# Patient Record
Sex: FEMALE | Race: WHITE | Employment: OTHER | URBAN - METROPOLITAN AREA
[De-identification: names, ages, dates, MRNs, and addresses within clinical notes are randomized per-mention and may not be internally consistent; named-entity substitution may affect disease eponyms.]

---

## 2018-03-22 ENCOUNTER — HOSPITAL ENCOUNTER (OUTPATIENT)
Age: 63
Discharge: HOME | End: 2018-03-22
Payer: COMMERCIAL

## 2018-03-22 LAB
ALBUMIN SERPL-MCNC: 3.3 G/DL (ref 3.5–5.2)
ALP BLD-CCNC: 87 U/L (ref 35–104)
ALT SERPL-CCNC: 11 U/L (ref 0–32)
ANION GAP SERPL CALCULATED.3IONS-SCNC: 14 MMOL/L (ref 7–16)
AST SERPL-CCNC: 15 U/L (ref 0–31)
BASOPHILS ABSOLUTE: 0.04 E9/L (ref 0–0.2)
BASOPHILS RELATIVE PERCENT: 0.6 % (ref 0–2)
BILIRUB SERPL-MCNC: 0.4 MG/DL (ref 0–1.2)
BUN BLDV-MCNC: 10 MG/DL (ref 8–23)
CALCIUM SERPL-MCNC: 9.4 MG/DL (ref 8.6–10.2)
CHLORIDE BLD-SCNC: 99 MMOL/L (ref 98–107)
CHOLESTEROL, TOTAL: 174 MG/DL (ref 0–199)
CO2: 31 MMOL/L (ref 22–29)
CREAT SERPL-MCNC: 1 MG/DL (ref 0.5–1)
EOSINOPHILS ABSOLUTE: 0.08 E9/L (ref 0.05–0.5)
EOSINOPHILS RELATIVE PERCENT: 1.2 % (ref 0–6)
GFR AFRICAN AMERICAN: >60
GFR NON-AFRICAN AMERICAN: 56 ML/MIN/1.73
GLUCOSE BLD-MCNC: 194 MG/DL (ref 74–109)
HCT VFR BLD CALC: 42.2 % (ref 34–48)
HDLC SERPL-MCNC: 30 MG/DL
HEMOGLOBIN: 13.6 G/DL (ref 11.5–15.5)
IMMATURE GRANULOCYTES #: 0.01 E9/L
IMMATURE GRANULOCYTES %: 0.1 % (ref 0–5)
LDL CHOLESTEROL CALCULATED: 98 MG/DL (ref 0–99)
LYMPHOCYTES ABSOLUTE: 2.79 E9/L (ref 1.5–4)
LYMPHOCYTES RELATIVE PERCENT: 41.5 % (ref 20–42)
MCH RBC QN AUTO: 32.3 PG (ref 26–35)
MCHC RBC AUTO-ENTMCNC: 32.2 % (ref 32–34.5)
MCV RBC AUTO: 100.2 FL (ref 80–99.9)
MONOCYTES ABSOLUTE: 0.64 E9/L (ref 0.1–0.95)
MONOCYTES RELATIVE PERCENT: 9.5 % (ref 2–12)
NEUTROPHILS ABSOLUTE: 3.17 E9/L (ref 1.8–7.3)
NEUTROPHILS RELATIVE PERCENT: 47.1 % (ref 43–80)
PDW BLD-RTO: 13.4 FL (ref 11.5–15)
PLATELET # BLD: 192 E9/L (ref 130–450)
PMV BLD AUTO: 11.5 FL (ref 7–12)
POTASSIUM SERPL-SCNC: 4.2 MMOL/L (ref 3.5–5)
RBC # BLD: 4.21 E12/L (ref 3.5–5.5)
SODIUM BLD-SCNC: 144 MMOL/L (ref 132–146)
TOTAL PROTEIN: 7.2 G/DL (ref 6.4–8.3)
TRIGL SERPL-MCNC: 230 MG/DL (ref 0–149)
TSH SERPL DL<=0.05 MIU/L-ACNC: 1.64 UIU/ML (ref 0.27–4.2)
VITAMIN D 25-HYDROXY: 57 NG/ML (ref 30–100)
VLDLC SERPL CALC-MCNC: 46 MG/DL
WBC # BLD: 6.7 E9/L (ref 4.5–11.5)

## 2018-03-22 PROCEDURE — 82306 VITAMIN D 25 HYDROXY: CPT

## 2018-03-22 PROCEDURE — 84443 ASSAY THYROID STIM HORMONE: CPT

## 2018-03-22 PROCEDURE — 80061 LIPID PANEL: CPT

## 2018-03-22 PROCEDURE — 80053 COMPREHEN METABOLIC PANEL: CPT

## 2018-03-22 PROCEDURE — 36415 COLL VENOUS BLD VENIPUNCTURE: CPT

## 2018-03-22 PROCEDURE — 85025 COMPLETE CBC W/AUTO DIFF WBC: CPT

## 2018-05-26 ENCOUNTER — HOSPITAL ENCOUNTER (OUTPATIENT)
Dept: MRI IMAGING | Age: 63
Discharge: HOME | End: 2018-05-28
Payer: COMMERCIAL

## 2018-05-26 ENCOUNTER — HOSPITAL ENCOUNTER (OUTPATIENT)
Age: 63
Discharge: HOME | End: 2018-05-26
Payer: COMMERCIAL

## 2018-05-26 LAB
BUN BLDV-MCNC: 10 MG/DL (ref 8–23)
CREAT SERPL-MCNC: 0.8 MG/DL (ref 0.5–1)
GFR AFRICAN AMERICAN: >60
GFR NON-AFRICAN AMERICAN: >60 ML/MIN/1.73

## 2018-05-26 PROCEDURE — 36415 COLL VENOUS BLD VENIPUNCTURE: CPT

## 2018-05-26 PROCEDURE — 82565 ASSAY OF CREATININE: CPT

## 2018-05-26 PROCEDURE — 84520 ASSAY OF UREA NITROGEN: CPT

## 2018-05-26 PROCEDURE — 70540 MRI ORBIT/FACE/NECK W/O DYE: CPT

## 2018-06-26 ENCOUNTER — TELEPHONE (OUTPATIENT)
Dept: CARDIOLOGY CLINIC | Age: 63
End: 2018-06-26

## 2018-07-23 ENCOUNTER — APPOINTMENT (OUTPATIENT)
Dept: GENERAL RADIOLOGY | Age: 63
DRG: 056 | End: 2018-07-23
Payer: MEDICARE

## 2018-07-23 ENCOUNTER — APPOINTMENT (OUTPATIENT)
Dept: CT IMAGING | Age: 63
DRG: 056 | End: 2018-07-23
Payer: MEDICARE

## 2018-07-23 ENCOUNTER — HOSPITAL ENCOUNTER (INPATIENT)
Age: 63
LOS: 11 days | Discharge: NURSING, REHAB OR LONG-TERM CARE | DRG: 056 | End: 2018-08-03
Attending: EMERGENCY MEDICINE | Admitting: INTERNAL MEDICINE
Payer: MEDICARE

## 2018-07-23 DIAGNOSIS — G91.2 NORMAL PRESSURE HYDROCEPHALUS (HCC): ICD-10-CM

## 2018-07-23 DIAGNOSIS — R29.90 STROKE-LIKE SYMPTOMS: Primary | ICD-10-CM

## 2018-07-23 DIAGNOSIS — M62.838 MUSCLE SPASMS OF BOTH LOWER EXTREMITIES: ICD-10-CM

## 2018-07-23 DIAGNOSIS — R52 PAIN: ICD-10-CM

## 2018-07-23 PROBLEM — R27.0 ATAXIA: Status: ACTIVE | Noted: 2018-07-23

## 2018-07-23 PROBLEM — N18.30 CKD (CHRONIC KIDNEY DISEASE) STAGE 3, GFR 30-59 ML/MIN (HCC): Status: ACTIVE | Noted: 2018-07-23

## 2018-07-23 PROBLEM — G45.9 TIA (TRANSIENT ISCHEMIC ATTACK): Status: ACTIVE | Noted: 2018-07-23

## 2018-07-23 PROBLEM — I27.20 PULMONARY HYPERTENSION (HCC): Status: ACTIVE | Noted: 2018-07-23

## 2018-07-23 PROBLEM — R09.02 HYPOXIA: Status: ACTIVE | Noted: 2018-07-23

## 2018-07-23 PROBLEM — R48.2 APRAXIA: Status: ACTIVE | Noted: 2018-07-23

## 2018-07-23 LAB
ALBUMIN SERPL-MCNC: 3.2 G/DL (ref 3.5–5.2)
ALP BLD-CCNC: 98 U/L (ref 35–104)
ALT SERPL-CCNC: 14 U/L (ref 0–32)
ANION GAP SERPL CALCULATED.3IONS-SCNC: 13 MMOL/L (ref 7–16)
APTT: <20 SEC (ref 24.5–35.1)
AST SERPL-CCNC: 26 U/L (ref 0–31)
BACTERIA: ABNORMAL /HPF
BASOPHILS ABSOLUTE: 0.06 E9/L (ref 0–0.2)
BASOPHILS RELATIVE PERCENT: 0.8 % (ref 0–2)
BILIRUB SERPL-MCNC: 1.1 MG/DL (ref 0–1.2)
BILIRUBIN URINE: NEGATIVE
BLOOD, URINE: NEGATIVE
BUN BLDV-MCNC: 23 MG/DL (ref 8–23)
CALCIUM SERPL-MCNC: 8.8 MG/DL (ref 8.6–10.2)
CHLORIDE BLD-SCNC: 87 MMOL/L (ref 98–107)
CLARITY: CLEAR
CO2: 37 MMOL/L (ref 22–29)
COLOR: YELLOW
CREAT SERPL-MCNC: 1.1 MG/DL (ref 0.5–1)
EKG ATRIAL RATE: 78 BPM
EKG P AXIS: 79 DEGREES
EKG P-R INTERVAL: 146 MS
EKG Q-T INTERVAL: 448 MS
EKG QRS DURATION: 146 MS
EKG QTC CALCULATION (BAZETT): 510 MS
EKG R AXIS: -2 DEGREES
EKG T AXIS: -159 DEGREES
EKG VENTRICULAR RATE: 78 BPM
EOSINOPHILS ABSOLUTE: 0.02 E9/L (ref 0.05–0.5)
EOSINOPHILS RELATIVE PERCENT: 0.3 % (ref 0–6)
EPITHELIAL CELLS, UA: ABNORMAL /HPF
GFR AFRICAN AMERICAN: >60
GFR NON-AFRICAN AMERICAN: 50 ML/MIN/1.73
GLUCOSE BLD-MCNC: 253 MG/DL (ref 74–109)
GLUCOSE URINE: NEGATIVE MG/DL
HCT VFR BLD CALC: 35.8 % (ref 34–48)
HEMOGLOBIN: 11.2 G/DL (ref 11.5–15.5)
IMMATURE GRANULOCYTES #: 0.04 E9/L
IMMATURE GRANULOCYTES %: 0.6 % (ref 0–5)
INR BLD: 1.5
KETONES, URINE: NEGATIVE MG/DL
LEUKOCYTE ESTERASE, URINE: NEGATIVE
LYMPHOCYTES ABSOLUTE: 2.45 E9/L (ref 1.5–4)
LYMPHOCYTES RELATIVE PERCENT: 33.9 % (ref 20–42)
MCH RBC QN AUTO: 28.6 PG (ref 26–35)
MCHC RBC AUTO-ENTMCNC: 31.3 % (ref 32–34.5)
MCV RBC AUTO: 91.6 FL (ref 80–99.9)
MONOCYTES ABSOLUTE: 0.69 E9/L (ref 0.1–0.95)
MONOCYTES RELATIVE PERCENT: 9.6 % (ref 2–12)
NEUTROPHILS ABSOLUTE: 3.96 E9/L (ref 1.8–7.3)
NEUTROPHILS RELATIVE PERCENT: 54.8 % (ref 43–80)
NITRITE, URINE: NEGATIVE
PDW BLD-RTO: 18.3 FL (ref 11.5–15)
PH UA: 5.5 (ref 5–9)
PLATELET # BLD: 191 E9/L (ref 130–450)
PMV BLD AUTO: 12.1 FL (ref 7–12)
POTASSIUM SERPL-SCNC: 4 MMOL/L (ref 3.5–5)
PROTEIN UA: 30 MG/DL
PROTHROMBIN TIME: 16.5 SEC (ref 9.3–12.4)
RBC # BLD: 3.91 E12/L (ref 3.5–5.5)
RBC UA: ABNORMAL /HPF (ref 0–2)
SODIUM BLD-SCNC: 137 MMOL/L (ref 132–146)
SPECIFIC GRAVITY UA: 1.02 (ref 1–1.03)
TOTAL PROTEIN: 7.2 G/DL (ref 6.4–8.3)
TROPONIN: <0.01 NG/ML (ref 0–0.03)
UROBILINOGEN, URINE: 4 E.U./DL
WBC # BLD: 7.2 E9/L (ref 4.5–11.5)
WBC UA: ABNORMAL /HPF (ref 0–5)

## 2018-07-23 PROCEDURE — 70450 CT HEAD/BRAIN W/O DYE: CPT

## 2018-07-23 PROCEDURE — 85025 COMPLETE CBC W/AUTO DIFF WBC: CPT

## 2018-07-23 PROCEDURE — 81001 URINALYSIS AUTO W/SCOPE: CPT

## 2018-07-23 PROCEDURE — 2060000000 HC ICU INTERMEDIATE R&B

## 2018-07-23 PROCEDURE — 36415 COLL VENOUS BLD VENIPUNCTURE: CPT

## 2018-07-23 PROCEDURE — 80053 COMPREHEN METABOLIC PANEL: CPT

## 2018-07-23 PROCEDURE — 2580000003 HC RX 258: Performed by: EMERGENCY MEDICINE

## 2018-07-23 PROCEDURE — 6360000004 HC RX CONTRAST MEDICATION: Performed by: RADIOLOGY

## 2018-07-23 PROCEDURE — 85730 THROMBOPLASTIN TIME PARTIAL: CPT

## 2018-07-23 PROCEDURE — 71275 CT ANGIOGRAPHY CHEST: CPT

## 2018-07-23 PROCEDURE — 99285 EMERGENCY DEPT VISIT HI MDM: CPT

## 2018-07-23 PROCEDURE — 71045 X-RAY EXAM CHEST 1 VIEW: CPT

## 2018-07-23 PROCEDURE — 84484 ASSAY OF TROPONIN QUANT: CPT

## 2018-07-23 PROCEDURE — 94761 N-INVAS EAR/PLS OXIMETRY MLT: CPT

## 2018-07-23 PROCEDURE — 85610 PROTHROMBIN TIME: CPT

## 2018-07-23 RX ORDER — 0.9 % SODIUM CHLORIDE 0.9 %
1000 INTRAVENOUS SOLUTION INTRAVENOUS ONCE
Status: COMPLETED | OUTPATIENT
Start: 2018-07-23 | End: 2018-07-23

## 2018-07-23 RX ORDER — FUROSEMIDE 20 MG/1
20 TABLET ORAL DAILY PRN
Status: ON HOLD | COMMUNITY
End: 2018-08-03 | Stop reason: HOSPADM

## 2018-07-23 RX ORDER — OXYBUTYNIN CHLORIDE 10 MG/1
10 TABLET, EXTENDED RELEASE ORAL DAILY
COMMUNITY

## 2018-07-23 RX ORDER — URSODIOL 300 MG/1
300 CAPSULE ORAL
Status: ON HOLD | COMMUNITY
End: 2018-08-03 | Stop reason: HOSPADM

## 2018-07-23 RX ORDER — FLUTICASONE PROPIONATE 110 UG/1
1 AEROSOL, METERED RESPIRATORY (INHALATION) 2 TIMES DAILY PRN
Status: ON HOLD | COMMUNITY
End: 2018-08-03 | Stop reason: HOSPADM

## 2018-07-23 RX ORDER — FLUTICASONE PROPIONATE 50 MCG
1 SPRAY, SUSPENSION (ML) NASAL DAILY PRN
COMMUNITY
End: 2022-10-04

## 2018-07-23 RX ORDER — SODIUM CHLORIDE 0.9 % (FLUSH) 0.9 %
10 SYRINGE (ML) INJECTION PRN
Status: DISCONTINUED | OUTPATIENT
Start: 2018-07-23 | End: 2018-07-27

## 2018-07-23 RX ADMIN — SODIUM CHLORIDE 1000 ML: 9 INJECTION, SOLUTION INTRAVENOUS at 19:16

## 2018-07-23 RX ADMIN — IOPAMIDOL 60 ML: 755 INJECTION, SOLUTION INTRAVENOUS at 18:30

## 2018-07-23 ASSESSMENT — ENCOUNTER SYMPTOMS
VOMITING: 0
BLOOD IN STOOL: 0
BACK PAIN: 0
ABDOMINAL PAIN: 0
SHORTNESS OF BREATH: 1
COUGH: 0
NAUSEA: 0

## 2018-07-23 NOTE — ED NOTES
Called to CT. Patient pulled IV out and holding it in her hand they stated.       Marty Mathis RN  07/23/18 [id number]

## 2018-07-23 NOTE — ED PROVIDER NOTES
respiratory distress. She has no wheezes. She has no rales. Abdominal: Soft. Bowel sounds are normal. There is no tenderness. There is no rebound and no guarding. Musculoskeletal: She exhibits no edema. Neurological: She is alert and oriented to person, place, and time. No cranial nerve deficit. Coordination normal.   Finger to nose ataxia,  slurred speech no aphasia   Skin: Skin is warm and dry. She is not diaphoretic. Nursing note and vitals reviewed. Procedures    MDM  Number of Diagnoses or Management Options  Diagnosis management comments: Patient evaluated for speech difficulties weakness and ataxia considering possible TIA or stroke. Patient's oxygen saturation requiring an additional O2 compared to normal. Will evaluate lungs the chest x-ray, labs. 4:00 patient has chest x-ray findings consistent with thoracic aortic dissection. Radiologist spoke with Dr. Dinorah Villafuerte. Ordered CTA chest to evaluate for possible dissection. Patient's symptoms are unchanged at this time      9:40 Reevaluated the patient. Notfified of imaging results. No PE or dissection present on scan. Will admit for stroke like symptoms. Speech improving, leg twitching still present. 9:45 Spoke with Dr Dong Gerard. He will admit the Patient.     --------------------------------------------- PAST HISTORY ---------------------------------------------  Past Medical History:  has a past medical history of Apraxia; Ataxia; CAD (coronary artery disease); CAD (coronary artery disease); Choledocholithiasis; CKD (chronic kidney disease) stage 3, GFR 30-59 ml/min; COPD (chronic obstructive pulmonary disease) ([facility] [address] Hyperlipidemia; Hypoxia; Pleural effusion; Pulmonary hypertension; Restless legs; S/P CABG x 2; and Smoker. Past Surgical History:  has a past surgical history that includes Coronary artery bypass graft (10/30/2002); Cholecystectomy (2002); cervical fusion (1999); Hysterectomy (1988); and Cardiac surgery.     Social History: reports that she has been smoking Cigarettes. She has a 20.00 pack-year smoking history. She has never used smokeless tobacco. She reports that she does not drink alcohol or use drugs. Family History: family history includes Diabetes in her mother; Emphysema in her father; Heart Attack in her sister; Heart Failure in her sister; High Blood Pressure in her mother. The patients home medications have been reviewed.     Allergies: Pentazocine lactate and Sulfa antibiotics    -------------------------------------------------- RESULTS -------------------------------------------------    LABS:  Results for orders placed or performed during the hospital encounter of 07/23/18   Respiratory Panel, Film Array   Result Value Ref Range    Film Array Adenovirus       Result: Not Detected  *  *  Normal Range: Not Detected      Film Array Coronavirus HKU1       Result: Not Detected  *  *  Normal Range: Not Detected      Film Array Conoravirus NL63       Result: Not Detected  *  *  Normal Range: Not Detected      Film Array Coronavirus 229E       Result: Not Detected  *  *  Normal Range: Not Detected      Film Array Coronavirus OC43       Result: Not Detected  *  *  Normal Range: Not Detected      Film Array Influenza A Virus       Result: Not Detected  *  *  Normal Range: Not Detected      Film Array Influenza A Virus H1       Result: Not Detected  *  *  Normal Range: Not Detected      Film Array Influenza A Virus 09H1       Result: Not Detected  *  *  Normal Range: Not Detected      Film Array Influenza A Virus H3       Result: Not Detected  *  *  Normal Range: Not Detected      Film Array Influenza B       Result: Not Detected  *  *  Normal Range: Not Detected      Film Array Metapneumovirus       Result: Not Detected  *  *  Normal Range: Not Detected      Film Array Parainfluenza Virus 1       Result: Not Detected  *  *  Normal Range: Not Detected      Film Array Parainfluenza Virus 2       Result: Not 93.4 80.0 - 99.9 fL    MCH 28.3 26.0 - 35.0 pg    MCHC 30.3 (L) 32.0 - 34.5 %    RDW 18.0 (H) 11.5 - 15.0 fL    Platelets 564 067 - 924 E9/L    MPV 11.9 7.0 - 12.0 fL    Neutrophils % 47.6 43.0 - 80.0 %    Immature Granulocytes % 0.2 0.0 - 5.0 %    Lymphocytes % 39.8 20.0 - 42.0 %    Monocytes % 10.4 2.0 - 12.0 %    Eosinophils % 0.9 0.0 - 6.0 %    Basophils % 1.1 0.0 - 2.0 %    Neutrophils # 2.62 1.80 - 7.30 E9/L    Immature Granulocytes # 0.01 E9/L    Lymphocytes # 2.19 1.50 - 4.00 E9/L    Monocytes # 0.57 0.10 - 0.95 E9/L    Eosinophils # 0.05 0.05 - 0.50 E9/L    Basophils # 0.06 0.00 - 0.20 E9/L   Brain Natriuretic Peptide   Result Value Ref Range    Pro-BNP 5,034 (H) 0 - 125 pg/mL   Hemoglobin A1C   Result Value Ref Range    Hemoglobin A1C 10.4 (H) 4.0 - 5.6 %   Lipid Panel   Result Value Ref Range    Cholesterol, Total 92 0 - 199 mg/dL    Triglycerides 108 0 - 149 mg/dL    HDL 21 >40 mg/dL    LDL Calculated 49 0 - 99 mg/dL    VLDL Cholesterol Calculated 22 mg/dL   Phosphorus   Result Value Ref Range    Phosphorus 2.9 2.5 - 4.5 mg/dL   Vitamin B12 & Folate   Result Value Ref Range    Vitamin B-12 1123 (H) 211 - 946 pg/mL    Folate 5.5 4.8 - 24.2 ng/mL   Troponin   Result Value Ref Range    Troponin <0.01 0.00 - 0.03 ng/mL   Troponin   Result Value Ref Range    Troponin <0.01 0.00 - 0.03 ng/mL   EKG 12 Lead   Result Value Ref Range    Ventricular Rate 78 BPM    Atrial Rate 78 BPM    P-R Interval 146 ms    QRS Duration 146 ms    Q-T Interval 448 ms    QTc Calculation (Bazett) 510 ms    P Axis 79 degrees    R Axis -2 degrees    T Axis -159 degrees       RADIOLOGY:  Ct Head Wo Contrast    Result Date: 2018  Patient MRN:  [id number] : 1955 Age: 60 years Gender: Female Order Date:  2018 3:00 PM EXAM: CT HEAD WO CONTRAST NUMBER OF IMAGES:  101 INDICATION: Difficulty walking and speaking Technique: Multiple contiguous 5 mm axial images were obtained from the skull base to the vertex without workstation, using maximum intensity projection (MIP) and volume rendered (3D) datasets. Low-dose CT  acquisition technique included one of following options: 1. Automated exposure control 2. Adjustment of MA and or KV according to patient's size or 3. Use of iterative reconstruction. Comparison: No prior studies available for comparison. Findings: Pulmonary vasculature: There is no evidence of filling defect in the main pulmonary artery, right or left pulmonary arteries, or segmental branches of the pulmonary arteries to suggest pulmonary embolism. Evaluation of the subsegmental branches is limited due to respiratory artifact. Lung parenchyma and pleura: The tracheobronchial tree is midline and the central bronchi are patent. There are moderate centrilobular bullous on this changes the lungs bilaterally, most prominently involving the upper lobes. There is a calcified pleural plaque in the posterior aspect of the superior segment of the left lower lobe (series 5, image 17). There is no definite pleural or parenchymal mass lesion. There is no focal consolidation, pleural effusion or pneumothorax. There are mild patchy bibasal dependent pulmonary parenchymal opacities consistent with mild atelectatic change. Thyroid: Demonstrates homogeneous enhancement. Mediastinum: No significant lymphadenopathy. Ruth: No significant lymphadenopathy. Heart and pericardium: The heart is enlarged. There is no pericardial effusion. There are moderate coronary artery calcifications. There is dilatation of the main pulmonary artery and, measuring 3.4 cm in maximum diameter (series 4, image 59). There is dilatation of the right pulmonary artery, measuring 2.8 cm in maximum diameter (series 4, image 64). There is dilatation of the left pulmonary artery, measuring 2.9 cm in maximum diameter (series 4, image 61). Chest wall: Midline sternotomy wires are identified, prior sternotomy. Axilla: No enlarged lymph nodes.  Visualized upper abdomen:

## 2018-07-24 ENCOUNTER — APPOINTMENT (OUTPATIENT)
Dept: ULTRASOUND IMAGING | Age: 63
DRG: 056 | End: 2018-07-24
Payer: MEDICARE

## 2018-07-24 LAB
ALBUMIN SERPL-MCNC: 2.6 G/DL (ref 3.5–5.2)
ALP BLD-CCNC: 82 U/L (ref 35–104)
ALT SERPL-CCNC: 11 U/L (ref 0–32)
ANION GAP SERPL CALCULATED.3IONS-SCNC: 12 MMOL/L (ref 7–16)
AST SERPL-CCNC: 16 U/L (ref 0–31)
BASOPHILS ABSOLUTE: 0.06 E9/L (ref 0–0.2)
BASOPHILS RELATIVE PERCENT: 1.1 % (ref 0–2)
BILIRUB SERPL-MCNC: 1 MG/DL (ref 0–1.2)
BUN BLDV-MCNC: 17 MG/DL (ref 8–23)
CALCIUM SERPL-MCNC: 8.2 MG/DL (ref 8.6–10.2)
CHLORIDE BLD-SCNC: 94 MMOL/L (ref 98–107)
CHOLESTEROL, TOTAL: 92 MG/DL (ref 0–199)
CO2: 34 MMOL/L (ref 22–29)
CREAT SERPL-MCNC: 1 MG/DL (ref 0.5–1)
EOSINOPHILS ABSOLUTE: 0.05 E9/L (ref 0.05–0.5)
EOSINOPHILS RELATIVE PERCENT: 0.9 % (ref 0–6)
FILM ARRAY ADENOVIRUS: NORMAL
FILM ARRAY BORDETELLA PERTUSSIS: NORMAL
FILM ARRAY CHLAMYDOPHILIA PNEUMONIAE: NORMAL
FILM ARRAY CORONAVIRUS 229E: NORMAL
FILM ARRAY CORONAVIRUS HKU1: NORMAL
FILM ARRAY CORONAVIRUS NL63: NORMAL
FILM ARRAY CORONAVIRUS OC43: NORMAL
FILM ARRAY INFLUENZA A VIRUS 09H1: NORMAL
FILM ARRAY INFLUENZA A VIRUS H1: NORMAL
FILM ARRAY INFLUENZA A VIRUS H3: NORMAL
FILM ARRAY INFLUENZA A VIRUS: NORMAL
FILM ARRAY INFLUENZA B: NORMAL
FILM ARRAY METAPNEUMOVIRUS: NORMAL
FILM ARRAY MYCOPLASMA PNEUMONIAE: NORMAL
FILM ARRAY PARAINFLUENZA VIRUS 1: NORMAL
FILM ARRAY PARAINFLUENZA VIRUS 2: NORMAL
FILM ARRAY PARAINFLUENZA VIRUS 3: NORMAL
FILM ARRAY PARAINFLUENZA VIRUS 4: NORMAL
FILM ARRAY RESPIRATORY SYNCITIAL VIRUS: NORMAL
FILM ARRAY RHINOVIRUS/ENTEROVIRUS: NORMAL
FOLATE: 5.5 NG/ML (ref 4.8–24.2)
GFR AFRICAN AMERICAN: >60
GFR NON-AFRICAN AMERICAN: 56 ML/MIN/1.73
GLUCOSE BLD-MCNC: 123 MG/DL (ref 74–109)
HBA1C MFR BLD: 10.4 % (ref 4–5.6)
HCT VFR BLD CALC: 34 % (ref 34–48)
HDLC SERPL-MCNC: 21 MG/DL
HEMOGLOBIN: 10.3 G/DL (ref 11.5–15.5)
IMMATURE GRANULOCYTES #: 0.01 E9/L
IMMATURE GRANULOCYTES %: 0.2 % (ref 0–5)
LDL CHOLESTEROL CALCULATED: 49 MG/DL (ref 0–99)
LYMPHOCYTES ABSOLUTE: 2.19 E9/L (ref 1.5–4)
LYMPHOCYTES RELATIVE PERCENT: 39.8 % (ref 20–42)
MCH RBC QN AUTO: 28.3 PG (ref 26–35)
MCHC RBC AUTO-ENTMCNC: 30.3 % (ref 32–34.5)
MCV RBC AUTO: 93.4 FL (ref 80–99.9)
MONOCYTES ABSOLUTE: 0.57 E9/L (ref 0.1–0.95)
MONOCYTES RELATIVE PERCENT: 10.4 % (ref 2–12)
NEUTROPHILS ABSOLUTE: 2.62 E9/L (ref 1.8–7.3)
NEUTROPHILS RELATIVE PERCENT: 47.6 % (ref 43–80)
PDW BLD-RTO: 18 FL (ref 11.5–15)
PHOSPHORUS: 2.9 MG/DL (ref 2.5–4.5)
PLATELET # BLD: 181 E9/L (ref 130–450)
PMV BLD AUTO: 11.9 FL (ref 7–12)
POTASSIUM SERPL-SCNC: 2.9 MMOL/L (ref 3.5–5)
PRO-BNP: 5034 PG/ML (ref 0–125)
RBC # BLD: 3.64 E12/L (ref 3.5–5.5)
SODIUM BLD-SCNC: 140 MMOL/L (ref 132–146)
TOTAL PROTEIN: 6.2 G/DL (ref 6.4–8.3)
TRIGL SERPL-MCNC: 108 MG/DL (ref 0–149)
TROPONIN: <0.01 NG/ML (ref 0–0.03)
TROPONIN: <0.01 NG/ML (ref 0–0.03)
TSH SERPL DL<=0.05 MIU/L-ACNC: 2.12 UIU/ML (ref 0.27–4.2)
VITAMIN B-12: 1123 PG/ML (ref 211–946)
VLDLC SERPL CALC-MCNC: 22 MG/DL
WBC # BLD: 5.5 E9/L (ref 4.5–11.5)

## 2018-07-24 PROCEDURE — 80053 COMPREHEN METABOLIC PANEL: CPT

## 2018-07-24 PROCEDURE — 6370000000 HC RX 637 (ALT 250 FOR IP): Performed by: INTERNAL MEDICINE

## 2018-07-24 PROCEDURE — 84484 ASSAY OF TROPONIN QUANT: CPT

## 2018-07-24 PROCEDURE — 87798 DETECT AGENT NOS DNA AMP: CPT

## 2018-07-24 PROCEDURE — 2580000003 HC RX 258: Performed by: EMERGENCY MEDICINE

## 2018-07-24 PROCEDURE — 83036 HEMOGLOBIN GLYCOSYLATED A1C: CPT

## 2018-07-24 PROCEDURE — 87502 INFLUENZA DNA AMP PROBE: CPT

## 2018-07-24 PROCEDURE — 87486 CHLMYD PNEUM DNA AMP PROBE: CPT

## 2018-07-24 PROCEDURE — 2060000000 HC ICU INTERMEDIATE R&B

## 2018-07-24 PROCEDURE — 2700000000 HC OXYGEN THERAPY PER DAY

## 2018-07-24 PROCEDURE — 84100 ASSAY OF PHOSPHORUS: CPT

## 2018-07-24 PROCEDURE — 36415 COLL VENOUS BLD VENIPUNCTURE: CPT

## 2018-07-24 PROCEDURE — 6360000002 HC RX W HCPCS: Performed by: INTERNAL MEDICINE

## 2018-07-24 PROCEDURE — 87581 M.PNEUMON DNA AMP PROBE: CPT

## 2018-07-24 PROCEDURE — 87040 BLOOD CULTURE FOR BACTERIA: CPT

## 2018-07-24 PROCEDURE — 84443 ASSAY THYROID STIM HORMONE: CPT

## 2018-07-24 PROCEDURE — 87503 INFLUENZA DNA AMP PROB ADDL: CPT

## 2018-07-24 PROCEDURE — 83880 ASSAY OF NATRIURETIC PEPTIDE: CPT

## 2018-07-24 PROCEDURE — 80061 LIPID PANEL: CPT

## 2018-07-24 PROCEDURE — 93880 EXTRACRANIAL BILAT STUDY: CPT

## 2018-07-24 PROCEDURE — 82746 ASSAY OF FOLIC ACID SERUM: CPT

## 2018-07-24 PROCEDURE — 82607 VITAMIN B-12: CPT

## 2018-07-24 PROCEDURE — 85025 COMPLETE CBC W/AUTO DIFF WBC: CPT

## 2018-07-24 PROCEDURE — 99222 1ST HOSP IP/OBS MODERATE 55: CPT | Performed by: PSYCHIATRY & NEUROLOGY

## 2018-07-24 PROCEDURE — 2580000003 HC RX 258: Performed by: INTERNAL MEDICINE

## 2018-07-24 PROCEDURE — 87088 URINE BACTERIA CULTURE: CPT

## 2018-07-24 RX ORDER — ISOSORBIDE MONONITRATE 30 MG/1
30 TABLET, EXTENDED RELEASE ORAL DAILY
Status: DISCONTINUED | OUTPATIENT
Start: 2018-07-24 | End: 2018-08-03 | Stop reason: HOSPADM

## 2018-07-24 RX ORDER — ONDANSETRON 2 MG/ML
4 INJECTION INTRAMUSCULAR; INTRAVENOUS EVERY 6 HOURS PRN
Status: DISCONTINUED | OUTPATIENT
Start: 2018-07-24 | End: 2018-08-03 | Stop reason: HOSPADM

## 2018-07-24 RX ORDER — ASPIRIN 81 MG/1
81 TABLET, CHEWABLE ORAL DAILY
Status: DISCONTINUED | OUTPATIENT
Start: 2018-07-24 | End: 2018-07-27

## 2018-07-24 RX ORDER — LORAZEPAM 2 MG/ML
2 INJECTION INTRAMUSCULAR ONCE
Status: COMPLETED | OUTPATIENT
Start: 2018-07-24 | End: 2018-07-25

## 2018-07-24 RX ORDER — OXYBUTYNIN CHLORIDE 10 MG/1
10 TABLET, EXTENDED RELEASE ORAL DAILY
Status: DISCONTINUED | OUTPATIENT
Start: 2018-07-24 | End: 2018-08-03 | Stop reason: HOSPADM

## 2018-07-24 RX ORDER — IBUPROFEN 200 MG
400 TABLET ORAL EVERY 6 HOURS PRN
Status: DISCONTINUED | OUTPATIENT
Start: 2018-07-24 | End: 2018-07-27

## 2018-07-24 RX ORDER — FLUTICASONE PROPIONATE 50 MCG
1 SPRAY, SUSPENSION (ML) NASAL DAILY PRN
Status: DISCONTINUED | OUTPATIENT
Start: 2018-07-24 | End: 2018-08-03 | Stop reason: HOSPADM

## 2018-07-24 RX ORDER — TRAZODONE HYDROCHLORIDE 150 MG/1
150 TABLET ORAL NIGHTLY
Status: DISCONTINUED | OUTPATIENT
Start: 2018-07-24 | End: 2018-08-03 | Stop reason: HOSPADM

## 2018-07-24 RX ORDER — FOLIC ACID 1 MG/1
1 TABLET ORAL DAILY
Status: DISCONTINUED | OUTPATIENT
Start: 2018-07-24 | End: 2018-08-03 | Stop reason: HOSPADM

## 2018-07-24 RX ORDER — POTASSIUM CHLORIDE 20 MEQ/1
20 TABLET, EXTENDED RELEASE ORAL 2 TIMES DAILY WITH MEALS
Status: DISCONTINUED | OUTPATIENT
Start: 2018-07-24 | End: 2018-07-27

## 2018-07-24 RX ORDER — PANTOPRAZOLE SODIUM 40 MG/1
40 TABLET, DELAYED RELEASE ORAL
Status: DISCONTINUED | OUTPATIENT
Start: 2018-07-24 | End: 2018-08-03 | Stop reason: HOSPADM

## 2018-07-24 RX ORDER — OXYCODONE HYDROCHLORIDE AND ACETAMINOPHEN 5; 325 MG/1; MG/1
1 TABLET ORAL EVERY 4 HOURS PRN
Status: DISCONTINUED | OUTPATIENT
Start: 2018-07-24 | End: 2018-07-27

## 2018-07-24 RX ORDER — CARVEDILOL 6.25 MG/1
18.75 TABLET ORAL 2 TIMES DAILY
Status: DISCONTINUED | OUTPATIENT
Start: 2018-07-24 | End: 2018-07-27

## 2018-07-24 RX ORDER — GABAPENTIN 300 MG/1
300 CAPSULE ORAL 3 TIMES DAILY
Status: DISCONTINUED | OUTPATIENT
Start: 2018-07-24 | End: 2018-08-01

## 2018-07-24 RX ORDER — LISINOPRIL 20 MG/1
40 TABLET ORAL DAILY
Status: DISCONTINUED | OUTPATIENT
Start: 2018-07-24 | End: 2018-08-02

## 2018-07-24 RX ORDER — TORSEMIDE 20 MG/1
20 TABLET ORAL DAILY
Status: DISCONTINUED | OUTPATIENT
Start: 2018-07-24 | End: 2018-08-03 | Stop reason: HOSPADM

## 2018-07-24 RX ORDER — SODIUM CHLORIDE 9 MG/ML
INJECTION, SOLUTION INTRAVENOUS CONTINUOUS
Status: DISCONTINUED | OUTPATIENT
Start: 2018-07-24 | End: 2018-07-28

## 2018-07-24 RX ORDER — ACETAMINOPHEN 325 MG/1
650 TABLET ORAL EVERY 4 HOURS PRN
Status: DISCONTINUED | OUTPATIENT
Start: 2018-07-24 | End: 2018-07-27 | Stop reason: SDUPTHER

## 2018-07-24 RX ORDER — ALBUTEROL SULFATE 2.5 MG/3ML
2.5 SOLUTION RESPIRATORY (INHALATION) EVERY 6 HOURS PRN
Status: DISCONTINUED | OUTPATIENT
Start: 2018-07-24 | End: 2018-08-03 | Stop reason: HOSPADM

## 2018-07-24 RX ORDER — FUROSEMIDE 20 MG/1
20 TABLET ORAL DAILY PRN
Status: DISCONTINUED | OUTPATIENT
Start: 2018-07-24 | End: 2018-08-03 | Stop reason: HOSPADM

## 2018-07-24 RX ORDER — BUPROPION HYDROCHLORIDE 300 MG/1
300 TABLET ORAL EVERY MORNING
Status: DISCONTINUED | OUTPATIENT
Start: 2018-07-24 | End: 2018-08-03 | Stop reason: HOSPADM

## 2018-07-24 RX ADMIN — PANTOPRAZOLE SODIUM 40 MG: 40 TABLET, DELAYED RELEASE ORAL at 08:21

## 2018-07-24 RX ADMIN — CARVEDILOL 18.75 MG: 6.25 TABLET, FILM COATED ORAL at 22:03

## 2018-07-24 RX ADMIN — ASPIRIN 81 MG 81 MG: 81 TABLET ORAL at 08:21

## 2018-07-24 RX ADMIN — POTASSIUM CHLORIDE 20 MEQ: 20 TABLET, EXTENDED RELEASE ORAL at 18:14

## 2018-07-24 RX ADMIN — TRAZODONE HYDROCHLORIDE 150 MG: 150 TABLET ORAL at 02:15

## 2018-07-24 RX ADMIN — OXYCODONE HYDROCHLORIDE AND ACETAMINOPHEN 1 TABLET: 5; 325 TABLET ORAL at 19:22

## 2018-07-24 RX ADMIN — BUPROPION HYDROCHLORIDE 300 MG: 300 TABLET, FILM COATED, EXTENDED RELEASE ORAL at 08:22

## 2018-07-24 RX ADMIN — BECLOMETHASONE DIPROPIONATE 4 PUFF: 80 AEROSOL, METERED RESPIRATORY (INHALATION) at 02:17

## 2018-07-24 RX ADMIN — ROPINIROLE HYDROCHLORIDE 3 MG: 2 TABLET, FILM COATED ORAL at 02:15

## 2018-07-24 RX ADMIN — OXYBUTYNIN CHLORIDE 10 MG: 10 TABLET, EXTENDED RELEASE ORAL at 08:22

## 2018-07-24 RX ADMIN — ROPINIROLE HYDROCHLORIDE 3 MG: 2 TABLET, FILM COATED ORAL at 22:04

## 2018-07-24 RX ADMIN — CARVEDILOL 18.75 MG: 6.25 TABLET, FILM COATED ORAL at 08:21

## 2018-07-24 RX ADMIN — SODIUM CHLORIDE: 9 INJECTION, SOLUTION INTRAVENOUS at 01:13

## 2018-07-24 RX ADMIN — BECLOMETHASONE DIPROPIONATE 4 PUFF: 80 AEROSOL, METERED RESPIRATORY (INHALATION) at 19:23

## 2018-07-24 RX ADMIN — OXYCODONE HYDROCHLORIDE AND ACETAMINOPHEN 1 TABLET: 5; 325 TABLET ORAL at 14:48

## 2018-07-24 RX ADMIN — TORSEMIDE 20 MG: 20 TABLET ORAL at 08:21

## 2018-07-24 RX ADMIN — GABAPENTIN 300 MG: 300 CAPSULE ORAL at 08:21

## 2018-07-24 RX ADMIN — ROPINIROLE HYDROCHLORIDE 3 MG: 2 TABLET, FILM COATED ORAL at 08:22

## 2018-07-24 RX ADMIN — LISINOPRIL 40 MG: 20 TABLET ORAL at 08:21

## 2018-07-24 RX ADMIN — OXYCODONE HYDROCHLORIDE AND ACETAMINOPHEN 1 TABLET: 5; 325 TABLET ORAL at 02:15

## 2018-07-24 RX ADMIN — Medication 10 ML: at 08:21

## 2018-07-24 RX ADMIN — BECLOMETHASONE DIPROPIONATE 4 PUFF: 80 AEROSOL, METERED RESPIRATORY (INHALATION) at 09:00

## 2018-07-24 RX ADMIN — ISOSORBIDE MONONITRATE 30 MG: 30 TABLET ORAL at 08:22

## 2018-07-24 RX ADMIN — ENOXAPARIN SODIUM 40 MG: 100 INJECTION SUBCUTANEOUS at 08:21

## 2018-07-24 RX ADMIN — OXYCODONE HYDROCHLORIDE AND ACETAMINOPHEN 1 TABLET: 5; 325 TABLET ORAL at 08:20

## 2018-07-24 RX ADMIN — TRAZODONE HYDROCHLORIDE 150 MG: 150 TABLET ORAL at 22:04

## 2018-07-24 RX ADMIN — SODIUM CHLORIDE: 9 INJECTION, SOLUTION INTRAVENOUS at 21:57

## 2018-07-24 RX ADMIN — CARVEDILOL 18.75 MG: 6.25 TABLET, FILM COATED ORAL at 02:14

## 2018-07-24 RX ADMIN — GABAPENTIN 300 MG: 300 CAPSULE ORAL at 22:03

## 2018-07-24 RX ADMIN — GABAPENTIN 300 MG: 300 CAPSULE ORAL at 13:46

## 2018-07-24 RX ADMIN — IBUPROFEN 400 MG: 200 TABLET, FILM COATED ORAL at 18:14

## 2018-07-24 ASSESSMENT — PAIN DESCRIPTION - ONSET: ONSET: ON-GOING

## 2018-07-24 ASSESSMENT — PAIN DESCRIPTION - LOCATION
LOCATION: BACK;NECK
LOCATION: BACK;NECK

## 2018-07-24 ASSESSMENT — PAIN SCALES - GENERAL
PAINLEVEL_OUTOF10: 9
PAINLEVEL_OUTOF10: 7
PAINLEVEL_OUTOF10: 5

## 2018-07-24 ASSESSMENT — PAIN DESCRIPTION - PAIN TYPE
TYPE: CHRONIC PAIN

## 2018-07-24 ASSESSMENT — PAIN DESCRIPTION - ORIENTATION: ORIENTATION: MID;UPPER

## 2018-07-24 ASSESSMENT — PAIN DESCRIPTION - DESCRIPTORS
DESCRIPTORS: ACHING;CONSTANT;DISCOMFORT
DESCRIPTORS: ACHING;DISCOMFORT;DULL

## 2018-07-24 ASSESSMENT — PAIN DESCRIPTION - PROGRESSION: CLINICAL_PROGRESSION: NOT CHANGED

## 2018-07-24 ASSESSMENT — PAIN DESCRIPTION - FREQUENCY: FREQUENCY: CONTINUOUS
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
## 2018-07-24 NOTE — PLAN OF CARE
Problem: Nutrition  Goal: Optimal nutrition therapy  Outcome: Ongoing  Nutrition Problem: Inadequate oral intake  Nutritional Goals: Consume >75% meals

## 2018-07-24 NOTE — PROGRESS NOTES
Monitoring, Education Initiated, Coordination of Care (Reviewed current diet order)    Nutrition Evaluation:   · Evaluation: Goals set   · Goals: Consume >75% meals    · Monitoring: Meal Intake, Fluid Balance, Ascites/Edema, Weight, Comparative Standards, Pertinent Labs    See Adult Nutrition Doc Flowsheet for more detail.      Electronically signed by Una Dawn RD, RADHA on 7/24/18 at 4:39 PM    Contact Number: [phone]

## 2018-07-24 NOTE — H&P
[address]                   [address] [facility],  [facility]                               HISTORY AND PHYSICAL    PATIENT NAME: Niels Lin                      :        1955  MED REC NO:   [id number]                            ROOM:       8503  ACCOUNT NO:   [de-identified]                           ADMIT DATE: 2018  PROVIDER:     Darian Fragoso DO    A 79-year-old female, patient of Dr. Pretty Harrington. I am asked to admit  and follow. Birth date 1955. CHIEF COMPLAINT:  Frequent falls, apraxia. HISTORY OF PRESENT ILLNESS:  A 79-year-old female who was sent from Dr. Roya Conde office to the ED because of severe worsening of slurred  speech, frequent falls, and difficulty thinking. Possibility of TIA verus  CVA. In the ED, the patient did undergo examination as well as CT of the brain  which showed no evidence of acute hemorrhage. There was noted to be  cerebral volume loss, trace microvascular disease, possibility of normal  pressure hydrocephalus. Chest x-ray showed enlarged cardiomediastinal  silhouette, basilar infiltrates, small effusion. She also underwent CT of  chest.  There was no evidence of pulmonary embolus, no focal  calcifications, moderate emphysema, calcified plaque posterior lung,  compatible with history of asbestos exposure; cardiomegaly. She is admitted for further evaluation and treatment of the above. History is obtained from the patient as well as . This is _____  current HPI.  states that he has noticed that the patient has been  falling more, at least once or twice a week for the last three months. He  has noticed more slurred speech for the last three months. The patient  admits she has fallen out of bed at least once a week perhaps more for the  last three to four months.   The patient states she has also had  intermittent chest discomfort, chest pain off and on for the fusion done at age 39. PHYSICAL EXAMINATION:  GENERAL:  A 41-year-old female, sitting quietly in bed, somewhat slow with  speech. No evidence of any slurred speech.  agrees the patient is  not as sharp with her speech as she was three to four months ago. VITAL SIGNS:  Temperature 98.5, respirations 18, pulse 88, blood pressure  142/69. HEENT:  Pupils equally round, reactive to light. Extraocular muscles are  intact. Sclerae white. Conjunctivae pink. Ears, nose, throat, negative  for discharge, drainage, deformity. NECK:  Supple. No masses or megaly. No bruits. Trachea midline. Thyroid  negative. No adenopathy. HEART:  Regular rate and rhythm. No murmur, gallop, or rub. LUNGS:  Clear to auscultation bilaterally. Nasal cannula oxygen in place  at this time. ABDOMEN:  Soft, nontender. No masses, megaly, rebound, or guarding. Liver  and spleen negative. Bowel sounds are normal.  EXTREMITIES:  Peripheral pulses decreased. Muscular strength equal and  normal bilaterally. She apparently does have trouble standing, off balance  which leads to her falls. LABORATORY DATA:  Monitor reviewed, has been in sinus since admission. Sodium 140, potassium 2.9, chloride 94, CO2 34, BUN 17, creatinine 1.0,  glucose 123, calcium 8.2, protein 6.2. ProBNP [id number]. LDL 49. Troponin is  negative x3. Albumin 2.6. Liver function is normal.  Hemoglobin 10.3, WBC  5.5. G40 is normal.  Folic acid is minimally normal at 5.5. Urinalysis,  again leukocytes esterase, negative nitrites. ASSESSMENT:  1. The patient with frequent episodes of falling down. Cannot exclude  cerebellar degeneration. Await the results of MRI. 2.  Possibility of vascular disease, causing the above. 3.  Equivocal normal pressure hydrocephalus. 4.  Equivocal peripheral neuropathy. If hemoglobin A1c is accurate, cannot  exclude diabetic peripheral neuropathy. 5.  Hypoxia, requiring nocturnal oxygen.   6.  Coronary artery disease with previous bypass. 7.  Pulmonary hypertension. 8.  Chronic kidney disease stage III. 9.  Hypokalemia. 10.  Borderline folic acid deficiency. PLAN:  Replace folic acid. Replace potassium. MRI of brain to be  completed. Valium to be given before MRI. The patient hoping for  discharge soon. Repeat A1c.         Adrian Kirkland DO    D: 07/24/2018 12:54:43       T: 07/24/2018 14:31:16     [email or web address]  Job#: [id number]     Doc#: [id number]    CC:

## 2018-07-25 ENCOUNTER — APPOINTMENT (OUTPATIENT)
Dept: MRI IMAGING | Age: 63
DRG: 056 | End: 2018-07-25
Payer: MEDICARE

## 2018-07-25 PROBLEM — J96.22 ACUTE ON CHRONIC RESPIRATORY FAILURE WITH HYPOXIA AND HYPERCAPNIA (HCC): Status: ACTIVE | Noted: 2018-07-25

## 2018-07-25 PROBLEM — J96.21 ACUTE ON CHRONIC RESPIRATORY FAILURE WITH HYPOXIA AND HYPERCAPNIA (HCC): Status: ACTIVE | Noted: 2018-07-25

## 2018-07-25 LAB
ANION GAP SERPL CALCULATED.3IONS-SCNC: 10 MMOL/L (ref 7–16)
BUN BLDV-MCNC: 16 MG/DL (ref 8–23)
CALCIUM SERPL-MCNC: 8.2 MG/DL (ref 8.6–10.2)
CHLORIDE BLD-SCNC: 100 MMOL/L (ref 98–107)
CO2: 33 MMOL/L (ref 22–29)
CREAT SERPL-MCNC: 1 MG/DL (ref 0.5–1)
GFR AFRICAN AMERICAN: >60
GFR NON-AFRICAN AMERICAN: 56 ML/MIN/1.73
GLUCOSE BLD-MCNC: 171 MG/DL (ref 74–109)
MAGNESIUM: 1.4 MG/DL (ref 1.6–2.6)
POTASSIUM REFLEX MAGNESIUM: 3.3 MMOL/L (ref 3.5–5)
SODIUM BLD-SCNC: 143 MMOL/L (ref 132–146)

## 2018-07-25 PROCEDURE — 83735 ASSAY OF MAGNESIUM: CPT

## 2018-07-25 PROCEDURE — 2700000000 HC OXYGEN THERAPY PER DAY

## 2018-07-25 PROCEDURE — 2060000000 HC ICU INTERMEDIATE R&B

## 2018-07-25 PROCEDURE — 80048 BASIC METABOLIC PNL TOTAL CA: CPT

## 2018-07-25 PROCEDURE — 6360000002 HC RX W HCPCS: Performed by: INTERNAL MEDICINE

## 2018-07-25 PROCEDURE — G8982 BODY POS GOAL STATUS: HCPCS | Performed by: PHYSICAL THERAPIST

## 2018-07-25 PROCEDURE — 97161 PT EVAL LOW COMPLEX 20 MIN: CPT | Performed by: PHYSICAL THERAPIST

## 2018-07-25 PROCEDURE — 6370000000 HC RX 637 (ALT 250 FOR IP): Performed by: INTERNAL MEDICINE

## 2018-07-25 PROCEDURE — 97167 OT EVAL HIGH COMPLEX 60 MIN: CPT

## 2018-07-25 PROCEDURE — 97535 SELF CARE MNGMENT TRAINING: CPT

## 2018-07-25 PROCEDURE — 36415 COLL VENOUS BLD VENIPUNCTURE: CPT

## 2018-07-25 PROCEDURE — G8987 SELF CARE CURRENT STATUS: HCPCS

## 2018-07-25 PROCEDURE — 72141 MRI NECK SPINE W/O DYE: CPT

## 2018-07-25 PROCEDURE — 70551 MRI BRAIN STEM W/O DYE: CPT

## 2018-07-25 PROCEDURE — G8981 BODY POS CURRENT STATUS: HCPCS | Performed by: PHYSICAL THERAPIST

## 2018-07-25 PROCEDURE — 99232 SBSQ HOSP IP/OBS MODERATE 35: CPT | Performed by: NURSE PRACTITIONER

## 2018-07-25 PROCEDURE — G8988 SELF CARE GOAL STATUS: HCPCS

## 2018-07-25 RX ORDER — MAGNESIUM SULFATE IN WATER 40 MG/ML
2 INJECTION, SOLUTION INTRAVENOUS ONCE
Status: COMPLETED | OUTPATIENT
Start: 2018-07-25 | End: 2018-07-25

## 2018-07-25 RX ADMIN — OXYCODONE HYDROCHLORIDE AND ACETAMINOPHEN 1 TABLET: 5; 325 TABLET ORAL at 01:06

## 2018-07-25 RX ADMIN — ENOXAPARIN SODIUM 40 MG: 100 INJECTION SUBCUTANEOUS at 08:59

## 2018-07-25 RX ADMIN — POTASSIUM CHLORIDE 20 MEQ: 20 TABLET, EXTENDED RELEASE ORAL at 08:58

## 2018-07-25 RX ADMIN — BECLOMETHASONE DIPROPIONATE 4 PUFF: 80 AEROSOL, METERED RESPIRATORY (INHALATION) at 08:59

## 2018-07-25 RX ADMIN — BUPROPION HYDROCHLORIDE 300 MG: 300 TABLET, FILM COATED, EXTENDED RELEASE ORAL at 08:58

## 2018-07-25 RX ADMIN — LISINOPRIL 40 MG: 20 TABLET ORAL at 08:58

## 2018-07-25 RX ADMIN — BECLOMETHASONE DIPROPIONATE 4 PUFF: 80 AEROSOL, METERED RESPIRATORY (INHALATION) at 20:43

## 2018-07-25 RX ADMIN — ROPINIROLE HYDROCHLORIDE 3 MG: 2 TABLET, FILM COATED ORAL at 08:59

## 2018-07-25 RX ADMIN — OXYBUTYNIN CHLORIDE 10 MG: 10 TABLET, EXTENDED RELEASE ORAL at 08:59

## 2018-07-25 RX ADMIN — ISOSORBIDE MONONITRATE 30 MG: 30 TABLET ORAL at 08:59

## 2018-07-25 RX ADMIN — FOLIC ACID 1 MG: 1 TABLET ORAL at 08:58

## 2018-07-25 RX ADMIN — TORSEMIDE 20 MG: 20 TABLET ORAL at 08:59

## 2018-07-25 RX ADMIN — TRAZODONE HYDROCHLORIDE 150 MG: 150 TABLET ORAL at 20:42

## 2018-07-25 RX ADMIN — ROPINIROLE HYDROCHLORIDE 3 MG: 2 TABLET, FILM COATED ORAL at 20:42

## 2018-07-25 RX ADMIN — MAGNESIUM SULFATE HEPTAHYDRATE 2 G: 40 INJECTION, SOLUTION INTRAVENOUS at 13:07

## 2018-07-25 RX ADMIN — GABAPENTIN 300 MG: 300 CAPSULE ORAL at 08:58

## 2018-07-25 RX ADMIN — PANTOPRAZOLE SODIUM 40 MG: 40 TABLET, DELAYED RELEASE ORAL at 08:59

## 2018-07-25 RX ADMIN — OXYCODONE HYDROCHLORIDE AND ACETAMINOPHEN 1 TABLET: 5; 325 TABLET ORAL at 16:49

## 2018-07-25 RX ADMIN — GABAPENTIN 300 MG: 300 CAPSULE ORAL at 20:42

## 2018-07-25 RX ADMIN — CARVEDILOL 18.75 MG: 6.25 TABLET, FILM COATED ORAL at 08:59

## 2018-07-25 RX ADMIN — CARVEDILOL 18.75 MG: 6.25 TABLET, FILM COATED ORAL at 20:42

## 2018-07-25 RX ADMIN — LORAZEPAM 2 MG: 2 INJECTION INTRAMUSCULAR; INTRAVENOUS at 06:29

## 2018-07-25 RX ADMIN — ASPIRIN 81 MG 81 MG: 81 TABLET ORAL at 08:59

## 2018-07-25 RX ADMIN — POTASSIUM CHLORIDE 20 MEQ: 20 TABLET, EXTENDED RELEASE ORAL at 16:46

## 2018-07-25 ASSESSMENT — PAIN SCALES - GENERAL
PAINLEVEL_OUTOF10: 7
PAINLEVEL_OUTOF10: 7
PAINLEVEL_OUTOF10: 0
PAINLEVEL_OUTOF10: 8
PAINLEVEL_OUTOF10: 0

## 2018-07-25 ASSESSMENT — PAIN DESCRIPTION - ORIENTATION: ORIENTATION: MID;UPPER

## 2018-07-25 ASSESSMENT — PAIN DESCRIPTION - FREQUENCY: FREQUENCY: CONTINUOUS

## 2018-07-25 ASSESSMENT — PAIN DESCRIPTION - DESCRIPTORS: DESCRIPTORS: ACHING;CONSTANT;NAGGING

## 2018-07-25 ASSESSMENT — PAIN DESCRIPTION - PROGRESSION: CLINICAL_PROGRESSION: NOT CHANGED

## 2018-07-25 ASSESSMENT — PAIN DESCRIPTION - ONSET: ONSET: ON-GOING

## 2018-07-25 ASSESSMENT — PAIN DESCRIPTION - LOCATION: LOCATION: BACK;NECK

## 2018-07-25 ASSESSMENT — PAIN DESCRIPTION - PAIN TYPE: TYPE: CHRONIC PAIN
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
## 2018-07-25 NOTE — PROGRESS NOTES
Upon shift assessment, patient without oxygen on. Currently set for 3L. Patient O2 Sat 66%. Placed NC O2 on patient and sat returned to 77%. Increased O2 to 4L, encouraged deep breathing. Sat @ 94%. Reminded patient to keep O2 on at all times. 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 Reiterated that O2 needs to stay on.

## 2018-07-25 NOTE — PROGRESS NOTES
PROGRESS  NOTE --                                                          INTERNAL  MEDICINE                                                                              I  PERSONALLY SAW , EXAMINED, AND CARED [address], 7/25/2018     LABS, XRAY ,CHART, AND MEDICATIONS  REVIEWED BY ME .        SUBJECTIVE: Ricky Veras is moderately sedated and sleepy, received Ativan prior to MRI; has been confused since. She is able to respond to questioning with head nods. Denies any chest pain dyspnea nausea emesis. Tolerating diet. No abdominal pain. Carotid ultrasound reveals no evidence of hemodynamically significant stenosis. MRI of brain shows no acute ischemic infarct or hemorrhage; diffuse age related atrophy and chronic microvascular changes; likely normal pressure hydrocephalus with ventricular enlargement. Sodium 143 potassium 3.3 chloride 100 CO2 33 BUN 16 creatinine 1.0 magnesium 1.4 glucose 171 calcium 8.2. Initial hemoglobin A1c 10.4; repeat A1c pending. No history of diabetes prior to this. Patient had significant oxygen desaturation today, when nasal cannula was removed; saturation in the 70s. ROS:  Negative to a 10 system review except that mentioned in the HPI. Objective:     PHYSICAL EXAM:    VS: BP (!) 159/95   Pulse 84   Temp 97.8 °F (36.6 °C) (Oral)   Resp 20   Ht 5' 2.99\" (1.6 m)   Wt 126 lb 4.8 oz (57.3 kg)   SpO2 95%   BMI 22.38 kg/m²   General appearance: Mildly sedated due to recent Ativan  Skin: Warm and dry ; no rashes  Head: Normocephalic. No masses, lesions or tenderness noted  Eyes: Conjunctivae pink, sclera white. PERRL,EOM-I  Ears: External ears normal  Nose/Sinuses: Nares normal. Septum midline. Mucosa normal. No drainage; nasal cannula oxygen in place  Oropharynx: Oropharynx clear with no exudate seen  Neck: Supple.  No jugular venous distension, lymphadenopathy or thyromegaly Trachea midline  Lungs: Clear to auscultation bilaterally. No rhonchi, crackles or wheezes  Heart: S1 S2  Regular rate and rhythm. No rub, murmur or gallop  Abdomen: Soft, non-tender. BS normal. No masses, organomegaly; no rebound or guarding  Extremities: No edema, Peripheral pulses weak  Musculoskeletal: Muscular strength appears weak   Neuro:   II-XII grossly intact . ALTMAN equally    TELEMETRY: REVIEWED--Telemetry: Sinus    ASSESSMENT:   Principal Problem:    Apraxia  Active Problems:    Pulmonary hypertension    CKD (chronic kidney disease) stage 3, GFR 30-59 ml/min    Hypoxia    CAD (coronary artery disease)    S/P CABG x 2    COPD (chronic obstructive pulmonary disease) (Formerly [facility])    Ataxia    TIA (transient ischemic attack)    Acute respiratory failure with hypoxia in the setting of hypoxia & COPD, being treated with O2 @ 4 L, QVAR, SpO2 monitoring. Resolved Problems:    * No resolved hospital problems. *      PLAN:  SEE ORDERS      RE  CHANGES AND FINDINGS   Medications reviewed with patient  GI prophylaxis  DVT prophylaxis  Consultants notes reviewed  Await results of cervical MRI  May need further workup and testing for low pressure hydrocephalus  Await repeat hemoglobin A1c  Continue other treatments  Replace magnesium IV      Discussed with patient and nursing.       Tom Vieira DO     12:43 PM     7/25/2018    TIME >25 MINUTES      Active Hospital Problems    Diagnosis    Pulmonary hypertension [I27.20]     Priority: High     Class: Chronic    Apraxia [R48.2]     Priority: High     Class: Acute    CKD (chronic kidney disease) stage 3, GFR 30-59 ml/min [N18.3]     Priority: High    Hypoxia [R09.02]     Priority: High     Class: Acute    Ataxia [R27.0]    TIA (transient ischemic attack) [G45.9]    COPD (chronic obstructive pulmonary disease) (Former[facility]) [J44.9]    CAD (coronary artery disease) [I25.10]    S/P CABG x 2 [Z95.1]                 ------------  INFORMATION  ----------- Nebulization Q6H PRN Marilee Slates Mihok, DO        0.9 % sodium chloride infusion   Intravenous Continuous Marilee Slates Mihok,  mL/hr at 18      enoxaparin (LOVENOX) injection 40 mg  40 mg Subcutaneous Daily Malik LORNA Mihok, DO   40 mg at 18 0859    pantoprazole (PROTONIX) tablet 40 mg  40 mg Oral QAM AC Malik LORNA Gomezhok, DO   40 mg at 18 0859    ondansetron (ZOFRAN) injection 4 mg  4 mg Intravenous Q6H PRN Marilee Slates Mihok, DO        acetaminophen (TYLENOL) tablet 650 mg  650 mg Oral Q4H PRN Marilee Slates Mihok, DO        aspirin chewable tablet 81 mg  81 mg Oral Daily Malik Gomezhok, DO   81 mg at 18 0859    potassium chloride (KLOR-CON M) extended release tablet 20 mEq  20 mEq Oral BID  Malik Gomezk, DO   20 mEq at  4520    folic acid (FOLVITE) tablet 1 mg  1 mg Oral Daily Malik ROTHMAN Mihok, DO   1 mg at 18 0858    sodium chloride flush 0.9 % injection 10 mL  10 mL Intravenous PRN Nae Gonsalez, DO   10 mL at 181     Scheduled Meds:   traZODone  150 mg Oral Nightly    torsemide  20 mg Oral Daily    rOPINIRole  3 mg Oral BID    oxybutynin  10 mg Oral Daily    lisinopril  40 mg Oral Daily    isosorbide mononitrate  30 mg Oral Daily    gabapentin  300 mg Oral TID    beclomethasone  4 puff Inhalation BID    carvedilol  18.75 mg Oral BID    buPROPion  300 mg Oral QAM    enoxaparin  40 mg Subcutaneous Daily    pantoprazole  40 mg Oral QAM AC    aspirin  81 mg Oral Daily    potassium chloride  20 mEq Oral BID WC    folic acid  1 mg Oral Daily       Continuous Infusions:   sodium chloride 100 mL/hr at 18         Data:   Temperature:  Current - Temp: 97.8 °F (36.6 °C);  Max - Temp  Av.7 °F (36.5 °C)  Min: 97.3 °F (36.3 °C)  Max: 98 °F (36.7 °C)    Respiratory Rate : Resp  Av  Min: 16  Max: 20    Pulse Range: Pulse  Av.5  Min: 82  Max: 86    Blood Presuure Range:  Systolic (42UPP), IDP:328 , Min:123 , XDS:022   ; Diastolic (24hrs), Av, Min:75, Max:95      Pulse ox Range: SpO2  Av.2 %  Min: 72 %  Max: 95 %    Patient Vitals for the past 8 hrs:   BP Temp Temp src Pulse Resp SpO2   18 0845 (!) 159/95 97.8 °F (36.6 °C) Oral 84 20 95 %         Intake/Output Summary (Last 24 hours) at 18 1243  Last data filed at 18 0657   Gross per 24 hour   Intake             1260 ml   Output                0 ml   Net             1260 ml       I/O last 3 completed shifts: In: 1440 [P.O.:540; I.V.:900]  Out: -     No intake/output data recorded.     Wt Readings from Last 3 Encounters:   18 126 lb 4.8 oz (57.3 kg)   17 130 lb (59 kg)   16 128 lb (58.1 kg)       Labs:   CBC:   Lab Results   Component Value Date    WBC 5.5 2018    RBC 3.64 2018    HGB 10.3 2018    HCT 34.0 2018    MCV 93.4 2018    MCH 28.3 2018    MCHC 30.3 2018    RDW 18.0 2018     2018    MPV 11.9 2018     CBC with Differential:    Lab Results   Component Value Date    WBC 5.5 2018    RBC 3.64 2018    HGB 10.3 2018    HCT 34.0 2018     2018    MCV 93.4 2018    MCH 28.3 2018    MCHC 30.3 2018    RDW 18.0 2018    SEGSPCT 63 2014    LYMPHOPCT 39.8 2018    MONOPCT 10.4 2018    BASOPCT 1.1 2018    MONOSABS 0.57 2018    LYMPHSABS 2.19 2018    EOSABS 0.05 2018    BASOSABS 0.06 2018     Hemoglobin/Hematocrit:    Lab Results   Component Value Date    HGB 10.3 2018    HCT 34.0 2018     CMP:    Lab Results   Component Value Date     2018    K 3.3 2018     2018    CO2 33 2018    BUN 16 2018    CREATININE 1.0 2018    GFRAA >60 2018    LABGLOM 56 2018    GLUCOSE 171 2018    GLUCOSE 111 2011    PROT 6.2 2018    LABALBU 2.6 2018    LABALBU 4.4 2011    CALCIUM 8.2 2018    BILITOT 1.0 07/24/2018    ALKPHOS 82 07/24/2018    AST 16 07/24/2018    ALT 11 07/24/2018     BMP:    Lab Results   Component Value Date     07/25/2018    K 3.3 07/25/2018     07/25/2018    CO2 33 07/25/2018    BUN 16 07/25/2018    LABALBU 2.6 07/24/2018    LABALBU 4.4 09/30/2011    CREATININE 1.0 07/25/2018    CALCIUM 8.2 07/25/2018    GFRAA >60 07/25/2018    LABGLOM 56 07/25/2018    GLUCOSE 171 07/25/2018    GLUCOSE 111 09/30/2011     Magnesium:    Lab Results   Component Value Date    MG 1.4 07/25/2018     Phosphorus:    Lab Results   Component Value Date    PHOS 2.9 07/24/2018     Uric Acid:  No results found for: LABURIC, URICACID  PT/INR:    Lab Results   Component Value Date    PROTIME 16.5 07/23/2018    INR 1.5 07/23/2018     Warfarin PT/INR:  No components found for: Caretha Ates  PTT:    Lab Results   Component Value Date    APTT <20.0 07/23/2018   [APTT}  Troponin:    Lab Results   Component Value Date    TROPONINI <0.01 07/24/2018     Last 3 Troponin:    Lab Results   Component Value Date    TROPONINI <0.01 07/24/2018    TROPONINI <0.01 07/24/2018    TROPONINI <0.01 07/23/2018     U/A:    Lab Results   Component Value Date    COLORU Yellow 07/23/2018    PROTEINU 30 07/23/2018    PHUR 5.5 07/23/2018    WBCUA 0-1 07/23/2018    RBCUA NONE 07/23/2018    BACTERIA FEW 07/23/2018    CLARITYU Clear 07/23/2018    SPECGRAV 1.020 07/23/2018    LEUKOCYTESUR Negative 07/23/2018    UROBILINOGEN 4.0 07/23/2018    BILIRUBINUR Negative 07/23/2018    BLOODU Negative 07/23/2018    GLUCOSEU Negative 07/23/2018     ABG:  No results found for: PH, PCO2, PO2, HCO3, BE, THGB, TCO2, O2SAT  HgBA1c:    Lab Results   Component Value Date    LABA1C 10.4 07/24/2018     FLP:    Lab Results   Component Value Date    TRIG 108 07/24/2018    HDL 21 07/24/2018    LDLCALC 49 07/24/2018    LABVLDL 22 07/24/2018     TSH:    Lab Results   Component Value Date    TSH 2.120 07/24/2018     VITAMIN B12: No components found for: B12  FOLATE: above.      XR CHEST PORTABLE   Final Result   ALERT:  THIS IS AN ABNORMAL REPORT. Findings communicated   directly with Dr. Whitney Garcia at approximately 7/23/2018 3:43 PM .   1. Enlarged cardiomediastinal silhouette since previous exam with   increased tortuosity of the descending thoracic aorta and enlargement. CT scan chest is recommended to exclude any potential of thoracic   aneurysm with consideration given to patient's renal status and any   potential safety or allergenic concerns. 2. Vascular calcifications thoracic aorta. 3. Suspected bibasilar infiltrate/pneumonia and small amounts of   bilateral pleural effusion.       MRI CERVICAL SPINE WO CONTRAST    (Results Pending)         Active Hospital Problems    Diagnosis    Pulmonary hypertension [I27.20]     Priority: High     Class: Chronic    Apraxia [R48.2]     Priority: High     Class: Acute    CKD (chronic kidney disease) stage 3, GFR 30-59 ml/min [N18.3]     Priority: High    Hypoxia [R09.02]     Priority: High     Class: Acute    Ataxia [R27.0]    TIA (transient ischemic attack) [G45.9]    COPD (chronic obstructive pulmonary disease) (HCC) [J44.9]    CAD (coronary artery disease) [I25.10]    S/P CABG x 2 [Z95.1]         Mohit Vieira  DO   12:43 PM     7/25/2018

## 2018-07-26 ENCOUNTER — ANESTHESIA EVENT (OUTPATIENT)
Dept: OPERATING ROOM | Age: 63
DRG: 056 | End: 2018-07-26
Payer: MEDICARE

## 2018-07-26 LAB
ANION GAP SERPL CALCULATED.3IONS-SCNC: 7 MMOL/L (ref 7–16)
ANION GAP SERPL CALCULATED.3IONS-SCNC: 9 MMOL/L (ref 7–16)
BUN BLDV-MCNC: 12 MG/DL (ref 8–23)
BUN BLDV-MCNC: 12 MG/DL (ref 8–23)
CALCIUM SERPL-MCNC: 8.2 MG/DL (ref 8.6–10.2)
CALCIUM SERPL-MCNC: 8.5 MG/DL (ref 8.6–10.2)
CHLORIDE BLD-SCNC: 95 MMOL/L (ref 98–107)
CHLORIDE BLD-SCNC: 97 MMOL/L (ref 98–107)
CO2: 36 MMOL/L (ref 22–29)
CO2: 38 MMOL/L (ref 22–29)
CREAT SERPL-MCNC: 0.8 MG/DL (ref 0.5–1)
CREAT SERPL-MCNC: 0.8 MG/DL (ref 0.5–1)
GFR AFRICAN AMERICAN: >60
GFR AFRICAN AMERICAN: >60
GFR NON-AFRICAN AMERICAN: >60 ML/MIN/1.73
GFR NON-AFRICAN AMERICAN: >60 ML/MIN/1.73
GLUCOSE BLD-MCNC: 182 MG/DL (ref 74–109)
GLUCOSE BLD-MCNC: 194 MG/DL (ref 74–109)
INR BLD: 1.2
MAGNESIUM: 1.4 MG/DL (ref 1.6–2.6)
MAGNESIUM: 1.9 MG/DL (ref 1.6–2.6)
POTASSIUM REFLEX MAGNESIUM: 3.3 MMOL/L (ref 3.5–5)
POTASSIUM REFLEX MAGNESIUM: 3.4 MMOL/L (ref 3.5–5)
PROTHROMBIN TIME: 13.8 SEC (ref 9.3–12.4)
SODIUM BLD-SCNC: 140 MMOL/L (ref 132–146)
SODIUM BLD-SCNC: 142 MMOL/L (ref 132–146)

## 2018-07-26 PROCEDURE — 85610 PROTHROMBIN TIME: CPT

## 2018-07-26 PROCEDURE — 2580000003 HC RX 258: Performed by: INTERNAL MEDICINE

## 2018-07-26 PROCEDURE — 99232 SBSQ HOSP IP/OBS MODERATE 35: CPT | Performed by: NURSE PRACTITIONER

## 2018-07-26 PROCEDURE — 2060000000 HC ICU INTERMEDIATE R&B

## 2018-07-26 PROCEDURE — 6370000000 HC RX 637 (ALT 250 FOR IP): Performed by: INTERNAL MEDICINE

## 2018-07-26 PROCEDURE — 6360000002 HC RX W HCPCS: Performed by: INTERNAL MEDICINE

## 2018-07-26 PROCEDURE — 2700000000 HC OXYGEN THERAPY PER DAY

## 2018-07-26 PROCEDURE — 80048 BASIC METABOLIC PNL TOTAL CA: CPT

## 2018-07-26 PROCEDURE — 83735 ASSAY OF MAGNESIUM: CPT

## 2018-07-26 PROCEDURE — 36415 COLL VENOUS BLD VENIPUNCTURE: CPT

## 2018-07-26 RX ORDER — MAGNESIUM SULFATE IN WATER 40 MG/ML
2 INJECTION, SOLUTION INTRAVENOUS ONCE
Status: COMPLETED | OUTPATIENT
Start: 2018-07-26 | End: 2018-07-26

## 2018-07-26 RX ADMIN — LISINOPRIL 40 MG: 20 TABLET ORAL at 09:10

## 2018-07-26 RX ADMIN — GABAPENTIN 300 MG: 300 CAPSULE ORAL at 14:47

## 2018-07-26 RX ADMIN — MAGNESIUM SULFATE HEPTAHYDRATE 2 G: 40 INJECTION, SOLUTION INTRAVENOUS at 12:16

## 2018-07-26 RX ADMIN — FOLIC ACID 1 MG: 1 TABLET ORAL at 09:10

## 2018-07-26 RX ADMIN — CARVEDILOL 18.75 MG: 6.25 TABLET, FILM COATED ORAL at 20:01

## 2018-07-26 RX ADMIN — BUPROPION HYDROCHLORIDE 300 MG: 300 TABLET, FILM COATED, EXTENDED RELEASE ORAL at 09:09

## 2018-07-26 RX ADMIN — GABAPENTIN 300 MG: 300 CAPSULE ORAL at 09:10

## 2018-07-26 RX ADMIN — OXYBUTYNIN CHLORIDE 10 MG: 10 TABLET, EXTENDED RELEASE ORAL at 09:09

## 2018-07-26 RX ADMIN — ROPINIROLE HYDROCHLORIDE 3 MG: 2 TABLET, FILM COATED ORAL at 09:09

## 2018-07-26 RX ADMIN — TORSEMIDE 20 MG: 20 TABLET ORAL at 09:10

## 2018-07-26 RX ADMIN — PANTOPRAZOLE SODIUM 40 MG: 40 TABLET, DELAYED RELEASE ORAL at 05:46

## 2018-07-26 RX ADMIN — CARVEDILOL 18.75 MG: 6.25 TABLET, FILM COATED ORAL at 09:10

## 2018-07-26 RX ADMIN — SODIUM CHLORIDE: 9 INJECTION, SOLUTION INTRAVENOUS at 02:57

## 2018-07-26 RX ADMIN — OXYCODONE HYDROCHLORIDE AND ACETAMINOPHEN 1 TABLET: 5; 325 TABLET ORAL at 14:47

## 2018-07-26 RX ADMIN — ISOSORBIDE MONONITRATE 30 MG: 30 TABLET ORAL at 09:10

## 2018-07-26 RX ADMIN — SODIUM CHLORIDE: 9 INJECTION, SOLUTION INTRAVENOUS at 22:45

## 2018-07-26 RX ADMIN — OXYCODONE HYDROCHLORIDE AND ACETAMINOPHEN 1 TABLET: 5; 325 TABLET ORAL at 20:00

## 2018-07-26 RX ADMIN — GABAPENTIN 300 MG: 300 CAPSULE ORAL at 20:01

## 2018-07-26 RX ADMIN — ROPINIROLE HYDROCHLORIDE 3 MG: 2 TABLET, FILM COATED ORAL at 20:01

## 2018-07-26 RX ADMIN — TRAZODONE HYDROCHLORIDE 150 MG: 150 TABLET ORAL at 20:01

## 2018-07-26 RX ADMIN — ASPIRIN 81 MG 81 MG: 81 TABLET ORAL at 09:09

## 2018-07-26 RX ADMIN — BECLOMETHASONE DIPROPIONATE 4 PUFF: 80 AEROSOL, METERED RESPIRATORY (INHALATION) at 09:11

## 2018-07-26 RX ADMIN — POTASSIUM CHLORIDE 20 MEQ: 20 TABLET, EXTENDED RELEASE ORAL at 17:26

## 2018-07-26 RX ADMIN — POTASSIUM CHLORIDE 20 MEQ: 20 TABLET, EXTENDED RELEASE ORAL at 09:10

## 2018-07-26 RX ADMIN — ENOXAPARIN SODIUM 40 MG: 100 INJECTION SUBCUTANEOUS at 09:09

## 2018-07-26 ASSESSMENT — PAIN SCALES - GENERAL
PAINLEVEL_OUTOF10: 3
PAINLEVEL_OUTOF10: 7
PAINLEVEL_OUTOF10: 0
PAINLEVEL_OUTOF10: 3
PAINLEVEL_OUTOF10: 0

## 2018-07-26 NOTE — PROGRESS NOTES
40 mg Oral Daily Liz Evans Mihok, DO   40 mg at 07/26/18 0910    isosorbide mononitrate (IMDUR) extended release tablet 30 mg  30 mg Oral Daily Malik Vieira, DO   30 mg at 07/26/18 0910    ibuprofen (ADVIL;MOTRIN) tablet 400 mg  400 mg Oral Q6H PRN Liz Gomezhok, DO   400 mg at 07/24/18 1814    gabapentin (NEURONTIN) capsule 300 mg  300 mg Oral TID Liz Gomezhok, DO   300 mg at 07/26/18 5104    furosemide (LASIX) tablet 20 mg  20 mg Oral Daily PRN Liz Gomezhok, DO        beclomethasone (QVAR) 80 MCG/ACT inhaler 4 puff  4 puff Inhalation BID Liz Gomezk, DO   4 puff at 07/26/18 0911    fluticasone (FLONASE) 50 MCG/ACT nasal spray 1 spray  1 spray Nasal Daily PRN Liz Gomezk, DO        carvedilol (COREG) tablet 18.75 mg  18.75 mg Oral BID Liz Gomezk, DO   18.75 mg at 07/26/18 0910    buPROPion (WELLBUTRIN XL) extended release tablet 300 mg  300 mg Oral QAM Malik Vieira, DO   300 mg at 07/26/18 0909    albuterol (PROVENTIL) nebulizer solution 2.5 mg  2.5 mg Nebulization Q6H PRN Liz Gomezhok, DO        0.9 % sodium chloride infusion   Intravenous Continuous Liz Vieira,  mL/hr at 07/26/18 0257      enoxaparin (LOVENOX) injection 40 mg  40 mg Subcutaneous Daily Malik Gomezk, DO   40 mg at 07/26/18 0909    pantoprazole (PROTONIX) tablet 40 mg  40 mg Oral QAM AC Malik Gomezk, DO   40 mg at 07/26/18 0546    ondansetron (ZOFRAN) injection 4 mg  4 mg Intravenous Q6H PRN Liz Gomezhok, DO        acetaminophen (TYLENOL) tablet 650 mg  650 mg Oral Q4H PRN Liz Gomezhok, DO        aspirin chewable tablet 81 mg  81 mg Oral Daily Malik Gomezk, DO   81 mg at 07/26/18 0909    potassium chloride (KLOR-CON M) extended release tablet 20 mEq  20 mEq Oral BID  Malik Vieira DO   20 mEq at 09/79/02 0617    folic acid (FOLVITE) tablet 1 mg  1 mg Oral Daily Malik Vieira, DO   1 mg at 07/26/18 0910    sodium chloride flush 0.9 % injection 10 mL  10 mL Intravenous PRN Valdez Milo DO Wallace   10 mL at 18 7591     Scheduled Meds:   magnesium sulfate  2 g Intravenous Once    traZODone  150 mg Oral Nightly    torsemide  20 mg Oral Daily    rOPINIRole  3 mg Oral BID    oxybutynin  10 mg Oral Daily    lisinopril  40 mg Oral Daily    isosorbide mononitrate  30 mg Oral Daily    gabapentin  300 mg Oral TID    beclomethasone  4 puff Inhalation BID    carvedilol  18.75 mg Oral BID    buPROPion  300 mg Oral QAM    enoxaparin  40 mg Subcutaneous Daily    pantoprazole  40 mg Oral QAM AC    aspirin  81 mg Oral Daily    potassium chloride  20 mEq Oral BID WC    folic acid  1 mg Oral Daily       Continuous Infusions:   sodium chloride 100 mL/hr at 18 0257         Data:   Temperature:  Current - Temp: 98.7 °F (37.1 °C); Max - Temp  Av.3 °F (36.8 °C)  Min: 97.4 °F (36.3 °C)  Max: 99.3 °F (37.4 °C)    Respiratory Rate : Resp  Av.4  Min: 18  Max: 20    Pulse Range: Pulse  Av.8  Min: 77  Max: 93    Blood Presuure Range:  Systolic (79JUZ), WINSTON:809 , Min:138 , NVI:873   ; Diastolic (75UMD), TNN:47, Min:72, Max:82      Pulse ox Range: SpO2  Av.3 %  Min: 93 %  Max: 95 %    Patient Vitals for the past 8 hrs:   BP Temp Temp src Pulse Resp SpO2   18 1132 (!) 142/76 98.7 °F (37.1 °C) Oral 93 18 -   18 0800 (!) 140/72 97.9 °F (36.6 °C) - 93  -   18 0441 (!) 143/81 99.3 °F (37.4 °C) Oral 83 18 -   18 0410 - - - - - 95 %         Intake/Output Summary (Last 24 hours) at 18 1158  Last data filed at 18 1112   Gross per 24 hour   Intake             5703 ml   Output              900 ml   Net             4803 ml       I/O last 3 completed shifts:   In: 7236 [P.O.:480; I.V.:4923]  Out: 900 [Urine:900]    I/O this shift:  In: 300 [P.O.:300]  Out: -     Wt Readings from Last 3 Encounters:   18 126 lb 4.8 oz (57.3 kg)   17 130 lb (59 kg)   16 128 lb (58.1 kg)       Labs:   CBC:   Lab Results   Component Value Date    WBC 5.5 07/23/2018     Warfarin PT/INR:  No components found for: Monica Ham  PTT:    Lab Results   Component Value Date    APTT <20.0 07/23/2018   [APTT}  Troponin:    Lab Results   Component Value Date    TROPONINI <0.01 07/24/2018     Last 3 Troponin:    Lab Results   Component Value Date    TROPONINI <0.01 07/24/2018    TROPONINI <0.01 07/24/2018    TROPONINI <0.01 07/23/2018     U/A:    Lab Results   Component Value Date    COLORU Yellow 07/23/2018    PROTEINU 30 07/23/2018    PHUR 5.5 07/23/2018    WBCUA 0-1 07/23/2018    RBCUA NONE 07/23/2018    BACTERIA FEW 07/23/2018    CLARITYU Clear 07/23/2018    SPECGRAV 1.020 07/23/2018    LEUKOCYTESUR Negative 07/23/2018    UROBILINOGEN 4.0 07/23/2018    BILIRUBINUR Negative 07/23/2018    BLOODU Negative 07/23/2018    GLUCOSEU Negative 07/23/2018     ABG:  No results found for: PH, PCO2, PO2, HCO3, BE, THGB, TCO2, O2SAT  HgBA1c:    Lab Results   Component Value Date    LABA1C 10.4 07/24/2018     FLP:    Lab Results   Component Value Date    TRIG 108 07/24/2018    HDL 21 07/24/2018    LDLCALC 49 07/24/2018    LABVLDL 22 07/24/2018     TSH:    Lab Results   Component Value Date    TSH 2.120 07/24/2018     VITAMIN B12: No components found for: B12  FOLATE:    Lab Results   Component Value Date    FOLATE 5.5 07/24/2018     GILBERT:  No results found for: ANATITER, GILBERT  RF:  No results found for: RF     CBC:  Recent Labs      07/23/18   1500  07/24/18   0653   WBC  7.2  5.5   RBC  3.91  3.64   HGB  11.2*  10.3*   HCT  35.8  34.0   PLT  191  181   MCV  91.6  93.4   MCH  28.6  28.3   MCHC  31.3*  30.3*   RDW  18.3*  18.0*   LYMPHOPCT  33.9  39.8   MONOPCT  9.6  10.4   BASOPCT  0.8  1.1   MONOSABS  0.69  0.57   LYMPHSABS  2.45  2.19   EOSABS  0.02*  0.05   BASOSABS  0.06  0.06          H & H :  Recent Labs      07/23/18   1500  07/24/18   0653   HGB  11.2*  10.3*       TSH:  Recent Labs      07/24/18   0045   TSH  2.120       GLUCOSE:No results for input(s): POCGLU in the last 72 7/23/2018 3:43 PM .   1. Enlarged cardiomediastinal silhouette since previous exam with   increased tortuosity of the descending thoracic aorta and enlargement. CT scan chest is recommended to exclude any potential of thoracic   aneurysm with consideration given to patient's renal status and any   potential safety or allergenic concerns. 2. Vascular calcifications thoracic aorta. 3. Suspected bibasilar infiltrate/pneumonia and small amounts of   bilateral pleural effusion.             Active Hospital Problems    Diagnosis    Acute on chronic respiratory failure with hypoxia and hypercapnia (HCC) [J96.21, J96.22]     Priority: High     Class: Acute    Pulmonary hypertension [I27.20]     Priority: High     Class: Chronic    Apraxia [R48.2]     Priority: High     Class: Acute    CKD (chronic kidney disease) stage 3, GFR 30-59 ml/min [N18.3]     Priority: High    Hypoxia [R09.02]     Priority: High     Class: Acute    Stroke-like symptoms [R29.90]    Ataxia [R27.0]    TIA (transient ischemic attack) [G45.9]    COPD (chronic obstructive pulmonary disease) (HCC) [J44.9]    CAD (coronary artery disease) [I25.10]    S/P CABG x 2 [Z95.1]         Cyndi Azeb Gomezjoellen SANABRIA   11:58 AM     7/26/2018
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
## 2018-07-27 ENCOUNTER — APPOINTMENT (OUTPATIENT)
Dept: GENERAL RADIOLOGY | Age: 63
DRG: 056 | End: 2018-07-27
Payer: MEDICARE

## 2018-07-27 ENCOUNTER — ANESTHESIA (OUTPATIENT)
Dept: OPERATING ROOM | Age: 63
DRG: 056 | End: 2018-07-27
Payer: MEDICARE

## 2018-07-27 VITALS
OXYGEN SATURATION: 100 % | RESPIRATION RATE: 14 BRPM | DIASTOLIC BLOOD PRESSURE: 96 MMHG | SYSTOLIC BLOOD PRESSURE: 158 MMHG

## 2018-07-27 PROBLEM — I45.5 SINUS PAUSE: Status: ACTIVE | Noted: 2018-07-27

## 2018-07-27 LAB — URINE CULTURE, ROUTINE: NORMAL

## 2018-07-27 PROCEDURE — 87081 CULTURE SCREEN ONLY: CPT

## 2018-07-27 PROCEDURE — 6370000000 HC RX 637 (ALT 250 FOR IP): Performed by: NEUROLOGICAL SURGERY

## 2018-07-27 PROCEDURE — 3600000012 HC SURGERY LEVEL 2 ADDTL 15MIN: Performed by: NEUROLOGICAL SURGERY

## 2018-07-27 PROCEDURE — 6370000000 HC RX 637 (ALT 250 FOR IP): Performed by: INTERNAL MEDICINE

## 2018-07-27 PROCEDURE — 2580000003 HC RX 258: Performed by: INTERNAL MEDICINE

## 2018-07-27 PROCEDURE — 99233 SBSQ HOSP IP/OBS HIGH 50: CPT | Performed by: SURGERY

## 2018-07-27 PROCEDURE — 009U30Z DRAINAGE OF SPINAL CANAL WITH DRAINAGE DEVICE, PERCUTANEOUS APPROACH: ICD-10-PCS | Performed by: NEUROLOGICAL SURGERY

## 2018-07-27 PROCEDURE — 6360000002 HC RX W HCPCS

## 2018-07-27 PROCEDURE — 2580000003 HC RX 258

## 2018-07-27 PROCEDURE — 00HU33Z INSERTION OF INFUSION DEVICE INTO SPINAL CANAL, PERCUTANEOUS APPROACH: ICD-10-PCS | Performed by: NEUROLOGICAL SURGERY

## 2018-07-27 PROCEDURE — 7100000000 HC PACU RECOVERY - FIRST 15 MIN: Performed by: NEUROLOGICAL SURGERY

## 2018-07-27 PROCEDURE — 2000000000 HC ICU R&B

## 2018-07-27 PROCEDURE — 3600000002 HC SURGERY LEVEL 2 BASE: Performed by: NEUROLOGICAL SURGERY

## 2018-07-27 PROCEDURE — 6360000002 HC RX W HCPCS: Performed by: NEUROLOGICAL SURGERY

## 2018-07-27 PROCEDURE — 2500000003 HC RX 250 WO HCPCS: Performed by: NEUROLOGICAL SURGERY

## 2018-07-27 PROCEDURE — 7100000001 HC PACU RECOVERY - ADDTL 15 MIN: Performed by: NEUROLOGICAL SURGERY

## 2018-07-27 PROCEDURE — 3700000001 HC ADD 15 MINUTES (ANESTHESIA): Performed by: NEUROLOGICAL SURGERY

## 2018-07-27 PROCEDURE — 3209999900 FLUORO FOR SURGICAL PROCEDURES

## 2018-07-27 PROCEDURE — 3700000000 HC ANESTHESIA ATTENDED CARE: Performed by: NEUROLOGICAL SURGERY

## 2018-07-27 RX ORDER — HYDROCODONE BITARTRATE AND ACETAMINOPHEN 5; 325 MG/1; MG/1
1 TABLET ORAL EVERY 4 HOURS PRN
Status: DISCONTINUED | OUTPATIENT
Start: 2018-07-27 | End: 2018-07-27

## 2018-07-27 RX ORDER — PROPOFOL 10 MG/ML
INJECTION, EMULSION INTRAVENOUS CONTINUOUS PRN
Status: DISCONTINUED | OUTPATIENT
Start: 2018-07-27 | End: 2018-07-27 | Stop reason: SDUPTHER

## 2018-07-27 RX ORDER — HYDROCODONE BITARTRATE AND ACETAMINOPHEN 5; 325 MG/1; MG/1
TABLET ORAL
Status: DISPENSED
Start: 2018-07-27 | End: 2018-07-27

## 2018-07-27 RX ORDER — POTASSIUM CHLORIDE 20 MEQ/1
40 TABLET, EXTENDED RELEASE ORAL 2 TIMES DAILY WITH MEALS
Status: DISCONTINUED | OUTPATIENT
Start: 2018-07-28 | End: 2018-07-27

## 2018-07-27 RX ORDER — SPIRONOLACTONE 25 MG/1
25 TABLET ORAL DAILY
Status: DISCONTINUED | OUTPATIENT
Start: 2018-07-28 | End: 2018-08-03 | Stop reason: HOSPADM

## 2018-07-27 RX ORDER — LIDOCAINE HYDROCHLORIDE AND EPINEPHRINE 10; 10 MG/ML; UG/ML
INJECTION, SOLUTION INFILTRATION; PERINEURAL PRN
Status: DISCONTINUED | OUTPATIENT
Start: 2018-07-27 | End: 2018-07-27 | Stop reason: HOSPADM

## 2018-07-27 RX ORDER — FENTANYL CITRATE 50 UG/ML
25 INJECTION, SOLUTION INTRAMUSCULAR; INTRAVENOUS EVERY 5 MIN PRN
Status: DISCONTINUED | OUTPATIENT
Start: 2018-07-27 | End: 2018-07-27 | Stop reason: HOSPADM

## 2018-07-27 RX ORDER — MIDAZOLAM HYDROCHLORIDE 1 MG/ML
INJECTION INTRAMUSCULAR; INTRAVENOUS PRN
Status: DISCONTINUED | OUTPATIENT
Start: 2018-07-27 | End: 2018-07-27 | Stop reason: SDUPTHER

## 2018-07-27 RX ORDER — SODIUM CHLORIDE 9 MG/ML
INJECTION, SOLUTION INTRAVENOUS CONTINUOUS PRN
Status: DISCONTINUED | OUTPATIENT
Start: 2018-07-27 | End: 2018-07-27 | Stop reason: SDUPTHER

## 2018-07-27 RX ORDER — CARVEDILOL 6.25 MG/1
12.5 TABLET ORAL 2 TIMES DAILY
Status: DISCONTINUED | OUTPATIENT
Start: 2018-07-28 | End: 2018-08-03 | Stop reason: HOSPADM

## 2018-07-27 RX ORDER — ACETAMINOPHEN 325 MG/1
650 TABLET ORAL EVERY 4 HOURS PRN
Status: DISCONTINUED | OUTPATIENT
Start: 2018-07-27 | End: 2018-08-03 | Stop reason: HOSPADM

## 2018-07-27 RX ORDER — OXYCODONE HYDROCHLORIDE AND ACETAMINOPHEN 5; 325 MG/1; MG/1
1 TABLET ORAL EVERY 4 HOURS PRN
Status: DISCONTINUED | OUTPATIENT
Start: 2018-07-27 | End: 2018-07-28

## 2018-07-27 RX ADMIN — GABAPENTIN 300 MG: 300 CAPSULE ORAL at 14:48

## 2018-07-27 RX ADMIN — CARVEDILOL 18.75 MG: 6.25 TABLET, FILM COATED ORAL at 09:56

## 2018-07-27 RX ADMIN — CEFAZOLIN SODIUM 1 G: 1 SOLUTION INTRAVENOUS at 22:30

## 2018-07-27 RX ADMIN — SODIUM CHLORIDE: 9 INJECTION, SOLUTION INTRAVENOUS at 07:10

## 2018-07-27 RX ADMIN — POTASSIUM CHLORIDE 20 MEQ: 20 TABLET, EXTENDED RELEASE ORAL at 16:56

## 2018-07-27 RX ADMIN — GABAPENTIN 300 MG: 300 CAPSULE ORAL at 20:45

## 2018-07-27 RX ADMIN — HYDROCODONE BITARTRATE AND ACETAMINOPHEN 1 TABLET: 5; 325 TABLET ORAL at 11:57

## 2018-07-27 RX ADMIN — BECLOMETHASONE DIPROPIONATE 4 PUFF: 80 AEROSOL, METERED RESPIRATORY (INHALATION) at 20:51

## 2018-07-27 RX ADMIN — CEFAZOLIN SODIUM 2 G: 2 SOLUTION INTRAVENOUS at 07:24

## 2018-07-27 RX ADMIN — MIDAZOLAM 0.5 MG: 1 INJECTION INTRAMUSCULAR; INTRAVENOUS at 07:17

## 2018-07-27 RX ADMIN — MIDAZOLAM 0.5 MG: 1 INJECTION INTRAMUSCULAR; INTRAVENOUS at 07:10

## 2018-07-27 RX ADMIN — SODIUM CHLORIDE: 9 INJECTION, SOLUTION INTRAVENOUS at 16:55

## 2018-07-27 RX ADMIN — TRAZODONE HYDROCHLORIDE 150 MG: 150 TABLET ORAL at 20:45

## 2018-07-27 RX ADMIN — CARVEDILOL 18.75 MG: 6.25 TABLET, FILM COATED ORAL at 20:45

## 2018-07-27 RX ADMIN — PROPOFOL 20 MCG/KG/MIN: 10 INJECTION, EMULSION INTRAVENOUS at 07:17

## 2018-07-27 RX ADMIN — OXYCODONE HYDROCHLORIDE AND ACETAMINOPHEN 1 TABLET: 5; 325 TABLET ORAL at 04:43

## 2018-07-27 RX ADMIN — OXYCODONE HYDROCHLORIDE AND ACETAMINOPHEN 1 TABLET: 5; 325 TABLET ORAL at 15:57

## 2018-07-27 RX ADMIN — LISINOPRIL 40 MG: 20 TABLET ORAL at 10:00

## 2018-07-27 RX ADMIN — CEFAZOLIN SODIUM 1 G: 1 SOLUTION INTRAVENOUS at 14:49

## 2018-07-27 RX ADMIN — ROPINIROLE HYDROCHLORIDE 3 MG: 2 TABLET, FILM COATED ORAL at 20:45

## 2018-07-27 RX ADMIN — OXYCODONE HYDROCHLORIDE AND ACETAMINOPHEN 1 TABLET: 5; 325 TABLET ORAL at 20:44

## 2018-07-27 ASSESSMENT — PAIN DESCRIPTION - ONSET
ONSET: ON-GOING

## 2018-07-27 ASSESSMENT — PAIN SCALES - GENERAL
PAINLEVEL_OUTOF10: 2
PAINLEVEL_OUTOF10: 0
PAINLEVEL_OUTOF10: 6
PAINLEVEL_OUTOF10: 8
PAINLEVEL_OUTOF10: 0
PAINLEVEL_OUTOF10: 3
PAINLEVEL_OUTOF10: 0
PAINLEVEL_OUTOF10: 0
PAINLEVEL_OUTOF10: 2
PAINLEVEL_OUTOF10: 9
PAINLEVEL_OUTOF10: 0
PAINLEVEL_OUTOF10: 9
PAINLEVEL_OUTOF10: 0
PAINLEVEL_OUTOF10: 8
PAINLEVEL_OUTOF10: 0

## 2018-07-27 ASSESSMENT — PULMONARY FUNCTION TESTS
PIF_VALUE: 0
PIF_VALUE: 1
PIF_VALUE: 0
PIF_VALUE: 1
PIF_VALUE: 0
PIF_VALUE: 1
PIF_VALUE: 0
PIF_VALUE: 0
PIF_VALUE: 1
PIF_VALUE: 0
PIF_VALUE: 0

## 2018-07-27 ASSESSMENT — PAIN DESCRIPTION - PROGRESSION
CLINICAL_PROGRESSION: NOT CHANGED

## 2018-07-27 ASSESSMENT — PAIN DESCRIPTION - ORIENTATION
ORIENTATION: MID
ORIENTATION: MID;LOWER
ORIENTATION: MID

## 2018-07-27 ASSESSMENT — PAIN DESCRIPTION - DESCRIPTORS
DESCRIPTORS: ACHING;DISCOMFORT
DESCRIPTORS: ACHING;DISCOMFORT;CONSTANT
DESCRIPTORS: ACHING;DISCOMFORT

## 2018-07-27 ASSESSMENT — PAIN DESCRIPTION - LOCATION
LOCATION: BACK

## 2018-07-27 ASSESSMENT — PAIN DESCRIPTION - PAIN TYPE
TYPE: ACUTE PAIN
TYPE: SURGICAL PAIN
TYPE: ACUTE PAIN;SURGICAL PAIN

## 2018-07-27 ASSESSMENT — PAIN DESCRIPTION - FREQUENCY
FREQUENCY: CONTINUOUS

## 2018-07-27 ASSESSMENT — LIFESTYLE VARIABLES: SMOKING_STATUS: 1

## 2018-07-27 NOTE — ANESTHESIA PRE PROCEDURE
Department of Anesthesiology  Preprocedure Note       Name:  Jarad Mariscal   Age:  60 y.o.  :  1955                                          MRN:  [id number]         Date:  2018      Surgeon: Marii Gutierrez):  Anastasia Boateng MD    Procedure: Procedure(s):  LUMBAR DRAIN INSERTION    Medications prior to admission:   Prior to Admission medications    Medication Sig Start Date End Date Taking? Authorizing Provider   GABAPENTIN PO Take 300 mg by mouth 3 times daily   Yes Historical Provider, MD   ursodiol (ACTIGALL) 300 MG capsule Take 300 mg by mouth 3 times daily (before meals)   Yes Historical Provider, MD   TORSEMIDE PO Take 20 mg by mouth daily 1-2 tablets   Yes Historical Provider, MD   oxybutynin (DITROPAN-XL) 10 MG extended release tablet Take 10 mg by mouth daily   Yes Historical Provider, MD   furosemide (LASIX) 20 MG tablet Take 20 mg by mouth daily as needed   Yes Historical Provider, MD   fluticasone (FLONASE) 50 MCG/ACT nasal spray 1 spray by Nasal route daily as needed for Rhinitis   Yes Historical Provider, MD   fluticasone (FLOVENT HFA) 110 MCG/ACT inhaler Inhale 1 puff into the lungs 2 times daily as needed   Yes Historical Provider, MD   albuterol (PROVENTIL) (2.5 MG/3ML) 0.083% nebulizer solution Take 3 mLs by nebulization every 6 hours as needed for Wheezing 3/5/17  Yes LIZA Lizarraga - CNP   carvedilol (COREG) 12.5 MG tablet   Take 18.75 mg by mouth 2 times daily  5/10/15  Yes Historical Provider, MD   rOPINIRole (REQUIP) 1 MG tablet Take 3 mg by mouth 2 times daily  5/10/15  Yes Historical Provider, MD   oxyCODONE-acetaminophen (PERCOCET) 5-325 MG per tablet Take 1 tablet by mouth every 4 hours as needed for Pain. Yes Historical Provider, MD   lisinopril (PRINIVIL;ZESTRIL) 10 MG tablet Take 40 mg by mouth daily. Yes Historical Provider, MD   buPROPion (WELLBUTRIN XL) 300 MG XL tablet Take 300 mg by mouth every morning.      Yes Historical Provider, MD   traZODone (DESYREL) 150 MG tablet Take 150 mg by mouth nightly. Yes Historical Provider, MD   isosorbide mononitrate (IMDUR) 30 MG CR tablet Take 30 mg by mouth daily. Yes Historical Provider, MD   ibuprofen (ADVIL;MOTRIN) 600 MG tablet Take 1 tablet by mouth every 6 hours as needed for Pain 3/5/17 3/10/17  LIZA Lizarraga - CNP   clotrimazole-betamethasone (LOTRISONE) 1-0.05 % cream Apply topically 2 times daily. 11/25/16   Uma Lopez DO   omeprazole (PRILOSEC) 40 MG capsule   Take 40 mg by mouth daily  5/16/15   Historical Provider, MD   vitamin D (ERGOCALCIFEROL) 86095 UNITS CAPS capsule   Take 50,000 Units by mouth once a week  4/30/15   Historical Provider, MD   DULoxetine (CYMBALTA) 60 MG capsule Take 60 mg by mouth daily. Historical Provider, MD   rosuvastatin (CRESTOR) 20 MG tablet Take 20 mg by mouth daily. Historical Provider, MD   aspirin 325 MG EC tablet Take 325 mg by mouth daily.       Historical Provider, MD       Current medications:    Current Facility-Administered Medications   Medication Dose Route Frequency Provider Last Rate Last Dose    traZODone (DESYREL) tablet 150 mg  150 mg Oral Nightly Malik Vieira, DO   150 mg at 07/26/18 2001    torsemide (DEMADEX) tablet 20 mg  20 mg Oral Daily Malik Vieira, DO   20 mg at 07/26/18 0910    rOPINIRole (REQUIP) tablet 3 mg  3 mg Oral BID Kika Vieira, DO   3 mg at 07/26/18 2001    oxyCODONE-acetaminophen (PERCOCET) 5-325 MG per tablet 1 tablet  1 tablet Oral Q4H PRN Kika Calregulo Mihok, DO   1 tablet at 07/27/18 0443    oxybutynin (DITROPAN-XL) extended release tablet 10 mg  10 mg Oral Daily Malik Vieira, DO   10 mg at 07/26/18 0909    lisinopril (PRINIVIL;ZESTRIL) tablet 40 mg  40 mg Oral Daily Malik Vieira, DO   40 mg at 07/26/18 0910    isosorbide mononitrate (IMDUR) extended release tablet 30 mg  30 mg Oral Daily Malik Vieira DO   30 mg at 07/26/18 0910    ibuprofen (ADVIL;MOTRIN) tablet 400 mg  400 mg Oral Q6H PRN Kika Vieira, DO   400 mg at 07/24/18 1814    gabapentin (NEURONTIN) capsule 300 mg  300 mg Oral TID Ana Vieira, DO   300 mg at 07/26/18 2001    furosemide (LASIX) tablet 20 mg  20 mg Oral Daily PRN Ana Gomezk, DO        beclomethasone (QVAR) 80 MCG/ACT inhaler 4 puff  4 puff Inhalation BID Ana Vieira, DO   4 puff at 07/26/18 0911    fluticasone (FLONASE) 50 MCG/ACT nasal spray 1 spray  1 spray Nasal Daily PRN Ana Gomezk, DO        carvedilol (COREG) tablet 18.75 mg  18.75 mg Oral BID Ana Vieira, DO   18.75 mg at 07/26/18 2001    buPROPion (WELLBUTRIN XL) extended release tablet 300 mg  300 mg Oral QAM Malik Vieira, DO   300 mg at 07/26/18 0909    albuterol (PROVENTIL) nebulizer solution 2.5 mg  2.5 mg Nebulization Q6H PRN Ana Vieira, DO        0.9 % sodium chloride infusion   Intravenous Continuous Ana Vieira,  mL/hr at 07/26/18 2245      enoxaparin (LOVENOX) injection 40 mg  40 mg Subcutaneous Daily Malik Vieira, DO   40 mg at 07/26/18 0909    pantoprazole (PROTONIX) tablet 40 mg  40 mg Oral QAM AC Malik Vieira, DO   Stopped at 07/27/18 0700    ondansetron (ZOFRAN) injection 4 mg  4 mg Intravenous Q6H PRN Ana Vieira, DO        acetaminophen (TYLENOL) tablet 650 mg  650 mg Oral Q4H PRN Ana Vieira, DO        aspirin chewable tablet 81 mg  81 mg Oral Daily Malik Vieira, DO   81 mg at 07/26/18 0909    potassium chloride (KLOR-CON M) extended release tablet 20 mEq  20 mEq Oral BID WC Malik Vieira, DO   20 mEq at 86/57/10 1848    folic acid (FOLVITE) tablet 1 mg  1 mg Oral Daily Malik Vieira, DO   1 mg at 07/26/18 0910    sodium chloride flush 0.9 % injection 10 mL  10 mL Intravenous PRN Saul Gonsalez DO   10 mL at 07/24/18 2122       Allergies:     Allergies   Allergen Reactions    Pentazocine Lactate     Sulfa Antibiotics        Problem List:    Patient Active Problem List   Diagnosis Code    CAD (coronary artery disease) I25.10    S/P CABG x 2 Z95.1    Smoker F17.200    Bronchitis J40    Pulmonary hypertension I27.20    COPD (chronic obstructive pulmonary disease) ([facility]) J44.9    Apraxia R48.2    Ataxia R27.0    CKD (chronic kidney disease) stage 3, GFR 30-59 ml/min N18.3    Hypoxia R09.02    TIA (transient ischemic attack) G45.9    Stroke-like symptoms R29.90    Acute on chronic respiratory failure with hypoxia and hypercapnia ([facility]) J96.21, J96.22       Past Medical History:        Diagnosis Date    Acute on chronic respiratory failure with hypoxia and hypercapnia ([facility]) 7/25/2018    Apraxia 7/23/2018    Ataxia 7/23/2018    CAD (coronary artery disease)     CAD (coronary artery disease) 10/4/2012    Choledocholithiasis     recurrent    CKD (chronic kidney disease) stage 3, GFR 30-59 ml/min 7/23/2018    COPD (chronic obstructive pulmonary disease) ([facility])     Hyperlipidemia     Hypoxia 7/23/2018    Pleural effusion     requiring right thoracentesis    Pulmonary hypertension 7/23/2018    Restless legs     S/P CABG x 2 10/4/2012    Smoker 10/4/2012       Past Surgical History:        Procedure Laterality Date    CARDIAC SURGERY      CERVICAL FUSION  1999    C3-C6    CHOLECYSTECTOMY  2002    CORONARY ARTERY BYPASS GRAFT  10/30/2002    HYSTERECTOMY  1988       Social History:    Social History   Substance Use Topics    Smoking status: Current Every Day Smoker     Packs/day: 0.50     Years: 40.00     Types: Cigarettes    Smokeless tobacco: Never Used    Alcohol use No                                Ready to quit: Not Answered  Counseling given: Not Answered      Vital Signs (Current):   Vitals:    07/26/18 1132 07/26/18 1529 07/26/18 1930 07/27/18 0000   BP: (!) 142/76 (!) 154/74 130/60 139/66   Pulse: 93 79 85 77   Resp: 18 18 18 18   Temp: 37.1 °C (98.7 °F) 36.6 °C (97.9 °F) 36.7 °C (98.1 °F) 36.6 °C (97.8 °F)   TempSrc: Oral Temporal Temporal Temporal   SpO2:   95% 94%   Weight:       Height: current smoker                          ROS comment: Bronchitis  Pleural effusion right   Cardiovascular:    (+) CAD:, CABG/stent:, pulmonary hypertension:, hyperlipidemia      ECG reviewed  Rhythm: regular  Rate: normal  Echocardiogram reviewed                  Neuro/Psych:   (+) neuromuscular disease ( restless legs):, TIA, psychiatric history:depression/anxiety              ROS comment: Apraxia  Ataxia  Stroke-like symptoms  CERVICAL FUSION GI/Hepatic/Renal:   (+) GERD:, renal disease: CRI,          ROS comment: Choledocholithiasis recurrent   CHOLECYSTECTOMY. Endo/Other:                      ROS comment: hysterectomy Abdominal:       Abdomen: soft.     Vascular:                                 06/02/15  ECHO  EF  40-45%  Ventricular Rate 78  BPM Incomplete 07/23/2018 4:01 PM [facility]   Atrial Rate 78  BPM Incomplete 07/23/2018 4:01 PM [facility]   P-R Interval 146  ms Incomplete 07/23/2018 4:01 PM [facility]   QRS Duration 146  ms Incomplete 07/23/2018 4:01 PM [facility]   Q-T Interval 448  ms Incomplete 07/23/2018 4:01 PM [facility]   QTc Calculation (Bazett) 510  ms Incomplete 07/23/2018 4:01 PM [facility]   P Carmen 79  degrees Incomplete 07/23/2018 4:01 PM [facility]   R Axis -2  degrees Incomplete 07/23/2018 4:01 PM [facility]   T Axis -159  degrees Incomplete 07/23/2018 4:01 PM [facility]   Testing Performed By   Lab - Abbreviation Name Director Address Valid Date Range   360-[facility] MUSE Unknown Unknown 04/18/16 1121-Present   Narrative   Normal sinus rhythm  Left ventricular hypertrophy with QRS widening and repolarization abnormality  Abnormal ECG  No previous ECGs available     7/23/2018 3:00 PM       EXAM: CT HEAD WO CONTRAST       NUMBER OF IMAGES:  101       INDICATION: Difficulty walking and speaking       Technique: Multiple contiguous 5 mm axial images were obtained from   the skull base to the vertex without administration of IV contrast.   Reformatted images were generated in the sagittal and coronal than cortical cerebral volume loss. Recommend   clinical correlation. 3. Trace microvascular ischemic disease as described above. 7/23/2018 3:00 PM       Exam: XR CHEST PORTABLE       Number of Views: 1       Indication:   Possible transient ischemic attack. Difficulty walking   and speaking. Possible sepsis. Comparison: Chest radiograph 9/25/2012       Findings: There is a enlarged cardiomediastinal silhouette since the   previous exam with abnormal appearance of the thoracic aorta. Vascular   calcifications thoracic aorta. Median sternotomy wires. Streaky   opacification in the lung bases. No pneumothorax. Impression   ALERT:  THIS IS AN ABNORMAL REPORT. Findings communicated   directly with Dr. Zenobia Bueno at approximately 7/23/2018 3:43 PM .   1. Enlarged cardiomediastinal silhouette since previous exam with   increased tortuosity of the descending thoracic aorta and enlargement. CT scan chest is recommended to exclude any potential of thoracic   aneurysm with consideration given to patient's renal status and any   potential safety or allergenic concerns. 2. Vascular calcifications thoracic aorta. 3. Suspected bibasilar infiltrate/pneumonia and small amounts of   bilateral pleural effusion. 7/23/2018 4:00 PM       EXAM: CTA CHEST W CONTRAST       NUMBER OF IMAGES:  548       INDICATION: Aortic disease        Technique: CT of the chest was performed from the apices of the lungs   through the upper abdomen using contiguous 2.5 mm transaxial sections,   following  pulmonary angiogram protocol. 60cc of Isovue-370 IV   contrast was administered without incident. Reformations were   constructed in the coronal and sagittal planes (5 mm). Images were   reviewed in soft tissue, bone and lung windows. Images were reconstructed on a separate dedicated 3-D imaging   workstation, using maximum intensity projection (MIP) and volume   rendered (3D) datasets.         Low-dose CT abdomen: There is a trace amount of perihepatic free   fluid. The gallbladder is absent and surgical clips are present in the   gallbladder fossa, consistent with a prior cholecystectomy. There is   gas present within the central biliary tree, likely secondary to prior   cholecystectomy. The remaining visualized upper abdominal organs are   unremarkable. Bones: There are degenerative changes of the spine. CT angiography demonstrates the following: The aorta is normal in caliber without evidence of aneurysmal   dilatation, dissection or thrombus. The ascending aorta at the level of the aortic root has a maximum   diameter of 2.7 cm   The ascending aorta at the level of the right pulmonary artery has a   maximum diameter of 3.3 cm   The transverse arch has a maximum diameter of 2.8 cm   The descending thoracic aorta at the level of the left pulmonary   artery has a maximum diameter of 2.4 cm           Impression   1. No evidence of pulmonary embolus in the  main pulmonary artery,   right or left pulmonary arteries, or segmental branches of the   pulmonary arteries. 2. No focal consolidation, pleural effusion or pneumothorax. 3. No definite pleural or parenchymal mass lesion. 4. Moderate pulmonary emphysematous disease. 5. Calcified pleural plaque in the posterior aspect of the superior   segment of the left lower lobe, consistent with a history of prior   asbestos exposure. 6. Cardiomegaly with dilatation of the main pulmonary artery as well   as the right and left pulmonary arteries, consistent with pulmonary   artery hypertension. 7. Trace amount of perihepatic free fluid. 8. Status post cholecystectomy with small amount of residual   pneumobilia.          7/24/2018 10:45 PM       EXAM: US CAROTID ARTERY BILATERAL       NUMBER OF IMAGES:  46       TECHNIQUE: Duplex carotid ultrasound with color-flow Doppler, and a   velocity blood flow characteristic and spectral analysis of the carotid arteries were obtained and documented. INDICATION: Patient is a 69-year-old woman with history of CAD,   hyperlipidemia, COPD, TIA        COMPARISON: None       FINDINGS:   Bilateral CCA, ICA and ECA are patent. The right internal carotid artery has a peak systolic velocity of 62   cm/sec proximally with an  internal to common carotid artery ratio of   1. Based on Gosink criteria, no evidence of hemodynamically   significant stenosis is seen. The left internal carotid artery has a peak systolic velocity of 30.6   cm/sec proximally with an internal to common carotid artery ratio of   1.3. Based on Gosink criteria, no evidence of hemodynamically   significant stenosis is seen. Bilateral antegrade vertebral artery flow is seen. Mild atherosclerotic plaque formation is seen on the right without   evidence of hemodynamically significant stenosis. Mild atherosclerotic plaque formation is seen on the left without   evidence of hemodynamically significant stenosis. Impression   1. No evidence to suggest hemodynamically significant stenosis. 7/24/2018 12:45 AM       EXAM: MRI BRAIN WO CONTRAST       NUMBER OF IMAGES:  36       INDICATION:  Patient is a 69-year-old woman with history of   hyperlipidemia, CAD, hypertension, STROKE        TECHNIQUE: Multiplanar, multi sequential images of the brain were   obtained without gadolinium administration. COMPARISON: CT head July 23, 2018       FINDINGS: The ventricular system appears moderately distended which is   a slightly disproportionate to cortical sulci widening raising   possibility of normal pressure hydrocephalus. Clinical correlation is   recommended. Moderate diffuse age-related cerebral atrophy. There are   mild to moderate periventricular and deep subcortical T-2/flair   hyperintensities representing chronic microvascular ischemic disease. No intracranial mass lesions are identified.        There

## 2018-07-27 NOTE — PROGRESS NOTES
PROGRESS  NOTE --                                                          INTERNAL  MEDICINE                                                                              I  PERSONALLY SAW , EXAMINED, AND CARED [address], 7/27/2018     LABS, XRAY ,CHART, AND MEDICATIONS  REVIEWED BY ME .        SUBJECTIVE: Cecy Veras is moderately sedated and sleepy, received Ativan prior to MRI; has been confused since. She is able to respond to questioning with head nods. Denies any chest pain dyspnea nausea emesis. Tolerating diet. No abdominal pain. Carotid ultrasound reveals no evidence of hemodynamically significant stenosis. MRI of brain shows no acute ischemic infarct or hemorrhage; diffuse age related atrophy and chronic microvascular changes; likely normal pressure hydrocephalus with ventricular enlargement. Sodium 143 potassium 3.3 chloride 100 CO2 33 BUN 16 creatinine 1.0 magnesium 1.4 glucose 171 calcium 8.2. Initial hemoglobin A1c 10.4; repeat A1c pending. No history of diabetes prior to this. Patient had significant oxygen desaturation today, when nasal cannula was removed; saturation in the 70s.    7/26/18-patient sitting in bed, alert awake oriented ×3. She states she still doesn't feel perfectly normal in regarding to thinking. No further falling episodes. Denies chest pain or dyspnea. MRI cervical spine does reveal broad-based focal right paracentral disc protrusion C4-C5 with moderate central canal stenosis on the right. Severe neuroforaminal changes. Sodium 142 potassium slowly improving 3.4 chloride 97 CO2 36 BUN 12 creatinine 0.9° of still low 1.4 glucose 194 calcium 8.2.    7/27/18- denies chest pain dyspnea. Patient was seen by neurosurgery, they agreed likely normal pressure hydrocephalus; she had surgery earlier this morning, lumbar drain insertion under anesthesia.  She states Norco is not helping her pain she would like something stronger. Currently in surgical intensive care, postop. Patient had 3 sec pause last evening; to be seen by cardiology. ROS:  Negative to a 10 system review except that mentioned in the HPI. Objective:     PHYSICAL EXAM:    VS: BP (!) 150/91   Pulse 75   Temp 98.8 °F (37.1 °C) (Temporal)   Resp 14   Ht 5' 2.99\" (1.6 m)   Wt 126 lb 4.8 oz (57.3 kg)   SpO2 95%   BMI 22.38 kg/m²   General appearance: Alert awake oriented ×3, uncomfortable due to pain  Skin: Warm and dry ; no rashes  Head: Normocephalic. No masses, lesions or tenderness noted  Eyes: Conjunctivae pink, sclera white. PERRL,EOM-I  Ears: External ears normal  Nose/Sinuses: Nares normal. Septum midline. Mucosa normal. No drainage; nasal cannula oxygen in place  Oropharynx: Oropharynx clear with no exudate seen  Neck: Supple. No jugular venous distension, lymphadenopathy or thyromegaly Trachea midline  Lungs: Clear to auscultation bilaterally. No rhonchi, crackles or wheezes  Heart: S1 S2  Regular rate and rhythm. No rub, murmur or gallop  Abdomen: Soft, non-tender. BS normal. No masses, organomegaly; no rebound or guarding  Extremities: No edema, Peripheral pulses weak  Musculoskeletal: Muscular strength appears weak   Neuro:   II-XII grossly intact . ALTMAN equally    TELEMETRY: REVIEWED--Telemetry: Sinus    ASSESSMENT:   Principal Problem:    Apraxia  Active Problems:    Pulmonary hypertension    CKD (chronic kidney disease) stage 3, GFR 30-59 ml/min    Hypoxia    CAD (coronary artery disease)    S/P CABG x 2    COPD (chronic obstructive pulmonary disease) (HCC)    Ataxia    TIA (transient ischemic attack)    Acute respiratory failure with hypoxia in the setting of hypoxia & COPD, being treated with O2 @ 4 L, QVAR, SpO2 monitoring. Resolved Problems:    * No resolved hospital problems.  *      PLAN:  SEE ORDERS      RE  CHANGES AND FINDINGS   Medications reviewed with patient  GI prophylaxis  DVT pantoprazole (PROTONIX) tablet 40 mg  40 mg Oral QAM AC Malik A Mihok, DO   Stopped at 18 0700    ondansetron (ZOFRAN) injection 4 mg  4 mg Intravenous Q6H PRN Sydelle Din Mihok, DO        acetaminophen (TYLENOL) tablet 650 mg  650 mg Oral Q4H PRN Sydelle Din Mihok, DO        potassium chloride (KLOR-CON M) extended release tablet 20 mEq  20 mEq Oral BID WC Malik Gomezk, DO   20 mEq at / 6160    folic acid (FOLVITE) tablet 1 mg  1 mg Oral Daily Malik Gomezhok, DO   1 mg at 18 0910    sodium chloride flush 0.9 % injection 10 mL  10 mL Intravenous PRN Jun Gonsalez, DO   10 mL at 18 7387     Scheduled Meds:   ceFAZolin  1 g Intravenous 60 Min Pre-Op    traZODone  150 mg Oral Nightly    torsemide  20 mg Oral Daily    rOPINIRole  3 mg Oral BID    oxybutynin  10 mg Oral Daily    lisinopril  40 mg Oral Daily    isosorbide mononitrate  30 mg Oral Daily    gabapentin  300 mg Oral TID    beclomethasone  4 puff Inhalation BID    carvedilol  18.75 mg Oral BID    buPROPion  300 mg Oral QAM    pantoprazole  40 mg Oral QAM     potassium chloride  20 mEq Oral BID     folic acid  1 mg Oral Daily       Continuous Infusions:   sodium chloride 100 mL/hr at 18 2245         Data:   Temperature:  Current - Temp: 98.8 °F (37.1 °C);  Max - Temp  Av.4 °F (36.9 °C)  Min: 97.8 °F (36.6 °C)  Max: 98.9 °F (37.2 °C)    Respiratory Rate : Resp  Av.2  Min: 0  Max: 18    Pulse Range: Pulse  Av.7  Min: 75  Max: 93    Blood Presuure Range:  Systolic (18PBJ), NVZ:382 , Min:130 , IBX:106   ; Diastolic (78FSQ), XGO:25, Min:60, Max:109      Pulse ox Range: SpO2  Av.6 %  Min: 88 %  Max: 100 %    Patient Vitals for the past 8 hrs:   BP Temp Temp src Pulse Resp SpO2   18 0900 (!) 150/91 - - 75 14 95 %   18 0830 (!) 157/92 - - 76 13 95 %   18 0815 (!) 141/92 - - 76 13 92 %   18 0800 137/89 - - 82 18 95 %   18 0747 (!) 160/89 98.8 °F (37.1 °C) Temporal 77 16 96 %   07/27/18 0745 (!) 160/86 98.9 °F (37.2 °C) Temporal 77 15 96 %         Intake/Output Summary (Last 24 hours) at 07/27/18 0952  Last data filed at 07/27/18 0900   Gross per 24 hour   Intake             2288 ml   Output               15 ml   Net             2273 ml       I/O last 3 completed shifts: In: 2038 [P.O.:420;  I.V.:1618]  Out: -     I/O this shift:  In: 250 [I.V.:250]  Out: 15 [Drains:10; Blood:5]    Wt Readings from Last 3 Encounters:   07/24/18 126 lb 4.8 oz (57.3 kg)   03/05/17 130 lb (59 kg)   12/22/16 128 lb (58.1 kg)       Labs:   CBC:   Lab Results   Component Value Date    WBC 5.5 07/24/2018    RBC 3.64 07/24/2018    HGB 10.3 07/24/2018    HCT 34.0 07/24/2018    MCV 93.4 07/24/2018    MCH 28.3 07/24/2018    MCHC 30.3 07/24/2018    RDW 18.0 07/24/2018     07/24/2018    MPV 11.9 07/24/2018     CBC with Differential:    Lab Results   Component Value Date    WBC 5.5 07/24/2018    RBC 3.64 07/24/2018    HGB 10.3 07/24/2018    HCT 34.0 07/24/2018     07/24/2018    MCV 93.4 07/24/2018    MCH 28.3 07/24/2018    MCHC 30.3 07/24/2018    RDW 18.0 07/24/2018    SEGSPCT 63 01/30/2014    LYMPHOPCT 39.8 07/24/2018    MONOPCT 10.4 07/24/2018    BASOPCT 1.1 07/24/2018    MONOSABS 0.57 07/24/2018    LYMPHSABS 2.19 07/24/2018    EOSABS 0.05 07/24/2018    BASOSABS 0.06 07/24/2018     Hemoglobin/Hematocrit:    Lab Results   Component Value Date    HGB 10.3 07/24/2018    HCT 34.0 07/24/2018     CMP:    Lab Results   Component Value Date     07/26/2018    K 3.3 07/26/2018    CL 95 07/26/2018    CO2 38 07/26/2018    BUN 12 07/26/2018    CREATININE 0.8 07/26/2018    GFRAA >60 07/26/2018    LABGLOM >60 07/26/2018    GLUCOSE 182 07/26/2018    GLUCOSE 111 09/30/2011    PROT 6.2 07/24/2018    LABALBU 2.6 07/24/2018    LABALBU 4.4 09/30/2011    CALCIUM 8.5 07/26/2018    BILITOT 1.0 07/24/2018    ALKPHOS 82 07/24/2018    AST 16 07/24/2018    ALT 11 07/24/2018     BMP:    Lab Results   Component Value results found for: RF     CBC:  No results for input(s): WBC, RBC, HGB, HCT, PLT, MCV, MCH, MCHC, RDW, NRBC, SEGSPCT, BANDSPCT, BLASTSPCT, METASPCT, LYMPHOPCT, PROMYELOPCT, MONOPCT, MYELOPCT, EOSPCT, BASOPCT, MONOSABS, LYMPHSABS, EOSABS, BASOSABS, DIFFTYPE in the last 72 hours. Invalid input(s): ATYLMREL       H & H :  No results for input(s): HGB in the last 72 hours. TSH:  No results for input(s): TSH in the last 72 hours. GLUCOSE:No results for input(s): POCGLU in the last 72 hours. CMP:  Recent Labs      07/25/18   0824  07/26/18   0607  07/26/18   1533   NA  143  142  140   K  3.3*  3.4*  3.3*   CL  100  97*  95*   CO2  33*  36*  38*   BUN  16  12  12   CREATININE  1.0  0.8  0.8   GFRAA  >60  >60  >60   LABGLOM  56  >60  >60   GLUCOSE  171*  194*  182*   CALCIUM  8.2*  8.2*  8.5*        BNP:No results found for: BNP    PROTIME/INR:  Recent Labs      07/26/18   1611   PROTIME  13.8*   INR  1.2       CRP: No results for input(s): CRP in the last 72 hours. ESR:No results for input(s): SEDRATE in the last 72 hours. LIPASE , AMYLASE:  Lab Results   Component Value Date    LIPASE 45 07/30/2014      No results found for: AMYLASE    ABGs: No results found for: PH, PO2, PCO2    CARDIAC:   No results for input(s): CKTOTAL, CKMB, CKMBINDEX, TROPONINI in the last 72 hours. Lipid Profile:   Lab Results   Component Value Date    TRIG 108 07/24/2018    HDL 21 07/24/2018    LDLCALC 49 07/24/2018    CHOL 92 07/24/2018        Lab Results   Component Value Date    LABA1C 10.4 (H) 07/24/2018            RADIOLOGY: REVIEWED AVAILABLE REPORT  MRI CERVICAL SPINE WO CONTRAST   Final Result      1. Limited study due to patient's motion. 2. No compression fracture or a spondylolisthesis. 3. C5-C6 vertebral fusion. 4. Moderate to severe degenerative disc disease at C6-C7. 5. Broad-based focal right paracentral disc protrusion at C4-C5 and   moderate central canal narrowing on the right.    6. Multilevel

## 2018-07-27 NOTE — ANESTHESIA POSTPROCEDURE EVALUATION
Department of Anesthesiology  Postprocedure Note    Patient: Tere Viera  MRN: [id number]  YOB: 1955  Date of evaluation: 7/27/2018  Time:  12:45 PM     Procedure Summary     Date:  07/27/18 Room / Location:  [facility] OR  / [facility] OR    Anesthesia Start:  0710 Anesthesia Stop:  9223    Procedure:  LUMBAR DRAIN INSERTION (N/A ) Diagnosis:  (encephalus)    Surgeon:  Porfirio Oviedo MD Responsible Provider:  Shaila Wlison DO    Anesthesia Type:  MAC ASA Status:  4          Anesthesia Type: MAC    Sg Phase I: Sg Score: 9    Sg Phase II:      Last vitals: Reviewed and per EMR flowsheets.        Anesthesia Post Evaluation    Patient location during evaluation: PACU  Patient participation: complete - patient participated  Level of consciousness: awake and alert  Pain score: 1  Airway patency: patent  Nausea & Vomiting: no nausea and no vomiting  Complications: no  Cardiovascular status: hemodynamically stable  Respiratory status: acceptable  Hydration status: euvolemic

## 2018-07-27 NOTE — CONSULTS
[address]                   [address] [facility], [address] [facility]                                   CONSULTATION    PATIENT NAME: Tiffany Marino                      :        1955  MED REC NO:   [id number]                            ROOM:       8503  ACCOUNT NO:   [de-identified]                           ADMIT DATE: 2018  PROVIDER:     Tomer Yañez MD    CONSULT DATE:  2018    CHIEF COMPLAINT:  Incontinence, ataxia of the gait and memory problems. HISTORY OF PRESENT ILLNESS:  This 27-year-old female who was admitted  because of above-mentioned symptoms and also having headaches from time to  time. She also complained of pain in the neck, but she has had neck  surgery in the past and has pain off and on since then. The CT scan of  brain followed by MRI scan of brain revealed enlargement of the ventricular  system which is little more pronounced than the cortical atrophy. The  patient does admit to having difficulty with balance and has loss of  short-term memory and is incontinent most of the time. The patient denies  any trauma of recent onset. The CT scan and MRI scan were reviewed by me. PAST MEDICAL HISTORY:  Apraxia, ataxia, cholelithiasis, hyperlipidemia,  COPD, pleural effusion, pulmonary hypertension, CABG, and she is a smoker. PAST SURGICAL HISTORY:  Cardiac surgery, cervical fusion, cholecystectomy,  coronary artery bypass graft and hysterectomy. PERSONAL HISTORY:  She is allergic to PENTAZOCINE and SULFA ANTIBIOTICS. SOCIAL HISTORY:  She is a smoker. Does not use alcohol or street drugs. PHYSICAL EXAMINATION:  GENERAL:  She is well-developed and well-nourished female, in no acute  distress. NEUROLOGIC:  She is alert, awake, confused, but disoriented about the time. She does admit to having slight headache. Pupils are equal, reactive. Extraocular movements are full. Vision is full to confrontation.   She can  count the fingers well. No drift of the upper extremities noted. Hand   is equal bilaterally. She can move her legs well. No focal motor or  sensory deficit noted in lower extremities. I reviewed the various studies. IMPRESSION:  1. Normal pressure hydrocephalus. The patient does have triad of normal  pressure hydrocephalus, incontinence, ataxia and short-term memory loss. 2.  She has multiple medical comorbidities. I have discussed the possibility of doing either area isotope cisternogram  or high volume tap to confirm the diagnosis of normal pressure  hydrocephalus. The patient has consented to the high volume tap. We will  schedule it for tomorrow. .  We will proceed from there.         Shanice Abdalla MD    D: 07/26/2018 15:55:30       T: 07/26/2018 15:57:10     [email or web address]  Job#: [id number]     Doc#: [id number]    CC:

## 2018-07-27 NOTE — PROGRESS NOTES
Dr. Faraz Abdullahi notified of consult and that patient's surgery is on hold until she receives cardiac clearance. Spoke to surgery regarding this issue. They will notify Dr. Heladio Lindsay.

## 2018-07-27 NOTE — PROGRESS NOTES
[facility]  SURGICAL INTENSIVE CARE UNIT (SICU)  ATTENDING PHYSICIAN CRITICAL CARE PROGRESS NOTE     I have examined the patient, reviewed the record, and discussed the case with the APN/ resident. Please refer to the APN/ resident's note. I agree with the assessment and plan. I have reviewed all relevant labs and imaging data. The following summarizes my clinical findings and independent assessment. CC:  Critical care management after lumbar drain for NPH    Hospital Course/Overnight Events:  7/27--underwent lumbar drain for NPH    Pt denies pain. Reports some weakness of legs and decreased sensation in RLE.     Awake and alert  Follows commands  Hrt:  Regular  Lungs:  Fairly clear bilaterally  Abd:  Soft; BS+; NT/ND  Skin:  Warm/dry  Ext:  Diminished sensation in RLE; strength 5/5 bilateral upper/lower ext    Patient Active Problem List    Diagnosis Date Noted    Acute on chronic respiratory failure with hypoxia and hypercapnia (HCC) 07/25/2018     Priority: High     Class: Acute    Pulmonary hypertension 07/23/2018     Priority: High     Class: Chronic    Apraxia 07/23/2018     Priority: High     Class: Acute    CKD (chronic kidney disease) stage 3, GFR 30-59 ml/min 07/23/2018     Priority: High    Hypoxia 07/23/2018     Priority: High     Class: Acute    Stroke-like symptoms     Ataxia 07/23/2018    TIA (transient ischemic attack) 07/23/2018    COPD (chronic obstructive pulmonary disease) ([facility] [address]     CAD (coronary artery disease) 10/04/2012    S/P CABG x 2 10/04/2012    Smoker 10/04/2012    Bronchitis 10/04/2012       S/p lumbar drain for NPH--POD #0  Monitor drain output  Monitor neuro exam  Hypoalbuminemia/moderate protein calorie malnutrition--diet as tolerated  Electrolyte imbalance (hypokalemia)--correct as able  PT/OT evals as appropriate  DVT risk--PCDs    Agnieszka Easley MD, FACS  7/27/2018  11:33 AM

## 2018-07-28 PROBLEM — G91.2 NORMAL PRESSURE HYDROCEPHALUS (HCC): Status: ACTIVE | Noted: 2018-07-28

## 2018-07-28 LAB
ANION GAP SERPL CALCULATED.3IONS-SCNC: 11 MMOL/L (ref 7–16)
BUN BLDV-MCNC: 11 MG/DL (ref 8–23)
CALCIUM SERPL-MCNC: 8.5 MG/DL (ref 8.6–10.2)
CHLORIDE BLD-SCNC: 100 MMOL/L (ref 98–107)
CO2: 33 MMOL/L (ref 22–29)
CREAT SERPL-MCNC: 0.7 MG/DL (ref 0.5–1)
GFR AFRICAN AMERICAN: >60
GFR NON-AFRICAN AMERICAN: >60 ML/MIN/1.73
GLUCOSE BLD-MCNC: 193 MG/DL (ref 74–109)
MAGNESIUM: 1.4 MG/DL (ref 1.6–2.6)
MRSA CULTURE ONLY: NORMAL
PHOSPHORUS: 2.7 MG/DL (ref 2.5–4.5)
POTASSIUM SERPL-SCNC: 3.4 MMOL/L (ref 3.5–5)
SODIUM BLD-SCNC: 144 MMOL/L (ref 132–146)

## 2018-07-28 PROCEDURE — 6370000000 HC RX 637 (ALT 250 FOR IP): Performed by: NEUROLOGICAL SURGERY

## 2018-07-28 PROCEDURE — 6360000002 HC RX W HCPCS: Performed by: SURGERY

## 2018-07-28 PROCEDURE — 6370000000 HC RX 637 (ALT 250 FOR IP): Performed by: INTERNAL MEDICINE

## 2018-07-28 PROCEDURE — 6370000000 HC RX 637 (ALT 250 FOR IP): Performed by: SURGERY

## 2018-07-28 PROCEDURE — 6370000000 HC RX 637 (ALT 250 FOR IP)

## 2018-07-28 PROCEDURE — 6370000000 HC RX 637 (ALT 250 FOR IP): Performed by: STUDENT IN AN ORGANIZED HEALTH CARE EDUCATION/TRAINING PROGRAM

## 2018-07-28 PROCEDURE — 6360000002 HC RX W HCPCS: Performed by: STUDENT IN AN ORGANIZED HEALTH CARE EDUCATION/TRAINING PROGRAM

## 2018-07-28 PROCEDURE — 80048 BASIC METABOLIC PNL TOTAL CA: CPT

## 2018-07-28 PROCEDURE — 36415 COLL VENOUS BLD VENIPUNCTURE: CPT

## 2018-07-28 PROCEDURE — 94640 AIRWAY INHALATION TREATMENT: CPT

## 2018-07-28 PROCEDURE — 84100 ASSAY OF PHOSPHORUS: CPT

## 2018-07-28 PROCEDURE — 99291 CRITICAL CARE FIRST HOUR: CPT | Performed by: SURGERY

## 2018-07-28 PROCEDURE — 2580000003 HC RX 258

## 2018-07-28 PROCEDURE — 83735 ASSAY OF MAGNESIUM: CPT

## 2018-07-28 PROCEDURE — 2000000000 HC ICU R&B

## 2018-07-28 PROCEDURE — 2700000000 HC OXYGEN THERAPY PER DAY

## 2018-07-28 PROCEDURE — 6360000002 HC RX W HCPCS: Performed by: INTERNAL MEDICINE

## 2018-07-28 PROCEDURE — 6360000002 HC RX W HCPCS: Performed by: NEUROLOGICAL SURGERY

## 2018-07-28 PROCEDURE — 99291 CRITICAL CARE FIRST HOUR: CPT | Performed by: NURSE PRACTITIONER

## 2018-07-28 RX ORDER — FENTANYL CITRATE 50 UG/ML
25 INJECTION, SOLUTION INTRAMUSCULAR; INTRAVENOUS
Status: DISCONTINUED | OUTPATIENT
Start: 2018-07-28 | End: 2018-08-03 | Stop reason: HOSPADM

## 2018-07-28 RX ORDER — MAGNESIUM SULFATE IN WATER 40 MG/ML
2 INJECTION, SOLUTION INTRAVENOUS ONCE
Status: COMPLETED | OUTPATIENT
Start: 2018-07-28 | End: 2018-07-28

## 2018-07-28 RX ORDER — FENTANYL CITRATE 50 UG/ML
75 INJECTION, SOLUTION INTRAMUSCULAR; INTRAVENOUS ONCE
Status: COMPLETED | OUTPATIENT
Start: 2018-07-28 | End: 2018-07-28

## 2018-07-28 RX ORDER — OXYCODONE HYDROCHLORIDE AND ACETAMINOPHEN 5; 325 MG/1; MG/1
1 TABLET ORAL EVERY 4 HOURS PRN
Status: DISCONTINUED | OUTPATIENT
Start: 2018-07-28 | End: 2018-08-03 | Stop reason: HOSPADM

## 2018-07-28 RX ORDER — BUTALBITAL, ACETAMINOPHEN AND CAFFEINE 50; 325; 40 MG/1; MG/1; MG/1
1 TABLET ORAL EVERY 4 HOURS PRN
Status: DISCONTINUED | OUTPATIENT
Start: 2018-07-28 | End: 2018-08-03 | Stop reason: HOSPADM

## 2018-07-28 RX ORDER — SODIUM CHLORIDE 0.9 % (FLUSH) 0.9 %
SYRINGE (ML) INJECTION
Status: COMPLETED
Start: 2018-07-28 | End: 2018-07-28

## 2018-07-28 RX ORDER — POTASSIUM CHLORIDE 20 MEQ/1
20 TABLET, EXTENDED RELEASE ORAL 2 TIMES DAILY WITH MEALS
Status: DISCONTINUED | OUTPATIENT
Start: 2018-07-28 | End: 2018-08-03 | Stop reason: HOSPADM

## 2018-07-28 RX ORDER — DIMETHICONE, OXYBENZONE, AND PADIMATE O 2; 2.5; 6.6 G/100G; G/100G; G/100G
STICK TOPICAL
Status: COMPLETED
Start: 2018-07-28 | End: 2018-07-28

## 2018-07-28 RX ORDER — FENTANYL CITRATE 50 UG/ML
INJECTION, SOLUTION INTRAMUSCULAR; INTRAVENOUS
Status: DISPENSED
Start: 2018-07-28 | End: 2018-07-29

## 2018-07-28 RX ORDER — OXYCODONE HYDROCHLORIDE AND ACETAMINOPHEN 5; 325 MG/1; MG/1
2 TABLET ORAL EVERY 4 HOURS PRN
Status: DISCONTINUED | OUTPATIENT
Start: 2018-07-28 | End: 2018-08-03 | Stop reason: HOSPADM

## 2018-07-28 RX ADMIN — GABAPENTIN 300 MG: 300 CAPSULE ORAL at 13:46

## 2018-07-28 RX ADMIN — FOLIC ACID 1 MG: 1 TABLET ORAL at 12:12

## 2018-07-28 RX ADMIN — Medication: at 20:35

## 2018-07-28 RX ADMIN — MAGNESIUM SULFATE HEPTAHYDRATE 2 G: 40 INJECTION, SOLUTION INTRAVENOUS at 11:10

## 2018-07-28 RX ADMIN — Medication 10 ML: at 09:35

## 2018-07-28 RX ADMIN — CEFAZOLIN SODIUM 1 G: 1 SOLUTION INTRAVENOUS at 14:41

## 2018-07-28 RX ADMIN — CARVEDILOL 12.5 MG: 6.25 TABLET, FILM COATED ORAL at 12:10

## 2018-07-28 RX ADMIN — OXYCODONE HYDROCHLORIDE AND ACETAMINOPHEN 1 TABLET: 5; 325 TABLET ORAL at 13:48

## 2018-07-28 RX ADMIN — GABAPENTIN 300 MG: 300 CAPSULE ORAL at 20:30

## 2018-07-28 RX ADMIN — PANTOPRAZOLE SODIUM 40 MG: 40 TABLET, DELAYED RELEASE ORAL at 06:37

## 2018-07-28 RX ADMIN — SPIRONOLACTONE 25 MG: 25 TABLET ORAL at 12:10

## 2018-07-28 RX ADMIN — BECLOMETHASONE DIPROPIONATE 4 PUFF: 80 AEROSOL, METERED RESPIRATORY (INHALATION) at 19:58

## 2018-07-28 RX ADMIN — CEFAZOLIN SODIUM 1 G: 1 SOLUTION INTRAVENOUS at 06:37

## 2018-07-28 RX ADMIN — OXYBUTYNIN CHLORIDE 10 MG: 10 TABLET, EXTENDED RELEASE ORAL at 12:12

## 2018-07-28 RX ADMIN — ROPINIROLE HYDROCHLORIDE 3 MG: 2 TABLET, FILM COATED ORAL at 12:14

## 2018-07-28 RX ADMIN — POTASSIUM CHLORIDE 20 MEQ: 20 TABLET, EXTENDED RELEASE ORAL at 12:13

## 2018-07-28 RX ADMIN — LISINOPRIL 40 MG: 20 TABLET ORAL at 12:11

## 2018-07-28 RX ADMIN — OXYCODONE HYDROCHLORIDE AND ACETAMINOPHEN 2 TABLET: 5; 325 TABLET ORAL at 21:59

## 2018-07-28 RX ADMIN — BUPROPION HYDROCHLORIDE 300 MG: 300 TABLET, FILM COATED, EXTENDED RELEASE ORAL at 12:10

## 2018-07-28 RX ADMIN — POTASSIUM CHLORIDE 20 MEQ: 20 TABLET, EXTENDED RELEASE ORAL at 17:32

## 2018-07-28 RX ADMIN — ONDANSETRON 4 MG: 2 INJECTION INTRAMUSCULAR; INTRAVENOUS at 09:35

## 2018-07-28 RX ADMIN — ACETAMINOPHEN 650 MG: 325 TABLET, FILM COATED ORAL at 08:35

## 2018-07-28 RX ADMIN — ISOSORBIDE MONONITRATE 30 MG: 30 TABLET ORAL at 12:11

## 2018-07-28 RX ADMIN — ROPINIROLE HYDROCHLORIDE 3 MG: 2 TABLET, FILM COATED ORAL at 20:26

## 2018-07-28 RX ADMIN — OXYCODONE HYDROCHLORIDE AND ACETAMINOPHEN 1 TABLET: 5; 325 TABLET ORAL at 01:00

## 2018-07-28 RX ADMIN — FENTANYL CITRATE 25 MCG: 50 INJECTION INTRAMUSCULAR; INTRAVENOUS at 12:26

## 2018-07-28 RX ADMIN — BUTALBITAL, ACETAMINOPHEN AND CAFFEINE 1 TABLET: 50; 325; 40 TABLET ORAL at 20:26

## 2018-07-28 RX ADMIN — OXYCODONE HYDROCHLORIDE AND ACETAMINOPHEN 1 TABLET: 5; 325 TABLET ORAL at 17:57

## 2018-07-28 RX ADMIN — CARVEDILOL 12.5 MG: 6.25 TABLET, FILM COATED ORAL at 20:25

## 2018-07-28 RX ADMIN — TRAZODONE HYDROCHLORIDE 150 MG: 150 TABLET ORAL at 20:26

## 2018-07-28 RX ADMIN — TORSEMIDE 20 MG: 20 TABLET ORAL at 12:12

## 2018-07-28 RX ADMIN — FENTANYL CITRATE 75 MCG: 50 INJECTION INTRAMUSCULAR; INTRAVENOUS at 12:46

## 2018-07-28 RX ADMIN — OXYCODONE HYDROCHLORIDE AND ACETAMINOPHEN 1 TABLET: 5; 325 TABLET ORAL at 09:22

## 2018-07-28 ASSESSMENT — PAIN SCALES - GENERAL
PAINLEVEL_OUTOF10: 8
PAINLEVEL_OUTOF10: 9
PAINLEVEL_OUTOF10: 6
PAINLEVEL_OUTOF10: 7
PAINLEVEL_OUTOF10: 2
PAINLEVEL_OUTOF10: 0
PAINLEVEL_OUTOF10: 0
PAINLEVEL_OUTOF10: 7
PAINLEVEL_OUTOF10: 7
PAINLEVEL_OUTOF10: 0
PAINLEVEL_OUTOF10: 10
PAINLEVEL_OUTOF10: 0
PAINLEVEL_OUTOF10: 9
PAINLEVEL_OUTOF10: 7

## 2018-07-28 ASSESSMENT — PAIN DESCRIPTION - LOCATION
LOCATION: BACK;HEAD
LOCATION: BACK

## 2018-07-28 ASSESSMENT — PAIN DESCRIPTION - ORIENTATION
ORIENTATION: MID

## 2018-07-28 ASSESSMENT — PAIN DESCRIPTION - DESCRIPTORS
DESCRIPTORS: ACHING;STABBING;SORE
DESCRIPTORS: ACHING;DISCOMFORT;THROBBING
DESCRIPTORS: ACHING;SHARP;SHOOTING;THROBBING
DESCRIPTORS: ACHING;DISCOMFORT;SORE
DESCRIPTORS: ACHING;DISCOMFORT;SORE
DESCRIPTORS: ACHING
DESCRIPTORS: STABBING;THROBBING
DESCRIPTORS: THROBBING;STABBING

## 2018-07-28 ASSESSMENT — PAIN DESCRIPTION - FREQUENCY
FREQUENCY: INTERMITTENT
FREQUENCY: CONTINUOUS
FREQUENCY: INTERMITTENT
FREQUENCY: CONTINUOUS

## 2018-07-28 ASSESSMENT — PAIN DESCRIPTION - PROGRESSION
CLINICAL_PROGRESSION: GRADUALLY WORSENING

## 2018-07-28 ASSESSMENT — PAIN DESCRIPTION - PAIN TYPE
TYPE: SURGICAL PAIN

## 2018-07-28 ASSESSMENT — PAIN DESCRIPTION - ONSET
ONSET: ON-GOING
ONSET: PROGRESSIVE
ONSET: ON-GOING
ONSET: SUDDEN
ONSET: ON-GOING
ONSET: ON-GOING
ONSET: GRADUAL
ONSET: SUDDEN

## 2018-07-28 NOTE — CONSULTS
Low-dose CT  acquisition technique included one of following options: 1. Automated exposure control 2. Adjustment of MA and or KV according to patient's size or 3. Use of iterative reconstruction. Comparison: No prior studies available for comparison. Findings: There is mild cortical sulcal prominence and moderate ventricular dilatation, with no clear-cut evidence of hydrocephalus, consistent with central greater than cortical cerebral volume loss. The basal cisterns are preserved. The fourth ventricle is midline. The supratentorial midline structures appear unremarkable. No evidence of parenchymal hemorrhage or extra-axial fluid collection is identified. There is no evidence of cortical infarction or contusion, with preservation of gray-white matter distinction. There are patchy regions of low attenuation in the deep periventricular white matter, most prominently about the frontal horns of lateral ventricles bilaterally, with extension into the corona radiata and centrum semiovale, most compatible with trace microangiopathic ischemic changes. No focal mass lesion or midline shift is identified. There is no depressed skull fracture. There is thickening of the inner table of the frontal bone, consistent with hyperostosis frontalis interna. No lytic or blastic osseous lesions are seen. There is no significant mucosal thickening or fluid levels within paranasal sinuses or mastoid air cells. 1. No evidence of intracranial hemorrhage, extra axial collection, space-occupying mass lesion or mass effect, midline shift, or acute territorial infarction. If there is strong clinical suspicion for acute infarct of the brain, then MR imaging of the brain with diffusion-weighted sequence may be obtained for further evaluation. 2. Central greater than cortical cerebral volume loss. Recommend clinical correlation. 3. Trace microvascular ischemic disease as described above.     Mri Cervical Spine Wo Contrast    Result Date: 2018  Patient MRN:  [id number] : 1955 Age: 60 years Gender: Female Order Date:  2018 3:15 PM EXAM: MRI CERVICAL SPINE WO CONTRAST NUMBER OF IMAGES: 127. INDICATION: Patient is a 59-year-old woman with history of neck pain, r/o spinal compression  COMPARISON: MRI neck May 26, 2018 TECHNIQUE: Multiplanar and multi sequential MRI of the cervical spine was performed with no gadolinium administration. Study is limited due to patient's motion. FINDINGS: There is normal alignment of the vertebral bodies with no spondylolisthesis. Cervico-occipital junction is unremarkable. C5-C6 vertebral fusion. Limited evaluation of the cervical cord signal due to patient's motion. There is no identifiable mass in the cervical canal. There is uniformity of the vertebral body heights. Prevertebral soft tissue are unremarkable. At C2-C3:  There is no focal disc bulge or herniation. No neural foraminal narrowing. At C3-C4: There is no focal disc bulge or herniation. Mild the left neural foraminal narrowing. At C4-C5: There is mild to moderate disc height loss and diffuse disc desiccation. Broad-based right paracentral disc protrusion and moderate central canal narrowing on the right. Flattening anterior aspect of the cord on the right. Moderate to severe bilateral neural foraminal narrowing, more pronounced on the right. At C5-C6: Vertebral fusion. No central canal stenosis. . No neural foraminal narrowing. At C6-C7: Moderate to severe disc height loss and desiccation. Degenerative irregularities of the endplates are present. Mild diffuse disc bulge and mild the central canal narrowing. Moderate to severe left foraminal narrowing. At C7-T1:There is no focal disc bulge or herniation. No neural foraminal narrowing. 1. Limited study due to patient's motion. 2. No compression fracture or a spondylolisthesis. 3. C5-C6 vertebral fusion. 4. Moderate to severe degenerative disc disease at C6-C7.  5. Broad-based focal right windows. Images were reconstructed on a separate dedicated 3-D imaging workstation, using maximum intensity projection (MIP) and volume rendered (3D) datasets. Low-dose CT  acquisition technique included one of following options: 1. Automated exposure control 2. Adjustment of MA and or KV according to patient's size or 3. Use of iterative reconstruction. Comparison: No prior studies available for comparison. Findings: Pulmonary vasculature: There is no evidence of filling defect in the main pulmonary artery, right or left pulmonary arteries, or segmental branches of the pulmonary arteries to suggest pulmonary embolism. Evaluation of the subsegmental branches is limited due to respiratory artifact. Lung parenchyma and pleura: The tracheobronchial tree is midline and the central bronchi are patent. There are moderate centrilobular bullous on this changes the lungs bilaterally, most prominently involving the upper lobes. There is a calcified pleural plaque in the posterior aspect of the superior segment of the left lower lobe (series 5, image 17). There is no definite pleural or parenchymal mass lesion. There is no focal consolidation, pleural effusion or pneumothorax. There are mild patchy bibasal dependent pulmonary parenchymal opacities consistent with mild atelectatic change. Thyroid: Demonstrates homogeneous enhancement. Mediastinum: No significant lymphadenopathy. Ruth: No significant lymphadenopathy. Heart and pericardium: The heart is enlarged. There is no pericardial effusion. There are moderate coronary artery calcifications. There is dilatation of the main pulmonary artery and, measuring 3.4 cm in maximum diameter (series 4, image 59). There is dilatation of the right pulmonary artery, measuring 2.8 cm in maximum diameter (series 4, image 64). There is dilatation of the left pulmonary artery, measuring 2.9 cm in maximum diameter (series 4, image 61).  Chest wall: Midline sternotomy wires are identified, prior sternotomy. Axilla: No enlarged lymph nodes. Visualized upper abdomen: There is a trace amount of perihepatic free fluid. The gallbladder is absent and surgical clips are present in the gallbladder fossa, consistent with a prior cholecystectomy. There is gas present within the central biliary tree, likely secondary to prior cholecystectomy. The remaining visualized upper abdominal organs are unremarkable. Bones: There are degenerative changes of the spine. CT angiography demonstrates the following: The aorta is normal in caliber without evidence of aneurysmal dilatation, dissection or thrombus. The ascending aorta at the level of the aortic root has a maximum diameter of 2.7 cm The ascending aorta at the level of the right pulmonary artery has a maximum diameter of 3.3 cm The transverse arch has a maximum diameter of 2.8 cm The descending thoracic aorta at the level of the left pulmonary artery has a maximum diameter of 2.4 cm     1. No evidence of pulmonary embolus in the  main pulmonary artery, right or left pulmonary arteries, or segmental branches of the pulmonary arteries. 2. No focal consolidation, pleural effusion or pneumothorax. 3. No definite pleural or parenchymal mass lesion. 4. Moderate pulmonary emphysematous disease. 5. Calcified pleural plaque in the posterior aspect of the superior segment of the left lower lobe, consistent with a history of prior asbestos exposure. 6. Cardiomegaly with dilatation of the main pulmonary artery as well as the right and left pulmonary arteries, consistent with pulmonary artery hypertension. 7. Trace amount of perihepatic free fluid. 8. Status post cholecystectomy with small amount of residual pneumobilia. Fluoro For Surgical Procedures    Result Date: 2018  Patient MRN:  [id number] : 1955 Age: 60 years Gender: Female Order Date:  2018 9:00 AM EXAM: FLUORO FOR SURGICAL PROCEDURES NUMBER OF IMAGES:  4. INDICATION: R 52 Pain.  Hardware Removal Findings: 3 fluoroscopic spot films were submitted for interpretation. Fluoroscopic images demonstrate localization of the lumbar spine with interval placement of a probable drain. Fluoroscopy equipment and a technologist were provided by the Department of Radiology. No radiologist was present for the examination. Total fluoroscopic time was 4.0 seconds. Intraoperative fluoroscopy, as detailed above. Mri Brain Wo Contrast    Result Date: 2018  Patient MRN:  [id number] : 1955 Age: 60 years Gender: Female Order Date:  2018 12:45 AM EXAM: MRI BRAIN WO CONTRAST NUMBER OF IMAGES:  36 INDICATION:  Patient is a 50-year-old woman with history of hyperlipidemia, CAD, hypertension, STROKE TECHNIQUE: Multiplanar, multi sequential images of the brain were obtained without gadolinium administration. COMPARISON: CT head 2018 FINDINGS: The ventricular system appears moderately distended which is a slightly disproportionate to cortical sulci widening raising possibility of normal pressure hydrocephalus. Clinical correlation is recommended. Moderate diffuse age-related cerebral atrophy. There are mild to moderate periventricular and deep subcortical T-2/flair hyperintensities representing chronic microvascular ischemic disease. No intracranial mass lesions are identified. There is no evidence of restricted diffusion to suggest acute/early subacute ischemic infarction. The posterior fossa contents and brainstem are unremarkable. There is evidence of prior bilateral cataract removal. The  orbital structures, paranasal sinuses, and mastoid air cells are unremarkable. Patent flow-voids are noted within the intracranial vessels and dural venous sinuses. The calvarium is intact. IMPRESSION: 1. No evidence for acute intracranial ischemic infarct or hemorrhage. 2. Moderate diffuse age-related cerebral atrophy and chronic microvascular ischemic disease.  3. Ventricular enlargement slightly disproportionate to cortical sulci widening which could raise possibility of normal pressure hydrocephalus. Clinical correlation is recommended. 4. No identifiable mass, mass effect or abnormal diffusion restriction. Us Carotid Artery Bilateral    Result Date: 2018  Patient MRN:  [id number] : 1955 Age: 60 years Gender: Female Order Date:  2018 10:45 PM EXAM: US CAROTID ARTERY BILATERAL NUMBER OF IMAGES:  55 TECHNIQUE: Duplex carotid ultrasound with color-flow Doppler, and a velocity blood flow characteristic and spectral analysis of the carotid arteries were obtained and documented. INDICATION: Patient is a 77-year-old woman with history of CAD, hyperlipidemia, COPD, TIA COMPARISON: None FINDINGS: Bilateral CCA, ICA and ECA are patent. The right internal carotid artery has a peak systolic velocity of 62 cm/sec proximally with an  internal to common carotid artery ratio of 1. Based on Gosink criteria, no evidence of hemodynamically significant stenosis is seen. The left internal carotid artery has a peak systolic velocity of 80.3 cm/sec proximally with an internal to common carotid artery ratio of 1.3. Based on Gosink criteria, no evidence of hemodynamically significant stenosis is seen. Bilateral antegrade vertebral artery flow is seen. Mild atherosclerotic plaque formation is seen on the right without evidence of hemodynamically significant stenosis. Mild atherosclerotic plaque formation is seen on the left without evidence of hemodynamically significant stenosis. 1. No evidence to suggest hemodynamically significant stenosis.     Assessment:    Principal Problem:    Apraxia  Active Problems:    CAD (coronary artery disease)    S/P CABG x 2    Pulmonary hypertension    COPD (chronic obstructive pulmonary disease) (HCC)    Ataxia    CKD (chronic kidney disease) stage 3, GFR 30-59 ml/min    Hypoxia    TIA (transient ischemic attack)    Stroke-like symptoms    Acute on chronic respiratory failure with hypoxia and

## 2018-07-28 NOTE — PROGRESS NOTES
[facility]  Surgical Intensive Care Unit  Daily Progress Note        CRITICAL CARE    Patient Active Problem List   Diagnosis    CAD (coronary artery disease)    S/P CABG x 2    Smoker    Bronchitis    Pulmonary hypertension    COPD (chronic obstructive pulmonary disease) (HCC)    Apraxia    Ataxia    CKD (chronic kidney disease) stage 3, GFR 30-59 ml/min    Hypoxia    TIA (transient ischemic attack)    Stroke-like symptoms    Acute on chronic respiratory failure with hypoxia and hypercapnia (HCC)    Sinus pause    Normal pressure hydrocephalus       Hospital Course/Overnight Events:  7/27--underwent lumbar drain for NPH  7/28--lumbar drain removed      OVERNIGHT EVENTS:    Writhing in pain, lumbar drain discontinued due to malfunctioning      PHYSICAL EXAM:  CONSTITUTIONAL:  Awake, alert  EYES:  Lids and lashes normal, pupils equal, round and reactive to light, extra ocular muscles intact, sclera clear, conjunctiva normal  LUNGS:  CTAB  CARDIOVASCULAR:  RRR  ABDOMEN:  Soft, NT, ND  MUSCULOSKELETAL:  ALTMAN x 4  NEUROLOGIC:  GCS 15      PROBLEMS/PLANS:    NEUROLOGIC:  PROBLEMS:  1. NPH  2. Nonfunctioning lumbar drain  PLAN:  1. Drain removed    SEDATION/ANALGESIA:  fentanyl -  Continue  Add fioricet for HA    CARDIOVASCULAR:  PROBLEMS:  1. arrhythmia  PLAN:  1. hemodynamic monitoring -  noninvasive -  continue  2. Cardiology following    PULMONARY:  PROBLEMS:  1. No active issues   PLAN:  1. bronchopulmonary hygiene -  continue  2. bronchodilators -  continue    RENAL/FLUID/ELECTROLYTE:  PROBLEMS:  1. hypomagnesium  2. hypokalemia  PLAN:  1. avoid nephrotoxins  2. replace electrolytes    GI/NUTRITION:  PROBLEMS:  1. No active issues   PLAN:  1. enteral nutrition -  continue    ID:  PROBLEMS:  1. No active issues   PLAN:  1. No active issues     HEMATOLOGIC:  PROBLEMS:  1. acute blood loss anemia  PLAN:  1. monitor    ENDOCRINE:  PROBLEMS:  1. No active issues   PLAN:  1.  No active

## 2018-07-28 NOTE — FLOWSHEET NOTE
Patient still having excruciating head pain and now pain into her lower back and legs. Patient writhing in bed from pain. Unable to take pills at this time from nausea from the pain. Dr. Freddie Bates and residents at bedside. Called Dr. Danielle Marion and reported the situation and ordered to pull the drain. Drain removed by residents and dressing placed. Ordered for patient to remain flat rest of the day.

## 2018-07-28 NOTE — PLAN OF CARE
Problem: Falls - Risk of:  Goal: Will remain free from falls  Will remain free from falls   Outcome: Met This Shift    Goal: Absence of physical injury  Absence of physical injury   Outcome: Met This Shift      Problem: Pain:  Goal: Pain level will decrease  Pain level will decrease   Outcome: Met This Shift    Goal: Control of acute pain  Control of acute pain   Outcome: Met This Shift      Problem: Mental Status - Impaired:  Goal: Mental status will improve  Mental status will improve   Outcome: Met This Shift

## 2018-07-28 NOTE — FLOWSHEET NOTE
Lumbar drain no longer draining any fluid. Bed was raised and bag was lowered with no drainage. Patient was turned on side to assess site and to ensure everything was still connected. Dr. Adriana Encinas called and was notified. No further orders at this time. Reported he would let Dr. Rey Jauregui know and that he may have to revisit the drain tomorrow when he is in.
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
## 2018-07-28 NOTE — PROGRESS NOTES
Mihok, DO   150 mg at 07/27/18 2045    torsemide (DEMADEX) tablet 20 mg  20 mg Oral Daily Liz Evans Mihok, DO   20 mg at 07/28/18 1212    rOPINIRole (REQUIP) tablet 3 mg  3 mg Oral BID Liz Gomezk, DO   3 mg at 07/28/18 1214    oxybutynin (DITROPAN-XL) extended release tablet 10 mg  10 mg Oral Daily Liz Vieira, DO   10 mg at 07/28/18 1212    lisinopril (PRINIVIL;ZESTRIL) tablet 40 mg  40 mg Oral Daily Malik Vieira, DO   40 mg at 07/28/18 1211    isosorbide mononitrate (IMDUR) extended release tablet 30 mg  30 mg Oral Daily Malik Vieira, DO   30 mg at 07/28/18 1211    gabapentin (NEURONTIN) capsule 300 mg  300 mg Oral TID Liz Gomezk, DO   Stopped at 07/28/18 0932    furosemide (LASIX) tablet 20 mg  20 mg Oral Daily PRN Liz Gomezk, DO        beclomethasone (QVAR) 80 MCG/ACT inhaler 4 puff  4 puff Inhalation BID Liz Gomezk, DO   4 puff at 07/27/18 2051    fluticasone (FLONASE) 50 MCG/ACT nasal spray 1 spray  1 spray Nasal Daily PRN Liz Gomezhok, DO        buPROPion (WELLBUTRIN XL) extended release tablet 300 mg  300 mg Oral QAM Malik Vieira, DO   300 mg at 07/28/18 1210    albuterol (PROVENTIL) nebulizer solution 2.5 mg  2.5 mg Nebulization Q6H PRN Liz Gomezhok, DO        pantoprazole (PROTONIX) tablet 40 mg  40 mg Oral QAM AC Malik Vieira, DO   40 mg at 07/28/18 4139    ondansetron (ZOFRAN) injection 4 mg  4 mg Intravenous Q6H PRN Liz Gomezk, DO   4 mg at 31/68/49 0186    folic acid (FOLVITE) tablet 1 mg  1 mg Oral Daily Malik Vieira, DO   1 mg at 07/28/18 1212     Scheduled Meds:   potassium chloride  20 mEq Oral BID WC    ceFAZolin  1 g Intravenous Q8H    carvedilol  12.5 mg Oral BID    spironolactone  25 mg Oral Daily    traZODone  150 mg Oral Nightly    torsemide  20 mg Oral Daily    rOPINIRole  3 mg Oral BID    oxybutynin  10 mg Oral Daily    lisinopril  40 mg Oral Daily    isosorbide mononitrate  30 mg Oral Daily    gabapentin  300 mg Oral TID    beclomethasone  4 puff Inhalation BID    buPROPion  300 mg Oral QAM    pantoprazole  40 mg Oral QAM AC    folic acid  1 mg Oral Daily       Continuous Infusions:        Data:   Temperature:  Current - Temp: 98.3 °F (36.8 °C); Max - Temp  Av.4 °F (36.9 °C)  Min: 98.1 °F (36.7 °C)  Max: 98.6 °F (37 °C)    Respiratory Rate : Resp  Av.3  Min: 13  Max: 37    Pulse Range: Pulse  Av  Min: 76  Max: 94    Blood Presuure Range:  Systolic (49RJG), OWW:011 , Min:131 , QWM:818   ; Diastolic (15ZEO), MSD:64, Min:79, Max:122      Pulse ox Range: SpO2  Av.9 %  Min: 89 %  Max: 99 %    Patient Vitals for the past 8 hrs:   BP Temp Temp src Pulse Resp SpO2 Weight   18 1210 (!) 157/98 - - 90 - - -   18 1200 (!) 157/98 - - 91 (!) 31 93 % -   18 1100 (!) 157/95 - - 88 22 95 % -   18 1000 (!) 147/105 - - 87 (!) 37 99 % -   18 0900 (!) 168/122 - - 94 23 94 % -   18 0800 (!) 177/115 98.3 °F (36.8 °C) Oral 83 24 94 % -   18 0700 (!) 149/81 - - 76 20 96 % -   18 0600 (!) 162/111 98.5 °F (36.9 °C) Oral 87 21 95 % 131 lb 1.6 oz (59.5 kg)         Intake/Output Summary (Last 24 hours) at 18 1337  Last data filed at 18 1046   Gross per 24 hour   Intake          3622.67 ml   Output             3123 ml   Net           499.67 ml       I/O last 3 completed shifts:   In: 3807.7 [P.O.:1480; I.V.:2177.7; IV Piggyback:150]  Out: 2500 [Urine:2375; Drains:120; Blood:5]    I/O this shift:  In: 365 [I.V.:365]  Out: 908 [Urine:900; Drains:8]    Wt Readings from Last 3 Encounters:   18 131 lb 1.6 oz (59.5 kg)   17 130 lb (59 kg)   16 128 lb (58.1 kg)       Labs:   CBC:   Lab Results   Component Value Date    WBC 5.5 2018    RBC 3.64 2018    HGB 10.3 2018    HCT 34.0 2018    MCV 93.4 2018    MCH 28.3 2018    MCHC 30.3 2018    RDW 18.0 2018     2018    MPV 11.9 2018     CBC with Differential:    Lab Results   Component Value Date    WBC 5.5 07/24/2018    RBC 3.64 07/24/2018    HGB 10.3 07/24/2018    HCT 34.0 07/24/2018     07/24/2018    MCV 93.4 07/24/2018    MCH 28.3 07/24/2018    MCHC 30.3 07/24/2018    RDW 18.0 07/24/2018    SEGSPCT 63 01/30/2014    LYMPHOPCT 39.8 07/24/2018    MONOPCT 10.4 07/24/2018    BASOPCT 1.1 07/24/2018    MONOSABS 0.57 07/24/2018    LYMPHSABS 2.19 07/24/2018    EOSABS 0.05 07/24/2018    BASOSABS 0.06 07/24/2018     Hemoglobin/Hematocrit:    Lab Results   Component Value Date    HGB 10.3 07/24/2018    HCT 34.0 07/24/2018     CMP:    Lab Results   Component Value Date     07/28/2018    K 3.4 07/28/2018    K 3.3 07/26/2018     07/28/2018    CO2 33 07/28/2018    BUN 11 07/28/2018    CREATININE 0.7 07/28/2018    GFRAA >60 07/28/2018    LABGLOM >60 07/28/2018    GLUCOSE 193 07/28/2018    GLUCOSE 111 09/30/2011    PROT 6.2 07/24/2018    LABALBU 2.6 07/24/2018    LABALBU 4.4 09/30/2011    CALCIUM 8.5 07/28/2018    BILITOT 1.0 07/24/2018    ALKPHOS 82 07/24/2018    AST 16 07/24/2018    ALT 11 07/24/2018     BMP:    Lab Results   Component Value Date     07/28/2018    K 3.4 07/28/2018    K 3.3 07/26/2018     07/28/2018    CO2 33 07/28/2018    BUN 11 07/28/2018    LABALBU 2.6 07/24/2018    LABALBU 4.4 09/30/2011    CREATININE 0.7 07/28/2018    CALCIUM 8.5 07/28/2018    GFRAA >60 07/28/2018    LABGLOM >60 07/28/2018    GLUCOSE 193 07/28/2018    GLUCOSE 111 09/30/2011     Magnesium:    Lab Results   Component Value Date    MG 1.4 07/28/2018     Phosphorus:    Lab Results   Component Value Date    PHOS 2.7 07/28/2018     Uric Acid:  No results found for: LABURIC, URICACID  PT/INR:    Lab Results   Component Value Date    PROTIME 13.8 07/26/2018    INR 1.2 07/26/2018     Warfarin PT/INR:  No components found for: PTPATWAR, PTINRWAR  PTT:    Lab Results   Component Value Date    APTT <20.0 07/23/2018   [APTT}  Troponin:    Lab Results   Component Value Date TROPONINI <0.01 07/24/2018     Last 3 Troponin:    Lab Results   Component Value Date    TROPONINI <0.01 07/24/2018    TROPONINI <0.01 07/24/2018    TROPONINI <0.01 07/23/2018     U/A:    Lab Results   Component Value Date    COLORU Yellow 07/23/2018    PROTEINU 30 07/23/2018    PHUR 5.5 07/23/2018    WBCUA 0-1 07/23/2018    RBCUA NONE 07/23/2018    BACTERIA FEW 07/23/2018    CLARITYU Clear 07/23/2018    SPECGRAV 1.020 07/23/2018    LEUKOCYTESUR Negative 07/23/2018    UROBILINOGEN 4.0 07/23/2018    BILIRUBINUR Negative 07/23/2018    BLOODU Negative 07/23/2018    GLUCOSEU Negative 07/23/2018     ABG:  No results found for: PH, PCO2, PO2, HCO3, BE, THGB, TCO2, O2SAT  HgBA1c:    Lab Results   Component Value Date    LABA1C 10.4 07/24/2018     FLP:    Lab Results   Component Value Date    TRIG 108 07/24/2018    HDL 21 07/24/2018    LDLCALC 49 07/24/2018    LABVLDL 22 07/24/2018     TSH:    Lab Results   Component Value Date    TSH 2.120 07/24/2018     VITAMIN B12: No components found for: B12  FOLATE:    Lab Results   Component Value Date    FOLATE 5.5 07/24/2018     GILBERT:  No results found for: ANATITER, GILBERT  RF:  No results found for: RF     CBC:  No results for input(s): WBC, RBC, HGB, HCT, PLT, MCV, MCH, MCHC, RDW, NRBC, SEGSPCT, BANDSPCT, BLASTSPCT, METASPCT, LYMPHOPCT, PROMYELOPCT, MONOPCT, MYELOPCT, EOSPCT, BASOPCT, MONOSABS, LYMPHSABS, EOSABS, BASOSABS, DIFFTYPE in the last 72 hours. Invalid input(s): ATYLMREL       H & H :  No results for input(s): HGB in the last 72 hours. TSH:  No results for input(s): TSH in the last 72 hours. GLUCOSE:No results for input(s): POCGLU in the last 72 hours.     CMP:  Recent Labs      07/26/18   0607  07/26/18   1533  07/28/18   0700   NA  142  140  144   K  3.4*  3.3*  3.4*   CL  97*  95*  100   CO2  36*  38*  33*   BUN  12  12  11   CREATININE  0.8  0.8  0.7   GFRAA  >60  >60  >60   LABGLOM  >60  >60  >60   GLUCOSE  194*  182*  193*   CALCIUM  8.2*  8.5*  8.5* BNP:No results found for: BNP    PROTIME/INR:  Recent Labs      07/26/18   1611   PROTIME  13.8*   INR  1.2       CRP: No results for input(s): CRP in the last 72 hours. ESR:No results for input(s): SEDRATE in the last 72 hours. LIPASE , AMYLASE:  Lab Results   Component Value Date    LIPASE 45 07/30/2014      No results found for: AMYLASE    ABGs: No results found for: PH, PO2, PCO2    CARDIAC:   No results for input(s): CKTOTAL, CKMB, CKMBINDEX, TROPONINI in the last 72 hours. Lipid Profile:   Lab Results   Component Value Date    TRIG 108 07/24/2018    HDL 21 07/24/2018    LDLCALC 49 07/24/2018    CHOL 92 07/24/2018        Lab Results   Component Value Date    LABA1C 10.4 (H) 07/24/2018            RADIOLOGY: REVIEWED AVAILABLE REPORT  FLUORO FOR SURGICAL PROCEDURES   Final Result   Intraoperative fluoroscopy, as detailed above. MRI CERVICAL SPINE WO CONTRAST   Final Result      1. Limited study due to patient's motion. 2. No compression fracture or a spondylolisthesis. 3. C5-C6 vertebral fusion. 4. Moderate to severe degenerative disc disease at C6-C7. 5. Broad-based focal right paracentral disc protrusion at C4-C5 and   moderate central canal narrowing on the right. 6. Multilevel moderate to severe foraminal narrowing more pronounced   at left C6-C7 and bilateral C4-C5. US CAROTID ARTERY BILATERAL   Final Result   1. No evidence to suggest hemodynamically significant stenosis. MRI Brain WO Contrast   Final Result    IMPRESSION:   1. No evidence for acute intracranial ischemic infarct or hemorrhage. 2. Moderate diffuse age-related cerebral atrophy and chronic   microvascular ischemic disease. 3. Ventricular enlargement slightly disproportionate to cortical sulci   widening which could raise possibility of normal pressure   hydrocephalus. Clinical correlation is recommended. 4. No identifiable mass, mass effect or abnormal diffusion   restriction.       CTA CHEST W

## 2018-07-28 NOTE — FLOWSHEET NOTE
Entered patient room and patient reported she was wet. Patient denies incontinence. Pad did not smell of urine. Lumbar dressing noted to be dry and intact with drain tubing intact as well.   Updated that drain not draining

## 2018-07-28 NOTE — PROGRESS NOTES
Daily  lisinopril (PRINIVIL;ZESTRIL) tablet 40 mg, 40 mg, Oral, Daily  isosorbide mononitrate (IMDUR) extended release tablet 30 mg, 30 mg, Oral, Daily  gabapentin (NEURONTIN) capsule 300 mg, 300 mg, Oral, TID  furosemide (LASIX) tablet 20 mg, 20 mg, Oral, Daily PRN  beclomethasone (QVAR) 80 MCG/ACT inhaler 4 puff, 4 puff, Inhalation, BID  fluticasone (FLONASE) 50 MCG/ACT nasal spray 1 spray, 1 spray, Nasal, Daily PRN  buPROPion (WELLBUTRIN XL) extended release tablet 300 mg, 300 mg, Oral, QAM  albuterol (PROVENTIL) nebulizer solution 2.5 mg, 2.5 mg, Nebulization, Q6H PRN  0.9 % sodium chloride infusion, , Intravenous, Continuous  pantoprazole (PROTONIX) tablet 40 mg, 40 mg, Oral, QAM AC  ondansetron (ZOFRAN) injection 4 mg, 4 mg, Intravenous, B4H PRN  folic acid (FOLVITE) tablet 1 mg, 1 mg, Oral, Daily    ASSESSMENT AND PLAN:    POD 1 s/p placement of lumbar drain. She has headaches. Will try percocet or Norco.  Continue draining 5 cc's per hour.     Floyd Ferguson

## 2018-07-29 LAB
ALBUMIN SERPL-MCNC: 2.7 G/DL (ref 3.5–5.2)
ALP BLD-CCNC: 90 U/L (ref 35–104)
ALT SERPL-CCNC: 7 U/L (ref 0–32)
ANION GAP SERPL CALCULATED.3IONS-SCNC: 10 MMOL/L (ref 7–16)
AST SERPL-CCNC: 15 U/L (ref 0–31)
BASOPHILS ABSOLUTE: 0.04 E9/L (ref 0–0.2)
BASOPHILS RELATIVE PERCENT: 0.6 % (ref 0–2)
BILIRUB SERPL-MCNC: 0.6 MG/DL (ref 0–1.2)
BLOOD CULTURE, ROUTINE: NORMAL
BUN BLDV-MCNC: 12 MG/DL (ref 8–23)
CALCIUM SERPL-MCNC: 8.8 MG/DL (ref 8.6–10.2)
CHLORIDE BLD-SCNC: 95 MMOL/L (ref 98–107)
CO2: 37 MMOL/L (ref 22–29)
CREAT SERPL-MCNC: 0.8 MG/DL (ref 0.5–1)
CULTURE, BLOOD 2: NORMAL
EOSINOPHILS ABSOLUTE: 0.12 E9/L (ref 0.05–0.5)
EOSINOPHILS RELATIVE PERCENT: 1.7 % (ref 0–6)
GFR AFRICAN AMERICAN: >60
GFR NON-AFRICAN AMERICAN: >60 ML/MIN/1.73
GLUCOSE BLD-MCNC: 132 MG/DL (ref 74–109)
HCT VFR BLD CALC: 36.1 % (ref 34–48)
HEMOGLOBIN: 11.1 G/DL (ref 11.5–15.5)
IMMATURE GRANULOCYTES #: 0.04 E9/L
IMMATURE GRANULOCYTES %: 0.6 % (ref 0–5)
LV EF: 23 %
LVEF MODALITY: NORMAL
LYMPHOCYTES ABSOLUTE: 1.91 E9/L (ref 1.5–4)
LYMPHOCYTES RELATIVE PERCENT: 26.6 % (ref 20–42)
MAGNESIUM: 1.6 MG/DL (ref 1.6–2.6)
MCH RBC QN AUTO: 28.5 PG (ref 26–35)
MCHC RBC AUTO-ENTMCNC: 30.7 % (ref 32–34.5)
MCV RBC AUTO: 92.8 FL (ref 80–99.9)
MONOCYTES ABSOLUTE: 0.6 E9/L (ref 0.1–0.95)
MONOCYTES RELATIVE PERCENT: 8.4 % (ref 2–12)
NEUTROPHILS ABSOLUTE: 4.46 E9/L (ref 1.8–7.3)
NEUTROPHILS RELATIVE PERCENT: 62.1 % (ref 43–80)
PDW BLD-RTO: 18.1 FL (ref 11.5–15)
PHOSPHORUS: 3 MG/DL (ref 2.5–4.5)
PLATELET # BLD: 159 E9/L (ref 130–450)
PMV BLD AUTO: 11.3 FL (ref 7–12)
POTASSIUM SERPL-SCNC: 3.7 MMOL/L (ref 3.5–5)
RBC # BLD: 3.89 E12/L (ref 3.5–5.5)
SODIUM BLD-SCNC: 142 MMOL/L (ref 132–146)
TOTAL PROTEIN: 6.6 G/DL (ref 6.4–8.3)
WBC # BLD: 7.2 E9/L (ref 4.5–11.5)

## 2018-07-29 PROCEDURE — 2060000000 HC ICU INTERMEDIATE R&B

## 2018-07-29 PROCEDURE — 93306 TTE W/DOPPLER COMPLETE: CPT

## 2018-07-29 PROCEDURE — 84100 ASSAY OF PHOSPHORUS: CPT

## 2018-07-29 PROCEDURE — 6370000000 HC RX 637 (ALT 250 FOR IP): Performed by: STUDENT IN AN ORGANIZED HEALTH CARE EDUCATION/TRAINING PROGRAM

## 2018-07-29 PROCEDURE — 85025 COMPLETE CBC W/AUTO DIFF WBC: CPT

## 2018-07-29 PROCEDURE — 6370000000 HC RX 637 (ALT 250 FOR IP): Performed by: INTERNAL MEDICINE

## 2018-07-29 PROCEDURE — 83735 ASSAY OF MAGNESIUM: CPT

## 2018-07-29 PROCEDURE — 2700000000 HC OXYGEN THERAPY PER DAY

## 2018-07-29 PROCEDURE — 36415 COLL VENOUS BLD VENIPUNCTURE: CPT

## 2018-07-29 PROCEDURE — 99231 SBSQ HOSP IP/OBS SF/LOW 25: CPT | Performed by: NURSE PRACTITIONER

## 2018-07-29 PROCEDURE — 80053 COMPREHEN METABOLIC PANEL: CPT

## 2018-07-29 PROCEDURE — 6370000000 HC RX 637 (ALT 250 FOR IP): Performed by: SURGERY

## 2018-07-29 RX ADMIN — BECLOMETHASONE DIPROPIONATE 4 PUFF: 80 AEROSOL, METERED RESPIRATORY (INHALATION) at 08:49

## 2018-07-29 RX ADMIN — GABAPENTIN 300 MG: 300 CAPSULE ORAL at 20:23

## 2018-07-29 RX ADMIN — BECLOMETHASONE DIPROPIONATE 4 PUFF: 80 AEROSOL, METERED RESPIRATORY (INHALATION) at 20:22

## 2018-07-29 RX ADMIN — TORSEMIDE 20 MG: 20 TABLET ORAL at 08:53

## 2018-07-29 RX ADMIN — GABAPENTIN 300 MG: 300 CAPSULE ORAL at 13:39

## 2018-07-29 RX ADMIN — SPIRONOLACTONE 25 MG: 25 TABLET ORAL at 08:52

## 2018-07-29 RX ADMIN — LISINOPRIL 40 MG: 20 TABLET ORAL at 08:52

## 2018-07-29 RX ADMIN — TRAZODONE HYDROCHLORIDE 150 MG: 150 TABLET ORAL at 20:23

## 2018-07-29 RX ADMIN — OXYCODONE HYDROCHLORIDE AND ACETAMINOPHEN 1 TABLET: 5; 325 TABLET ORAL at 09:48

## 2018-07-29 RX ADMIN — PANTOPRAZOLE SODIUM 40 MG: 40 TABLET, DELAYED RELEASE ORAL at 05:41

## 2018-07-29 RX ADMIN — OXYBUTYNIN CHLORIDE 10 MG: 10 TABLET, EXTENDED RELEASE ORAL at 08:54

## 2018-07-29 RX ADMIN — BUPROPION HYDROCHLORIDE 300 MG: 300 TABLET, FILM COATED, EXTENDED RELEASE ORAL at 08:54

## 2018-07-29 RX ADMIN — BUTALBITAL, ACETAMINOPHEN AND CAFFEINE 1 TABLET: 50; 325; 40 TABLET ORAL at 00:26

## 2018-07-29 RX ADMIN — GABAPENTIN 300 MG: 300 CAPSULE ORAL at 08:55

## 2018-07-29 RX ADMIN — ISOSORBIDE MONONITRATE 30 MG: 30 TABLET ORAL at 08:53

## 2018-07-29 RX ADMIN — POTASSIUM CHLORIDE 20 MEQ: 20 TABLET, EXTENDED RELEASE ORAL at 16:15

## 2018-07-29 RX ADMIN — CARVEDILOL 12.5 MG: 6.25 TABLET, FILM COATED ORAL at 08:53

## 2018-07-29 RX ADMIN — POTASSIUM CHLORIDE 20 MEQ: 20 TABLET, EXTENDED RELEASE ORAL at 08:52

## 2018-07-29 RX ADMIN — OXYCODONE HYDROCHLORIDE AND ACETAMINOPHEN 2 TABLET: 5; 325 TABLET ORAL at 19:53

## 2018-07-29 RX ADMIN — CARVEDILOL 12.5 MG: 6.25 TABLET, FILM COATED ORAL at 20:23

## 2018-07-29 RX ADMIN — ROPINIROLE HYDROCHLORIDE 3 MG: 2 TABLET, FILM COATED ORAL at 20:23

## 2018-07-29 RX ADMIN — ROPINIROLE HYDROCHLORIDE 3 MG: 2 TABLET, FILM COATED ORAL at 08:51

## 2018-07-29 RX ADMIN — FOLIC ACID 1 MG: 1 TABLET ORAL at 08:56

## 2018-07-29 RX ADMIN — OXYCODONE HYDROCHLORIDE AND ACETAMINOPHEN 2 TABLET: 5; 325 TABLET ORAL at 15:18

## 2018-07-29 RX ADMIN — BUTALBITAL, ACETAMINOPHEN AND CAFFEINE 1 TABLET: 50; 325; 40 TABLET ORAL at 05:28

## 2018-07-29 ASSESSMENT — PAIN DESCRIPTION - FREQUENCY
FREQUENCY: CONTINUOUS
FREQUENCY: CONTINUOUS
FREQUENCY: INTERMITTENT

## 2018-07-29 ASSESSMENT — PAIN DESCRIPTION - LOCATION
LOCATION: BACK
LOCATION: HEAD
LOCATION: BACK
LOCATION: BACK

## 2018-07-29 ASSESSMENT — PAIN SCALES - GENERAL
PAINLEVEL_OUTOF10: 9
PAINLEVEL_OUTOF10: 8
PAINLEVEL_OUTOF10: 0
PAINLEVEL_OUTOF10: 8
PAINLEVEL_OUTOF10: 4
PAINLEVEL_OUTOF10: 8
PAINLEVEL_OUTOF10: 10
PAINLEVEL_OUTOF10: 5
PAINLEVEL_OUTOF10: 8

## 2018-07-29 ASSESSMENT — PAIN DESCRIPTION - PAIN TYPE
TYPE: SURGICAL PAIN
TYPE: ACUTE PAIN
TYPE: CHRONIC PAIN

## 2018-07-29 ASSESSMENT — PAIN DESCRIPTION - ORIENTATION
ORIENTATION: LOWER;MID
ORIENTATION: MID
ORIENTATION: MID

## 2018-07-29 ASSESSMENT — PAIN DESCRIPTION - DESCRIPTORS
DESCRIPTORS: HEADACHE
DESCRIPTORS: ACHING;DISCOMFORT;HEADACHE

## 2018-07-29 ASSESSMENT — PAIN DESCRIPTION - ONSET: ONSET: GRADUAL

## 2018-07-29 NOTE — PROGRESS NOTES
[facility]  Critical Care Consult  Daily Progress Note 7/29/2018    Date of Admission: 7/23/18    SURGICAL/INTERVENTIONAL PROCEDURES:   7/27--underwent lumbar drain for NPH  7/28--lumbar drain removed    OVERNIGHT EVENTS: Patient moved to NSICU for bed availability. NO acute events overnight. Afebrile, vital signs stable     PHYSICAL EXAM:    BP (!) 167/108   Pulse 86   Temp 98.3 °F (36.8 °C) (Temporal)   Resp 17   Ht 5' 2.99\" (1.6 m)   Wt 131 lb 1.6 oz (59.5 kg)   SpO2 94%   BMI 23.23 kg/m²     General appearance:  Comfortable. Pain Description: none  GCS:    4 - Opens eyes on own   6 - Follows simple motor commands  4 - Seems confused, disoriented    Pupil size:  Left 3 mm  Right 3 mm  Pupil reaction: Yes  Wiggles fingers: Left Yes Right Yes  Hand grasp:   Left normal     Right normal  Wiggles toes: Left Yes    Right Yes  Plantar flexion: Left normal    Right normal    CONSTITUTIONAL: no acute distress, lying in hospital bed, alert and cooperative   NEUROLOGIC: PERRL, oriented x 4  CARDIOVASCULAR: S1 S2, regular rate, regular rhythm, no murmur/gallop/rub. Monitor: sinus  PULMONARY: no rhonchi/rales/wheezes, no use of accessory muscles, Ra  RENAL: voiding clear yellow urine  ABDOMEN: soft, nontender, nondistended, nontympanic, normal bowel sounds   MUSCULOSKELETAL: moves all extremities purposefully, 5/5 strength   SKIN/EXTREMITIES: no rashes/ecchymosis, no edema/clubbing, warm/dry, good capillary refill       CONSULTS:   Medicine  Neurology  Neurosurgery      ASSESSMENT/PLAN:       · Neuro:  NPH s/p lumbar drain removal due to malfunction. Neurosurgery and Neurology following. Monitor neruo status, Wellbutrin  · CV: No acute issues HX of HTN, hyperlipidemia, CAD, CABG. Monitor hemodynamics. BP goal < 160. PRN hydralzine & labetalol. Coreg, Imdur, Lisinopril   · Pulm: No acute issues, Hx COPD. Monitor RR & SpO2. Pulmonary hygiene, QVar  · GI: No acute issues. Diet.   Monitor bowel function. · Renal: No acute issues. Monitor BUN & Cr. Monitor electrolytes & replace as needed. Monitor urine output. · ID: No acute issues. Monitor for SIRS. · Endocrine: No acute issues. · MSK: No acute issues. ROM. turn & reposition. PT/OT  · Heme: No acute issues. Monitor CBC. Pain control/Sedation: Percocet, Tylenol  DVT prophylaxis: SCDs. No Lovenox/heparin until ok with neurosurgery.    GI prophylaxis: Diet  Mouth/Eye care: As needed  Family update: Questions answered for patient  Code status:  Full  Disposition:  Transfer     Electronically signed by LIZA Luke CNP on 7/29/2018 at 1:30 PM

## 2018-07-30 PROCEDURE — 97166 OT EVAL MOD COMPLEX 45 MIN: CPT

## 2018-07-30 PROCEDURE — 97168 OT RE-EVAL EST PLAN CARE: CPT

## 2018-07-30 PROCEDURE — 97530 THERAPEUTIC ACTIVITIES: CPT

## 2018-07-30 PROCEDURE — 6370000000 HC RX 637 (ALT 250 FOR IP): Performed by: INTERNAL MEDICINE

## 2018-07-30 PROCEDURE — 2060000000 HC ICU INTERMEDIATE R&B

## 2018-07-30 PROCEDURE — 6370000000 HC RX 637 (ALT 250 FOR IP): Performed by: SURGERY

## 2018-07-30 PROCEDURE — 6370000000 HC RX 637 (ALT 250 FOR IP): Performed by: STUDENT IN AN ORGANIZED HEALTH CARE EDUCATION/TRAINING PROGRAM

## 2018-07-30 PROCEDURE — 6370000000 HC RX 637 (ALT 250 FOR IP): Performed by: NEUROLOGICAL SURGERY

## 2018-07-30 PROCEDURE — G8979 MOBILITY GOAL STATUS: HCPCS

## 2018-07-30 PROCEDURE — 2500000003 HC RX 250 WO HCPCS: Performed by: STUDENT IN AN ORGANIZED HEALTH CARE EDUCATION/TRAINING PROGRAM

## 2018-07-30 PROCEDURE — 97535 SELF CARE MNGMENT TRAINING: CPT

## 2018-07-30 PROCEDURE — 2700000000 HC OXYGEN THERAPY PER DAY

## 2018-07-30 PROCEDURE — G8978 MOBILITY CURRENT STATUS: HCPCS

## 2018-07-30 PROCEDURE — 97164 PT RE-EVAL EST PLAN CARE: CPT

## 2018-07-30 PROCEDURE — G8988 SELF CARE GOAL STATUS: HCPCS

## 2018-07-30 PROCEDURE — G8987 SELF CARE CURRENT STATUS: HCPCS

## 2018-07-30 RX ORDER — LIDOCAINE HYDROCHLORIDE AND EPINEPHRINE 10; 10 MG/ML; UG/ML
20 INJECTION, SOLUTION INFILTRATION; PERINEURAL ONCE
Status: COMPLETED | OUTPATIENT
Start: 2018-07-30 | End: 2018-07-30

## 2018-07-30 RX ADMIN — OXYCODONE HYDROCHLORIDE AND ACETAMINOPHEN 2 TABLET: 5; 325 TABLET ORAL at 21:03

## 2018-07-30 RX ADMIN — TRAZODONE HYDROCHLORIDE 150 MG: 150 TABLET ORAL at 21:03

## 2018-07-30 RX ADMIN — CARVEDILOL 12.5 MG: 6.25 TABLET, FILM COATED ORAL at 08:32

## 2018-07-30 RX ADMIN — BUPROPION HYDROCHLORIDE 300 MG: 300 TABLET, FILM COATED, EXTENDED RELEASE ORAL at 08:30

## 2018-07-30 RX ADMIN — SPIRONOLACTONE 25 MG: 25 TABLET ORAL at 08:32

## 2018-07-30 RX ADMIN — ROPINIROLE HYDROCHLORIDE 3 MG: 2 TABLET, FILM COATED ORAL at 21:03

## 2018-07-30 RX ADMIN — ISOSORBIDE MONONITRATE 30 MG: 30 TABLET ORAL at 08:32

## 2018-07-30 RX ADMIN — TORSEMIDE 20 MG: 20 TABLET ORAL at 08:33

## 2018-07-30 RX ADMIN — OXYBUTYNIN CHLORIDE 10 MG: 10 TABLET, EXTENDED RELEASE ORAL at 08:30

## 2018-07-30 RX ADMIN — GABAPENTIN 300 MG: 300 CAPSULE ORAL at 08:32

## 2018-07-30 RX ADMIN — FOLIC ACID 1 MG: 1 TABLET ORAL at 08:32

## 2018-07-30 RX ADMIN — BECLOMETHASONE DIPROPIONATE 4 PUFF: 80 AEROSOL, METERED RESPIRATORY (INHALATION) at 21:04

## 2018-07-30 RX ADMIN — ROPINIROLE HYDROCHLORIDE 3 MG: 2 TABLET, FILM COATED ORAL at 08:29

## 2018-07-30 RX ADMIN — LIDOCAINE HYDROCHLORIDE,EPINEPHRINE BITARTRATE 20 ML: 10; .01 INJECTION, SOLUTION INFILTRATION; PERINEURAL at 22:05

## 2018-07-30 RX ADMIN — BECLOMETHASONE DIPROPIONATE 4 PUFF: 80 AEROSOL, METERED RESPIRATORY (INHALATION) at 08:28

## 2018-07-30 RX ADMIN — PANTOPRAZOLE SODIUM 40 MG: 40 TABLET, DELAYED RELEASE ORAL at 06:40

## 2018-07-30 RX ADMIN — POTASSIUM CHLORIDE 20 MEQ: 20 TABLET, EXTENDED RELEASE ORAL at 15:23

## 2018-07-30 RX ADMIN — LISINOPRIL 40 MG: 20 TABLET ORAL at 08:33

## 2018-07-30 RX ADMIN — CARVEDILOL 12.5 MG: 6.25 TABLET, FILM COATED ORAL at 21:03

## 2018-07-30 RX ADMIN — ACETAMINOPHEN 650 MG: 325 TABLET, FILM COATED ORAL at 08:29

## 2018-07-30 RX ADMIN — GABAPENTIN 300 MG: 300 CAPSULE ORAL at 21:03

## 2018-07-30 RX ADMIN — OXYCODONE HYDROCHLORIDE AND ACETAMINOPHEN 2 TABLET: 5; 325 TABLET ORAL at 14:11

## 2018-07-30 RX ADMIN — OXYCODONE HYDROCHLORIDE AND ACETAMINOPHEN 2 TABLET: 5; 325 TABLET ORAL at 05:39

## 2018-07-30 RX ADMIN — OXYCODONE HYDROCHLORIDE AND ACETAMINOPHEN 2 TABLET: 5; 325 TABLET ORAL at 10:08

## 2018-07-30 RX ADMIN — POTASSIUM CHLORIDE 20 MEQ: 20 TABLET, EXTENDED RELEASE ORAL at 08:32

## 2018-07-30 RX ADMIN — GABAPENTIN 300 MG: 300 CAPSULE ORAL at 14:11

## 2018-07-30 ASSESSMENT — PAIN DESCRIPTION - PAIN TYPE
TYPE: CHRONIC PAIN
TYPE: ACUTE PAIN;CHRONIC PAIN
TYPE: CHRONIC PAIN
TYPE: CHRONIC PAIN
TYPE: ACUTE PAIN

## 2018-07-30 ASSESSMENT — PAIN DESCRIPTION - FREQUENCY
FREQUENCY: CONTINUOUS

## 2018-07-30 ASSESSMENT — PAIN DESCRIPTION - LOCATION
LOCATION: BACK
LOCATION: BACK;HEAD
LOCATION: BACK
LOCATION: BACK;HEAD
LOCATION: BACK;HEAD

## 2018-07-30 ASSESSMENT — PAIN SCALES - GENERAL
PAINLEVEL_OUTOF10: 8
PAINLEVEL_OUTOF10: 5
PAINLEVEL_OUTOF10: 3
PAINLEVEL_OUTOF10: 0
PAINLEVEL_OUTOF10: 4
PAINLEVEL_OUTOF10: 7
PAINLEVEL_OUTOF10: 0
PAINLEVEL_OUTOF10: 7
PAINLEVEL_OUTOF10: 0

## 2018-07-30 ASSESSMENT — PAIN DESCRIPTION - DESCRIPTORS
DESCRIPTORS: DISCOMFORT;ACHING;CONSTANT
DESCRIPTORS: ACHING;DISCOMFORT;CONSTANT;HEADACHE
DESCRIPTORS: ACHING;DISCOMFORT
DESCRIPTORS: ACHING;CONSTANT;CRAMPING
DESCRIPTORS: DISCOMFORT

## 2018-07-30 ASSESSMENT — PAIN DESCRIPTION - ORIENTATION
ORIENTATION: MID;LOWER
ORIENTATION: LOWER;MID

## 2018-07-30 ASSESSMENT — PAIN DESCRIPTION - ONSET: ONSET: GRADUAL

## 2018-07-30 NOTE — PROGRESS NOTES
would like something stronger. Currently in surgical intensive care, postop. Patient had 3 sec pause last evening; to be seen by cardiology. 7/28/18-patient had lumbar drain intact; was draining 5 mL per hour. This morning, she apparently unintentionally disconnected the drain. Blood was saturated with cerebral spinal fluid; unbearable headache occurred. Neurosurgery was notified; there was reattachment of implanted drain to drainage system. However patient again dislodge it many times. It was finally determined best to have the entire catheter removed which was done by residents. She was having excruciating head pain back pain because of the above and loss of CSF fluid. She was given fentanyl for pain relief making her very sleepy at this time. Patient has been seen by cardiology due to sinus pause. Sodium 144 potassium 3.4 chloride 100 CO2 33 BUN 11 creatinine 0.7 magnesium 1.4 glucose 193 calcium 8.5 phosphorus 2.7     7/29/18-patient was transferred from surgical intensive to the neurointensive unit. She states she still having severe headache and back pain. I discussed the above with nursing, apparently patient has been receiving 1 Percocet for pain control; patient states at home she was taking 1 Percocet frequently for other aches and pains. I advised nursing to increase, 2 tablets of Percocet as needed. Blood pressure has increased since events of yesterday, averaging at least 132-735 systolic or higher. Temperature 99.3. Sodium 142 potassium 3.7 chloride 95 CO2 37 BUN 12 creatinine 0.8 magnesium 1.6, improved. Glucose 132 calcium 8.8 protein 6.6 albumin 2.7 liver functions normal.  Hemoglobin 11.1 WBC 7.2 platelets 450.    2/54/70-LKIKHVB sitting in bed, headache much improved temporarily. She states she still having paroxysms of headache. She notices her speech is worsening again; her family noticed that also.  Her headache is more like the previous headaches, prehospital rather than the intense °C); Max - Temp  Av.7 °F (37.1 °C)  Min: 98.2 °F (36.8 °C)  Max: 99.3 °F (37.4 °C)    Respiratory Rate : Resp  Av.6  Min: 15  Max: 22    Pulse Range: Pulse  Av.7  Min: 82  Max: 91    Blood Presuure Range:  Systolic (46NXP), TBT:962 , Min:121 , RUO:623   ; Diastolic (81THK), NRB:82, Min:78, Max:105      Pulse ox Range: SpO2  Av.7 %  Min: 90 %  Max: 98 %    Patient Vitals for the past 8 hrs:   BP Temp Temp src Pulse Resp SpO2   18 1215 - - - - - 94 %   18 0815 (!) 157/91 98.2 °F (36.8 °C) Oral 86 16 98 %         Intake/Output Summary (Last 24 hours) at 18 1353  Last data filed at 18 0619   Gross per 24 hour   Intake                0 ml   Output                0 ml   Net                0 ml       I/O last 3 completed shifts: In: 0   Out: 1450 [Urine:1450]    No intake/output data recorded.     Wt Readings from Last 3 Encounters:   18 131 lb 1.6 oz (59.5 kg)   17 130 lb (59 kg)   16 128 lb (58.1 kg)       Labs:   CBC:   Lab Results   Component Value Date    WBC 7.2 2018    RBC 3.89 2018    HGB 11.1 2018    HCT 36.1 2018    MCV 92.8 2018    MCH 28.5 2018    MCHC 30.7 2018    RDW 18.1 2018     2018    MPV 11.3 2018     CBC with Differential:    Lab Results   Component Value Date    WBC 7.2 2018    RBC 3.89 2018    HGB 11.1 2018    HCT 36.1 2018     2018    MCV 92.8 2018    MCH 28.5 2018    MCHC 30.7 2018    RDW 18.1 2018    SEGSPCT 63 2014    LYMPHOPCT 26.6 2018    MONOPCT 8.4 2018    BASOPCT 0.6 2018    MONOSABS 0.60 2018    LYMPHSABS 1.91 2018    EOSABS 0.12 2018    BASOSABS 0.04 2018     Hemoglobin/Hematocrit:    Lab Results   Component Value Date    HGB 11.1 2018    HCT 36.1 2018     CMP:    Lab Results   Component Value Date     2018    K 3.7 2018    K 3.3 07/26/2018    CL 95 07/29/2018    CO2 37 07/29/2018    BUN 12 07/29/2018    CREATININE 0.8 07/29/2018    GFRAA >60 07/29/2018    LABGLOM >60 07/29/2018    GLUCOSE 132 07/29/2018    GLUCOSE 111 09/30/2011    PROT 6.6 07/29/2018    LABALBU 2.7 07/29/2018    LABALBU 4.4 09/30/2011    CALCIUM 8.8 07/29/2018    BILITOT 0.6 07/29/2018    ALKPHOS 90 07/29/2018    AST 15 07/29/2018    ALT 7 07/29/2018     BMP:    Lab Results   Component Value Date     07/29/2018    K 3.7 07/29/2018    K 3.3 07/26/2018    CL 95 07/29/2018    CO2 37 07/29/2018    BUN 12 07/29/2018    LABALBU 2.7 07/29/2018    LABALBU 4.4 09/30/2011    CREATININE 0.8 07/29/2018    CALCIUM 8.8 07/29/2018    GFRAA >60 07/29/2018    LABGLOM >60 07/29/2018    GLUCOSE 132 07/29/2018    GLUCOSE 111 09/30/2011     Magnesium:    Lab Results   Component Value Date    MG 1.6 07/29/2018     Phosphorus:    Lab Results   Component Value Date    PHOS 3.0 07/29/2018     Uric Acid:  No results found for: LABURIC, URICACID  PT/INR:    Lab Results   Component Value Date    PROTIME 13.8 07/26/2018    INR 1.2 07/26/2018     Warfarin PT/INR:  No components found for: PTPATWAR, PTINRWAR  PTT:    Lab Results   Component Value Date    APTT <20.0 07/23/2018   [APTT}  Troponin:    Lab Results   Component Value Date    TROPONINI <0.01 07/24/2018     Last 3 Troponin:    Lab Results   Component Value Date    TROPONINI <0.01 07/24/2018    TROPONINI <0.01 07/24/2018    TROPONINI <0.01 07/23/2018     U/A:    Lab Results   Component Value Date    COLORU Yellow 07/23/2018    PROTEINU 30 07/23/2018    PHUR 5.5 07/23/2018    WBCUA 0-1 07/23/2018    RBCUA NONE 07/23/2018    BACTERIA FEW 07/23/2018    CLARITYU Clear 07/23/2018    SPECGRAV 1.020 07/23/2018    LEUKOCYTESUR Negative 07/23/2018    UROBILINOGEN 4.0 07/23/2018    BILIRUBINUR Negative 07/23/2018    BLOODU Negative 07/23/2018    GLUCOSEU Negative 07/23/2018     ABG:  No results found for: PH, PCO2, PO2, HCO3, BE, THGB, TCO2, pulmonary disease) ([facility].) [J44.9]    CAD (coronary artery disease) [I25.10]    S/P CABG x 2 [Z95.1]         Teresita Vieira DO   1:53 PM     7/30/2018

## 2018-07-30 NOTE — CARE COORDINATION
SOCIAL WORK / DISCHARGE PLANNING:  Sw met with pt and spouse at bedside. Sw reviewed therapy evals with them. Min A for transfers and ambulation. Spouse states he will be able to assist pt as needed. Pt does not have wheeled walker and is agreeable, [facility] D[facility] after choices provided. Sw discussed HHC and they are agreeable, [facility]. Pt has 2L O2 from [facility] that is for nocturnal use only. Pt is currently wearing 4L O2 and would need new testing and script for portable to be obtained prior to discharge if needed. Discharge plan remains to return home with [facility] and dme arrangements.                        Electronically signed by DEENA Shelton on 7/30/2018 at 5:15 PM

## 2018-07-30 NOTE — PROGRESS NOTES
C/O post spinal headache. According to , speech better & may be cognition. .  Discussed options.   Will consider isotope cisternogram to diagnose NPH

## 2018-07-30 NOTE — PROGRESS NOTES
OCCUPATIONAL THERAPY RE-EVALUATION      Date:2018  Patient Name: Sherlon Cowden  MRN: [id number]  : 1955  Room: [address] [place], [place] [address]     Re-Evaluating OT: Matthew , OTR/L  Patient re-evaluated to assess cognitive/functional changes following procedure. AM-PAC Daily Activity Raw Score:   G-Code: CK    Recommended Adaptive Equipment: to be determined     Diagnosis: TIA  Surgery: Insertion of lumbar intrathecal drain for high-volume tap 18  Pertinent Medical History: acute on chronic respiratory failure with hypoxia and hypercapnia, apraxia, ataxia, CAD, CKD, COPD, HLD, pleural effusion requiring R thoracentesis, pulmonary HTN, restless legs, CABG x2, tobacco    Comments: Pt admitted 18 for frequent falls, balance problems beginning 5 months ago and falls beginning 2 months ago. Also affected were speech, STM, and concentration. Lumbar drain insertion on , drain found to be out and bed wet on  and reattached the same day, pt in intense pain and drain ordered to be taken out. Pt has had confusion throughout hospital stay. Precautions: falls, dysmetria, home O2 user, TSM, bed alarm     Home Living: Pt questionable historian, no family present to verify information. Pt lives with   in a 1 story home with 3 stairs to enter and no rails; bed/bath on main level  Bathroom setup: TBD  Equipment owned: cane    Prior Level of Function: Since onset of illness some assistance with ADLs and with IADLs; using cane for ambulation only for the last few days PTA. 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 Slow processing. Poor safety. Poor problem solving. Fair sequencing. Fair STM, can complete 2/3 in 3 word recall.  Poor attention to task.    RASS: -1    Sensory/Perception: Dysmetria  Hearing: WFL  Vision: WFL    Glasses: yes [] no [x] reading []      UE Assessment:  Hand Dominance: Right [x]  Left []     Strength ROM  Comments   RUE  Proximal: 4/5  Distal: 4/5 Proximal: WFL  Distal: WFL G  and WFL FMC/dexterity noted during ADL tasks   LUE Proximal: 4/5  Distal: 4/5 Proximal: WFL  Distal: WFL G  and WFL FMC/dexterity noted during ADL tasks   Sensation: N/T B feet  Tone: WNL  Edema: Mild edema BLE     Functional Assessment:   Initial Status 7/30/18 Comments   Feeding  Set up A    Grooming  SBA    Upper Body Dressing Min A     Lower Body Dressing Min A    Bathing Mod A    Toileting  Mod A    Bed Mobility  Supine to Sit: SBA  Sit to Supine: SBA    Functional Transfers Sit to Stand: Min A  Stand to Sit: Min A    Functional Mobility  Min A with ww  For in-home distance. Patient reports increased pain in head and neck with ambulation. Sit balance: SBA  Stand balance: Min to mod A with ww  Endurance/Activity tolerance: fair-                              Treatment Narrative: OK from RN to see patient. Evaluation completed with PT collaboration. Upon arrival patient supine with HOB slightly elevated. Donned socks in long sitting. Supine to sit. UE ROM/MMT completed at EOB. Sit to stand. Functional mobility as above, declined further ADLs as pt reports increase headache pain following ambulation. Stand to sit at EOB. Sit to supine. Patient properly positioned using pillows and bed mechanics to improve interaction with environment, overall functioning and decrease/prevent edema and contractures. After session, patient in position as stated above with all devices within reach, all lines and tubes intact, nursing notified. Pt required cues and education as noted above for safe facilitation and completion of tasks.  During functional activites and ADLs pt educated on proper hand placement, safety technique, sequencing, and energy conservation techniques. Therapist provided skilled monitoring of HR, O2 saturation, blood pressure and patient's response during treatment session. Pt educated on OT POC, OT role, importance of completing ADL tasks daily as independently as possible to aide in recovery process, pt demonstrated F understanding, further education likely needed. Prior to and at the end of session, environmental modifications/line management completed for patients safety and efficiency of treatment session. Assessment of current deficits   Functional mobility [x]  ADLs [x] Strength [x]  Cognition [x]  Functional transfers  [x] IADLs [x] Safety Awareness [x]  Endurance [x]  Fine Motor Coordination [] Balance [x] Vision/perception [x] Sensation [x]   Gross Motor Coordination [] ROM [] Delirium []       Eval Complexity: Moderate  Profile and History: Moderate   Assessment of Occupational Performance and Identification of Deficits: Moderate   Clinical Decision Making:  Moderate    Treatment frequency: PRN     Plan of Care:  ADL retraining [x]   Equipment needs [x]   Neuromuscular re-education [x] Energy Conservation Techniques [x]  Functional Transfer training [x] Patient and/or Family Education [x]  Functional Mobility training [x]  Environmental Modifications [x]  Cognitive re-training [x]   Compensatory techniques for ADLs [x]  Splinting Needs []   Positioning to improve overall function [x]  Other: []     Delirium prevention/treatment  [x]     Rehab Potential: Good    Patient / Family Goal: None stated    Short term Goals  Time Frame: 1 week from initial evaluation  GOAL 1: Pt will improve UB/LB dressing/bathing to independent with item retrieval and G problem solving, G sequencing   GOAL 2: Pt will improve all functional transfers to independent  GOAL 3: Pt will improve functional mobility to modified independent with use of ww PRN  GOAL 4: Pt will improve grooming/hygiene tasks to independent while standing at sink with item retrieval   GOAL 5: Pt will improve toileting task and all clothing mgmt to independent  GOAL 6: Pt will demo G- activity tolerance/endurance during 15-20 min ADL task     Patient and/or family understands diagnosis, prognosis/goals and plan of care: yes    [] Malnutrition indicators have been identified and nursing has been notified to ensure a dietitian consult is ordered. Comments: Based on patient's functional performance as stated above and level of assistance needed prior to admission, this therapist believes that the patient would benefit from intensive skilled OT following hospital stay in an effort to increase safety, functional independence, and quality of life.      Time in: 0915  Time out: 0945  Total Tx Time: 25 minutes +eval time    Aletha Portillo, OTR/L  [id number]

## 2018-07-30 NOTE — PROGRESS NOTES
Patient able to perform bed mobility without hands on assist with verbal cues for safety. Patient assisted to standing, and required [facility] for balance and safety. Patient demonstrated narrow DIAMOND, with scissoring at times, and poor walker approximation/management. Repeated verbal cues for walker use needed with no carryover. Patient assisted back to supine with call light and tray table in reach. Education provided for safety throughout assessment with fair carryover. Patient education  Pt educated on safety with mobility, transfers, gait, walker use/approximation. Patient response to education:   Pt verbalized understanding Pt demonstrated skill Pt requires further education in this area   Yes  Partially with verbal cues and assist Yes      Rehab potential is Good for reaching above PT goals. Pts/ family goals   1. To go home. Patient and or family understand(s) diagnosis, prognosis, and plan of care. Questionable     PLAN  PT care will be provided in accordance with the objectives noted above. Whenever appropriate, clear delegation orders will be provided for nursing staff. Exercises and functional mobility practice will be used as well as appropriate assistive devices or modalities to obtain goals. Patient and family education will also be administered as needed. Frequency of treatments will be 2-5x/week x 3-5 days.     Time in: 0913  Time out: Viky Walters, PT, DPT   License number:  [id number]

## 2018-07-31 ENCOUNTER — APPOINTMENT (OUTPATIENT)
Dept: NUCLEAR MEDICINE | Age: 63
DRG: 056 | End: 2018-07-31
Payer: MEDICARE

## 2018-07-31 LAB
APPEARANCE CSF: ABNORMAL
COLOR CSF: ABNORMAL
GLUCOSE, CSF: 142 MG/DL (ref 40–70)
MONOCYTE, CSF: 93 % (ref 10–70)
NEUTROPHILS, CSF: 7 % (ref 0–10)
PROTEIN CSF: 99 MG/DL (ref 15–40)
RBC CSF: ABNORMAL /UL
TUBE NUMBER CSF: ABNORMAL
WBC CSF: 28 /UL (ref 0–2)

## 2018-07-31 PROCEDURE — 2700000000 HC OXYGEN THERAPY PER DAY

## 2018-07-31 PROCEDURE — 82945 GLUCOSE OTHER FLUID: CPT

## 2018-07-31 PROCEDURE — 97535 SELF CARE MNGMENT TRAINING: CPT

## 2018-07-31 PROCEDURE — 6370000000 HC RX 637 (ALT 250 FOR IP): Performed by: INTERNAL MEDICINE

## 2018-07-31 PROCEDURE — 6370000000 HC RX 637 (ALT 250 FOR IP): Performed by: STUDENT IN AN ORGANIZED HEALTH CARE EDUCATION/TRAINING PROGRAM

## 2018-07-31 PROCEDURE — 6370000000 HC RX 637 (ALT 250 FOR IP): Performed by: SURGERY

## 2018-07-31 PROCEDURE — 84157 ASSAY OF PROTEIN OTHER: CPT

## 2018-07-31 PROCEDURE — 2060000000 HC ICU INTERMEDIATE R&B

## 2018-07-31 PROCEDURE — 97112 NEUROMUSCULAR REEDUCATION: CPT

## 2018-07-31 PROCEDURE — 89051 BODY FLUID CELL COUNT: CPT

## 2018-07-31 RX ORDER — INDIUM IN-111 PENTETATE DISODIUM 1 MCI/ML
2000 SOLUTION INTRATHECAL
Status: COMPLETED | OUTPATIENT
Start: 2018-07-31 | End: 2018-07-31

## 2018-07-31 RX ADMIN — LISINOPRIL 40 MG: 20 TABLET ORAL at 08:59

## 2018-07-31 RX ADMIN — GABAPENTIN 300 MG: 300 CAPSULE ORAL at 08:59

## 2018-07-31 RX ADMIN — ROPINIROLE HYDROCHLORIDE 3 MG: 2 TABLET, FILM COATED ORAL at 20:51

## 2018-07-31 RX ADMIN — INDIUM IN-111 PENTETATE DISODIUM 2000 MICRO CURIE: 1 SOLUTION INTRATHECAL at 13:17

## 2018-07-31 RX ADMIN — CARVEDILOL 12.5 MG: 6.25 TABLET, FILM COATED ORAL at 20:51

## 2018-07-31 RX ADMIN — OXYBUTYNIN CHLORIDE 10 MG: 10 TABLET, EXTENDED RELEASE ORAL at 08:58

## 2018-07-31 RX ADMIN — FOLIC ACID 1 MG: 1 TABLET ORAL at 08:59

## 2018-07-31 RX ADMIN — GABAPENTIN 300 MG: 300 CAPSULE ORAL at 20:51

## 2018-07-31 RX ADMIN — POTASSIUM CHLORIDE 20 MEQ: 20 TABLET, EXTENDED RELEASE ORAL at 08:58

## 2018-07-31 RX ADMIN — BECLOMETHASONE DIPROPIONATE 4 PUFF: 80 AEROSOL, METERED RESPIRATORY (INHALATION) at 08:59

## 2018-07-31 RX ADMIN — TORSEMIDE 20 MG: 20 TABLET ORAL at 08:59

## 2018-07-31 RX ADMIN — GABAPENTIN 300 MG: 300 CAPSULE ORAL at 15:20

## 2018-07-31 RX ADMIN — TRAZODONE HYDROCHLORIDE 150 MG: 150 TABLET ORAL at 20:51

## 2018-07-31 RX ADMIN — BUTALBITAL, ACETAMINOPHEN AND CAFFEINE 1 TABLET: 50; 325; 40 TABLET ORAL at 17:35

## 2018-07-31 RX ADMIN — PANTOPRAZOLE SODIUM 40 MG: 40 TABLET, DELAYED RELEASE ORAL at 06:05

## 2018-07-31 RX ADMIN — OXYCODONE HYDROCHLORIDE AND ACETAMINOPHEN 2 TABLET: 5; 325 TABLET ORAL at 08:58

## 2018-07-31 RX ADMIN — POTASSIUM CHLORIDE 20 MEQ: 20 TABLET, EXTENDED RELEASE ORAL at 17:35

## 2018-07-31 RX ADMIN — ISOSORBIDE MONONITRATE 30 MG: 30 TABLET ORAL at 08:59

## 2018-07-31 RX ADMIN — BECLOMETHASONE DIPROPIONATE 4 PUFF: 80 AEROSOL, METERED RESPIRATORY (INHALATION) at 21:32

## 2018-07-31 RX ADMIN — SPIRONOLACTONE 25 MG: 25 TABLET ORAL at 08:59

## 2018-07-31 RX ADMIN — OXYCODONE HYDROCHLORIDE AND ACETAMINOPHEN 2 TABLET: 5; 325 TABLET ORAL at 13:13

## 2018-07-31 RX ADMIN — ROPINIROLE HYDROCHLORIDE 3 MG: 2 TABLET, FILM COATED ORAL at 08:58

## 2018-07-31 RX ADMIN — CARVEDILOL 12.5 MG: 6.25 TABLET, FILM COATED ORAL at 08:58

## 2018-07-31 RX ADMIN — BUPROPION HYDROCHLORIDE 300 MG: 300 TABLET, FILM COATED, EXTENDED RELEASE ORAL at 08:59

## 2018-07-31 ASSESSMENT — PAIN SCALES - GENERAL
PAINLEVEL_OUTOF10: 9
PAINLEVEL_OUTOF10: 9
PAINLEVEL_OUTOF10: 6
PAINLEVEL_OUTOF10: 7
PAINLEVEL_OUTOF10: 4
PAINLEVEL_OUTOF10: 9
PAINLEVEL_OUTOF10: 6
PAINLEVEL_OUTOF10: 7
PAINLEVEL_OUTOF10: 0

## 2018-07-31 ASSESSMENT — PAIN DESCRIPTION - LOCATION
LOCATION: BACK;HEAD

## 2018-07-31 ASSESSMENT — PAIN DESCRIPTION - DESCRIPTORS
DESCRIPTORS: BURNING;ACHING
DESCRIPTORS: BURNING;ACHING
DESCRIPTORS: DISCOMFORT;ACHING
DESCRIPTORS: POUNDING;BURNING

## 2018-07-31 ASSESSMENT — PAIN DESCRIPTION - PAIN TYPE
TYPE: ACUTE PAIN

## 2018-07-31 ASSESSMENT — PAIN DESCRIPTION - FREQUENCY: FREQUENCY: CONTINUOUS

## 2018-07-31 ASSESSMENT — PAIN DESCRIPTION - ORIENTATION: ORIENTATION: POSTERIOR

## 2018-07-31 NOTE — PROGRESS NOTES
Patient dressing noted to be saturated with CSF with small amount on bed pad underneath patient. Dr. Anisha Cleary notified. Dr. Rashaad Bailey placed a suture. Will continue to monitor.

## 2018-07-31 NOTE — PROGRESS NOTES
Spoke with nuc med. Cisternogram cannot be completed until tomorrow 8/1/18 due to need to order medication for test. Test will also need to be set up with Dr. Мария Sullivan as he must be present.

## 2018-07-31 NOTE — PLAN OF CARE
Problem: Nutrition  Goal: Optimal nutrition therapy  Outcome: Ongoing  Nutrition Problem: Inadequate oral intake  Intervention: Food and/or Nutrient Delivery: Modify current diet, Start ONS (Pt agreeable to Glucerna daily, d/t elevated BSL and A1C, would recommend change to CHO controlled diet)  Nutritional Goals: Consume >75% meals  Follow-up date: 8/6

## 2018-07-31 NOTE — PROGRESS NOTES
Nutrition Assessment    Type and Reason for Visit: Reassess    Nutrition Recommendations:   Pt agreeable to Glucerna daily, d/t elevated BSL and A1C, would recommend change to CHO controlled diet    Malnutrition Assessment:  · Malnutrition Status: Insufficient data  · Context: Chronic illness  · Findings of the 6 clinical characteristics of malnutrition (Minimum of 2 out of 6 clinical characteristics is required to make the diagnosis of moderate or severe Protein Calorie Malnutrition based on AND/ASPEN Guidelines):  1. Energy Intake-Less than or equal to 75%,  (x1 day)    2. Weight Loss-Unable to assess, unable to assess  3. Fat Loss-Mild subcutaneous fat loss, Orbital  4. Muscle Loss-No significant muscle mass loss,    5. Fluid Accumulation-No significant fluid accumulation,    6.   Strength-Not measured    Nutrition Diagnosis:   · Problem: Inadequate oral intake  · Etiology: related to Insufficient energy/nutrient consumption     Signs and symptoms:  as evidenced by Intake 50-75%, Diet history of poor intake    Nutrition Assessment:  · Nutrition-Focused Physical Findings: intrathecal drain removed 7/28, +I/O's, flat/soft abd, oriented x 4 (intermittent confusion), slurred speech, trace edema, edentulous (dentures U/L)   · Wound Type: Surgical Wound  · Current Nutrition Therapies:  · Oral Diet Orders: General   · Oral Diet intake: %, 0% (varies, pt reports eating well)  · Anthropometric Measures:  · Ht: 5' 2.99\" (160 cm)   · Current Body Wt: 124 lb (56.2 kg) (7/31 bedscale )  · Admission Body Wt: 126 lb (57.2 kg) (bedscale )  · Usual Body Wt: 130 lb (59 kg) (stated per pt)  · % Weight Change: wt closer to admit wt at this time, noted trace edema and +5.5L fl bal at this time ,     · Ideal Body Wt: 115 lb (52.2 kg), % Ideal Body 108%   · BMI Classification: BMI 18.5 - 24.9 Normal Weight  · Comparative Standards (Estimated Nutrition Needs):  · Estimated Daily Total Kcal: 1400- 1700 kcal   · Estimated

## 2018-07-31 NOTE — PROGRESS NOTES
prehospital  Continue gabapentin  Continue aerosols  Continue Corgard  Continue Desyrel at bedtime  Nuclear cisternogram  Prescription written for wheeled walker for home  Prescription for home health care including therapy  Try to obtain the name of patient's pulmonologist in [facility] with patient and nursing.       [address] [facility]     12:04 PM     7/31/2018    TIME >25 MINUTES      Active Hospital Problems    Diagnosis    Acute on chronic respiratory failure with hypoxia and hypercapnia (HCC) [J96.21, J96.22]     Priority: High     Class: Acute    Pulmonary hypertension [I27.20]     Priority: High     Class: Chronic    Apraxia [R48.2]     Priority: High     Class: Acute    CKD (chronic kidney disease) stage 3, GFR 30-59 ml/min [N18.3]     Priority: High    Hypoxia [R09.02]     Priority: High     Class: Acute    Normal pressure hydrocephalus [G91.2]    Sinus pause [I45.5]    Stroke-like symptoms [R29.90]    Ataxia [R27.0]    TIA (transient ischemic attack) [G45.9]    COPD (chronic obstructive pulmonary disease) (HCC) [J44.9]    CAD (coronary artery disease) [I25.10]    S/P CABG x 2 [Z95.1]                 ------------  INFORMATION  -----------      DIET:DIET GENERAL;  Dietary Nutrition Supplements: Diabetic Oral Supplement        Allergies   Allergen Reactions    Pentazocine Lactate     Sulfa Antibiotics          MEDICATION SIDE EFFECTS:none       SCHEDULED MEDS:                                 Current Facility-Administered Medications   Medication Dose Route Frequency Provider Last Rate Last Dose    potassium chloride (KLOR-CON M) extended release tablet 20 mEq  20 mEq Oral BID  Naomi Kelly MD   20 mEq at 07/31/18 0858    fentaNYL (SUBLIMAZE) injection 25 mcg  25 mcg Intravenous Q1H PRN Laura Sam MD   25 mcg at 07/28/18 1226    oxyCODONE-acetaminophen (PERCOCET) 5-325 MG per tablet 1 tablet  1 tablet Oral Q4H PRN Laura Sam MD   1 tablet (PROVENTIL) nebulizer solution 2.5 mg  2.5 mg Nebulization Q6H PRN wanda Cy Gomezhok, DO        pantoprazole (PROTONIX) tablet 40 mg  40 mg Oral QAM AC Malik ROTHMAN Mihok, DO   40 mg at 18 0605    ondansetron (ZOFRAN) injection 4 mg  4 mg Intravenous Q6H PRN wanda Cy Gomezk, DO   4 mg at  8246    folic acid (FOLVITE) tablet 1 mg  1 mg Oral Daily wanda Benoit Miaurorak, DO   1 mg at 18 7950     Scheduled Meds:   potassium chloride  20 mEq Oral BID WC    carvedilol  12.5 mg Oral BID    spironolactone  25 mg Oral Daily    traZODone  150 mg Oral Nightly    torsemide  20 mg Oral Daily    rOPINIRole  3 mg Oral BID    oxybutynin  10 mg Oral Daily    lisinopril  40 mg Oral Daily    isosorbide mononitrate  30 mg Oral Daily    gabapentin  300 mg Oral TID    beclomethasone  4 puff Inhalation BID    buPROPion  300 mg Oral QAM    pantoprazole  40 mg Oral QAM AC    folic acid  1 mg Oral Daily       Continuous Infusions:        Data:   Temperature:  Current - Temp: 98.6 °F (37 °C); Max - Temp  Av.5 °F (36.9 °C)  Min: 98.3 °F (36.8 °C)  Max: 98.7 °F (37.1 °C)    Respiratory Rate : Resp  Av  Min: 18  Max: 18    Pulse Range: Pulse  Av  Min: 88  Max: 91    Blood Presuure Range:  Systolic (14CKU), SYC:134 , Min:132 , UYD:554   ; Diastolic (18YTH), XMF:82, Min:78, Max:82      Pulse ox Range: SpO2  Av.3 %  Min: 94 %  Max: 95 %    Patient Vitals for the past 8 hrs:   BP Temp Temp src Pulse Resp SpO2 Weight   18 1100 - - - - - - 124 lb 1.6 oz (56.3 kg)   18 0821 132/82 98.6 °F (37 °C) Oral 91 18 95 % -         Intake/Output Summary (Last 24 hours) at 18 1204  Last data filed at 18 0913   Gross per 24 hour   Intake              580 ml   Output                0 ml   Net              580 ml       I/O last 3 completed shifts:   In: 220 [P.O.:220]  Out: -     I/O this shift:  In: 360 [P.O.:360]  Out: -     Wt Readings from Last 3 Encounters:   18 124 lb 1.6 oz (56.3 kg) 03/05/17 130 lb (59 kg)   12/22/16 128 lb (58.1 kg)       Labs:   CBC:   Lab Results   Component Value Date    WBC 7.2 07/29/2018    RBC 3.89 07/29/2018    HGB 11.1 07/29/2018    HCT 36.1 07/29/2018    MCV 92.8 07/29/2018    MCH 28.5 07/29/2018    MCHC 30.7 07/29/2018    RDW 18.1 07/29/2018     07/29/2018    MPV 11.3 07/29/2018     CBC with Differential:    Lab Results   Component Value Date    WBC 7.2 07/29/2018    RBC 3.89 07/29/2018    HGB 11.1 07/29/2018    HCT 36.1 07/29/2018     07/29/2018    MCV 92.8 07/29/2018    MCH 28.5 07/29/2018    MCHC 30.7 07/29/2018    RDW 18.1 07/29/2018    SEGSPCT 63 01/30/2014    LYMPHOPCT 26.6 07/29/2018    MONOPCT 8.4 07/29/2018    BASOPCT 0.6 07/29/2018    MONOSABS 0.60 07/29/2018    LYMPHSABS 1.91 07/29/2018    EOSABS 0.12 07/29/2018    BASOSABS 0.04 07/29/2018     Hemoglobin/Hematocrit:    Lab Results   Component Value Date    HGB 11.1 07/29/2018    HCT 36.1 07/29/2018     CMP:    Lab Results   Component Value Date     07/29/2018    K 3.7 07/29/2018    K 3.3 07/26/2018    CL 95 07/29/2018    CO2 37 07/29/2018    BUN 12 07/29/2018    CREATININE 0.8 07/29/2018    GFRAA >60 07/29/2018    LABGLOM >60 07/29/2018    GLUCOSE 132 07/29/2018    GLUCOSE 111 09/30/2011    PROT 6.6 07/29/2018    LABALBU 2.7 07/29/2018    LABALBU 4.4 09/30/2011    CALCIUM 8.8 07/29/2018    BILITOT 0.6 07/29/2018    ALKPHOS 90 07/29/2018    AST 15 07/29/2018    ALT 7 07/29/2018     BMP:    Lab Results   Component Value Date     07/29/2018    K 3.7 07/29/2018    K 3.3 07/26/2018    CL 95 07/29/2018    CO2 37 07/29/2018    BUN 12 07/29/2018    LABALBU 2.7 07/29/2018    LABALBU 4.4 09/30/2011    CREATININE 0.8 07/29/2018    CALCIUM 8.8 07/29/2018    GFRAA >60 07/29/2018    LABGLOM >60 07/29/2018    GLUCOSE 132 07/29/2018    GLUCOSE 111 09/30/2011     Magnesium:    Lab Results   Component Value Date    MG 1.6 07/29/2018     Phosphorus:    Lab Results   Component Value Date    PHOS

## 2018-08-01 ENCOUNTER — APPOINTMENT (OUTPATIENT)
Dept: NUCLEAR MEDICINE | Age: 63
DRG: 056 | End: 2018-08-01
Payer: MEDICARE

## 2018-08-01 PROBLEM — I50.22 CHRONIC SYSTOLIC CONGESTIVE HEART FAILURE (HCC): Status: ACTIVE | Noted: 2018-08-01

## 2018-08-01 PROBLEM — I34.0 SEVERE MITRAL REGURGITATION: Status: ACTIVE | Noted: 2018-08-01

## 2018-08-01 PROCEDURE — 6370000000 HC RX 637 (ALT 250 FOR IP): Performed by: STUDENT IN AN ORGANIZED HEALTH CARE EDUCATION/TRAINING PROGRAM

## 2018-08-01 PROCEDURE — 2700000000 HC OXYGEN THERAPY PER DAY

## 2018-08-01 PROCEDURE — 2060000000 HC ICU INTERMEDIATE R&B

## 2018-08-01 PROCEDURE — 94640 AIRWAY INHALATION TREATMENT: CPT

## 2018-08-01 PROCEDURE — 97535 SELF CARE MNGMENT TRAINING: CPT

## 2018-08-01 PROCEDURE — 6370000000 HC RX 637 (ALT 250 FOR IP): Performed by: SURGERY

## 2018-08-01 PROCEDURE — 97530 THERAPEUTIC ACTIVITIES: CPT

## 2018-08-01 PROCEDURE — 6370000000 HC RX 637 (ALT 250 FOR IP): Performed by: INTERNAL MEDICINE

## 2018-08-01 PROCEDURE — 99255 IP/OBS CONSLTJ NEW/EST HI 80: CPT | Performed by: INTERNAL MEDICINE

## 2018-08-01 PROCEDURE — 97112 NEUROMUSCULAR REEDUCATION: CPT

## 2018-08-01 RX ORDER — IPRATROPIUM BROMIDE AND ALBUTEROL SULFATE 2.5; .5 MG/3ML; MG/3ML
1 SOLUTION RESPIRATORY (INHALATION) 4 TIMES DAILY
Status: DISCONTINUED | OUTPATIENT
Start: 2018-08-01 | End: 2018-08-03 | Stop reason: HOSPADM

## 2018-08-01 RX ADMIN — IPRATROPIUM BROMIDE AND ALBUTEROL SULFATE 1 AMPULE: 2.5; .5 SOLUTION RESPIRATORY (INHALATION) at 21:05

## 2018-08-01 RX ADMIN — OXYCODONE HYDROCHLORIDE AND ACETAMINOPHEN 2 TABLET: 5; 325 TABLET ORAL at 11:37

## 2018-08-01 RX ADMIN — TRAZODONE HYDROCHLORIDE 150 MG: 150 TABLET ORAL at 21:28

## 2018-08-01 RX ADMIN — LISINOPRIL 40 MG: 20 TABLET ORAL at 07:59

## 2018-08-01 RX ADMIN — IPRATROPIUM BROMIDE AND ALBUTEROL SULFATE 1 AMPULE: 2.5; .5 SOLUTION RESPIRATORY (INHALATION) at 15:42

## 2018-08-01 RX ADMIN — POTASSIUM CHLORIDE 20 MEQ: 20 TABLET, EXTENDED RELEASE ORAL at 16:17

## 2018-08-01 RX ADMIN — FOLIC ACID 1 MG: 1 TABLET ORAL at 08:00

## 2018-08-01 RX ADMIN — GABAPENTIN 300 MG: 300 CAPSULE ORAL at 08:00

## 2018-08-01 RX ADMIN — OXYCODONE HYDROCHLORIDE AND ACETAMINOPHEN 2 TABLET: 5; 325 TABLET ORAL at 20:25

## 2018-08-01 RX ADMIN — ROPINIROLE HYDROCHLORIDE 3 MG: 2 TABLET, FILM COATED ORAL at 21:28

## 2018-08-01 RX ADMIN — BECLOMETHASONE DIPROPIONATE 4 PUFF: 80 AEROSOL, METERED RESPIRATORY (INHALATION) at 19:46

## 2018-08-01 RX ADMIN — TORSEMIDE 20 MG: 20 TABLET ORAL at 08:00

## 2018-08-01 RX ADMIN — CARVEDILOL 12.5 MG: 6.25 TABLET, FILM COATED ORAL at 07:59

## 2018-08-01 RX ADMIN — ISOSORBIDE MONONITRATE 30 MG: 30 TABLET ORAL at 08:00

## 2018-08-01 RX ADMIN — SPIRONOLACTONE 25 MG: 25 TABLET ORAL at 07:59

## 2018-08-01 RX ADMIN — OXYBUTYNIN CHLORIDE 10 MG: 10 TABLET, EXTENDED RELEASE ORAL at 08:00

## 2018-08-01 RX ADMIN — PANTOPRAZOLE SODIUM 40 MG: 40 TABLET, DELAYED RELEASE ORAL at 06:11

## 2018-08-01 RX ADMIN — POTASSIUM CHLORIDE 20 MEQ: 20 TABLET, EXTENDED RELEASE ORAL at 07:59

## 2018-08-01 RX ADMIN — BUPROPION HYDROCHLORIDE 300 MG: 300 TABLET, FILM COATED, EXTENDED RELEASE ORAL at 08:00

## 2018-08-01 RX ADMIN — CARVEDILOL 12.5 MG: 6.25 TABLET, FILM COATED ORAL at 21:28

## 2018-08-01 RX ADMIN — OXYCODONE HYDROCHLORIDE AND ACETAMINOPHEN 2 TABLET: 5; 325 TABLET ORAL at 16:17

## 2018-08-01 RX ADMIN — BECLOMETHASONE DIPROPIONATE 4 PUFF: 80 AEROSOL, METERED RESPIRATORY (INHALATION) at 08:04

## 2018-08-01 RX ADMIN — OXYCODONE HYDROCHLORIDE AND ACETAMINOPHEN 2 TABLET: 5; 325 TABLET ORAL at 06:12

## 2018-08-01 RX ADMIN — ROPINIROLE HYDROCHLORIDE 3 MG: 2 TABLET, FILM COATED ORAL at 07:59

## 2018-08-01 ASSESSMENT — PAIN DESCRIPTION - FREQUENCY
FREQUENCY: CONTINUOUS
FREQUENCY: CONTINUOUS

## 2018-08-01 ASSESSMENT — PAIN DESCRIPTION - DESCRIPTORS
DESCRIPTORS: ACHING;HEADACHE
DESCRIPTORS: CONSTANT;DISCOMFORT;ACHING
DESCRIPTORS: HEADACHE
DESCRIPTORS: ACHING;DISCOMFORT;SORE
DESCRIPTORS: CONSTANT;DISCOMFORT;SORE

## 2018-08-01 ASSESSMENT — PAIN SCALES - GENERAL
PAINLEVEL_OUTOF10: 8
PAINLEVEL_OUTOF10: 7
PAINLEVEL_OUTOF10: 7
PAINLEVEL_OUTOF10: 10
PAINLEVEL_OUTOF10: 10
PAINLEVEL_OUTOF10: 9
PAINLEVEL_OUTOF10: 0
PAINLEVEL_OUTOF10: 10
PAINLEVEL_OUTOF10: 6
PAINLEVEL_OUTOF10: 9

## 2018-08-01 ASSESSMENT — PAIN DESCRIPTION - ORIENTATION
ORIENTATION: POSTERIOR

## 2018-08-01 ASSESSMENT — PAIN DESCRIPTION - LOCATION
LOCATION: NECK
LOCATION: HEAD
LOCATION: NECK;BACK
LOCATION: HEAD
LOCATION: NECK

## 2018-08-01 ASSESSMENT — PAIN DESCRIPTION - PROGRESSION: CLINICAL_PROGRESSION: GRADUALLY WORSENING

## 2018-08-01 ASSESSMENT — PAIN DESCRIPTION - PAIN TYPE
TYPE: ACUTE PAIN

## 2018-08-01 ASSESSMENT — PAIN DESCRIPTION - ONSET
ONSET: ON-GOING

## 2018-08-01 NOTE — PROGRESS NOTES
descending thoracic aorta at the level of the left pulmonary artery has a maximum diameter of 2.4 cm     1. No evidence of pulmonary embolus in the  main pulmonary artery, right or left pulmonary arteries, or segmental branches of the pulmonary arteries. 2. No focal consolidation, pleural effusion or pneumothorax. 3. No definite pleural or parenchymal mass lesion. 4. Moderate pulmonary emphysematous disease. 5. Calcified pleural plaque in the posterior aspect of the superior segment of the left lower lobe, consistent with a history of prior asbestos exposure. 6. Cardiomegaly with dilatation of the main pulmonary artery as well as the right and left pulmonary arteries, consistent with pulmonary artery hypertension. 7. Trace amount of perihepatic free fluid. 8. Status post cholecystectomy with small amount of residual pneumobilia. Fluoro For Surgical Procedures    Result Date: 2018  Patient MRN:  [id number] : 1955 Age: 60 years Gender: Female Order Date:  2018 9:00 AM EXAM: FLUORO FOR SURGICAL PROCEDURES NUMBER OF IMAGES:  4. INDICATION: R 52 Pain. Hardware Removal Findings: 3 fluoroscopic spot films were submitted for interpretation. Fluoroscopic images demonstrate localization of the lumbar spine with interval placement of a probable drain. Fluoroscopy equipment and a technologist were provided by the Department of Radiology. No radiologist was present for the examination. Total fluoroscopic time was 4.0 seconds. Intraoperative fluoroscopy, as detailed above. Mri Brain Wo Contrast    Result Date: 2018  Patient MRN:  [id number] : 1955 Age: 60 years Gender: Female Order Date:  2018 12:45 AM EXAM: MRI BRAIN WO CONTRAST NUMBER OF IMAGES:  36 INDICATION:  Patient is a 59-year-old woman with history of hyperlipidemia, CAD, hypertension, STROKE TECHNIQUE: Multiplanar, multi sequential images of the brain were obtained without gadolinium administration.  COMPARISON: CT head 2018 FINDINGS: The ventricular system appears moderately distended which is a slightly disproportionate to cortical sulci widening raising possibility of normal pressure hydrocephalus. Clinical correlation is recommended. Moderate diffuse age-related cerebral atrophy. There are mild to moderate periventricular and deep subcortical T-2/flair hyperintensities representing chronic microvascular ischemic disease. No intracranial mass lesions are identified. There is no evidence of restricted diffusion to suggest acute/early subacute ischemic infarction. The posterior fossa contents and brainstem are unremarkable. There is evidence of prior bilateral cataract removal. The  orbital structures, paranasal sinuses, and mastoid air cells are unremarkable. Patent flow-voids are noted within the intracranial vessels and dural venous sinuses. The calvarium is intact. IMPRESSION: 1. No evidence for acute intracranial ischemic infarct or hemorrhage. 2. Moderate diffuse age-related cerebral atrophy and chronic microvascular ischemic disease. 3. Ventricular enlargement slightly disproportionate to cortical sulci widening which could raise possibility of normal pressure hydrocephalus. Clinical correlation is recommended. 4. No identifiable mass, mass effect or abnormal diffusion restriction. Us Carotid Artery Bilateral    Result Date: 2018  Patient MRN:  [id number] : 1955 Age: 60 years Gender: Female Order Date:  2018 10:45 PM EXAM: US CAROTID ARTERY BILATERAL NUMBER OF IMAGES:  55 TECHNIQUE: Duplex carotid ultrasound with color-flow Doppler, and a velocity blood flow characteristic and spectral analysis of the carotid arteries were obtained and documented. INDICATION: Patient is a 59-year-old woman with history of CAD, hyperlipidemia, COPD, TIA COMPARISON: None FINDINGS: Bilateral CCA, ICA and ECA are patent.  The right internal carotid artery has a peak systolic velocity of 62 cm/sec proximally with an  internal to common carotid artery ratio of 1. Based on Gosink criteria, no evidence of hemodynamically significant stenosis is seen. The left internal carotid artery has a peak systolic velocity of 86.0 cm/sec proximally with an internal to common carotid artery ratio of 1.3. Based on Gosink criteria, no evidence of hemodynamically significant stenosis is seen. Bilateral antegrade vertebral artery flow is seen. Mild atherosclerotic plaque formation is seen on the right without evidence of hemodynamically significant stenosis. Mild atherosclerotic plaque formation is seen on the left without evidence of hemodynamically significant stenosis. 1. No evidence to suggest hemodynamically significant stenosis. EKG: See Report  Echo: 7/29/2018  Summary   Left ventricle mild-moderately dilated.   Normal left ventricular wall thickness.   Global hypokinesis is noted to be severe.   Septal hypokinesis - severe.   Estimated left ventricular ejection fraction is 23±5%.   Cannot accurately characterize diastolic function secondary to mitral   valve disease.   Right ventricle global systolic function is moderately reduced .   Increased E point septal separation noted,suggesting decreased LV cardiac   output.   Moderate to severe mitral regurgitation is present.   Coanda effect, suggesting at least moderate mitral regurgitation.   Moderate to severe tricuspid regurgitation.    RVSP is 56 mmHg.   Pulmonary hypertension is moderate .   Physiologic and/or trace pulmonic regurgitation present.   Technically good quality study    IMPRESSIONS:  Principal Problem:    Apraxia  Active Problems:    CAD (coronary artery disease)    S/P CABG x 2    Pulmonary hypertension    COPD (chronic obstructive pulmonary disease) ([facility])    Ataxia    CKD (chronic kidney disease) stage 3, GFR 30-59 ml/min    Hypoxia    TIA (transient ischemic attack)    Stroke-like symptoms    Acute on chronic respiratory failure with hypoxia and hypercapnia ([address]    Sinus pause    Normal pressure hydrocephalus  Resolved Problems:    * No resolved hospital problems. *      RECOMMENDATIONS:  Based on her two-dimensional echocardiogram results, but appears to have an underlying ischemic cardiomyopathy and thus per ACC/HFSA guidelines, will to sacubitril/valsartan in the next 36 hours and utilize losartan during the transition period. Continue other heart failure medications as well. Carefully monitor both renal function as well as serum potassium and sodium and systolic blood pressure. Finally, in [place] as to the dosage of gabapentin relative to fluid retention. I have spent more than 25 minutes face to face with Jarad Mariscal and reviewing notes and laboratory data, with greater than 50% of this time instructing and counseling the patient face to face regarding my findings and recommendations and I have answered all questions as posed to me by Ms. Yaz Butler. Luis Amos, DO FACP,FACC,FSCAI      NOTE:  This report was transcribed using voice recognition software.   Every effort was made to ensure accuracy; however, inadvertent computerized transcription errors may be present

## 2018-08-01 NOTE — PLAN OF CARE
Problem: Falls - Risk of:  Goal: Will remain free from falls  Will remain free from falls   Outcome: Met This Shift      Problem: Pain:  Goal: Pain level will decrease  Pain level will decrease   Outcome: Met This Shift      Problem: Nutrition  Goal: Optimal nutrition therapy  Outcome: Met This Shift      Problem: Risk for Impaired Skin Integrity  Goal: Tissue integrity - skin and mucous membranes  Structural intactness and normal physiological function of skin and  mucous membranes.    Outcome: Met This Shift      Problem: Mental Status - Impaired:  Goal: Mental status will improve  Mental status will improve   Outcome: Met This Shift      Problem: Skin Integrity:  Goal: Will show no infection signs and symptoms  Will show no infection signs and symptoms   Outcome: Met This Shift

## 2018-08-01 NOTE — CONSULTS
[place]  Division of Pulmonary, Critical Care and Sleep Medicine  Consult Note      Admit Date:  7/23/2018    MRN:   [id number]        Patient:        PCP:   Gabriella Christianson DO    CONSULT : Chronic Respiratory Failure    HISTORY OF PRESENT ILLNESS: Brandee Chapa 60 y.o. female was seen in consultation regarding the above. A 67-year-old female with a PMH of CAD, s/p ABC, DM, HTN, NPH, Smoking, COPD, Chronic oxygen therapy, and falls was sent from Dr. Ace Nunez office to the ED because of severe worsening of slurred speech, frequent falls, and difficulty thinking. Possibility of TIA verus CVA.     In the ED, the patient did undergo examination as well as CT of the brain which showed no evidence of acute hemorrhage. There was noted to be cerebral volume loss, trace microvascular disease, possibility of normal pressure hydrocephalus. Chest x-ray showed enlarged cardiomediastinalsilhouette, basilar infiltrates, small effusion. She also underwent CT of chest.  There was no evidence of pulmonary embolus, no focal calcifications, moderate emphysema, calcified plaque posterior lung, compatible with history of asbestos exposure; cardiomegaly.     She is admitted for further evaluation and treatment of the above. SInce admission she has had a constant HA, Echocardiogram showed severe PAH with mitral valve disease and severely reduced LVEF. Pulmonary was to evaluated.      PAST MEDICAL HISTORY:   has a past medical history of Acute on chronic respiratory failure with hypoxia and hypercapnia ([address] Apraxia; Ataxia; CAD (coronary artery disease); CAD (coronary artery disease); Choledocholithiasis; Chronic systolic congestive heart failure (HCC); CKD (chronic kidney disease) stage 3, GFR 30-59 ml/min; COPD (chronic obstructive pulmonary disease) ([address] Hyperlipidemia; Hypoxia; Normal pressure hydrocephalus; Pleural effusion; Pulmonary hypertension;  Restless legs; S/P CABG x 2; Severe mitral regurgitation; and Smoker. SURGICAL HISTORY:   has a past surgical history that includes Coronary artery bypass graft (10/30/2002); Cholecystectomy (2002); cervical fusion (1999); Hysterectomy (1988); Cardiac surgery; lumbar drain implantation (07/27/2018); and pr spinal puncture,therapeutic drainage (N/A, 7/27/2018). SOCIAL HISTORY:   reports that she has been smoking Cigarettes. She has a 20.00 pack-year smoking history. She has never used smokeless tobacco. She reports that she does not drink alcohol or use drugs. She smokes 5 cigs a day. 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 Her father was a truck drive. She knows of no asbestos exposure. She has 4 grandchildren. She has no hobbies but does velasquez from time to time. Reports no TB but in her past job has some exposure likely. FAMILY  HISTORY:  family history includes Diabetes in her mother; Emphysema in her father; Heart Attack in her sister; Heart Failure in her sister; High Blood Pressure in her mother. MEDICATIONS:   Prior to Admission medications    Medication Sig Start Date End Date Taking?  Authorizing Provider   GABAPENTIN PO Take 300 mg by mouth 3 times daily   Yes Historical Provider, MD   ursodiol (ACTIGALL) 300 MG capsule Take 300 mg by mouth 3 times daily (before meals)   Yes Historical Provider, MD   TORSEMIDE PO Take 20 mg by mouth daily 1-2 tablets   Yes Historical Provider, MD   oxybutynin (DITROPAN-XL) 10 MG extended release tablet Take 10 mg by mouth daily   Yes Historical Provider, MD   furosemide (LASIX) 20 MG tablet Take 20 mg by mouth daily as needed   Yes Historical Provider, MD   fluticasone (FLONASE) 50 MCG/ACT nasal spray 1 spray by Nasal route daily as needed for Rhinitis   Yes Historical Provider, MD   fluticasone (FLOVENT HFA) 110 MCG/ACT inhaler Inhale 1 puff into the lungs 2 times daily as needed   Yes Historical Provider, MD   albuterol (PROVENTIL) (2.5 MG/3ML) 0.083% nebulizer solution Take 3 mLs by nebulization every 6 hours as needed for Wheezing 3/5/17  Yes LIZA Thomas CNP   carvedilol (COREG) 12.5 MG tablet   Take 18.75 mg by mouth 2 times daily  5/10/15  Yes Historical Provider, MD   rOPINIRole (REQUIP) 1 MG tablet Take 3 mg by mouth 2 times daily  5/10/15  Yes Historical Provider, MD   oxyCODONE-acetaminophen (PERCOCET) 5-325 MG per tablet Take 1 tablet by mouth every 4 hours as needed for Pain. Yes Historical Provider, MD   lisinopril (PRINIVIL;ZESTRIL) 10 MG tablet Take 40 mg by mouth daily. Yes Historical Provider, MD   buPROPion (WELLBUTRIN XL) 300 MG XL tablet Take 300 mg by mouth every morning. Yes Historical Provider, MD   traZODone (DESYREL) 150 MG tablet Take 150 mg by mouth nightly. Yes Historical Provider, MD   isosorbide mononitrate (IMDUR) 30 MG CR tablet Take 30 mg by mouth daily. Yes Historical Provider, MD   ibuprofen (ADVIL;MOTRIN) 600 MG tablet Take 1 tablet by mouth every 6 hours as needed for Pain 3/5/17 3/10/17  LIZA Thomas CNP   clotrimazole-betamethasone (LOTRISONE) 1-0.05 % cream Apply topically 2 times daily. 11/25/16   Kourtney Vides DO   omeprazole (PRILOSEC) 40 MG capsule   Take 40 mg by mouth daily  5/16/15   Historical Provider, MD   vitamin D (ERGOCALCIFEROL) 65383 UNITS CAPS capsule   Take 50,000 Units by mouth once a week  4/30/15   Historical Provider, MD   DULoxetine (CYMBALTA) 60 MG capsule Take 60 mg by mouth daily. Historical Provider, MD   rosuvastatin (CRESTOR) 20 MG tablet Take 20 mg by mouth daily. Historical Provider, MD   aspirin 325 MG EC tablet Take 325 mg by mouth daily. Historical Provider, MD       ALLERGIES:  Pentazocine lactate and Sulfa antibiotics       REVIEW OF SYSTEMS:   Constitutional: No unanticipated weight loss. No change in energy level. She has RLS and abnormal sleep pattern. No fever chills or rigors. Eyes: + visual changes or diplopia. HA see HPI.  No scleral icterus. ENT: + Headaches, No hearing loss or vertigo. No mouth sores or sore throat. No change in taste or smell. + left neck mass/lymph node Jacquiline Captain work up)   Cardiovascular: No chest discomfort, dyspnea on exertion, palpitations, loss of consciousness, no phlebitis, no claudication. Respiratory: No cough, wheezing or sputum production. No hemoptysis, pleuritic pain. + Oxygen since 2001  Gastrointestinal: No abdominal pain, appetite loss, blood in stools. No hematemesis  Genitourinary: No dysuria, trouble voiding or hematuria. No nocturia. Musculoskeletal:  + gait disturbance, + weakness or joint complaints. Integumentary: No rash or pruritis. Neurological: + headache, with change in gait, balance, coordination. Psychiatric: No anxiety or depression. Endocrine: No temperature intolerance. No excessive thirst, fluid intake, or urination. No tremor. Hematologic/Lymphatic: No abnormal bruising or bleeding or + neck swollen lymph nodes. Allergic/Immunologic: No nasal congestion or hives. OBJECTIVE:     PHYSICAL EXAM:   VITALS:   Patient Vitals for the past 8 hrs:   BP Temp Temp src Pulse Resp SpO2   08/01/18 0751 128/82 97.8 °F (36.6 °C) Oral 89 18 95 %       Intake/Output Summary (Last 24 hours) at 08/01/18 1453  Last data filed at 08/01/18 1306   Gross per 24 hour   Intake              360 ml   Output              275 ml   Net               85 ml        CONSTITUTIONAL:   A&O x 3, NAD  SKIN:    No rash, No suspicious lesions  HEENT:    EOMI, MMM, No thrush  NECK:   No bruits, No JVP apprechiated  CV:     Sinus,  No Murmus, No Rubs, No gallop  PULMONARY:  Couse,  No Wheezing, No Rales, No Rhonchi or Egophony  ABDOMEN:    Soft, non-tender. BS normal. No R/R/G  EXT:   No deformities or skin discoloration. No clubbing.  No lower extremity edema, No venous stasis  PULSE:  Peripheral pulses present 1+   PSYCHIATRIC: Approprate  MS:   No fracture, No gross muscle weakness  NEUROLOGIC:  Non focal

## 2018-08-01 NOTE — PROGRESS NOTES
Physical Therapy    Facility/Department: [facility]    NAME: Jarad Mariscal  : 1955  MRN: [id number]    Date of Service: 2018  Evaluating Therapist: Cameron Barnes PT, DPT    Room #: 2100G  DIAGNOSIS: TIA  PRECAUTIONS: Falls, TSM, NPH  Recommend equipment of ww    Social:  Pt lives with  in a 1 floor plan with 3 step(s) and no rail(s) to enter. Prior to admission pt walked with no AD. Has a cane that she used just prior to admission. Reported independent prior. Initial Evaluation  Date: 18 Treatment  Date:    Short Term/ Long Term   Goals   Was pt agreeable to Eval/treatment? Yes  Yes     Does pt have pain? Reported 7/10 HA and neck pain. Nurse notified. no    Bed Mobility  Rolling: SBA  Supine to sit: SBA  Sit to supine: SBA  Scooting: SBA nt Supervision   Transfers Sit to stand: Min A  Stand to sit: Min A  Stand pivot: Min A Sit to stand min  Stand to sit min with max cues for technique  Stand pivot min with ww Supervision   Ambulation    125 feet with [employer] with Min A 125 feet ww min  17 feet no device min  Pt more stable with ww >200 feet with AAD with Supervision   Stair negotiation: ascended and descended  NT 4 steps 1 rail with min of 2 4 steps with 1 rail with Supervision   AM-PAC Score 16 17    Pt is alert and oriented    Balance:  Standing dynamic min  Sitting independent    Pt performed therapeutic exercise of the following: function    Patient education  Pt was educated on  steps    Patient response to education:   Pt verbalized understanding Pt demonstrated skill Pt requires further education in this area   nt With assist x     Additional Comments:  Pt in bathroom upon arrival.   Assisted pt to chair from bathroom and pt very unsteady. With sitting pt arching bkw and not understanding how to sit. Pt required verbal and tactile cues. Pt sat and then ambulated pt with ww and pt unsteady. Pt got confused on steps and having trouble how to step up on step.     Pt up

## 2018-08-01 NOTE — PROGRESS NOTES
determination of next treatment. Patient having significant headache again, possibly from fluid build-up. ROS:  Negative to a 10 system review except that mentioned in the HPI. Objective:     PHYSICAL EXAM:    VS: /82   Pulse 89   Temp 97.8 °F (36.6 °C) (Oral)   Resp 18   Ht 5' 2.99\" (1.6 m)   Wt 124 lb 1.6 oz (56.3 kg)   SpO2 95%   BMI 21.99 kg/m²   General appearance:Awake alert oriented x3; More expressive aphasia  Skin: Warm and dry ; no rashes  Head: Normocephalic. No masses, lesions or tenderness noted  Eyes: Conjunctivae pink, sclera white. PERRL,EOM-I  Ears: External ears normal  Nose/Sinuses: Nares normal. Septum midline. Mucosa normal. No drainage; nasal cannula oxygen in place  Oropharynx: Oropharynx clear with no exudate seen  Neck: Supple. No jugular venous distension, lymphadenopathy or thyromegaly Trachea midline  Lungs: Clear to auscultation bilaterally. No rhonchi, crackles or wheezes  Heart: S1 S2  Regular rate and rhythm. No rub, murmur or gallop  Abdomen: Soft, non-tender. BS normal. No masses, organomegaly; no rebound or guarding  Extremities: No edema, Peripheral pulses weak  Musculoskeletal: Muscular strength appears fair   Neuro:   II-XII grossly intact . ALTMAN equally    TELEMETRY: REVIEWED--Telemetry: Sinus    ASSESSMENT:   Principal Problem:    Apraxia  Active Problems:    Pulmonary hypertension    CKD (chronic kidney disease) stage 3, GFR 30-59 ml/min    Hypoxia    CAD (coronary artery disease)    S/P CABG x 2    COPD (chronic obstructive pulmonary disease) (HCC)    Ataxia    TIA (transient ischemic attack)    Acute respiratory failure with hypoxia in the setting of hypoxia & COPD, being treated with O2 @ 4 L, QVAR, SpO2 monitoring. Normal pressure hydrocephalus          Resolved Problems:    * No resolved hospital problems.  *      PLAN:  SEE ORDERS      RE  CHANGES AND FINDINGS   Medications reviewed with patient  GI prophylaxis  DVT prophylaxis  Consultants notes reviewed  Await results of cervical MRI  May need further workup and testing for low pressure hydrocephalus  Await repeat hemoglobin A1c  Continue other treatments  Replace magnesium IV  Repeat magnesium sulfate IV today 2 g  Neurosurgery has been consulted for possible cervical cord compression versus low pressure hydrocephalus and falling  Continue potassium supplements  Continue nasal cannula oxygen  Recheck labs in a.m. Await results of improvement  Recheck labs in a.m. Continue with Coreg  Continue Qvar  Continue Demadex  Lumbar drainage system has been removed  Replace oral potassium  Spironolactone 25 mg has been started  Recheck labs in a.m. Percocet 10 mg every 4 hours when necessary  May need to increase Coreg dosage, hypertension  Continue lisinopril 40 mg daily  Await further neurosurgical treatment  Follow labs  I discussed with nursing, neurosurgery should be notified patient would like to proceed with  shunt if that's proposed since she's returning to pre-Condition. She states she cannot go on living as she had been prehospital  Continue gabapentin  Continue aerosols  Continue Corgard  Continue Desyrel at bedtime  Nuclear cisternogram  Prescription written for wheeled walker for home  Prescription for home health care including therapy  Try to obtain the name of patient's pulmonologist in [facility]  Continue aerosols  Pulmonary consult regarding ongoing oxygen requirements  Cardiology suggest initiation of ENTRESTO  I stopped the gabapentin because of potential interference and fluid problems  Neurosurgery suggested possible discharge tomorrow after final cisternogram pictures are completed and follow-up in the office early next week with neurosurgery  Patient may need longer stay because of cardiac and pulmonary difficulties. Discussed with patient and nursing.       [address] [facility]  DO     1:41 PM     8/1/2018    TIME >35 [address] Oral BID WC    carvedilol  12.5 mg Oral BID    spironolactone  25 mg Oral Daily    traZODone  150 mg Oral Nightly    torsemide  20 mg Oral Daily    rOPINIRole  3 mg Oral BID    oxybutynin  10 mg Oral Daily    lisinopril  40 mg Oral Daily    isosorbide mononitrate  30 mg Oral Daily    gabapentin  300 mg Oral TID    beclomethasone  4 puff Inhalation BID    buPROPion  300 mg Oral QAM    pantoprazole  40 mg Oral QAM AC    folic acid  1 mg Oral Daily       Continuous Infusions:        Data:   Temperature:  Current - Temp: 97.8 °F (36.6 °C); Max - Temp  Av °F (36.7 °C)  Min: 97.8 °F (36.6 °C)  Max: 98.2 °F (36.8 °C)    Respiratory Rate : Resp  Av  Min: 16  Max: 20    Pulse Range: Pulse  Av.5  Min: 89  Max: 96    Blood Presuure Range:  Systolic (14RVZ), UXC:847 , Min:127 , HG   ; Diastolic (23PHA), YZR:76, Min:81, Max:89      Pulse ox Range: SpO2  Av.3 %  Min: 93 %  Max: 95 %    Patient Vitals for the past 8 hrs:   BP Temp Temp src Pulse Resp SpO2   18 0751 128/82 97.8 °F (36.6 °C) Oral 89 18 95 %         Intake/Output Summary (Last 24 hours) at 18 1341  Last data filed at 18 1306   Gross per 24 hour   Intake              720 ml   Output             1025 ml   Net             -305 ml       I/O last 3 completed shifts:   In: 1080 [P.O.:1080]  Out: 750 [Urine:750]    I/O this shift:  In: -   Out: 275 [Urine:275]    Wt Readings from Last 3 Encounters:   18 124 lb 1.6 oz (56.3 kg)   17 130 lb (59 kg)   16 128 lb (58.1 kg)       Labs:   CBC:   Lab Results   Component Value Date    WBC 7.2 2018    RBC 3.89 2018    HGB 11.1 2018    HCT 36.1 2018    MCV 92.8 2018    MCH 28.5 2018    MCHC 30.7 2018    RDW 18.1 2018     2018    MPV 11.3 2018     CBC with Differential:    Lab Results   Component Value Date    WBC 7.2 2018    RBC 3.89 2018    HGB 11.1 2018    HCT 36.1 2018

## 2018-08-02 ENCOUNTER — APPOINTMENT (OUTPATIENT)
Dept: NUCLEAR MEDICINE | Age: 63
DRG: 056 | End: 2018-08-02
Payer: MEDICARE

## 2018-08-02 PROCEDURE — 82104 ALPHA-1-ANTITRYPSIN PHENO: CPT

## 2018-08-02 PROCEDURE — 94060 EVALUATION OF WHEEZING: CPT

## 2018-08-02 PROCEDURE — 2060000000 HC ICU INTERMEDIATE R&B

## 2018-08-02 PROCEDURE — 99233 SBSQ HOSP IP/OBS HIGH 50: CPT | Performed by: INTERNAL MEDICINE

## 2018-08-02 PROCEDURE — 36415 COLL VENOUS BLD VENIPUNCTURE: CPT

## 2018-08-02 PROCEDURE — 6370000000 HC RX 637 (ALT 250 FOR IP): Performed by: INTERNAL MEDICINE

## 2018-08-02 PROCEDURE — 6360000002 HC RX W HCPCS: Performed by: NEUROLOGICAL SURGERY

## 2018-08-02 PROCEDURE — 94726 PLETHYSMOGRAPHY LUNG VOLUMES: CPT

## 2018-08-02 PROCEDURE — 94729 DIFFUSING CAPACITY: CPT

## 2018-08-02 PROCEDURE — 82103 ALPHA-1-ANTITRYPSIN TOTAL: CPT

## 2018-08-02 PROCEDURE — 78630 CEREBROSPINAL FLUID SCAN: CPT

## 2018-08-02 PROCEDURE — 6370000000 HC RX 637 (ALT 250 FOR IP): Performed by: SURGERY

## 2018-08-02 PROCEDURE — 6370000000 HC RX 637 (ALT 250 FOR IP): Performed by: STUDENT IN AN ORGANIZED HEALTH CARE EDUCATION/TRAINING PROGRAM

## 2018-08-02 PROCEDURE — 2700000000 HC OXYGEN THERAPY PER DAY

## 2018-08-02 PROCEDURE — 86481 TB AG RESPONSE T-CELL SUSP: CPT

## 2018-08-02 PROCEDURE — 99231 SBSQ HOSP IP/OBS SF/LOW 25: CPT | Performed by: NEUROLOGICAL SURGERY

## 2018-08-02 RX ORDER — LOSARTAN POTASSIUM 50 MG/1
50 TABLET ORAL DAILY
Status: COMPLETED | OUTPATIENT
Start: 2018-08-03 | End: 2018-08-03

## 2018-08-02 RX ORDER — LORAZEPAM 2 MG/ML
1 INJECTION INTRAMUSCULAR ONCE
Status: COMPLETED | OUTPATIENT
Start: 2018-08-02 | End: 2018-08-02

## 2018-08-02 RX ADMIN — PANTOPRAZOLE SODIUM 40 MG: 40 TABLET, DELAYED RELEASE ORAL at 06:53

## 2018-08-02 RX ADMIN — OXYCODONE HYDROCHLORIDE AND ACETAMINOPHEN 2 TABLET: 5; 325 TABLET ORAL at 18:27

## 2018-08-02 RX ADMIN — ROPINIROLE HYDROCHLORIDE 3 MG: 2 TABLET, FILM COATED ORAL at 08:44

## 2018-08-02 RX ADMIN — CARVEDILOL 12.5 MG: 6.25 TABLET, FILM COATED ORAL at 21:13

## 2018-08-02 RX ADMIN — ISOSORBIDE MONONITRATE 30 MG: 30 TABLET ORAL at 08:44

## 2018-08-02 RX ADMIN — OXYCODONE HYDROCHLORIDE AND ACETAMINOPHEN 2 TABLET: 5; 325 TABLET ORAL at 04:07

## 2018-08-02 RX ADMIN — ROPINIROLE HYDROCHLORIDE 3 MG: 2 TABLET, FILM COATED ORAL at 21:13

## 2018-08-02 RX ADMIN — BECLOMETHASONE DIPROPIONATE 4 PUFF: 80 AEROSOL, METERED RESPIRATORY (INHALATION) at 21:13

## 2018-08-02 RX ADMIN — CARVEDILOL 12.5 MG: 6.25 TABLET, FILM COATED ORAL at 08:44

## 2018-08-02 RX ADMIN — TORSEMIDE 20 MG: 20 TABLET ORAL at 08:44

## 2018-08-02 RX ADMIN — OXYBUTYNIN CHLORIDE 10 MG: 10 TABLET, EXTENDED RELEASE ORAL at 08:44

## 2018-08-02 RX ADMIN — SPIRONOLACTONE 25 MG: 25 TABLET ORAL at 08:43

## 2018-08-02 RX ADMIN — POTASSIUM CHLORIDE 20 MEQ: 20 TABLET, EXTENDED RELEASE ORAL at 18:27

## 2018-08-02 RX ADMIN — BECLOMETHASONE DIPROPIONATE 4 PUFF: 80 AEROSOL, METERED RESPIRATORY (INHALATION) at 08:43

## 2018-08-02 RX ADMIN — BUPROPION HYDROCHLORIDE 300 MG: 300 TABLET, FILM COATED, EXTENDED RELEASE ORAL at 08:44

## 2018-08-02 RX ADMIN — POTASSIUM CHLORIDE 20 MEQ: 20 TABLET, EXTENDED RELEASE ORAL at 08:44

## 2018-08-02 RX ADMIN — TRAZODONE HYDROCHLORIDE 150 MG: 150 TABLET ORAL at 21:13

## 2018-08-02 RX ADMIN — LISINOPRIL 40 MG: 20 TABLET ORAL at 08:44

## 2018-08-02 RX ADMIN — OXYCODONE HYDROCHLORIDE AND ACETAMINOPHEN 2 TABLET: 5; 325 TABLET ORAL at 08:43

## 2018-08-02 RX ADMIN — LORAZEPAM 1 MG: 2 INJECTION INTRAMUSCULAR; INTRAVENOUS at 10:55

## 2018-08-02 RX ADMIN — FOLIC ACID 1 MG: 1 TABLET ORAL at 08:44

## 2018-08-02 ASSESSMENT — PAIN DESCRIPTION - PROGRESSION
CLINICAL_PROGRESSION: GRADUALLY IMPROVING
CLINICAL_PROGRESSION: NOT CHANGED

## 2018-08-02 ASSESSMENT — PAIN DESCRIPTION - FREQUENCY
FREQUENCY: CONTINUOUS

## 2018-08-02 ASSESSMENT — PAIN SCALES - GENERAL
PAINLEVEL_OUTOF10: 7
PAINLEVEL_OUTOF10: 0
PAINLEVEL_OUTOF10: 5
PAINLEVEL_OUTOF10: 7
PAINLEVEL_OUTOF10: 10
PAINLEVEL_OUTOF10: 7
PAINLEVEL_OUTOF10: 7

## 2018-08-02 ASSESSMENT — PAIN DESCRIPTION - ONSET
ONSET: ON-GOING

## 2018-08-02 ASSESSMENT — PAIN DESCRIPTION - DESCRIPTORS
DESCRIPTORS: ACHING;CONSTANT;DISCOMFORT
DESCRIPTORS: ACHING;CONSTANT;DISCOMFORT
DESCRIPTORS: HEADACHE

## 2018-08-02 ASSESSMENT — PAIN DESCRIPTION - PAIN TYPE
TYPE: ACUTE PAIN

## 2018-08-02 ASSESSMENT — PAIN DESCRIPTION - LOCATION
LOCATION: NECK
LOCATION: HEAD
LOCATION: NECK

## 2018-08-02 NOTE — PROGRESS NOTES
[place]  Division of Pulmonary, Critical Care and Sleep Medicine  Consult Note      Admit Date:  7/23/2018    MRN:   [id number]        Patient:        PCP:   Lori Ruffin DO    CONSULT : Chronic Respiratory Failure    SUBJECTIVE:    Still feel terrible  HA is severe  ECHO noted      OBJECTIVE:     PHYSICAL EXAM:   VITALS:   Patient Vitals for the past 8 hrs:   BP Temp Temp src Pulse Resp SpO2   08/02/18 0806 129/77 96.7 °F (35.9 °C) Axillary 92 18 95 %       Intake/Output Summary (Last 24 hours) at 08/02/18 1114  Last data filed at 08/01/18 1306   Gross per 24 hour   Intake                0 ml   Output              275 ml   Net             -275 ml        CONSTITUTIONAL:   A&O x 3, NAD  SKIN:    No rash, No suspicious lesions  HEENT:    EOMI, MMM, No thrush  NECK:   No bruits, No JVP apprechiated  CV:     Sinus,  No Murmus, No Rubs, No gallop  PULMONARY:  Couse,  No Wheezing, No Rales, No Rhonchi or Egophony  ABDOMEN:    Soft, non-tender.  BS normal. No R/R/G  EXT:   No lower extremity edema, No venous stasis  PULSE:  Peripheral pulses present 1+   PSYCHIATRIC: Approprate  MS:   No fracture, No gross muscle weakness  NEUROLOGIC:  Non focal exam.     DATA: IMAGING & TESTING:     LABORATORY TESTS:    CBC:   Lab Results   Component Value Date    WBC 7.2 07/29/2018    RBC 3.89 07/29/2018    HGB 11.1 07/29/2018    HCT 36.1 07/29/2018    MCV 92.8 07/29/2018    MCH 28.5 07/29/2018    MCHC 30.7 07/29/2018    RDW 18.1 07/29/2018     07/29/2018    MPV 11.3 07/29/2018     CMP:    Lab Results   Component Value Date     07/29/2018    K 3.7 07/29/2018    K 3.3 07/26/2018    CL 95 07/29/2018    CO2 37 07/29/2018    BUN 12 07/29/2018    CREATININE 0.8 07/29/2018    GFRAA >60 07/29/2018    LABGLOM >60 07/29/2018    GLUCOSE 132 07/29/2018    GLUCOSE 111 09/30/2011    PROT 6.6 07/29/2018    LABALBU 2.7 07/29/2018    LABALBU 4.4 09/30/2011    CALCIUM 8.8 07/29/2018    BILITOT 0.6 projection (MIP) and volume rendered (3D) datasets. Low-dose CT  acquisition technique included one of following options: 1. Automated exposure control 2. Adjustment of MA and or KV according to patient's size or 3. Use of iterative reconstruction. Comparison: No prior studies available for comparison. Findings: Pulmonary vasculature: There is no evidence of filling defect in the main pulmonary artery, right or left pulmonary arteries, or segmental branches of the pulmonary arteries to suggest pulmonary embolism. Evaluation of the subsegmental branches is limited due to respiratory artifact. Lung parenchyma and pleura: The tracheobronchial tree is midline and the central bronchi are patent. There are moderate centrilobular bullous on this changes the lungs bilaterally, most prominently involving the upper lobes. There is a calcified pleural plaque in the posterior aspect of the superior segment of the left lower lobe (series 5, image 17). There is no definite pleural or parenchymal mass lesion. There is no focal consolidation, pleural effusion or pneumothorax. There are mild patchy bibasal dependent pulmonary parenchymal opacities consistent with mild atelectatic change. Thyroid: Demonstrates homogeneous enhancement. Mediastinum: No significant lymphadenopathy. Ruth: No significant lymphadenopathy. Heart and pericardium: The heart is enlarged. There is no pericardial effusion. There are moderate coronary artery calcifications. There is dilatation of the main pulmonary artery and, measuring 3.4 cm in maximum diameter (series 4, image 59). There is dilatation of the right pulmonary artery, measuring 2.8 cm in maximum diameter (series 4, image 64). There is dilatation of the left pulmonary artery, measuring 2.9 cm in maximum diameter (series 4, image 61). Chest wall: Midline sternotomy wires are identified, prior sternotomy. Axilla: No enlarged lymph nodes. Visualized upper abdomen:  There is a trace amount of hydrocephalus. Clinical correlation is recommended. 4. No identifiable mass, mass effect or abnormal diffusion restriction. Us Carotid Artery Bilateral  1. No evidence to suggest hemodynamically significant stenosis. Assessment  1. Emphysema  2. Smoker  3. Chronic Respiratory failure  4. Chronic oxygen therapy  5. Unknonw lung function  6. CAD  7. Pleural plaque? Verse old infection/scar  8. Left neck adenopathy    Plan  1. Start Duonebs QID  2. Home with Anoro or Trelegy  3. Her home medication needs changes  4. Full PFT would be helpful with ABG   5. Alpha 1 testing pending  6. 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  Home nebulizer needed for COPD        Dickson Holter DO, MPH, FCCP, Binh Ken, FACP,

## 2018-08-02 NOTE — PROGRESS NOTES
would like something stronger. Currently in surgical intensive care, postop. Patient had 3 sec pause last evening; to be seen by cardiology. 7/28/18-patient had lumbar drain intact; was draining 5 mL per hour. This morning, she apparently unintentionally disconnected the drain. Blood was saturated with cerebral spinal fluid; unbearable headache occurred. Neurosurgery was notified; there was reattachment of implanted drain to drainage system. However patient again dislodge it many times. It was finally determined best to have the entire catheter removed which was done by residents. She was having excruciating head pain back pain because of the above and loss of CSF fluid. She was given fentanyl for pain relief making her very sleepy at this time. Patient has been seen by cardiology due to sinus pause. Sodium 144 potassium 3.4 chloride 100 CO2 33 BUN 11 creatinine 0.7 magnesium 1.4 glucose 193 calcium 8.5 phosphorus 2.7     7/29/18-patient was transferred from surgical intensive to the neurointensive unit. She states she still having severe headache and back pain. I discussed the above with nursing, apparently patient has been receiving 1 Percocet for pain control; patient states at home she was taking 1 Percocet frequently for other aches and pains. I advised nursing to increase, 2 tablets of Percocet as needed. Blood pressure has increased since events of yesterday, averaging at least 245-939 systolic or higher. Temperature 99.3. Sodium 142 potassium 3.7 chloride 95 CO2 37 BUN 12 creatinine 0.8 magnesium 1.6, improved. Glucose 132 calcium 8.8 protein 6.6 albumin 2.7 liver functions normal.  Hemoglobin 11.1 WBC 7.2 platelets 378.    7/96/53-NAPFKDE sitting in bed, headache much improved temporarily. She states she still having paroxysms of headache. She notices her speech is worsening again; her family noticed that also.  Her headache is more like the previous headaches, prehospital rather than the intense head pain she had in excess spinal fluid was released. Patient states her family is convinced that she was dramatically better with the lumbar drainage tube in place; now seemed to be reverting back to her prehospital condition. She would like further treatment if possible. 7/31/18-patient laying quietly in bed, still has intermittent headache. Speech becoming more of a problem. Obvious expressive aphasia, mild, but worsening each day. I spoke with neurosurgery regarding the above. Last evening patient was having leakage, CSF from the lumbar drainage site; drain had been removed. Surgical resident applied 1 stitch to the area, no further drainage. Patient had a nuclear cisternogram initiated this morning. The above will continue for 2 more days. Neurosurgery suggested at that time patient can be discharged home and follow up with him in the office since results will not likely be available since that is the weekend. Denies any chest pain dyspnea. 8/1/18-patient was seen yesterday by cardiology, after she had 2-D echo performed. She apparently doesn't understand results of the 2-D echo. I explained him specifically that ejection fraction should be 60%, her ears 23% plus/minus/5%. That terrifies her. She's having a hard time putting words together again. Her headaches are increasing likely from mild normal pressure hydrocephalus. She requests that I speak to her , Jewell Hoskins which I did by phone explained all the above to him. In addition, pulmonary will be consulted regarding home oxygen etc. I question both  and patient extensively, they deny she was having any significant shortness of breath prior to this admission. She is due for one more \"picture\" regarding cisternogram tomorrow. Possible discharge at that time if okay with cardiology and pulmonary.  I spoke with neurosurgery, they requested patient be discharged after final pictures and follow up in the office with neurosurgery early next week for completed and follow-up in the office early next week with neurosurgery  Patient may need longer stay because of cardiac and pulmonary difficulties. Consult for subacute rehab  Continue oxygen  ENTRESTO and/or losartan to be started by cardiology  Recheck labs in a.m. Discussed with patient and nursing.       [address]     4:11 PM     8/2/2018    TIME >35 MINUTES      Active Hospital Problems    Diagnosis    Chronic systolic congestive heart failure (HCC) [I50.22]     Priority: High     Class: Chronic    Severe mitral regurgitation [I34.0]     Priority: High     Class: Chronic    Acute on chronic respiratory failure with hypoxia and hypercapnia (HCC) [B89.34, J96.22]     Priority: High     Class: Acute    Pulmonary hypertension [I27.20]     Priority: High     Class: Chronic    Apraxia [R48.2]     Priority: High     Class: Acute    CKD (chronic kidney disease) stage 3, GFR 30-59 ml/min [N18.3]     Priority: High    Hypoxia [R09.02]     Priority: High     Class: Acute    Normal pressure hydrocephalus [G91.2]    Sinus pause [I45.5]    Stroke-like symptoms [R29.90]    Ataxia [R27.0]    TIA (transient ischemic attack) [G45.9]    COPD (chronic obstructive pulmonary disease) ([facility]) [J44.9]    CAD (coronary artery disease) [I25.10]    S/P CABG x 2 [Z95.1]                 ------------  INFORMATION  -----------      DIET:DIET GENERAL;  Dietary Nutrition Supplements: Diabetic Oral Supplement        Allergies   Allergen Reactions    Pentazocine Lactate     Sulfa Antibiotics          MEDICATION SIDE EFFECTS:none       SCHEDULED MEDS:                                 Current Facility-Administered Medications   Medication Dose Route Frequency Provider Last Rate Last Dose    ipratropium-albuterol (DUONEB) nebulizer solution 1 ampule  1 ampule Nebulization 4x daily Eric Mariscal DO   1 ampule at 08/01/18 9996    potassium chloride (KLOR-CON M) extended release tablet 20 mEq  20 mEq Oral BID  (58.1 kg)       Labs:   CBC:   Lab Results   Component Value Date    WBC 7.2 07/29/2018    RBC 3.89 07/29/2018    HGB 11.1 07/29/2018    HCT 36.1 07/29/2018    MCV 92.8 07/29/2018    MCH 28.5 07/29/2018    MCHC 30.7 07/29/2018    RDW 18.1 07/29/2018     07/29/2018    MPV 11.3 07/29/2018     CBC with Differential:    Lab Results   Component Value Date    WBC 7.2 07/29/2018    RBC 3.89 07/29/2018    HGB 11.1 07/29/2018    HCT 36.1 07/29/2018     07/29/2018    MCV 92.8 07/29/2018    MCH 28.5 07/29/2018    MCHC 30.7 07/29/2018    RDW 18.1 07/29/2018    SEGSPCT 63 01/30/2014    LYMPHOPCT 26.6 07/29/2018    MONOPCT 8.4 07/29/2018    BASOPCT 0.6 07/29/2018    MONOSABS 0.60 07/29/2018    LYMPHSABS 1.91 07/29/2018    EOSABS 0.12 07/29/2018    BASOSABS 0.04 07/29/2018     Hemoglobin/Hematocrit:    Lab Results   Component Value Date    HGB 11.1 07/29/2018    HCT 36.1 07/29/2018     CMP:    Lab Results   Component Value Date     07/29/2018    K 3.7 07/29/2018    K 3.3 07/26/2018    CL 95 07/29/2018    CO2 37 07/29/2018    BUN 12 07/29/2018    CREATININE 0.8 07/29/2018    GFRAA >60 07/29/2018    LABGLOM >60 07/29/2018    GLUCOSE 132 07/29/2018    GLUCOSE 111 09/30/2011    PROT 6.6 07/29/2018    LABALBU 2.7 07/29/2018    LABALBU 4.4 09/30/2011    CALCIUM 8.8 07/29/2018    BILITOT 0.6 07/29/2018    ALKPHOS 90 07/29/2018    AST 15 07/29/2018    ALT 7 07/29/2018     BMP:    Lab Results   Component Value Date     07/29/2018    K 3.7 07/29/2018    K 3.3 07/26/2018    CL 95 07/29/2018    CO2 37 07/29/2018    BUN 12 07/29/2018    LABALBU 2.7 07/29/2018    LABALBU 4.4 09/30/2011    CREATININE 0.8 07/29/2018    CALCIUM 8.8 07/29/2018    GFRAA >60 07/29/2018    LABGLOM >60 07/29/2018    GLUCOSE 132 07/29/2018    GLUCOSE 111 09/30/2011     Magnesium:    Lab Results   Component Value Date    MG 1.6 07/29/2018     Phosphorus:    Lab Results   Component Value Date    PHOS 3.0 07/29/2018     Uric Acid:  No results found for: [place]  PT/INR:    Lab Results   Component Value Date    PROTIME 13.8 07/26/2018    INR 1.2 07/26/2018     Warfarin PT/INR:  No components found for: Loli Miguel  PTT:    Lab Results   Component Value Date    APTT <20.0 07/23/2018   [APTT}  Troponin:    Lab Results   Component Value Date    TROPONINI <0.01 07/24/2018     Last 3 Troponin:    Lab Results   Component Value Date    TROPONINI <0.01 07/24/2018    TROPONINI <0.01 07/24/2018    TROPONINI <0.01 07/23/2018     U/A:    Lab Results   Component Value Date    COLORU Yellow 07/23/2018    PROTEINU 30 07/23/2018    PHUR 5.5 07/23/2018    WBCUA 0-1 07/23/2018    RBCUA NONE 07/23/2018    BACTERIA FEW 07/23/2018    CLARITYU Clear 07/23/2018    SPECGRAV 1.020 07/23/2018    LEUKOCYTESUR Negative 07/23/2018    UROBILINOGEN 4.0 07/23/2018    BILIRUBINUR Negative 07/23/2018    BLOODU Negative 07/23/2018    GLUCOSEU Negative 07/23/2018     ABG:  No results found for: PH, PCO2, PO2, HCO3, BE, THGB, TCO2, O2SAT  HgBA1c:    Lab Results   Component Value Date    LABA1C 10.4 07/24/2018     FLP:    Lab Results   Component Value Date    TRIG 108 07/24/2018    HDL 21 07/24/2018    LDLCALC 49 07/24/2018    LABVLDL 22 07/24/2018     TSH:    Lab Results   Component Value Date    TSH 2.120 07/24/2018     VITAMIN B12: No components found for: B12  FOLATE:    Lab Results   Component Value Date    FOLATE 5.5 07/24/2018     GILBERT:  No results found for: ANATITER, GILBERT  RF:  No results found for: RF     CBC:  No results for input(s): WBC, RBC, HGB, HCT, PLT, MCV, MCH, MCHC, RDW, NRBC, SEGSPCT, BANDSPCT, BLASTSPCT, METASPCT, LYMPHOPCT, PROMYELOPCT, MONOPCT, MYELOPCT, EOSPCT, BASOPCT, MONOSABS, LYMPHSABS, EOSABS, BASOSABS, DIFFTYPE in the last 72 hours. Invalid input(s): [facility]       H & H :  No results for input(s): HGB in the last 72 hours. TSH:  No results for input(s): TSH in the last 72 hours.     GLUCOSE:No results for input(s): POCGLU in the last 72

## 2018-08-02 NOTE — PROGRESS NOTES
Department of Neurosurgery  Progress Note    CHIEF COMPLAINT: pt seen for NPH work up    SUBJECTIVE:  Mild HA, no N/V.      REVIEW OF SYSTEMS :  Constitutional: Negative for chills and fever. Neurological: Negative for dizziness, tremors and speech change. OBJECTIVE:   VITALS:  /77   Pulse 92   Temp 96.7 °F (35.9 °C) (Axillary)   Resp 18   Ht 5' 2.99\" (1.6 m)   Wt 124 lb 1.6 oz (56.3 kg)   SpO2 95%   BMI 21.99 kg/m²   PHYSICAL:  CONSTITUTIONAL:  awake, alert, cooperative, no apparent distress, and appears stated age  Moving all extremities.      DATA:  CBC:   Lab Results   Component Value Date    WBC 7.2 07/29/2018    RBC 3.89 07/29/2018    HGB 11.1 07/29/2018    HCT 36.1 07/29/2018    MCV 92.8 07/29/2018    MCH 28.5 07/29/2018    MCHC 30.7 07/29/2018    RDW 18.1 07/29/2018     07/29/2018    MPV 11.3 07/29/2018     BMP:    Lab Results   Component Value Date     07/29/2018    K 3.7 07/29/2018    K 3.3 07/26/2018    CL 95 07/29/2018    CO2 37 07/29/2018    BUN 12 07/29/2018    LABALBU 2.7 07/29/2018    LABALBU 4.4 09/30/2011    CREATININE 0.8 07/29/2018    CALCIUM 8.8 07/29/2018    GFRAA >60 07/29/2018    LABGLOM >60 07/29/2018    GLUCOSE 132 07/29/2018    GLUCOSE 111 09/30/2011     PT/INR:    Lab Results   Component Value Date    PROTIME 13.8 07/26/2018    INR 1.2 07/26/2018     PTT:    Lab Results   Component Value Date    APTT <20.0 07/23/2018   [APTT}    Current Inpatient Medications  Current Facility-Administered Medications: ipratropium-albuterol (DUONEB) nebulizer solution 1 ampule, 1 ampule, Nebulization, 4x daily  potassium chloride (KLOR-CON M) extended release tablet 20 mEq, 20 mEq, Oral, BID WC  fentaNYL (SUBLIMAZE) injection 25 mcg, 25 mcg, Intravenous, Q1H PRN  oxyCODONE-acetaminophen (PERCOCET) 5-325 MG per tablet 1 tablet, 1 tablet, Oral, Q4H PRN **OR** oxyCODONE-acetaminophen (PERCOCET) 5-325 MG per tablet 2 tablet, 2 tablet, Oral, Q4H PRN  butalbital-acetaminophen-caffeine (FIORICET, ESGIC) per tablet 1 tablet, 1 tablet, Oral, Q4H PRN  acetaminophen (TYLENOL) tablet 650 mg, 650 mg, Oral, Q4H PRN  carvedilol (COREG) tablet 12.5 mg, 12.5 mg, Oral, BID  spironolactone (ALDACTONE) tablet 25 mg, 25 mg, Oral, Daily  perflutren lipid microspheres (DEFINITY) injection 1.65 mg, 1.5 mL, Intravenous, ONCE PRN  traZODone (DESYREL) tablet 150 mg, 150 mg, Oral, Nightly  torsemide (DEMADEX) tablet 20 mg, 20 mg, Oral, Daily  rOPINIRole (REQUIP) tablet 3 mg, 3 mg, Oral, BID  oxybutynin (DITROPAN-XL) extended release tablet 10 mg, 10 mg, Oral, Daily  lisinopril (PRINIVIL;ZESTRIL) tablet 40 mg, 40 mg, Oral, Daily  isosorbide mononitrate (IMDUR) extended release tablet 30 mg, 30 mg, Oral, Daily  furosemide (LASIX) tablet 20 mg, 20 mg, Oral, Daily PRN  beclomethasone (QVAR) 80 MCG/ACT inhaler 4 puff, 4 puff, Inhalation, BID  fluticasone (FLONASE) 50 MCG/ACT nasal spray 1 spray, 1 spray, Nasal, Daily PRN  buPROPion (WELLBUTRIN XL) extended release tablet 300 mg, 300 mg, Oral, QAM  albuterol (PROVENTIL) nebulizer solution 2.5 mg, 2.5 mg, Nebulization, Q6H PRN  pantoprazole (PROTONIX) tablet 40 mg, 40 mg, Oral, QAM AC  ondansetron (ZOFRAN) injection 4 mg, 4 mg, Intravenous, P7Z PRN  folic acid (FOLVITE) tablet 1 mg, 1 mg, Oral, Daily    ASSESSMENT:   · 60year old female with possible NPH    PLAN:  · Cisternogram in progress  · F/u with  Dr. Valeria Lewis in clinic to review results      Electronically signed by LORI Altamirano on 8/2/2018 at 9:55 AM     I independently saw, evaluated, and examined the patient. Agree with plan outlined above.    Electronically signed by Leyda Dixon MD on 8/2/2018 at 10:19 PM

## 2018-08-03 VITALS
DIASTOLIC BLOOD PRESSURE: 80 MMHG | HEIGHT: 63 IN | RESPIRATION RATE: 20 BRPM | TEMPERATURE: 98.1 F | SYSTOLIC BLOOD PRESSURE: 128 MMHG | WEIGHT: 124.1 LBS | OXYGEN SATURATION: 96 % | BODY MASS INDEX: 21.99 KG/M2 | HEART RATE: 88 BPM

## 2018-08-03 LAB
ANION GAP SERPL CALCULATED.3IONS-SCNC: 10 MMOL/L (ref 7–16)
BUN BLDV-MCNC: 23 MG/DL (ref 8–23)
CALCIUM SERPL-MCNC: 9.6 MG/DL (ref 8.6–10.2)
CHLORIDE BLD-SCNC: 91 MMOL/L (ref 98–107)
CO2: 33 MMOL/L (ref 22–29)
CREAT SERPL-MCNC: 0.9 MG/DL (ref 0.5–1)
GFR AFRICAN AMERICAN: >60
GFR NON-AFRICAN AMERICAN: >60 ML/MIN/1.73
GLUCOSE BLD-MCNC: 177 MG/DL (ref 74–109)
PHOSPHORUS: 4.3 MG/DL (ref 2.5–4.5)
POTASSIUM REFLEX MAGNESIUM: 4.7 MMOL/L (ref 3.5–5)
PRO-BNP: 2111 PG/ML (ref 0–125)
SODIUM BLD-SCNC: 134 MMOL/L (ref 132–146)

## 2018-08-03 PROCEDURE — 94729 DIFFUSING CAPACITY: CPT | Performed by: INTERNAL MEDICINE

## 2018-08-03 PROCEDURE — 94640 AIRWAY INHALATION TREATMENT: CPT

## 2018-08-03 PROCEDURE — 83880 ASSAY OF NATRIURETIC PEPTIDE: CPT

## 2018-08-03 PROCEDURE — 36415 COLL VENOUS BLD VENIPUNCTURE: CPT

## 2018-08-03 PROCEDURE — 80048 BASIC METABOLIC PNL TOTAL CA: CPT

## 2018-08-03 PROCEDURE — 99233 SBSQ HOSP IP/OBS HIGH 50: CPT | Performed by: INTERNAL MEDICINE

## 2018-08-03 PROCEDURE — 94726 PLETHYSMOGRAPHY LUNG VOLUMES: CPT | Performed by: INTERNAL MEDICINE

## 2018-08-03 PROCEDURE — 97535 SELF CARE MNGMENT TRAINING: CPT

## 2018-08-03 PROCEDURE — 97112 NEUROMUSCULAR REEDUCATION: CPT

## 2018-08-03 PROCEDURE — 2700000000 HC OXYGEN THERAPY PER DAY

## 2018-08-03 PROCEDURE — 6370000000 HC RX 637 (ALT 250 FOR IP): Performed by: INTERNAL MEDICINE

## 2018-08-03 PROCEDURE — 97110 THERAPEUTIC EXERCISES: CPT

## 2018-08-03 PROCEDURE — 84100 ASSAY OF PHOSPHORUS: CPT

## 2018-08-03 PROCEDURE — 6370000000 HC RX 637 (ALT 250 FOR IP): Performed by: SURGERY

## 2018-08-03 PROCEDURE — 94060 EVALUATION OF WHEEZING: CPT | Performed by: INTERNAL MEDICINE

## 2018-08-03 RX ORDER — IPRATROPIUM BROMIDE AND ALBUTEROL SULFATE 2.5; .5 MG/3ML; MG/3ML
3 SOLUTION RESPIRATORY (INHALATION) 4 TIMES DAILY
Qty: 360 ML | DISCHARGE
Start: 2018-08-03 | End: 2018-10-10

## 2018-08-03 RX ORDER — BUTALBITAL, ACETAMINOPHEN AND CAFFEINE 50; 325; 40 MG/1; MG/1; MG/1
1 TABLET ORAL EVERY 4 HOURS PRN
Qty: 180 TABLET | Refills: 3 | Status: SHIPPED | OUTPATIENT
Start: 2018-08-03 | End: 2020-09-23 | Stop reason: ALTCHOICE

## 2018-08-03 RX ORDER — POTASSIUM CHLORIDE 20 MEQ/1
20 TABLET, EXTENDED RELEASE ORAL 2 TIMES DAILY WITH MEALS
Qty: 60 TABLET | Refills: 3 | Status: ON HOLD | DISCHARGE
Start: 2018-08-03 | End: 2018-10-30 | Stop reason: HOSPADM

## 2018-08-03 RX ORDER — OXYCODONE HYDROCHLORIDE AND ACETAMINOPHEN 5; 325 MG/1; MG/1
1 TABLET ORAL EVERY 4 HOURS PRN
Qty: 40 TABLET | Refills: 0 | Status: SHIPPED | OUTPATIENT
Start: 2018-08-03 | End: 2018-08-10

## 2018-08-03 RX ORDER — FOLIC ACID 1 MG/1
1 TABLET ORAL DAILY
Qty: 30 TABLET | Refills: 3 | DISCHARGE
Start: 2018-08-04 | End: 2020-09-23 | Stop reason: ALTCHOICE

## 2018-08-03 RX ORDER — SPIRONOLACTONE 25 MG/1
25 TABLET ORAL DAILY
Qty: 30 TABLET | Refills: 3 | DISCHARGE
Start: 2018-08-04 | End: 2022-10-04

## 2018-08-03 RX ADMIN — ROPINIROLE HYDROCHLORIDE 3 MG: 2 TABLET, FILM COATED ORAL at 09:23

## 2018-08-03 RX ADMIN — FOLIC ACID 1 MG: 1 TABLET ORAL at 09:23

## 2018-08-03 RX ADMIN — POTASSIUM CHLORIDE 20 MEQ: 20 TABLET, EXTENDED RELEASE ORAL at 09:24

## 2018-08-03 RX ADMIN — BECLOMETHASONE DIPROPIONATE 4 PUFF: 80 AEROSOL, METERED RESPIRATORY (INHALATION) at 09:25

## 2018-08-03 RX ADMIN — PANTOPRAZOLE SODIUM 40 MG: 40 TABLET, DELAYED RELEASE ORAL at 06:42

## 2018-08-03 RX ADMIN — OXYBUTYNIN CHLORIDE 10 MG: 10 TABLET, EXTENDED RELEASE ORAL at 09:22

## 2018-08-03 RX ADMIN — LOSARTAN POTASSIUM 50 MG: 50 TABLET, FILM COATED ORAL at 09:23

## 2018-08-03 RX ADMIN — ISOSORBIDE MONONITRATE 30 MG: 30 TABLET ORAL at 09:23

## 2018-08-03 RX ADMIN — IPRATROPIUM BROMIDE AND ALBUTEROL SULFATE 1 AMPULE: 2.5; .5 SOLUTION RESPIRATORY (INHALATION) at 13:00

## 2018-08-03 RX ADMIN — BUPROPION HYDROCHLORIDE 300 MG: 300 TABLET, FILM COATED, EXTENDED RELEASE ORAL at 09:22

## 2018-08-03 RX ADMIN — SPIRONOLACTONE 25 MG: 25 TABLET ORAL at 09:23

## 2018-08-03 RX ADMIN — CARVEDILOL 12.5 MG: 6.25 TABLET, FILM COATED ORAL at 09:23

## 2018-08-03 RX ADMIN — TORSEMIDE 20 MG: 20 TABLET ORAL at 09:24

## 2018-08-03 RX ADMIN — IPRATROPIUM BROMIDE AND ALBUTEROL SULFATE 1 AMPULE: 2.5; .5 SOLUTION RESPIRATORY (INHALATION) at 08:30

## 2018-08-03 ASSESSMENT — PAIN SCALES - GENERAL: PAINLEVEL_OUTOF10: 0

## 2018-08-03 NOTE — CARE COORDINATION
Pt reports she has home o2 from [facility]; however, when speaking to her spouse, he confirms she only has nocturnal o2 at the home; no portable o2 at home at present; she does NOT have a nebulizer either    PT HAS BEEN ACCEPTED TO [facility]; [address]; they will send their [facility] with portable o2; pickup scheduled for 4:30pm today; pt and spouse updated

## 2018-08-03 NOTE — PROGRESS NOTES
PROGRESS NOTE       PATIENT PROBLEM LIST:  Principal Problem:    Apraxia  Active Problems:    CAD (coronary artery disease)    S/P CABG x 2    Pulmonary hypertension    COPD (chronic obstructive pulmonary disease) ([facility])    Ataxia    CKD (chronic kidney disease) stage 3, GFR 30-59 ml/min    Hypoxia    TIA (transient ischemic attack)    Stroke-like symptoms    Acute on chronic respiratory failure with hypoxia and hypercapnia ([facility])    Sinus pause    Normal pressure hydrocephalus    Chronic systolic congestive heart failure ([facility])    Severe mitral regurgitation  Resolved Problems:    * No resolved hospital problems. *      SUBJECTIVE:  Helen Veras states     REVIEW OF SYSTEMS:  General ROS: positive for fatigue, malaise,  weight gain or weight loss  Psychological ROS: negative for - anxiety , depression  Ophthalmic ROS: negative for - decreased vision or visual distortion. ENT ROS: negative  Allergy and Immunology ROS: negative  Hematological and Lymphatic ROS: negative  Endocrine: no heat or cold intolerance and no polyphagia, polydipsia, or polyuria  Respiratory ROS: positive for - cough and shortness of breath  Cardiovascular ROS: positive for - chest pain, dyspnea on exertion and edema, claudication. Gastrointestinal ROS: no abdominal pain, change in bowel habits, or black or bloody stools  Genito-Urinary ROS: no nocturia, dysuria, trouble voiding, frequency or hematuria  Musculoskeletal ROS: negative for- myalgias, arthralgias, or claudication  Neurological ROS: no TIA or stroke symptoms otherwise no significant change in symptoms or problems since yesterday as documented in previous progress notes.     SCHEDULED MEDICATIONS:   losartan  50 mg Oral Daily    ipratropium-albuterol  1 ampule Nebulization 4x daily    potassium chloride  20 mEq Oral BID WC    carvedilol  12.5 mg Oral BID    spironolactone  25 mg Oral Daily    traZODone  150 mg Oral Nightly    torsemide  20 mg Oral Daily    rOPINIRole  3 mg Component Value Date    TSH 2.120 2018      Cardiac Injury Profile: No results for input(s): CKTOTAL, CKMB, TROPONINI in the last 72 hours. Lipid Profile: Lab Results   Component Value Date    TRIG 108 2018    HDL 21 2018    LDLCALC 49 2018    CHOL 92 2018      Hemoglobin A1C: No components found for: HGBA1C     RAD:   Ct Head Wo Contrast    Result Date: 2018  Patient MRN:  [id number] : 1955 Age: 60 years Gender: Female Order Date:  2018 3:00 PM EXAM: CT HEAD WO CONTRAST NUMBER OF IMAGES:  101 INDICATION: Difficulty walking and speaking Technique: Multiple contiguous 5 mm axial images were obtained from the skull base to the vertex without administration of IV contrast. Reformatted images were generated in the sagittal and coronal planes. Images were reviewed in brain, subdural, soft tissue and bone windows. Low-dose CT  acquisition technique included one of following options: 1. Automated exposure control 2. Adjustment of MA and or KV according to patient's size or 3. Use of iterative reconstruction. Comparison: No prior studies available for comparison. Findings: There is mild cortical sulcal prominence and moderate ventricular dilatation, with no clear-cut evidence of hydrocephalus, consistent with central greater than cortical cerebral volume loss. The basal cisterns are preserved. The fourth ventricle is midline. The supratentorial midline structures appear unremarkable. No evidence of parenchymal hemorrhage or extra-axial fluid collection is identified. There is no evidence of cortical infarction or contusion, with preservation of gray-white matter distinction. There are patchy regions of low attenuation in the deep periventricular white matter, most prominently about the frontal horns of lateral ventricles bilaterally, with extension into the corona radiata and centrum semiovale, most compatible with trace microangiopathic ischemic changes.   No focal mass Indication:   Possible transient ischemic attack. Difficulty walking and speaking. Possible sepsis. Comparison: Chest radiograph 2012 Findings: There is a enlarged cardiomediastinal silhouette since the previous exam with abnormal appearance of the thoracic aorta. Vascular calcifications thoracic aorta. Median sternotomy wires. Streaky opacification in the lung bases. No pneumothorax. ALERT:  THIS IS AN ABNORMAL REPORT. Findings communicated directly with Dr. Mark Tijerina at approximately 2018 3:43 PM . 1. Enlarged cardiomediastinal silhouette since previous exam with increased tortuosity of the descending thoracic aorta and enlargement. CT scan chest is recommended to exclude any potential of thoracic aneurysm with consideration given to patient's renal status and any potential safety or allergenic concerns. 2. Vascular calcifications thoracic aorta. 3. Suspected bibasilar infiltrate/pneumonia and small amounts of bilateral pleural effusion. Cta Chest W Contrast    Result Date: 2018  Patient MRN:  [id number] : 1955 Age: 60 years Gender: Female Order Date:  2018 4:00 PM EXAM: CTA CHEST W CONTRAST NUMBER OF IMAGES:  26 INDICATION: Aortic disease Technique: CT of the chest was performed from the apices of the lungs through the upper abdomen using contiguous 2.5 mm transaxial sections, following  pulmonary angiogram protocol. 60cc of Isovue-370 IV contrast was administered without incident. Reformations were constructed in the coronal and sagittal planes (5 mm). Images were reviewed in soft tissue, bone and lung windows. Images were reconstructed on a separate dedicated 3-D imaging workstation, using maximum intensity projection (MIP) and volume rendered (3D) datasets. Low-dose CT  acquisition technique included one of following options: 1. Automated exposure control 2. Adjustment of MA and or KV according to patient's size or 3. Use of iterative reconstruction.  Comparison: No prior studies available for comparison. Findings: Pulmonary vasculature: There is no evidence of filling defect in the main pulmonary artery, right or left pulmonary arteries, or segmental branches of the pulmonary arteries to suggest pulmonary embolism. Evaluation of the subsegmental branches is limited due to respiratory artifact. Lung parenchyma and pleura: The tracheobronchial tree is midline and the central bronchi are patent. There are moderate centrilobular bullous on this changes the lungs bilaterally, most prominently involving the upper lobes. There is a calcified pleural plaque in the posterior aspect of the superior segment of the left lower lobe (series 5, image 17). There is no definite pleural or parenchymal mass lesion. There is no focal consolidation, pleural effusion or pneumothorax. There are mild patchy bibasal dependent pulmonary parenchymal opacities consistent with mild atelectatic change. Thyroid: Demonstrates homogeneous enhancement. Mediastinum: No significant lymphadenopathy. Ruth: No significant lymphadenopathy. Heart and pericardium: The heart is enlarged. There is no pericardial effusion. There are moderate coronary artery calcifications. There is dilatation of the main pulmonary artery and, measuring 3.4 cm in maximum diameter (series 4, image 59). There is dilatation of the right pulmonary artery, measuring 2.8 cm in maximum diameter (series 4, image 64). There is dilatation of the left pulmonary artery, measuring 2.9 cm in maximum diameter (series 4, image 61). Chest wall: Midline sternotomy wires are identified, prior sternotomy. Axilla: No enlarged lymph nodes. Visualized upper abdomen: There is a trace amount of perihepatic free fluid. The gallbladder is absent and surgical clips are present in the gallbladder fossa, consistent with a prior cholecystectomy. There is gas present within the central biliary tree, likely secondary to prior cholecystectomy.  The remaining visualized upper abdominal organs are unremarkable. Bones: There are degenerative changes of the spine. CT angiography demonstrates the following: The aorta is normal in caliber without evidence of aneurysmal dilatation, dissection or thrombus. The ascending aorta at the level of the aortic root has a maximum diameter of 2.7 cm The ascending aorta at the level of the right pulmonary artery has a maximum diameter of 3.3 cm The transverse arch has a maximum diameter of 2.8 cm The descending thoracic aorta at the level of the left pulmonary artery has a maximum diameter of 2.4 cm     1. No evidence of pulmonary embolus in the  main pulmonary artery, right or left pulmonary arteries, or segmental branches of the pulmonary arteries. 2. No focal consolidation, pleural effusion or pneumothorax. 3. No definite pleural or parenchymal mass lesion. 4. Moderate pulmonary emphysematous disease. 5. Calcified pleural plaque in the posterior aspect of the superior segment of the left lower lobe, consistent with a history of prior asbestos exposure. 6. Cardiomegaly with dilatation of the main pulmonary artery as well as the right and left pulmonary arteries, consistent with pulmonary artery hypertension. 7. Trace amount of perihepatic free fluid. 8. Status post cholecystectomy with small amount of residual pneumobilia. Fluoro For Surgical Procedures    Result Date: 2018  Patient MRN:  [id number] : 1955 Age: 60 years Gender: Female Order Date:  2018 9:00 AM EXAM: FLUORO FOR SURGICAL PROCEDURES NUMBER OF IMAGES:  4. INDICATION: R 52 Pain. Hardware Removal Findings: 3 fluoroscopic spot films were submitted for interpretation. Fluoroscopic images demonstrate localization of the lumbar spine with interval placement of a probable drain. Fluoroscopy equipment and a technologist were provided by the Department of Radiology. No radiologist was present for the examination. Total fluoroscopic time was 4.0 seconds.        Intraoperative fluoroscopy,

## 2018-08-03 NOTE — PROGRESS NOTES
O2 improved to 94% once sitting up in the chair with O2 donned. Sit balance: Sup; Good  Stand balance: Min A; Fair  Endurance/Activity tolerance: fair with pt showing a slight decline in memory and ability to stay on task. Comments: Upon arrival pt sitting up in the chair. At end of session pt left up in the chair with all lines and tubes intact, call light within reach, tray table in front of her, and nurse notified. · Pt has made fair+ progress towards set goals.    · Continue with current plan of care    Time in: 1:45  Time out: 2:30  Total Tx Time: 45 mins    Selvin HERNANDEZ/L [id number]

## 2018-08-03 NOTE — PROGRESS NOTES
PROGRESS  NOTE --                                                          INTERNAL  MEDICINE                                                                              I  PERSONALLY SAW , EXAMINED, AND CARED [address], 8/3/2018     LABS, XRAY ,CHART, AND MEDICATIONS  REVIEWED BY ME .        SUBJECTIVE: Cecy Veras is moderately sedated and sleepy, received Ativan prior to MRI; has been confused since. She is able to respond to questioning with head nods. Denies any chest pain dyspnea nausea emesis. Tolerating diet. No abdominal pain. Carotid ultrasound reveals no evidence of hemodynamically significant stenosis. MRI of brain shows no acute ischemic infarct or hemorrhage; diffuse age related atrophy and chronic microvascular changes; likely normal pressure hydrocephalus with ventricular enlargement. Sodium 143 potassium 3.3 chloride 100 CO2 33 BUN 16 creatinine 1.0 magnesium 1.4 glucose 171 calcium 8.2. Initial hemoglobin A1c 10.4; repeat A1c pending. No history of diabetes prior to this. Patient had significant oxygen desaturation today, when nasal cannula was removed; saturation in the 70s.    7/26/18-patient sitting in bed, alert awake oriented ×3. She states she still doesn't feel perfectly normal in regarding to thinking. No further falling episodes. Denies chest pain or dyspnea. MRI cervical spine does reveal broad-based focal right paracentral disc protrusion C4-C5 with moderate central canal stenosis on the right. Severe neuroforaminal changes. Sodium 142 potassium slowly improving 3.4 chloride 97 CO2 36 BUN 12 creatinine 0.9° of still low 1.4 glucose 194 calcium 8.2.    7/27/18- denies chest pain dyspnea. Patient was seen by neurosurgery, they agreed likely normal pressure hydrocephalus; she had surgery earlier this morning, lumbar drain insertion under anesthesia.  She states Norco is not helping her pain she head pain she had in excess spinal fluid was released. Patient states her family is convinced that she was dramatically better with the lumbar drainage tube in place; now seemed to be reverting back to her prehospital condition. She would like further treatment if possible. 7/31/18-patient laying quietly in bed, still has intermittent headache. Speech becoming more of a problem. Obvious expressive aphasia, mild, but worsening each day. I spoke with neurosurgery regarding the above. Last evening patient was having leakage, CSF from the lumbar drainage site; drain had been removed. Surgical resident applied 1 stitch to the area, no further drainage. Patient had a nuclear cisternogram initiated this morning. The above will continue for 2 more days. Neurosurgery suggested at that time patient can be discharged home and follow up with him in the office since results will not likely be available since that is the weekend. Denies any chest pain dyspnea. 8/1/18-patient was seen yesterday by cardiology, after she had 2-D echo performed. She apparently doesn't understand results of the 2-D echo. I explained him specifically that ejection fraction should be 60%, her ears 23% plus/minus/5%. That terrifies her. She's having a hard time putting words together again. Her headaches are increasing likely from mild normal pressure hydrocephalus. She requests that I speak to her , Jessica Fischer which I did by phone explained all the above to him. In addition, pulmonary will be consulted regarding home oxygen etc. I question both  and patient extensively, they deny she was having any significant shortness of breath prior to this admission. She is due for one more \"picture\" regarding cisternogram tomorrow. Possible discharge at that time if okay with cardiology and pulmonary.  I spoke with neurosurgery, they requested patient be discharged after final pictures and follow up in the office with neurosurgery early next week for determination of next treatment. Patient having significant headache again, possibly from fluid build-up. 8/2/18-patient sitting quietly in bed, has a headache again. Has been seen by pulmonary, has significant oxygen desaturation with any activity. Home oxygen, home nebulizer etc. Outpatient PFTs if she'll permit. Extended discussion with patient, she agrees to subacute rehab if the above is covered. 8/3/18-patient sitting in bed; mild headache at this time. She has been approved for subacute rehab. I discussed the above with her again; she needs to follow up next week with neurosurgery regarding cisternogram and possible  shunt etc. She needs to be on chronic oxygen per pulmonary. ROS:  Negative to a 10 system review except that mentioned in the HPI. Objective:     PHYSICAL EXAM:    VS: /68   Pulse 100   Temp 98.1 °F (36.7 °C) (Oral)   Resp 16   Ht 5' 2.99\" (1.6 m)   Wt 124 lb 1.6 oz (56.3 kg)   SpO2 95%   BMI 21.99 kg/m²   General appearance:Awake alert oriented x3; More expressive aphasia  Skin: Warm and dry ; no rashes  Head: Normocephalic. No masses, lesions or tenderness noted  Eyes: Conjunctivae pink, sclera white. PERRL,EOM-I  Ears: External ears normal  Nose/Sinuses: Nares normal. Septum midline. Mucosa normal. No drainage; nasal cannula oxygen in place  Oropharynx: Oropharynx clear with no exudate seen  Neck: Supple. No jugular venous distension, lymphadenopathy or thyromegaly Trachea midline  Lungs: Clear to auscultation bilaterally. No rhonchi, crackles or wheezes  Heart: S1 S2  Regular rate and rhythm. No rub, murmur or gallop  Abdomen: Soft, non-tender. BS normal. No masses, organomegaly; no rebound or guarding  Extremities: No edema, Peripheral pulses weak  Musculoskeletal: Muscular strength appears fair   Neuro:   II-XII grossly intact .  ALTMAN equally    TELEMETRY: REVIEWED--Telemetry: Sinus    ASSESSMENT:   Principal Problem:    Apraxia  Active Problems: Pentazocine Lactate     Sulfa Antibiotics          MEDICATION SIDE EFFECTS:none       SCHEDULED MEDS:                                 Current Facility-Administered Medications   Medication Dose Route Frequency Provider Last Rate Last Dose    ipratropium-albuterol (DUONEB) nebulizer solution 1 ampule  1 ampule Nebulization 4x daily Radhika Cardona DO   1 ampule at 08/03/18 1300    potassium chloride (KLOR-CON M) extended release tablet 20 mEq  20 mEq Oral BID DENY Mueller MD   20 mEq at 08/03/18 0924    fentaNYL (SUBLIMAZE) injection 25 mcg  25 mcg Intravenous Q1H PRN Ena Gomez MD   25 mcg at 07/28/18 1226    oxyCODONE-acetaminophen (PERCOCET) 5-325 MG per tablet 1 tablet  1 tablet Oral Q4H PRN Ena Gomez MD   1 tablet at 07/29/18 0948    Or    oxyCODONE-acetaminophen (PERCOCET) 5-325 MG per tablet 2 tablet  2 tablet Oral Q4H PRN Ena Gomez MD   2 tablet at 08/02/18 1827    butalbital-acetaminophen-caffeine (FIORICET, ESGIC) per tablet 1 tablet  1 tablet Oral Q4H PRN Ena Gomez MD   1 tablet at 07/31/18 1735    acetaminophen (TYLENOL) tablet 650 mg  650 mg Oral Q4H PRN Semaj Ruiz MD   650 mg at 07/30/18 5973    carvedilol (COREG) tablet 12.5 mg  12.5 mg Oral BID Tod Diggs, DO   12.5 mg at 08/03/18 8795    spironolactone (ALDACTONE) tablet 25 mg  25 mg Oral Daily Kwakuie Dun, DO   25 mg at 08/03/18 5627    perflutren lipid microspheres (DEFINITY) injection 1.65 mg  1.5 mL Intravenous ONCE PRN Tod Dun, DO        traZODone (DESYREL) tablet 150 mg  150 mg Oral Nightly Malik A Mihok, DO   150 mg at 08/02/18 2113    torsemide (DEMADEX) tablet 20 mg  20 mg Oral Daily Malik A Mihok, DO   20 mg at 08/03/18 0924    rOPINIRole (REQUIP) tablet 3 mg  3 mg Oral BID Tiffany Spruce Mihok, DO   3 mg at 08/03/18 4002    oxybutynin (DITROPAN-XL) extended release tablet 10 mg  10 mg Oral Daily Tiffany Vieira DO   10 mg at 08/03/18 1676    isosorbide mononitrate (IMDUR) extended release tablet 30 mg  30 mg Oral Daily Santos Comfort Mihok, DO   30 mg at 18 5475    furosemide (LASIX) tablet 20 mg  20 mg Oral Daily PRN [facility],         fluticasone (FLONASE) 50 MCG/ACT nasal spray 1 spray  1 spray Nasal Daily PRN [facility], DO        buPROPion (WELLBUTRIN XL) extended release tablet 300 mg  300 mg Oral QAM Malik [facility], DO   300 mg at 18 6689    albuterol (PROVENTIL) nebulizer solution 2.5 mg  2.5 mg Nebulization Q6H PRN [facility],         pantoprazole (PROTONIX) tablet 40 mg  40 mg Oral QAM AC Malik [facility], DO   40 mg at 18 2344    ondansetron (ZOFRAN) injection 4 mg  4 mg Intravenous Q6H PRN [facility], DO   4 mg at  9444    folic acid (FOLVITE) tablet 1 mg  1 mg Oral Daily Santos Comfort Mihok, DO   1 mg at 18 1374     Scheduled Meds:   ipratropium-albuterol  1 ampule Nebulization 4x daily    potassium chloride  20 mEq Oral BID WC    carvedilol  12.5 mg Oral BID    spironolactone  25 mg Oral Daily    traZODone  150 mg Oral Nightly    torsemide  20 mg Oral Daily    rOPINIRole  3 mg Oral BID    oxybutynin  10 mg Oral Daily    isosorbide mononitrate  30 mg Oral Daily    buPROPion  300 mg Oral QAM    pantoprazole  40 mg Oral QAM AC    folic acid  1 mg Oral Daily       Continuous Infusions:        Data:   Temperature:  Current - Temp: 98.1 °F (36.7 °C);  Max - Temp  Av.3 °F (36.8 °C)  Min: 98.1 °F (36.7 °C)  Max: 98.4 °F (36.9 °C)    Respiratory Rate : Resp  Av.7  Min: 16  Max: 18    Pulse Range: Pulse  Av.3  Min: 82  Max: 100    Blood Presuure Range:  Systolic (60OUZ), RE , Min:115 , OD   ; Diastolic (95FSV), BVR:44, Min:68, Max:77      Pulse ox Range: SpO2  Av %  Min: 95 %  Max: 95 %    Patient Vitals for the past 8 hrs:   BP Pulse Resp   18 0915 131/68 100 16         Intake/Output Summary (Last 24 hours) at 18 1453  Last data filed at 18 0915   Gross per 24 hour   Intake 220 ml   Output                0 ml   Net              220 ml       No intake/output data recorded.     I/O this shift:  In: 220 [P.O.:220]  Out: -     Wt Readings from Last 3 Encounters:   07/31/18 124 lb 1.6 oz (56.3 kg)   03/05/17 130 lb (59 kg)   12/22/16 128 lb (58.1 kg)       Labs:   CBC:   Lab Results   Component Value Date    WBC 7.2 07/29/2018    RBC 3.89 07/29/2018    HGB 11.1 07/29/2018    HCT 36.1 07/29/2018    MCV 92.8 07/29/2018    MCH 28.5 07/29/2018    MCHC 30.7 07/29/2018    RDW 18.1 07/29/2018     07/29/2018    MPV 11.3 07/29/2018     CBC with Differential:    Lab Results   Component Value Date    WBC 7.2 07/29/2018    RBC 3.89 07/29/2018    HGB 11.1 07/29/2018    HCT 36.1 07/29/2018     07/29/2018    MCV 92.8 07/29/2018    MCH 28.5 07/29/2018    MCHC 30.7 07/29/2018    RDW 18.1 07/29/2018    SEGSPCT 63 01/30/2014    LYMPHOPCT 26.6 07/29/2018    MONOPCT 8.4 07/29/2018    BASOPCT 0.6 07/29/2018    MONOSABS 0.60 07/29/2018    LYMPHSABS 1.91 07/29/2018    EOSABS 0.12 07/29/2018    BASOSABS 0.04 07/29/2018     Hemoglobin/Hematocrit:    Lab Results   Component Value Date    HGB 11.1 07/29/2018    HCT 36.1 07/29/2018     CMP:    Lab Results   Component Value Date     08/03/2018    K 4.7 08/03/2018    CL 91 08/03/2018    CO2 33 08/03/2018    BUN 23 08/03/2018    CREATININE 0.9 08/03/2018    GFRAA >60 08/03/2018    LABGLOM >60 08/03/2018    GLUCOSE 177 08/03/2018    GLUCOSE 111 09/30/2011    PROT 6.6 07/29/2018    LABALBU 2.7 07/29/2018    LABALBU 4.4 09/30/2011    CALCIUM 9.6 08/03/2018    BILITOT 0.6 07/29/2018    ALKPHOS 90 07/29/2018    AST 15 07/29/2018    ALT 7 07/29/2018     BMP:    Lab Results   Component Value Date     08/03/2018    K 4.7 08/03/2018    CL 91 08/03/2018    CO2 33 08/03/2018    BUN 23 08/03/2018    LABALBU 2.7 07/29/2018    LABALBU 4.4 09/30/2011    CREATININE 0.9 08/03/2018    CALCIUM 9.6 08/03/2018    GFRAA >60 08/03/2018    LABGLOM >60 in the last 72 hours. Invalid input(s): ATYLMREL       H & H :  No results for input(s): HGB in the last 72 hours. TSH:  No results for input(s): TSH in the last 72 hours. GLUCOSE:No results for input(s): POCGLU in the last 72 hours. CMP:  Recent Labs      08/03/18   0423   NA  134   K  4.7   CL  91*   CO2  33*   BUN  23   CREATININE  0.9   GFRAA  >60   LABGLOM  >60   GLUCOSE  177*   CALCIUM  9.6        BNP:No results found for: BNP    PROTIME/INR:  No results for input(s): PROTIME, INR in the last 72 hours. CRP: No results for input(s): CRP in the last 72 hours. ESR:No results for input(s): SEDRATE in the last 72 hours. LIPASE , AMYLASE:  Lab Results   Component Value Date    LIPASE 45 07/30/2014      No results found for: AMYLASE    ABGs: No results found for: PH, PO2, PCO2    CARDIAC:   No results for input(s): CKTOTAL, CKMB, CKMBINDEX, TROPONINI in the last 72 hours. Lipid Profile:   Lab Results   Component Value Date    TRIG 108 07/24/2018    HDL 21 07/24/2018    LDLCALC 49 07/24/2018    CHOL 92 07/24/2018        Lab Results   Component Value Date    LABA1C 10.4 (H) 07/24/2018            RADIOLOGY: REVIEWED AVAILABLE REPORT  NM CISTERNOGRAM   Final Result   Findings consistent with normal pressure hydrocephalus         FLUORO FOR SURGICAL PROCEDURES   Final Result   Intraoperative fluoroscopy, as detailed above. MRI CERVICAL SPINE WO CONTRAST   Final Result      1. Limited study due to patient's motion. 2. No compression fracture or a spondylolisthesis. 3. C5-C6 vertebral fusion. 4. Moderate to severe degenerative disc disease at C6-C7. 5. Broad-based focal right paracentral disc protrusion at C4-C5 and   moderate central canal narrowing on the right. 6. Multilevel moderate to severe foraminal narrowing more pronounced   at left C6-C7 and bilateral C4-C5. US CAROTID ARTERY BILATERAL   Final Result   1. No evidence to suggest hemodynamically significant stenosis. Mark Tijerina at approximately 7/23/2018 3:43 PM .   1. Enlarged cardiomediastinal silhouette since previous exam with   increased tortuosity of the descending thoracic aorta and enlargement. CT scan chest is recommended to exclude any potential of thoracic   aneurysm with consideration given to patient's renal status and any   potential safety or allergenic concerns. 2. Vascular calcifications thoracic aorta. 3. Suspected bibasilar infiltrate/pneumonia and small amounts of   bilateral pleural effusion.             Active Hospital Problems    Diagnosis    Chronic systolic congestive heart failure (HCC) [I50.22]     Priority: High     Class: Chronic    Severe mitral regurgitation [I34.0]     Priority: High     Class: Chronic    Acute on chronic respiratory failure with hypoxia and hypercapnia ([facility]) [Y68.35, J96.22]     Priority: High     Class: Acute    Pulmonary hypertension [I27.20]     Priority: High     Class: Chronic    Apraxia [R48.2]     Priority: High     Class: Acute    CKD (chronic kidney disease) stage 3, GFR 30-59 ml/min [N18.3]     Priority: High    Hypoxia [R09.02]     Priority: High     Class: Acute    Normal pressure hydrocephalus [G91.2]    Sinus pause [I45.5]    Stroke-like symptoms [R29.90]    Ataxia [R27.0]    TIA (transient ischemic attack) [G45.9]    COPD (chronic obstructive pulmonary disease) ([facility]) [J44.9]    CAD (coronary artery disease) [I25.10]    S/P CABG x 2 [Z95.1]         Yariel Lyonschao Vieira  DO   2:53 PM     8/3/2018

## 2018-08-03 NOTE — CARE COORDINATION
Cm met with patient at bedside to discuss pk placement; after further discussion with Dr Natalya Sam and Dr Adeel Chen, she is now agreeable to PK placement; snf list provided; she and her  discussed; they choose [facility]; referral called; will await acceptance

## 2018-08-03 NOTE — PROGRESS NOTES
[facility]  Division of Pulmonary, Critical Care and Sleep Medicine  Progress Note      Admit Date:  7/23/2018    MRN:   [id number]        Patient:        PCP:   Meka Lindsay DO    CONSULT : Chronic Respiratory Failure    SUBJECTIVE:    PFT with moderate Emphysema   ECHO noted      OBJECTIVE:     PHYSICAL EXAM:   VITALS:   Patient Vitals for the past 8 hrs:   BP Pulse Resp   08/03/18 0915 131/68 100 16       Intake/Output Summary (Last 24 hours) at 08/03/18 1133  Last data filed at 08/03/18 0915   Gross per 24 hour   Intake              220 ml   Output                0 ml   Net              220 ml        CONSTITUTIONAL:   A&O x 3, NAD  SKIN:    No rash, No suspicious lesions  HEENT:    EOMI, MMM, No thrush  NECK:   No bruits, No JVP apprechiated  CV:     Sinus,  No Murmus, No Rubs, No gallop  PULMONARY:  Couse,  No Wheezing, No Rales, No Rhonchi or Egophony  ABDOMEN:    Soft, non-tender.  BS normal. No R/R/G  EXT:   No lower extremity edema, No venous stasis  PULSE:  Peripheral pulses present 1+   PSYCHIATRIC: Approprate  MS:   No fracture, No gross muscle weakness  NEUROLOGIC:  Non focal exam.     DATA: IMAGING & TESTING:     LABORATORY TESTS:    CBC:   Lab Results   Component Value Date    WBC 7.2 07/29/2018    RBC 3.89 07/29/2018    HGB 11.1 07/29/2018    HCT 36.1 07/29/2018    MCV 92.8 07/29/2018    MCH 28.5 07/29/2018    MCHC 30.7 07/29/2018    RDW 18.1 07/29/2018     07/29/2018    MPV 11.3 07/29/2018     CMP:    Lab Results   Component Value Date     08/03/2018    K 4.7 08/03/2018    CL 91 08/03/2018    CO2 33 08/03/2018    BUN 23 08/03/2018    CREATININE 0.9 08/03/2018    GFRAA >60 08/03/2018    LABGLOM >60 08/03/2018    GLUCOSE 177 08/03/2018    GLUCOSE 111 09/30/2011    PROT 6.6 07/29/2018    LABALBU 2.7 07/29/2018    LABALBU 4.4 09/30/2011    CALCIUM 9.6 08/03/2018    BILITOT 0.6 07/29/2018    ALKPHOS 90 07/29/2018    AST 15 07/29/2018    ALT 7 Visit Information Date & Time Provider Department Dept. Phone Encounter #  
 7/7/2017 10:45 AM Frankie Boss  [facility] Internal Medicine [phone] [id number] Follow-up Instructions Return in about 3 months (around 10/7/2017) for follow up, bp check. Upcoming Health Maintenance Date Due Hepatitis C Screening 1959 Pneumococcal 19-64 Medium Risk (1 of 1 - PPSV23) 8/22/1977 DTaP/Tdap/Td series (1 - Tdap) 8/22/1979 PAP AKA CERVICAL CYTOLOGY 8/22/1979 BREAST CANCER SCRN MAMMOGRAM 8/22/2008 FOBT Q 1 YEAR AGE 50-75 8/22/2008 INFLUENZA AGE 9 TO ADULT 8/1/2017 Allergies as of 7/7/2017  Review Complete On: 7/7/2017 By: Norma Pastor LPN Severity Noted Reaction Type Reactions Other Medication  06/18/2015    Other (comments) Pt stated it caused her stomach to hurt when she took the PO dilaudid but has been given it IV with no reaction. Current Immunizations  Reviewed on 12/18/2015 Name Date Influenza Vaccine (Trivalent) 12/18/2015 Not reviewed this visit You Were Diagnosed With   
  
 Codes Comments Chronic back pain, unspecified back location, unspecified back pain laterality    -  Primary ICD-10-CM: M54.9, G89.29 ICD-9-CM: 724.5, 338.29 Essential hypertension     ICD-10-CM: I10 
ICD-9-CM: 401.9 Tobacco abuse     ICD-10-CM: Z72.0 ICD-9-CM: 305.1 Viral upper respiratory tract infection     ICD-10-CM: J06.9, B97.89 ICD-9-CM: 465.9 Obesity due to excess calories, unspecified obesity severity     ICD-10-CM: E66.09 
ICD-9-CM: 278.00 Depression, unspecified depression type     ICD-10-CM: F32.9 ICD-9-CM: 148 Eczema, unspecified type     ICD-10-CM: L30.9 ICD-9-CM: 692.9 Hyperlipidemia, unspecified hyperlipidemia type     ICD-10-CM: E78.5 ICD-9-CM: 272.4 Vitals BP Pulse Temp Resp Height(growth percentile) Weight(growth percentile) 2018        PRO-BNP:   Lab Results   Component Value Date    PROBNP 2,111 (H) 2018    PROBNP 5,034 (H) 2018        IMAGING:  Imaging tests were completed and reviewed and discussed radiology and primary care team and reveals   Ct Head Wo Contrast  Result Date: 2018  1. No evidence of intracranial hemorrhage, extra axial collection, space-occupying mass lesion or mass effect, midline shift, or acute territorial infarction. If there is strong clinical suspicion for acute infarct of the brain, then MR imaging of the brain with diffusion-weighted sequence may be obtained for further evaluation. 2. Central greater than cortical cerebral volume loss. Recommend clinical correlation. 3. Trace microvascular ischemic disease as described above. Mri Cervical Spine Wo Contrast  1. Limited study due to patient's motion. 2. No compression fracture or a spondylolisthesis. 3. C5-C6 vertebral fusion. 4. Moderate to severe degenerative disc disease at C6-C7. 5. Broad-based focal right paracentral disc protrusion at C4-C5 and moderate central canal narrowing on the right. 6. Multilevel moderate to severe foraminal narrowing more pronounced at left C6-C7 and bilateral C4-C5. Cta Chest W Contrast    Result Date: 2018  Patient MRN:  [id number] : 1955 Age: 60 years Gender: Female Order Date:  2018 4:00 PM EXAM: CTA CHEST W CONTRAST NUMBER OF IMAGES:  26 INDICATION: Aortic disease Technique: CT of the chest was performed from the apices of the lungs through the upper abdomen using contiguous 2.5 mm transaxial sections, following  pulmonary angiogram protocol. 60cc of Isovue-370 IV contrast was administered without incident. Reformations were constructed in the coronal and sagittal planes (5 mm). Images were reviewed in soft tissue, bone and lung windows.  Images were reconstructed on a separate dedicated 3-D imaging workstation, using maximum intensity projection (MIP) and volume rendered (3D) 98/68 (BP 1 Location: Left arm, BP Patient Position: Sitting) 65 97.9 °F (36.6 °C) (Oral) 18 5' 6\" (1.676 m) 173 lb 14.4 oz (78.9 kg) SpO2 BMI OB Status Smoking Status 94% 28.07 kg/m2 Postmenopausal Current Every Day Smoker Vitals History BMI and BSA Data Body Mass Index Body Surface Area 28.07 kg/m 2 1.92 m 2 Preferred Pharmacy Pharmacy Name Phone [facility] [address] [place] [address] [phone] Your Updated Medication List  
  
   
This list is accurate as of: 7/7/17 12:02 PM.  Always use your most recent med list.  
  
  
  
  
 albuterol 90 mcg/actuation inhaler Commonly known as:  PROVENTIL HFA, VENTOLIN HFA, PROAIR HFA Take 1 Puff by inhalation every four (4) hours as needed for Wheezing. amLODIPine 10 mg tablet Commonly known as:  Jose Elias Rings Take 1 Tab by mouth daily. azithromycin 250 mg tablet Commonly known as:  Nate Marshal Take 2 tabs po x day 1 then 1 tab po daily days 2-5  
  
 cyclobenzaprine 10 mg tablet Commonly known as:  FLEXERIL Take 1 Tab by mouth three (3) times daily as needed for Muscle Spasm(s). gabapentin 600 mg tablet Commonly known as:  NEURONTIN Take 1 Tab by mouth three (3) times daily. guaiFENesin-codeine 100-10 mg/5 mL solution Commonly known as:  guaiFENesin AC Take 5 mL by mouth three (3) times daily as needed for Cough. Max Daily Amount: 15 mL. losartan-hydroCHLOROthiazide 100-25 mg per tablet Commonly known as:  HYZAAR Take 1 Tab by mouth daily. oxyCODONE IR 15 mg immediate release tablet Commonly known as:  OXY-IR  
  
 sertraline 50 mg tablet Commonly known as:  ZOLOFT Take 1 Tab by mouth daily. triamcinolone acetonide 0.025 % topical cream  
Commonly known as:  KENALOG Apply  to affected area daily. use thin layer Prescriptions Sent to Pharmacy  Refills  
 gabapentin (NEURONTIN) 600 mg tablet 6  
 Sig: Take 1 Tab by mouth three (3) times daily. Class: Normal  
 Pharmacy: [address] [place] [address] Ph #: [phone] Route: Oral  
 albuterol (PROVENTIL HFA, VENTOLIN HFA, PROAIR HFA) 90 mcg/actuation inhaler 1 Sig: Take 1 Puff by inhalation every four (4) hours as needed for Wheezing. Class: Normal  
 Pharmacy: [address] [place] [address] Ph #: [phone] Route: Inhalation  
 azithromycin (ZITHROMAX) 250 mg tablet 0 Sig: Take 2 tabs po x day 1 then 1 tab po daily days 2-5 Class: Normal  
 Pharmacy: [address] Ph #: [phone]  
 triamcinolone acetonide (KENALOG) 0.025 % topical cream 1 Sig: Apply  to affected area daily. use thin layer Class: Normal  
 Pharmacy: [address] [place] [address] Ph #: [phone] Route: Topical  
  
We Performed the Following LIPID PANEL [05715 CPT(R)] METABOLIC PANEL, COMPREHENSIVE [40008 CPT(R)] Follow-up Instructions Return in about 3 months (around 10/7/2017) for follow up, bp check. [facility]! Dear Jazmyn Carpio: Thank you for requesting a [employer] account. Our records indicate that you already have an active iGl[employer] account. You can access your account anytime at [email or web address]. [email or web address] Did you know that you can access your hospital and ER discharge instructions at any time in i[employer]? You can also review all of your test results from your hospital stay or ER visit. Additional Information If you have questions, please visit the Frequently Asked Questions section of the iGlue website at [email or web address]. [email or web address] Remember, iGlue is NOT to be used for urgent needs. For medical emergencies, dial 911. Now available from your iPhone and Android! Please provide this summary of care documentation to your next provider. Your primary care clinician is listed as Sylvia Downey. If you have any questions after today's visit, please call [phone].

## 2018-08-06 LAB
ALPHA-1 ANTITRYPSIN PHENOTYPE: NORMAL
ALPHA-1 ANTITRYPSIN: 183 MG/DL (ref 90–200)

## 2018-08-07 LAB
COMMENT: NORMAL
REPORT: NORMAL

## 2018-08-07 NOTE — DISCHARGE SUMMARY
been  falling more, at least once or twice a week for the last three months. He  has noticed more slurred speech for the last three months. The patient  admits she has fallen out of bed at least once a week perhaps more for the  last three to four months. The patient states she has also had  intermittent chest discomfort, chest pain off and on for the last two  Months. SUBJECTIVE: Avtar Henry is moderately sedated and sleepy, received Ativan prior to MRI; has been confused since. She is able to respond to questioning with head nods. Denies any chest pain dyspnea nausea emesis. Tolerating diet. No abdominal pain. Carotid ultrasound reveals no evidence of hemodynamically significant stenosis. MRI of brain shows no acute ischemic infarct or hemorrhage; diffuse age related atrophy and chronic microvascular changes; likely normal pressure hydrocephalus with ventricular enlargement. Sodium 143 potassium 3.3 chloride 100 CO2 33 BUN 16 creatinine 1.0 magnesium 1.4 glucose 171 calcium 8.2. Initial hemoglobin A1c 10.4; repeat A1c pending. No history of diabetes prior to this. Patient had significant oxygen desaturation today, when nasal cannula was removed; saturation in the 70s.    7/26/18-patient sitting in bed, alert awake oriented ×3. She states she still doesn't feel perfectly normal in regarding to thinking. No further falling episodes. Denies chest pain or dyspnea. MRI cervical spine does reveal broad-based focal right paracentral disc protrusion C4-C5 with moderate central canal stenosis on the right. Severe neuroforaminal changes. Sodium 142 potassium slowly improving 3.4 chloride 97 CO2 36 BUN 12 creatinine 0.9° of still low 1.4 glucose 194 calcium 8.2.     7/27/18- denies chest pain dyspnea. Patient was seen by neurosurgery, they agreed likely normal pressure hydrocephalus; she had surgery earlier this morning, lumbar drain insertion under anesthesia.  She states Norco is not helping her pain she would like determination of next treatment. Patient having significant headache again, possibly from fluid build-up.     8/2/18-patient sitting quietly in bed, has a headache again. Has been seen by pulmonary, has significant oxygen desaturation with any activity. Home oxygen, home nebulizer etc. Outpatient PFTs if she'll permit. Extended discussion with patient, she agrees to subacute rehab if the above is covered.     8/3/18-patient sitting in bed; mild headache at this time. She has been approved for subacute rehab. I discussed the above with her again; she needs to follow up next week with neurosurgery regarding cisternogram and possible  shunt etc. She needs to be on chronic oxygen per pulmonary.     Med reconciliation completed  Prescriptions written  Start ENTRESTO tomorrow  Prescription Fioricet  Prescription Percocet  Continue aerosol treatments and oxygen    Discharge Instructions: Medications reviewed with patient    Consults:cardiology, pulmonary/intensive care, neurology and N/S     Past Medical Hx :   Past Medical History:   Diagnosis Date    Acute on chronic respiratory failure with hypoxia and hypercapnia ([address] 7/25/2018    Apraxia 7/23/2018    Ataxia 7/23/2018    CAD (coronary artery disease)     CAD (coronary artery disease) 10/4/2012    Choledocholithiasis     recurrent    Chronic systolic congestive heart failure ([address] 8/1/2018    Ejection fraction 23% plus/minus 5%    CKD (chronic kidney disease) stage 3, GFR 30-59 ml/min 7/23/2018    COPD (chronic obstructive pulmonary disease) (HCC)     Hyperlipidemia     Hypoxia 7/23/2018    Normal pressure hydrocephalus 7/28/2018    Pleural effusion     requiring right thoracentesis    Pulmonary hypertension ([address] 7/23/2018    Restless legs     S/P CABG x 2 10/4/2012    Severe mitral regurgitation 8/1/2018    Smoker 10/4/2012       Past Surgical Hx :  Past Surgical History:   Procedure Laterality Date   [address] 177 08/03/2018    GLUCOSE 111 09/30/2011     Hepatic Function Panel:    Lab Results   Component Value Date    ALKPHOS 90 07/29/2018    ALT 7 07/29/2018    AST 15 07/29/2018    PROT 6.6 07/29/2018    BILITOT 0.6 07/29/2018    BILIDIR <0.2 07/30/2014    IBILI 0.1 07/30/2014    LABALBU 2.7 07/29/2018    LABALBU 4.4 09/30/2011     Magnesium:    Lab Results   Component Value Date    MG 1.6 07/29/2018     Phosphorus:    Lab Results   Component Value Date    PHOS 4.3 08/03/2018     LDH:  No results found for: LDH  PT/INR:    Lab Results   Component Value Date    PROTIME 13.8 07/26/2018    INR 1.2 07/26/2018     Warfarin PT/INR:  No components found for: Caretha Ates  PTT:    Lab Results   Component Value Date    APTT <20.0 07/23/2018   [APTT}  Troponin:    Lab Results   Component Value Date    TROPONINI <0.01 07/24/2018     Last 3 Troponin:    Lab Results   Component Value Date    TROPONINI <0.01 07/24/2018    TROPONINI <0.01 07/24/2018    TROPONINI <0.01 07/23/2018     U/A:    Lab Results   Component Value Date    COLORU Yellow 07/23/2018    PROTEINU 30 07/23/2018    PHUR 5.5 07/23/2018    WBCUA 0-1 07/23/2018    RBCUA NONE 07/23/2018    BACTERIA FEW 07/23/2018    CLARITYU Clear 07/23/2018    SPECGRAV 1.020 07/23/2018    LEUKOCYTESUR Negative 07/23/2018    UROBILINOGEN 4.0 07/23/2018    BILIRUBINUR Negative 07/23/2018    BLOODU Negative 07/23/2018    GLUCOSEU Negative 07/23/2018     ABG:  No results found for: PH, PCO2, PO2, HCO3, BE, THGB, TCO2, O2SAT  HgBA1c:    Lab Results   Component Value Date    LABA1C 10.4 07/24/2018     FLP:    Lab Results   Component Value Date    TRIG 108 07/24/2018    HDL 21 07/24/2018    LDLCALC 49 07/24/2018    LABVLDL 22 07/24/2018     TSH:    Lab Results   Component Value Date    TSH 2.120 07/24/2018     VITAMIN B12: No components found for: B12  FOLATE:    Lab Results   Component Value Date    FOLATE 5.5 07/24/2018     IRON:  No results found for: IRON       CBC:No results for input(s): WBC, RBC, HGB, HCT, PLT, MCV, MCH, MCHC, RDW, NRBC, SEGSPCT, BANDSPCT, BLASTSPCT, METASPCT, LYMPHOPCT, PROMYELOPCT, MONOPCT, MYELOPCT, EOSPCT, BASOPCT, MONOSABS, LYMPHSABS, EOSABS, BASOSABS, DIFFTYPE in the last 72 hours. Invalid input(s): ATYLMREL       H & H :No results for input(s): HGB in the last 72 hours. TSH:No results for input(s): TSH in the last 72 hours. GLUCOSE:No results for input(s): POCGLU in the last 72 hours. CMP:No results for input(s): NA, K, CL, CO2, BUN, CREATININE, GFRAA, AGRATIO, LABGLOM, GLUCOSE, PROT, LABALBU, CALCIUM, BILITOT, ALKPHOS, AST, ALT in the last 72 hours. Invalid input(s): GLU      BNP:No results found for: BNP    PROTIME/INR:No results for input(s): PROTIME, INR in the last 72 hours. CRP: No results for input(s): CRP in the last 72 hours. ESR:No results for input(s): SEDRATE in the last 72 hours. LIPASE , AMYLASE:  Lab Results   Component Value Date    LIPASE 45 2014      No results found for: AMYLASE    ABGs: No results found for: PHART, PO2ART, MOY3LVH    CARDIAC: No results for input(s): CKTOTAL, CKMB, CKMBINDEX, TROPONINI in the last 72 hours. Lipid Profile:   Lab Results   Component Value Date    TRIG 108 2018    HDL 21 2018    LDLCALC 49 2018    CHOL 92 2018        Ct Head Wo Contrast    Result Date: 2018  Patient MRN:  [id number] : 1955 Age: 60 years Gender: Female Order Date:  2018 3:00 PM EXAM: CT HEAD WO CONTRAST NUMBER OF IMAGES:  101 INDICATION: Difficulty walking and speaking Technique: Multiple contiguous 5 mm axial images were obtained from the skull base to the vertex without administration of IV contrast. Reformatted images were generated in the sagittal and coronal planes. Images were reviewed in brain, subdural, soft tissue and bone windows. Low-dose CT  acquisition technique included one of following options: 1. Automated exposure control 2.  Adjustment of MA and or KV WO CONTRAST NUMBER OF IMAGES: 127. INDICATION: Patient is a 69-year-old woman with history of neck pain, r/o spinal compression  COMPARISON: MRI neck May 26, 2018 TECHNIQUE: Multiplanar and multi sequential MRI of the cervical spine was performed with no gadolinium administration. Study is limited due to patient's motion. FINDINGS: There is normal alignment of the vertebral bodies with no spondylolisthesis. Cervico-occipital junction is unremarkable. C5-C6 vertebral fusion. Limited evaluation of the cervical cord signal due to patient's motion. There is no identifiable mass in the cervical canal. There is uniformity of the vertebral body heights. Prevertebral soft tissue are unremarkable. At C2-C3:  There is no focal disc bulge or herniation. No neural foraminal narrowing. At C3-C4: There is no focal disc bulge or herniation. Mild the left neural foraminal narrowing. At C4-C5: There is mild to moderate disc height loss and diffuse disc desiccation. Broad-based right paracentral disc protrusion and moderate central canal narrowing on the right. Flattening anterior aspect of the cord on the right. Moderate to severe bilateral neural foraminal narrowing, more pronounced on the right. At C5-C6: Vertebral fusion. No central canal stenosis. . No neural foraminal narrowing. At C6-C7: Moderate to severe disc height loss and desiccation. Degenerative irregularities of the endplates are present. Mild diffuse disc bulge and mild the central canal narrowing. Moderate to severe left foraminal narrowing. At C7-T1:There is no focal disc bulge or herniation. No neural foraminal narrowing. 1. Limited study due to patient's motion. 2. No compression fracture or a spondylolisthesis. 3. C5-C6 vertebral fusion. 4. Moderate to severe degenerative disc disease at C6-C7. 5. Broad-based focal right paracentral disc protrusion at C4-C5 and moderate central canal narrowing on the right.  6. Multilevel moderate to severe foraminal narrowing 2018  Patient MRN:  [id number] : 1955 Age: 60 years Gender: Female Order Date:  2018 4:00 PM EXAM: CTA CHEST W CONTRAST NUMBER OF IMAGES:  26 INDICATION: Aortic disease Technique: CT of the chest was performed from the apices of the lungs through the upper abdomen using contiguous 2.5 mm transaxial sections, following  pulmonary angiogram protocol. 60cc of Isovue-370 IV contrast was administered without incident. Reformations were constructed in the coronal and sagittal planes (5 mm). Images were reviewed in soft tissue, bone and lung windows. Images were reconstructed on a separate dedicated 3-D imaging workstation, using maximum intensity projection (MIP) and volume rendered (3D) datasets. Low-dose CT  acquisition technique included one of following options: 1. Automated exposure control 2. Adjustment of MA and or KV according to patient's size or 3. Use of iterative reconstruction. Comparison: No prior studies available for comparison. Findings: Pulmonary vasculature: There is no evidence of filling defect in the main pulmonary artery, right or left pulmonary arteries, or segmental branches of the pulmonary arteries to suggest pulmonary embolism. Evaluation of the subsegmental branches is limited due to respiratory artifact. Lung parenchyma and pleura: The tracheobronchial tree is midline and the central bronchi are patent. There are moderate centrilobular bullous on this changes the lungs bilaterally, most prominently involving the upper lobes. There is a calcified pleural plaque in the posterior aspect of the superior segment of the left lower lobe (series 5, image 17). There is no definite pleural or parenchymal mass lesion. There is no focal consolidation, pleural effusion or pneumothorax. There are mild patchy bibasal dependent pulmonary parenchymal opacities consistent with mild atelectatic change. Thyroid: Demonstrates homogeneous enhancement. Mediastinum: No significant lymphadenopathy. Ruth: No significant lymphadenopathy. Heart and pericardium: The heart is enlarged. There is no pericardial effusion. There are moderate coronary artery calcifications. There is dilatation of the main pulmonary artery and, measuring 3.4 cm in maximum diameter (series 4, image 59). There is dilatation of the right pulmonary artery, measuring 2.8 cm in maximum diameter (series 4, image 64). There is dilatation of the left pulmonary artery, measuring 2.9 cm in maximum diameter (series 4, image 61). Chest wall: Midline sternotomy wires are identified, prior sternotomy. Axilla: No enlarged lymph nodes. Visualized upper abdomen: There is a trace amount of perihepatic free fluid. The gallbladder is absent and surgical clips are present in the gallbladder fossa, consistent with a prior cholecystectomy. There is gas present within the central biliary tree, likely secondary to prior cholecystectomy. The remaining visualized upper abdominal organs are unremarkable. Bones: There are degenerative changes of the spine. CT angiography demonstrates the following: The aorta is normal in caliber without evidence of aneurysmal dilatation, dissection or thrombus. The ascending aorta at the level of the aortic root has a maximum diameter of 2.7 cm The ascending aorta at the level of the right pulmonary artery has a maximum diameter of 3.3 cm The transverse arch has a maximum diameter of 2.8 cm The descending thoracic aorta at the level of the left pulmonary artery has a maximum diameter of 2.4 cm     1. No evidence of pulmonary embolus in the  main pulmonary artery, right or left pulmonary arteries, or segmental branches of the pulmonary arteries. 2. No focal consolidation, pleural effusion or pneumothorax. 3. No definite pleural or parenchymal mass lesion. 4. Moderate pulmonary emphysematous disease.  5. Calcified pleural plaque in the posterior aspect of the superior segment of the left lower lobe, consistent with a history of prior asbestos exposure. 6. Cardiomegaly with dilatation of the main pulmonary artery as well as the right and left pulmonary arteries, consistent with pulmonary artery hypertension. 7. Trace amount of perihepatic free fluid. 8. Status post cholecystectomy with small amount of residual pneumobilia. Fluoro For Surgical Procedures    Result Date: 2018  Patient MRN:  [id number] : 1955 Age: 60 years Gender: Female Order Date:  2018 9:00 AM EXAM: FLUORO FOR SURGICAL PROCEDURES NUMBER OF IMAGES:  4. INDICATION: R 52 Pain. Hardware Removal Findings: 3 fluoroscopic spot films were submitted for interpretation. Fluoroscopic images demonstrate localization of the lumbar spine with interval placement of a probable drain. Fluoroscopy equipment and a technologist were provided by the Department of Radiology. No radiologist was present for the examination. Total fluoroscopic time was 4.0 seconds. Intraoperative fluoroscopy, as detailed above. Mri Brain Wo Contrast    Result Date: 2018  Patient MRN:  [id number] : 1955 Age: 60 years Gender: Female Order Date:  2018 12:45 AM EXAM: MRI BRAIN WO CONTRAST NUMBER OF IMAGES:  36 INDICATION:  Patient is a 71-year-old woman with history of hyperlipidemia, CAD, hypertension, STROKE TECHNIQUE: Multiplanar, multi sequential images of the brain were obtained without gadolinium administration. COMPARISON: CT head 2018 FINDINGS: The ventricular system appears moderately distended which is a slightly disproportionate to cortical sulci widening raising possibility of normal pressure hydrocephalus. Clinical correlation is recommended. Moderate diffuse age-related cerebral atrophy. There are mild to moderate periventricular and deep subcortical T-2/flair hyperintensities representing chronic microvascular ischemic disease. No intracranial mass lesions are identified.  There is no evidence of restricted diffusion to suggest acute/early subacute ischemic infarction. The posterior fossa contents and brainstem are unremarkable. There is evidence of prior bilateral cataract removal. The  orbital structures, paranasal sinuses, and mastoid air cells are unremarkable. Patent flow-voids are noted within the intracranial vessels and dural venous sinuses. The calvarium is intact. IMPRESSION: 1. No evidence for acute intracranial ischemic infarct or hemorrhage. 2. Moderate diffuse age-related cerebral atrophy and chronic microvascular ischemic disease. 3. Ventricular enlargement slightly disproportionate to cortical sulci widening which could raise possibility of normal pressure hydrocephalus. Clinical correlation is recommended. 4. No identifiable mass, mass effect or abnormal diffusion restriction. Us Carotid Artery Bilateral    Result Date: 2018  Patient MRN:  [id number] : 1955 Age: 60 years Gender: Female Order Date:  2018 10:45 PM EXAM: US CAROTID ARTERY BILATERAL NUMBER OF IMAGES:  55 TECHNIQUE: Duplex carotid ultrasound with color-flow Doppler, and a velocity blood flow characteristic and spectral analysis of the carotid arteries were obtained and documented. INDICATION: Patient is a 78-year-old woman with history of CAD, hyperlipidemia, COPD, TIA COMPARISON: None FINDINGS: Bilateral CCA, ICA and ECA are patent. The right internal carotid artery has a peak systolic velocity of 62 cm/sec proximally with an  internal to common carotid artery ratio of 1. Based on Gosink criteria, no evidence of hemodynamically significant stenosis is seen. The left internal carotid artery has a peak systolic velocity of 75.4 cm/sec proximally with an internal to common carotid artery ratio of 1.3. Based on Gosink criteria, no evidence of hemodynamically significant stenosis is seen. Bilateral antegrade vertebral artery flow is seen. Mild atherosclerotic plaque formation is seen on the right without evidence of hemodynamically significant stenosis.  Mild respiratory failure with hypoxia and hypercapnia (HCC) [J96.21, J96.22]     Priority: High     Class: Acute    Pulmonary hypertension (HCC) [I27.20]     Priority: High     Class: Chronic    Apraxia [R48.2]     Priority: High     Class: Acute    CKD (chronic kidney disease) stage 3, GFR 30-59 ml/min [N18.3]     Priority: High    Hypoxia [R09.02]     Priority: High     Class: Acute    Normal pressure hydrocephalus [G91.2]    Sinus pause [I45.5]    Stroke-like symptoms [R29.90]    Ataxia [R27.0]    TIA (transient ischemic attack) [G45.9]    COPD (chronic obstructive pulmonary disease) (HCC) [J44.9]    CAD (coronary artery disease) [I25.10]    S/P CABG x 2 [Z95.1]       Signed:    Anthony Vieira DO    8/7/2018    4:55 PM

## 2018-09-19 NOTE — H&P
Department of Neurosurgery  Attending Pre-operative History and Physical        DIAGNOSIS:  Normal pressure hydrocephalus    INDICATION:  Triad of symptoms: confusion, gait shuffling, urinary incontinence occassionally    PROCEDURE:  Ventricular-peritoneal shunt    CHIEF COMPLAINT:  Shuffling gait, some confusion, positive high volume tap. History Obtained From:  patient    HISTORY OF PRESENT ILLNESS:      The patient is a 60 y.o. female with significant past medical history of NPH who presents with positive high volume CSF tap. Past Medical History:        Diagnosis Date    Acute on chronic respiratory failure with hypoxia and hypercapnia ([address] 7/25/2018    Apraxia 7/23/2018    Ataxia 7/23/2018    CAD (coronary artery disease)     CAD (coronary artery disease) 10/4/2012    Choledocholithiasis     recurrent    Chronic systolic congestive heart failure (HCC) 8/1/2018    Ejection fraction 23% plus/minus 5%    CKD (chronic kidney disease) stage 3, GFR 30-59 ml/min 7/23/2018    COPD (chronic obstructive pulmonary disease) ([facility])     alpha one M1S 183mg/dl    Hyperlipidemia     Hypoxia 7/23/2018    Normal pressure hydrocephalus 7/28/2018    Pleural effusion     requiring right thoracentesis    Pulmonary hypertension ([address] 7/23/2018    Restless legs     S/P CABG x 2 10/4/2012    Severe mitral regurgitation 8/1/2018    Smoker 10/4/2012     Past Surgical History:        Procedure Laterality Date    CARDIAC SURGERY      CERVICAL FUSION  1999    C3-C6    CHOLECYSTECTOMY  2002    CORONARY ARTERY BYPASS GRAFT  10/30/2002    HYSTERECTOMY  1988    LUMBAR DRAIN IMPLANTATION  07/27/2018    CO SPINAL PUNCTURE,THERAPEUTIC DRAINAGE N/A 7/27/2018    LUMBAR DRAIN INSERTION performed by Cheri Kan MD at [address] [place] [address]     Medications Prior to Admission:   No prescriptions prior to admission.   Allergies:  Pentazocine lactate and Sulfa antibiotics  History of allergic reaction to anesthesia:  No      Social

## 2018-09-24 ENCOUNTER — OFFICE VISIT (OUTPATIENT)
Dept: PULMONOLOGY | Age: 63
End: 2018-09-24
Payer: MEDICARE

## 2018-09-24 VITALS
SYSTOLIC BLOOD PRESSURE: 124 MMHG | OXYGEN SATURATION: 98 % | BODY MASS INDEX: 18.45 KG/M2 | RESPIRATION RATE: 24 BRPM | HEART RATE: 94 BPM | HEIGHT: 63 IN | DIASTOLIC BLOOD PRESSURE: 59 MMHG | TEMPERATURE: 98.3 F | WEIGHT: 104.1 LBS

## 2018-09-24 DIAGNOSIS — I27.20 PULMONARY HYPERTENSION (HCC): ICD-10-CM

## 2018-09-24 DIAGNOSIS — J96.21 ACUTE ON CHRONIC RESPIRATORY FAILURE WITH HYPOXIA AND HYPERCAPNIA (HCC): ICD-10-CM

## 2018-09-24 DIAGNOSIS — J43.9 PULMONARY EMPHYSEMA, UNSPECIFIED EMPHYSEMA TYPE (HCC): ICD-10-CM

## 2018-09-24 DIAGNOSIS — J96.22 ACUTE ON CHRONIC RESPIRATORY FAILURE WITH HYPOXIA AND HYPERCAPNIA (HCC): ICD-10-CM

## 2018-09-24 PROCEDURE — G8598 ASA/ANTIPLAT THER USED: HCPCS | Performed by: INTERNAL MEDICINE

## 2018-09-24 PROCEDURE — G8419 CALC BMI OUT NRM PARAM NOF/U: HCPCS | Performed by: INTERNAL MEDICINE

## 2018-09-24 PROCEDURE — G8427 DOCREV CUR MEDS BY ELIG CLIN: HCPCS | Performed by: INTERNAL MEDICINE

## 2018-09-24 PROCEDURE — 3017F COLORECTAL CA SCREEN DOC REV: CPT | Performed by: INTERNAL MEDICINE

## 2018-09-24 PROCEDURE — G8926 SPIRO NO PERF OR DOC: HCPCS | Performed by: INTERNAL MEDICINE

## 2018-09-24 PROCEDURE — 3023F SPIROM DOC REV: CPT | Performed by: INTERNAL MEDICINE

## 2018-09-24 PROCEDURE — 1036F TOBACCO NON-USER: CPT | Performed by: INTERNAL MEDICINE

## 2018-09-24 PROCEDURE — 99203 OFFICE O/P NEW LOW 30 MIN: CPT | Performed by: INTERNAL MEDICINE

## 2018-09-24 PROCEDURE — 99214 OFFICE O/P EST MOD 30 MIN: CPT | Performed by: INTERNAL MEDICINE

## 2018-09-24 NOTE — PROGRESS NOTES
surgery for resection biopsy and she is following with Dr Gem Jiménez ,ENT ,patinet aware   7. - RLS      Plan  1. Start Duo nebs  2. Full PFT  3. Start Trelegy   4. Alpha 1 testing 183 and no issues   5. 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  Respiratory allergy test   7. eosinophilic count  Lymph node cervical as per ENT   9- CT reviewed  10 continue o2  11-pulmonary rehab   12 further recommendation to follow     Will see in 6-8 weeks

## 2018-10-09 DIAGNOSIS — J44.9 CHRONIC OBSTRUCTIVE PULMONARY DISEASE, UNSPECIFIED COPD TYPE (HCC): Primary | ICD-10-CM

## 2018-10-10 RX ORDER — OXYCODONE HYDROCHLORIDE AND ACETAMINOPHEN 325; 5 MG/5ML; MG/5ML
SOLUTION ORAL EVERY 4 HOURS PRN
Status: ON HOLD | COMMUNITY
End: 2018-10-30 | Stop reason: HOSPADM

## 2018-10-10 NOTE — PROGRESS NOTES
[address] [facility] PRE-ADMISSION TESTING GENERAL INSTRUCTIONS- [facility]-phone number:[phone]    GENERAL INSTRUCTIONS  [x] Antibacterial Soap shower Night before and/or AM of Surgery  [] Arhtur wipe instruction sheet and wipes given. [x] Nothing by mouth after midnight, including gum, candy, mints, or water. [x] You may brush your teeth, gargle, but do NOT swallow water. []Hibiclens shower  the night before and the morning of surgery. Do not use             Hibiclens on your face or head. [x]No smoking, chewing tobacco, illegal drugs, or alcohol within 24 hours of your surgery. [x] Jewelry, valuables or body piercing's should not be brought to the hospital. All body and/or tongue piercing's must be removed prior to arriving to hospital.  ALL hair pins must be removed. [x] Do not wear makeup, lotions, powders, deodorant. Nail polish as directed by the nurse. [x] Arrange transportation to and from the hospital.  Arrange for someone to be with you for the remainder of the day and for 24 hours after your procedure due to having had anesthesia. [x] Bring insurance card and photo ID.  [] Transfusion Bracelet: Please bring with you to hospital, day of surgery  [] Bring urine specimen day of surgery. Any small container is acceptable. [] Use inhalers the morning of surgery and bring with you to hospital.   []Bring copy of living will or healthcare power of  papers to be placed in your electronic record. [] CPAP/BI-PAP: Please bring your machine if you are to spend the night in the hospital.     ENDOSCOPY INSTRUCTIONS:   [] Bowel prep instructions reviewed. [] Nothing by mouth after midnight, including gum, candy, mints, or water.  [] You may brush your teeth, gargle, but do NOT swallow water. [] Do not wear makeup, lotions, powders, deodorant. Nail polish as directed by the nurse.   [] Arrange transportation to and from the hospital.  Arrange for someone to be with you for the

## 2018-10-15 ENCOUNTER — ANESTHESIA EVENT (OUTPATIENT)
Dept: OPERATING ROOM | Age: 63
DRG: 032 | End: 2018-10-15
Payer: MEDICARE

## 2018-10-15 ENCOUNTER — ANESTHESIA (OUTPATIENT)
Dept: OPERATING ROOM | Age: 63
DRG: 032 | End: 2018-10-15
Payer: MEDICARE

## 2018-10-15 ENCOUNTER — HOSPITAL ENCOUNTER (INPATIENT)
Age: 63
LOS: 3 days | Discharge: NURSING, REHAB OR LONG-TERM CARE | DRG: 032 | End: 2018-10-18
Attending: NEUROLOGICAL SURGERY | Admitting: NEUROLOGICAL SURGERY
Payer: MEDICARE

## 2018-10-15 VITALS
DIASTOLIC BLOOD PRESSURE: 74 MMHG | OXYGEN SATURATION: 96 % | TEMPERATURE: 96.1 F | SYSTOLIC BLOOD PRESSURE: 128 MMHG | RESPIRATION RATE: 14 BRPM

## 2018-10-15 DIAGNOSIS — G91.2 NORMAL PRESSURE HYDROCEPHALUS (HCC): Primary | ICD-10-CM

## 2018-10-15 LAB
ANION GAP SERPL CALCULATED.3IONS-SCNC: 13 MMOL/L (ref 7–16)
BUN BLDV-MCNC: 18 MG/DL (ref 8–23)
CALCIUM SERPL-MCNC: 9.8 MG/DL (ref 8.6–10.2)
CHLORIDE BLD-SCNC: 94 MMOL/L (ref 98–107)
CO2: 31 MMOL/L (ref 22–29)
CREAT SERPL-MCNC: 1.2 MG/DL (ref 0.5–1)
GFR AFRICAN AMERICAN: 55
GFR NON-AFRICAN AMERICAN: 45 ML/MIN/1.73
GLUCOSE BLD-MCNC: 149 MG/DL (ref 74–109)
HCT VFR BLD CALC: 35.2 % (ref 34–48)
HEMOGLOBIN: 11.7 G/DL (ref 11.5–15.5)
MCH RBC QN AUTO: 31.1 PG (ref 26–35)
MCHC RBC AUTO-ENTMCNC: 33.2 % (ref 32–34.5)
MCV RBC AUTO: 93.6 FL (ref 80–99.9)
METER GLUCOSE: 152 MG/DL (ref 70–110)
METER GLUCOSE: 200 MG/DL (ref 70–110)
METER GLUCOSE: 244 MG/DL (ref 70–110)
PDW BLD-RTO: 14.6 FL (ref 11.5–15)
PLATELET # BLD: 193 E9/L (ref 130–450)
PMV BLD AUTO: 10.9 FL (ref 7–12)
POTASSIUM SERPL-SCNC: 4 MMOL/L (ref 3.5–5)
RBC # BLD: 3.76 E12/L (ref 3.5–5.5)
SODIUM BLD-SCNC: 138 MMOL/L (ref 132–146)
WBC # BLD: 8.7 E9/L (ref 4.5–11.5)

## 2018-10-15 PROCEDURE — 2580000003 HC RX 258: Performed by: NEUROLOGICAL SURGERY

## 2018-10-15 PROCEDURE — 7100000001 HC PACU RECOVERY - ADDTL 15 MIN: Performed by: NEUROLOGICAL SURGERY

## 2018-10-15 PROCEDURE — 6360000002 HC RX W HCPCS

## 2018-10-15 PROCEDURE — 6360000002 HC RX W HCPCS: Performed by: NEUROLOGICAL SURGERY

## 2018-10-15 PROCEDURE — 2709999900 HC NON-CHARGEABLE SUPPLY: Performed by: NEUROLOGICAL SURGERY

## 2018-10-15 PROCEDURE — 82962 GLUCOSE BLOOD TEST: CPT

## 2018-10-15 PROCEDURE — 2720000010 HC SURG SUPPLY STERILE: Performed by: NEUROLOGICAL SURGERY

## 2018-10-15 PROCEDURE — 99222 1ST HOSP IP/OBS MODERATE 55: CPT | Performed by: SURGERY

## 2018-10-15 PROCEDURE — 2780000010 HC IMPLANT OTHER: Performed by: NEUROLOGICAL SURGERY

## 2018-10-15 PROCEDURE — 6370000000 HC RX 637 (ALT 250 FOR IP): Performed by: NURSE PRACTITIONER

## 2018-10-15 PROCEDURE — 2000000000 HC ICU R&B

## 2018-10-15 PROCEDURE — 7100000000 HC PACU RECOVERY - FIRST 15 MIN: Performed by: NEUROLOGICAL SURGERY

## 2018-10-15 PROCEDURE — 6370000000 HC RX 637 (ALT 250 FOR IP): Performed by: NEUROLOGICAL SURGERY

## 2018-10-15 PROCEDURE — 62223 ESTABLISH BRAIN CAVITY SHUNT: CPT | Performed by: SURGERY

## 2018-10-15 PROCEDURE — 2500000003 HC RX 250 WO HCPCS: Performed by: NEUROLOGICAL SURGERY

## 2018-10-15 PROCEDURE — C1729 CATH, DRAINAGE: HCPCS | Performed by: NEUROLOGICAL SURGERY

## 2018-10-15 PROCEDURE — 2580000003 HC RX 258

## 2018-10-15 PROCEDURE — 36415 COLL VENOUS BLD VENIPUNCTURE: CPT

## 2018-10-15 PROCEDURE — 3700000000 HC ANESTHESIA ATTENDED CARE: Performed by: NEUROLOGICAL SURGERY

## 2018-10-15 PROCEDURE — 2700000000 HC OXYGEN THERAPY PER DAY

## 2018-10-15 PROCEDURE — 3700000001 HC ADD 15 MINUTES (ANESTHESIA): Performed by: NEUROLOGICAL SURGERY

## 2018-10-15 PROCEDURE — 85027 COMPLETE CBC AUTOMATED: CPT

## 2018-10-15 PROCEDURE — 00160J6 BYPASS CEREBRAL VENTRICLE TO PERITONEAL CAVITY WITH SYNTHETIC SUBSTITUTE, OPEN APPROACH: ICD-10-PCS | Performed by: NEUROLOGICAL SURGERY

## 2018-10-15 PROCEDURE — 3600000002 HC SURGERY LEVEL 2 BASE: Performed by: NEUROLOGICAL SURGERY

## 2018-10-15 PROCEDURE — 87081 CULTURE SCREEN ONLY: CPT

## 2018-10-15 PROCEDURE — 3600000012 HC SURGERY LEVEL 2 ADDTL 15MIN: Performed by: NEUROLOGICAL SURGERY

## 2018-10-15 PROCEDURE — 80048 BASIC METABOLIC PNL TOTAL CA: CPT

## 2018-10-15 PROCEDURE — 2500000003 HC RX 250 WO HCPCS

## 2018-10-15 PROCEDURE — 6360000002 HC RX W HCPCS: Performed by: ANESTHESIOLOGY

## 2018-10-15 RX ORDER — DEXAMETHASONE SODIUM PHOSPHATE 10 MG/ML
INJECTION, SOLUTION INTRAMUSCULAR; INTRAVENOUS PRN
Status: DISCONTINUED | OUTPATIENT
Start: 2018-10-15 | End: 2018-10-15 | Stop reason: SDUPTHER

## 2018-10-15 RX ORDER — MIDAZOLAM HYDROCHLORIDE 1 MG/ML
INJECTION INTRAMUSCULAR; INTRAVENOUS PRN
Status: DISCONTINUED | OUTPATIENT
Start: 2018-10-15 | End: 2018-10-15 | Stop reason: SDUPTHER

## 2018-10-15 RX ORDER — NICOTINE POLACRILEX 4 MG
15 LOZENGE BUCCAL PRN
Status: DISCONTINUED | OUTPATIENT
Start: 2018-10-15 | End: 2018-10-18 | Stop reason: HOSPADM

## 2018-10-15 RX ORDER — ONDANSETRON 2 MG/ML
INJECTION INTRAMUSCULAR; INTRAVENOUS PRN
Status: DISCONTINUED | OUTPATIENT
Start: 2018-10-15 | End: 2018-10-15 | Stop reason: SDUPTHER

## 2018-10-15 RX ORDER — BUTALBITAL, ACETAMINOPHEN AND CAFFEINE 50; 325; 40 MG/1; MG/1; MG/1
1 TABLET ORAL EVERY 4 HOURS PRN
Status: DISCONTINUED | OUTPATIENT
Start: 2018-10-15 | End: 2018-10-18 | Stop reason: HOSPADM

## 2018-10-15 RX ORDER — PHENYLEPHRINE HYDROCHLORIDE 10 MG/ML
INJECTION INTRAVENOUS PRN
Status: DISCONTINUED | OUTPATIENT
Start: 2018-10-15 | End: 2018-10-15 | Stop reason: SDUPTHER

## 2018-10-15 RX ORDER — DIAPER,BRIEF,INFANT-TODD,DISP
EACH MISCELLANEOUS PRN
Status: DISCONTINUED | OUTPATIENT
Start: 2018-10-15 | End: 2018-10-15 | Stop reason: HOSPADM

## 2018-10-15 RX ORDER — EPHEDRINE SULFATE 50 MG/ML
INJECTION INTRAVENOUS PRN
Status: DISCONTINUED | OUTPATIENT
Start: 2018-10-15 | End: 2018-10-15 | Stop reason: SDUPTHER

## 2018-10-15 RX ORDER — TRAZODONE HYDROCHLORIDE 150 MG/1
150 TABLET ORAL NIGHTLY
Status: DISCONTINUED | OUTPATIENT
Start: 2018-10-15 | End: 2018-10-18 | Stop reason: HOSPADM

## 2018-10-15 RX ORDER — SODIUM CHLORIDE 0.9 % (FLUSH) 0.9 %
10 SYRINGE (ML) INJECTION PRN
Status: DISCONTINUED | OUTPATIENT
Start: 2018-10-15 | End: 2018-10-15 | Stop reason: HOSPADM

## 2018-10-15 RX ORDER — ERGOCALCIFEROL 1.25 MG/1
50000 CAPSULE ORAL
Status: DISCONTINUED | OUTPATIENT
Start: 2018-10-18 | End: 2018-10-18 | Stop reason: HOSPADM

## 2018-10-15 RX ORDER — ISOSORBIDE MONONITRATE 30 MG/1
30 TABLET, EXTENDED RELEASE ORAL DAILY
Status: DISCONTINUED | OUTPATIENT
Start: 2018-10-16 | End: 2018-10-18 | Stop reason: HOSPADM

## 2018-10-15 RX ORDER — ONDANSETRON 2 MG/ML
4 INJECTION INTRAMUSCULAR; INTRAVENOUS
Status: DISCONTINUED | OUTPATIENT
Start: 2018-10-15 | End: 2018-10-15 | Stop reason: HOSPADM

## 2018-10-15 RX ORDER — ALBUTEROL SULFATE 2.5 MG/3ML
2.5 SOLUTION RESPIRATORY (INHALATION) EVERY 6 HOURS PRN
Status: DISCONTINUED | OUTPATIENT
Start: 2018-10-15 | End: 2018-10-18 | Stop reason: HOSPADM

## 2018-10-15 RX ORDER — POTASSIUM CHLORIDE 20 MEQ/1
20 TABLET, EXTENDED RELEASE ORAL 2 TIMES DAILY WITH MEALS
Status: DISCONTINUED | OUTPATIENT
Start: 2018-10-15 | End: 2018-10-18 | Stop reason: HOSPADM

## 2018-10-15 RX ORDER — FLUTICASONE PROPIONATE 50 MCG
1 SPRAY, SUSPENSION (ML) NASAL DAILY PRN
Status: DISCONTINUED | OUTPATIENT
Start: 2018-10-16 | End: 2018-10-18 | Stop reason: HOSPADM

## 2018-10-15 RX ORDER — LIDOCAINE HYDROCHLORIDE AND EPINEPHRINE BITARTRATE 20; .01 MG/ML; MG/ML
INJECTION, SOLUTION SUBCUTANEOUS PRN
Status: DISCONTINUED | OUTPATIENT
Start: 2018-10-15 | End: 2018-10-15 | Stop reason: HOSPADM

## 2018-10-15 RX ORDER — ROCURONIUM BROMIDE 10 MG/ML
INJECTION, SOLUTION INTRAVENOUS PRN
Status: DISCONTINUED | OUTPATIENT
Start: 2018-10-15 | End: 2018-10-15 | Stop reason: SDUPTHER

## 2018-10-15 RX ORDER — SODIUM CHLORIDE 9 MG/ML
INJECTION, SOLUTION INTRAVENOUS CONTINUOUS PRN
Status: DISCONTINUED | OUTPATIENT
Start: 2018-10-15 | End: 2018-10-15 | Stop reason: SDUPTHER

## 2018-10-15 RX ORDER — OXYCODONE HYDROCHLORIDE AND ACETAMINOPHEN 5; 325 MG/1; MG/1
1 TABLET ORAL EVERY 4 HOURS PRN
Status: DISCONTINUED | OUTPATIENT
Start: 2018-10-15 | End: 2018-10-18 | Stop reason: HOSPADM

## 2018-10-15 RX ORDER — OXYBUTYNIN CHLORIDE 10 MG/1
10 TABLET, EXTENDED RELEASE ORAL DAILY
Status: DISCONTINUED | OUTPATIENT
Start: 2018-10-15 | End: 2018-10-18 | Stop reason: HOSPADM

## 2018-10-15 RX ORDER — IPRATROPIUM BROMIDE AND ALBUTEROL SULFATE 2.5; .5 MG/3ML; MG/3ML
1 SOLUTION RESPIRATORY (INHALATION)
Status: DISCONTINUED | OUTPATIENT
Start: 2018-10-15 | End: 2018-10-15

## 2018-10-15 RX ORDER — ACETAMINOPHEN 325 MG/1
650 TABLET ORAL EVERY 4 HOURS PRN
Status: DISCONTINUED | OUTPATIENT
Start: 2018-10-15 | End: 2018-10-18 | Stop reason: HOSPADM

## 2018-10-15 RX ORDER — DEXTROSE MONOHYDRATE 50 MG/ML
100 INJECTION, SOLUTION INTRAVENOUS PRN
Status: DISCONTINUED | OUTPATIENT
Start: 2018-10-15 | End: 2018-10-18 | Stop reason: HOSPADM

## 2018-10-15 RX ORDER — NEOSTIGMINE METHYLSULFATE 0.5 MG/ML
INJECTION, SOLUTION INTRAVENOUS PRN
Status: DISCONTINUED | OUTPATIENT
Start: 2018-10-15 | End: 2018-10-15 | Stop reason: SDUPTHER

## 2018-10-15 RX ORDER — DEXTROSE MONOHYDRATE 25 G/50ML
12.5 INJECTION, SOLUTION INTRAVENOUS PRN
Status: DISCONTINUED | OUTPATIENT
Start: 2018-10-15 | End: 2018-10-18 | Stop reason: HOSPADM

## 2018-10-15 RX ORDER — SPIRONOLACTONE 25 MG/1
25 TABLET ORAL DAILY
Status: DISCONTINUED | OUTPATIENT
Start: 2018-10-15 | End: 2018-10-18 | Stop reason: HOSPADM

## 2018-10-15 RX ORDER — PROPOFOL 10 MG/ML
INJECTION, EMULSION INTRAVENOUS PRN
Status: DISCONTINUED | OUTPATIENT
Start: 2018-10-15 | End: 2018-10-15 | Stop reason: SDUPTHER

## 2018-10-15 RX ORDER — BUPROPION HYDROCHLORIDE 300 MG/1
300 TABLET ORAL EVERY MORNING
Status: DISCONTINUED | OUTPATIENT
Start: 2018-10-16 | End: 2018-10-18 | Stop reason: HOSPADM

## 2018-10-15 RX ORDER — GLYCOPYRROLATE 1 MG/5 ML
SYRINGE (ML) INTRAVENOUS PRN
Status: DISCONTINUED | OUTPATIENT
Start: 2018-10-15 | End: 2018-10-15 | Stop reason: SDUPTHER

## 2018-10-15 RX ORDER — FENTANYL CITRATE 50 UG/ML
INJECTION, SOLUTION INTRAMUSCULAR; INTRAVENOUS PRN
Status: DISCONTINUED | OUTPATIENT
Start: 2018-10-15 | End: 2018-10-15 | Stop reason: SDUPTHER

## 2018-10-15 RX ORDER — CARVEDILOL 6.25 MG/1
12.5 TABLET ORAL 2 TIMES DAILY WITH MEALS
Status: DISCONTINUED | OUTPATIENT
Start: 2018-10-15 | End: 2018-10-18 | Stop reason: HOSPADM

## 2018-10-15 RX ORDER — SODIUM CHLORIDE 0.9 % (FLUSH) 0.9 %
10 SYRINGE (ML) INJECTION EVERY 12 HOURS SCHEDULED
Status: DISCONTINUED | OUTPATIENT
Start: 2018-10-15 | End: 2018-10-15 | Stop reason: HOSPADM

## 2018-10-15 RX ADMIN — PHENYLEPHRINE HYDROCHLORIDE 100 MCG: 10 INJECTION INTRAVENOUS at 08:11

## 2018-10-15 RX ADMIN — SACUBITRIL AND VALSARTAN 1 TABLET: 24; 26 TABLET, FILM COATED ORAL at 20:49

## 2018-10-15 RX ADMIN — INSULIN LISPRO 2 UNITS: 100 INJECTION, SOLUTION INTRAVENOUS; SUBCUTANEOUS at 20:55

## 2018-10-15 RX ADMIN — OXYCODONE AND ACETAMINOPHEN 1 TABLET: 5; 325 TABLET ORAL at 15:33

## 2018-10-15 RX ADMIN — DEXAMETHASONE SODIUM PHOSPHATE 10 MG: 10 INJECTION INTRAMUSCULAR; INTRAVENOUS at 07:47

## 2018-10-15 RX ADMIN — MIDAZOLAM 1 MG: 1 INJECTION INTRAMUSCULAR; INTRAVENOUS at 07:47

## 2018-10-15 RX ADMIN — EPHEDRINE SULFATE 10 MG: 50 INJECTION, SOLUTION INTRAVENOUS at 08:11

## 2018-10-15 RX ADMIN — EPHEDRINE SULFATE 10 MG: 50 INJECTION, SOLUTION INTRAVENOUS at 08:01

## 2018-10-15 RX ADMIN — OXYCODONE AND ACETAMINOPHEN 1 TABLET: 5; 325 TABLET ORAL at 11:13

## 2018-10-15 RX ADMIN — CEFAZOLIN SODIUM 1 G: 1 SOLUTION INTRAVENOUS at 11:16

## 2018-10-15 RX ADMIN — CEFAZOLIN SODIUM 1 G: 1 SOLUTION INTRAVENOUS at 18:24

## 2018-10-15 RX ADMIN — HYDROMORPHONE HYDROCHLORIDE 0.25 MG: 1 INJECTION, SOLUTION INTRAMUSCULAR; INTRAVENOUS; SUBCUTANEOUS at 09:30

## 2018-10-15 RX ADMIN — Medication 0.2 MG: at 08:15

## 2018-10-15 RX ADMIN — Medication 10 ML: at 11:20

## 2018-10-15 RX ADMIN — HYDROMORPHONE HYDROCHLORIDE 0.25 MG: 1 INJECTION, SOLUTION INTRAMUSCULAR; INTRAVENOUS; SUBCUTANEOUS at 09:53

## 2018-10-15 RX ADMIN — OXYBUTYNIN CHLORIDE 10 MG: 10 TABLET, FILM COATED, EXTENDED RELEASE ORAL at 11:48

## 2018-10-15 RX ADMIN — Medication 0.6 MG: at 08:46

## 2018-10-15 RX ADMIN — HYDROMORPHONE HYDROCHLORIDE 0.25 MG: 1 INJECTION, SOLUTION INTRAMUSCULAR; INTRAVENOUS; SUBCUTANEOUS at 09:35

## 2018-10-15 RX ADMIN — CARVEDILOL 12.5 MG: 6.25 TABLET, FILM COATED ORAL at 17:17

## 2018-10-15 RX ADMIN — PHENYLEPHRINE HYDROCHLORIDE 100 MCG: 10 INJECTION INTRAVENOUS at 08:03

## 2018-10-15 RX ADMIN — ROCURONIUM BROMIDE 40 MG: 10 INJECTION, SOLUTION INTRAVENOUS at 07:47

## 2018-10-15 RX ADMIN — VANCOMYCIN HYDROCHLORIDE 1000 MG: 1 INJECTION, POWDER, LYOPHILIZED, FOR SOLUTION INTRAVENOUS at 07:30

## 2018-10-15 RX ADMIN — PHENYLEPHRINE HYDROCHLORIDE 100 MCG: 10 INJECTION INTRAVENOUS at 07:58

## 2018-10-15 RX ADMIN — HYDROMORPHONE HYDROCHLORIDE 0.25 MG: 1 INJECTION, SOLUTION INTRAMUSCULAR; INTRAVENOUS; SUBCUTANEOUS at 09:48

## 2018-10-15 RX ADMIN — POTASSIUM CHLORIDE 20 MEQ: 20 TABLET, EXTENDED RELEASE ORAL at 11:13

## 2018-10-15 RX ADMIN — ROPINIROLE HYDROCHLORIDE 3 MG: 2 TABLET, FILM COATED ORAL at 20:49

## 2018-10-15 RX ADMIN — BUTALBITAL, ACETAMINOPHEN, AND CAFFEINE 1 TABLET: 50; 325; 40 TABLET ORAL at 14:21

## 2018-10-15 RX ADMIN — SODIUM CHLORIDE: 9 INJECTION, SOLUTION INTRAVENOUS at 07:41

## 2018-10-15 RX ADMIN — OXYCODONE AND ACETAMINOPHEN 1 TABLET: 5; 325 TABLET ORAL at 20:02

## 2018-10-15 RX ADMIN — FENTANYL CITRATE 100 MCG: 50 INJECTION, SOLUTION INTRAMUSCULAR; INTRAVENOUS at 07:47

## 2018-10-15 RX ADMIN — LINAGLIPTIN 5 MG: 5 TABLET, FILM COATED ORAL at 11:48

## 2018-10-15 RX ADMIN — ROPINIROLE HYDROCHLORIDE 3 MG: 2 TABLET, FILM COATED ORAL at 11:52

## 2018-10-15 RX ADMIN — LIDOCAINE HYDROCHLORIDE 80 MG: 20 INJECTION, SOLUTION INTRAVENOUS at 07:47

## 2018-10-15 RX ADMIN — POTASSIUM CHLORIDE 20 MEQ: 20 TABLET, EXTENDED RELEASE ORAL at 17:19

## 2018-10-15 RX ADMIN — INSULIN LISPRO 6 UNITS: 100 INJECTION, SOLUTION INTRAVENOUS; SUBCUTANEOUS at 17:15

## 2018-10-15 RX ADMIN — NEOSTIGMINE METHYLSULFATE 3 MG: 0.5 INJECTION INTRAVENOUS at 08:46

## 2018-10-15 RX ADMIN — TRAZODONE HYDROCHLORIDE 150 MG: 150 TABLET ORAL at 20:49

## 2018-10-15 RX ADMIN — PROPOFOL 100 MG: 10 INJECTION, EMULSION INTRAVENOUS at 07:47

## 2018-10-15 RX ADMIN — ONDANSETRON HYDROCHLORIDE 4 MG: 2 INJECTION, SOLUTION INTRAMUSCULAR; INTRAVENOUS at 07:47

## 2018-10-15 ASSESSMENT — PAIN SCALES - GENERAL
PAINLEVEL_OUTOF10: 8
PAINLEVEL_OUTOF10: 0
PAINLEVEL_OUTOF10: 6
PAINLEVEL_OUTOF10: 10
PAINLEVEL_OUTOF10: 10
PAINLEVEL_OUTOF10: 5
PAINLEVEL_OUTOF10: 8
PAINLEVEL_OUTOF10: 5
PAINLEVEL_OUTOF10: 5
PAINLEVEL_OUTOF10: 0
PAINLEVEL_OUTOF10: 0
PAINLEVEL_OUTOF10: 5
PAINLEVEL_OUTOF10: 0

## 2018-10-15 ASSESSMENT — PULMONARY FUNCTION TESTS
PIF_VALUE: 25
PIF_VALUE: 17
PIF_VALUE: 0
PIF_VALUE: 18
PIF_VALUE: 24
PIF_VALUE: 17
PIF_VALUE: 0
PIF_VALUE: 17
PIF_VALUE: 18
PIF_VALUE: 17
PIF_VALUE: 9
PIF_VALUE: 17
PIF_VALUE: 20
PIF_VALUE: 23
PIF_VALUE: 18
PIF_VALUE: 25
PIF_VALUE: 18
PIF_VALUE: 17
PIF_VALUE: 18
PIF_VALUE: 19
PIF_VALUE: 18
PIF_VALUE: 19
PIF_VALUE: 17
PIF_VALUE: 18
PIF_VALUE: 18
PIF_VALUE: 19
PIF_VALUE: 17
PIF_VALUE: 18
PIF_VALUE: 17
PIF_VALUE: 18
PIF_VALUE: 17
PIF_VALUE: 24
PIF_VALUE: 0
PIF_VALUE: 16
PIF_VALUE: 16
PIF_VALUE: 25
PIF_VALUE: 15
PIF_VALUE: 20
PIF_VALUE: 25
PIF_VALUE: 23
PIF_VALUE: 16
PIF_VALUE: 25
PIF_VALUE: 24
PIF_VALUE: 24
PIF_VALUE: 18
PIF_VALUE: 0
PIF_VALUE: 18
PIF_VALUE: 19
PIF_VALUE: 18
PIF_VALUE: 25
PIF_VALUE: 2
PIF_VALUE: 24
PIF_VALUE: 1
PIF_VALUE: 17
PIF_VALUE: 2
PIF_VALUE: 17
PIF_VALUE: 17
PIF_VALUE: 18
PIF_VALUE: 23
PIF_VALUE: 18
PIF_VALUE: 16
PIF_VALUE: 18
PIF_VALUE: 17
PIF_VALUE: 19
PIF_VALUE: 25
PIF_VALUE: 1
PIF_VALUE: 4
PIF_VALUE: 17
PIF_VALUE: 14
PIF_VALUE: 17
PIF_VALUE: 18
PIF_VALUE: 17
PIF_VALUE: 17
PIF_VALUE: 19
PIF_VALUE: 17
PIF_VALUE: 3
PIF_VALUE: 17
PIF_VALUE: 24
PIF_VALUE: 24

## 2018-10-15 ASSESSMENT — PAIN DESCRIPTION - PAIN TYPE
TYPE: SURGICAL PAIN

## 2018-10-15 ASSESSMENT — PAIN DESCRIPTION - DESCRIPTORS
DESCRIPTORS: ACHING
DESCRIPTORS: ACHING;DISCOMFORT
DESCRIPTORS: ACHING;DISCOMFORT;CONSTANT
DESCRIPTORS: ACHING;CONSTANT;DISCOMFORT

## 2018-10-15 ASSESSMENT — PAIN DESCRIPTION - LOCATION
LOCATION: HEAD;NECK
LOCATION: HEAD
LOCATION: HEAD
LOCATION: HEAD;NECK

## 2018-10-15 ASSESSMENT — PAIN - FUNCTIONAL ASSESSMENT: PAIN_FUNCTIONAL_ASSESSMENT: 0-10

## 2018-10-15 ASSESSMENT — PAIN DESCRIPTION - FREQUENCY
FREQUENCY: INTERMITTENT
FREQUENCY: CONTINUOUS

## 2018-10-15 ASSESSMENT — PAIN DESCRIPTION - ORIENTATION
ORIENTATION: RIGHT;POSTERIOR
ORIENTATION: POSTERIOR
ORIENTATION: RIGHT
ORIENTATION: RIGHT

## 2018-10-15 ASSESSMENT — LIFESTYLE VARIABLES: SMOKING_STATUS: 1

## 2018-10-15 ASSESSMENT — ENCOUNTER SYMPTOMS: SHORTNESS OF BREATH: 1

## 2018-10-15 NOTE — ANESTHESIA POSTPROCEDURE EVALUATION
Department of Anesthesiology  Postprocedure Note    Patient: Maia De Souza  MRN: [id number]  YOB: 1955  Date of evaluation: 10/15/2018  Time:  9:32 AM     Procedure Summary     Date:  10/15/18 Room / Location:  [facility] 07 / [address]    Anesthesia Start:  1124 Anesthesia Stop:  5542    Procedure:  VENTRICULAR PERITONEAL SHUNT  PLACEMENT (N/A ) Diagnosis:  (NORMAL PRESSURE HYDROCEPHALUS )    Surgeon:  Katina Kehr, MD Responsible Provider:  Ayala Fowler MD    Anesthesia Type:  general ASA Status:  4          Anesthesia Type: general    Sg Phase I: Sg Score: 8    Sg Phase II:      Last vitals: Reviewed and per EMR flowsheets.        Anesthesia Post Evaluation    Patient location during evaluation: PACU  Patient participation: complete - patient participated  Level of consciousness: awake and alert  Pain score: 2  Airway patency: patent  Nausea & Vomiting: no vomiting and no nausea  Complications: no  Cardiovascular status: hemodynamically stable  Respiratory status: spontaneous ventilation  Hydration status: stable

## 2018-10-15 NOTE — ANESTHESIA PRE PROCEDURE
23% +- 5%):, hyperlipidemia      ECG reviewed  Rhythm: regular  Rate: normal  Echocardiogram reviewed  Stress test reviewed       Beta Blocker:  Dose within 24 Hrs         Neuro/Psych:   (+) neuromuscular disease:, TIA,              ROS comment: Pt presents with weakness when walking. Ataxia and apraxia. Bilateral leg weakness  GI/Hepatic/Renal:   (+) GERD: well controlled, renal disease: CRI,           Endo/Other:    (+) DiabetesType II DM, , .                 Abdominal:           Vascular:                                        Anesthesia Plan      general     ASA 4     (GA. Discussed extubation trail and plan for ETT removal.)  Induction: intravenous. BIS  MIPS: Postoperative opioids intended and Prophylactic antiemetics administered. Anesthetic plan and risks discussed with patient. Use of blood products discussed with patient whom consented to blood products. Plan discussed with CRNA and attending. Attending anesthesiologist reviewed and agrees with Pre Eval content            Quinten Valdez RN   10/15/2018      DOS STAFF ADDENDUM:    Pt seen and examined, physical exam updated, chart reviewed including anesthesia, drug and allergy history. H&P reviewed. No interval changes to history or physical examination (unless noted above). NPO status confirmed. Anesthetic plan, risks, benefits, alternatives discussed with patient. Patient verbalized an understanding and agrees to proceed.      Sandy rAteaga MD  Staff Anesthesiologist

## 2018-10-15 NOTE — BRIEF OP NOTE
Brief Postoperative Note  ______________________________________________________________    Patient: Clarisa Workman  YOB: 1955  MRN: [id number]  Date of Procedure: 10/15/2018    Pre-Op Diagnosis: NORMAL PRESSURE HYDROCEPHALUS     Post-Op Diagnosis: Same       Procedure(s):  VENTRICULAR PERITONEAL SHUNT  PLACEMENT    Anesthesia: General    Surgeon(s):  MD Lesli Ray MD    Staff:  Surgical Assistant: Clemencia Malik RN  Scrub Person First: Jourdan Brown  Scrub Person Second: Dilma Williamson     Estimated Blood Loss: * No values recorded between 10/15/2018  7:42 AM and 10/15/2018  9:01 AM * None    Complications: None    Specimens:   * No specimens in log *    Implants:    Implant Name Type Inv.  Item Serial No.  Lot No. LRB No. Used   [facility] [employer]UM VENTRICULAR CATHETER 15CM, WTT#         [id number] N/A 1         Drains:      Findings: As expected    Bill Gil MD  Date: 10/15/2018  Time: 9:01 AM

## 2018-10-16 ENCOUNTER — APPOINTMENT (OUTPATIENT)
Dept: CT IMAGING | Age: 63
DRG: 032 | End: 2018-10-16
Attending: NEUROLOGICAL SURGERY
Payer: MEDICARE

## 2018-10-16 LAB
METER GLUCOSE: 115 MG/DL (ref 70–110)
METER GLUCOSE: 140 MG/DL (ref 70–110)
METER GLUCOSE: 169 MG/DL (ref 70–110)
METER GLUCOSE: 170 MG/DL (ref 70–110)
MRSA CULTURE ONLY: NORMAL

## 2018-10-16 PROCEDURE — 97162 PT EVAL MOD COMPLEX 30 MIN: CPT

## 2018-10-16 PROCEDURE — APPSS60 APP SPLIT SHARED TIME 46-60 MINUTES: Performed by: NURSE PRACTITIONER

## 2018-10-16 PROCEDURE — 70450 CT HEAD/BRAIN W/O DYE: CPT

## 2018-10-16 PROCEDURE — 6370000000 HC RX 637 (ALT 250 FOR IP): Performed by: NURSE PRACTITIONER

## 2018-10-16 PROCEDURE — 97530 THERAPEUTIC ACTIVITIES: CPT

## 2018-10-16 PROCEDURE — 2060000000 HC ICU INTERMEDIATE R&B

## 2018-10-16 PROCEDURE — G8979 MOBILITY GOAL STATUS: HCPCS

## 2018-10-16 PROCEDURE — 82962 GLUCOSE BLOOD TEST: CPT

## 2018-10-16 PROCEDURE — G8988 SELF CARE GOAL STATUS: HCPCS

## 2018-10-16 PROCEDURE — 97165 OT EVAL LOW COMPLEX 30 MIN: CPT

## 2018-10-16 PROCEDURE — G8987 SELF CARE CURRENT STATUS: HCPCS

## 2018-10-16 PROCEDURE — 6370000000 HC RX 637 (ALT 250 FOR IP): Performed by: NEUROLOGICAL SURGERY

## 2018-10-16 PROCEDURE — G8978 MOBILITY CURRENT STATUS: HCPCS

## 2018-10-16 PROCEDURE — 97535 SELF CARE MNGMENT TRAINING: CPT

## 2018-10-16 RX ORDER — METHOCARBAMOL 500 MG/1
500 TABLET, FILM COATED ORAL ONCE
Status: COMPLETED | OUTPATIENT
Start: 2018-10-16 | End: 2018-10-16

## 2018-10-16 RX ORDER — BISACODYL 10 MG
10 SUPPOSITORY, RECTAL RECTAL ONCE
Status: COMPLETED | OUTPATIENT
Start: 2018-10-16 | End: 2018-10-16

## 2018-10-16 RX ADMIN — INSULIN LISPRO 3 UNITS: 100 INJECTION, SOLUTION INTRAVENOUS; SUBCUTANEOUS at 16:33

## 2018-10-16 RX ADMIN — BISACODYL 10 MG: 10 SUPPOSITORY RECTAL at 17:50

## 2018-10-16 RX ADMIN — OXYCODONE AND ACETAMINOPHEN 1 TABLET: 5; 325 TABLET ORAL at 10:33

## 2018-10-16 RX ADMIN — OXYCODONE AND ACETAMINOPHEN 1 TABLET: 5; 325 TABLET ORAL at 05:58

## 2018-10-16 RX ADMIN — SACUBITRIL AND VALSARTAN 1 TABLET: 24; 26 TABLET, FILM COATED ORAL at 20:12

## 2018-10-16 RX ADMIN — METHOCARBAMOL 500 MG: 500 TABLET, FILM COATED ORAL at 16:13

## 2018-10-16 RX ADMIN — OXYCODONE AND ACETAMINOPHEN 1 TABLET: 5; 325 TABLET ORAL at 14:47

## 2018-10-16 RX ADMIN — LINAGLIPTIN 5 MG: 5 TABLET, FILM COATED ORAL at 08:12

## 2018-10-16 RX ADMIN — OXYBUTYNIN CHLORIDE 10 MG: 10 TABLET, FILM COATED, EXTENDED RELEASE ORAL at 08:13

## 2018-10-16 RX ADMIN — INSULIN LISPRO 3 UNITS: 100 INJECTION, SOLUTION INTRAVENOUS; SUBCUTANEOUS at 11:32

## 2018-10-16 RX ADMIN — ROPINIROLE HYDROCHLORIDE 3 MG: 2 TABLET, FILM COATED ORAL at 20:12

## 2018-10-16 RX ADMIN — TRAZODONE HYDROCHLORIDE 150 MG: 150 TABLET ORAL at 20:11

## 2018-10-16 RX ADMIN — BUPROPION HYDROCHLORIDE 300 MG: 300 TABLET, FILM COATED, EXTENDED RELEASE ORAL at 08:03

## 2018-10-16 RX ADMIN — OXYCODONE AND ACETAMINOPHEN 1 TABLET: 5; 325 TABLET ORAL at 18:47

## 2018-10-16 RX ADMIN — CARVEDILOL 12.5 MG: 6.25 TABLET, FILM COATED ORAL at 08:05

## 2018-10-16 RX ADMIN — SPIRONOLACTONE 25 MG: 25 TABLET ORAL at 08:05

## 2018-10-16 RX ADMIN — POTASSIUM CHLORIDE 20 MEQ: 20 TABLET, EXTENDED RELEASE ORAL at 08:05

## 2018-10-16 RX ADMIN — ISOSORBIDE MONONITRATE 30 MG: 30 TABLET, EXTENDED RELEASE ORAL at 08:05

## 2018-10-16 RX ADMIN — BUTALBITAL, ACETAMINOPHEN, AND CAFFEINE 1 TABLET: 50; 325; 40 TABLET ORAL at 08:03

## 2018-10-16 RX ADMIN — OXYCODONE AND ACETAMINOPHEN 1 TABLET: 5; 325 TABLET ORAL at 00:38

## 2018-10-16 RX ADMIN — ROPINIROLE HYDROCHLORIDE 3 MG: 2 TABLET, FILM COATED ORAL at 08:03

## 2018-10-16 RX ADMIN — POTASSIUM CHLORIDE 20 MEQ: 20 TABLET, EXTENDED RELEASE ORAL at 16:16

## 2018-10-16 RX ADMIN — SACUBITRIL AND VALSARTAN 1 TABLET: 24; 26 TABLET, FILM COATED ORAL at 08:03

## 2018-10-16 RX ADMIN — INSULIN LISPRO 2 UNITS: 100 INJECTION, SOLUTION INTRAVENOUS; SUBCUTANEOUS at 21:28

## 2018-10-16 ASSESSMENT — PAIN DESCRIPTION - LOCATION
LOCATION: HEAD;NECK
LOCATION: HEAD;NECK
LOCATION: NECK

## 2018-10-16 ASSESSMENT — PAIN SCALES - GENERAL
PAINLEVEL_OUTOF10: 8
PAINLEVEL_OUTOF10: 4
PAINLEVEL_OUTOF10: 8
PAINLEVEL_OUTOF10: 8
PAINLEVEL_OUTOF10: 7
PAINLEVEL_OUTOF10: 0
PAINLEVEL_OUTOF10: 9
PAINLEVEL_OUTOF10: 5
PAINLEVEL_OUTOF10: 0
PAINLEVEL_OUTOF10: 5
PAINLEVEL_OUTOF10: 8
PAINLEVEL_OUTOF10: 4
PAINLEVEL_OUTOF10: 8

## 2018-10-16 ASSESSMENT — PAIN DESCRIPTION - DESCRIPTORS: DESCRIPTORS: ACHING;DISCOMFORT

## 2018-10-16 ASSESSMENT — PAIN DESCRIPTION - PAIN TYPE
TYPE: SURGICAL PAIN

## 2018-10-16 ASSESSMENT — PAIN DESCRIPTION - ORIENTATION
ORIENTATION: RIGHT;POSTERIOR

## 2018-10-16 NOTE — CONSULTS
History and Physical - General Surgery    Patient's Name/Date of Birth: Sai Cruz / 1955    Date: 9/14/2018    PCP: Gisela Edge DO    Referring Physician:   No ref. provider found  N/A      CHIEF COMPLAINT:  Shuffling gait      HISTORY OF PRESENT ILLNESS:    Sai Cruz is an 58 y.o. female who presents with shuffling gait, confusion, occasional urinary incontinence. The patient had a positive high volume CSF tap. I have been asked by Dr. Nola Ott to assist with a  shunt today.        Past Medical History:   Past Medical History:   Diagnosis Date    Acute on chronic respiratory failure with hypoxia and hypercapnia ([address] 7/25/2018    Apraxia 7/23/2018    Ataxia 7/23/2018    CAD (coronary artery disease) 10/4/2012    Choledocholithiasis     recurrent    Chronic systolic congestive heart failure ([facility]) 8/1/2018    Ejection fraction 23% plus/minus 5%    CKD (chronic kidney disease) stage 3, GFR 30-59 ml/min ([facility]) 7/23/2018    COPD (chronic obstructive pulmonary disease) ([facility])     alpha one M1S 183mg/dl    Hyperlipidemia     Hypoxia 7/23/2018    Normal pressure hydrocephalus 7/28/2018    Pleural effusion     requiring right thoracentesis    Pulmonary hypertension ([facility] 7/23/2018    Restless legs     S/P CABG x 2 10/4/2012    Severe mitral regurgitation 8/1/2018    Smoker 10/4/2012        Past Surgical History:   Past Surgical History:   Procedure Laterality Date    CARDIAC SURGERY      CERVICAL FUSION  1999    C3-C6    CHOLECYSTECTOMY  2002    CORONARY ARTERY BYPASS GRAFT  10/30/2002    HYSTERECTOMY  1988    LUMBAR DRAIN IMPLANTATION  07/27/2018    HI SPINAL PUNCTURE,THERAPEUTIC DRAINAGE N/A 7/27/2018    LUMBAR DRAIN INSERTION performed by Tg Palacios MD at [facility] OR        Allergies: Pentazocine lactate and Sulfa antibiotics     Medications:   Current Facility-Administered Medications   Medication Dose Route Frequency Provider Last Rate Last Dose    sodium chloride flush 0.9
08/03/2018    CL 94 10/15/2018    CO2 31 10/15/2018    BUN 18 10/15/2018    CREATININE 1.2 10/15/2018    GFRAA 55 10/15/2018    LABGLOM 45 10/15/2018    GLUCOSE 149 10/15/2018    GLUCOSE 111 09/30/2011    PROT 6.6 07/29/2018    LABALBU 2.7 07/29/2018    LABALBU 4.4 09/30/2011    CALCIUM 9.8 10/15/2018    BILITOT 0.6 07/29/2018    ALKPHOS 90 07/29/2018    AST 15 07/29/2018    ALT 7 07/29/2018       ASSESSMENT:      Active Hospital Problems    Diagnosis    Chronic systolic congestive heart failure (HCC) [I50.22]     Priority: High     Class: Chronic    Severe mitral regurgitation [I34.0]     Priority: High     Class: Chronic    Pulmonary hypertension (HCC) [I27.20]     Priority: High     Class: Chronic    CKD (chronic kidney disease) stage 3, GFR 30-59 ml/min ([facility]) [N18.3]     Priority: High    Normal pressure hydrocephalus [G91.2]    COPD (chronic obstructive pulmonary disease) ([facility]) [J44.9]    CAD (coronary artery disease) [I25.10]       PLAN:  Continue tx as outlined               Cardiac and pulmonary status stable         Yesi Aldrich DO  11:08 AM  10/16/2018

## 2018-10-16 NOTE — PLAN OF CARE
Problem: Falls - Risk of:  Goal: Will remain free from falls  Will remain free from falls   Outcome: Met This Shift      Problem:  Activity:  Goal: Ability to tolerate increased activity will improve  Ability to tolerate increased activity will improve   Outcome: Met This Shift

## 2018-10-16 NOTE — PLAN OF CARE
Problem: Infection - Surgical Site:  Goal: Will remain free from infection  Will remain free from infection   Outcome: Met This Shift      Problem: Verbal Communication - Impaired:  Goal: Functional communication will improve  Functional communication will improve   Outcome: Met This Shift      Problem: Falls - Risk of:  Goal: Will remain free from falls  Will remain free from falls   Outcome: Met This Shift

## 2018-10-17 LAB
ALBUMIN SERPL-MCNC: 3.5 G/DL (ref 3.5–5.2)
ALP BLD-CCNC: 57 U/L (ref 35–104)
ALT SERPL-CCNC: 8 U/L (ref 0–32)
ANION GAP SERPL CALCULATED.3IONS-SCNC: 11 MMOL/L (ref 7–16)
AST SERPL-CCNC: 18 U/L (ref 0–31)
BILIRUB SERPL-MCNC: 0.3 MG/DL (ref 0–1.2)
BUN BLDV-MCNC: 9 MG/DL (ref 8–23)
CALCIUM SERPL-MCNC: 9.9 MG/DL (ref 8.6–10.2)
CHLORIDE BLD-SCNC: 99 MMOL/L (ref 98–107)
CO2: 30 MMOL/L (ref 22–29)
CREAT SERPL-MCNC: 0.8 MG/DL (ref 0.5–1)
GFR AFRICAN AMERICAN: >60
GFR NON-AFRICAN AMERICAN: >60 ML/MIN/1.73
GLUCOSE BLD-MCNC: 102 MG/DL (ref 74–109)
METER GLUCOSE: 118 MG/DL (ref 70–110)
METER GLUCOSE: 148 MG/DL (ref 70–110)
METER GLUCOSE: 153 MG/DL (ref 70–110)
METER GLUCOSE: 87 MG/DL (ref 70–110)
POTASSIUM SERPL-SCNC: 4.3 MMOL/L (ref 3.5–5)
SODIUM BLD-SCNC: 140 MMOL/L (ref 132–146)
TOTAL PROTEIN: 6.9 G/DL (ref 6.4–8.3)

## 2018-10-17 PROCEDURE — 6360000002 HC RX W HCPCS: Performed by: NURSE PRACTITIONER

## 2018-10-17 PROCEDURE — 6370000000 HC RX 637 (ALT 250 FOR IP): Performed by: NEUROLOGICAL SURGERY

## 2018-10-17 PROCEDURE — 36415 COLL VENOUS BLD VENIPUNCTURE: CPT

## 2018-10-17 PROCEDURE — 6370000000 HC RX 637 (ALT 250 FOR IP): Performed by: NURSE PRACTITIONER

## 2018-10-17 PROCEDURE — 2060000000 HC ICU INTERMEDIATE R&B

## 2018-10-17 PROCEDURE — 82962 GLUCOSE BLOOD TEST: CPT

## 2018-10-17 PROCEDURE — 80053 COMPREHEN METABOLIC PANEL: CPT

## 2018-10-17 RX ORDER — ONDANSETRON 2 MG/ML
4 INJECTION INTRAMUSCULAR; INTRAVENOUS EVERY 6 HOURS PRN
Status: DISCONTINUED | OUTPATIENT
Start: 2018-10-17 | End: 2018-10-17

## 2018-10-17 RX ADMIN — LINAGLIPTIN 5 MG: 5 TABLET, FILM COATED ORAL at 08:26

## 2018-10-17 RX ADMIN — SACUBITRIL AND VALSARTAN 1 TABLET: 24; 26 TABLET, FILM COATED ORAL at 21:51

## 2018-10-17 RX ADMIN — OXYCODONE AND ACETAMINOPHEN 1 TABLET: 5; 325 TABLET ORAL at 15:38

## 2018-10-17 RX ADMIN — BUTALBITAL, ACETAMINOPHEN, AND CAFFEINE 1 TABLET: 50; 325; 40 TABLET ORAL at 09:14

## 2018-10-17 RX ADMIN — SPIRONOLACTONE 25 MG: 25 TABLET ORAL at 08:26

## 2018-10-17 RX ADMIN — TRIMETHOBENZAMIDE HYDROCHLORIDE 300 MG: 300 CAPSULE ORAL at 11:00

## 2018-10-17 RX ADMIN — OXYCODONE AND ACETAMINOPHEN 1 TABLET: 5; 325 TABLET ORAL at 11:34

## 2018-10-17 RX ADMIN — ROPINIROLE HYDROCHLORIDE 3 MG: 2 TABLET, FILM COATED ORAL at 21:51

## 2018-10-17 RX ADMIN — CARVEDILOL 12.5 MG: 6.25 TABLET, FILM COATED ORAL at 16:09

## 2018-10-17 RX ADMIN — ROPINIROLE HYDROCHLORIDE 3 MG: 2 TABLET, FILM COATED ORAL at 08:25

## 2018-10-17 RX ADMIN — POTASSIUM CHLORIDE 20 MEQ: 20 TABLET, EXTENDED RELEASE ORAL at 16:09

## 2018-10-17 RX ADMIN — TRAZODONE HYDROCHLORIDE 150 MG: 150 TABLET ORAL at 21:51

## 2018-10-17 RX ADMIN — OXYBUTYNIN CHLORIDE 10 MG: 10 TABLET, FILM COATED, EXTENDED RELEASE ORAL at 08:26

## 2018-10-17 RX ADMIN — TRIMETHOBENZAMIDE HYDROCHLORIDE 200 MG: 100 INJECTION INTRAMUSCULAR at 20:51

## 2018-10-17 RX ADMIN — OXYCODONE AND ACETAMINOPHEN 1 TABLET: 5; 325 TABLET ORAL at 07:15

## 2018-10-17 RX ADMIN — OXYCODONE AND ACETAMINOPHEN 1 TABLET: 5; 325 TABLET ORAL at 20:03

## 2018-10-17 RX ADMIN — POTASSIUM CHLORIDE 20 MEQ: 20 TABLET, EXTENDED RELEASE ORAL at 08:26

## 2018-10-17 RX ADMIN — SACUBITRIL AND VALSARTAN 1 TABLET: 24; 26 TABLET, FILM COATED ORAL at 08:26

## 2018-10-17 RX ADMIN — BUPROPION HYDROCHLORIDE 300 MG: 300 TABLET, FILM COATED, EXTENDED RELEASE ORAL at 10:57

## 2018-10-17 RX ADMIN — INSULIN LISPRO 2 UNITS: 100 INJECTION, SOLUTION INTRAVENOUS; SUBCUTANEOUS at 21:51

## 2018-10-17 RX ADMIN — CARVEDILOL 12.5 MG: 6.25 TABLET, FILM COATED ORAL at 08:26

## 2018-10-17 RX ADMIN — ISOSORBIDE MONONITRATE 30 MG: 30 TABLET, EXTENDED RELEASE ORAL at 08:26

## 2018-10-17 ASSESSMENT — PAIN DESCRIPTION - PAIN TYPE
TYPE: ACUTE PAIN
TYPE: ACUTE PAIN
TYPE: ACUTE PAIN;SURGICAL PAIN

## 2018-10-17 ASSESSMENT — PAIN SCALES - GENERAL
PAINLEVEL_OUTOF10: 0
PAINLEVEL_OUTOF10: 0
PAINLEVEL_OUTOF10: 4
PAINLEVEL_OUTOF10: 6
PAINLEVEL_OUTOF10: 9
PAINLEVEL_OUTOF10: 0
PAINLEVEL_OUTOF10: 10
PAINLEVEL_OUTOF10: 0
PAINLEVEL_OUTOF10: 10
PAINLEVEL_OUTOF10: 0
PAINLEVEL_OUTOF10: 8
PAINLEVEL_OUTOF10: 0
PAINLEVEL_OUTOF10: 10
PAINLEVEL_OUTOF10: 0
PAINLEVEL_OUTOF10: 0
PAINLEVEL_OUTOF10: 8
PAINLEVEL_OUTOF10: 0

## 2018-10-17 ASSESSMENT — PAIN DESCRIPTION - DESCRIPTORS
DESCRIPTORS: ACHING;DISCOMFORT
DESCRIPTORS: DISCOMFORT

## 2018-10-17 ASSESSMENT — PAIN DESCRIPTION - LOCATION
LOCATION: ABDOMEN
LOCATION: ABDOMEN
LOCATION: HEAD

## 2018-10-17 ASSESSMENT — PAIN DESCRIPTION - FREQUENCY
FREQUENCY: INTERMITTENT
FREQUENCY: INTERMITTENT

## 2018-10-17 NOTE — CARE COORDINATION
Social Work/Discharge Planning:      SW continuing to follow to assist with d/c planning. PM&R consult in pt chart. Per pt ARU choice is ARU here. Awaiting Decision of PM&R consult. SW will follow.     Electronically signed by DEENA Rojas on 10/17/2018 at 7:32 AM

## 2018-10-17 NOTE — PROGRESS NOTES
Paged Dr. Shirlene Barber regarding patients pain, she is experiencing in her abdomen. Awaiting new orders.

## 2018-10-17 NOTE — PROGRESS NOTES
arrangements: [x] Home  [] Assisted living  [] SNF [] Other  Lived with:  [] Alone  [x] Spouse  [x] Family  [] Other    Lived with: , daughter, and 4 grandchildren  Home:  2 story home  2 entry steps  Rails: 0  Steps:  Unknown to 2nd floor  Rails:  unknown   Bedroom: [x] 1st floor  [] 2nd floor    Bathroom:  [x] 1st floor  [] 2nd floor    Prior Functional Level:  Assistance for: min assist as needed for ADLs, IADLs, uses cane/walker in community  Dependent for: driving  Dominant hand: [x] Right  [] Left    Previous Home Equipment:  [x] [employer] [] Grab bars [] Orthotic / prosthetic   [] Shower chair [] Tub bench  [] 3-in-1 Commode [] Long handle sponge   [] Oxygen [] Sock aide  [] Wheelchair  [] motorized wc/scooter  [] Wheelchair cushion   [] Crutches [] Long handle shoehorn  [] Reachers [] Toilet seat elevator  [x] Walker(wheeled)   [] Walker(standard) [] Mechanical lift    [] None of the above    MEDICAL UPDATE:  History of present admission:  Ko Guerrero is an 58 y.o. female who presents with shuffling gait, confusion, occasional urinary incontinence. The patient had a positive high volume CSF tap. Dr. Shekhar Reyes and Dr. Arnaldo Fong preformed  shunt on 10/15/18.     Past Medical:  Past Medical History:   Diagnosis Date    Acute on chronic respiratory failure with hypoxia and hypercapnia ([facility] 7/25/2018    Apraxia 7/23/2018    Ataxia 7/23/2018    CAD (coronary artery disease) 10/4/2012    Choledocholithiasis     recurrent    Chronic systolic congestive heart failure (HCC) 8/1/2018    Ejection fraction 23% plus/minus 5%    CKD (chronic kidney disease) stage 3, GFR 30-59 ml/min ([facility]) 7/23/2018    COPD (chronic obstructive pulmonary disease) ([facility])     alpha one M1S 183mg/dl    Hyperlipidemia     Hypoxia 7/23/2018    Normal pressure hydrocephalus 7/28/2018    Pleural effusion     requiring right thoracentesis    Pulmonary hypertension ([facility] 7/23/2018    Restless legs     S/P CABG x 2 10/4/2012    [] Central Line    [] TPN       [x] Oxygen: [] Trach [] Bi-PAP [] CPAP  [x] Nasal cannula  [x] Liters: 3L home O2     [x] Wound Care:   [] Pressure ulcers(stage and location)    [] Wound vac   [x] Wound or incision care right neck, right head, abdomen    [x] Pain Management (level of pain, meds): 10-0 headache, controlled with  Percocet and Fioricet    [] Incontinence Bladder [] Decker  Insertion date:   []Hemodialysis and  Frequency:   [] Incontinence Bowel    [x] Last bowel movement :10/16/18    [x] Substance use history:  [x] Tobacco (history)  [] Alcohol  [] Other     [] Ethnic  [] Cultural  [] Spiritual  [] Language [] Needs  [] Other than English  [] Hearing Impaired  [] Visually Impaired  [] Speaking Impaired  [] Blind    [] Special equipment:  [] Devices/Splints  [] Type   [] Brace   [] Type  [] Bariatric bed  [] Extra wide commode  [] Extra wide wheelchair [] Extra wide walker  [] Alex walker  [] Alex wheelchair  [] Transfer lift    [] Other equipment     FUNCTIONAL STATUS PT / [place] / Mali Ferreira:  FIM / EVAL Discipline Initial: 10/16/18 Follow Up: 10/18/18 Current:    Eating OT Set up   Set up      Grooming OT Set up   Minimum assistance      Bathing OT Minimum assistance   nt    Dressing Upper Extremity OT Minimum assistance   Stand by Assist      Dressing Lower Extremity OT Moderate Assist   Minimum assistance      Toileting OT nt Minimum assistance      Toilet Transfers OT Minimum assistance   Minimum assistance      Tub/Shower Transfers OT nt nt    Homemaking OT nt nt    Bed Mobility PT Stand by Assist   Stand by Assist      Bed/Wheelchair Transfers PT Minimum assistance   Minimum assistance      Locomotion Walk / Wheelchair  Device:  Distance: PT 79' with Minimum assistance  No device 75 feet x2 and 15 x 1 with Minimum assistance      Endurance PT      Expression SP      Social Interaction SP      Problem Solving SP      Memory SP      Comprehension SP      Swallowing SP      Bowel Management

## 2018-10-17 NOTE — PLAN OF CARE
Problem: Nausea/Vomiting:  Goal: Absence of nausea/vomiting  Absence of nausea/vomiting   Outcome: Not Met This Shift      Problem: Falls - Risk of:  Goal: Will remain free from falls  Will remain free from falls   Outcome: Met This Shift    Goal: Absence of physical injury  Absence of physical injury   Outcome: Met This Shift

## 2018-10-17 NOTE — PROGRESS NOTES
Chief Complaint: head ache with N/V    Subjective: The patient is alert. Headache. Denies chest pain & SOB . Post op abdominal pain. Nausea with vomiting this AM.    Objective:    Vitals:    10/17/18 1000   BP: 117/78   Pulse: 108   Resp: 21   Temp:    SpO2: 93%     A&O x's 3  Heart:  RRR, no murmurs, gallops, or rubs. Lungs:  CTA bilaterally, no wheeze, rales or rhonchi  Abd: bowel sounds present,  tender, nondistended, no masses  Extrem:  No clubbing, cyanosis, or edema  Tele: NSR    Lab Results   Component Value Date     10/17/2018    K 4.3 10/17/2018    K 4.7 2018    CL 99 10/17/2018    CO2 30 10/17/2018    BUN 9 10/17/2018    CREATININE 0.8 10/17/2018    CALCIUM 9.9 10/17/2018      Lab Results   Component Value Date    WBC 8.7 10/15/2018    RBC 3.76 10/15/2018    HGB 11.7 10/15/2018    HCT 35.2 10/15/2018    MCV 93.6 10/15/2018    MCH 31.1 10/15/2018    MCHC 33.2 10/15/2018    RDW 14.6 10/15/2018     10/15/2018    MPV 10.9 10/15/2018     PT/INR:    Lab Results   Component Value Date    PROTIME 13.8 2018    INR 1.2 2018     No results for input(s): POCGLU in the last 72 hours. Recent Labs      10/15/18   0720  10/17/18   0955   NA  138  140   K  4.0  4.3   CL  94*  99   CO2  31*  30*   BUN  18  9   LABALBU   --   3.5   CREATININE  1.2*  0.8   CALCIUM  9.8  9.9   GFRAA  55  >60   LABGLOM  45  >60   GLUCOSE  149*  102       Ct Head Wo Contrast    Result Date: 10/16/2018  Patient MRN:  [id number] : 1955 Age: 58 years Gender: Female Order Date:  10/16/2018 6:00 AM EXAM: CT HEAD WO CONTRAST NUMBER OF IMAGES:  112 INDICATION:  Post op check  COMPARISON: 2018 TECHNIQUE: Routine axial images through the head without contrast. Coronal and sagittal reformations. Low-dose CT  acquisition technique included one of following options: 1 . Automated exposure control 2. Adjustment of MA and or KV according to patient's size or 3. Use of iterative reconstruction.  FINDINGS:

## 2018-10-18 ENCOUNTER — HOSPITAL ENCOUNTER (INPATIENT)
Age: 63
LOS: 13 days | Discharge: HOME HEALTH | DRG: 056 | End: 2018-10-31
Attending: PHYSICAL MEDICINE & REHABILITATION | Admitting: PHYSICAL MEDICINE & REHABILITATION
Payer: MEDICARE

## 2018-10-18 VITALS
OXYGEN SATURATION: 93 % | RESPIRATION RATE: 20 BRPM | SYSTOLIC BLOOD PRESSURE: 108 MMHG | TEMPERATURE: 98.7 F | HEIGHT: 62 IN | BODY MASS INDEX: 19.25 KG/M2 | HEART RATE: 92 BPM | WEIGHT: 104.6 LBS | DIASTOLIC BLOOD PRESSURE: 68 MMHG

## 2018-10-18 PROBLEM — G91.2 NPH (NORMAL PRESSURE HYDROCEPHALUS) (HCC): Status: ACTIVE | Noted: 2018-10-18

## 2018-10-18 LAB
METER GLUCOSE: 102 MG/DL (ref 70–110)
METER GLUCOSE: 124 MG/DL (ref 70–110)
METER GLUCOSE: 157 MG/DL (ref 70–110)
METER GLUCOSE: 164 MG/DL (ref 70–110)

## 2018-10-18 PROCEDURE — 99024 POSTOP FOLLOW-UP VISIT: CPT | Performed by: SURGERY

## 2018-10-18 PROCEDURE — 6370000000 HC RX 637 (ALT 250 FOR IP): Performed by: PHYSICIAN ASSISTANT

## 2018-10-18 PROCEDURE — 97535 SELF CARE MNGMENT TRAINING: CPT

## 2018-10-18 PROCEDURE — 1280000000 HC REHAB R&B

## 2018-10-18 PROCEDURE — 97530 THERAPEUTIC ACTIVITIES: CPT

## 2018-10-18 PROCEDURE — 82962 GLUCOSE BLOOD TEST: CPT

## 2018-10-18 PROCEDURE — 6360000002 HC RX W HCPCS: Performed by: NURSE PRACTITIONER

## 2018-10-18 PROCEDURE — 6370000000 HC RX 637 (ALT 250 FOR IP): Performed by: NEUROLOGICAL SURGERY

## 2018-10-18 RX ORDER — OXYCODONE HYDROCHLORIDE AND ACETAMINOPHEN 5; 325 MG/1; MG/1
1 TABLET ORAL EVERY 4 HOURS PRN
Status: CANCELLED | OUTPATIENT
Start: 2018-10-18

## 2018-10-18 RX ORDER — TRAZODONE HYDROCHLORIDE 150 MG/1
150 TABLET ORAL NIGHTLY
Status: DISCONTINUED | OUTPATIENT
Start: 2018-10-18 | End: 2018-10-26

## 2018-10-18 RX ORDER — POTASSIUM CHLORIDE 20 MEQ/1
20 TABLET, EXTENDED RELEASE ORAL 2 TIMES DAILY WITH MEALS
Status: CANCELLED | OUTPATIENT
Start: 2018-10-18

## 2018-10-18 RX ORDER — BUTALBITAL, ACETAMINOPHEN AND CAFFEINE 50; 325; 40 MG/1; MG/1; MG/1
1 TABLET ORAL EVERY 4 HOURS PRN
Status: CANCELLED | OUTPATIENT
Start: 2018-10-18

## 2018-10-18 RX ORDER — NICOTINE POLACRILEX 4 MG
15 LOZENGE BUCCAL PRN
Status: DISCONTINUED | OUTPATIENT
Start: 2018-10-18 | End: 2018-10-23

## 2018-10-18 RX ORDER — POTASSIUM CHLORIDE 20 MEQ/1
20 TABLET, EXTENDED RELEASE ORAL 2 TIMES DAILY WITH MEALS
Status: DISCONTINUED | OUTPATIENT
Start: 2018-10-18 | End: 2018-10-23

## 2018-10-18 RX ORDER — ALBUTEROL SULFATE 2.5 MG/3ML
2.5 SOLUTION RESPIRATORY (INHALATION) EVERY 6 HOURS PRN
Status: CANCELLED | OUTPATIENT
Start: 2018-10-18

## 2018-10-18 RX ORDER — ERGOCALCIFEROL 1.25 MG/1
50000 CAPSULE ORAL
Status: DISCONTINUED | OUTPATIENT
Start: 2018-10-25 | End: 2018-10-31 | Stop reason: HOSPADM

## 2018-10-18 RX ORDER — ERGOCALCIFEROL 1.25 MG/1
50000 CAPSULE ORAL
Status: CANCELLED | OUTPATIENT
Start: 2018-10-25

## 2018-10-18 RX ORDER — NICOTINE POLACRILEX 4 MG
15 LOZENGE BUCCAL PRN
Status: CANCELLED | OUTPATIENT
Start: 2018-10-18

## 2018-10-18 RX ORDER — CARVEDILOL 6.25 MG/1
12.5 TABLET ORAL 2 TIMES DAILY WITH MEALS
Status: CANCELLED | OUTPATIENT
Start: 2018-10-18

## 2018-10-18 RX ORDER — OXYBUTYNIN CHLORIDE 10 MG/1
10 TABLET, EXTENDED RELEASE ORAL DAILY
Status: DISCONTINUED | OUTPATIENT
Start: 2018-10-19 | End: 2018-10-31 | Stop reason: HOSPADM

## 2018-10-18 RX ORDER — DEXTROSE MONOHYDRATE 25 G/50ML
12.5 INJECTION, SOLUTION INTRAVENOUS PRN
Status: DISCONTINUED | OUTPATIENT
Start: 2018-10-18 | End: 2018-10-23

## 2018-10-18 RX ORDER — FLUTICASONE PROPIONATE 50 MCG
1 SPRAY, SUSPENSION (ML) NASAL DAILY PRN
Status: CANCELLED | OUTPATIENT
Start: 2018-10-18

## 2018-10-18 RX ORDER — BUPROPION HYDROCHLORIDE 300 MG/1
300 TABLET ORAL EVERY MORNING
Status: CANCELLED | OUTPATIENT
Start: 2018-10-19

## 2018-10-18 RX ORDER — OXYBUTYNIN CHLORIDE 10 MG/1
10 TABLET, EXTENDED RELEASE ORAL DAILY
Status: CANCELLED | OUTPATIENT
Start: 2018-10-19

## 2018-10-18 RX ORDER — DEXTROSE MONOHYDRATE 25 G/50ML
12.5 INJECTION, SOLUTION INTRAVENOUS PRN
Status: CANCELLED | OUTPATIENT
Start: 2018-10-18

## 2018-10-18 RX ORDER — ALBUTEROL SULFATE 2.5 MG/3ML
2.5 SOLUTION RESPIRATORY (INHALATION) EVERY 6 HOURS PRN
Status: DISCONTINUED | OUTPATIENT
Start: 2018-10-18 | End: 2018-10-31 | Stop reason: HOSPADM

## 2018-10-18 RX ORDER — OXYCODONE HYDROCHLORIDE AND ACETAMINOPHEN 5; 325 MG/1; MG/1
1 TABLET ORAL EVERY 4 HOURS PRN
Status: DISCONTINUED | OUTPATIENT
Start: 2018-10-18 | End: 2018-10-21

## 2018-10-18 RX ORDER — BUPROPION HYDROCHLORIDE 300 MG/1
300 TABLET ORAL EVERY MORNING
Status: DISCONTINUED | OUTPATIENT
Start: 2018-10-19 | End: 2018-10-31 | Stop reason: HOSPADM

## 2018-10-18 RX ORDER — SPIRONOLACTONE 25 MG/1
25 TABLET ORAL DAILY
Status: CANCELLED | OUTPATIENT
Start: 2018-10-19

## 2018-10-18 RX ORDER — ACETAMINOPHEN 325 MG/1
650 TABLET ORAL EVERY 4 HOURS PRN
Status: DISCONTINUED | OUTPATIENT
Start: 2018-10-18 | End: 2018-10-31 | Stop reason: HOSPADM

## 2018-10-18 RX ORDER — CARVEDILOL 6.25 MG/1
12.5 TABLET ORAL 2 TIMES DAILY WITH MEALS
Status: DISCONTINUED | OUTPATIENT
Start: 2018-10-18 | End: 2018-10-31 | Stop reason: HOSPADM

## 2018-10-18 RX ORDER — BUTALBITAL, ACETAMINOPHEN AND CAFFEINE 50; 325; 40 MG/1; MG/1; MG/1
1 TABLET ORAL EVERY 4 HOURS PRN
Status: DISCONTINUED | OUTPATIENT
Start: 2018-10-18 | End: 2018-10-23

## 2018-10-18 RX ORDER — DEXTROSE MONOHYDRATE 50 MG/ML
100 INJECTION, SOLUTION INTRAVENOUS PRN
Status: CANCELLED | OUTPATIENT
Start: 2018-10-18

## 2018-10-18 RX ORDER — FLUTICASONE PROPIONATE 50 MCG
1 SPRAY, SUSPENSION (ML) NASAL DAILY PRN
Status: DISCONTINUED | OUTPATIENT
Start: 2018-10-18 | End: 2018-10-31 | Stop reason: HOSPADM

## 2018-10-18 RX ORDER — ISOSORBIDE MONONITRATE 30 MG/1
30 TABLET, EXTENDED RELEASE ORAL DAILY
Status: CANCELLED | OUTPATIENT
Start: 2018-10-19

## 2018-10-18 RX ORDER — ISOSORBIDE MONONITRATE 30 MG/1
30 TABLET, EXTENDED RELEASE ORAL DAILY
Status: DISCONTINUED | OUTPATIENT
Start: 2018-10-19 | End: 2018-10-31 | Stop reason: HOSPADM

## 2018-10-18 RX ORDER — SPIRONOLACTONE 25 MG/1
25 TABLET ORAL DAILY
Status: DISCONTINUED | OUTPATIENT
Start: 2018-10-19 | End: 2018-10-31 | Stop reason: HOSPADM

## 2018-10-18 RX ORDER — TRAZODONE HYDROCHLORIDE 50 MG/1
150 TABLET ORAL NIGHTLY
Status: CANCELLED | OUTPATIENT
Start: 2018-10-18

## 2018-10-18 RX ORDER — DEXTROSE MONOHYDRATE 50 MG/ML
100 INJECTION, SOLUTION INTRAVENOUS PRN
Status: DISCONTINUED | OUTPATIENT
Start: 2018-10-18 | End: 2018-10-23

## 2018-10-18 RX ORDER — ACETAMINOPHEN 325 MG/1
650 TABLET ORAL EVERY 4 HOURS PRN
Status: CANCELLED | OUTPATIENT
Start: 2018-10-18

## 2018-10-18 RX ADMIN — CARVEDILOL 12.5 MG: 6.25 TABLET, FILM COATED ORAL at 09:58

## 2018-10-18 RX ADMIN — ERGOCALCIFEROL 50000 UNITS: 1.25 CAPSULE ORAL at 10:00

## 2018-10-18 RX ADMIN — BUPROPION HYDROCHLORIDE 300 MG: 300 TABLET, FILM COATED, EXTENDED RELEASE ORAL at 09:58

## 2018-10-18 RX ADMIN — SACUBITRIL AND VALSARTAN 1 TABLET: 24; 26 TABLET, FILM COATED ORAL at 21:21

## 2018-10-18 RX ADMIN — SACUBITRIL AND VALSARTAN 1 TABLET: 24; 26 TABLET, FILM COATED ORAL at 10:00

## 2018-10-18 RX ADMIN — SPIRONOLACTONE 25 MG: 25 TABLET ORAL at 09:58

## 2018-10-18 RX ADMIN — LINAGLIPTIN 5 MG: 5 TABLET, FILM COATED ORAL at 09:58

## 2018-10-18 RX ADMIN — OXYCODONE AND ACETAMINOPHEN 1 TABLET: 5; 325 TABLET ORAL at 10:05

## 2018-10-18 RX ADMIN — OXYCODONE AND ACETAMINOPHEN 1 TABLET: 5; 325 TABLET ORAL at 20:21

## 2018-10-18 RX ADMIN — INSULIN LISPRO 3 UNITS: 100 INJECTION, SOLUTION INTRAVENOUS; SUBCUTANEOUS at 17:05

## 2018-10-18 RX ADMIN — INSULIN LISPRO 2 UNITS: 100 INJECTION, SOLUTION INTRAVENOUS; SUBCUTANEOUS at 21:21

## 2018-10-18 RX ADMIN — POTASSIUM CHLORIDE 20 MEQ: 20 TABLET, EXTENDED RELEASE ORAL at 09:59

## 2018-10-18 RX ADMIN — OXYBUTYNIN CHLORIDE 10 MG: 10 TABLET, FILM COATED, EXTENDED RELEASE ORAL at 09:59

## 2018-10-18 RX ADMIN — TRIMETHOBENZAMIDE HYDROCHLORIDE 200 MG: 100 INJECTION INTRAMUSCULAR at 11:30

## 2018-10-18 RX ADMIN — ROPINIROLE HYDROCHLORIDE 3 MG: 2 TABLET, FILM COATED ORAL at 21:21

## 2018-10-18 RX ADMIN — TRAZODONE HYDROCHLORIDE 150 MG: 150 TABLET ORAL at 21:20

## 2018-10-18 RX ADMIN — OXYCODONE AND ACETAMINOPHEN 1 TABLET: 5; 325 TABLET ORAL at 14:40

## 2018-10-18 RX ADMIN — POTASSIUM CHLORIDE 20 MEQ: 20 TABLET, EXTENDED RELEASE ORAL at 17:03

## 2018-10-18 RX ADMIN — ROPINIROLE HYDROCHLORIDE 3 MG: 2 TABLET, FILM COATED ORAL at 09:59

## 2018-10-18 RX ADMIN — CARVEDILOL 12.5 MG: 6.25 TABLET, FILM COATED ORAL at 17:03

## 2018-10-18 RX ADMIN — OXYCODONE AND ACETAMINOPHEN 1 TABLET: 5; 325 TABLET ORAL at 02:06

## 2018-10-18 RX ADMIN — OXYCODONE AND ACETAMINOPHEN 1 TABLET: 5; 325 TABLET ORAL at 06:07

## 2018-10-18 RX ADMIN — ISOSORBIDE MONONITRATE 30 MG: 30 TABLET, EXTENDED RELEASE ORAL at 09:58

## 2018-10-18 ASSESSMENT — PAIN SCALES - GENERAL
PAINLEVEL_OUTOF10: 7
PAINLEVEL_OUTOF10: 8
PAINLEVEL_OUTOF10: 0
PAINLEVEL_OUTOF10: 7
PAINLEVEL_OUTOF10: 8
PAINLEVEL_OUTOF10: 10

## 2018-10-18 ASSESSMENT — PAIN DESCRIPTION - LOCATION
LOCATION: ARM
LOCATION: ABDOMEN
LOCATION: FLANK;HEAD

## 2018-10-18 ASSESSMENT — PAIN DESCRIPTION - PAIN TYPE
TYPE: ACUTE PAIN

## 2018-10-18 ASSESSMENT — PAIN DESCRIPTION - ORIENTATION
ORIENTATION: LEFT;UPPER
ORIENTATION: LEFT;UPPER

## 2018-10-18 ASSESSMENT — PAIN DESCRIPTION - DESCRIPTORS
DESCRIPTORS: SHARP
DESCRIPTORS: CRAMPING;BURNING;ACHING
DESCRIPTORS: ACHING;DISCOMFORT

## 2018-10-18 ASSESSMENT — PAIN DESCRIPTION - FREQUENCY
FREQUENCY: INTERMITTENT
FREQUENCY: CONTINUOUS
FREQUENCY: INTERMITTENT

## 2018-10-18 NOTE — PROGRESS NOTES
Physical Therapy  Facility/Department: [address] NEURO IMCU  Daily Treatment Note  NAME: Julia Borrero  : 1955  MRN: [id number]    Date of Service: 10/18/2018   Evaluating Therapist: Timmy De Santiago PT, DPT     ROOM #: [id number]  DIAGNOSIS: NPH  PRECAUTIONS: Falls, 3L O2  PMHx: CAD, CKD, CHF, COPD, HLD  PROCEDURES: 10/15 insertion of  shunt     Social:  Pt lives with , daughter and 4 grandchildren in a 1 floor plan with 2+1 step(s) and 0 rail(s) to enter. Prior to admission pt walked with no device and was Independent. Pt has HHPT 1-2x/wk for gait and balance training.                 Initial Evaluation  Date: 10/16/18 Treatment  Date:    10/18/18 Short Term/ Long Term   Goals   AM-PAC 6 Clicks 89/33 42/07      Does pt have pain?  8/10 back and abdomen pain  c/o \"some\" abdominal pain, no ratign given.      Bed Mobility  Rolling: NT  Supine to sit: NT  Sit to supine: SBA  Scooting: SBA  Rolling: SBA  Supine to sit: SBA  Sit to supine: SBA  Scooting: SBA Mod Independent   Transfers Sit to stand: Rachel  Stand to sit: Rachel  Stand pivot: Rachel no device  Sit to stand: Min A  Stand to sit: Min A for safety   Stand pivot: Min A Supervision with AAD if needed   Ambulation   70 feet with Rachel with no device  75 feet x 2 and 15 x 1 with Min A  >150 feet with Supervision with AAD if needed   Stair negotiation: ascended and descended NT NT >4 steps with 1 rail with Supervision   BLE ROM WNL  WNL     BLE strength Grossly 4-/5  NT Increase by 1/3 MMT grade   Balance Sitting: SBA  Standing: Rachel no device   Sitting: SBA  Dynamic standing: Min A with no AD Sitting: Independent  Standing: Supervision with AAD if needed       Patient education  Pt was educated on taking rest breaks when needed, safety, improving DIAMOND    Patient response to education:   Pt verbalized understanding Pt demonstrated skill Pt requires further education in this area   x x x     Additional Comments: Pt supine on arrival and agreeable to PT

## 2018-10-18 NOTE — PROGRESS NOTES
General Surgery  Attending Physician Progress Note    No chief complaint on file. Subjective: The patient said she had an episode of nausea and vomiting yesterday. She said she has had nausea off and on for a while even prior to the surgery. The patient has a pinching sensation in her LLQ. She said that is new. I reviewed the patient's Past Medical History, Past Surgical History, Medications, and Allergies.     LABS:  CBC:   Lab Results   Component Value Date    WBC 8.7 10/15/2018    RBC 3.76 10/15/2018    HGB 11.7 10/15/2018    HCT 35.2 10/15/2018    MCV 93.6 10/15/2018    MCH 31.1 10/15/2018    MCHC 33.2 10/15/2018    RDW 14.6 10/15/2018     10/15/2018    MPV 10.9 10/15/2018     CMP:    Lab Results   Component Value Date     10/17/2018    K 4.3 10/17/2018    K 4.7 08/03/2018    CL 99 10/17/2018    CO2 30 10/17/2018    BUN 9 10/17/2018    CREATININE 0.8 10/17/2018    GFRAA >60 10/17/2018    LABGLOM >60 10/17/2018    GLUCOSE 102 10/17/2018    GLUCOSE 111 09/30/2011    PROT 6.9 10/17/2018    LABALBU 3.5 10/17/2018    LABALBU 4.4 09/30/2011    CALCIUM 9.9 10/17/2018    BILITOT 0.3 10/17/2018    ALKPHOS 57 10/17/2018    AST 18 10/17/2018    ALT 8 10/17/2018     BMP:    Lab Results   Component Value Date     10/17/2018    K 4.3 10/17/2018    K 4.7 08/03/2018    CL 99 10/17/2018    CO2 30 10/17/2018    BUN 9 10/17/2018    LABALBU 3.5 10/17/2018    LABALBU 4.4 09/30/2011    CREATININE 0.8 10/17/2018    CALCIUM 9.9 10/17/2018    GFRAA >60 10/17/2018    LABGLOM >60 10/17/2018     Hepatic Function Panel:    Lab Results   Component Value Date    ALKPHOS 57 10/17/2018    ALT 8 10/17/2018    AST 18 10/17/2018    PROT 6.9 10/17/2018    BILITOT 0.3 10/17/2018    BILIDIR <0.2 07/30/2014    IBILI 0.1 07/30/2014    LABALBU 3.5 10/17/2018    LABALBU 4.4 09/30/2011     PT/INR:    Lab Results   Component Value Date    PROTIME 13.8 07/26/2018    INR 1.2 07/26/2018     Warfarin PT/INR:  No components found for: PTPATWAR, PTINRWAR  ABG:  No results found for: SFE9UKZ, BEART, D4SBIQEK, PHART, THGBART, WRD7TWP, PO2ART, ATR9SQT    Physical Exam:  Vitals:    10/18/18 1352   BP: (!) 148/77   Pulse: 83   Resp: 14   Temp: 98.6 °F (37 °C)       General appearance: alert, cooperative and in no acute distress. Lungs: clear to auscultation bilaterally  Heart: regular rate and rhythm  Abdomen: soft, mild LLQ tenderness, incisions c/d/i  Extremities: symmetrical without clubbing cyanosis or edema.     Impression/Plan:      Assessment: Saintclair Minion is an 58 y.o. female  POD 3  shunt    Plan: I do not think her nausea and vomiting is related to the surgery  She tolerated breakfast.  Ok for general diet  Ok for [employer] by Benedict San MD, General Surgery  on 10/18/2018 at 3:14 PM

## 2018-10-18 NOTE — PROGRESS NOTES
dextrose    I/O last 3 completed shifts:  In: -   Out: 600 [Urine:600]  No intake/output data recorded.     Intake/Output Summary (Last 24 hours) at 10/18/18 0934  Last data filed at 10/17/18 1100   Gross per 24 hour   Intake                0 ml   Output              600 ml   Net             -600 ml       Assessment:    Active Hospital Problems    Diagnosis    Chronic systolic congestive heart failure ([facility] [I50.22]     Priority: High     Class: Chronic    Severe mitral regurgitation [I34.0]     Priority: High     Class: Chronic    Pulmonary hypertension (HCC) [I27.20]     Priority: High     Class: Chronic    CKD (chronic kidney disease) stage 3, GFR 30-59 ml/min ([facility]) [N18.3]     Priority: High    Normal pressure hydrocephalus [G91.2]    COPD (chronic obstructive pulmonary disease) ([facility]) [J44.9]    CAD (coronary artery disease) [I25.10]       Plan:  Lab reviewed             BP labile--monitor             BS labile--continue coverage             Cardiac and pulmonary status remain stable             Gait unsteady walking with assistance        Joselyn Aldrich DO  9:34 AM  10/18/2018

## 2018-10-18 NOTE — PROGRESS NOTES
Patient oriented to room and new admission folder given.  Patient Guide reviewed and patient given an explanation of Rights And Responsibilities Important message from medicare signed and copy given to patient  Shellie Tavon 10/18/2018 2:13 PM

## 2018-10-18 NOTE — LETTER
DATE: 10/26/2018        [place]o [place] available in your area  [address] [facility]  [place], [address]  [phone]  Activities include: ceramics, aerobics, walkers club, and fitness center. BEAT[address]  [place], [address]  [phone] opportunities, programs and prescription assistance   [facility]  [address], [facility]  [phone], line dancing, chair yoga,   dance exercise classes, painting, gardening groups and more. [facility]  [address] [facility], [place]  [phone]  Socialization opportunities, exercise and wellness classes, crafts, and computer classes. [facility]  [address], [address]  [phone]  Senior group meets on Mondays and Wednesdays from [address] [facility]. Activities include: discussions, crafts, guest speakers, cards and films. [address] [place] [address], [place]  [phone]  Activities such as cards, bowling and occasional bus trips. [facility]  [address] [facility], [address]  [phone]  Weekly programs including painting, dancing, exercise, computer classes,   crafts and theater. [address] [facility] [address] [facility],[address], [place]  [phone]  Meetings 9AM-2PM on Thursdays to socialize and play cards. [place] [facility]  [address] [facility]  [phone]  Exercise equipment, water exercise classes,  indoor and outdoor pool. [employer] of [facility]  [address] [facility], [address]  [phone]  Activities include: bus trips, fitness programs, arts, crafts, dinners and card games. [employer] of [address] [facility], [address]  [address]  [place], [address]  [phone] Water exercise programs, different exercise equipment, indoor pool. [address] at the . [address] [facility]. [address]  [phone]  Free health screenings, health education, health information on community resources, fitness classes & fitness center. [address] [facility]  [address], [address] [place] [address]  [phone]  8AM-4PM daily with noon meal.  Activities include arts, crafts, knitting and bronson [employer] [facility]  [address]  [phone]  Low level fitness classes and warm water arthritis classes. [facility] [address], [address] [facility]  [phone]  Group meets at 12PM on Mondays, Wednesdays and Fridays. Activities include bingo, lunch and special programs.

## 2018-10-19 LAB
ALBUMIN SERPL-MCNC: 3.4 G/DL (ref 3.5–5.2)
ALP BLD-CCNC: 59 U/L (ref 35–104)
ALT SERPL-CCNC: 9 U/L (ref 0–32)
ANION GAP SERPL CALCULATED.3IONS-SCNC: 11 MMOL/L (ref 7–16)
AST SERPL-CCNC: 20 U/L (ref 0–31)
BASOPHILS ABSOLUTE: 0.04 E9/L (ref 0–0.2)
BASOPHILS RELATIVE PERCENT: 0.7 % (ref 0–2)
BILIRUB SERPL-MCNC: 0.3 MG/DL (ref 0–1.2)
BUN BLDV-MCNC: 9 MG/DL (ref 8–23)
CALCIUM SERPL-MCNC: 9.9 MG/DL (ref 8.6–10.2)
CHLORIDE BLD-SCNC: 102 MMOL/L (ref 98–107)
CO2: 30 MMOL/L (ref 22–29)
CREAT SERPL-MCNC: 1 MG/DL (ref 0.5–1)
EOSINOPHILS ABSOLUTE: 0.11 E9/L (ref 0.05–0.5)
EOSINOPHILS RELATIVE PERCENT: 1.8 % (ref 0–6)
GFR AFRICAN AMERICAN: >60
GFR NON-AFRICAN AMERICAN: 56 ML/MIN/1.73
GLUCOSE BLD-MCNC: 112 MG/DL (ref 74–109)
HCT VFR BLD CALC: 31.3 % (ref 34–48)
HEMOGLOBIN: 10.5 G/DL (ref 11.5–15.5)
IMMATURE GRANULOCYTES #: 0.02 E9/L
IMMATURE GRANULOCYTES %: 0.3 % (ref 0–5)
INR BLD: 1
LYMPHOCYTES ABSOLUTE: 2.76 E9/L (ref 1.5–4)
LYMPHOCYTES RELATIVE PERCENT: 45 % (ref 20–42)
MCH RBC QN AUTO: 32.5 PG (ref 26–35)
MCHC RBC AUTO-ENTMCNC: 33.5 % (ref 32–34.5)
MCV RBC AUTO: 96.9 FL (ref 80–99.9)
METER GLUCOSE: 102 MG/DL (ref 70–110)
METER GLUCOSE: 113 MG/DL (ref 70–110)
METER GLUCOSE: 119 MG/DL (ref 70–110)
METER GLUCOSE: 145 MG/DL (ref 70–110)
MONOCYTES ABSOLUTE: 0.51 E9/L (ref 0.1–0.95)
MONOCYTES RELATIVE PERCENT: 8.3 % (ref 2–12)
NEUTROPHILS ABSOLUTE: 2.69 E9/L (ref 1.8–7.3)
NEUTROPHILS RELATIVE PERCENT: 43.9 % (ref 43–80)
PDW BLD-RTO: 14.4 FL (ref 11.5–15)
PLATELET # BLD: 160 E9/L (ref 130–450)
PMV BLD AUTO: 11.4 FL (ref 7–12)
POTASSIUM REFLEX MAGNESIUM: 4.6 MMOL/L (ref 3.5–5)
PROTHROMBIN TIME: 11.9 SEC (ref 9.3–12.4)
RBC # BLD: 3.23 E12/L (ref 3.5–5.5)
SODIUM BLD-SCNC: 143 MMOL/L (ref 132–146)
TOTAL PROTEIN: 6.5 G/DL (ref 6.4–8.3)
WBC # BLD: 6.1 E9/L (ref 4.5–11.5)

## 2018-10-19 PROCEDURE — 92523 SPEECH SOUND LANG COMPREHEN: CPT

## 2018-10-19 PROCEDURE — 97127 HC SP THER IVNTJ W/FOCUS COG FUNCJ: CPT

## 2018-10-19 PROCEDURE — 6370000000 HC RX 637 (ALT 250 FOR IP): Performed by: PHYSICAL MEDICINE & REHABILITATION

## 2018-10-19 PROCEDURE — 97535 SELF CARE MNGMENT TRAINING: CPT | Performed by: OCCUPATIONAL THERAPIST

## 2018-10-19 PROCEDURE — 85610 PROTHROMBIN TIME: CPT

## 2018-10-19 PROCEDURE — 1280000000 HC REHAB R&B

## 2018-10-19 PROCEDURE — 80053 COMPREHEN METABOLIC PANEL: CPT

## 2018-10-19 PROCEDURE — 36415 COLL VENOUS BLD VENIPUNCTURE: CPT

## 2018-10-19 PROCEDURE — 85025 COMPLETE CBC W/AUTO DIFF WBC: CPT

## 2018-10-19 PROCEDURE — 6370000000 HC RX 637 (ALT 250 FOR IP): Performed by: PHYSICIAN ASSISTANT

## 2018-10-19 PROCEDURE — 82962 GLUCOSE BLOOD TEST: CPT

## 2018-10-19 PROCEDURE — 97530 THERAPEUTIC ACTIVITIES: CPT

## 2018-10-19 PROCEDURE — 97165 OT EVAL LOW COMPLEX 30 MIN: CPT | Performed by: OCCUPATIONAL THERAPIST

## 2018-10-19 PROCEDURE — 97530 THERAPEUTIC ACTIVITIES: CPT | Performed by: OCCUPATIONAL THERAPIST

## 2018-10-19 PROCEDURE — 97161 PT EVAL LOW COMPLEX 20 MIN: CPT

## 2018-10-19 RX ORDER — OXYCODONE AND ACETAMINOPHEN 10; 325 MG/1; MG/1
1 TABLET ORAL EVERY 4 HOURS PRN
Status: DISCONTINUED | OUTPATIENT
Start: 2018-10-19 | End: 2018-10-21

## 2018-10-19 RX ADMIN — OXYBUTYNIN CHLORIDE 10 MG: 10 TABLET, FILM COATED, EXTENDED RELEASE ORAL at 10:14

## 2018-10-19 RX ADMIN — SACUBITRIL AND VALSARTAN 1 TABLET: 24; 26 TABLET, FILM COATED ORAL at 20:55

## 2018-10-19 RX ADMIN — TRAZODONE HYDROCHLORIDE 150 MG: 150 TABLET ORAL at 20:52

## 2018-10-19 RX ADMIN — SPIRONOLACTONE 25 MG: 25 TABLET ORAL at 10:00

## 2018-10-19 RX ADMIN — POTASSIUM CHLORIDE 20 MEQ: 20 TABLET, EXTENDED RELEASE ORAL at 10:01

## 2018-10-19 RX ADMIN — CARVEDILOL 12.5 MG: 6.25 TABLET, FILM COATED ORAL at 17:33

## 2018-10-19 RX ADMIN — ISOSORBIDE MONONITRATE 30 MG: 30 TABLET ORAL at 10:01

## 2018-10-19 RX ADMIN — BUPROPION HYDROCHLORIDE 300 MG: 300 TABLET, FILM COATED, EXTENDED RELEASE ORAL at 10:09

## 2018-10-19 RX ADMIN — LINAGLIPTIN 5 MG: 5 TABLET, FILM COATED ORAL at 10:02

## 2018-10-19 RX ADMIN — ACETAMINOPHEN 650 MG: 325 TABLET, FILM COATED ORAL at 10:10

## 2018-10-19 RX ADMIN — SACUBITRIL AND VALSARTAN 1 TABLET: 24; 26 TABLET, FILM COATED ORAL at 10:13

## 2018-10-19 RX ADMIN — OXYCODONE AND ACETAMINOPHEN 1 TABLET: 5; 325 TABLET ORAL at 15:53

## 2018-10-19 RX ADMIN — OXYCODONE HYDROCHLORIDE AND ACETAMINOPHEN 1 TABLET: 10; 325 TABLET ORAL at 20:51

## 2018-10-19 RX ADMIN — POTASSIUM CHLORIDE 20 MEQ: 20 TABLET, EXTENDED RELEASE ORAL at 17:33

## 2018-10-19 RX ADMIN — OXYCODONE AND ACETAMINOPHEN 1 TABLET: 5; 325 TABLET ORAL at 05:18

## 2018-10-19 RX ADMIN — OXYCODONE AND ACETAMINOPHEN 1 TABLET: 5; 325 TABLET ORAL at 00:50

## 2018-10-19 RX ADMIN — ROPINIROLE HYDROCHLORIDE 3 MG: 2 TABLET, FILM COATED ORAL at 20:53

## 2018-10-19 RX ADMIN — FLUTICASONE PROPIONATE 1 SPRAY: 50 SPRAY, METERED NASAL at 09:58

## 2018-10-19 RX ADMIN — OXYCODONE AND ACETAMINOPHEN 1 TABLET: 5; 325 TABLET ORAL at 10:52

## 2018-10-19 RX ADMIN — INSULIN LISPRO 3 UNITS: 100 INJECTION, SOLUTION INTRAVENOUS; SUBCUTANEOUS at 12:43

## 2018-10-19 RX ADMIN — ROPINIROLE HYDROCHLORIDE 3 MG: 2 TABLET, FILM COATED ORAL at 10:11

## 2018-10-19 RX ADMIN — CARVEDILOL 12.5 MG: 6.25 TABLET, FILM COATED ORAL at 10:03

## 2018-10-19 ASSESSMENT — PAIN DESCRIPTION - ONSET
ONSET: ON-GOING

## 2018-10-19 ASSESSMENT — PAIN DESCRIPTION - ORIENTATION
ORIENTATION: LEFT;LOWER
ORIENTATION: LEFT;LOWER;RIGHT;UPPER
ORIENTATION: LEFT;LOWER

## 2018-10-19 ASSESSMENT — PAIN SCALES - GENERAL
PAINLEVEL_OUTOF10: 7
PAINLEVEL_OUTOF10: 3
PAINLEVEL_OUTOF10: 0
PAINLEVEL_OUTOF10: 2
PAINLEVEL_OUTOF10: 8
PAINLEVEL_OUTOF10: 4
PAINLEVEL_OUTOF10: 8
PAINLEVEL_OUTOF10: 8
PAINLEVEL_OUTOF10: 0
PAINLEVEL_OUTOF10: 2
PAINLEVEL_OUTOF10: 7
PAINLEVEL_OUTOF10: 3
PAINLEVEL_OUTOF10: 6
PAINLEVEL_OUTOF10: 7

## 2018-10-19 ASSESSMENT — PAIN DESCRIPTION - LOCATION
LOCATION: ABDOMEN;HEAD
LOCATION: ABDOMEN
LOCATION: ABDOMEN;FLANK;HEAD
LOCATION: ABDOMEN
LOCATION: ABDOMEN;FLANK;HEAD
LOCATION: ABDOMEN
LOCATION: ABDOMEN;HEAD
LOCATION: ABDOMEN;HEAD
LOCATION: HEAD;ABDOMEN
LOCATION: HEAD;ABDOMEN

## 2018-10-19 ASSESSMENT — PAIN DESCRIPTION - FREQUENCY
FREQUENCY: CONTINUOUS
FREQUENCY: CONTINUOUS
FREQUENCY: INTERMITTENT
FREQUENCY: INTERMITTENT
FREQUENCY: CONTINUOUS
FREQUENCY: INTERMITTENT

## 2018-10-19 ASSESSMENT — PAIN DESCRIPTION - DESCRIPTORS
DESCRIPTORS: ACHING;DISCOMFORT
DESCRIPTORS: DISCOMFORT;NAGGING
DESCRIPTORS: ACHING
DESCRIPTORS: SHARP;ACHING;DISCOMFORT
DESCRIPTORS: SHARP;ACHING;DISCOMFORT
DESCRIPTORS: ACHING;DISCOMFORT

## 2018-10-19 ASSESSMENT — 9 HOLE PEG TEST
TESTTIME_SECONDS: 35
TESTTIME_SECONDS: 36

## 2018-10-19 ASSESSMENT — PAIN DESCRIPTION - PROGRESSION
CLINICAL_PROGRESSION: NOT CHANGED
CLINICAL_PROGRESSION: GRADUALLY IMPROVING
CLINICAL_PROGRESSION: NOT CHANGED
CLINICAL_PROGRESSION: GRADUALLY IMPROVING
CLINICAL_PROGRESSION: NOT CHANGED
CLINICAL_PROGRESSION: GRADUALLY IMPROVING
CLINICAL_PROGRESSION: NOT CHANGED
CLINICAL_PROGRESSION: NOT CHANGED

## 2018-10-19 ASSESSMENT — PAIN DESCRIPTION - PAIN TYPE
TYPE: ACUTE PAIN
TYPE: SURGICAL PAIN;ACUTE PAIN
TYPE: ACUTE PAIN;SURGICAL PAIN
TYPE: ACUTE PAIN;SURGICAL PAIN
TYPE: ACUTE PAIN

## 2018-10-19 NOTE — PLAN OF CARE
Problem: Falls - Risk of:  Goal: Will remain free from falls  Will remain free from falls   Outcome: Met This Shift      Problem: Pain:  Goal: Control of acute pain  Control of acute pain   Outcome: Met This Shift      Problem: Mobility - Impaired:  Goal: Mobility will improve  Mobility will improve   Outcome: Met This Shift      Problem: Skin Integrity:  Goal: Will show no infection signs and symptoms  Will show no infection signs and symptoms   Outcome: Met This Shift      Problem: Infection:  Goal: Will remain free from infection  Will remain free from infection   Outcome: Met This Shift      Problem: Safety:  Goal: Free from accidental physical injury  Free from accidental physical injury   Outcome: Met This Shift      Problem: Daily Care:  Goal: Daily care needs are met  Daily care needs are met   Outcome: Met This Shift      Problem: ABCDS Injury Assessment  Goal: Absence of physical injury  Outcome: Met This Shift

## 2018-10-19 NOTE — PROGRESS NOTES
Physical Therapy    Facility/Department: [address] REHAB  Initial Assessment    NAME: Bard Morales  : 1955  MRN: [id number]    Date of Service: 10/19/2018  Evaluating Therapist: Rahul Noble. Stephan Curiel P.T.    ROOM: [facility]  DIAGNOSIS: NPH  PRECAUTIONS: Fall risk, O2   PROCEDURE(S): 10/15 insertion of  shunt    Social:  Pt lives with her , daughter, and 4 grandchildren in a 1 floor plan with 2-3 steps and no rail to enter. Prior to admission pt ambulated with no AD, was receiving HHPT for gait/balance 1-2x/week (TIA 2018). Pt owns a [employer]. Initial Evaluation  Date: 10/19/18          Short Term Goals Long Term Goals    Was pt agreeable to Eval/treatment? Yes Initial Evaluation Initial Evaluation     Does pt have pain? 8/10 back and L side pain, nursing aware       Bed Mobility  Rolling: Supervision  Supine to sit: SBA  Sit to supine: SBA  Scooting: SBA   Supervision Independent   Transfers Sit to stand: SBA  Stand to sit: SBA  Stand pivot: SBA   Supervision Modified Independent   Ambulation   150 feet with no AD with SBA   150 feet with AAD with Supervision >150 feet with AAD with modified independence. Walking 10 feet on uneven surface 10 feet with no AD with SBA   10 feet with AAD with supervision >10 feet with AAD with modified independence. Wheel Chair Mobility NT       Car Transfers SBA   Supervision Indepence   Stair negotiation: ascended and descended 4 steps with 2 rail with SBA   12 steps with 1 rail with supervision. >12 steps with 0 rail with modified indenpence. Curb Step:   ascended and descended 4 inch step with no AD and SBA   7.5 inch step with AAD and supervison 7.5 inch step with AAD and modified indepence   Picking up object off the floor Picked up 5# with SBA   Will  5# with Supervision. Will  5# with modified independence.    BLE ROM WNL       BLE Strength BLEs are grossly 4-/5 to 4/5       Balance  Sitting EOB: Supervision  Dynamic standing: SBA       Date descent  (    ) 0 needs assist to sit    TRANSFERS  INSTRUCTIONS: Arrange chair(s) for pivot transfer. Ask subject to transfer one way toward a seat with armrests and one way toward a seat without armrests. You may use two chairs (one with and one without armrests) or a bed and a chair. ( x ) 4 able to transfer safely with minor use of hands  (    ) 3 able to transfer safely definite need of hands  (    ) 2 able to transfer with verbal cuing and/or supervision  (    ) 1 needs one person to assist  (    ) 0 needs two people to assist or supervise to be safe    STANDING UNSUPPORTED WITH EYES CLOSED  INSTRUCTIONS: Please close your eyes and stand still for 10 seconds. (    ) 4 able to stand 10 seconds safely  ( x ) 3 able to stand 10 seconds with supervision   (    ) 2 able to stand 3 seconds  (    ) 1 unable to keep eyes closed 3 seconds but stays safely  (    ) 0 needs help to keep from falling    STANDING UNSUPPORTED WITH FEET TOGETHER  INSTRUCTIONS: Place your feet together and stand without holding on. (    ) 4 able to place feet together independently and stand 1 minute safely  ( x ) 3 able to place feet together independently and stand 1 minute with supervision  (    ) 2 able to place feet together independently but unable to hold for 30 seconds  (    ) 1 needs help to attain position but able to stand 15 seconds feet together  (    ) 0 needs help to attain position and unable to hold for 15 seconds      REACHING FORWARD WITH OUTSTRETCHED ARM WHILE STANDING  INSTRUCTIONS: Lift arm to 90 degrees. Stretch out your fingers and reach forward as far as you can. (Examiner places a ruler at the end of fingertips when arm is at 90 degrees. Fingers should not touch the ruler while reaching forward. The recorded measure is the distance forward that the fingers reach while the subject is in the most forward lean position.  When possible, ask subject to use both arms when reaching to avoid rotation of the trunk.)  (    ) 4 can reach forward confidently 25 cm (10 inches)  ( x ) 3 can reach forward  12 cm (5 inches)  (    ) 2 can reach forward 5 cm (2 inches)  (    ) 1 reaches forward but needs supervision  (    ) 0 loses balance while trying/requires external support     OBJECT FROM THE FLOOR FROM A STANDING POSITION  INSTRUCTIONS:  the shoe/slipper, which is place in front of your feet. ( x ) 4 able to  slipper safely and easily  (    ) 3 able to  slipper but needs supervision   (    ) 2 unable to  but reaches 2-5 cm(1-2 inches) from slipper and keeps balance  independently  (    ) 1 unable to  and needs supervision while trying  (    ) 0 unable to try/needs assist to keep from losing balance or falling    TURNING TO LOOK BEHIND OVER LEFT AND RIGHT SHOULDERS WHILE STANDING  INSTRUCTIONS: Turn to look directly behind you over toward the left shoulder. Repeat to the right. Examiner may pick an object to look at directly behind the subject to encourage a better twist turn. (    ) 4 looks behind from both sides and weight shifts well  ( x ) 3 looks behind one side only other side shows less weight shift  (    ) 2 turns sideways only but maintains balance  (    ) 1 needs supervision when turning  (    ) 0 needs assist to keep from losing balance or falling    TURN 360 DEGREES  INSTRUCTIONS: Turn completely around in a full [place]. Pause. Then turn a full [place] in the other direction. ( x ) 4 able to turn 360 degrees safely in 4 seconds or less  (    ) 3 able to turn 360 degrees safely one side only 4 seconds or less  (    ) 2 able to turn 360 degrees safely but slowly  (    ) 1 needs close supervision or verbal cuing  (    ) 0 needs assistance while turning    PLACE ALTERNATE FOOT ON STEP OR STOOL WHILE STANDING UNSUPPORTED  INSTRUCTIONS: Place each foot alternately on the step/stool. Continue until each foot has touch the step/stool four times.   (    ) 4 able to stand independently and safely and complete 8 steps in 20 seconds  (    ) 3 able to stand independently and complete 8 steps in > 20 seconds  ( x ) 2 able to complete 4 steps without aid with supervision  (    ) 1 able to complete > 2 steps needs minimal assist  (    ) 0 needs assistance to keep from falling/unable to try    STANDING UNSUPPORTED ONE FOOT IN FRONT  INSTRUCTIONS: (DEMONSTRATE TO SUBJECT) Place one foot directly in front of the other. If you feel that you cannot place your foot directly in front, try to step far enough ahead that the heel of your forward foot is ahead of the toes of the other foot. (To score 3 points, the length of the step should exceed the length of the other foot and the width of the stance should approximate the subjects normal stride width.)   (    ) 4 able to place foot tandem independently and hold 30 seconds  (    ) 3 able to place foot ahead independently and hold 30 seconds  ( x ) 2 able to take small step independently and hold 30 seconds  (    ) 1 needs help to step but can hold 15 seconds  (    ) 0 loses balance while stepping or standing    STANDING ON ONE LEG  INSTRUCTIONS: Stand on one leg as long as you can without holding on. (    ) 4 able to lift leg independently and hold > 10 seconds  (    ) 3 able to lift leg independently and hold  5-10 seconds  (    ) 2 able to lift leg independently and hold ? 3 seconds  ( x ) 1 tries to lift leg unable to hold 3 seconds but remains standing independently. (    ) 0 unable to try of needs assist to prevent fall      ( 44  )   TOTAL SCORE (Maximum = 56)      Interpretation: >45 = low fall risk     40-45 = medium fall risk     <40 = high fall risk   ASSESSMENT  Pt displays functional ability as noted in the objective portion of this evaluation. Comments:  Pt was received in room and was 15 minutes late to therapy due to receiving medication from nursing. Pt ambulated with decreased stride length and alyssa with no AD with SBA.   Pt demonstrates

## 2018-10-19 NOTE — PROGRESS NOTES
Speech Language Pathology  DAILY PROGRESS NOTE        COGNITION/LANGUAGE:    Alert, oriented x 3. Patient provided appropriate responses to complex level wh- questions given increased time. Active participant in cognitive therapy which involved informal conversation and responding to open-ended questions. Activity targeted turn-taking skills, STM/LTM recall, and ideational language. Patient consistently provided context-specific response to all questions given increased time for word retrieval. Exhibited good turn taking skills within context of this task. Fair+/good verbal problem solving and abstract reasoning noted when posed with novel scenarios or intentional verbal sabotage of sequence of task. Verbalizing fair+ insight into current physical/cognitive limitations and impact they will have on level of functional independence once discharged home. Will continue SP intervention as per established POC.         SWALLOWING:                     Current level:              MODIFIED INDEPENDENT     RECEPTIVE LANGUAGE:    Current FIM level:       MODIFIED INDEPENDENT                            EXPRESSIVE LANGUAGE:  Current FIM level:       SUPERVISION     PROBLEM SOLVING:           Current FIM level:       MIN ASSISTANCE                MEMORY:                              Current FIM level:       MIN ASSISTANCE             Peel Noon., CCC-SLP/L  JESS [id number]  10/19/2018      Three Hour Rule Tracking:    Individual therapy:            15 minutes  Concurrent therapy:  0 minutes  Group therapy:   0 minutes  Co-treatment therapy:  0 minutes    Total minutes: 15 minutes

## 2018-10-19 NOTE — H&P
difficulty chewing or swallowing. Past Medical History:   Past Medical History:   Diagnosis Date    Acute on chronic respiratory failure with hypoxia and hypercapnia ([facility] [address] 7/25/2018    Apraxia 7/23/2018    Ataxia 7/23/2018    CAD (coronary artery disease) 10/4/2012    Choledocholithiasis     recurrent    Chronic systolic congestive heart failure (HCC) 8/1/2018    Ejection fraction 23% plus/minus 5%    CKD (chronic kidney disease) stage 3, GFR 30-59 ml/min ([facility]) 7/23/2018    COPD (chronic obstructive pulmonary disease) ([facility])     alpha one M1S 183mg/dl    Hyperlipidemia     Hypoxia 7/23/2018    Normal pressure hydrocephalus 7/28/2018    Pleural effusion     requiring right thoracentesis    Pulmonary hypertension ([facility] [address] 7/23/2018    Restless legs     S/P CABG x 2 10/4/2012    Severe mitral regurgitation 8/1/2018    Smoker 10/4/2012      Past Surgical History:   Procedure Laterality Date    CARDIAC SURGERY      CERVICAL FUSION  1999    C3-C6    CHOLECYSTECTOMY  2002    CORONARY ARTERY BYPASS GRAFT  10/30/2002    HYSTERECTOMY  1988    LUMBAR DRAIN IMPLANTATION  07/27/2018    NV CREATE SHUNT:VENTRIC-PERITONEAL N/A 10/15/2018    VENTRICULAR PERITONEAL SHUNT  PLACEMENT performed by John Schneider MD at [address] [place] [address] N/A 7/27/2018    LUMBAR DRAIN INSERTION performed by John Schneider MD at [facility] OR       Allergies: Pentazocine lactate and Sulfa antibiotics    Medications:   Prior to Admission medications    Medication Sig Start Date End Date Taking? Authorizing Provider   oxyCODONE-acetaminophen (ROXICET) 5-325 MG/5ML solution Take by mouth every 4 hours as needed for Pain. .   Yes Historical Provider, MD   linagliptin (TRADJENTA) 5 MG tablet Take 5 mg by mouth every morning   Yes Historical Provider, MD   Fluticasone-Umeclidin-Vilant (Yaya Ruts) 100-62.5-25 MCG/INH AEPB Inhale into the lungs   Yes Historical Provider, MD   OXYGEN Inhale 3 L into the 30 mg by mouth daily. Yes Historical Provider, MD   rosuvastatin (CRESTOR) 20 MG tablet Take 20 mg by mouth daily.      Yes Historical Provider, MD   TORSEMIDE PO Take 20 mg by mouth daily 1-2 tablets    Historical Provider, MD      Current Facility-Administered Medications:     acetaminophen (TYLENOL) tablet 650 mg, 650 mg, Oral, Q4H PRN, Ginette Seen, PA    albuterol (PROVENTIL) nebulizer solution 2.5 mg, 2.5 mg, Nebulization, Q6H PRN, Ginette Seen, PA    buPROPion (WELLBUTRIN XL) extended release tablet 300 mg, 300 mg, Oral, QAM, Ginette Seen, PA    butalbital-acetaminophen-caffeine (FIORICET, ESGIC) per tablet 1 tablet, 1 tablet, Oral, Q4H PRN, Ginette Seen, PA    carvedilol (COREG) tablet 12.5 mg, 12.5 mg, Oral, BID WC, Ginette Seen, PA, 12.5 mg at 10/18/18 1703    fluticasone (FLONASE) 50 MCG/ACT nasal spray 1 spray, 1 spray, Nasal, Daily PRN, Ginette Seen, PA    Fluticasone-Umeclidin-Vilant 100-62.5-25 MCG/INH AEPB 1 puff, 1 puff, Inhalation, Daily, Ginette Seen, PA    isosorbide mononitrate (IMDUR) extended release tablet 30 mg, 30 mg, Oral, Daily, Ginette Seen, PA    linagliptin (TRADJENTA) tablet 5 mg, 5 mg, Oral, QAM, Ginette Seen, PA    oxybutynin (DITROPAN-XL) extended release tablet 10 mg, 10 mg, Oral, Daily, Ginette Seen, PA    oxyCODONE-acetaminophen (PERCOCET) 5-325 MG per tablet 1 tablet, 1 tablet, Oral, Q4H PRN, Ginette Seen, PA, 1 tablet at 10/19/18 0518    potassium chloride (KLOR-CON M) extended release tablet 20 mEq, 20 mEq, Oral, BID WC, Ginette Seen, PA, 20 mEq at 10/18/18 1703    rOPINIRole (REQUIP) tablet 3 mg, 3 mg, Oral, BID, Ginette Seen, PA, 3 mg at 10/18/18 2121    sacubitril-valsartan (ENTRESTO) 24-26 MG per tablet 1 tablet, 1 tablet, Oral, BID, Ginette Collier, PA, 1 tablet at 10/18/18 2121    spironolactone (ALDACTONE) tablet 25 mg, 25 mg, Oral, Daily, Ginette Seen, PA    traZODone (DESYREL) tablet 150 mg, Left   4/5               Right Bicep  4/5           Left Bicep  4/5              Right Triceps   4/5       Left Trceps  4/5          Right Deltoid  4/5     Left Deltoid  4/5         Right IPS  4+/5            Left IPS  4/5               Right Quadriceps  4/5          Left Quadriceps    4/5           Right Gastrocnemius    2/5    Left Gastrocnemius   2/5  Right Ant Tibialis   4+/5  Left Ant Tibialis   4+/5        Sensory:  normal to light touch (B) UE and LE        Gait: narrow base of support, impulsive, mild shuffling and tends to ignore surrounding       Coordination:   test for rapid alternating movements abnormal      DTR:   Right Brachioradialis reflex 1+  Left Brachioradialis reflex 1+  Right Biceps reflex 1+  Left Biceps reflex 1+  Right Triceps reflex 1+  Left Triceps reflex 1+  Right Quadriceps reflex 0  Left Quadriceps reflex 0  Right Achilles reflex 0  Left Achilles reflex 0      ASSESSMENT AND PLAN      Patient Active Problem List   Diagnosis    CAD (coronary artery disease)    S/P CABG x 2    Smoker    Bronchitis    Pulmonary hypertension (HCC)    [facility])    Apraxia    Ataxia    CKD (chronic kidney disease) stage 3, GFR 30-59 ml/min ([facility])    Hypoxia    TIA (transient ischemic attack)    Stroke-like symptoms    Acute on chronic respiratory failure with hypoxia and hypercapnia (HCC)    Sinus pause    Normal pressure hydrocephalus    Chronic systolic congestive heart failure (HCC)    Severe mitral regurgitation    NPH (normal pressure hydrocephalus)       1:  Primary indication for inpatient rehabilitation admission over other settings:  Neurologic Conditions:  03.9  Other Neurologic Disorder Normal Pressure Hydrocephalus  2:  Comorbidities:  Comorbid Conditions in addition to those listed above None  3. Estimated length of stay:  5 days  4. Anticipated disposition:  Home. The potential to achieve that is excellent.   5: Precautions:  falls, behavior, infections and skin      Patient Active Problem List   Diagnosis    CAD (coronary artery disease)    S/P CABG x 2    Smoker    Bronchitis    Pulmonary hypertension (HCC)    [facility] disease) [facility] [address]    Apraxia    Ataxia    CKD (chronic kidney disease) stage 3, GFR 30-59 ml/min ([facility])    Hypoxia    TIA (transient ischemic attack)    Stroke-like symptoms    Acute on chronic respiratory failure with hypoxia and hypercapnia ([facility])    Sinus pause    Normal pressure hydrocephalus    Chronic systolic congestive heart failure (HCC)    Severe mitral regurgitation    NPH (normal pressure hydrocephalus)         Admitted for comprehensive inpatient rehabilitation including PT, OT, RT, SLP, and supportive services to improve functional outcome of impaired mobility, ADL's, transfers, and self-care. Rehabilitation Potential: excellent    IOPOC: PT 90 minutes per day 5-7 days per week focusing on transfers, balance, progressive ambulation, elevations, strengthening; OT 90 minutes per day 5-7 days per week for functional transfers, UE strengthening, energy conservation, equipment needs evaluation, cognitive and perceptual evaluation; SLP 30 minutes per day 5-7 days per week with attention to problem solving and memory; Rehab nursing 24/7 for skin care, bowel and bladder management, education and carryover and  for discharge planning. Current Functional Status:     Initial Evaluation  Date: 10/19/18          Short Term Goals Long Term Goals    Was pt agreeable to Eval/treatment? Yes Initial Evaluation Initial Evaluation       Does pt have pain?  8/10 back and L side pain, nursing aware           Bed Mobility  Rolling: Supervision  Supine to sit: SBA  Sit to supine: SBA  Scooting: SBA     Supervision Independent   Transfers Sit to stand: SBA  Stand to sit: SBA  Stand pivot: SBA     Supervision Modified Independent   Ambulation   150 feet with no AD with SBA     150 feet with AAD with Supervision >150 feet with AAD with modified independence. Walking 10 feet on uneven surface 10 feet with no AD with SBA     10 feet with AAD with supervision >10 feet with AAD with modified independence. Wheel Chair Mobility NT           Car Transfers SBA     Supervision Indepence   Stair negotiation: ascended and descended 4 steps with 2 rail with SBA     12 steps with 1 rail with supervision. >12 steps with 0 rail with modified indenpence. Curb Step:   ascended and descended 4 inch step with no AD and SBA     7.5 inch step with AAD and supervison 7.5 inch step with AAD and modified indepence   Picking up object off the floor Picked up 5# with SBA     Will  5# with Supervision. Will  5# with modified independence.    BLE ROM WNL           BLE Strength BLEs are grossly 4-/5 to 4/5           Balance  Sitting EOB: Supervision  Dynamic standing: SBA             Sitting Balance: Independent  Standing Balance: Contact guard assistance (during dynamic ADL tasks)     Functional Mobility  Functional - Mobility Device: No device  Activity: To/from bathroom  Assist Level: Contact guard assistance  Functional Mobility Comments: safety cues for O2 line management     Toilet Transfers  Toilet - Technique: Ambulating  Equipment Used: Grab bars  Toilet Transfer: Contact guard assistance (No Device; cues for O2 line management)     Shower Transfers  Shower Transfers Comments: Patient decline shower transfer this date.     ADL  Feeding: Modified independent  ([employer])  Grooming: Minimal assistance for combing hair/completed seated at sink d/t decreased endurance  UE Bathing: Supervision  LE Bathing: Contact guard assistance  UE Dressing: Supervision;Setup  LE Dressing: Contact guard assistance;Setup (Patient donned pants, underpants over feet shoes Independently seated on side of bed; CGA/SBA standing to manage clothing over hips  )  Toileting: Stand by assistance;Contact guard assistance (Clothing management CGA/SBA; bladder hygiene - Indepednent seated on commode)     Coordination  Coordination and Movement description: Fine motor impairments  Quality of Movement Other  Comment: difficulty opening packages/containers during grooming tasks  Cognition  Arousal/Alertness: Appropriate responses to stimuli  Following Commands: Follows one step commands consistently (patient had some difficulty w/ word finding)  Attention Span: Appears intact  Memory:  (Modified Independent)  Safety Judgement:  (cues for safety with O2 line management)  Problem Solving: Assistance required to implement solutions (Patient able to generate solutions for completion of ADL tasks regarding limitation in motor endurance and activity tolerance, she required cues fro follow through and implementation of strategies)  Insights: Fully aware of deficits  Initiation: Does not require cues  Sequencing: Does not require cues  Sensation  Overall Sensation Status:  (Diminished sensation B hands/feet)      SWALLOWING:                     Current level:              MODIFIED INDEPENDENT     RECEPTIVE LANGUAGE:    Current FIM level:       MODIFIED INDEPENDENT     EXPRESSIVE LANGUAGE:  Current FIM level:       SUPERVISION     PROBLEM SOLVING:           Current FIM level:       MIN ASSISTANCE     MEMORY:                              Current FIM level:       MIN ASSISTANCE        Additional comments:I reviewed the patient's new clinical lab test results. Results for Claudio Muniz (MRN [id number]) as of 10/19/2018 16:42   Ref.  Range 10/19/2018 05:44   Sodium Latest Ref Range: 132 - 146 mmol/L 143   Potassium Latest Ref Range: 3.5 - 5.0 mmol/L 4.6   Chloride Latest Ref Range: 98 - 107 mmol/L 102   CO2 Latest Ref Range: 22 - 29 mmol/L 30 (H)   BUN Latest Ref Range: 8 - 23 mg/dL 9   Creatinine Latest Ref Range: 0.5 - 1.0 mg/dL 1.0   Anion Gap Latest Ref Range: 7 - 16 mmol/L 11   GFR Non- Latest Ref Range: >=60

## 2018-10-19 NOTE — PLAN OF CARE
Problem: Falls - Risk of:  Goal: Will remain free from falls  Will remain free from falls   Outcome: Met This Shift    Goal: Absence of physical injury  Absence of physical injury   Outcome: Met This Shift      Problem: Pain:  Goal: Pain level will decrease  Pain level will decrease   Outcome: Met This Shift    Goal: Control of acute pain  Control of acute pain   Outcome: Met This Shift    Goal: Control of chronic pain  Control of chronic pain   Outcome: Met This Shift      Problem: Mobility - Impaired:  Goal: Mobility will improve  Mobility will improve   Outcome: Met This Shift      Problem: Skin Integrity:  Goal: Will show no infection signs and symptoms  Will show no infection signs and symptoms   Outcome: Met This Shift    Goal: Absence of new skin breakdown  Absence of new skin breakdown   Outcome: Met This Shift      Problem: Safety:  Goal: Free from accidental physical injury  Free from accidental physical injury   Outcome: Met This Shift

## 2018-10-19 NOTE — PATIENT CARE CONFERENCE
[address] [place], [place] [address]Th [facility]  INPATIENT ACUTE REHABILITATION  TEAM CONFERENCE NOTE/PATIENT PLAN OF CARE    Date: 10/19/2018  Admission date: 10/18/2018  Patient Name: Lissette Banegas        MRN: [id number]    : 1955  (61 y.o.)  Gender: female   Rehab diagnosis/surgery with date:   Normal Pressure Hydrocephalus/ shunt insertion 10/15/18  Impairment Group Code:  2.1      MEDICAL/FUNCTIONAL HISTORY/STATUS:  COPD-has oxygen at home, CKD stage 3  patient not yet evaluated at time of team conference, briefly went over pre-admission levels, will be a good candidate for acute rehab and anticipate her being able to reach goals and go home within 1-2 weeks    Consultations/Labs/X-rays: none recent      MEDICATION UPDATE:  BP running low      NURSING FIMS:    Bowel:   Current level: independent  Short term bowel goal:  independent  Long term bowel goal: independent    Bladder:   Current level: Modified independent, oral ditropan  Short term bladder goal: Modified independent  Long term bladder goal: Modified independent    Toilet Hygiene:   Current level : 0  Short term Toilet hygiene goal: Modified independent  Long term toilet hygiene goal:  Modified independent    Skin integrity: incisions healing, on oxygen at 3 liters, had it at home due to COPD  Pain: 7-10, abdominal pain    NUTRITION    Diet  general , low NA      Discharge Plan   Estimated Length of Stay: 1-2 weeks            Destination: home  Services at Discharge: to be assessed  Equipment at Discharge: to be assessed      INTERDISCIPLINARY TEAM/PHYSICIAN RECOMMENDATION AND/OR REVISIONS OF PLAN OF CARE:  complete initial evals, continue working towards goals      I approve the established interdisciplinary plan of care as documented within the medical record of Lissette Banegas. Please see team conference signature page for those in attendance.     Electronically signed by Akira Esparza RN on 10/19/2018 at 8:48 AM

## 2018-10-19 NOTE — CARE COORDINATION
Social Work Assessment Summary    PCP: Eyal Ramirez DO    Patient Resides: Pr lives with Luis Vieira (spouse), Richard Solano (daughter) and Nataly's 4 children. Within the home there are 3 dogs (2 small and 1 large) and 5 cats. Home Architecture : Pt lives in a [facility] home. Main entrance has 1 step, 0 rails. Back entrance has 2-3 steps, 0 rails. 1st floor bed/bath which is a tub/shower combo with a shower curtain. Will you return to your own home? Yes      Primary Caregiver: Caryn Beach, spouse (64) works full time as a  in construction. He is both healthy and drives. Pt has two children:  Richard Solano, daughter (40) lives with the Pt. Frances Fisher does not work. She is both healthy and drives. Tristan Her, son (40) lives in [place]. He works full time in construction. He is both healthy and drives. Also within the home lives Sundance 4 children. Level of Function PTA : Pt was independent in ambulation, toileting and dressing. Pt needed assistance with bathing, meal prep, homemaking and driving. 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 Receives SSD for the past 2-3 years Reason: \"Neck and back fusion. \"     DME Pta  : Pt has and uses Oxygen 3 L flow from [facility] ([address] in [facility]). Pt has and \"sometimes\" uses a wheeled walker and glucometer. Community Agency Involvement PTA : [facility] for PT, OT and Nursing. Do they have a medical profile: No    Family Teaching: TBS    Strength: Compassionate    Preference: \"To get me back. To do things so I don't have to depend on somebody. To drive. \"      NAME RELATION HOME # WORK # CELL #   Caryn Beach Spouse [phone]  [address] [place] [address] Letcher daughter [phone]  [phone]            Height: 5'2\"  Weight: 102 #    Thereasa Fragmin SW Intern  Mela Vanessa

## 2018-10-19 NOTE — PROGRESS NOTES
Occupational Therapy   Occupational Therapy Initial Assessment  Date: 10/19/2018   Patient Name: Miley Jarrett  MRN: [id number]     : 1955    Date of Service: 10/19/2018     Referring Practitioner: Dr. Martin Brown  Diagnosis: Normal Presser Hydrocephalus; s/p  Shunt/[facility] 10-15-18     Past medical history: Acute on chronic respiratory failure with hypoxia and hypercapnia ; Apraxia; Ataxia; CAD (coronary artery disease); Choledocholithiasis; Chronic systolic congestive heart failure ; CKD (chronic kidney disease) stage 3, GFR 30-59 ml/min ; COPD (chronic obstructive pulmonary disease); Hyperlipidemia; Hypoxia; Normal pressure hydrocephalus; Pleural effusion; Pulmonary hypertension ; Restless legs; S/P CABG x 2; Severe mitral regurgitation; and Smoker. Past surgical history: Coronary artery bypass graft (10/30/2002); Cholecystectomy (); cervical fusion (); Hysterectomy (); Cardiac surgery; lumbar drain implantation (2018); pr spinal puncture,therapeutic drainage (N/A, 2018)         Restrictions/Precautions: Fall Risk (O2 3LPM)    Pain Assessment  Patient Currently in Pain:  (Pain L Flank - Patient reported r/t placement of shunt)     Social/Functional History  Social/Functional History  Lives With: Spouse; Daughter and 4 grandchildren; Patient reported that her spouse has CA and is undergoing Chemotherapy  Type of Home: House  Home Layout: Two level (Patient has bed/bath on 1st floor )  Home Access:  (2 steps to enter)  Bathroom Shower/Tub: Tub/Shower unit  Bathroom Toilet: Standard  Home Equipment: Rolling walker, Cane (Patient used device for community mobility only)  ADL Assistance: Independent  Homemaking Assistance:  (Family assist as needed. Patient reported that she was Independent w/ meal prep vacuming and management of finances w/ increased time PTA. )    Prior Level of Function  Ambulation Assistance: Independent  Transfer Assistance: Needs assistance (Required supervision from tub/shower transfer)  Mode of Transportation:  (Patient has not driven since July 0195)  Occupation: Retired (LPN)  [address]: Enjoys cooking     Objective   Vision: Within Functional Limits  Hearing: Within functional limits    Orientation  Overall Orientation Status: Within Functional Limits (modified Independent for exact date - looked at calendar)     Balance  Sitting Balance: Independent  Standing Balance: Contact guard assistance (during dynamic ADL tasks)    Functional Mobility  Functional - Mobility Device: No device  Activity: To/from bathroom  Assist Level: Contact guard assistance  Functional Mobility Comments: safety cues for O2 line management    Toilet Transfers  Toilet - Technique: Ambulating  Equipment Used: Grab bars  Toilet Transfer: Contact guard assistance (No Device; cues for O2 line management)    Shower Transfers  Shower Transfers Comments: Patient decline shower transfer this date. ADL  Feeding: Modified independent  ([employer]ent[employer])  Grooming: Minimal assistance for combing hair/completed seated at sink d/t decreased endurance  UE Bathing: Supervision  LE Bathing: Contact guard assistance  UE Dressing: Supervision;Setup  LE Dressing: Contact guard assistance;Setup (Patient donned pants, underpants over feet shoes Independently seated on side of bed; CGA/SBA standing to manage clothing over hips  )  Toileting: Stand by assistance;Contact guard assistance (Clothing management CGA/SBA; bladder hygiene - Indepednent seated on commode)    Coordination  Coordination and Movement description: Fine motor impairments  Quality of Movement Other  Comment: difficulty opening packages/containers during grooming tasks     Cognition  Arousal/Alertness: Appropriate responses to stimuli  Following Commands:  Follows one step commands consistently (patient had some difficulty w/ word finding)  Attention Span: Appears intact  Memory:  (Modified Independent)  Safety Judgement:  (cues for safety with O2 line management)  Problem Solving: Assistance required to implement solutions (Patient able to generate solutions for completion of ADL tasks regarding limitation in motor endurance and activity tolerance, she required cues fro follow through and implementation of strategies)  Insights: Fully aware of deficits  Initiation: Does not require cues  Sequencing: Does not require cues     Sensation  Overall Sensation Status:  (Diminished sensation B hands/feet)      LUE AROM (degrees)  LUE AROM : WFL  LUE General AROM:  limitation w/ shoulder elevation d/t pain L flank    RUE AROM (degrees)  RUE AROM : WFL    LUE Strength  Gross LUE Strength:  (3+/5 proximal; 4-/5 distal)  RUE Strength  Gross RUE Strength:  (4-/5 throughout)     Hand Dominance  Hand Dominance: Right  Left Hand Strength -  (lbs)  Handle Setting 2: 27#  Right Hand Strength -  (lbs)  Handle Setting 2: 28#    Fine Motor Skills  Left 9 Hole Peg Test Time (secs): 36  Right 9 Hole Peg Test Time (secs): 35    Activity Tolerance  Activity Tolerance: Patient limited by fatigue  Activity Tolerance: cues to pace self     Assessment   Performance deficits / Impairments: Decreased ADL status; Decreased balance;Decreased sensation;Decreased endurance;Decreased functional mobility ; Decreased high-level IADLs;Decreased fine motor control  Prognosis: Good  Decision Making: Low Complexity    Safety Devices  Safety Devices in place: Yes  Type of devices: Call light within reach         Plan   Plan  Times per week: OT 1-2x/day for 6 days/week  Current Treatment Recommendations: Self-Care / ADL, Home Management Training, Functional Mobility Training, Endurance Training, Strengthening, Balance Training, Patient/Caregiver Education & Training, Equipment Evaluation, Education, & procurement, Neuromuscular Re-education (Instruction/trainig on energy conservation)  OT Equipment Recommendations  Other: Tub Seat; grab bar    Goals  Long term goals  Time Frame for Long term goals : 1 week  Long term goal 1: increase UE dressing and grooming- standing @ sink to Independent  Long term goal 2: increase LE dressing, bathing and toileting to Modified Indepedent  Long term goal 3: increase functional transfers to/from commode, bed and chair to Modified Independnet  Long term goal 4: increase functional mobility for setup of ADL and light household tasks to Independent  Long term goal 5: increase occupational endurance to [facility] for completion of ADLs/IADLs  Long term goals 6: increase tub transfer to [employer] w/ DME PRN  Patient Goals   Patient goals : To be Normal, Independent w/ all ADLs and IADLS.   \" I love my Richardson\"       Therapy Time   Individual Concurrent Group Co-treatment   Time In 0815         Time Out 0915         Minutes 20 eval  40 treatment                 Martha Alexander, OT

## 2018-10-19 NOTE — PROGRESS NOTES
WNL  WNL     BLE Strength BLEs are grossly 4-/5 to 4/5  BLEs are grossly 4-/5 to 4/5     Balance  Sitting EOB: Supervision  Dynamic standing: SBA  Sitting EOB: Supervision  Dynamic standing: SBA     Date Family Teach Completed No family present at initial evaluation  No family present to this date     Is additional Family Teaching Needed? Y or N Yes  Yes     Hindering Progress Weakness, balance deficits  Weakness, balance deficits     PT recommended ELOS        Team's Discharge Plan        Therapist at Team Meeting          Therapeutic Exercise:   PM:   Ambulation: 90 feet with SPC with CGA<>Min A  Ambulation: 200 feet with no AD with SBA  Ambulation: x200 feet with WW with SBA  Standing cone stacking with no UE support on walker with supervision  Standing 4\" box toe touches alternating BLEs with no UE support with SBA and Delfin to recover from 1 LOB. Stand pivot transfer with WW with SBA    Additional Comments: The pt attempted ambulation with no AD, with WW, and SPC. The pt had difficulty with sequencing with the [employer] and lost her balance multiple times. The pt was able to ambulate with no AD without physical assistance but was unsteady and exhibited increased lateral sway. The pt ambulated with a WW with no LOB and no sign of unsteadiness, however she required cues to focus on her balance and was easily distracted, she turned quickly and required SBA for safety. During 4\" toe box touches, pt required Delfin for 1 LOB on the first attempt due to insufficient weight shifting, but was SBA for the rest of the activity. Noted intermittent R toe drag as pt fatigued with ambulation. Patient/family education  Pt/family educated on proper [facility] [place] and [employer] gait training.     Patient/family response to education:   Pt/family verbalized understanding Pt/family demonstrated skill Pt/family requires further education in this area   Yes Yes Yes         PM  Time in: 1430  Time out: 1515    Pt is making good progress toward established Physical Therapy goals. Continue with physical therapy current plan of care. Tami Viera, ARA Bryant.  [place], [place]  License Number: [id number]

## 2018-10-19 NOTE — PROGRESS NOTES
Aphasic               Confused        Impulsive       Unresponsive              EVALUATION  RESULTS SHORT TERM   FIM GOAL LONG TERM   FIM GOAL     RECEPTIVE   LANGUAGE     AUDITORY/SPOKEN LANGUAGE COMPREHENSION   INTACT   given increased time       MODIFIED INDEPENDENT      Reliable response to   yes/no questions  Abstract in nature      Accurate response to   Wh- questions Complex queries given increased time      Follow commands   3 step      Single object identification   Intact      Understand conversational speech Yes      WRITTEN COMPREHENSION   DNT      Identify simple written symbols/letters       Understand written   language       Functional reading             EXPRESSIVE   LANGUAGE VERBAL EXPRESSION   SUPERVISION  MODIFIED INDEPENDENT    Repetition   Intact      Automatic speech   Intact       Confrontation naming,  objects Intact       Word fluency   Increased time necessary      Effective for communication of basic wants/needs?  Yes       Abstract language   Present, requires increased time      Conversational speech   Mild word retrieval issues      WRITTEN EXPRESSION   DNT      Copying         Functional writing             PRAGMATIC   LANGUAGE   Pragmatic comprehension   Intact     Pragmatic production   Intact     Cultural communication competence Intact         COGNITION Orientation   X 3     Attention  Sustained  Selective  Alternating  Divided   Intact  Intact  TBA  TBA     Concomitant factors  Neglect   Visual field cut  Diplopia  Hearing loss   NO  NO  NO  NO     PROBLEM SOLVING MIN ASSISTANCE  SUPERVISION    Verbal    Fair+      Motor   TBA      MEMORY   MIN ASSISTANCE      SUPERVISION      Immediate recall   Fair+/good      Recent/STM recall   Fair/fair+      Long term recall   Good      SAFETY   TBA              PARAMETERS OF SPEECH PRODUCTION    FUNCTIONAL  IMPAIRED  (Comment)    Respiration   X      Articulation   X      Fluency   X       Pitch   X       Intensity    X       Quality   X Resonance   X       Prosody   X       Intelligibility   X          ORAL MECHANISM EXAMINATION    [x] Adequate lingual/labial strength  [] Generalized oral weakness  [] Left labiobuccal weakness   [] Right labiobuccal weakness  [] Left lingual deviation   [] Right lingual deviation  [] Oral apraxia    [] CNT      SALIENT CLINICAL OBSERVATIONS    [x] Latent processing   [x] Latent responses    [] Inconsistent responses    [] Perseveration     [x] Cueing necessary for accurate completion of task   [] Decreased insight into impact current physical and cognitive limitations will have on level of functional independence once discharged home       DISCHARGE PLANNING:    Given current cognitive/linguistic status, SLP recommends      24 hour supervision    Close supervision   Intermittent supervision    No supervision               X                                             Education was completed in the following areas:         Aspiration precautions    X   Solid/liquid consistency recommendations     Strategies to promote safety with PO intake     Use of thickening agents     Resistive tongue base exercises/Arianna maneuver     Deep Pharyngeal Neuromuscular Stimulation (DPNS)     Shaker exercises     Electrical stimulation     Swallow compensatory strategies     Ortiz water protocol     Modified Ortiz water protocol     Receptive aphasia     Auditory processing deficit     Expressive aphasia     Anomia/circumlocutory strategies     Apraxia -- verbal/oral/motor     Augmentative communication   X  Cognition   X  Strategies to promote safety in home environment   X  IADL tasks -- finances, medications, etc.     Safety/insight given current physical/cognitive/visuospatial deficits     Neglect     Homonymous hemianopsia     Dysarthria     Oral motor exercises     Articulation drill training     Speech intelligibility strategies (slow rate of delivery, over-articulation)     Dysphonia     Vocal hygiene program Other:       Speech Pathologist (SLP) completed education with the patient and/or family regarding results of this evaluation. Encouraged patient and/or family to engage SLP in structured Q&A session. Patient and/or family indicated understanding of all information provided via satisfactory verbal response. Regarding:      [x] diagnosis      [x] treatment      [x] prognosis      [x] plan of care    [x] Patient was an active participant in goal planning process. [] Patient was unable to participate in goal planning process. [] Goal planning not appropriate at this time as intervention was not recommended.     Prognosis for improvements is    [x] good       [] fair       [] guarded       [] poor      Arie Dietrich., CCC-SLP/L  SP [id number]  10/19/2018

## 2018-10-20 LAB
METER GLUCOSE: 114 MG/DL (ref 70–110)
METER GLUCOSE: 116 MG/DL (ref 70–110)
METER GLUCOSE: 120 MG/DL (ref 70–110)
METER GLUCOSE: 144 MG/DL (ref 70–110)

## 2018-10-20 PROCEDURE — 6370000000 HC RX 637 (ALT 250 FOR IP): Performed by: PHYSICIAN ASSISTANT

## 2018-10-20 PROCEDURE — 6370000000 HC RX 637 (ALT 250 FOR IP): Performed by: PHYSICAL MEDICINE & REHABILITATION

## 2018-10-20 PROCEDURE — 82962 GLUCOSE BLOOD TEST: CPT

## 2018-10-20 PROCEDURE — 2700000000 HC OXYGEN THERAPY PER DAY

## 2018-10-20 PROCEDURE — 97535 SELF CARE MNGMENT TRAINING: CPT

## 2018-10-20 PROCEDURE — 97127 HC SP THER IVNTJ W/FOCUS COG FUNCJ: CPT

## 2018-10-20 PROCEDURE — 1280000000 HC REHAB R&B

## 2018-10-20 PROCEDURE — 97530 THERAPEUTIC ACTIVITIES: CPT

## 2018-10-20 RX ADMIN — BUPROPION HYDROCHLORIDE 300 MG: 300 TABLET, FILM COATED, EXTENDED RELEASE ORAL at 09:31

## 2018-10-20 RX ADMIN — OXYCODONE HYDROCHLORIDE AND ACETAMINOPHEN 1 TABLET: 10; 325 TABLET ORAL at 09:30

## 2018-10-20 RX ADMIN — ISOSORBIDE MONONITRATE 30 MG: 30 TABLET ORAL at 09:31

## 2018-10-20 RX ADMIN — BUTALBITAL, ACETAMINOPHEN, AND CAFFEINE 1 TABLET: 50; 325; 40 TABLET ORAL at 16:49

## 2018-10-20 RX ADMIN — OXYCODONE HYDROCHLORIDE AND ACETAMINOPHEN 1 TABLET: 10; 325 TABLET ORAL at 19:27

## 2018-10-20 RX ADMIN — ROPINIROLE HYDROCHLORIDE 3 MG: 2 TABLET, FILM COATED ORAL at 21:10

## 2018-10-20 RX ADMIN — OXYBUTYNIN CHLORIDE 10 MG: 10 TABLET, FILM COATED, EXTENDED RELEASE ORAL at 09:30

## 2018-10-20 RX ADMIN — POTASSIUM CHLORIDE 20 MEQ: 20 TABLET, EXTENDED RELEASE ORAL at 16:50

## 2018-10-20 RX ADMIN — SACUBITRIL AND VALSARTAN 1 TABLET: 24; 26 TABLET, FILM COATED ORAL at 21:10

## 2018-10-20 RX ADMIN — INSULIN LISPRO 3 UNITS: 100 INJECTION, SOLUTION INTRAVENOUS; SUBCUTANEOUS at 16:50

## 2018-10-20 RX ADMIN — BUTALBITAL, ACETAMINOPHEN, AND CAFFEINE 1 TABLET: 50; 325; 40 TABLET ORAL at 11:02

## 2018-10-20 RX ADMIN — OXYCODONE HYDROCHLORIDE AND ACETAMINOPHEN 1 TABLET: 10; 325 TABLET ORAL at 13:35

## 2018-10-20 RX ADMIN — SACUBITRIL AND VALSARTAN 1 TABLET: 24; 26 TABLET, FILM COATED ORAL at 09:31

## 2018-10-20 RX ADMIN — CARVEDILOL 12.5 MG: 6.25 TABLET, FILM COATED ORAL at 09:31

## 2018-10-20 RX ADMIN — OXYCODONE HYDROCHLORIDE AND ACETAMINOPHEN 1 TABLET: 10; 325 TABLET ORAL at 03:22

## 2018-10-20 RX ADMIN — TRAZODONE HYDROCHLORIDE 150 MG: 150 TABLET ORAL at 21:10

## 2018-10-20 RX ADMIN — BUTALBITAL, ACETAMINOPHEN, AND CAFFEINE 1 TABLET: 50; 325; 40 TABLET ORAL at 05:58

## 2018-10-20 RX ADMIN — POTASSIUM CHLORIDE 20 MEQ: 20 TABLET, EXTENDED RELEASE ORAL at 09:31

## 2018-10-20 RX ADMIN — ROPINIROLE HYDROCHLORIDE 3 MG: 2 TABLET, FILM COATED ORAL at 09:31

## 2018-10-20 RX ADMIN — LINAGLIPTIN 5 MG: 5 TABLET, FILM COATED ORAL at 09:31

## 2018-10-20 RX ADMIN — MAGNESIUM HYDROXIDE 30 ML: 400 SUSPENSION ORAL at 05:58

## 2018-10-20 RX ADMIN — SPIRONOLACTONE 25 MG: 25 TABLET ORAL at 09:31

## 2018-10-20 ASSESSMENT — PAIN DESCRIPTION - ORIENTATION
ORIENTATION: LEFT;LOWER

## 2018-10-20 ASSESSMENT — PAIN DESCRIPTION - PROGRESSION
CLINICAL_PROGRESSION: NOT CHANGED
CLINICAL_PROGRESSION: NOT CHANGED

## 2018-10-20 ASSESSMENT — PAIN SCALES - GENERAL
PAINLEVEL_OUTOF10: 6
PAINLEVEL_OUTOF10: 7
PAINLEVEL_OUTOF10: 0
PAINLEVEL_OUTOF10: 10
PAINLEVEL_OUTOF10: 8
PAINLEVEL_OUTOF10: 7
PAINLEVEL_OUTOF10: 8
PAINLEVEL_OUTOF10: 6
PAINLEVEL_OUTOF10: 0
PAINLEVEL_OUTOF10: 7
PAINLEVEL_OUTOF10: 8

## 2018-10-20 ASSESSMENT — PAIN DESCRIPTION - DESCRIPTORS
DESCRIPTORS: SHARP;ACHING;DISCOMFORT
DESCRIPTORS: SHARP;ACHING
DESCRIPTORS: SHARP;CONSTANT
DESCRIPTORS: CONSTANT;SHARP
DESCRIPTORS: CONSTANT;DISCOMFORT
DESCRIPTORS: CONSTANT;DISCOMFORT

## 2018-10-20 ASSESSMENT — PAIN DESCRIPTION - ONSET
ONSET: ON-GOING

## 2018-10-20 ASSESSMENT — PAIN DESCRIPTION - LOCATION
LOCATION: ABDOMEN
LOCATION: ABDOMEN
LOCATION: ABDOMEN;HEAD
LOCATION: ABDOMEN
LOCATION: ABDOMEN
LOCATION: ABDOMEN;HEAD

## 2018-10-20 ASSESSMENT — PAIN DESCRIPTION - FREQUENCY
FREQUENCY: INTERMITTENT
FREQUENCY: CONTINUOUS
FREQUENCY: CONTINUOUS
FREQUENCY: INTERMITTENT

## 2018-10-20 ASSESSMENT — PAIN DESCRIPTION - PAIN TYPE
TYPE: ACUTE PAIN

## 2018-10-20 NOTE — PROGRESS NOTES
OCCUPATIONAL THERAPY DAILY NOTE    Date:10/20/2018  Patient Name: Giuliano Arrington  MRN: [id number]  : 1955  Room: [address] [place], [place] [address]     Patient Active Problem List   Diagnosis    CAD (coronary artery disease)    S/P CABG x 2    Smoker    Bronchitis    [facility]monary [facility] ([facility] [address]    [facility]ch[facility]) [facility]    Apraxia    Ataxia    CKD (chronic kidney disease) stage 3, GFR 30-59 ml/min ([facility])    Hypoxia    TIA (transient ischemic attack)    Stroke-like symptoms    Acute on chronic respiratory failure with hypoxia and hypercapnia ([facility])    Sinus pause    Normal pressure hydrocephalus    Chronic systolic congestive heart failure ([facility])    Severe mitral regurgitation    NPH (normal pressure hydrocephalus)       Precautions: 02 3L,  Fall risk  Functional Assessment:   Date Status AE  Comments   Feeding 10/19/18 Mod I   Per eval   Grooming 10/20/18   CGA  Pt completed oral care, face/hand washing then applied deodorant seated at sink,   Bathing 10/20/18 UB- supervision  LB CGA  Pt engaged in sponge bath since declined shower. Pt needed CGA for bert care when standing due to balance. UB Dressing 10/20/18  S/set up  Pt donned tank top and shirt after set up. LB Dressing 10/20/18  SBA/CGA  Pt donned pants and gripper socks using BLE crossover method. Pt able to pull pants up over hips    Homemaking   TBA       Functional Transfers / Balance:   Date Status DME  Comments   Sit Balance 10/20/18   supervision  Sitting balance required supervision during ADL since sitting with BLE's crossed. Stand Balance 10/20/18  CGA  Standing balance during transfers, ambulation and clothing mgmt. [] Tub  [] Shower   Transfer   TBA     Commode   Transfer 10/19/18  CGA No device Per eval   Functional   Mobility 10/20/18 CGA No device Pt ambulated in room using no device needing assist for 02 line mgmt.     Other:         Functional Exercises / Activity:  Pt engaged in am ADL's to increase

## 2018-10-20 NOTE — PROGRESS NOTES
Speech Language Pathology  DAILY PROGRESS NOTE        COGNITION/LANGUAGE:    Good outcome with completion of deductive reasoning task in which patient was asked to deduce the relationships between different people, places and things based on a limited number of clues given in the puzzle. Patient required infrequent min cues for thought organization. Will continue SP intervention as per established POC.         SWALLOWING:                     Current level:              MODIFIED INDEPENDENT     RECEPTIVE LANGUAGE:    Current FIM level:       MODIFIED INDEPENDENT                            EXPRESSIVE LANGUAGE:  Current FIM level:       SUPERVISION     PROBLEM SOLVING:           Current FIM level:       MIN ASSISTANCE                MEMORY:                              Current FIM level:       MIN ASSISTANCE             Rosa Rubio, CCC-SLP/L  SP [id number]  10/20/2018      Three Hour Rule Tracking:    Individual therapy:            30 minutes  Concurrent therapy:  0 minutes  Group therapy:   0 minutes  Co-treatment therapy:  0 minutes    Total minutes: 30 minutes

## 2018-10-21 LAB
METER GLUCOSE: 115 MG/DL (ref 70–110)
METER GLUCOSE: 127 MG/DL (ref 70–110)
METER GLUCOSE: 164 MG/DL (ref 70–110)
METER GLUCOSE: 99 MG/DL (ref 70–110)

## 2018-10-21 PROCEDURE — 2700000000 HC OXYGEN THERAPY PER DAY

## 2018-10-21 PROCEDURE — 6370000000 HC RX 637 (ALT 250 FOR IP): Performed by: PHYSICIAN ASSISTANT

## 2018-10-21 PROCEDURE — 97530 THERAPEUTIC ACTIVITIES: CPT

## 2018-10-21 PROCEDURE — 6370000000 HC RX 637 (ALT 250 FOR IP): Performed by: PHYSICAL MEDICINE & REHABILITATION

## 2018-10-21 PROCEDURE — 1280000000 HC REHAB R&B

## 2018-10-21 PROCEDURE — 82962 GLUCOSE BLOOD TEST: CPT

## 2018-10-21 RX ORDER — OXYCODONE AND ACETAMINOPHEN 10; 325 MG/1; MG/1
1 TABLET ORAL EVERY 4 HOURS PRN
Status: DISCONTINUED | OUTPATIENT
Start: 2018-10-21 | End: 2018-10-23

## 2018-10-21 RX ORDER — DOCUSATE SODIUM 100 MG/1
100 CAPSULE, LIQUID FILLED ORAL 2 TIMES DAILY
Status: DISCONTINUED | OUTPATIENT
Start: 2018-10-21 | End: 2018-10-31 | Stop reason: HOSPADM

## 2018-10-21 RX ORDER — SODIUM PHOSPHATE, DIBASIC AND SODIUM PHOSPHATE, MONOBASIC 7; 19 G/133ML; G/133ML
1 ENEMA RECTAL
Status: ACTIVE | OUTPATIENT
Start: 2018-10-21 | End: 2018-10-21

## 2018-10-21 RX ORDER — CALCIUM CARBONATE 200(500)MG
500 TABLET,CHEWABLE ORAL 2 TIMES DAILY PRN
Status: DISCONTINUED | OUTPATIENT
Start: 2018-10-21 | End: 2018-10-25

## 2018-10-21 RX ORDER — OXYCODONE HYDROCHLORIDE AND ACETAMINOPHEN 5; 325 MG/1; MG/1
1 TABLET ORAL EVERY 4 HOURS PRN
Status: DISCONTINUED | OUTPATIENT
Start: 2018-10-21 | End: 2018-10-23

## 2018-10-21 RX ADMIN — LINAGLIPTIN 5 MG: 5 TABLET, FILM COATED ORAL at 08:12

## 2018-10-21 RX ADMIN — OXYCODONE HYDROCHLORIDE AND ACETAMINOPHEN 1 TABLET: 10; 325 TABLET ORAL at 12:34

## 2018-10-21 RX ADMIN — POTASSIUM CHLORIDE 20 MEQ: 20 TABLET, EXTENDED RELEASE ORAL at 08:11

## 2018-10-21 RX ADMIN — MAGNESIUM HYDROXIDE 30 ML: 400 SUSPENSION ORAL at 07:08

## 2018-10-21 RX ADMIN — TRAZODONE HYDROCHLORIDE 150 MG: 150 TABLET ORAL at 21:37

## 2018-10-21 RX ADMIN — OXYCODONE HYDROCHLORIDE AND ACETAMINOPHEN 1 TABLET: 10; 325 TABLET ORAL at 03:25

## 2018-10-21 RX ADMIN — BUPROPION HYDROCHLORIDE 300 MG: 300 TABLET, FILM COATED, EXTENDED RELEASE ORAL at 08:11

## 2018-10-21 RX ADMIN — CARVEDILOL 12.5 MG: 6.25 TABLET, FILM COATED ORAL at 17:05

## 2018-10-21 RX ADMIN — SACUBITRIL AND VALSARTAN 1 TABLET: 24; 26 TABLET, FILM COATED ORAL at 21:36

## 2018-10-21 RX ADMIN — SPIRONOLACTONE 25 MG: 25 TABLET ORAL at 08:11

## 2018-10-21 RX ADMIN — CALCIUM CARBONATE (ANTACID) CHEW TAB 500 MG 500 MG: 500 CHEW TAB at 21:36

## 2018-10-21 RX ADMIN — FLUTICASONE PROPIONATE 1 SPRAY: 50 SPRAY, METERED NASAL at 08:14

## 2018-10-21 RX ADMIN — OXYCODONE HYDROCHLORIDE AND ACETAMINOPHEN 1 TABLET: 10; 325 TABLET ORAL at 08:11

## 2018-10-21 RX ADMIN — ROPINIROLE HYDROCHLORIDE 3 MG: 2 TABLET, FILM COATED ORAL at 08:13

## 2018-10-21 RX ADMIN — OXYCODONE HYDROCHLORIDE AND ACETAMINOPHEN 1 TABLET: 10; 325 TABLET ORAL at 17:05

## 2018-10-21 RX ADMIN — POTASSIUM CHLORIDE 20 MEQ: 20 TABLET, EXTENDED RELEASE ORAL at 17:06

## 2018-10-21 RX ADMIN — OXYBUTYNIN CHLORIDE 10 MG: 10 TABLET, FILM COATED, EXTENDED RELEASE ORAL at 08:11

## 2018-10-21 RX ADMIN — DOCUSATE SODIUM 100 MG: 100 CAPSULE, LIQUID FILLED ORAL at 12:35

## 2018-10-21 RX ADMIN — ROPINIROLE HYDROCHLORIDE 3 MG: 2 TABLET, FILM COATED ORAL at 21:47

## 2018-10-21 RX ADMIN — OXYCODONE HYDROCHLORIDE AND ACETAMINOPHEN 1 TABLET: 10; 325 TABLET ORAL at 22:08

## 2018-10-21 RX ADMIN — DOCUSATE SODIUM 100 MG: 100 CAPSULE, LIQUID FILLED ORAL at 21:37

## 2018-10-21 ASSESSMENT — PAIN DESCRIPTION - LOCATION
LOCATION: HEAD
LOCATION: HEAD;NECK
LOCATION: HEAD
LOCATION: ABDOMEN;HEAD
LOCATION: ABDOMEN;HEAD
LOCATION: ABDOMEN

## 2018-10-21 ASSESSMENT — PAIN SCALES - GENERAL
PAINLEVEL_OUTOF10: 8
PAINLEVEL_OUTOF10: 8
PAINLEVEL_OUTOF10: 0
PAINLEVEL_OUTOF10: 0
PAINLEVEL_OUTOF10: 8
PAINLEVEL_OUTOF10: 8
PAINLEVEL_OUTOF10: 0
PAINLEVEL_OUTOF10: 8
PAINLEVEL_OUTOF10: 6
PAINLEVEL_OUTOF10: 0

## 2018-10-21 ASSESSMENT — PAIN DESCRIPTION - ONSET
ONSET: ON-GOING

## 2018-10-21 ASSESSMENT — PAIN DESCRIPTION - DESCRIPTORS
DESCRIPTORS: DISCOMFORT;HEADACHE
DESCRIPTORS: CONSTANT;ACHING;DISCOMFORT
DESCRIPTORS: CONSTANT;HEADACHE
DESCRIPTORS: ACHING
DESCRIPTORS: ACHING
DESCRIPTORS: DISCOMFORT

## 2018-10-21 ASSESSMENT — PAIN DESCRIPTION - PAIN TYPE
TYPE: ACUTE PAIN
TYPE: ACUTE PAIN
TYPE: CHRONIC PAIN
TYPE: ACUTE PAIN

## 2018-10-21 ASSESSMENT — PAIN DESCRIPTION - PROGRESSION
CLINICAL_PROGRESSION: NOT CHANGED

## 2018-10-21 ASSESSMENT — PAIN DESCRIPTION - ORIENTATION
ORIENTATION: LEFT;LOWER
ORIENTATION: RIGHT
ORIENTATION: LEFT;LOWER
ORIENTATION: LEFT;LOWER
ORIENTATION: RIGHT

## 2018-10-21 ASSESSMENT — PAIN DESCRIPTION - FREQUENCY
FREQUENCY: CONTINUOUS

## 2018-10-21 NOTE — PROGRESS NOTES
Problem List:     CAD (coronary artery disease)     S/P CABG x 2     Smoker     Bronchitis     Pulmonary hypertension (HCC)     COPD (chronic obstructive pulmonary disease) ([facility])     Apraxia     Ataxia     CKD (chronic kidney disease) stage 3, GFR 30-59 ml/min ([facility])     Hypoxia     TIA (transient ischemic attack)     Stroke-like symptoms     Acute on chronic respiratory failure with hypoxia and hypercapnia ([facility])     Sinus pause     Normal pressure hydrocephalus     Chronic systolic congestive heart failure (HCC)     Severe mitral regurgitation     NPH (normal pressure hydrocephalus)      Plan/  1. Continue increased pain medicine  2. Continue mobilize as able  3.  Continue dvt prophylaxis          Paul Cordova MD

## 2018-10-21 NOTE — PROGRESS NOTES
Physical Therapy  Facility/Department: [address] REHAB  Daily Treatment Note  NAME: Krishan Dominguez  : 1955  MRN: [id number]    Date of Service: 10/21/2018  Evaluating Therapist: Krishan Gutiérrez. Maria Isabel Mcallister P.T.     ROOM: [facility]  DIAGNOSIS: NPH  PRECAUTIONS: Fall risk, O2   PROCEDURE(S): 10/15 insertion of  shunt     Social:  Pt lives with her , daughter, and 4 grandchildren in a 1 floor plan with 2-3 steps and no rail to enter. Prior to admission pt ambulated with no AD, was receiving HHPT for gait/balance 1-2x/week (TIA 2018). Pt owns a [employer].                    Initial Evaluation  Date: 10/19/18 AM     PM    Short Term Goals Long Term Goals    Was pt agreeable to Eval/treatment? Yes Initial Evaluation Yes       Does pt have pain? 8/10 back and L side pain, nursing aware   No c/o pain , just extreme fatigue        Bed Mobility  Rolling: Supervision  Supine to sit: SBA  Sit to supine: SBA  Scooting: SBA   Hospital bed Supervision bed flat   All aspects  Independent   Transfers Sit to stand: SBA  Stand to sit: SBA  Stand pivot: SBA   Sit to stand: CGA  Stand to sit: CGA  Stand pivot: CGA w no AD   Modified Independent   Ambulation   150 feet with no AD with SBA   200  feet with no AD with CGA  >150 feet with AAD with modified independence. Walking 10 feet on uneven surface 10 feet with no AD with SBA   NT  >10 feet with AAD with modified independence. Wheel Chair Mobility NT   NT      Car Transfers SBA   Supervision  Indepence   Stair negotiation: ascended and descended 4 steps with 2 rail with SBA          NT  >12 steps with 0 rail with modified indenpence. Curb Step:   ascended and descended 4 inch step with no AD and SBA   NT  7.5 inch step with AAD and modified indepence   Picking up object off the floor Picked up 5# with SBA   NT  Will  5# with modified independence.    BLE ROM WNL          BLE Strength BLEs are grossly 4-/5 to 4/5          Balance  Sitting EOB: Supervision  Dynamic standing:

## 2018-10-21 NOTE — PLAN OF CARE
Problem: Falls - Risk of:  Goal: Will remain free from falls  Will remain free from falls   Outcome: Met This Shift    Goal: Absence of physical injury  Absence of physical injury   Outcome: Met This Shift      Problem: Pain:  Goal: Pain level will decrease  Pain level will decrease   Outcome: Met This Shift    Goal: Control of acute pain  Control of acute pain   Outcome: Met This Shift    Goal: Control of chronic pain  Control of chronic pain   Outcome: Met This Shift      Problem: Mobility - Impaired:  Goal: Mobility will improve  Mobility will improve   Outcome: Met This Shift      Problem: Skin Integrity:  Goal: Will show no infection signs and symptoms  Will show no infection signs and symptoms   Outcome: Met This Shift    Goal: Absence of new skin breakdown  Absence of new skin breakdown   Outcome: Met This Shift      Problem: Infection:  Goal: Will remain free from infection  Will remain free from infection   Outcome: Met This Shift      Problem: Safety:  Goal: Free from accidental physical injury  Free from accidental physical injury   Outcome: Met This Shift    Goal: Free from intentional harm  Free from intentional harm   Outcome: Met This Shift

## 2018-10-22 LAB
METER GLUCOSE: 108 MG/DL (ref 70–110)
METER GLUCOSE: 111 MG/DL (ref 70–110)
METER GLUCOSE: 136 MG/DL (ref 70–110)
METER GLUCOSE: 98 MG/DL (ref 70–110)

## 2018-10-22 PROCEDURE — 97530 THERAPEUTIC ACTIVITIES: CPT

## 2018-10-22 PROCEDURE — 97110 THERAPEUTIC EXERCISES: CPT

## 2018-10-22 PROCEDURE — 82962 GLUCOSE BLOOD TEST: CPT

## 2018-10-22 PROCEDURE — 97535 SELF CARE MNGMENT TRAINING: CPT

## 2018-10-22 PROCEDURE — 1280000000 HC REHAB R&B

## 2018-10-22 PROCEDURE — 6370000000 HC RX 637 (ALT 250 FOR IP): Performed by: PHYSICIAN ASSISTANT

## 2018-10-22 PROCEDURE — 6370000000 HC RX 637 (ALT 250 FOR IP): Performed by: PHYSICAL MEDICINE & REHABILITATION

## 2018-10-22 PROCEDURE — 2700000000 HC OXYGEN THERAPY PER DAY

## 2018-10-22 RX ORDER — SENNA PLUS 8.6 MG/1
2 TABLET ORAL NIGHTLY
Status: DISCONTINUED | OUTPATIENT
Start: 2018-10-22 | End: 2018-10-31 | Stop reason: HOSPADM

## 2018-10-22 RX ORDER — SODIUM PHOSPHATE, DIBASIC AND SODIUM PHOSPHATE, MONOBASIC 7; 19 G/133ML; G/133ML
1 ENEMA RECTAL
Status: COMPLETED | OUTPATIENT
Start: 2018-10-22 | End: 2018-10-22

## 2018-10-22 RX ORDER — SENNA PLUS 8.6 MG/1
1 TABLET ORAL NIGHTLY
Status: DISCONTINUED | OUTPATIENT
Start: 2018-10-22 | End: 2018-10-22

## 2018-10-22 RX ADMIN — OXYCODONE HYDROCHLORIDE AND ACETAMINOPHEN 1 TABLET: 10; 325 TABLET ORAL at 11:12

## 2018-10-22 RX ADMIN — SENNOSIDES 17.2 MG: 8.6 TABLET, FILM COATED ORAL at 21:11

## 2018-10-22 RX ADMIN — OXYCODONE HYDROCHLORIDE AND ACETAMINOPHEN 1 TABLET: 10; 325 TABLET ORAL at 21:12

## 2018-10-22 RX ADMIN — BUPROPION HYDROCHLORIDE 300 MG: 300 TABLET, FILM COATED, EXTENDED RELEASE ORAL at 09:07

## 2018-10-22 RX ADMIN — OXYCODONE HYDROCHLORIDE AND ACETAMINOPHEN 1 TABLET: 10; 325 TABLET ORAL at 04:45

## 2018-10-22 RX ADMIN — SACUBITRIL AND VALSARTAN 1 TABLET: 24; 26 TABLET, FILM COATED ORAL at 21:12

## 2018-10-22 RX ADMIN — OXYCODONE HYDROCHLORIDE AND ACETAMINOPHEN 1 TABLET: 10; 325 TABLET ORAL at 16:27

## 2018-10-22 RX ADMIN — TRIMETHOBENZAMIDE HYDROCHLORIDE 300 MG: 300 CAPSULE ORAL at 22:47

## 2018-10-22 RX ADMIN — ROPINIROLE HYDROCHLORIDE 3 MG: 2 TABLET, FILM COATED ORAL at 21:11

## 2018-10-22 RX ADMIN — DOCUSATE SODIUM 100 MG: 100 CAPSULE, LIQUID FILLED ORAL at 09:01

## 2018-10-22 RX ADMIN — LINAGLIPTIN 5 MG: 5 TABLET, FILM COATED ORAL at 09:04

## 2018-10-22 RX ADMIN — SPIRONOLACTONE 25 MG: 25 TABLET ORAL at 11:12

## 2018-10-22 RX ADMIN — MAGNESIUM HYDROXIDE 30 ML: 400 SUSPENSION ORAL at 21:11

## 2018-10-22 RX ADMIN — FLUTICASONE PROPIONATE 1 SPRAY: 50 SPRAY, METERED NASAL at 09:01

## 2018-10-22 RX ADMIN — ROPINIROLE HYDROCHLORIDE 3 MG: 2 TABLET, FILM COATED ORAL at 09:06

## 2018-10-22 RX ADMIN — SODIUM PHOSPHATE, DIBASIC AND SODIUM PHOSPHATE, MONOBASIC 1 ENEMA: 7; 19 ENEMA RECTAL at 20:01

## 2018-10-22 RX ADMIN — DOCUSATE SODIUM 100 MG: 100 CAPSULE, LIQUID FILLED ORAL at 21:11

## 2018-10-22 RX ADMIN — BUTALBITAL, ACETAMINOPHEN, AND CAFFEINE 1 TABLET: 50; 325; 40 TABLET ORAL at 12:33

## 2018-10-22 RX ADMIN — POTASSIUM CHLORIDE 20 MEQ: 20 TABLET, EXTENDED RELEASE ORAL at 09:05

## 2018-10-22 RX ADMIN — OXYBUTYNIN CHLORIDE 10 MG: 10 TABLET, FILM COATED, EXTENDED RELEASE ORAL at 09:05

## 2018-10-22 RX ADMIN — TRAZODONE HYDROCHLORIDE 150 MG: 150 TABLET ORAL at 21:12

## 2018-10-22 RX ADMIN — POTASSIUM CHLORIDE 20 MEQ: 20 TABLET, EXTENDED RELEASE ORAL at 17:29

## 2018-10-22 ASSESSMENT — PAIN DESCRIPTION - DESCRIPTORS
DESCRIPTORS: STABBING
DESCRIPTORS: ACHING;CONSTANT;DISCOMFORT;THROBBING

## 2018-10-22 ASSESSMENT — PAIN SCALES - GENERAL
PAINLEVEL_OUTOF10: 8
PAINLEVEL_OUTOF10: 6
PAINLEVEL_OUTOF10: 8
PAINLEVEL_OUTOF10: 7
PAINLEVEL_OUTOF10: 7
PAINLEVEL_OUTOF10: 6
PAINLEVEL_OUTOF10: 0
PAINLEVEL_OUTOF10: 8
PAINLEVEL_OUTOF10: 8

## 2018-10-22 ASSESSMENT — PAIN DESCRIPTION - LOCATION
LOCATION: ABDOMEN
LOCATION: ABDOMEN;NECK;HEAD
LOCATION: ABDOMEN;NECK;HEAD
LOCATION: RIB CAGE

## 2018-10-22 ASSESSMENT — PAIN DESCRIPTION - ORIENTATION
ORIENTATION: RIGHT
ORIENTATION: LEFT
ORIENTATION: RIGHT
ORIENTATION: RIGHT

## 2018-10-22 ASSESSMENT — PAIN DESCRIPTION - PAIN TYPE
TYPE: SURGICAL PAIN

## 2018-10-22 ASSESSMENT — PAIN DESCRIPTION - FREQUENCY
FREQUENCY: CONTINUOUS

## 2018-10-22 NOTE — PLAN OF CARE
Problem: Falls - Risk of:  Goal: Will remain free from falls  Will remain free from falls   Outcome: Met This Shift      Problem: Pain:  Goal: Pain level will decrease  Pain level will decrease   Outcome: Ongoing      Problem: Mobility - Impaired:  Goal: Mobility will improve  Mobility will improve   Outcome: Ongoing      Problem: Infection:  Goal: Will remain free from infection  Will remain free from infection   Outcome: Met This Shift      Problem: Safety:  Goal: Free from accidental physical injury  Free from accidental physical injury   Outcome: Met This Shift

## 2018-10-22 NOTE — PROGRESS NOTES
Notified Dr. Ulysses Cordon that the pt has not had a bowel movement since the 16th and her enema that was ordered has been discontinued. Orders received and entered.  Magdalen Fleischer, RN

## 2018-10-22 NOTE — PROGRESS NOTES
7.5 inch step with [employer] with SBA NT 7.5 inch step with AAD and supervison 7.5 inch step with AAD and modified indepence   Picking up object off the floor Picked up 5# with SBA NT NT Will  5# with Supervision. Will  5# with modified independence. BLE ROM WNL  WNL     BLE Strength BLEs are grossly 4-/5 to 4/5  BLEs are grossly 4-/5 to 4/5     Balance  Sitting EOB: Supervision  Dynamic standing: SBA  Sitting EOB: Supervision  Dynamic standing: SBA     Date Family Teach Completed No family present at initial evaluation  No family present to this date     Is additional Family Teaching Needed? Y or N Yes  Yes     Hindering Progress Weakness, balance deficits  Weakness, balance deficits     PT recommended ELOS        Team's Discharge Plan        Therapist at Team Meeting          Therapeutic Exercise:   AM:   Ambulation 2 x 150 ft with no AD with SBA, 1 x 150 ft with [employer] with SBA, 2 x 75 ft with [employer] with SBA  7.5 inch curb step with [employer] with SBA x 2  Ambulation 10 feet on uneven surface (blue mat) with [employer] with SBA x 2    PM:   Ambulation 2 x 150 ft with [employer] with SBA, 2 X 35 ft with [employer] with SBA (weaving quad canes)  4 inch box alternating BLE toe touches with no UE at parallel bars 2 x 10 with SBA  Alternating BLE toe touches with cone with no UE at parallel bars 2 x 10 with SBA  Forward, Backward, and Sideways Quad cane step overs at parallel bars with no UE use with SBA<>CGA 2 each direction    Additional Comments: Pt states that she has increased pain and fatigue due to reduction of pain medication intake  to counter low BP. At the beginning of session, pt had symptoms of low BP at sitting and was measured at 91/56 mmHg. Symptoms reduced once pt started ambulating. With ambulation with [employer], pt exhibited R foot drag with onset of fatigue that is compensated with increased hip flexion during mid-swing. Pt had increased unsteadiness and fatigue with descending stairs and ambulation.  Pt demonstrated proper technique with curb step negotiation, but requires more training on proper placement of [address] when descending step. During the PM session, pt was educated on navigating turns ( weaving quad canes) with increased steadiness. Pt continues to have decreased balance and unsteadiness when weight shifting on 1 LE (R>L). Patient/family education  Pt/family educated on proper technique for curb step and uneven surface negotiation with [address]     Patient/family response to education:   Pt/family verbalized understanding Pt/family demonstrated skill Pt/family requires further education in this area   Yes Yes Yes     AM  Time in: 1000  Time out: 1045    PM  Time in: 1430  Time out: 1515    Pt is making good progress toward established Physical Therapy goals. Continue with physical therapy current plan of care. Mercedez Turner, ARA Roland.  [place], [place]  License Number: [id number]

## 2018-10-22 NOTE — PROGRESS NOTES
Occupational Therapy  Therapeutic Recreation Assessment     LEISURE INTEREST SURVEY               Key: P = Past Interest C = Current Interest F = Future Interest     Arts/Crafts:  ___Mechanical  ___Woodworking    ___ Painting   ___Floral arranging ___ Ceramics         _ __ Knitting                                                     ___ Crocheting        ___Other: ___________      Cooking:  _ _Baking _C__Cooking    ___   Other: _______   Comments:       Games:  _C__Cards  ___Darts  ___Board games    ___Word games  ___Crossword puzzles    ___Seek-n-find/jumble                   _C__Other: __Computer games , Hill Running , Slots_______      Horticulture:  ___Vegetables  ___Flowers  ___House plants                                                     ___Other: ___________      House/Yard Care:  ___Ironing  ___Laundry  ___Cleaning                                                      ___Repairs              ___Lawn care                                                     ___Other: ___________      Music Listening/Playing:  ___Classical       ___Big Band       ___Rock-n-Roll                                                     ___Country          ___R&B             ___Gospel/Hymns                                                     ___Other: ___________      Outdoor Activities:  ___Hunting          ___Fishing          P__Camping                                                     ___Other: ___________      Pets/Animals:  X 3___Dogs             _C__Cats                _P__Fish                                                     ___Birds             ___Livestock         ___Other: _________    Reading:   ___Newspapers  ___Magazines ___Other:stories                                                     ___Other: ___________      Sikhism Activities:  Islam: ___________   Nica Banker Activities: ___________      Shopping:   ___Grocery  ___Clothing  ___Flea market     _C__Other: __For kids_________      Socialization: _C__Family _C__Friends  ___Neighbors       ___Church  ___Volunteer ___Club/Organization                                                     ___Other: ___________    Sports/Exercises:  ___Walking  ___Football  ___Basketball     _P__Golf  ___Baseball  _P__Tennis       ___Bowling  ___Other: ___________      Traveling:   ___Global  _C__Stateside  ___Local       ___Other: ___________      TV/Radio:   ___Mysteries  ___Comedies ___Romance                                                     ___Game show       ___Sports       ___News                                                      ___Talk show          _C__Other: __Movies_________      Other: [employer] identified feelings of being sad and \"pissed\"    Personal Leisure Profile:   Question Response   Attributes Identify a positive quality: []Ambitious  []Appreciative  [x]Caring  []Courteous  []Considerate  []Compassionate  []Creative  []Dependable   []Generous  []Honest  []Hard working  []Helpful  []Kind  [][place]   [][place]  [][place]annerly  []Patient  []Polite  []Respectful  []Sociable  []Sense of humor  []Thoughtful  []Other: ___________    [facility] are very good at Nothing   Awareness Why do you participate in your leisure interest: []Competition  []Creativity  []Concentration  []Exercise  []Enjoyment  []Fun  []Hand/eye Coordination  []Increase confidence  []Learning a new skill  []Relaxation  []Social Interaction  []Supporting one another  []Thinking  [x]Other: _To get my mind off of things__________    Are your leisure activities limited at this time? [x]Yes  []No  Comments: _________    How often do you participate in your leisure interest? Never   Resources Name a restaurant, store or business you frequent? [employer]   Personal When are you most happy?  When I am me     Barriers to Leisure Participation:  []Visual   [][place]  []Cognition/Communication  []Motivation  []Financial  []Transportation  []Time Constraints  [x]Physical Ability  []Leisure Awareness  []Drug/Alcohol  []Other: ___________    Recommendations:  [x]Group Session  [x]Individual Session  []Client Refused Services  []No Therapy  []Other: ___________    Objectives:  []Establish adaptations for leisure involvement   [x]Increase Self worth/self esteem  []Community reintegration training   [x]Social interaction  [x]Leisure participation  []Other: ___________    Goals for Therapeutic Recreation Services:   Social Interaction:   Admission Functional [place] Measure: ____5_______  Discharge Functional [place] Measure: _____6______   Other:________________________________      Leisure Choice/ Increase [place] Quality of Life: To participate in 1 premorbid leisure activities of choice by discharge to increase leisure choice and independence thus increasing the overall quality of his/her life. Socialization/Social Interaction: To increase social interaction skills by introducing self 1 x per week at the beginning of TR session . Leisure Activity Tolerance: To increase leisure tolerance by attending 1 leisure activities per week for 20 minutes with active participation. Self Expression: To participate in 1 leisure activity(s) of choice, identifying 1 benefits to leisure participation. Self esteem/confidence: To complete 1 leisure activities with success 1x per week by discharge. Mamie Horner Daryle Laughter

## 2018-10-22 NOTE — PLAN OF CARE
Problem: Falls - Risk of:  Goal: Will remain free from falls  Will remain free from falls   Outcome: Met This Shift    Goal: Absence of physical injury  Absence of physical injury   Outcome: Met This Shift      Problem: Pain:  Goal: Pain level will decrease  Pain level will decrease   Outcome: Met This Shift    Goal: Control of acute pain  Control of acute pain   Outcome: Met This Shift    Goal: Control of chronic pain  Control of chronic pain   Outcome: Met This Shift      Problem: Mobility - Impaired:  Goal: Mobility will improve  Mobility will improve   Outcome: Met This Shift      Problem: Skin Integrity:  Goal: Will show no infection signs and symptoms  Will show no infection signs and symptoms   Outcome: Met This Shift    Goal: Absence of new skin breakdown  Absence of new skin breakdown   Outcome: Met This Shift      Problem: Infection:  Goal: Will remain free from infection  Will remain free from infection   Outcome: Met This Shift      Problem: Safety:  Goal: Free from accidental physical injury  Free from accidental physical injury   Outcome: Met This Shift    Goal: Free from intentional harm  Free from intentional harm   Outcome: Met This Shift      Problem: ABCDS Injury Assessment  Goal: Absence of physical injury  Outcome: Met This Shift

## 2018-10-22 NOTE — PROGRESS NOTES
OCCUPATIONAL THERAPY DAILY NOTE    Date:10/22/2018  Patient Name: Agustina Shirley  MRN: [id number]  : 1955  Room: [address] [place] [address]     DIAGNOSIS: NPH  PRECAUTIONS: Fall risk, O2   PROCEDURE(S): 10/15 insertion of  shunt     Social:  Pt lives with her , daughter, and 4 grandchildren in a 1 floor plan with 2-3 steps and no rail to enter. Prior to admission pt ambulated with no AD, was receiving HHPT for gait/balance 1-2x/week (TIA 2018). Pt owns a [employer]. Precautions: 02 3L,  Fall risk  Functional Assessment:   Date Status AE  Comments   Feeding 10/22/18 Mod I   Per eval   Grooming 10/22/18  SBA  Standing @ sink level   Bathing 10/20/18 UB- supervision  LB CGA  Pt engaged in sponge bath since declined shower. Pt needed CGA for bert care when standing due to balance. UB Dressing 10/20/18  S/set up  Pt donned tank top and shirt after set up. LB Dressing 10/20/18  SBA/CGA  Pt donned pants and gripper socks using BLE crossover method. Pt able to pull pants up over hips    Homemaking   TBA       Functional Transfers / Balance:   Date Status DME  Comments   Sit Balance 10/22/18  Independent   Seated EOB   Stand Balance 10/22/18 SBA rw Standing balance @ high low table while engaged in a functional activity    [] Tub  [] Shower   Transfer   TBA     Commode   Transfer 10/22/18 SBA rw Min vc's for safety when opening the bathroom & door & turnign to sit to a standard commode   Functional   Mobility 10/22/18 SBA rw Min vc's for safety & walker management   Other:         Functional Exercises / Activity:  Pt presents seated EOB & demo Sup to don shoes & tie them. Pt demo SBA functional mobility in her room & SBA commode trf with no device to a standard commode. Pt demo SBA functional mobility with no device on the unit with no device & 3L O2 NC & min vc's for safety as she moves throughout her environment. Pt tolerate leisure activity standing @ high low table with Sup.       Sensory / Neuromuscular

## 2018-10-23 LAB
ALBUMIN SERPL-MCNC: 3.4 G/DL (ref 3.5–5.2)
ALP BLD-CCNC: 66 U/L (ref 35–104)
ALT SERPL-CCNC: 12 U/L (ref 0–32)
ANION GAP SERPL CALCULATED.3IONS-SCNC: 10 MMOL/L (ref 7–16)
AST SERPL-CCNC: 16 U/L (ref 0–31)
BASOPHILS ABSOLUTE: 0.03 E9/L (ref 0–0.2)
BASOPHILS RELATIVE PERCENT: 0.5 % (ref 0–2)
BILIRUB SERPL-MCNC: <0.2 MG/DL (ref 0–1.2)
BUN BLDV-MCNC: 13 MG/DL (ref 8–23)
CALCIUM SERPL-MCNC: 10.3 MG/DL (ref 8.6–10.2)
CHLORIDE BLD-SCNC: 98 MMOL/L (ref 98–107)
CO2: 32 MMOL/L (ref 22–29)
CREAT SERPL-MCNC: 1.1 MG/DL (ref 0.5–1)
EOSINOPHILS ABSOLUTE: 0.15 E9/L (ref 0.05–0.5)
EOSINOPHILS RELATIVE PERCENT: 2.7 % (ref 0–6)
GFR AFRICAN AMERICAN: >60
GFR NON-AFRICAN AMERICAN: 50 ML/MIN/1.73
GLUCOSE BLD-MCNC: 103 MG/DL (ref 74–109)
HCT VFR BLD CALC: 32.7 % (ref 34–48)
HEMOGLOBIN: 10.6 G/DL (ref 11.5–15.5)
IMMATURE GRANULOCYTES #: 0.02 E9/L
IMMATURE GRANULOCYTES %: 0.4 % (ref 0–5)
LYMPHOCYTES ABSOLUTE: 2.31 E9/L (ref 1.5–4)
LYMPHOCYTES RELATIVE PERCENT: 41.9 % (ref 20–42)
MCH RBC QN AUTO: 31.5 PG (ref 26–35)
MCHC RBC AUTO-ENTMCNC: 32.4 % (ref 32–34.5)
MCV RBC AUTO: 97 FL (ref 80–99.9)
METER GLUCOSE: 101 MG/DL (ref 70–110)
METER GLUCOSE: 113 MG/DL (ref 70–110)
MONOCYTES ABSOLUTE: 0.57 E9/L (ref 0.1–0.95)
MONOCYTES RELATIVE PERCENT: 10.3 % (ref 2–12)
NEUTROPHILS ABSOLUTE: 2.43 E9/L (ref 1.8–7.3)
NEUTROPHILS RELATIVE PERCENT: 44.2 % (ref 43–80)
PDW BLD-RTO: 14 FL (ref 11.5–15)
PLATELET # BLD: 177 E9/L (ref 130–450)
PMV BLD AUTO: 11 FL (ref 7–12)
POTASSIUM SERPL-SCNC: 5.1 MMOL/L (ref 3.5–5)
RBC # BLD: 3.37 E12/L (ref 3.5–5.5)
SODIUM BLD-SCNC: 140 MMOL/L (ref 132–146)
TOTAL PROTEIN: 6.7 G/DL (ref 6.4–8.3)
WBC # BLD: 5.5 E9/L (ref 4.5–11.5)

## 2018-10-23 PROCEDURE — 97110 THERAPEUTIC EXERCISES: CPT

## 2018-10-23 PROCEDURE — 6370000000 HC RX 637 (ALT 250 FOR IP): Performed by: PHYSICAL MEDICINE & REHABILITATION

## 2018-10-23 PROCEDURE — 36415 COLL VENOUS BLD VENIPUNCTURE: CPT

## 2018-10-23 PROCEDURE — 82962 GLUCOSE BLOOD TEST: CPT

## 2018-10-23 PROCEDURE — 92507 TX SP LANG VOICE COMM INDIV: CPT | Performed by: SPEECH-LANGUAGE PATHOLOGIST

## 2018-10-23 PROCEDURE — 85025 COMPLETE CBC W/AUTO DIFF WBC: CPT

## 2018-10-23 PROCEDURE — 97530 THERAPEUTIC ACTIVITIES: CPT

## 2018-10-23 PROCEDURE — 6370000000 HC RX 637 (ALT 250 FOR IP): Performed by: PHYSICIAN ASSISTANT

## 2018-10-23 PROCEDURE — 2700000000 HC OXYGEN THERAPY PER DAY

## 2018-10-23 PROCEDURE — 80053 COMPREHEN METABOLIC PANEL: CPT

## 2018-10-23 PROCEDURE — 97535 SELF CARE MNGMENT TRAINING: CPT

## 2018-10-23 PROCEDURE — 1280000000 HC REHAB R&B

## 2018-10-23 RX ORDER — POTASSIUM CHLORIDE 20 MEQ/1
20 TABLET, EXTENDED RELEASE ORAL
Status: DISCONTINUED | OUTPATIENT
Start: 2018-10-24 | End: 2018-10-24

## 2018-10-23 RX ORDER — BUTALBITAL, ACETAMINOPHEN AND CAFFEINE 50; 325; 40 MG/1; MG/1; MG/1
1 TABLET ORAL 2 TIMES DAILY PRN
Status: DISCONTINUED | OUTPATIENT
Start: 2018-10-23 | End: 2018-10-26

## 2018-10-23 RX ORDER — OXYCODONE HYDROCHLORIDE AND ACETAMINOPHEN 5; 325 MG/1; MG/1
1 TABLET ORAL 3 TIMES DAILY PRN
Status: DISCONTINUED | OUTPATIENT
Start: 2018-10-23 | End: 2018-10-25

## 2018-10-23 RX ADMIN — SPIRONOLACTONE 25 MG: 25 TABLET ORAL at 09:23

## 2018-10-23 RX ADMIN — MAGNESIUM CITRATE 296 ML: 1.75 LIQUID ORAL at 09:22

## 2018-10-23 RX ADMIN — LINAGLIPTIN 5 MG: 5 TABLET, FILM COATED ORAL at 09:23

## 2018-10-23 RX ADMIN — ROPINIROLE HYDROCHLORIDE 3 MG: 2 TABLET, FILM COATED ORAL at 21:15

## 2018-10-23 RX ADMIN — OXYCODONE AND ACETAMINOPHEN 1 TABLET: 5; 325 TABLET ORAL at 22:59

## 2018-10-23 RX ADMIN — CARVEDILOL 12.5 MG: 6.25 TABLET, FILM COATED ORAL at 09:23

## 2018-10-23 RX ADMIN — ACETAMINOPHEN 650 MG: 325 TABLET, FILM COATED ORAL at 15:02

## 2018-10-23 RX ADMIN — TRAZODONE HYDROCHLORIDE 150 MG: 150 TABLET ORAL at 21:19

## 2018-10-23 RX ADMIN — ISOSORBIDE MONONITRATE 30 MG: 30 TABLET ORAL at 09:23

## 2018-10-23 RX ADMIN — BUPROPION HYDROCHLORIDE 300 MG: 300 TABLET, FILM COATED, EXTENDED RELEASE ORAL at 09:23

## 2018-10-23 RX ADMIN — ROPINIROLE HYDROCHLORIDE 3 MG: 2 TABLET, FILM COATED ORAL at 09:23

## 2018-10-23 RX ADMIN — OXYCODONE HYDROCHLORIDE AND ACETAMINOPHEN 1 TABLET: 10; 325 TABLET ORAL at 08:21

## 2018-10-23 RX ADMIN — OXYBUTYNIN CHLORIDE 10 MG: 10 TABLET, FILM COATED, EXTENDED RELEASE ORAL at 09:23

## 2018-10-23 RX ADMIN — BUTALBITAL, ACETAMINOPHEN, AND CAFFEINE 1 TABLET: 50; 325; 40 TABLET ORAL at 12:50

## 2018-10-23 RX ADMIN — POTASSIUM CHLORIDE 20 MEQ: 20 TABLET, EXTENDED RELEASE ORAL at 09:23

## 2018-10-23 RX ADMIN — SACUBITRIL AND VALSARTAN 1 TABLET: 24; 26 TABLET, FILM COATED ORAL at 09:24

## 2018-10-23 RX ADMIN — DOCUSATE SODIUM 100 MG: 100 CAPSULE, LIQUID FILLED ORAL at 09:24

## 2018-10-23 RX ADMIN — SENNOSIDES 17.2 MG: 8.6 TABLET, FILM COATED ORAL at 21:13

## 2018-10-23 RX ADMIN — DOCUSATE SODIUM 100 MG: 100 CAPSULE, LIQUID FILLED ORAL at 21:13

## 2018-10-23 ASSESSMENT — PAIN DESCRIPTION - ONSET
ONSET: ON-GOING

## 2018-10-23 ASSESSMENT — PAIN DESCRIPTION - FREQUENCY
FREQUENCY: CONTINUOUS

## 2018-10-23 ASSESSMENT — PAIN DESCRIPTION - ORIENTATION
ORIENTATION: RIGHT;LEFT
ORIENTATION: LEFT

## 2018-10-23 ASSESSMENT — PAIN DESCRIPTION - DESCRIPTORS
DESCRIPTORS: STABBING
DESCRIPTORS: SHARP
DESCRIPTORS: ACHING;DISCOMFORT;STABBING
DESCRIPTORS: ACHING;DISCOMFORT;STABBING

## 2018-10-23 ASSESSMENT — PAIN DESCRIPTION - LOCATION
LOCATION: ABDOMEN
LOCATION: HEAD;ABDOMEN

## 2018-10-23 ASSESSMENT — PAIN SCALES - GENERAL
PAINLEVEL_OUTOF10: 0
PAINLEVEL_OUTOF10: 8
PAINLEVEL_OUTOF10: 8
PAINLEVEL_OUTOF10: 6
PAINLEVEL_OUTOF10: 7
PAINLEVEL_OUTOF10: 0
PAINLEVEL_OUTOF10: 8
PAINLEVEL_OUTOF10: 8
PAINLEVEL_OUTOF10: 0

## 2018-10-23 ASSESSMENT — PAIN DESCRIPTION - PAIN TYPE
TYPE: SURGICAL PAIN

## 2018-10-23 NOTE — PROGRESS NOTES
OCCUPATIONAL THERAPY DAILY NOTE    Date:10/23/2018  Patient Name: Mani De Santiago  MRN: [id number]  : 1955  Room: [address] [place] [address]     DIAGNOSIS: NPH  PRECAUTIONS: Fall risk, O2   PROCEDURE(S): 10/15 insertion of  shunt     Social:  Pt lives with her , daughter, and 4 grandchildren in a 1 floor plan with 2-3 steps and no rail to enter. Prior to admission pt ambulated with no AD, was receiving HHPT for gait/balance 1-2x/week (TIA 2018). Pt owns a [employer]. Precautions: 02 3L,  Fall risk  Functional Assessment:   Date Status AE  Comments   Feeding 10/22/18 Mod I   Per eval   Grooming 10/22/18 3 S/Set up  Pt performed oral care and applied deodorant seated up in w/c at sink. Bathing 10/23/18 UB- Mod I/Supervision  LB -S/SBA LH hose/  Shower stall bench Pt engaged in shower per consult with nurse however no hair washing allowed. UB Dressing 10/23/18  S/set up  Pt donned bra, shirt and sweater after set up . LB Dressing 10/23/18 S/set up  Pt donned underwear/pants and shoes seated at sink. Pt utilized BLE crossover method for LB tasks. Homemaking   TBA       Functional Transfers / Balance:   Date Status DME  Comments   Sit Balance 10/23/18  Independent   Seated EOB, up in w/c and on commode and shower bench. Stand Balance 10/23/18 S/SBA rw  sink Standing balance during  ADL's, ambulation and sit to stand transfers. [] Tub  [x] Shower   Transfer 10/23/18  S/SBA Grab bar Pt used grab bar to enter/exit shower stall with one cue for safety/02 line mgmt. Commode   Transfer 10/23/18 S/SBA rw Min vc's for safety walking to  standard commode and elevating on/off device. Functional   Mobility 10/23/18 S/SBA rw  No device Pt ambulated from bed into bathroom using no device. Other: sit to stand transfers 10/23/18 S/SBA  Sit to stand transfers on/off EOB, shower stall bench, commode and w/c.       Functional Exercises / Activity:  Pt engaged in am ADL/shower to increase independence with dressing and bathing tasks. BUE AROM Ex's wearing 1# wrist wts during peg board/towel folding tasks then used 2# weighted ball to increase strength/endurance for ADL's and transfers. Pt tolerated bicep curls, presses and punches using ball 2 reps of 5-10 ea. Sensory / Neuromuscular Re-Education:      Cognitive Skills:   Status Comments   Problem   Solving Fair plus Demo during ADL's, transfers and arm ex's    Memory Fair plus    Sequencing Fair plus    Safety Fair plus      Visual Perception:    Education:  Pt educated with safety during sit to stand transfers, functional mobility and ADL's to increase awareness. [] Family teach completed on:    Pain Level: 8/10 left side and nurse aware    Additional Notes:   10/23/18 BP taken by nurse with reading 98/64 rate 80. Pt ok'd to shower from only  chest down; no neck/no hair due to sutures present. .     Patient has fair plus made progress during treatment sessions toward set goals. Therapy emphasis to obtain goals:  ADL,s balance, endurance, strength, transfers, fxl mobility and fine motor skills/sensation    [x] Continue with current OT Plan of care. [] Prepare for Discharge     DISCHARGE RECOMMENDATIONS  Recommended DME:    Post Discharge Care:   []Home Independently  []Home with 24hr Care / Supervision []Home with Partial Supervision []Home with Home Health OT []Home with Out Pt OT []Other: ___   Comments:         Time in Time out Tx Time Breakdown  Variance:   First Session  8:05 am 9:10 am [x] Individual Tx-65 mins  [] Concurrent Tx -  [] Co-Tx -   [] Group Tx -   [] Time Missed -     Second Session 1300pm 1345pm [] Individual Tx-   [x] Concurrent Tx -45mins  [] Co-Tx -   [] Group Tx -   [] Time Missed -     Third Session    [] Individual Tx-   [] Concurrent Tx -  [] Co-Tx -   [] Group Tx -   [] Time Missed -     Miguel [place]   I have read the above note and agree with the documentation.   Araceli Pozo OTR/L [id number]     Total Time: 110 Min

## 2018-10-23 NOTE — PROGRESS NOTES
Physical Therapy    Facility/Department: [address] REH[facility]  Treatment Note    NAME: Mani De Santiago  : 1955  MRN: [id number]    Date of Service: 10/23/2018  Evaluating Therapist: Magi Rider. Alma Delia Welch P.T.    ROOM: [facility]  DIAGNOSIS: NPH  PRECAUTIONS: Fall risk, O2   PROCEDURE(S): 10/15 insertion of  shunt    Social:  Pt lives with her , daughter, and 4 grandchildren in a 1 floor plan with 2-3 steps and no rail to enter. Prior to admission pt ambulated with no AD, was receiving HHPT for gait/balance 1-2x/week (TIA 2018). Pt owns a [employer]. Initial Evaluation  Date: 10/19/18 AM  PM    Short Term Goals Long Term Goals    Was pt agreeable to Eval/treatment? Yes Yes Yes     Does pt have pain? 8/10 back and L side pain, nursing aware 7/10 L side pain 7/10 L side pain      Bed Mobility  Rolling: Supervision  Supine to sit: SBA  Sit to supine: SBA  Scooting: SBA NT NT Supervision Independent   Transfers Sit to stand: SBA  Stand to sit: SBA  Stand pivot: SBA Sit to stand: Supervision  Stand to sit: supervision  Stand pivot: Supervision NT Supervision Modified Independent   Ambulation   150 feet with no AD with  feet with WW with  feet with WW with SBA. 150 feet with AAD with Supervision >150 feet with AAD with modified independence. Walking 10 feet on uneven surface 10 feet with no AD with SBA NT NT 10 feet with AAD with supervision >10 feet with AAD with modified independence. Wheel Chair Mobility NT NT NT     Car Transfers SBA NT NT Supervision Indepence   Stair negotiation: ascended and descended 4 steps with 2 rail with SBA NT NT 12 steps with 1 rail with supervision. >12 steps with 0 rail with modified indenpence. Curb Step:   ascended and descended 4 inch step with no AD and SBA NT NT 7.5 inch step with AAD and supervison 7.5 inch step with AAD and modified indepence   Picking up object off the floor Picked up 5# with SBA NT NT Will  5# with Supervision.  Will  5# with verbalized understanding Pt/family demonstrated skill Pt/family requires further education in this area   Yes Yes Yes     AM  Time in: 1000  Time out: 1045    PM  Time in: 1345  Time out: 1430    Pt is making good progress toward established Physical Therapy goals. Continue with physical therapy current plan of care. Robert Tavera, ARA dAams.  [place], [place]  License Number: [id number]

## 2018-10-23 NOTE — PROGRESS NOTES
10/23/2018    HCT 32.7 10/23/2018     10/23/2018    MCV 97.0 10/23/2018    MCH 31.5 10/23/2018    MCHC 32.4 10/23/2018    RDW 14.0 10/23/2018    SEGSPCT 63 01/30/2014    LYMPHOPCT 41.9 10/23/2018    MONOPCT 10.3 10/23/2018    BASOPCT 0.5 10/23/2018    MONOSABS 0.57 10/23/2018    LYMPHSABS 2.31 10/23/2018    EOSABS 0.15 10/23/2018    BASOSABS 0.03 10/23/2018     CMP:    Lab Results   Component Value Date     10/23/2018    K 5.1 10/23/2018    K 4.6 10/19/2018    CL 98 10/23/2018    CO2 32 10/23/2018    BUN 13 10/23/2018    CREATININE 1.1 10/23/2018    GFRAA >60 10/23/2018    LABGLOM 50 10/23/2018    GLUCOSE 103 10/23/2018    GLUCOSE 111 09/30/2011    PROT 6.7 10/23/2018    LABALBU 3.4 10/23/2018    LABALBU 4.4 09/30/2011    CALCIUM 10.3 10/23/2018    BILITOT <0.2 10/23/2018    ALKPHOS 66 10/23/2018    AST 16 10/23/2018    ALT 12 10/23/2018       OBJECTIVE:    General:  Alert, oriented, in NAD. HENT:   EOMI, DONAVAN, Fundi deferred. Neck:   Supple. Heart:   RRR. Chest:   Clear to auscultation. Abdomen:  Soft, with diffuse tenderness along the transverse and descending colon. BS 2+. Neurological:  No focal neurological deficits. Musculo-skeletal: WFL. FUNCTIONAL STATUS:     Cognition:   WFL. Speech:  WNL. ADLs and Self Care: SBA to MOD I. Bed Mobility:  SUP. Transfers:   SUP. Gait:     150' with [employer] with SBA. Stairs:    8 steps with 2 rails with SBA.    ROM:     DIAGNOSES:  1.) NPH.  2.) Status Post insertion of  shunt on 10/15.  3.) ADLs and Gait Dysfunction. 4.) HTN.  5.) Hypotension. 6.) Dm type II.  7.) COPD.  8.) Pulmonary Hypertension. 9.) CKD. 10.) Anemia. 11.) Depression. [facility] PLAN:    1.) SS enema now.  2.) Decrease dose and frequency of Percocet. 3.) Decrease frequency of Fioricet. 4.) PT to measure HARE Balance scale. 5.) Decrease K+ supplement.   6.) continue Rehab Program.

## 2018-10-23 NOTE — PROGRESS NOTES
Speech Language Pathology  DAILY PROGRESS NOTE        COGNITION/LANGUAGE:    Pt completed word finding task with fair-good ability this date. Dysfluencies noted during conversational speech. Pt reports this is not new since this hospital stay, however is new with onset of diagnosis. Pt trained on comp strats for decrease dysfluency with fair outcome. Good recall of adls this date. Delayed recall of words was completed with fair outcome. Will continue SP intervention as per established POC. FIMS determined by treating therapist from previous session.     SWALLOWING:                     Current level:              MODIFIED INDEPENDENT     RECEPTIVE LANGUAGE:    Current FIM level:       MODIFIED INDEPENDENT                            EXPRESSIVE LANGUAGE:  Current FIM level:       SUPERVISION     PROBLEM SOLVING:           Current FIM level:       MIN ASSISTANCE                MEMORY:                              Current FIM level:       MIN ASSISTANCE             Amira Olsen M.A., CCC-SLP/L  SP [id number]  10/23/2018      Three Hour Rule Tracking:    Individual therapy:            0  minutes  Concurrent therapy:  30 minutes  Group therapy:   0 minutes  Co-treatment therapy:  0 minutes    Total minutes: 30 minutes

## 2018-10-23 NOTE — PROGRESS NOTES
Progress Note    Subjective/   58y.o. year old female on the rehab unit for NPH s/p  shunt. Tolerating rehab. Increased pain complaints complicated by the fact that her blood pressure precludes getting medication. No SOB or chest pain. Staff notes no BM in several days. No results with fleet. Objective/   VITALS:  BP 94/60   Pulse 72   Temp 98.4 °F (36.9 °C) (Temporal)   Resp 16   Ht 5' 2.5\" (1.588 m)   Wt 102 lb 3.2 oz (46.4 kg)   SpO2 99%   BMI 18.39 kg/m²   24HR INTAKE/OUTPUT:    Intake/Output Summary (Last 24 hours) at 10/22/18 2017  Last data filed at 10/22/18 1100   Gross per 24 hour   Intake              840 ml   Output                0 ml   Net              840 ml     Constitutional:  Alert, awake, no apparent distress   Cardiovascular:  S1, S2 without m/r/g   Respiratory:  CTA B without w/r/r   Abdomen: +BS, no tenderness  Ext: no pitting LE edema  Neuro: awake and alert, no tremor. Tone is normal    Functional Level    Initial Evaluation  Date: 10/19/18 AM  PM    Short Term Goals Long Term Goals    Was pt agreeable to Eval/treatment? Yes Yes Yes       Does pt have pain? 8/10 back and L side pain, nursing aware 8/10 L side pain 6/10 L side pain       Bed Mobility  Rolling: Supervision  Supine to sit: SBA  Sit to supine: SBA  Scooting: SBA Rolling: Supervision  Supine to sit: Supervision  Sit to supine: Supervision  Scooting: Supervison  (mat, R side)  NT Supervision Independent   Transfers Sit to stand: SBA  Stand to sit: SBA  Stand pivot: SBA Sit to stand: supervision  Stand to sit: supervision    Sit to stand:Supervision  Stand to sit: supervision  Stand pivot: supervision Supervision Modified Independent   Ambulation   150 feet with no AD with  feet with WW with  feet with WW with  feet with AAD with Supervision >150 feet with AAD with modified independence.    Walking 10 feet on uneven surface 10 feet with no AD with SBA 10 feet with WW with SBA NT 10 feet

## 2018-10-24 LAB
METER GLUCOSE: 105 MG/DL (ref 70–110)
METER GLUCOSE: 116 MG/DL (ref 70–110)

## 2018-10-24 PROCEDURE — 6370000000 HC RX 637 (ALT 250 FOR IP): Performed by: PHYSICIAN ASSISTANT

## 2018-10-24 PROCEDURE — 6360000002 HC RX W HCPCS

## 2018-10-24 PROCEDURE — 97110 THERAPEUTIC EXERCISES: CPT

## 2018-10-24 PROCEDURE — 6370000000 HC RX 637 (ALT 250 FOR IP): Performed by: PHYSICAL MEDICINE & REHABILITATION

## 2018-10-24 PROCEDURE — 97535 SELF CARE MNGMENT TRAINING: CPT

## 2018-10-24 PROCEDURE — 1280000000 HC REHAB R&B

## 2018-10-24 PROCEDURE — 97530 THERAPEUTIC ACTIVITIES: CPT

## 2018-10-24 PROCEDURE — 97127 HC SP THER IVNTJ W/FOCUS COG FUNCJ: CPT

## 2018-10-24 PROCEDURE — 82962 GLUCOSE BLOOD TEST: CPT

## 2018-10-24 RX ORDER — ONDANSETRON 4 MG/1
TABLET, ORALLY DISINTEGRATING ORAL
Status: COMPLETED
Start: 2018-10-24 | End: 2018-10-24

## 2018-10-24 RX ORDER — ONDANSETRON 4 MG/1
4 TABLET, ORALLY DISINTEGRATING ORAL 2 TIMES DAILY PRN
Status: DISCONTINUED | OUTPATIENT
Start: 2018-10-24 | End: 2018-10-26

## 2018-10-24 RX ADMIN — ROPINIROLE HYDROCHLORIDE 3 MG: 2 TABLET, FILM COATED ORAL at 09:08

## 2018-10-24 RX ADMIN — NALOXEGOL OXALATE 25 MG: 12.5 TABLET, FILM COATED ORAL at 09:09

## 2018-10-24 RX ADMIN — BUTALBITAL, ACETAMINOPHEN, AND CAFFEINE 1 TABLET: 50; 325; 40 TABLET ORAL at 20:11

## 2018-10-24 RX ADMIN — OXYBUTYNIN CHLORIDE 10 MG: 10 TABLET, FILM COATED, EXTENDED RELEASE ORAL at 10:45

## 2018-10-24 RX ADMIN — POTASSIUM CHLORIDE 20 MEQ: 1500 TABLET, EXTENDED RELEASE ORAL at 09:11

## 2018-10-24 RX ADMIN — ONDANSETRON 4 MG: 4 TABLET, ORALLY DISINTEGRATING ORAL at 20:18

## 2018-10-24 RX ADMIN — SENNOSIDES 17.2 MG: 8.6 TABLET, FILM COATED ORAL at 21:38

## 2018-10-24 RX ADMIN — DOCUSATE SODIUM 100 MG: 100 CAPSULE, LIQUID FILLED ORAL at 09:07

## 2018-10-24 RX ADMIN — BUTALBITAL, ACETAMINOPHEN, AND CAFFEINE 1 TABLET: 50; 325; 40 TABLET ORAL at 14:05

## 2018-10-24 RX ADMIN — ROPINIROLE HYDROCHLORIDE 3 MG: 2 TABLET, FILM COATED ORAL at 21:37

## 2018-10-24 RX ADMIN — SACUBITRIL AND VALSARTAN 1 TABLET: 24; 26 TABLET, FILM COATED ORAL at 09:08

## 2018-10-24 RX ADMIN — DOCUSATE SODIUM 100 MG: 100 CAPSULE, LIQUID FILLED ORAL at 21:37

## 2018-10-24 RX ADMIN — CARVEDILOL 12.5 MG: 6.25 TABLET, FILM COATED ORAL at 09:07

## 2018-10-24 RX ADMIN — ISOSORBIDE MONONITRATE 30 MG: 30 TABLET ORAL at 09:07

## 2018-10-24 RX ADMIN — BUPROPION HYDROCHLORIDE 300 MG: 300 TABLET, FILM COATED, EXTENDED RELEASE ORAL at 09:08

## 2018-10-24 RX ADMIN — LINAGLIPTIN 5 MG: 5 TABLET, FILM COATED ORAL at 09:08

## 2018-10-24 RX ADMIN — OXYCODONE AND ACETAMINOPHEN 1 TABLET: 5; 325 TABLET ORAL at 09:15

## 2018-10-24 RX ADMIN — SPIRONOLACTONE 25 MG: 25 TABLET ORAL at 09:07

## 2018-10-24 RX ADMIN — OXYCODONE AND ACETAMINOPHEN 1 TABLET: 5; 325 TABLET ORAL at 16:47

## 2018-10-24 RX ADMIN — FLUTICASONE PROPIONATE 1 SPRAY: 50 SPRAY, METERED NASAL at 09:07

## 2018-10-24 RX ADMIN — TRAZODONE HYDROCHLORIDE 150 MG: 150 TABLET ORAL at 21:40

## 2018-10-24 ASSESSMENT — PAIN SCALES - GENERAL
PAINLEVEL_OUTOF10: 3
PAINLEVEL_OUTOF10: 0
PAINLEVEL_OUTOF10: 3
PAINLEVEL_OUTOF10: 7
PAINLEVEL_OUTOF10: 2
PAINLEVEL_OUTOF10: 7
PAINLEVEL_OUTOF10: 8
PAINLEVEL_OUTOF10: 8

## 2018-10-24 ASSESSMENT — PAIN DESCRIPTION - FREQUENCY
FREQUENCY: INTERMITTENT

## 2018-10-24 ASSESSMENT — PAIN DESCRIPTION - ORIENTATION
ORIENTATION: RIGHT;POSTERIOR
ORIENTATION: RIGHT;LEFT;POSTERIOR
ORIENTATION: RIGHT;POSTERIOR
ORIENTATION: POSTERIOR
ORIENTATION: RIGHT;LEFT

## 2018-10-24 ASSESSMENT — PAIN DESCRIPTION - DESCRIPTORS
DESCRIPTORS: HEADACHE
DESCRIPTORS: DISCOMFORT
DESCRIPTORS: ACHING;POUNDING;HEADACHE
DESCRIPTORS: SHARP
DESCRIPTORS: HEADACHE
DESCRIPTORS: ACHING;HEADACHE

## 2018-10-24 ASSESSMENT — PAIN DESCRIPTION - PROGRESSION
CLINICAL_PROGRESSION: NOT CHANGED

## 2018-10-24 ASSESSMENT — PAIN DESCRIPTION - PAIN TYPE
TYPE: ACUTE PAIN
TYPE: SURGICAL PAIN
TYPE: ACUTE PAIN

## 2018-10-24 ASSESSMENT — PAIN DESCRIPTION - LOCATION
LOCATION: HEAD
LOCATION: HEAD
LOCATION: ABDOMEN;HEAD
LOCATION: ABDOMEN;HEAD
LOCATION: HEAD
LOCATION: HEAD

## 2018-10-24 ASSESSMENT — PAIN DESCRIPTION - ONSET
ONSET: ON-GOING
ONSET: ON-GOING

## 2018-10-24 NOTE — PROGRESS NOTES
Perception:    Education:  Pt educated with safety during dyn/static standing balance at table top level to increase awareness. [] Family teach completed on:    Pain Level: 4-5/10 stomach due to enema liquids to drink for bowel movement. Additional Notes:   10/23/18 BP taken by nurse with reading 98/64 rate 80. Pt ok'd to shower from only  chest down; no neck/no hair due to sutures present. .     Patient has fair plus made progress during treatment sessions toward set goals. Therapy emphasis to obtain goals:  ADL,s balance, endurance, strength, transfers, fxl mobility and fine motor skills/sensation    [x] Continue with current OT Plan of care. [] Prepare for Discharge     DISCHARGE RECOMMENDATIONS  Recommended DME:    Post Discharge Care:   []Home Independently  []Home with 24hr Care / Supervision []Home with Partial Supervision []Home with Home Health OT []Home with Out Pt OT []Other: ___   Comments:         Time in Time out Tx Time Breakdown  Variance:   First Session  9:15 am 10:00 am [x] Individual Tx-45 mins  [] Concurrent Tx -  [] Co-Tx -   [] Group Tx -   [] Time Missed -     Second Session 1300pm 1345pm [] Individual Tx-   [] Concurrent Tx -mins  [] Co-Tx -   [] Group Tx -   [x] Time Missed - 45mins Pt not feeling well; c/o  Headache and nurse aware. 2nd attempt made at 13:45 pm however patient still had headache. Third Session    [] Individual Tx-   [] Concurrent Tx -  [] Co-Tx -   [] Group Tx -   [] Time Missed -          Total time: [place]   I have read the above note and agree with the documentation.   Amp[employer] Mobile OTR/L [id number]

## 2018-10-24 NOTE — PROGRESS NOTES
Physical Therapy    Facility/Department: [address] REHAB  Treatment Note    NAME: Giuliano Arrington  : 1955  MRN: [id number]    Date of Service: 10/24/2018  Evaluating Therapist: Nae Doty. Prachi Ribera P.T.    ROOM: [facility]  DIAGNOSIS: NPH  PRECAUTIONS: Fall risk, O2   PROCEDURE(S): 10/15 insertion of  shunt    Social:  Pt lives with her , daughter, and 4 grandchildren in a 1 floor plan with 2-3 steps and no rail to enter. Prior to admission pt ambulated with no AD, was receiving HHPT for gait/balance 1-2x/week (TIA 2018). Pt owns a [employer]. Initial Evaluation  Date: 10/19/18 AM  PM    Short Term Goals Long Term Goals    Was pt agreeable to Eval/treatment? Yes Yes Yes     Does pt have pain? 8/10 back and L side pain, nursing aware 8/10 L side pain 7/10 L side pain and headache     Bed Mobility  Rolling: Supervision  Supine to sit: SBA  Sit to supine: SBA  Scooting: SBA Rolling: Modified Independent  Supine to sit: Modified Independent  Sit to supine: Modified Independent  Scooting: Modified Independent   NT Supervision Independent   Transfers Sit to stand: SBA  Stand to sit: SBA  Stand pivot: SBA Sit to stand: Supervision  Stand to sit: Supervision  Stand pivot: Supervision Sit to stand: Supervision  Stand to sit: Supervision Supervision Modified Independent   Ambulation   150 feet with no AD with  feet with WW with  feet with no AD with  feet with AAD with Supervision >150 feet with AAD with modified independence. Walking 10 feet on uneven surface 10 feet with no AD with SBA NT NT 10 feet with AAD with supervision >10 feet with AAD with modified independence. Wheel Chair Mobility NT NT NT     Car Transfers SBA NT SBA Supervision Indepence   Stair negotiation: ascended and descended 4 steps with 2 rail with SBA 8 stairs with 1 rail with SBA 4 stairs with 1 rail with SBA 12 steps with 1 rail with supervision. >12 steps with 0 rail with modified indenpence.    Curb Step:   ascended able to sit 30 seconds  (    ) 1 able to sit 10 seconds  (    ) 0 unable to sit  without support 10 seconds    STANDING TO SITTING  INSTRUCTIONS: Please sit down. (    ) 4 sits safely with minimal use of hands  ( x ) 3 controls descent by using hands  (    ) 2 uses back of legs against chair to control descent  (    ) 1 sits independently but has uncontrolled descent  (    ) 0 needs assist to sit    TRANSFERS  INSTRUCTIONS: Arrange chair(s) for pivot transfer. Ask subject to transfer one way toward a seat with armrests and one way toward a seat without armrests. You may use two chairs (one with and one without armrests) or a bed and a chair. ( x ) 4 able to transfer safely with minor use of hands  (    ) 3 able to transfer safely definite need of hands  (    ) 2 able to transfer with verbal cuing and/or supervision  (    ) 1 needs one person to assist  (    ) 0 needs two people to assist or supervise to be safe    STANDING UNSUPPORTED WITH EYES CLOSED  INSTRUCTIONS: Please close your eyes and stand still for 10 seconds. ( x ) 4 able to stand 10 seconds safely  (    ) 3 able to stand 10 seconds with supervision   (    ) 2 able to stand 3 seconds  (    ) 1 unable to keep eyes closed 3 seconds but stays safely  (    ) 0 needs help to keep from falling    STANDING UNSUPPORTED WITH FEET TOGETHER  INSTRUCTIONS: Place your feet together and stand without holding on. ( x ) 4 able to place feet together independently and stand 1 minute safely  (    ) 3 able to place feet together independently and stand 1 minute with supervision  (    ) 2 able to place feet together independently but unable to hold for 30 seconds  (    ) 1 needs help to attain position but able to stand 15 seconds feet together  (    ) 0 needs help to attain position and unable to hold for 15 seconds      REACHING FORWARD WITH OUTSTRETCHED ARM WHILE STANDING  INSTRUCTIONS: Lift arm to 90 degrees.  Stretch out your fingers and reach forward as far as you can. (Examiner places a ruler at the end of fingertips when arm is at 90 degrees. Fingers should not touch the ruler while reaching forward. The recorded measure is the distance forward that the fingers reach while the subject is in the most forward lean position. When possible, ask subject to use both arms when reaching to avoid rotation of the trunk.)  (    ) 4 can reach forward confidently 25 cm (10 inches)  ( x ) 3 can reach forward  12 cm (5 inches)  (    ) 2 can reach forward 5 cm (2 inches)  (    ) 1 reaches forward but needs supervision  (    ) 0 loses balance while trying/requires external support     OBJECT FROM THE FLOOR FROM A STANDING POSITION  INSTRUCTIONS:  the shoe/slipper, which is place in front of your feet. ( x ) 4 able to  slipper safely and easily  (    ) 3 able to  slipper but needs supervision   (    ) 2 unable to  but reaches 2-5 cm(1-2 inches) from slipper and keeps balance  independently  (    ) 1 unable to  and needs supervision while trying  (    ) 0 unable to try/needs assist to keep from losing balance or falling    TURNING TO LOOK BEHIND OVER LEFT AND RIGHT SHOULDERS WHILE STANDING  INSTRUCTIONS: Turn to look directly behind you over toward the left shoulder. Repeat to the right. Examiner may pick an object to look at directly behind the subject to encourage a better twist turn. ( x ) 4 looks behind from both sides and weight shifts well  (    ) 3 looks behind one side only other side shows less weight shift  (    ) 2 turns sideways only but maintains balance  (    ) 1 needs supervision when turning  (    ) 0 needs assist to keep from losing balance or falling    TURN 360 DEGREES  INSTRUCTIONS: Turn completely around in a full [place]. Pause. Then turn a full [place] in the other direction.   ( x ) 4 able to turn 360 degrees safely in 4 seconds or less  (    ) 3 able to turn 360 degrees safely one side only 4 seconds or less  (    ) 2 able to balance. (2)    Mild Impairment: Pivot turns safely in > 3 seconds and stops with no loss of balance. (1) Moderate Impairment: Turns slowly, requires verbal cueing, requires several small steps to catch balance following turn and stop. (0) Severe Impairment: Cannot turn safely, requires assistance to turn and stop. 6. Step over obstacle __2__  Instructions: Begin walking at your normal speed. When you come to the shoebox, step over it, not around it, and keep walking. Grading: [place] the lowest category that applies. (3)    Normal: Is able to step over the box without changing gait speed, no evidence of imbalance. (2) Mild Impairment: Is able to step over box, but must slow down and adjust steps to clear box safely. (1) Moderate Impairment: Is able to step over box but must stop, then step over. May require verbal cueing.  (0) Severe Impairment: Cannot perform without assistance. 7. Step around obstacles __2___  Instructions: Begin walking at normal speed. When you come to the first cone (about 6 away), walk around the right side of it. When you come to the second cone (6 past first cone), walk around it to the left. Grading: [place] the lowest category that applies. (3) Normal: Is able to walk around cones safely without changing gait speed; no  evidence of imbalance. (2) Mild Impairment: Is able to step around both cones, but must slow down and adjust steps to clear cones. (1) Moderate Impairment: Is able to clear cones but must significantly slow, speed to accomplish task, or requires verbal cueing.  (0) Severe Impairment: Unable to clear cones, walks into one or both cones, or requires physical assistance. 8. Steps __2___  Instructions: Walk up these stairs as you would at home, i.e., using the railing if necessary. At the top, turn around and walk down. Grading: [place] the lowest category that applies. (3)  Normal: Alternating feet, no rail.   (2)  Mild Impairment: Alternating feet, must use out: 1045    PM  Time in: 1430  Time out: 1515    Pt is making good progress toward established Physical Therapy goals. Continue with physical therapy current plan of care. Cinthya Erwin, ARA [place].  [place], [place]  License Number: [id number]

## 2018-10-24 NOTE — PROGRESS NOTES
Speech Language Pathology  BHAVIN COGNITIVE ASSESSMENT      [x] The admitting diagnosis and active problem list as listed below have been reviewed prior to the initiation of this evaluation. NPH (normal pressure hydrocephalus) [G91.2]     Patient Active Problem List   Diagnosis    CAD (coronary artery disease)    S/P CABG x 2    Smoker    Bronchitis    Pulmonary hypertension (HCC)    COPD (chronic obstructive pulmonary disease) ([facility] [address]    Apraxia    Ataxia    CKD (chronic kidney disease) stage 3, GFR 30-59 ml/min ([facility])    Hypoxia    TIA (transient ischemic attack)    Stroke-like symptoms    Acute on chronic respiratory failure with hypoxia and hypercapnia ([facility])    Sinus pause    Normal pressure hydrocephalus    Chronic systolic congestive heart failure (HCC)    Severe mitral regurgitation    NPH (normal pressure hydrocephalus)         The [place] Cognitive Assessment (MoCA) was administered this date for assessment of the following cognitive domains: attention and concentration, executive functions, memory, language, visuoconstructional skills, conceptual thinking, calculations, and orientation. Visuospatial/Executive: 5/5   Naming:   3/3   Attention:   6/6   Language:   3/3   Abstraction:   1/2   Delayed recall:  4/5    Memory Index Score  15/15   Orientation:   5/6        A score of 28/30 was attained. This indicates patient's cognition is within functional limits as per the assessment parameters. Will discontinue SP intervention at this time. Patient is in agreement.       RAW SCORE SEVERITY   26-30 Within functional   limits   18-25 Mild cognitive   impairment   10-17 Moderate cognitive impairment   <10 Severe cognitive impairment           SWALLOWING:                     Current level:              INDEPENDENT     RECEPTIVE LANGUAGE:    Current FIM level:       INDEPENDENT     EXPRESSIVE LANGUAGE:  Current FIM level:       MODIFIED INDEPENDENT     PROBLEM SOLVING:           Current FIM level:       MODIFIED INDEPENDENT     MEMORY:                              Current FIM level:       MODIFIED INDEPENDENT           Glen Echeverria., CCC-SLP/L  SP [id number]  10/24/2018

## 2018-10-24 NOTE — PROGRESS NOTES
[facility]  Progress Note    GENERAL:   Covering for Dr Shalini Wallace. Reports intermittent nausea. Had good results from laxatives. BP running low.     VITALS:   Vitals:    10/23/18 1630 10/23/18 1700 10/23/18 2250 10/24/18 0707   BP: 90/60  113/62 (!) 99/52   Pulse: 80 69 70 66   Resp:    16   Temp:    97.1 °F (36.2 °C)   TempSrc:    Temporal   SpO2:  96%     Weight:       Height:           Scheduled Meds:   potassium chloride  20 mEq Oral Daily with breakfast    senna  2 tablet Oral Nightly    docusate sodium  100 mg Oral BID    buPROPion  300 mg Oral QAM    carvedilol  12.5 mg Oral BID WC    Fluticasone-Umeclidin-Vilant  1 puff Inhalation Daily    isosorbide mononitrate  30 mg Oral Daily    linagliptin  5 mg Oral QAM    oxybutynin  10 mg Oral Daily    rOPINIRole  3 mg Oral BID    sacubitril-valsartan  1 tablet Oral BID    spironolactone  25 mg Oral Daily    traZODone  150 mg Oral Nightly    [START ON 10/25/2018] vitamin D  50,000 Units Oral Once per day on Thu    influenza virus vaccine  0.5 mL Intramuscular Prior to discharge     Continuous Infusions:  PRN Meds:.oxyCODONE-acetaminophen **OR** [DISCONTINUED] oxyCODONE-acetaminophen, butalbital-acetaminophen-caffeine, calcium carbonate, acetaminophen, albuterol, fluticasone, trimethobenzamide **OR** [DISCONTINUED] trimethobenzamide      LABS:  CBC with Differential:    Lab Results   Component Value Date    WBC 5.5 10/23/2018    RBC 3.37 10/23/2018    HGB 10.6 10/23/2018    HCT 32.7 10/23/2018     10/23/2018    MCV 97.0 10/23/2018    MCH 31.5 10/23/2018    MCHC 32.4 10/23/2018    RDW 14.0 10/23/2018    SEGSPCT 63 01/30/2014    LYMPHOPCT 41.9 10/23/2018    MONOPCT 10.3 10/23/2018    BASOPCT 0.5 10/23/2018    MONOSABS 0.57 10/23/2018    LYMPHSABS 2.31 10/23/2018    EOSABS 0.15 10/23/2018    BASOSABS 0.03 10/23/2018     CMP:    Lab Results   Component Value Date     10/23/2018    K 5.1 10/23/2018    K 4.6 10/19/2018    CL 98 10/23/2018    CO2 32 10/23/2018    BUN 13 10/23/2018    CREATININE 1.1 10/23/2018    GFRAA >60 10/23/2018    LABGLOM 50 10/23/2018    GLUCOSE 103 10/23/2018    GLUCOSE 111 09/30/2011    PROT 6.7 10/23/2018    LABALBU 3.4 10/23/2018    LABALBU 4.4 09/30/2011    CALCIUM 10.3 10/23/2018    BILITOT <0.2 10/23/2018    ALKPHOS 66 10/23/2018    AST 16 10/23/2018    ALT 12 10/23/2018       FUNCTIONAL STATUS:     Cognition:   WFL. Speech:  WNL. ADLs and Self Care:  SBA to SUP. Bed Mobility:  MOD I. Transfers:   SUP. Gait:     250' with no device with SBA. Stairs:    4 - 8 steps with 1 rail with SBA.    ROM:     DIAGNOSES:  1.) NPH.  2.) Status Post insertion of  shunt on 10/15.  3.) ADLs and Gait Dysfunction. 4.) HTN.  5.) Hypotension. 6.) Dm type II.  7.) COPD.  8.) Pulmonary Hypertension. 9.) CKD. 10.) Anemia. 11.) Depression. 12.) Constipation. PLAN:    1.) Start on Movantik. 2.) Recheck labs. 3.) Discontinue POCT Glucose. 4.) Discontinue K+  5.) Start on a Carb Controlled Diet.   6.) Continue Rehab Program.

## 2018-10-24 NOTE — PLAN OF CARE
Problem: Falls - Risk of:  Goal: Will remain free from falls  Will remain free from falls   Outcome: Met This Shift    Goal: Absence of physical injury  Absence of physical injury   Outcome: Met This Shift      Problem: Pain:  Goal: Pain level will decrease  Pain level will decrease   Outcome: Met This Shift    Goal: Control of acute pain  Control of acute pain   Outcome: Met This Shift    Goal: Control of chronic pain  Control of chronic pain   Outcome: Met This Shift      Problem: Mobility - Impaired:  Goal: Mobility will improve  Mobility will improve   Outcome: Met This Shift      Problem: Skin Integrity:  Goal: Will show no infection signs and symptoms  Will show no infection signs and symptoms   Outcome: Met This Shift    Goal: Absence of new skin breakdown  Absence of new skin breakdown   Outcome: Met This Shift      Problem: Infection:  Goal: Will remain free from infection  Will remain free from infection   Outcome: Met This Shift      Problem: Safety:  Goal: Free from accidental physical injury  Free from accidental physical injury   Outcome: Met This Shift    Goal: Free from intentional harm  Free from intentional harm   Outcome: Met This Shift      Problem: Daily Care:  Goal: Daily care needs are met  Daily care needs are met   Outcome: Met This Shift      Problem: ABCDS Injury Assessment  Goal: Absence of physical injury  Outcome: Met This Shift

## 2018-10-24 NOTE — PROGRESS NOTES
Speech Language Pathology      DISCHARGE SUMMARY          CURRENT LEVEL OF FUNCTION:     SWALLOWING:   Functional for regular consistency solids and thin consistency liquids     LANGUAGE:    Functional for expression of abstract thought given increased time      COGNITION:   Within functional limits (MoCA testing 28/30 on 10/24)     SPEECH:   Within normal limits      Speech language pathologist (SLP) completed education with the patient regarding results of evaluation. Explained that speech pathology intervention is not warranted at this time. Encouraged patient to engage SLP in structured Q&A session. Patient indicated understanding of all information provided via satisfactory verbal response. OUTCOME:    Patient met all goals. Will discontinue SP intervention.       Alessia Burch M.A., CCC-SLP/L  SP [id number]  10/24/2018

## 2018-10-25 PROBLEM — E43 SEVERE PROTEIN-CALORIE MALNUTRITION (HCC): Status: ACTIVE | Noted: 2018-10-25

## 2018-10-25 LAB
ALBUMIN SERPL-MCNC: 3.9 G/DL (ref 3.5–5.2)
ALP BLD-CCNC: 67 U/L (ref 35–104)
ALT SERPL-CCNC: 8 U/L (ref 0–32)
ANION GAP SERPL CALCULATED.3IONS-SCNC: 12 MMOL/L (ref 7–16)
AST SERPL-CCNC: 12 U/L (ref 0–31)
BILIRUB SERPL-MCNC: 0.2 MG/DL (ref 0–1.2)
BUN BLDV-MCNC: 14 MG/DL (ref 8–23)
CALCIUM SERPL-MCNC: 10.5 MG/DL (ref 8.6–10.2)
CHLORIDE BLD-SCNC: 100 MMOL/L (ref 98–107)
CO2: 29 MMOL/L (ref 22–29)
CREAT SERPL-MCNC: 1.1 MG/DL (ref 0.5–1)
GFR AFRICAN AMERICAN: >60
GFR NON-AFRICAN AMERICAN: 50 ML/MIN/1.73
GLUCOSE BLD-MCNC: 101 MG/DL (ref 74–109)
HBA1C MFR BLD: 6.8 % (ref 4–5.6)
HCT VFR BLD CALC: 33.5 % (ref 34–48)
HEMOGLOBIN: 10.8 G/DL (ref 11.5–15.5)
MCH RBC QN AUTO: 32 PG (ref 26–35)
MCHC RBC AUTO-ENTMCNC: 32.2 % (ref 32–34.5)
MCV RBC AUTO: 99.1 FL (ref 80–99.9)
PDW BLD-RTO: 14.1 FL (ref 11.5–15)
PLATELET # BLD: 195 E9/L (ref 130–450)
PMV BLD AUTO: 10.7 FL (ref 7–12)
POTASSIUM SERPL-SCNC: 4.8 MMOL/L (ref 3.5–5)
RBC # BLD: 3.38 E12/L (ref 3.5–5.5)
SODIUM BLD-SCNC: 141 MMOL/L (ref 132–146)
TOTAL PROTEIN: 7.6 G/DL (ref 6.4–8.3)
WBC # BLD: 5 E9/L (ref 4.5–11.5)

## 2018-10-25 PROCEDURE — 36415 COLL VENOUS BLD VENIPUNCTURE: CPT

## 2018-10-25 PROCEDURE — 97530 THERAPEUTIC ACTIVITIES: CPT | Performed by: OCCUPATIONAL THERAPY ASSISTANT

## 2018-10-25 PROCEDURE — 6370000000 HC RX 637 (ALT 250 FOR IP): Performed by: PHYSICIAN ASSISTANT

## 2018-10-25 PROCEDURE — 97110 THERAPEUTIC EXERCISES: CPT | Performed by: OCCUPATIONAL THERAPY ASSISTANT

## 2018-10-25 PROCEDURE — 2700000000 HC OXYGEN THERAPY PER DAY

## 2018-10-25 PROCEDURE — 85027 COMPLETE CBC AUTOMATED: CPT

## 2018-10-25 PROCEDURE — 97110 THERAPEUTIC EXERCISES: CPT

## 2018-10-25 PROCEDURE — 6370000000 HC RX 637 (ALT 250 FOR IP): Performed by: PHYSICAL MEDICINE & REHABILITATION

## 2018-10-25 PROCEDURE — 83036 HEMOGLOBIN GLYCOSYLATED A1C: CPT

## 2018-10-25 PROCEDURE — 80053 COMPREHEN METABOLIC PANEL: CPT

## 2018-10-25 PROCEDURE — 1280000000 HC REHAB R&B

## 2018-10-25 PROCEDURE — 97530 THERAPEUTIC ACTIVITIES: CPT

## 2018-10-25 RX ORDER — OXYCODONE HYDROCHLORIDE AND ACETAMINOPHEN 5; 325 MG/1; MG/1
1 TABLET ORAL 2 TIMES DAILY PRN
Status: DISCONTINUED | OUTPATIENT
Start: 2018-10-25 | End: 2018-10-31 | Stop reason: HOSPADM

## 2018-10-25 RX ORDER — PANTOPRAZOLE SODIUM 40 MG/1
40 TABLET, DELAYED RELEASE ORAL
Status: DISCONTINUED | OUTPATIENT
Start: 2018-10-25 | End: 2018-10-31 | Stop reason: HOSPADM

## 2018-10-25 RX ADMIN — BUPROPION HYDROCHLORIDE 300 MG: 300 TABLET, FILM COATED, EXTENDED RELEASE ORAL at 09:04

## 2018-10-25 RX ADMIN — NALOXEGOL OXALATE 25 MG: 12.5 TABLET, FILM COATED ORAL at 09:04

## 2018-10-25 RX ADMIN — TRAZODONE HYDROCHLORIDE 150 MG: 150 TABLET ORAL at 22:09

## 2018-10-25 RX ADMIN — SPIRONOLACTONE 25 MG: 25 TABLET ORAL at 09:05

## 2018-10-25 RX ADMIN — ERGOCALCIFEROL 50000 UNITS: 1.25 CAPSULE ORAL at 09:04

## 2018-10-25 RX ADMIN — ACETAMINOPHEN 650 MG: 325 TABLET, FILM COATED ORAL at 11:13

## 2018-10-25 RX ADMIN — SENNOSIDES 17.2 MG: 8.6 TABLET, FILM COATED ORAL at 22:10

## 2018-10-25 RX ADMIN — CARVEDILOL 12.5 MG: 6.25 TABLET, FILM COATED ORAL at 09:05

## 2018-10-25 RX ADMIN — OXYCODONE AND ACETAMINOPHEN 1 TABLET: 5; 325 TABLET ORAL at 05:22

## 2018-10-25 RX ADMIN — ROPINIROLE HYDROCHLORIDE 3 MG: 2 TABLET, FILM COATED ORAL at 22:10

## 2018-10-25 RX ADMIN — OXYBUTYNIN CHLORIDE 10 MG: 10 TABLET, FILM COATED, EXTENDED RELEASE ORAL at 09:04

## 2018-10-25 RX ADMIN — DOCUSATE SODIUM 100 MG: 100 CAPSULE, LIQUID FILLED ORAL at 09:04

## 2018-10-25 RX ADMIN — LINAGLIPTIN 5 MG: 5 TABLET, FILM COATED ORAL at 09:05

## 2018-10-25 RX ADMIN — OXYCODONE AND ACETAMINOPHEN 1 TABLET: 5; 325 TABLET ORAL at 17:27

## 2018-10-25 RX ADMIN — DOCUSATE SODIUM 100 MG: 100 CAPSULE, LIQUID FILLED ORAL at 22:12

## 2018-10-25 RX ADMIN — FLUTICASONE PROPIONATE 1 SPRAY: 50 SPRAY, METERED NASAL at 09:04

## 2018-10-25 RX ADMIN — ISOSORBIDE MONONITRATE 30 MG: 30 TABLET ORAL at 09:05

## 2018-10-25 RX ADMIN — SACUBITRIL AND VALSARTAN 1 TABLET: 24; 26 TABLET, FILM COATED ORAL at 09:04

## 2018-10-25 RX ADMIN — ROPINIROLE HYDROCHLORIDE 3 MG: 2 TABLET, FILM COATED ORAL at 09:04

## 2018-10-25 ASSESSMENT — PAIN SCALES - GENERAL
PAINLEVEL_OUTOF10: 8
PAINLEVEL_OUTOF10: 7
PAINLEVEL_OUTOF10: 0
PAINLEVEL_OUTOF10: 3
PAINLEVEL_OUTOF10: 8
PAINLEVEL_OUTOF10: 5
PAINLEVEL_OUTOF10: 0

## 2018-10-25 ASSESSMENT — PAIN DESCRIPTION - LOCATION
LOCATION: FLANK
LOCATION: HEAD
LOCATION: HEAD

## 2018-10-25 ASSESSMENT — PAIN DESCRIPTION - PAIN TYPE
TYPE: SURGICAL PAIN
TYPE: SURGICAL PAIN
TYPE: ACUTE PAIN

## 2018-10-25 ASSESSMENT — PAIN DESCRIPTION - ONSET
ONSET: ON-GOING

## 2018-10-25 ASSESSMENT — PAIN DESCRIPTION - DESCRIPTORS
DESCRIPTORS: STABBING;SHARP
DESCRIPTORS: DISCOMFORT;SORE
DESCRIPTORS: ACHING;HEADACHE

## 2018-10-25 ASSESSMENT — PAIN DESCRIPTION - FREQUENCY
FREQUENCY: INTERMITTENT

## 2018-10-25 ASSESSMENT — PAIN DESCRIPTION - ORIENTATION
ORIENTATION: RIGHT
ORIENTATION: LEFT

## 2018-10-25 ASSESSMENT — PAIN DESCRIPTION - PROGRESSION
CLINICAL_PROGRESSION: GRADUALLY IMPROVING
CLINICAL_PROGRESSION: NOT CHANGED

## 2018-10-25 NOTE — PROGRESS NOTES
Nutrition Assessment    Type and Reason for Visit: Positive Nutrition Screen, Initial    Nutrition Recommendations: Continue current diet as ordered. Will begin Ensure HP BID to promote optimal po intake. Malnutrition Assessment:  · Malnutrition Status: Meets the criteria for severe malnutrition  · Context: Acute illness or injury  · Findings of the 6 clinical characteristics of malnutrition (Minimum of 2 out of 6 clinical characteristics is required to make the diagnosis of moderate or severe Protein Calorie Malnutrition based on AND/ASPEN Guidelines):  1. Energy Intake-Less than or equal to 75%, greater than 7 days    2. Weight Loss-20% loss or greater,  (x 4 months )  3. Fat Loss-Moderate subcutaneous fat loss, Orbital  4. Muscle Loss-Moderate muscle mass loss, Clavicles (pectoralis and deltoids), Temples (temporalis muscle)  5. Fluid Accumulation-No significant fluid accumulation,    6.   Strength-Not measured    Nutrition Diagnosis:   · Problem: Severe malnutrition, in context of acute illness or injury  · Etiology: related to Catabolic illness     Signs and symptoms:  as evidenced by Diet history of poor intake, Intake 50-75%, Weight loss greater than or equal to 7.5% in 3 months, Moderate muscle loss, Moderate loss of subcutaneous fat    Nutrition Assessment:  · Subjective Assessment: admit to AR[facility] w/ NPH s/p  shunt placement   · Nutrition-Focused Physical Findings: +I/Os, soft abd, active bs, no edema noted   · Wound Type: Surgical Wound  · Current Nutrition Therapies:  · Oral Diet Orders: General, Carb Control 4 Carbs/Meal, 2gm Sodium   · Oral Diet intake: 51-75% (variable intake noted per EMR )  · Oral Nutrition Supplement (ONS) Orders: None  · Anthropometric Measures:  · Ht: 5' 2\" (157.5 cm)   · Current Body Wt: 100 lb (45.4 kg) (10/25 actual bedscale )  · Usual Body Wt: 124 lb (56.2 kg) (7/2018 actual bedscale )  · % Weight Change: 24% wt loss noted per EMR x 4 months per EMR data , · Ideal Body Wt: 110 lb (49.9 kg), % Ideal Body 91%  · BMI Classification: BMI <18.5 Underweight  · Comparative Standards (Estimated Nutrition Needs):  · Estimated Daily Total Kcal: 8728-5585  · Estimated Daily Protein (g): 60-70    Nutrition Risk Level: Moderate    Nutrition Interventions:   Continue current diet, Start ONS (Ensure HP BID )  Continued Inpatient Monitoring, Coordination of Care    Nutrition Evaluation:   · Evaluation: Goals set   · Goals: Consume 75% or mroe of most meals/ONS    · Monitoring: Meal Intake, Supplement Intake, Diet Tolerance, Skin Integrity, Wound Healing, Fluid Balance, Ascites/Edema, Weight, Comparative Standards, Pertinent Labs    See Adult Nutrition Doc Flowsheet for more detail.      Electronically signed by Rubi Henry RD, RADHA on 10/25/18 at 4:01 PM    Contact Number: [phone]

## 2018-10-25 NOTE — PLAN OF CARE
Problem: Falls - Risk of:  Goal: Will remain free from falls  Will remain free from falls   Outcome: Met This Shift      Problem: Pain:  Goal: Control of chronic pain  Control of chronic pain   Outcome: Met This Shift      Problem: Mobility - Impaired:  Goal: Mobility will improve  Mobility will improve   Outcome: Met This Shift      Problem: Skin Integrity:  Goal: Will show no infection signs and symptoms  Will show no infection signs and symptoms   Outcome: Met This Shift      Problem: Infection:  Goal: Will remain free from infection  Will remain free from infection   Outcome: Met This Shift      Problem: Daily Care:  Goal: Daily care needs are met  Daily care needs are met   Outcome: Met This Shift      Problem: ABCDS Injury Assessment  Goal: Absence of physical injury  Outcome: Met This Shift

## 2018-10-25 NOTE — PROGRESS NOTES
[facility]  Progress Note    GENERAL:   Covering for Dr Mynor Hou. Seated in bed,  visiting. Right Occipital Scalp incision healing well, sutures in place, no drainage, patient is 10 days, status post insertion of  shunt, by Dr Belinda Collins. Had nausea and heartburn last night and requested Zofran, was on Omeprazole at home. Had HARE Balance scale, 10/24 and scored 48/56, which indicates low risk for falling.     VITALS:   Vitals:    10/25/18 0513 10/25/18 0514 10/25/18 0639 10/25/18 0800   BP: 87/66 100/60  113/62   Pulse: 77 75  74   Resp: 16   16   Temp: 97.5 °F (36.4 °C)   98.5 °F (36.9 °C)   TempSrc: Temporal      SpO2:       Weight:   100 lb 11.2 oz (45.7 kg)    Height:           Scheduled Meds:   naloxegol  25 mg Oral QAM    senna  2 tablet Oral Nightly    docusate sodium  100 mg Oral BID    buPROPion  300 mg Oral QAM    carvedilol  12.5 mg Oral BID WC    Fluticasone-Umeclidin-Vilant  1 puff Inhalation Daily    isosorbide mononitrate  30 mg Oral Daily    linagliptin  5 mg Oral QAM    oxybutynin  10 mg Oral Daily    rOPINIRole  3 mg Oral BID    sacubitril-valsartan  1 tablet Oral BID    spironolactone  25 mg Oral Daily    traZODone  150 mg Oral Nightly    vitamin D  50,000 Units Oral Once per day on Thu    influenza virus vaccine  0.5 mL Intramuscular Prior to discharge     Continuous Infusions:  PRN Meds:.ondansetron, oxyCODONE-acetaminophen **OR** [DISCONTINUED] oxyCODONE-acetaminophen, butalbital-acetaminophen-caffeine, calcium carbonate, acetaminophen, albuterol, fluticasone      LABS:  CBC:   Lab Results   Component Value Date    WBC 5.0 10/25/2018    RBC 3.38 10/25/2018    HGB 10.8 10/25/2018    HCT 33.5 10/25/2018    MCV 99.1 10/25/2018    MCH 32.0 10/25/2018    MCHC 32.2 10/25/2018    RDW 14.1 10/25/2018     10/25/2018    MPV 10.7 10/25/2018     CMP:    Lab Results   Component Value Date     10/25/2018    K 4.8 10/25/2018

## 2018-10-25 NOTE — PROGRESS NOTES
OCCUPATIONAL THERAPY DAILY NOTE    Date:10/25/2018  Patient Name: Gisella Fortune  MRN: [id number]  : 1955  Room: [address] [place], [place] [address]     DIAGNOSIS: NPH  PRECAUTIONS: Fall risk, O2   PROCEDURE(S): 10/15 insertion of  shunt     Social:  Pt lives with her , daughter, and 4 grandchildren in a 1 floor plan with 2-3 steps and no rail to enter. Prior to admission pt ambulated with no AD, was receiving HHPT for gait/balance 1-2x/week (TIA 2018). Pt owns a [employer]. Precautions: 02 3L,  Fall risk  Functional Assessment:   Date Status AE  Comments   Feeding 10/22/18 Mod I      Grooming 10/24/18 S/Set up     Bathing 10/23/18 UB- Mod I/Supervision  LB -S/SBA LH hose/  Shower stall bench       UB Dressing 10/23/18  S/set up   . LB Dressing 10/25/18  SUP     Donned shoes seated edge of bed    Homemaking 10/25/18 CGA rw vc's for walker management      Functional Transfers / Balance:   Date Status DME  Comments   Sit Balance 10/25/18  Independent      Stand Balance 10/25/18 S/SBA rw  Sink  Table     [] Tub  [x] Shower   Transfer 10/23/18  S/SBA Grab bar    Commode   Transfer 10/23/18 S/SBA rw    Functional   Mobility 10/25/18 S/SBA rw   To and from therapy with w.w. Other: sit to stand transfers 10/24/18 S/SBA       Functional Exercises / Activity:    BUE ROM and strength exercises with arm bike 5 mins for 3 resp   B hand strength with hand gripper 3 sets 15 reps   Func standing ax to increase bal/end while performing func ladder climb ax with 3# dowel to increase AROM and strength of BUE's. 15 reps x2 standing. Bal-F end- 10 mins standing. Func standing ax to increase bal/end while performing FM leisure ax to increase [facility]. Bal-F end- 10 mins at a time.      Sensory / Neuromuscular Re-Education:      Cognitive Skills:   Status Comments   Problem   Solving Fair plus Demo during sit to stand transfers, dyn/static balance and arm ex's    Memory Fair plus \"   Sequencing Fair plus \"   Safety Fair plus \"

## 2018-10-25 NOTE — PROGRESS NOTES
Physical Therapy    [address]  Treatment Note    NAME: Flako Hutson  : 1955  MRN: [id number]    Date of Service: 10/25/2018  Evaluating Therapist: Ginger Fontanez PELIA    ROOM: [facility]  DIAGNOSIS: NPH  PRECAUTIONS: Fall risk, O2   PROCEDURE(S): 10/15 insertion of  shunt    Social:  Pt lives with her , daughter, and 4 grandchildren in a 1 floor plan with 2-3 steps and no rail to enter. Prior to admission pt ambulated with no AD, was receiving HHPT for gait/balance 1-2x/week (TIA 2018). Pt owns a [employer]. Initial Evaluation  Date: 10/19/18 AM  PM    Short Term Goals Long Term Goals    Was pt agreeable to Eval/treatment? Yes Yes Yes     Does pt have pain? 8/10 back and L side pain, nursing aware 8/10 L side pain and headache 8/10 L side pain and cranial at the surgical site     Bed Mobility  Rolling: Supervision  Supine to sit: SBA  Sit to supine: SBA  Scooting: SBA NT NT Supervision Independent   Transfers Sit to stand: SBA  Stand to sit: SBA  Stand pivot: SBA Sit to stand: Supervision  Stand to sit: Supervision Sit to stand: supervision  Stand to sit: supervision  Stand pivot with [employer]: supervison Supervision Modified Independent   Ambulation   150 feet with no AD with  feet with [employer] with supervision 200 feet with [employer] with supervision 150 feet with AAD with Supervision >150 feet with AAD with modified independence. Walking 10 feet on uneven surface 10 feet with no AD with SBA 10 feet with WW with SBA NT 10 feet with AAD with supervision >10 feet with AAD with modified independence. Wheel Chair Mobility NT NT NT     Car Transfers SBA NT Supervision Supervision Indepence   Stair negotiation: ascended and descended 4 steps with 2 rail with SBA 12 steps with 1 rail with SBA NT 12 steps with 1 rail with supervision. >12 steps with 0 rail with modified indenpence.    Curb Step:   ascended and descended 4 inch step with no AD and SBA NT NT 7.5 inch step with AAD and

## 2018-10-26 PROCEDURE — 6370000000 HC RX 637 (ALT 250 FOR IP): Performed by: PHYSICIAN ASSISTANT

## 2018-10-26 PROCEDURE — 1280000000 HC REHAB R&B

## 2018-10-26 PROCEDURE — 97110 THERAPEUTIC EXERCISES: CPT

## 2018-10-26 PROCEDURE — 97530 THERAPEUTIC ACTIVITIES: CPT

## 2018-10-26 PROCEDURE — 6370000000 HC RX 637 (ALT 250 FOR IP): Performed by: PHYSICAL MEDICINE & REHABILITATION

## 2018-10-26 PROCEDURE — 97535 SELF CARE MNGMENT TRAINING: CPT

## 2018-10-26 PROCEDURE — 2700000000 HC OXYGEN THERAPY PER DAY

## 2018-10-26 RX ORDER — LACTULOSE 10 G/15ML
30 SOLUTION ORAL DAILY
Status: COMPLETED | OUTPATIENT
Start: 2018-10-26 | End: 2018-10-30

## 2018-10-26 RX ORDER — TRAZODONE HYDROCHLORIDE 50 MG/1
100 TABLET ORAL NIGHTLY
Status: DISCONTINUED | OUTPATIENT
Start: 2018-10-26 | End: 2018-10-31 | Stop reason: HOSPADM

## 2018-10-26 RX ADMIN — SACUBITRIL AND VALSARTAN 1 TABLET: 24; 26 TABLET, FILM COATED ORAL at 21:47

## 2018-10-26 RX ADMIN — ROPINIROLE HYDROCHLORIDE 3 MG: 2 TABLET, FILM COATED ORAL at 21:47

## 2018-10-26 RX ADMIN — BUPROPION HYDROCHLORIDE 300 MG: 300 TABLET, FILM COATED, EXTENDED RELEASE ORAL at 08:39

## 2018-10-26 RX ADMIN — LACTULOSE 30 G: 20 SOLUTION ORAL at 11:28

## 2018-10-26 RX ADMIN — OXYCODONE AND ACETAMINOPHEN 1 TABLET: 5; 325 TABLET ORAL at 17:08

## 2018-10-26 RX ADMIN — LINAGLIPTIN 5 MG: 5 TABLET, FILM COATED ORAL at 08:40

## 2018-10-26 RX ADMIN — ISOSORBIDE MONONITRATE 30 MG: 30 TABLET ORAL at 08:40

## 2018-10-26 RX ADMIN — OXYCODONE AND ACETAMINOPHEN 1 TABLET: 5; 325 TABLET ORAL at 05:15

## 2018-10-26 RX ADMIN — DOCUSATE SODIUM 100 MG: 100 CAPSULE, LIQUID FILLED ORAL at 08:39

## 2018-10-26 RX ADMIN — PANTOPRAZOLE SODIUM 40 MG: 40 TABLET, DELAYED RELEASE ORAL at 05:15

## 2018-10-26 RX ADMIN — NALOXEGOL OXALATE 25 MG: 12.5 TABLET, FILM COATED ORAL at 08:40

## 2018-10-26 RX ADMIN — TRAZODONE HYDROCHLORIDE 100 MG: 50 TABLET ORAL at 21:48

## 2018-10-26 RX ADMIN — SENNOSIDES 17.2 MG: 8.6 TABLET, FILM COATED ORAL at 21:47

## 2018-10-26 RX ADMIN — DOCUSATE SODIUM 100 MG: 100 CAPSULE, LIQUID FILLED ORAL at 21:47

## 2018-10-26 RX ADMIN — ROPINIROLE HYDROCHLORIDE 3 MG: 2 TABLET, FILM COATED ORAL at 08:40

## 2018-10-26 RX ADMIN — OXYBUTYNIN CHLORIDE 10 MG: 10 TABLET, FILM COATED, EXTENDED RELEASE ORAL at 08:40

## 2018-10-26 ASSESSMENT — PAIN DESCRIPTION - ONSET: ONSET: ON-GOING

## 2018-10-26 ASSESSMENT — PAIN DESCRIPTION - PROGRESSION: CLINICAL_PROGRESSION: NOT CHANGED

## 2018-10-26 ASSESSMENT — PAIN SCALES - GENERAL
PAINLEVEL_OUTOF10: 0
PAINLEVEL_OUTOF10: 7
PAINLEVEL_OUTOF10: 6
PAINLEVEL_OUTOF10: 5
PAINLEVEL_OUTOF10: 0
PAINLEVEL_OUTOF10: 0

## 2018-10-26 ASSESSMENT — PAIN DESCRIPTION - ORIENTATION
ORIENTATION: LEFT
ORIENTATION: LEFT

## 2018-10-26 ASSESSMENT — PAIN DESCRIPTION - LOCATION
LOCATION: FLANK
LOCATION: HEAD;FLANK

## 2018-10-26 ASSESSMENT — PAIN DESCRIPTION - FREQUENCY
FREQUENCY: INTERMITTENT
FREQUENCY: INTERMITTENT

## 2018-10-26 ASSESSMENT — PAIN DESCRIPTION - PAIN TYPE: TYPE: SURGICAL PAIN

## 2018-10-26 ASSESSMENT — PAIN DESCRIPTION - DESCRIPTORS
DESCRIPTORS: SHARP
DESCRIPTORS: ACHING;DISCOMFORT;SHARP;THROBBING

## 2018-10-26 NOTE — PATIENT CARE CONFERENCE
[address] [place], [place] [address]  INPATIENT ACUTE REHABILITATION  TEAM CONFERENCE NOTE/PATIENT PLAN OF CARE    Date: 10/26/2018  Admission date: 10/18/2018  Patient Name: Niharika Mary        MRN: [id number]    : 1955  (58 y.o.)  Gender: female   Rehab diagnosis/surgery with date:  Normal Pressure Hydrocephalus,  shunt 10/15/18  Impairment Group Code:  2.1      MEDICAL/FUNCTIONAL HISTORY/STATUS:  stable, had been constipated, left flank pain, omeparazoe at home, severe heartburn-started on protonix  scored 28/30 on MOCA    Consultations/Labs/X-rays: 10/25/18 Hgb A1C 6.8      MEDICATION UPDATE:  was on Omeprazole at home, will start on Protonix      NURSING FIMS:    Bowel:   Current level: dependent, had soap suds enema  Short term bowel goal:  supervision  Long term bowel goal: Modified independent    Bladder:   Current level: supervision  Short term bladder goal: Modified independent  Long term bladder goal: Modified independent    Toilet Hygiene:   Current level : supervision  Short term Toilet hygiene goal: supervision  Long term toilet hygiene goal:  supervision    Skin integrity: scalp incision with sutures, will come out on Monday 10/29  Pain: Headache  8-0, percocet, fioricet effective    NUTRITION    Diet  carb controlled, low sodium  Liquid consistency   thin    SOCIAL INFORMATION:  Lives with: spouse and daughter, 4 grandchildren  Prior community services:  oxygen from [employer]:  2 story, 1 entry,first floor bed and bath  Prior Level of function:  independent with gait, toilet and drsg, help with bathing, meals & housework  DME:  home O2, wheeled walker , glucometer    FAMILY / PATIENT EDUCATION:  safety and self care are ongoing with patient    PHYSICAL THERAPY    Bed mobility:   Current level: Modified independent  Short term bed mobility goal: Modified independent  Long term bed mobility goal: independent    Chair/bed transfers:  Current level: supervision with wheeled walker  Short

## 2018-10-26 NOTE — PROGRESS NOTES
Physical Therapy    Facility/Department: [address] REHAB  Treatment Note    NAME: Nga Conti  : 1955  MRN: [id number]    Date of Service: 10/26/2018  Evaluating Therapist: Reshma Valle. Roma Snyder P.T.    ROOM: [facility]  DIAGNOSIS: NPH  PRECAUTIONS: Fall risk, O2   PROCEDURE(S): 10/15 insertion of  shunt    Social:  Pt lives with her , daughter, and 4 grandchildren in a 1 floor plan with 2-3 steps and no rail to enter. Prior to admission pt ambulated with no AD, was receiving HHPT for gait/balance 1-2x/week (TIA 2018). Pt owns a [employer]. Initial Evaluation  Date: 10/19/18 AM  PM    Short Term Goals Long Term Goals    Was pt agreeable to Eval/treatment? Yes Yes      Does pt have pain? 8/10 back and L side pain, nursing aware 7/10 L side pain      Bed Mobility  Rolling: Supervision  Supine to sit: SBA  Sit to supine: SBA  Scooting: SBA NT  Supervision Independent   Transfers Sit to stand: SBA  Stand to sit: SBA  Stand pivot: SBA Sit to stand: supervision  Stand to sit: supervision   Stand pivot: supervision   Supervision Modified Independent   Ambulation   150 feet with no AD with  feet with no AD with SBA  150 feet with AAD with Supervision >150 feet with AAD with modified independence. Walking 10 feet on uneven surface 10 feet with no AD with SBA NT  10 feet with AAD with supervision >10 feet with AAD with modified independence. Wheel Chair Mobility NT NT      Car Transfers SBA Supervision  Supervision Indepence   Stair negotiation: ascended and descended 4 steps with 2 rail with SBA 16 steps with 1 rail with SBA. 12 steps with 1 rail with supervision. >12 steps with 0 rail with modified indenpence. Curb Step:   ascended and descended 4 inch step with no AD and SBA NT  7.5 inch step with AAD and supervison 7.5 inch step with AAD and modified indepence   Picking up object off the floor Picked up 5# with SBA NT  Will  5# with Supervision. Will  5# with modified independence. BLE ROM WNL WNL      BLE Strength BLEs are grossly 4-/5 to 4/5 BLEs are grossly 4-/5 to 4/5      Balance  Sitting EOB: Supervision  Dynamic standing: SBA Sitting EOB: Independent  Dynamic standing: SBA      Date Family Teach Completed No family present at initial evaluation No family present to this date      Is additional Family Teaching Needed? Y or N Yes Yes      Hindering Progress Weakness, balance deficits Weakness, balance deficits      PT recommended ELOS        Team's Discharge Plan        Therapist at Team Meeting          Therapeutic Exercise:   AM:   Ambulated 2 x 200 ft with [employer] with supervision, 1 x 300 ft with no AD with SBA (weaving canes, backwards stepping, gait with head turns, and grapevines)   Standing cone taps with alternating LEs with no UE use with SBA 2 x 10  Car transfer x 3 with supervision  16 steps with 1 rail with SBA    Additional Comments: Spoke with pt about possible family teach and she reported that she is going to try to get  to come in sometime next week. Pt was compliant to therapy session, but had decreased attention span to task due to hearing recent family news which required continuous VCing to keep on task for car transfers and stair negotiation. During dynamic balance activities with grapevine stepping, pt had increased unsteadiness with 2 LOB with Delfin recovery. Pt was educated on weight shifting with widened DIAMOND to increase balance during dynamic balance activities. Patient/family education  Pt/family educated on weight shifting with widened DIAMOND to increase balance during dynamic balance activities. Patient/family response to education:   Pt/family verbalized understanding Pt/family demonstrated skill Pt/family requires further education in this area   Yes Yes, intermittent Yes     AM  Time in: 1000   Time out: 1045    Pt is making good progress toward established Physical Therapy goals. Continue with physical therapy current plan of care.     Luisa Bacon Caden, ARA [place].  Mikki Sheppard, [place]  License Number: [id number]

## 2018-10-26 NOTE — CARE COORDINATION
Per team meeting:  Pt is scheduled for discharge on 10/31. Updated both Pt and Warden De Guzmant, spouse. LTG: Stand by Assist/Independent. Team noted: At times Pt requires cues for safety and wheeled walker management. Pt's balance was improving. DME: Pt has all recommended DME (wheeled walker and oxygen)    Aftercare:  Per Pt request:  Referral made to [facility] for PT and OT.  [address], [place], [place] [address] [phone].     FT:  10/31AM  [facility] Intern  Minor Nathaly

## 2018-10-26 NOTE — PROGRESS NOTES
OCCUPATIONAL THERAPY DAILY NOTE    Date:10/26/2018  Patient Name: Jocelyne Osei  MRN: [id number]  : 1955  Room: [address] [place], [place] [address]     DIAGNOSIS: NPH  PRECAUTIONS: Fall risk, O2   PROCEDURE(S): 10/15 insertion of  shunt     Social:  Pt lives with her , daughter, and 4 grandchildren in a 1 floor plan with 2-3 steps and no rail to enter. Prior to admission pt ambulated with no AD, was receiving HHPT for gait/balance 1-2x/week (TIA 2018). 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 Precautions: 02 3L,  Fall risk    Functional Assessment:   Date Status AE  Comments   Feeding 10/26/18 Mod I   Seated at EOB pt eating breakfast.    Grooming 10/26/18 Mod I  Grooming tasks completed seated at sink. Bathing 10/26/18 Supervision LH hose/  Shower stall bench Full shower completed seated/standing with min cues for safety. Fair tolerance. UB Dressing 10/26/18  Setup  To don/doff pull over shirt and bra seated in chair. LB Dressing 10/26/18  Supervision  Pt demo'd ability to don/doff underwear, pants and socks. Min cues for safety and technique required. Homemaking 10/25/18 CGA rw      Functional Transfers / Balance:   Date Status DME  Comments   Sit Balance 10/26/18  Independent      Stand Balance 10/26/18 Supervision rw  Sink  Table  Demo'd during transfers and ADL tasks. [] Tub  [x] Shower   Transfer 10/26/18  S/SBA Grab bar, w/w Min cues for safety required. Commode   Transfer 10/23/18 S/SBA rw    Functional   Mobility 10/26/18 S/SBA w/w   Completed within pt's room/bathroom with w/w with min cues for walker safety d/t pt abandoning w/w in smaller spaces.     2nd session: Without w/w from room<>OT rehab CGA   Other: sit to stand transfers 10/26/18 Supervision     Functional Exercises / Activity:  Pt seated completed B UE ex's focusing on strength/endurance for increased independence with ADL tasks;   Ergometer 6 minutes froward, 3 minute rest break, 5 minutes backwards;   Min resistant can do bar 15 reps 3 planes with multiple rest breaks     Sensory / Neuromuscular Re-Education:      Cognitive Skills:   Status Comments   Problem   Solving Fair plus Demo during sit to stand transfers, dyn/static balance and arm ex's    Memory Fair plus \"   Sequencing Fair plus \"   Safety Fair plus \"     Visual Perception:    Education:    Pt was educated on bathing/dressing safety and transfer techniques. [] Family teach completed on:    Pain Level: no complaints of pain      Additional Notes:     Patient has fair plus made progress during treatment sessions toward set goals. Therapy emphasis to obtain goals:  ADL,s balance, endurance, strength, transfers, fxl mobility and fine motor skills/sensation    [x] Continue with current OT Plan of care. [] Prepare for Discharge     DISCHARGE RECOMMENDATIONS  Recommended DME:    Post Discharge Care:   []Home Independently  []Home with 24hr Care / Supervision []Home with Partial Supervision []Home with Home Health OT []Home with Out Pt OT []Other: ___   Comments:         Time in Time out Tx Time Breakdown  Variance:   First Session  2224 [id number] [x] Individual Tx-45 Mins  [] Concurrent Tx -  [] Co-Tx -   [] Group Tx -   [] Time Missed -     Second Session [id number] [] Individual Tx-   [x] Concurrent Tx - 45 min  [] Co-Tx -   [] Group Tx -   [] Time Missed -       Third Session    [] Individual Tx-   [] Concurrent Tx -  [] Co-Tx -   [] Group Tx -   [] Time Missed -                 Total time: 90 Mins   Cecille Case HERNANDEZ/L [address] [id number]     I have read & agree with the above status.     Brian Michel OTR/L [id number]

## 2018-10-26 NOTE — PROGRESS NOTES
Physical Therapy    Facility/Department: [address] REHAB  Treatment Note    NAME: Ismael Diana  : 1955  MRN: [id number]    Date of Service: 10/26/2018  Evaluating Therapist: Dank Otero. Parish Jean P.T.    ROOM: [facility]  DIAGNOSIS: NPH  PRECAUTIONS: Fall risk, O2   PROCEDURE(S): 10/15 insertion of  shunt    Social:  Pt lives with her , daughter, and 4 grandchildren in a 1 floor plan with 2-3 steps and no rail to enter. Prior to admission pt ambulated with no AD, was receiving HHPT for gait/balance 1-2x/week (TIA 2018). Pt owns a [employer]. Initial Evaluation  Date: 10/19/18 PM  Short Term Goals Long Term Goals    Was pt agreeable to Eval/treatment? Yes Yes     Does pt have pain? 8/10 back and L side pain, nursing aware no     Bed Mobility  Rolling: Supervision  Supine to sit: SBA  Sit to supine: SBA  Scooting: SBA Rolling: Modified Independent  Supine to sit: Modified Independent  Sit to supine: NT  Scooting: Modified Independent Supervision Independent   Transfers Sit to stand: SBA  Stand to sit: SBA  Stand pivot: SBA Sit to stand: supervision  Stand to sit: supervision   Stand pivot: supervision  Supervision Modified Independent   Ambulation   150 feet with no AD with  feet with ww with  feet with AAD with Supervision >150 feet with AAD with modified independence. Walking 10 feet on uneven surface 10 feet with no AD with SBA 10 feet with ww with SBA 10 feet with AAD with supervision >10 feet with AAD with modified independence. Wheel Chair Mobility NT NT     Car Transfers SBA NT Supervision Indepence   Stair negotiation: ascended and descended 4 steps with 2 rail with SBA 16 steps with 1 rail with SBA 12 steps with 1 rail with supervision. >12 steps with 0 rail with modified indenpence.    Curb Step:   ascended and descended 4 inch step with no AD and SBA 4 inch with ww with 7.5 inch step with AAD and supervison 7.5 inch step with AAD and modified indepence   Picking up object off the floor Picked up 5# with SBA NT Will  5# with Supervision. Will  5# with modified independence. BLE ROM WNL WNL     BLE Strength BLEs are grossly 4-/5 to 4/5 BLEs are grossly 4-/5 to 4/5     Balance  Sitting EOB: Supervision  Dynamic standing: SBA Sitting EOB: Independent  Dynamic standing: SBA     Date Family Teach Completed No family present at initial evaluation No family present to this date     Is additional Family Teaching Needed? Y or N Yes Yes     Hindering Progress Weakness, balance deficits Weakness, balance deficits     PT recommended ELOS       Team's Discharge Plan       Therapist at Team Meeting         Therapeutic Exercise:   PM:   Fwd,bkw,lateral walking around square with no AD with SBA    Additional Comments: Pt performed functional mobility with good technique and safety awareness. Pt had 1 minor LOB that was self corrected while turning d/t narrow DIAMOND. Educated pt on widening DIAMOND, pt with good carryover throughout tx. Patient/family education  Pt educated on widen DIAMOND during turns    Patient/family response to education:   Pt/family verbalized understanding Pt/family demonstrated skill Pt/family requires further education in this area   Yes Yes reinforce     PM  Time in: 1430   Time out: 1515    Pt is making good progress toward established Physical Therapy goals. Continue with physical therapy current plan of care.     Giovanny Llamas PTA [id number]

## 2018-10-26 NOTE — PLAN OF CARE
Problem: Falls - Risk of:  Goal: Will remain free from falls  Will remain free from falls   Outcome: Met This Shift      Problem: Pain:  Goal: Control of acute pain  Control of acute pain   Outcome: Met This Shift      Problem: Mobility - Impaired:  Goal: Mobility will improve  Mobility will improve   Outcome: Met This Shift      Problem: Skin Integrity:  Goal: Will show no infection signs and symptoms  Will show no infection signs and symptoms   Outcome: Met This Shift      Problem: Infection:  Goal: Will remain free from infection  Will remain free from infection   Outcome: Met This Shift      Problem: Daily Care:  Goal: Daily care needs are met  Daily care needs are met   Outcome: Met This Shift      Problem: ABCDS Injury Assessment  Goal: Absence of physical injury  Outcome: Met This Shift

## 2018-10-27 ENCOUNTER — APPOINTMENT (OUTPATIENT)
Dept: GENERAL RADIOLOGY | Age: 63
DRG: 056 | End: 2018-10-27
Attending: PHYSICAL MEDICINE & REHABILITATION
Payer: MEDICARE

## 2018-10-27 PROCEDURE — 2700000000 HC OXYGEN THERAPY PER DAY

## 2018-10-27 PROCEDURE — 6370000000 HC RX 637 (ALT 250 FOR IP): Performed by: PHYSICAL MEDICINE & REHABILITATION

## 2018-10-27 PROCEDURE — 74019 RADEX ABDOMEN 2 VIEWS: CPT

## 2018-10-27 PROCEDURE — 1280000000 HC REHAB R&B

## 2018-10-27 PROCEDURE — 6370000000 HC RX 637 (ALT 250 FOR IP): Performed by: PHYSICIAN ASSISTANT

## 2018-10-27 RX ADMIN — SACUBITRIL AND VALSARTAN 1 TABLET: 24; 26 TABLET, FILM COATED ORAL at 08:59

## 2018-10-27 RX ADMIN — ROPINIROLE HYDROCHLORIDE 3 MG: 2 TABLET, FILM COATED ORAL at 21:15

## 2018-10-27 RX ADMIN — ROPINIROLE HYDROCHLORIDE 3 MG: 2 TABLET, FILM COATED ORAL at 08:59

## 2018-10-27 RX ADMIN — LINAGLIPTIN 5 MG: 5 TABLET, FILM COATED ORAL at 08:59

## 2018-10-27 RX ADMIN — LACTULOSE 30 G: 20 SOLUTION ORAL at 08:58

## 2018-10-27 RX ADMIN — DOCUSATE SODIUM 100 MG: 100 CAPSULE, LIQUID FILLED ORAL at 08:58

## 2018-10-27 RX ADMIN — SENNOSIDES 17.2 MG: 8.6 TABLET, FILM COATED ORAL at 21:14

## 2018-10-27 RX ADMIN — TRAZODONE HYDROCHLORIDE 100 MG: 50 TABLET ORAL at 21:15

## 2018-10-27 RX ADMIN — OXYBUTYNIN CHLORIDE 10 MG: 10 TABLET, FILM COATED, EXTENDED RELEASE ORAL at 08:59

## 2018-10-27 RX ADMIN — CARVEDILOL 12.5 MG: 6.25 TABLET, FILM COATED ORAL at 08:59

## 2018-10-27 RX ADMIN — NALOXEGOL OXALATE 25 MG: 12.5 TABLET, FILM COATED ORAL at 08:58

## 2018-10-27 RX ADMIN — SPIRONOLACTONE 25 MG: 25 TABLET ORAL at 08:59

## 2018-10-27 RX ADMIN — ISOSORBIDE MONONITRATE 30 MG: 30 TABLET ORAL at 08:59

## 2018-10-27 RX ADMIN — OXYCODONE AND ACETAMINOPHEN 1 TABLET: 5; 325 TABLET ORAL at 21:14

## 2018-10-27 RX ADMIN — OXYCODONE AND ACETAMINOPHEN 1 TABLET: 5; 325 TABLET ORAL at 08:58

## 2018-10-27 RX ADMIN — PANTOPRAZOLE SODIUM 40 MG: 40 TABLET, DELAYED RELEASE ORAL at 06:48

## 2018-10-27 RX ADMIN — DOCUSATE SODIUM 100 MG: 100 CAPSULE, LIQUID FILLED ORAL at 21:15

## 2018-10-27 RX ADMIN — BUPROPION HYDROCHLORIDE 300 MG: 300 TABLET, FILM COATED, EXTENDED RELEASE ORAL at 08:59

## 2018-10-27 RX ADMIN — SACUBITRIL AND VALSARTAN 1 TABLET: 24; 26 TABLET, FILM COATED ORAL at 21:15

## 2018-10-27 ASSESSMENT — PAIN DESCRIPTION - DESCRIPTORS
DESCRIPTORS: ACHING;DISCOMFORT
DESCRIPTORS: ACHING;DISCOMFORT

## 2018-10-27 ASSESSMENT — PAIN SCALES - GENERAL
PAINLEVEL_OUTOF10: 6
PAINLEVEL_OUTOF10: 3
PAINLEVEL_OUTOF10: 0
PAINLEVEL_OUTOF10: 5

## 2018-10-27 ASSESSMENT — PAIN DESCRIPTION - PROGRESSION: CLINICAL_PROGRESSION: NOT CHANGED

## 2018-10-27 ASSESSMENT — PAIN DESCRIPTION - FREQUENCY
FREQUENCY: INTERMITTENT
FREQUENCY: INTERMITTENT

## 2018-10-27 ASSESSMENT — PAIN DESCRIPTION - ONSET: ONSET: ON-GOING

## 2018-10-27 ASSESSMENT — PAIN DESCRIPTION - LOCATION
LOCATION: ABDOMEN
LOCATION: ABDOMEN

## 2018-10-27 ASSESSMENT — PAIN DESCRIPTION - PAIN TYPE
TYPE: ACUTE PAIN
TYPE: ACUTE PAIN

## 2018-10-27 ASSESSMENT — PAIN DESCRIPTION - ORIENTATION
ORIENTATION: LEFT
ORIENTATION: LEFT

## 2018-10-27 NOTE — PROGRESS NOTES
Ismael Diana is a 58 y.o. female patient.     Current Facility-Administered Medications   Medication Dose Route Frequency Provider Last Rate Last Dose    lactulose (CHRONULAC) 10 GM/15ML solution 30 g  30 g Oral Daily Td Mclean MD   30 g at 10/27/18 0858    traZODone (DESYREL) tablet 100 mg  100 mg Oral Nightly Td Mclean MD   100 mg at 10/26/18 2148    pantoprazole (PROTONIX) tablet 40 mg  40 mg Oral QAM AC Td Mclean MD   40 mg at 10/27/18 0648    oxyCODONE-acetaminophen (PERCOCET) 5-325 MG per tablet 1 tablet  1 tablet Oral BID PRN Prema Mendez MD   1 tablet at 10/27/18 0858    naloxegol (MOVANTIK) tablet 25 mg  25 mg Oral QAM Td Mclean MD   25 mg at 10/27/18 0858    senna (SENOKOT) tablet 17.2 mg  2 tablet Oral Nightly Tiny Meza MD   17.2 mg at 10/26/18 2147    docusate sodium (COLACE) capsule 100 mg  100 mg Oral BID Td Mclean MD   100 mg at 10/27/18 0858    acetaminophen (TYLENOL) tablet 650 mg  650 mg Oral Q4H PRN LORI Davis   650 mg at 10/25/18 1113    albuterol (PROVENTIL) nebulizer solution 2.5 mg  2.5 mg Nebulization Q6H PRN LORI Davis        buPROPion (WELLBUTRIN XL) extended release tablet 300 mg  300 mg Oral QAM LORI Davis   300 mg at 10/27/18 0859    carvedilol (COREG) tablet 12.5 mg  12.5 mg Oral BID WC Td Godinez MD   12.5 mg at 10/27/18 0859    fluticasone (FLONASE) 50 MCG/ACT nasal spray 1 spray  1 spray Nasal Daily PRN LORI Davis   1 spray at 10/25/18 0904    Fluticasone-Umeclidin-Vilant 100-62.5-25 MCG/INH AEPB 1 puff  1 puff Inhalation Daily LORI Davis        isosorbide mononitrate (IMDUR) extended release tablet 30 mg  30 mg Oral Daily Td Godinez MD   30 mg at 10/27/18 0859    linagliptin (TRADJENTA) tablet 5 mg  5 mg Oral QAM LORI Davis   5 mg at 10/27/18 0859    oxybutynin (DITROPAN-XL) extended release tablet 10 mg  10 mg Oral Daily LORI Davis   10 auscultation. Heart: Normal rate. Regular rhythm. S1 normal and S2 normal.    Abdomen: Abdomen is soft. Bowel sounds are normal.   There is no abdominal tenderness. Extremities: Normal range of motion. Neurological: Patient is alert. (4/5 str). Assessment:    Condition: In stable condition. Improving.   (NPH). Plan:   Encourage ambulation. (Still complaining of some left upper upper quadrant abdominal pain  No significant findings on exam  Bowels are moving  Will check flat plate).        Randal Pierce MD  10/27/2018

## 2018-10-27 NOTE — PROGRESS NOTES
OT SESSION ATTEMPT     Date:10/27/2018  Patient Name: Lissette Banegas  MRN: [id number]  : 1955  Room: [address] [place] [address]     Attempted OT session this date:    [] unavailable due to other medical staff currently with pt   [] on hold per nursing staff   [] on hold per nursing staff secondary to lab / radiology results    [] declined treatment  this date due to ____. Benefits of participation in therapy reviewed with pt.    [] off unit   [x] Other: Pt stated not feeling good due to unable to move bowels and taking so much medication to help causing nausea and stomach upset with left side pain. Pt requested no OT therapy invention and patient was lying down in bed. Consulted with nurse and will see patient on  vs today (Saturday). Continue with current OT P. [facility] at a later time.     Flaquito Frazier [facility]

## 2018-10-28 PROCEDURE — 6370000000 HC RX 637 (ALT 250 FOR IP): Performed by: PHYSICIAN ASSISTANT

## 2018-10-28 PROCEDURE — 6370000000 HC RX 637 (ALT 250 FOR IP): Performed by: PHYSICAL MEDICINE & REHABILITATION

## 2018-10-28 PROCEDURE — 97535 SELF CARE MNGMENT TRAINING: CPT

## 2018-10-28 PROCEDURE — 2700000000 HC OXYGEN THERAPY PER DAY

## 2018-10-28 PROCEDURE — 1280000000 HC REHAB R&B

## 2018-10-28 PROCEDURE — 97530 THERAPEUTIC ACTIVITIES: CPT

## 2018-10-28 RX ADMIN — DOCUSATE SODIUM 100 MG: 100 CAPSULE, LIQUID FILLED ORAL at 08:41

## 2018-10-28 RX ADMIN — SACUBITRIL AND VALSARTAN 1 TABLET: 24; 26 TABLET, FILM COATED ORAL at 21:51

## 2018-10-28 RX ADMIN — OXYCODONE AND ACETAMINOPHEN 1 TABLET: 5; 325 TABLET ORAL at 21:46

## 2018-10-28 RX ADMIN — ROPINIROLE HYDROCHLORIDE 3 MG: 2 TABLET, FILM COATED ORAL at 21:48

## 2018-10-28 RX ADMIN — CARVEDILOL 12.5 MG: 6.25 TABLET, FILM COATED ORAL at 08:41

## 2018-10-28 RX ADMIN — PANTOPRAZOLE SODIUM 40 MG: 40 TABLET, DELAYED RELEASE ORAL at 06:12

## 2018-10-28 RX ADMIN — SENNOSIDES 17.2 MG: 8.6 TABLET, FILM COATED ORAL at 21:48

## 2018-10-28 RX ADMIN — CARVEDILOL 12.5 MG: 6.25 TABLET, FILM COATED ORAL at 17:14

## 2018-10-28 RX ADMIN — LACTULOSE 30 G: 20 SOLUTION ORAL at 08:40

## 2018-10-28 RX ADMIN — BUPROPION HYDROCHLORIDE 300 MG: 300 TABLET, FILM COATED, EXTENDED RELEASE ORAL at 08:40

## 2018-10-28 RX ADMIN — NALOXEGOL OXALATE 25 MG: 12.5 TABLET, FILM COATED ORAL at 08:41

## 2018-10-28 RX ADMIN — ROPINIROLE HYDROCHLORIDE 3 MG: 2 TABLET, FILM COATED ORAL at 08:40

## 2018-10-28 RX ADMIN — TRAZODONE HYDROCHLORIDE 100 MG: 50 TABLET ORAL at 21:51

## 2018-10-28 RX ADMIN — LINAGLIPTIN 5 MG: 5 TABLET, FILM COATED ORAL at 08:41

## 2018-10-28 RX ADMIN — SACUBITRIL AND VALSARTAN 1 TABLET: 24; 26 TABLET, FILM COATED ORAL at 08:41

## 2018-10-28 RX ADMIN — ISOSORBIDE MONONITRATE 30 MG: 30 TABLET ORAL at 08:41

## 2018-10-28 RX ADMIN — DOCUSATE SODIUM 100 MG: 100 CAPSULE, LIQUID FILLED ORAL at 21:48

## 2018-10-28 RX ADMIN — ACETAMINOPHEN 650 MG: 325 TABLET, FILM COATED ORAL at 14:03

## 2018-10-28 RX ADMIN — OXYBUTYNIN CHLORIDE 10 MG: 10 TABLET, FILM COATED, EXTENDED RELEASE ORAL at 08:41

## 2018-10-28 RX ADMIN — OXYCODONE AND ACETAMINOPHEN 1 TABLET: 5; 325 TABLET ORAL at 08:41

## 2018-10-28 RX ADMIN — ACETAMINOPHEN 650 MG: 325 TABLET, FILM COATED ORAL at 06:19

## 2018-10-28 RX ADMIN — SPIRONOLACTONE 25 MG: 25 TABLET ORAL at 08:41

## 2018-10-28 ASSESSMENT — PAIN DESCRIPTION - DESCRIPTORS
DESCRIPTORS: ACHING;DISCOMFORT
DESCRIPTORS: HEADACHE
DESCRIPTORS: ACHING;BURNING
DESCRIPTORS: POUNDING;THROBBING
DESCRIPTORS: NAGGING;THROBBING
DESCRIPTORS: BURNING;DISCOMFORT

## 2018-10-28 ASSESSMENT — PAIN SCALES - GENERAL
PAINLEVEL_OUTOF10: 8
PAINLEVEL_OUTOF10: 0
PAINLEVEL_OUTOF10: 7
PAINLEVEL_OUTOF10: 3
PAINLEVEL_OUTOF10: 0
PAINLEVEL_OUTOF10: 3
PAINLEVEL_OUTOF10: 8
PAINLEVEL_OUTOF10: 2
PAINLEVEL_OUTOF10: 1
PAINLEVEL_OUTOF10: 4

## 2018-10-28 ASSESSMENT — PAIN DESCRIPTION - PAIN TYPE
TYPE: CHRONIC PAIN
TYPE: CHRONIC PAIN;SURGICAL PAIN
TYPE: CHRONIC PAIN

## 2018-10-28 ASSESSMENT — PAIN DESCRIPTION - ORIENTATION
ORIENTATION: LEFT
ORIENTATION: RIGHT
ORIENTATION: RIGHT
ORIENTATION: LEFT
ORIENTATION: LEFT
ORIENTATION: RIGHT

## 2018-10-28 ASSESSMENT — PAIN DESCRIPTION - LOCATION
LOCATION: HEAD
LOCATION: HEAD
LOCATION: ABDOMEN
LOCATION: ABDOMEN
LOCATION: HEAD;ABDOMEN
LOCATION: ABDOMEN

## 2018-10-28 ASSESSMENT — PAIN DESCRIPTION - PROGRESSION: CLINICAL_PROGRESSION: NOT CHANGED

## 2018-10-28 ASSESSMENT — PAIN DESCRIPTION - ONSET
ONSET: ON-GOING
ONSET: ON-GOING

## 2018-10-28 ASSESSMENT — PAIN DESCRIPTION - FREQUENCY
FREQUENCY: CONTINUOUS
FREQUENCY: INTERMITTENT
FREQUENCY: INTERMITTENT
FREQUENCY: CONTINUOUS

## 2018-10-28 NOTE — PROGRESS NOTES
Visual Perception:    Education:    Pt was educated on safety during item retrieval in kitchen/cabinets along with counter wiping and doing dishes at w. Walker level      [] Family teach completed on:    Pain Level: no complaints of pain      Additional Notes:     Patient has good made progress during treatment sessions toward set goals. Therapy emphasis to obtain goals:  ADL,s balance, endurance, strength, transfers, fxl mobility and fine motor skills/sensation    [x] Continue with current OT Plan of care. [] Prepare for Discharge     DISCHARGE RECOMMENDATIONS  Recommended DME:    Post Discharge Care:   []Home Independently  []Home with 24hr Care / Supervision []Home with Partial Supervision []Home with Home Health OT []Home with Out Pt OT []Other: ___   Comments:         Time in Time out Tx Time Breakdown  Variance:   First Session  11:30 AM 12:00pm [x] Individual Tx-30 Mins  [] Concurrent Tx -  [] Co-Tx -   [] Group Tx -   [] Time Missed -     Second Session   [] Individual Tx-   [] Concurrent Tx -  min  [] Co-Tx -   [] Group Tx -   [] Time Missed -       Third Session    [] Individual Tx-   [] Concurrent Tx -  [] Co-Tx -   [] Group Tx -   [] Time Missed -                 Total time: 30 Mins   Jacob Arauz HERNANDEZ/L [id number]       I have read the above note and agree with the documentation.    Tanmay Mendoza, OTR/L [id number]

## 2018-10-28 NOTE — PROGRESS NOTES
Physical Therapy    Facility/Department: [address] [facility]  Treatment Note    NAME: Jagruti Wilcox  : 1955  MRN: [id number]    Date of Service: 10/28/2018  Evaluating Therapist: Elizabeth Rivas. Xiao Garcia P.T.    ROOM: [facility]  DIAGNOSIS: NPH  PRECAUTIONS: Fall risk, O2   PROCEDURE(S): 10/15 insertion of  shunt    Social:  Pt lives with her , daughter, and 4 grandchildren in a 1 floor plan with 2-3 steps and no rail to enter. Prior to admission pt ambulated with no AD, was receiving HHPT for gait/balance 1-2x/week (TIA 2018). Pt owns a [employer]. Initial Evaluation  Date: 10/19/18 AM  Short Term Goals Long Term Goals    Was pt agreeable to Eval/treatment? Yes Yes     Does pt have pain? 8/10 back and L side pain, nursing aware None      Bed Mobility  Rolling: Supervision  Supine to sit: SBA  Sit to supine: SBA  Scooting: SBA Supine to sit mod I Supervision Independent   Transfers Sit to stand: SBA  Stand to sit: SBA  Stand pivot: SBA Sit to stand: supervision  Stand to sit: supervision   Stand pivot: supervision  Supervision Modified Independent   Ambulation   150 feet with no AD with  feet and 150 feet with ww with supervision  150 feet with AAD with Supervision >150 feet with AAD with modified independence. Walking 10 feet on uneven surface 10 feet with no AD with SBA NT 10 feet with AAD with supervision >10 feet with AAD with modified independence. Wheel Chair Mobility NT NT     Car Transfers SBA Supervision Supervision Indepence   Stair negotiation: ascended and descended 4 steps with 2 rail with SBA 16 steps with 1 rail with Sb/supervision  12 steps with 1 rail with supervision. >12 steps with 0 rail with modified indenpence. Curb Step:   ascended and descended 4 inch step with no AD and SBA NT 7.5 inch step with AAD and supervison 7.5 inch step with AAD and modified indepence   Picking up object off the floor Picked up 5# with SBA NT Will  5# with Supervision.  Will  5# with modified independence. BLE ROM WNL WNL     BLE Strength BLEs are grossly 4-/5 to 4/5 BLEs are grossly 4-/5 to 4/5     Balance  Sitting EOB: Supervision  Dynamic standing: SBA Sitting EOB: Independent  Dynamic standing: SBA     Date Family Teach Completed No family present at initial evaluation No family present to this date     Is additional Family Teaching Needed? Y or N Yes Yes     Hindering Progress Weakness, balance deficits Weakness, balance deficits     PT recommended ELOS       Team's Discharge Plan       Therapist at Team Meeting         T    Additional Comments Verbal cues to improve safety as pt tends to be quick with ambulation and other functional mobility. Patient/family education  Pt  educated on energy conservation   Patient/family response to education:   Pt/family verbalized understanding Pt/family demonstrated skill Pt/family requires further education in this area   x x x     AM  Time in: 1050  Time out: 1120    Pt is making good progress toward established Physical Therapy goals. Continue with physical therapy current plan of care.     Jesús Osei [id number]

## 2018-10-29 LAB
ALBUMIN SERPL-MCNC: 3.7 G/DL (ref 3.5–5.2)
ALP BLD-CCNC: 58 U/L (ref 35–104)
ALT SERPL-CCNC: 12 U/L (ref 0–32)
ANION GAP SERPL CALCULATED.3IONS-SCNC: 12 MMOL/L (ref 7–16)
AST SERPL-CCNC: 14 U/L (ref 0–31)
BILIRUB SERPL-MCNC: <0.2 MG/DL (ref 0–1.2)
BUN BLDV-MCNC: 15 MG/DL (ref 8–23)
CALCIUM SERPL-MCNC: 10.1 MG/DL (ref 8.6–10.2)
CHLORIDE BLD-SCNC: 100 MMOL/L (ref 98–107)
CO2: 30 MMOL/L (ref 22–29)
CREAT SERPL-MCNC: 1 MG/DL (ref 0.5–1)
GFR AFRICAN AMERICAN: >60
GFR NON-AFRICAN AMERICAN: 56 ML/MIN/1.73
GLUCOSE BLD-MCNC: 97 MG/DL (ref 74–109)
HCT VFR BLD CALC: 32.2 % (ref 34–48)
HEMOGLOBIN: 10.7 G/DL (ref 11.5–15.5)
MCH RBC QN AUTO: 32.4 PG (ref 26–35)
MCHC RBC AUTO-ENTMCNC: 33.2 % (ref 32–34.5)
MCV RBC AUTO: 97.6 FL (ref 80–99.9)
PDW BLD-RTO: 13.9 FL (ref 11.5–15)
PLATELET # BLD: 215 E9/L (ref 130–450)
PMV BLD AUTO: 10.6 FL (ref 7–12)
POTASSIUM SERPL-SCNC: 4.1 MMOL/L (ref 3.5–5)
RBC # BLD: 3.3 E12/L (ref 3.5–5.5)
SODIUM BLD-SCNC: 142 MMOL/L (ref 132–146)
TOTAL PROTEIN: 6.7 G/DL (ref 6.4–8.3)
WBC # BLD: 6.2 E9/L (ref 4.5–11.5)

## 2018-10-29 PROCEDURE — 85027 COMPLETE CBC AUTOMATED: CPT

## 2018-10-29 PROCEDURE — 97530 THERAPEUTIC ACTIVITIES: CPT

## 2018-10-29 PROCEDURE — 2700000000 HC OXYGEN THERAPY PER DAY

## 2018-10-29 PROCEDURE — 6370000000 HC RX 637 (ALT 250 FOR IP): Performed by: PHYSICAL MEDICINE & REHABILITATION

## 2018-10-29 PROCEDURE — 1280000000 HC REHAB R&B

## 2018-10-29 PROCEDURE — 97535 SELF CARE MNGMENT TRAINING: CPT

## 2018-10-29 PROCEDURE — 36415 COLL VENOUS BLD VENIPUNCTURE: CPT

## 2018-10-29 PROCEDURE — 97110 THERAPEUTIC EXERCISES: CPT

## 2018-10-29 PROCEDURE — 80053 COMPREHEN METABOLIC PANEL: CPT

## 2018-10-29 PROCEDURE — 6370000000 HC RX 637 (ALT 250 FOR IP): Performed by: PHYSICIAN ASSISTANT

## 2018-10-29 RX ADMIN — OXYCODONE AND ACETAMINOPHEN 1 TABLET: 5; 325 TABLET ORAL at 20:57

## 2018-10-29 RX ADMIN — OXYCODONE AND ACETAMINOPHEN 1 TABLET: 5; 325 TABLET ORAL at 08:23

## 2018-10-29 RX ADMIN — ROPINIROLE HYDROCHLORIDE 3 MG: 2 TABLET, FILM COATED ORAL at 08:13

## 2018-10-29 RX ADMIN — OXYBUTYNIN CHLORIDE 10 MG: 10 TABLET, FILM COATED, EXTENDED RELEASE ORAL at 08:12

## 2018-10-29 RX ADMIN — ISOSORBIDE MONONITRATE 30 MG: 30 TABLET ORAL at 08:09

## 2018-10-29 RX ADMIN — FLUTICASONE PROPIONATE 1 SPRAY: 50 SPRAY, METERED NASAL at 08:09

## 2018-10-29 RX ADMIN — LINAGLIPTIN 5 MG: 5 TABLET, FILM COATED ORAL at 08:10

## 2018-10-29 RX ADMIN — SPIRONOLACTONE 25 MG: 25 TABLET ORAL at 08:11

## 2018-10-29 RX ADMIN — ROPINIROLE HYDROCHLORIDE 3 MG: 2 TABLET, FILM COATED ORAL at 20:48

## 2018-10-29 RX ADMIN — LACTULOSE 30 G: 20 SOLUTION ORAL at 08:08

## 2018-10-29 RX ADMIN — DOCUSATE SODIUM 100 MG: 100 CAPSULE, LIQUID FILLED ORAL at 08:09

## 2018-10-29 RX ADMIN — TRAZODONE HYDROCHLORIDE 100 MG: 50 TABLET ORAL at 20:53

## 2018-10-29 RX ADMIN — CARVEDILOL 12.5 MG: 6.25 TABLET, FILM COATED ORAL at 08:10

## 2018-10-29 RX ADMIN — SENNOSIDES 17.2 MG: 8.6 TABLET, FILM COATED ORAL at 20:52

## 2018-10-29 RX ADMIN — SACUBITRIL AND VALSARTAN 1 TABLET: 24; 26 TABLET, FILM COATED ORAL at 20:49

## 2018-10-29 RX ADMIN — BUPROPION HYDROCHLORIDE 300 MG: 300 TABLET, FILM COATED, EXTENDED RELEASE ORAL at 08:11

## 2018-10-29 RX ADMIN — NALOXEGOL OXALATE 25 MG: 12.5 TABLET, FILM COATED ORAL at 08:10

## 2018-10-29 RX ADMIN — DOCUSATE SODIUM 100 MG: 100 CAPSULE, LIQUID FILLED ORAL at 20:52

## 2018-10-29 RX ADMIN — PANTOPRAZOLE SODIUM 40 MG: 40 TABLET, DELAYED RELEASE ORAL at 07:22

## 2018-10-29 ASSESSMENT — PAIN SCALES - GENERAL
PAINLEVEL_OUTOF10: 0
PAINLEVEL_OUTOF10: 7
PAINLEVEL_OUTOF10: 0
PAINLEVEL_OUTOF10: 0
PAINLEVEL_OUTOF10: 5
PAINLEVEL_OUTOF10: 7

## 2018-10-29 ASSESSMENT — PAIN DESCRIPTION - ORIENTATION: ORIENTATION: LEFT

## 2018-10-29 ASSESSMENT — PAIN DESCRIPTION - LOCATION: LOCATION: RIB CAGE

## 2018-10-29 ASSESSMENT — PAIN DESCRIPTION - PAIN TYPE: TYPE: ACUTE PAIN

## 2018-10-29 NOTE — PROGRESS NOTES
[facility]  Progress Note    GENERAL:   Covering for Dr Darrel Mcardle. Resting in bed, Scalp sutures removed today. Incision healing well. KUB, 10/27:  Large amount of stool in the colon with no obstruction. On Senokot, Colace, Movantik, and Lactulose. SS enema given, with good results.     VITALS:   Vitals:    10/28/18 1714 10/28/18 2100 10/29/18 0428 10/29/18 0800   BP: 124/88 122/62 (!) 104/53 (!) 114/58   Pulse: 94 83 74 78   Resp:  18 18 18   Temp:  99.1 °F (37.3 °C) 97.9 °F (36.6 °C)    TempSrc:  Temporal Temporal    SpO2:   99%    Weight:       Height:           Scheduled Meds:   lactulose  30 g Oral Daily    traZODone  100 mg Oral Nightly    pantoprazole  40 mg Oral QAM AC    naloxegol  25 mg Oral QAM    senna  2 tablet Oral Nightly    docusate sodium  100 mg Oral BID    buPROPion  300 mg Oral QAM    carvedilol  12.5 mg Oral BID WC    Fluticasone-Umeclidin-Vilant  1 puff Inhalation Daily    isosorbide mononitrate  30 mg Oral Daily    linagliptin  5 mg Oral QAM    oxybutynin  10 mg Oral Daily    rOPINIRole  3 mg Oral BID    sacubitril-valsartan  1 tablet Oral BID    spironolactone  25 mg Oral Daily    vitamin D  50,000 Units Oral Once per day on Thu    influenza virus vaccine  0.5 mL Intramuscular Prior to discharge     Continuous Infusions:  PRN Meds:.oxyCODONE-acetaminophen **OR** [DISCONTINUED] oxyCODONE-acetaminophen, acetaminophen, albuterol, fluticasone      LABS:  CBC:   Lab Results   Component Value Date    WBC 6.2 10/29/2018    RBC 3.30 10/29/2018    HGB 10.7 10/29/2018    HCT 32.2 10/29/2018    MCV 97.6 10/29/2018    MCH 32.4 10/29/2018    MCHC 33.2 10/29/2018    RDW 13.9 10/29/2018     10/29/2018    MPV 10.6 10/29/2018     CMP:    Lab Results   Component Value Date     10/29/2018    K 4.1 10/29/2018    K 4.6 10/19/2018     10/29/2018    CO2 30 10/29/2018    BUN 15 10/29/2018    CREATININE 1.0 10/29/2018    GFRAA >60

## 2018-10-29 NOTE — PROGRESS NOTES
outings & energy conservation & pacing strategies to implement @ home to ensure pt safety. Pt agreed & verbalize understanding information proivded    Sensory / Neuromuscular Re-Education:      Cognitive Skills:   Status Comments   Problem   Solving Fair plus Demo during sit to stand transfers, dyn/static balance and ambulation   Memory Fair plus \"   Sequencing Fair plus \"   Safety Fair plus \"     Visual Perception:    Education:    Pt was educated on types of DME for tub/shower combo       [x] Family teach completed on:  10/29/18- Pt  educated on pt current status. Pt  participated in pm OT treatment session & agreed to provide Sup @ home. Pain Level: no complaints of pain      Additional Notes:     Patient has good made progress during treatment sessions toward set goals. Therapy emphasis to obtain goals:  ADL,s balance, endurance, strength, transfers, fxl mobility and fine motor skills/sensation    [x] Continue with current OT Plan of care.   [] Prepare for Discharge     DISCHARGE RECOMMENDATIONS  Recommended DME:    Post Discharge Care:   []Home Independently  []Home with 24hr Care / Supervision []Home with Partial Supervision []Home with Home Health OT []Home with Out Pt OT []Other: ___   Comments:         Time in Time out Tx Time Breakdown  Variance:   First Session  9:15 10:00  [x] Individual Tx-45  [] Concurrent Tx -  [] Co-Tx -   [] Group Tx -   [] Time Missed -     Second Session [phone] [] Individual Tx-   [x] Concurrent Tx -45   [] Co-Tx -   [] Group Tx -   [] Time Missed -       Third Session    [] Individual Tx-   [] Concurrent Tx -  [] Co-Tx -   [] Group Tx -   [] Time Missed -                 Total time: [facility]L [address] [employer] [facility] OTR/L 52357

## 2018-10-29 NOTE — PROGRESS NOTES
supervision 12 steps with 1 rail with supervision. >12 steps with 0 rail with modified indenpence. Curb Step:   ascended and descended 4 inch step with no AD and SBA 7.5 inch curb with WW with supervision 4 inch curb with [employer] with supervision 7.5 inch step with AAD and supervison 7.5 inch step with AAD and modified indepence   Picking up object off the floor Picked up 5# with SBA NT NT Will  5# with Supervision. Will  5# with modified independence. BLE ROM WNL WNL      BLE Strength BLEs are grossly 4-/5 to 4/5 BLEs are grossly 4-/5 to 4/5      Balance  Sitting EOB: Supervision  Dynamic standing: SBA Sitting EOB: Independent  Dynamic standing: supervision      Date Family Teach Completed No family present at initial evaluation No family present to this date  present 10/29/18     Is additional Family Teaching Needed? Y or N Yes Yes No     Hindering Progress Weakness, balance deficits Weakness, balance deficits      PT recommended ELOS        Team's Discharge Plan        Therapist at Team Meeting          Therapeutic Exercise:   AM:   Ambulated 2 x 400 ft with [employer] with supervision, 2 x 300 ft without AD with SBA, 1 x 200 ft without AD with SBA  Car transfer x 3 with supervision  7.5 inch curb step with [employer] with supervision x 4    PM:  Ambulated 2 x 400 ft with [employer] with supervision, 4 x 200 ft without AD with SBA  4 inch curb step with [employer] with supervision x 4  Car transfer x 3 with supervision    Additional Comments: Pt demonstrated increased steadiness and balance with ambulating with WW throughout all functional tasks, but continues to ambulate with an unsteady gait when ambulating without AD. During the first car transfer, pt abandoned [employer] after opening car door that was corrected with [address]. With car transfers, pt requires min VCing for sequencing with WW to car.  was present for family teach during the PM session. He brought her personal [employer] that was adjusted to fit properly.  By the end

## 2018-10-30 PROCEDURE — 6370000000 HC RX 637 (ALT 250 FOR IP): Performed by: PHYSICAL MEDICINE & REHABILITATION

## 2018-10-30 PROCEDURE — 97535 SELF CARE MNGMENT TRAINING: CPT

## 2018-10-30 PROCEDURE — 97110 THERAPEUTIC EXERCISES: CPT

## 2018-10-30 PROCEDURE — 1280000000 HC REHAB R&B

## 2018-10-30 PROCEDURE — 97530 THERAPEUTIC ACTIVITIES: CPT

## 2018-10-30 PROCEDURE — 2700000000 HC OXYGEN THERAPY PER DAY

## 2018-10-30 PROCEDURE — 6370000000 HC RX 637 (ALT 250 FOR IP): Performed by: PHYSICIAN ASSISTANT

## 2018-10-30 RX ORDER — PSEUDOEPHEDRINE HCL 30 MG
100 TABLET ORAL 2 TIMES DAILY
COMMUNITY
Start: 2018-10-30 | End: 2020-09-23

## 2018-10-30 RX ORDER — OXYCODONE HYDROCHLORIDE AND ACETAMINOPHEN 5; 325 MG/1; MG/1
1 TABLET ORAL 2 TIMES DAILY PRN
Qty: 20 TABLET | Refills: 0 | Status: SHIPPED | OUTPATIENT
Start: 2018-10-30 | End: 2018-11-09

## 2018-10-30 RX ORDER — ROPINIROLE 3 MG/1
3 TABLET, FILM COATED ORAL 2 TIMES DAILY
Qty: 60 TABLET | Refills: 3 | Status: SHIPPED | OUTPATIENT
Start: 2018-10-30 | End: 2022-10-04

## 2018-10-30 RX ORDER — OXYCODONE HYDROCHLORIDE AND ACETAMINOPHEN 5; 325 MG/1; MG/1
1 TABLET ORAL
Status: ACTIVE | OUTPATIENT
Start: 2018-10-30 | End: 2018-10-30

## 2018-10-30 RX ORDER — TRAZODONE HYDROCHLORIDE 100 MG/1
100 TABLET ORAL NIGHTLY
Qty: 30 TABLET | Refills: 3 | Status: SHIPPED | OUTPATIENT
Start: 2018-10-30 | End: 2022-09-06

## 2018-10-30 RX ORDER — SENNA PLUS 8.6 MG/1
2 TABLET ORAL NIGHTLY
Qty: 60 TABLET | Refills: 0 | COMMUNITY
Start: 2018-10-30 | End: 2018-11-29

## 2018-10-30 RX ADMIN — TRAZODONE HYDROCHLORIDE 100 MG: 50 TABLET ORAL at 20:45

## 2018-10-30 RX ADMIN — SPIRONOLACTONE 25 MG: 25 TABLET ORAL at 09:34

## 2018-10-30 RX ADMIN — PANTOPRAZOLE SODIUM 40 MG: 40 TABLET, DELAYED RELEASE ORAL at 06:39

## 2018-10-30 RX ADMIN — LINAGLIPTIN 5 MG: 5 TABLET, FILM COATED ORAL at 09:34

## 2018-10-30 RX ADMIN — BUPROPION HYDROCHLORIDE 300 MG: 300 TABLET, FILM COATED, EXTENDED RELEASE ORAL at 09:34

## 2018-10-30 RX ADMIN — DOCUSATE SODIUM 100 MG: 100 CAPSULE, LIQUID FILLED ORAL at 09:35

## 2018-10-30 RX ADMIN — NALOXEGOL OXALATE 25 MG: 12.5 TABLET, FILM COATED ORAL at 09:34

## 2018-10-30 RX ADMIN — OXYBUTYNIN CHLORIDE 10 MG: 10 TABLET, FILM COATED, EXTENDED RELEASE ORAL at 09:34

## 2018-10-30 RX ADMIN — OXYCODONE AND ACETAMINOPHEN 1 TABLET: 5; 325 TABLET ORAL at 16:59

## 2018-10-30 RX ADMIN — OXYCODONE AND ACETAMINOPHEN 1 TABLET: 5; 325 TABLET ORAL at 06:43

## 2018-10-30 RX ADMIN — DOCUSATE SODIUM 100 MG: 100 CAPSULE, LIQUID FILLED ORAL at 20:45

## 2018-10-30 RX ADMIN — ACETAMINOPHEN 650 MG: 325 TABLET, FILM COATED ORAL at 11:21

## 2018-10-30 RX ADMIN — SENNOSIDES 17.2 MG: 8.6 TABLET, FILM COATED ORAL at 20:45

## 2018-10-30 RX ADMIN — LACTULOSE 30 G: 20 SOLUTION ORAL at 09:32

## 2018-10-30 RX ADMIN — ROPINIROLE HYDROCHLORIDE 3 MG: 2 TABLET, FILM COATED ORAL at 09:34

## 2018-10-30 RX ADMIN — ROPINIROLE HYDROCHLORIDE 3 MG: 2 TABLET, FILM COATED ORAL at 20:45

## 2018-10-30 ASSESSMENT — PAIN DESCRIPTION - PAIN TYPE
TYPE: ACUTE PAIN

## 2018-10-30 ASSESSMENT — PAIN SCALES - GENERAL
PAINLEVEL_OUTOF10: 5
PAINLEVEL_OUTOF10: 5
PAINLEVEL_OUTOF10: 7
PAINLEVEL_OUTOF10: 8
PAINLEVEL_OUTOF10: 0
PAINLEVEL_OUTOF10: 7
PAINLEVEL_OUTOF10: 0
PAINLEVEL_OUTOF10: 6

## 2018-10-30 ASSESSMENT — PAIN DESCRIPTION - PROGRESSION: CLINICAL_PROGRESSION: NOT CHANGED

## 2018-10-30 ASSESSMENT — PAIN DESCRIPTION - ORIENTATION
ORIENTATION: LEFT;LOWER
ORIENTATION: LEFT;LOWER

## 2018-10-30 ASSESSMENT — PAIN DESCRIPTION - FREQUENCY
FREQUENCY: INTERMITTENT
FREQUENCY: CONTINUOUS

## 2018-10-30 ASSESSMENT — PAIN DESCRIPTION - ONSET
ONSET: ON-GOING

## 2018-10-30 ASSESSMENT — PAIN DESCRIPTION - LOCATION
LOCATION: ABDOMEN
LOCATION: ABDOMEN
LOCATION: HEAD

## 2018-10-30 ASSESSMENT — PAIN DESCRIPTION - DESCRIPTORS
DESCRIPTORS: CONSTANT;STABBING
DESCRIPTORS: CONSTANT;STABBING
DESCRIPTORS: ACHING;DISCOMFORT;TENDER

## 2018-10-30 NOTE — PROGRESS NOTES
AD and SBA 7.5 inch with [employer] with supervision NT 7.5 inch step with AAD and supervison 7.5 inch step with AAD and modified indepence   Picking up object off the floor Picked up 5# with SBA NT NT Will  5# with Supervision. Will  5# with modified independence. BLE ROM WNL WNL      BLE Strength BLEs are grossly 4-/5 to 4/5 BLEs are grossly 4-/5 to 4/5      Balance  Sitting EOB: Supervision  Dynamic standing: SBA Sitting EOB: Independent  Dynamic standing: SBA      Date Family Teach Completed No family present at initial evaluation 10/29/18      Is additional Family Teaching Needed? Y or N Yes Yes      Hindering Progress Weakness, balance deficits Weakness, balance deficits      PT recommended ELOS        Team's Discharge Plan        Therapist at Team Meeting          Dynamic Gait Index    1. Gait level surface __2___  Instructions: Walk at your normal speed from here to the next atul (20)  Grading: Atul the lowest category that applies. (3)  Normal: Walks 20, no assistive devices, good sped, no evidence for imbalance, normal gait pattern  (2)  Mild Impairment: Walks 20, uses assistive devices, slower speed, mild gait deviations. (1) Moderate Impairment: Walks 20, slow speed, abnormal gait pattern, evidence for imbalance. (0) Severe Impairment: Cannot walk 20 without assistance, severe gait deviations or imbalance. 2. Change in gait speed __3___  Instructions: Begin walking at your normal pace (for 5), when I tell you go, walk as fast as you can (for 5). When I tell you slow, walk as slowly as you can (for 5). Grading: Jaspal Vizcaino the lowest category that applies. (3)  Normal: Able to smoothly change walking speed without loss of balance or gait deviation. Shows a significant difference in walking speeds between normal, fast and slow speeds.   (2)  Mild Impairment: Is able to change speed but demonstrates mild gait deviations, or not gait deviations but unable to achieve a significant change in velocity, or uses an assistive device. (1) Moderate Impairment: Makes only minor adjustments to walking speed, or accomplishes a change in speed with significant gait deviations, or changes speed but has significant gait deviations, or changes speed but loses balance but is able to recover and continue walking.  (0) Severe Impairment: Cannot change speeds, or loses balance and has to reach for wall or be caught. 3. Gait with horizontal head turns ___2__  Instructions:  Begin walking at your normal pace. When I tell you to [facility] right, keep walking straight, but turn your head to the right. Keep looking to the right until I tell you, look left, then keep walking straight and turn your head to the left. Keep your head to the left until I tell you look straight, then keep walking straight, but return your head to the center. Grading: Tigist Scale the lowest category that applies. (3)  Normal: Performs head turns smoothly with no change in gait. (2)  Mild Impairment: Performs head turns smoothly with slight change in gait velocity, i.e., minor disruption to smooth gait path or uses walking aid. (1) Moderate Impairment: Performs head turns with moderate change in gait velocity, slows down, staggers but recovers, can continue to walk. (0) Severe Impairment: Performs task with severe disruption of gait, i.e., staggers         outside 15 path, loses balance, stops, reaches for wall. 4. Gait with vertical head turns ___2__  Instructions: Begin walking at your normal pace. When I tell you to [facility] up, keep walking straight, but tip your head up. Keep looking up until I tell you, look down, then keep walking straight and tip your head down. Keep your head down until I tell you look straight, then keep walking straight, but return your head to the center. Grading: Tigist Scale the lowest category that applies. (3)  Normal: Performs head turns smoothly with no change in gait.   (2) Mild Impairment: Performs head turns around cones safely without changing gait speed; no  evidence of imbalance. (2) Mild Impairment: Is able to step around both cones, but must slow down and adjust steps to clear cones. (1) Moderate Impairment: Is able to clear cones but must significantly slow, speed to accomplish task, or requires verbal cueing.  (0) Severe Impairment: Unable to clear cones, walks into one or both cones, or requires physical assistance. 8. Steps _2____  Instructions: Walk up these stairs as you would at home, i.e., using the railing if necessary. At the top, turn around and walk down. Grading: Al Rater the lowest category that applies. (3)  Normal: Alternating feet, no rail. (2)  Mild Impairment: Alternating feet, must use rail. (1) Moderate Impairment: Two feet to a stair, must use rail.  (0)  Severe Impairment: Cannot do safely. TOTAL SCORE: _19__ / 24    Interpretation: < 19/24 = predictive of falls in the elderly     > 22/24 = safe ambulators    Therapeutic Exercise:   AM:   10 feet on uneven surface x 2 with [address] with Supervision, x 2 with no AD with SBA  7.5 inch curb step x 2 with [address] with Supervision, x 2 with no AD with SBA  Ambulated 2 x 200 feet with [address] with supervision    PM:  [place] 2 x 250 feet with no AD with SBA  Ambulated 1 x 100 ft with [address] with no supplemental O2 with Supervision  Ambulated 1 x 300 ft with [address] ( backwards walking, and forward walking with looking R and L) with Supervision  Agility Ladder: nonreciprocal forward walking, in-in-out-out forward progression, backwards, and Icky shuffle x 2/ each     Additional Comments: Pt continues to demonstrate increased balance with functional tasks. During ambulation, pt ambulates with R foot drag that is corrected with VCing. Pt stated that she has a fear of tripping on grandchildren's toys when descending stairs when D/C home so pt was educated on scanning the environment before negotiating steps.  Pt performed DGI, and maintained the same score of 19/24 although pt's quality of movement is improving, the pt continues to be limited by unsteadiness without use of AD. During PM session, pt ambulated 100 ft without O2 to see necessity for O2 and SaO2 dropped to 67%, pt then put on nasal cannula and O2 jumped back to 97% within 1 minute. During grapevine stepping, pt had 2 LOB with Delfin to recover. Patient/family education  Pt/family educated on preventing R foot drag when ambulating and scanning environment when descending stairs. Patient/family response to education:   Pt/family verbalized understanding Pt/family demonstrated skill Pt/family requires further education in this area   Yes Yes Yes, reinforce     AM  Time in: 1000   Time out: 1045    PM  Time in: 1430  Time out: 1515    Pt is making good progress toward established Physical Therapy goals. Continue with physical therapy current plan of care. Arnaud Fernandez, ARA [place].  [place], [place]  License Number: [id number]

## 2018-10-30 NOTE — CARE COORDINATION
Verified with [facility] that patient has oxygen, portable tanks and concentrator.   Alvaro Mathews

## 2018-10-30 NOTE — PROGRESS NOTES
91%  · BMI Classification: BMI <18.5 Underweight  · Comparative Standards (Estimated Nutrition Needs):  · Estimated Daily Total Kcal: 8480-8191  · Estimated Daily Protein (g): 60-70      Nutrition Risk Level: Moderate    Nutrition Interventions:   Continue current diet, Continue current ONS  Continued Inpatient Monitoring, Coordination of Care, No recommendations at this time    Nutrition Evaluation:   · Evaluation: Goals set   · Goals: Consume 75% or more of msot meals/ONS    · Monitoring: Meal Intake, Supplement Intake, Diet Tolerance, Skin Integrity, Fluid Balance, Wound Healing, Weight, Comparative Standards, Pertinent Labs    See Adult Nutrition Doc Flowsheet for more detail.      Electronically signed by Lee Butt RD, LD on 10/30/18 at 12:35 PM    Contact Number: [phone]

## 2018-10-30 NOTE — PROGRESS NOTES
Occupational Therapy     10/30/18 1728   Attendance   Activity Pet therapy   Participation Active participation   [place] Demonstrates ability to complete social goals   Social Skills Interacts independently in social activity   Leisure Education Demonstrates knowledge of benefits of leisure involvement  Scott Reasons stated\"It made me happy\"as a benefit.)   Time Spent With Patient   Minutes 15

## 2018-10-30 NOTE — PROGRESS NOTES
OCCUPATIONAL THERAPY DAILY NOTE    Date:10/30/2018  Patient Name: Breana De La Torre  MRN: [id number]  : 1955  Room: [address] [place] [address]     DIAGNOSIS: NPH  PRECAUTIONS: Fall risk, O2   PROCEDURE(S): 10/15 insertion of  shunt     Social:  Pt lives with her , daughter, and 4 grandchildren in a 1 floor plan with 2-3 steps and no rail to enter. Prior to admission pt ambulated with no AD, was receiving HHPT for gait/balance 1-2x/week (TIA 2018). Pt owns a [employer]. Precautions: 02 3L,  Fall risk    Functional Assessment:   Date Status AE  Comments   Feeding 10/26/18 Mod I      Grooming 10/30/18 Mod I   Completed seated/standing at sink. Bathing 10/30/18 Supervision LH hose/  Shower stall bench Full shower completed seated/standing with min cues for safety and for technique to avoid incision on head when washing hair with baby shampoo. UB Dressing 10/30/18 Independent  To don/doff pull over shirt and bar seated on chair. LB Dressing 10/30/18  Supervision  Pt demo'd ability to don/doff underwear, socks and pants. Min cues for sequencing and safety required. Pt donned shoes seated edge of bed    Homemaking 10/28/18 supervision rw  counter       Functional Transfers / Balance:   Date Status DME  Comments   Sit Balance 10/30/18  Independent   Dynamic sitting on shower bench. Stand Balance 10/30/18 Mod I rw  Sink Demo'd during ADL tasks. Standing at table top level    [x] Tub      [x] Shower   Transfer 10/29/18         10/30/18  SBA          Supervision Indwelling tub seat       Grab rail and shower seat, w/w         Min cues for safety provided   Commode   Transfer 10/30/18 Mod I rw    Functional   Mobility 10/30/18 Supervision w/w   Pt completed functional item retrieval within pt's room with min cues for safety when retrieving items from low surfaces. Throughout hallways and in OT clinic.  Assist with pushing O2 tank    Other: on/off recliner and standard queen bed  10/29/18 Supervision ww    Functional

## 2018-10-31 VITALS
OXYGEN SATURATION: 98 % | BODY MASS INDEX: 18.53 KG/M2 | WEIGHT: 100.7 LBS | RESPIRATION RATE: 16 BRPM | DIASTOLIC BLOOD PRESSURE: 58 MMHG | SYSTOLIC BLOOD PRESSURE: 103 MMHG | HEART RATE: 92 BPM | TEMPERATURE: 98 F | HEIGHT: 62 IN

## 2018-10-31 PROCEDURE — 6370000000 HC RX 637 (ALT 250 FOR IP): Performed by: PHYSICAL MEDICINE & REHABILITATION

## 2018-10-31 PROCEDURE — 97530 THERAPEUTIC ACTIVITIES: CPT

## 2018-10-31 PROCEDURE — 2700000000 HC OXYGEN THERAPY PER DAY

## 2018-10-31 PROCEDURE — 6370000000 HC RX 637 (ALT 250 FOR IP): Performed by: PHYSICIAN ASSISTANT

## 2018-10-31 PROCEDURE — 97535 SELF CARE MNGMENT TRAINING: CPT

## 2018-10-31 PROCEDURE — 97110 THERAPEUTIC EXERCISES: CPT

## 2018-10-31 RX ADMIN — ROPINIROLE HYDROCHLORIDE 3 MG: 2 TABLET, FILM COATED ORAL at 09:04

## 2018-10-31 RX ADMIN — SACUBITRIL AND VALSARTAN 1 TABLET: 24; 26 TABLET, FILM COATED ORAL at 09:04

## 2018-10-31 RX ADMIN — FLUTICASONE PROPIONATE 1 SPRAY: 50 SPRAY, METERED NASAL at 09:04

## 2018-10-31 RX ADMIN — NALOXEGOL OXALATE 25 MG: 12.5 TABLET, FILM COATED ORAL at 09:04

## 2018-10-31 RX ADMIN — DOCUSATE SODIUM 100 MG: 100 CAPSULE, LIQUID FILLED ORAL at 09:06

## 2018-10-31 RX ADMIN — SPIRONOLACTONE 25 MG: 25 TABLET ORAL at 09:04

## 2018-10-31 RX ADMIN — OXYBUTYNIN CHLORIDE 10 MG: 10 TABLET, FILM COATED, EXTENDED RELEASE ORAL at 09:04

## 2018-10-31 RX ADMIN — PANTOPRAZOLE SODIUM 40 MG: 40 TABLET, DELAYED RELEASE ORAL at 07:15

## 2018-10-31 RX ADMIN — LINAGLIPTIN 5 MG: 5 TABLET, FILM COATED ORAL at 09:04

## 2018-10-31 RX ADMIN — OXYCODONE AND ACETAMINOPHEN 1 TABLET: 5; 325 TABLET ORAL at 02:22

## 2018-10-31 RX ADMIN — ISOSORBIDE MONONITRATE 30 MG: 30 TABLET ORAL at 09:04

## 2018-10-31 RX ADMIN — BUPROPION HYDROCHLORIDE 300 MG: 300 TABLET, FILM COATED, EXTENDED RELEASE ORAL at 09:04

## 2018-10-31 ASSESSMENT — PAIN DESCRIPTION - ORIENTATION: ORIENTATION: LEFT;LOWER

## 2018-10-31 ASSESSMENT — PAIN SCALES - GENERAL
PAINLEVEL_OUTOF10: 0
PAINLEVEL_OUTOF10: 0
PAINLEVEL_OUTOF10: 8
PAINLEVEL_OUTOF10: 0
PAINLEVEL_OUTOF10: 0

## 2018-10-31 ASSESSMENT — PAIN DESCRIPTION - FREQUENCY: FREQUENCY: INTERMITTENT

## 2018-10-31 ASSESSMENT — PAIN DESCRIPTION - PAIN TYPE: TYPE: ACUTE PAIN

## 2018-10-31 ASSESSMENT — PAIN DESCRIPTION - LOCATION: LOCATION: ABDOMEN

## 2018-10-31 ASSESSMENT — PAIN DESCRIPTION - DESCRIPTORS: DESCRIPTORS: STABBING;ACHING

## 2018-10-31 ASSESSMENT — PAIN DESCRIPTION - PROGRESSION: CLINICAL_PROGRESSION: NOT CHANGED

## 2018-10-31 ASSESSMENT — PAIN DESCRIPTION - ONSET: ONSET: ON-GOING

## 2018-10-31 NOTE — PROGRESS NOTES
Physical Therapy    Facility/Department: [address] [facility]HAB  Treatment Note    NAME: Krishan Dominguez  : 1955  MRN: [id number]    Date of Service: 10/31/2018  Evaluating Therapist: Krishan Gutiérrez. Maria Isabel Mcallister P.T.    ROOM: [facility]  DIAGNOSIS: NPH  PRECAUTIONS: Fall risk, O2   PROCEDURE(S): 10/15 insertion of  shunt    Social:  Pt lives with her , daughter, and 4 grandchildren in a 1 floor plan with 2-3 steps and no rail to enter. Prior to admission pt ambulated with no AD, was receiving HHPT for gait/balance 1-2x/week (TIA 2018). Pt owns a [employer]. Initial Evaluation  Date: 10/19/18 AM      Short Term Goals Long Term Goals    Was pt agreeable to Eval/treatment? Yes Yes Discharged     Does pt have pain? 8/10 back and L side pain, nursing aware 6/10 L side pain      Bed Mobility  Rolling: Supervision  Supine to sit: SBA  Sit to supine: SBA  Scooting: SBA Rolling:  Independent  Supine to sit: Independent  Sit to supine:  Independent  Scooting: Independent    Supervision Independent   Transfers Sit to stand: SBA  Stand to sit: SBA  Stand pivot: SBA Sit to stand: Modified Independent  Stand to sit: Modified Independent  Stand pivot w/ [employer]: Modified Independent    Supervision Modified Independent   Ambulation   150 feet with no AD with  feet with [employer] with Supervision  150 feet with AAD with Supervision >150 feet with AAD with modified independence. Walking 10 feet on uneven surface 10 feet with no AD with  feet with WW with supervision  10 feet with AAD with supervision >10 feet with AAD with modified independence. Wheel Chair Mobility NT NT      Car Transfers SBA NT  Supervision Indepence   Stair negotiation: ascended and descended 4 steps with 2 rail with SBA 16 stairs with 1 rail with supervision  12 steps with 1 rail with supervision. >12 steps with 0 rail with modified indenpence.    Curb Step:   ascended and descended 4 inch step with no AD and SBA 7.5 inch step with WW with supervision  7.5 inch step with AAD and supervison 7.5 inch step with AAD and modified indepence   Picking up object off the floor Picked up 5# with SBA Picked up 5# weight with supervision  Will  5# with Supervision. Will  5# with modified independence. BLE ROM WNL WNL      BLE Strength BLEs are grossly 4-/5 to 4/5 BLEs are grossly 4-/5 to 4/5      Balance  Sitting EOB: Supervision  Dynamic standing: SBA Sitting EOB: Independent  Dynamic standing: SBA      Date Family Teach Completed No family present at initial evaluation 10/29/18, 10/31/18      Is additional Family Teaching Needed? Y or N Yes No      Hindering Progress Weakness, balance deficits Weakness, balance deficits      PT recommended [facility]        Team's Discharge Plan        Therapist at Team Meeting          [facility]: Please stand up. Try not to use your hand for support. ( x ) 4 able to stand without using hands and stabilize independently  (    ) 3 able to stand independently using hands  (    ) 2 able to stand using hands after several tries  (    ) 1 needs minimal aid to stand or stabilize  (    ) 0 needs moderate or maximal assist to stand    STANDING UNSUPPORTED  INSTRUCTIONS: Please stand for two minutes without holding on. ( x ) 4 able to stand safely for 2 minutes  (    ) 3 able to stand 2 minutes with supervision  (    ) 2 able to stand 30 seconds unsupported  (    ) 1 needs several tries to stand 30 seconds unsupported  (    ) 0 unable to stand 30 seconds unsupported    If a subject is able to stand 2 minutes unsupported, score full points for sitting unsupported. Proceed to item #4. SITTING WITH BACK UNSUPPORTED BUT FEET SUPPORTED ON FLOOR OR ON A STOOL  INSTRUCTIONS: Please sit with arms folded for 2 minutes.   ( x ) 4 able to sit safely and securely for 2 minutes  (    ) 3 able to sit 2 minutes under supervision  (    ) 2 able to able to sit 30 seconds  (    ) 1 able to sit 10 seconds  (    ) 0 unable to sit  without support 10 seconds    STANDING TO SITTING  INSTRUCTIONS: Please sit down. ( x ) 4 sits safely with minimal use of hands  (    ) 3 controls descent by using hands  (    ) 2 uses back of legs against chair to control descent  (    ) 1 sits independently but has uncontrolled descent  (    ) 0 needs assist to sit    TRANSFERS  INSTRUCTIONS: Arrange chair(s) for pivot transfer. Ask subject to transfer one way toward a seat with armrests and one way toward a seat without armrests. You may use two chairs (one with and one without armrests) or a bed and a chair. ( x ) 4 able to transfer safely with minor use of hands  (    ) 3 able to transfer safely definite need of hands  (    ) 2 able to transfer with verbal cuing and/or supervision  (    ) 1 needs one person to assist  (    ) 0 needs two people to assist or supervise to be safe    STANDING UNSUPPORTED WITH EYES CLOSED  INSTRUCTIONS: Please close your eyes and stand still for 10 seconds. (    ) 4 able to stand 10 seconds safely  ( x ) 3 able to stand 10 seconds with supervision   (    ) 2 able to stand 3 seconds  (    ) 1 unable to keep eyes closed 3 seconds but stays safely  (    ) 0 needs help to keep from falling    STANDING UNSUPPORTED WITH FEET TOGETHER  INSTRUCTIONS: Place your feet together and stand without holding on. (    ) 4 able to place feet together independently and stand 1 minute safely  ( x ) 3 able to place feet together independently and stand 1 minute with supervision  (    ) 2 able to place feet together independently but unable to hold for 30 seconds  (    ) 1 needs help to attain position but able to stand 15 seconds feet together  (    ) 0 needs help to attain position and unable to hold for 15 seconds      REACHING FORWARD WITH OUTSTRETCHED ARM WHILE STANDING  INSTRUCTIONS: Lift arm to 90 degrees. Stretch out your fingers and reach forward as far as you can.  (Examiner places a ruler at the end of fingertips when arm is at 90 degrees. Fingers should not touch the ruler while reaching forward. The recorded measure is the distance forward that the fingers reach while the subject is in the most forward lean position. When possible, ask subject to use both arms when reaching to avoid rotation of the trunk.)  ( x ) 4 can reach forward confidently 25 cm (10 inches)  (    ) 3 can reach forward  12 cm (5 inches)  (    ) 2 can reach forward 5 cm (2 inches)  (    ) 1 reaches forward but needs supervision  (    ) 0 loses balance while trying/requires external support     OBJECT FROM THE FLOOR FROM A STANDING POSITION  INSTRUCTIONS:  the shoe/slipper, which is place in front of your feet. ( x ) 4 able to  slipper safely and easily  (    ) 3 able to  slipper but needs supervision   (    ) 2 unable to  but reaches 2-5 cm(1-2 inches) from slipper and keeps balance  independently  (    ) 1 unable to  and needs supervision while trying  (    ) 0 unable to try/needs assist to keep from losing balance or falling    TURNING TO LOOK BEHIND OVER LEFT AND RIGHT SHOULDERS WHILE STANDING  INSTRUCTIONS: Turn to look directly behind you over toward the left shoulder. Repeat to the right. Examiner may pick an object to look at directly behind the subject to encourage a better twist turn. ( x ) 4 looks behind from both sides and weight shifts well  (    ) 3 looks behind one side only other side shows less weight shift  (    ) 2 turns sideways only but maintains balance  (    ) 1 needs supervision when turning  (    ) 0 needs assist to keep from losing balance or falling    TURN 360 DEGREES  INSTRUCTIONS: Turn completely around in a full [place]. Pause. Then turn a full [place] in the other direction.   ( x ) 4 able to turn 360 degrees safely in 4 seconds or less  (    ) 3 able to turn 360 degrees safely one side only 4 seconds or less  (    ) 2 able to turn 360 degrees safely but slowly  (    ) 1 needs close risk  Dynamic Gait Index    1. Gait level surface __2___  Instructions: Walk at your normal speed from here to the next atul (20)  Grading: [place] the lowest category that applies. (3)  Normal: Walks 20, no assistive devices, good sped, no evidence for imbalance, normal gait pattern  (2)  Mild Impairment: Walks 20, uses assistive devices, slower speed, mild gait deviations. (1) Moderate Impairment: Walks 20, slow speed, abnormal gait pattern, evidence for imbalance. (0) Severe Impairment: Cannot walk 20 without assistance, severe gait deviations or imbalance. 2. Change in gait speed __3___  Instructions: Begin walking at your normal pace (for 5), when I tell you go, walk as fast as you can (for 5). When I tell you slow, walk as slowly as you can (for 5). Grading: [facility] the lowest category that applies. (3)  Normal: Able to smoothly change walking speed without loss of balance or gait deviation. Shows a significant difference in walking speeds between normal, fast and slow speeds. (2)  Mild Impairment: Is able to change speed but demonstrates mild gait deviations, or not gait deviations but unable to achieve a significant change in velocity, or uses an assistive device. (1) Moderate Impairment: Makes only minor adjustments to walking speed, or accomplishes a change in speed with significant gait deviations, or changes speed but has significant gait deviations, or changes speed but loses balance but is able to recover and continue walking.  (0) Severe Impairment: Cannot change speeds, or loses balance and has to reach for wall or be caught. 3. Gait with horizontal head turns ___2__  Instructions:  Begin walking at your normal pace. When I tell you to [facility] right, keep walking straight, but turn your head to the right. Keep looking to the right until I tell you, look left, then keep walking straight and turn your head to the left.  Keep your head to the left until I tell you look straight, 3 seconds and stops with no loss of balance. (1) Moderate Impairment: Turns slowly, requires verbal cueing, requires several small steps to catch balance following turn and stop. (0) Severe Impairment: Cannot turn safely, requires assistance to turn and stop. 6. Step over obstacle _3___  Instructions: Begin walking at your normal speed. When you come to the shoebox, step over it, not around it, and keep walking. Grading: [place] the lowest category that applies. (3)    Normal: Is able to step over the box without changing gait speed, no evidence of imbalance. (2) Mild Impairment: Is able to step over box, but must slow down and adjust steps to clear box safely. (1) Moderate Impairment: Is able to step over box but must stop, then step over. May require verbal cueing.  (0) Severe Impairment: Cannot perform without assistance. 7. Step around obstacles __3___  Instructions: Begin walking at normal speed. When you come to the first cone (about 6 away), walk around the right side of it. When you come to the second cone (6 past first cone), walk around it to the left. Grading: [place] the lowest category that applies. (3) Normal: Is able to walk around cones safely without changing gait speed; no  evidence of imbalance. (2) Mild Impairment: Is able to step around both cones, but must slow down and adjust steps to clear cones. (1) Moderate Impairment: Is able to clear cones but must significantly slow, speed to accomplish task, or requires verbal cueing.  (0) Severe Impairment: Unable to clear cones, walks into one or both cones, or requires physical assistance. 8. Steps __2___  Instructions: Walk up these stairs as you would at home, i.e., using the railing if necessary. At the top, turn around and walk down. Grading: [place] the lowest category that applies. (3)  Normal: Alternating feet, no rail. (2)  Mild Impairment: Alternating feet, must use rail.   (1) Moderate Impairment: Two feet to a stair, must use brian.  (0)  Severe Impairment: Cannot do safely. TOTAL SCORE: _20__ / 24    Interpretation: < 19/24 = predictive of falls in the elderly     > 22/24 = safe ambulators        Therapeutic Exercise:   AM:   7.5 curb step x 2 with [address] with supervision, and x 2 with no AD with SBA  10 ft with uneven surface with WW with Supervision  Ambulated 2 x 200 ft with [address] with supervision, 1 x 75 ft with no AD with SBA         Additional Comments:  was present for today's session and demonstrated knowledge of proper technique for guarding during functional tasks. Pt continues to increase in balance and steadiness when ambulating, but ambulating with WW is still advised at this time. Pt continues to require Supervision with ambulation with WW due to neglect of O2 lines at times. Pt performed the Mauro balance score three times since initial evaluation with improvements of 44/56 to 48/56, and today 50/56 which places pt at low risk for falls. Pt also performed the DGI three times with improvements from 19/24 to 20/24 during today's session which still places pt at a risk for falls with dynamic activities. Patient/family education  Pt/family educated on safety awareness of ambulating with O2 line. Patient/family response to education:   Pt/family verbalized understanding Pt/family demonstrated skill Pt/family requires further education in this area   Yes Yes Yes     AM  Time in: 1000  Time out: 1045      Pt is making good progress toward established Physical Therapy goals. Continue with physical therapy current plan of care. Tami Viera, ARA  [facility].  [facility], [address] [facility]  License Number: [id number]

## 2018-10-31 NOTE — PROGRESS NOTES
SBA Picked up 5# weight with supervision Will  5# with Supervision. Will  5# with modified independence. BLE ROM WNL WNL     BLE Strength BLEs are grossly 4-/5 to 4/5 BLEs are grossly 4-/5 to 4/5     Balance  Sitting EOB: Supervision  Dynamic standing: SBA Sitting EOB: Independent  Dynamic standing: SBA     Date Family Teach Completed No family present at initial evaluation 10/29/18, 10/31/18     Hindering Progress Weakness, balance deficits Weakness, balance deficits       Additional Comments: The pt made good progress with meeting all STG's and most LTG's except with ambulation levels of supervision due to decreased awareness of O2 line . Pt improved on the Mauro balance score since initial evaluation with scores of  44/56 to 48/56 last week, and today 50/56 which places pt at low risk for falls with static balance activities. Pt also performed the DGI three times with improvements from 19/24 to 20/24 during today's session which still places pt at a risk for falls with dynamic activities. Mario Kerr, SPT  [facility].  [facility], [address]  number:  [phone]

## 2018-10-31 NOTE — PROGRESS NOTES
Occupational Therapy     10/31/18 1315   Attendance   Activity Games; Discussion/reminisce   Participation Active participation   Therapeutic Recreation   Community Reintegration Demonstrates ability to complete social goals   Social Skills Interacts independently in social activity   Leisure Education Demonstrates knowledge of benefits of leisure involvement  Jose Barnes stated\"I learned about myself \" as a benefit.)   Time Spent With Patient   Minutes 25

## 2018-10-31 NOTE — PLAN OF CARE
Problem: Falls - Risk of:  Goal: Will remain free from falls  Will remain free from falls   Outcome: Completed Date Met: 10/31/18    Goal: Absence of physical injury  Absence of physical injury   Outcome: Completed Date Met: 10/31/18      Problem: Pain:  Goal: Pain level will decrease  Pain level will decrease   Outcome: Completed Date Met: 10/31/18    Goal: Control of acute pain  Control of acute pain   Outcome: Completed Date Met: 10/31/18    Goal: Control of chronic pain  Control of chronic pain   Outcome: Completed Date Met: 10/31/18      Problem: Mobility - Impaired:  Goal: Mobility will improve  Mobility will improve   Outcome: Completed Date Met: 10/31/18      Problem: Skin Integrity:  Goal: Will show no infection signs and symptoms  Will show no infection signs and symptoms   Outcome: Completed Date Met: 10/31/18    Goal: Absence of new skin breakdown  Absence of new skin breakdown   Outcome: Completed Date Met: 10/31/18      Problem: Infection:  Goal: Will remain free from infection  Will remain free from infection   Outcome: Completed Date Met: 10/31/18      Problem: Safety:  Goal: Free from accidental physical injury  Free from accidental physical injury   Outcome: Completed Date Met: 10/31/18    Goal: Free from intentional harm  Free from intentional harm   Outcome: Completed Date Met: 10/31/18      Problem: Daily Care:  Goal: Daily care needs are met  Daily care needs are met   Outcome: Completed Date Met: 10/31/18      Problem: Discharge Planning:  Goal: Patients continuum of care needs are met  Patients continuum of care needs are met   Outcome: Completed Date Met: 10/31/18

## 2018-10-31 NOTE — PROGRESS NOTES
OCCUPATIONAL THERAPY DAILY NOTE & discharge summary    Date:10/31/2018  Patient Name: Bard Morales  MRN: [id number]  : 1955  Room: [address] [place] [address]     DIAGNOSIS: NPH  PRECAUTIONS: Fall risk, O2   PROCEDURE(S): 10/15 insertion of  shunt     Social:  Pt lives with her , daughter, and 4 grandchildren in a 1 floor plan with 2-3 steps and no rail to enter. Prior to admission pt ambulated with no AD, was receiving HHPT for gait/balance 1-2x/week (TIA 2018). Pt owns a [employer]. Precautions: 02 3L,  Fall risk    Functional Assessment:   Date Status AE  Comments   Feeding 10/31/18 Mod I      Grooming 10/31/18 Mod I   Completed seated/standing at sink. Bathing 10/30/18 Supervision  hose/  Shower stall bench Full shower completed seated/standing with min cues for safety and for technique to avoid incision on head when washing hair with baby shampoo. UB Dressing 10/31/18 Independent  To don/doff pull over shirt and bar seated on chair. LB Dressing 10/31/18  Mod I     Homemaking 10/28/18 supervision rw  counter       Functional Transfers / Balance:   Date Status DME  Comments   Sit Balance 10/30/18  Independent   Dynamic sitting on shower bench. Stand Balance 10/31/18 Mod I rw  Sink Demo'd during ADL tasks. Standing at table top level    [x] Tub      [x] Shower   Transfer 10/29/18         10/30/18  SBA          Supervision Indwelling tub seat       Grab rail and shower seat, w/w         Min cues for safety provided   Commode   Transfer 10/31/18 Mod I rw    Functional   Mobility 10/31/18 Mod I  w/w   Pt completed functional item retrieval within pt's room with min cues for safety when retrieving items from low surfaces. Throughout hallways and in OT clinic.  Assist with pushing O2 tank    Other: on/off recliner and standard queen bed  10/31/18 Mod I ww    Functional Exercises / Activity:    Sensory / Neuromuscular Re-Education:    Cognitive Skills:   Status Comments   Problem   Solving G- Demo'd during transfers/ADL's   Memory G- \"   Sequencing G \"   Safety G- \"     Visual Perception:    Education:    Pt was educated on shower safety and safety when retrieving items from low surfaces. [x] Family teach completed on:  10/29/18- Pt  educated on pt current status. Pt  participated in pm OT treatment session & agreed to provide Sup @ home. Pain Level: No c/o pain     Additional Notes:     Patient has good made progress during treatment sessions toward set goals. Therapy emphasis to obtain goals:  ADL,s balance, endurance, strength, transfers, fxl mobility and fine motor skills/sensation    [x] Continue with current OT Plan of care.   [x] Prepare for Discharge     DISCHARGE RECOMMENDATIONS  Recommended DME:  rw & tub seat with a back  Post Discharge Care:   []Home Independently  []Home with 24hr Care / Supervision []Home with Partial Supervision []Home with Home Health OT []Home with Out Pt OT []Other: ___   Comments:         Time in Time out Tx Time Breakdown  Variance:   First Session  915 1000 [x] Individual Tx- 45 Mins  [] Concurrent Tx -  [] Co-Tx -   [] Group Tx -   [] Time Missed -     Second Session   [] Individual Tx-   [] Concurrent Tx -   [] Co-Tx -   [] Group Tx -   [] Time Missed -       Third Session    [] Individual Tx-   [] Concurrent Tx -  [] Co-Tx -   [] Group Tx -   [] Time Missed -                 Total time: 45mins    Lovely Morfin OTR/L [id number]

## 2018-10-31 NOTE — DISCHARGE INSTR - DIET

## 2018-10-31 NOTE — PROGRESS NOTES
Progress Note    Subjective/   58y.o. year old female on the rehab unit for NPH s/p  shunt. Still complaining of severe (L) abdominal pain and consistently states new since surgery. No other complaints at this time. She is eager for discharge tomorrow. She admits was usually taking only 1/2 her trazadone at home and is comfortable with the 100 mg dose. Objective/   VITALS:  BP (!) 126/58   Pulse 86   Temp 97.7 °F (36.5 °C) (Temporal)   Resp 18   Ht 5' 2\" (1.575 m)   Wt 100 lb 11.2 oz (45.7 kg)   SpO2 99%   BMI 18.42 kg/m²   24HR INTAKE/OUTPUT:    Intake/Output Summary (Last 24 hours) at 10/30/18 2306  Last data filed at 10/30/18 0630   Gross per 24 hour   Intake              240 ml   Output                0 ml   Net              240 ml     Constitutional:  Alert, awake, no apparent distress   Cardiovascular:  S1, S2 without m/r/g   Respiratory:  CTA B without w/r/r   Abdomen: +BS  Ext: no pitting LE edema  Neuro: awake and alert, no focal weakness. Good sitting balance and good mobility    Functional Level      Date   Status   AE    Comments     Feeding   10/26/18   Mod I              Grooming   10/30/18   Mod I        Completed seated/standing at sink. Bathing   10/30/18   Supervision  hose/    Shower stall bench Full shower completed seated/standing with min cues for safety and for technique to avoid incision on head when washing hair with baby shampoo. UB Dressing   10/30/18   Independent       To don/doff pull over shirt and bar seated on chair. LB Dressing   10/30/18    Supervision       Pt demo'd ability to don/doff underwear, socks and pants. Min cues for sequencing and safety required. Pt donned shoes seated edge of bed      Homemaking   10/28/18   supervision   rw    counter            Functional Transfers / Balance:      Date Status DME  Comments   Sit Balance 10/30/18  Independent    Dynamic sitting on shower bench.    Stand Balance 10/30/18 Mod I rw  Sink Demo'd during ADL tasks. Standing at table top level    [x] Tub        [x] Shower   Transfer 10/29/18            10/30/18  SBA             Supervision Indwelling tub seat         Grab rail and shower seat, w/w             Min cues for safety provided   Commode   Transfer 10/30/18 Mod I rw     Functional   Mobility 10/30/18 Supervision w/w    Pt completed functional item retrieval within pt's room with min cues for safety when retrieving items from low surfaces. Throughout hallways and in OT clinic. Assist with pushing O2 tank    Other: on/off recliner and standard queen bed  10/29/18 Supervision ww     Functional Exercises / Activity:  BUE strength exercises with red can do bar 3 sets 10 reps   Pt completed graded clothespin activity with focus on UB gross coordination and strength and standing balance. Pt completed activity at Supervision level      Sensory / Neuromuscular Re-Education:        Cognitive Skills:    Status Comments   Problem   Solving Fair plus Demo'd during transfers/ADL's   Memory Fair plus \"   Sequencing Fair plus Pt required min cues for sequencing during ADL tasks   Safety Fair plus \"          Scheduled Meds:   traZODone  100 mg Oral Nightly    pantoprazole  40 mg Oral QAM AC    naloxegol  25 mg Oral QAM    senna  2 tablet Oral Nightly    docusate sodium  100 mg Oral BID    buPROPion  300 mg Oral QAM    carvedilol  12.5 mg Oral BID WC    Fluticasone-Umeclidin-Vilant  1 puff Inhalation Daily    isosorbide mononitrate  30 mg Oral Daily    linagliptin  5 mg Oral QAM    oxybutynin  10 mg Oral Daily    rOPINIRole  3 mg Oral BID    sacubitril-valsartan  1 tablet Oral BID    spironolactone  25 mg Oral Daily    vitamin D  50,000 Units Oral Once per day on Thu    influenza virus vaccine  0.5 mL Intramuscular Prior to discharge     Continuous Infusions:  PRN Meds:oxyCODONE-acetaminophen, oxyCODONE-acetaminophen **OR** [DISCONTINUED] oxyCODONE-acetaminophen, acetaminophen, albuterol,

## 2018-11-01 NOTE — DISCHARGE SUMMARY
standing: SBA              Sitting Balance: Independent  Standing Balance: Contact guard assistance (during dynamic ADL tasks)     Functional Mobility  Functional - Mobility Device: No device  Activity: To/from bathroom  Assist Level: Contact guard assistance  Functional Mobility Comments: safety cues for O2 line management     Toilet Transfers  Toilet - Technique: Ambulating  Equipment Used: Grab bars  Toilet Transfer: Contact guard assistance (No Device; cues for O2 line management)     Shower Transfers  Shower Transfers Comments: Patient decline shower transfer this date.     ADL  Feeding: Modified independent  (Dentures)  Grooming: Minimal assistance for combing hair/completed seated at sink d/t decreased endurance  UE Bathing: Supervision  LE Bathing: Contact guard assistance  UE Dressing: Supervision;Setup  LE Dressing: Contact guard assistance;Setup (Patient donned pants, underpants over feet shoes Independently seated on side of bed; CGA/SBA standing to manage clothing over hips  )  Toileting: Stand by assistance;Contact guard assistance (Clothing management CGA/SBA; bladder hygiene - Indepednent seated on commode)     Coordination  Coordination and Movement description: Fine motor impairments  Quality of Movement Other  Comment: difficulty opening packages/containers during grooming tasks  Cognition  Arousal/Alertness: Appropriate responses to stimuli  Following Commands:  Follows one step commands consistently (patient had some difficulty w/ word finding)  Attention Span: Appears intact  Memory:  (Modified Independent)  Safety Judgement:  (cues for safety with O2 line management)  Problem Solving: Assistance required to implement solutions (Patient able to generate solutions for completion of ADL tasks regarding limitation in motor endurance and activity tolerance, she required cues fro follow through and implementation of strategies)  Insights: Fully aware of deficits  Initiation: Does not require cues  Sequencing: Does not require cues  Sensation  Overall Sensation Status:  (Diminished sensation B hands/feet)        SWALLOWING:                     Current level:              MODIFIED INDEPENDENT     RECEPTIVE LANGUAGE:    Current FIM level:       MODIFIED INDEPENDENT     EXPRESSIVE LANGUAGE:  Current FIM level:       SUPERVISION     PROBLEM SOLVING:           Current FIM level:       MIN ASSISTANCE     MEMORY:                              Current FIM level:       MIN ASSISTANCE           Discharge Functional Status:                  Initial Evaluation  Date: 10/19/18 Discharge  Date: 10/31/18 Short Term Goals Long Term Goals    Bed Mobility  Rolling: Supervision  Supine to sit: SBA  Sit to supine: SBA  Scooting: SBA Rolling:  Independent  Supine to sit:  Independent  Sit to supine:  Independent  Scooting: Independent    Supervision Independent   Transfers Sit to stand: SBA  Stand to sit: SBA  Stand pivot: SBA Sit to stand: Modified Independent  Stand to sit: Modified Independent  Stand pivot w/ [employer]: Modified Independent    Supervision Modified Independent   Ambulation   150 feet with no AD with  feet with [employer] with Supervision 150 feet with AAD with Supervision >150 feet with AAD with modified independence. Walking 10 feet on uneven surface 10 feet with no AD with  feet with [employer] with supervision 10 feet with AAD with supervision >10 feet with AAD with modified independence. Wheel Chair Mobility NT NT       Car Transfers SBA NT Supervision Indepence   Stair negotiation: ascended and descended [place] with 2 rail with SBA 16 stairs with 1 rail with supervision 12 steps with 1 rail with supervision. >12 steps with 0 rail with modified indenpence.    Curb Step:   ascended and descended 4 inch step with no AD and SBA 7.5 inch step with WW with supervision 7.5 inch step with AAD and supervison 7.5 inch step with AAD and modified indepence   Picking up object off the floor Picked up 5# with SBA Picked for Admission: Decreased mobility, self care and safety secondary to NPH and s/p  shunt placement.      Rehabilitation Course: She did participate in a comprehensive inpatient rehab program and made excellent gains as outlined above. She had persistent abdominal pain which was manageable with medication. She insisted the pain was new and remained present. She remained stable from a cardiac standpoint. She was maintained on dvt prophylaxis. She had persistent constipation and was managed with multiple medications. Renal function remained stable     Consults: neurosurgery     Significant Diagnostic Studies: labs:   Results for Quyen Ramírez (MRN [id number]) as of 1/8/2019 00:09    Ref.  Range 10/29/2018 06:06 10/29/2018 06:07   Sodium Latest Ref Range: 132 - 146 mmol/L   142   Potassium Latest Ref Range: 3.5 - 5.0 mmol/L   4.1   Chloride Latest Ref Range: 98 - 107 mmol/L   100   CO2 Latest Ref Range: 22 - 29 mmol/L   30 (H)   BUN Latest Ref Range: 8 - 23 mg/dL   15   Creatinine Latest Ref Range: 0.5 - 1.0 mg/dL   1.0   Anion Gap Latest Ref Range: 7 - 16 mmol/L   12   GFR Non- Latest Ref Range: >=60 mL/min/1.73   56   GFR  Unknown   >60   Glucose Latest Ref Range: 74 - 109 mg/dL   97   Calcium Latest Ref Range: 8.6 - 10.2 mg/dL   10.1   Total Protein Latest Ref Range: 6.4 - 8.3 g/dL   6.7   Albumin Latest Ref Range: 3.5 - 5.2 g/dL   3.7   Alk Phos Latest Ref Range: 35 - 104 U/L   58   ALT Latest Ref Range: 0 - 32 U/L   12   AST Latest Ref Range: 0 - 31 U/L   14   Bilirubin Latest Ref Range: 0.0 - 1.2 mg/dL   <0.2   WBC Latest Ref Range: 4.5 - 11.5 E9/L 6.2     RBC Latest Ref Range: 3.50 - 5.50 E12/L 3.30 (L)     Hemoglobin Quant Latest Ref Range: 11.5 - 15.5 g/dL 10.7 (L)     Hematocrit Latest Ref Range: 34.0 - 48.0 % 32.2 (L)     MCV Latest Ref Range: 80.0 - 99.9 fL 97.6     MCH Latest Ref Range: 26.0 - 35.0 pg 32.4     MCHC Latest Ref Range: 32.0 - 34.5 % 33.2     MPV Latest Ref Range: 7.0 - 12.0 fL 10.6     RDW Latest Ref Range: 11.5 - 15.0 fL 13.9     Platelet Count Latest Ref Range: 130 - 450 E9/L 215        Abdominal film      Order Date:  10/27/2018 9:30 AM       EXAM: XR ABDOMEN (2 VIEWS)       NUMBER OF IMAGES:  3       INDICATION:  pain    pain        COMPARISON: July 30, 2014       TECHNIQUE:   AP views of the abdomen.       FINDINGS:   There is a nonobstructed bowel gas pattern with a large amount of   stool in the colon. No free air seen. Lung bases are clear. Degenerative changes noted in the spine.  Ventriculoperitoneal shunt   terminates in the left upper quadrant.           Impression   Nonobstructed bowel gas pattern with a large amount of stool in the   colon.                Treatments: therapies: PT, OT, RN and SW     Discharge Exam:  See progress note from date of discharge     Disposition: home     Patient Instructions:   She was to notify her physician of any new problems including fever, chills, neck pain, nausea or vomiting or any other concerns                   Scott Tadeo   Home Medication Instructions GLP:[id number]     Printed on:01/08/19 0011   Medication Information                                       buPROPion (WELLBUTRIN XL) 300 MG XL tablet  Take 300 mg by mouth every morning.                        butalbital-acetaminophen-caffeine (FIORICET, ESGIC) -40 MG per tablet  Take 1 tablet by mouth every 4 hours as needed for Headaches                      carvedilol (COREG) 12.5 MG tablet  Take 12.5 mg by mouth 2 times daily                       docusate sodium (COLACE, DULCOLAX) 100 MG CAPS  Take 100 mg by mouth 2 times daily                      fluticasone (FLONASE) 50 MCG/ACT nasal spray  1 spray by Nasal route daily as needed for Rhinitis                      Fluticasone-Umeclidin-Vilant (TRELEGY ELLIPTA) 100-62.5-25 MCG/INH AEPB  Inhale 1 puff into the lungs daily                      folic acid (FOLVITE) 1 MG tablet  Take 1 tablet by mouth

## 2018-11-01 NOTE — PROGRESS NOTES
Progress Note    Subjective/   58y.o. year old female on the rehab unit for NPH. NO new complaints. Tolerating therapy. Jessie Laureano for discharge. Objective/   VITALS:  BP (!) 103/58   Pulse 92   Temp 98 °F (36.7 °C) (Temporal)   Resp 16   Ht 5' 2\" (1.575 m)   Wt 100 lb 11.2 oz (45.7 kg)   SpO2 98%   BMI 18.42 kg/m²   24HR INTAKE/OUTPUT:    Intake/Output Summary (Last 24 hours) at 10/31/18 2241  Last data filed at 10/31/18 0715   Gross per 24 hour   Intake              360 ml   Output                0 ml   Net              360 ml     Constitutional:  Alert, awake, no apparent distress   Cardiovascular:  S1, S2 without m/r/g   Respiratory:  CTA B without w/r/r   Abdomen: +BS, soft, tender anteriorly and worse (L) LQ  Ext: no pitting LE edema  Neuro: awake and alert, no focal weakness    Functional Level  See discharge summary    Scheduled Meds:  Continuous Infusions:  PRN Meds:  I/O last 3 completed shifts: In: 360 [P.O.:360]  Out: -   No intake/output data recorded. Labs reviewed  CBC:   Recent Labs      10/29/18   0606   WBC  6.2   HGB  10.7*   PLT  215     BMP:  Recent Labs      10/29/18   0607   NA  142   K  4.1   CL  100   CO2  30*   BUN  15   CREATININE  1.0   GLUCOSE  97     Hepatic: Recent Labs      10/29/18   0607   AST  14   ALT  12   BILITOT  <0.2   ALKPHOS  58     BNP: No results for input(s): BNP in the last 72 hours. Lipids: No results for input(s): CHOL, HDL in the last 72 hours. Invalid input(s): LDLCALCU  INR: No results for input(s): INR in the last 72 hours.     Assessment/  Patient Active Problem List:     CAD (coronary artery disease)     S/P CABG x 2     Smoker     Bronchitis     Pulmonary hypertension (HCC)     COPD (chronic obstructive pulmonary disease) ([facility])     Apraxia     Ataxia     CKD (chronic kidney disease) stage 3, GFR 30-59 ml/min ([facility])     Hypoxia     TIA (transient ischemic attack)     Stroke-like symptoms     Acute on chronic respiratory failure with hypoxia and hypercapnia (HCC)     Sinus pause     Normal pressure hydrocephalus     Chronic systolic congestive heart failure (HCC)     Severe mitral regurgitation     NPH (normal pressure hydrocephalus)     Severe protein-calorie malnutrition (HCC)      Plan/  1. Continue rehab program  2. Discharge home  3. Continue current medications  4.  See discharge summary          Vinay Perez MD

## 2018-11-06 ENCOUNTER — TELEPHONE (OUTPATIENT)
Dept: PULMONOLOGY | Age: 63
End: 2018-11-06

## 2018-11-06 NOTE — TELEPHONE ENCOUNTER
Mailed a letter to patient informing her that her Full PFT is scheduled for 12-13-18 at 1:00 pm at the [facility]. She must arrive at 12:30 pm. She is to have no caffeine for 24 hours prior to the test and no resp meds for at least 4 hour prior to the test

## 2018-12-06 ENCOUNTER — PREP FOR PROCEDURE (OUTPATIENT)
Dept: SURGERY | Age: 63
End: 2018-12-06

## 2018-12-06 ENCOUNTER — OFFICE VISIT (OUTPATIENT)
Dept: SURGERY | Age: 63
End: 2018-12-06
Payer: MEDICARE

## 2018-12-06 VITALS
RESPIRATION RATE: 16 BRPM | DIASTOLIC BLOOD PRESSURE: 60 MMHG | HEIGHT: 62 IN | WEIGHT: 108 LBS | SYSTOLIC BLOOD PRESSURE: 106 MMHG | BODY MASS INDEX: 19.88 KG/M2

## 2018-12-06 DIAGNOSIS — R19.4 CHANGE IN BOWEL HABITS: Primary | ICD-10-CM

## 2018-12-06 PROCEDURE — 1036F TOBACCO NON-USER: CPT | Performed by: SURGERY

## 2018-12-06 PROCEDURE — G8484 FLU IMMUNIZE NO ADMIN: HCPCS | Performed by: SURGERY

## 2018-12-06 PROCEDURE — G8427 DOCREV CUR MEDS BY ELIG CLIN: HCPCS | Performed by: SURGERY

## 2018-12-06 PROCEDURE — G8420 CALC BMI NORM PARAMETERS: HCPCS | Performed by: SURGERY

## 2018-12-06 PROCEDURE — G8598 ASA/ANTIPLAT THER USED: HCPCS | Performed by: SURGERY

## 2018-12-06 PROCEDURE — 3017F COLORECTAL CA SCREEN DOC REV: CPT | Performed by: SURGERY

## 2018-12-06 PROCEDURE — 99213 OFFICE O/P EST LOW 20 MIN: CPT | Performed by: SURGERY

## 2018-12-06 RX ORDER — SODIUM CHLORIDE 9 MG/ML
INJECTION, SOLUTION INTRAVENOUS CONTINUOUS
Status: CANCELLED | OUTPATIENT
Start: 2018-12-06

## 2018-12-06 RX ORDER — 0.9 % SODIUM CHLORIDE 0.9 %
10 VIAL (ML) INJECTION EVERY 12 HOURS SCHEDULED
Status: CANCELLED | OUTPATIENT
Start: 2018-12-06

## 2018-12-06 RX ORDER — 0.9 % SODIUM CHLORIDE 0.9 %
10 VIAL (ML) INJECTION PRN
Status: CANCELLED | OUTPATIENT
Start: 2018-12-06

## 2018-12-06 NOTE — PROGRESS NOTES
Progress Note - Post-op follow up     Patient's Name/Date of Birth: Carlos Enrique Warren / 1955    Date: 12/6/2018    PCP: Cresencio Lux DO    Referring Physician:   No ref. provider found  N/A    Chief Complaint   Patient presents with    Abdominal Pain     since she was hospitalized. bowel movements are very small        Subjective:  Patient presents to the office following  shunt about 6 weeks ago. She said she is having lower abdominal pain. She said that is new since her surgery. She said her bowel habits have changed and she is having constipation. She is using stool softeners. She said the stool softeners are helping. The patient is taking oxycodone daily. She uses this for chronic neck pain. She has chronic nausea. She said she is drinking ensure to gain weight. She has never had a colonoscopy. Patient's medications, allergies, past medical, surgical, social and family histories were reviewed and updated as appropriate. Physical Exam:  /60 (Site: Left Upper Arm, Position: Sitting, Cuff Size: Small Adult)   Resp 16   Ht 5' 2\" (1.575 m)   Wt 108 lb (49 kg)   BMI 19.75 kg/m²   General Appearance: NAD  Abdomen: soft, non-distended mild incisional tenderness, no rebound or guarding, incision C/D/I    Data Reviewed: Pathology reviewed with patient    Assessment/Plan:    58y.o. year old female s/p V P shunt; change in bowel habits    Patient reassured that her pain is likely unrelated to her  shunt  She has never had a colonoscopy and needs to have her change in bowel habits evaluated. I will set the patient up for a colonoscopy, possible biopsy, possible polypectomy. I explained the risks including but not limited to bleeding, perforation leading to possible surgery, or infection. The benefits, alternatives, and potential complications associated with the above procedure to be performed and transfusions when applicable with the patient/responsible person prior to the procedure.  I

## 2018-12-14 ENCOUNTER — TELEPHONE (OUTPATIENT)
Dept: SURGERY | Age: 63
End: 2018-12-14

## 2018-12-14 DIAGNOSIS — Z01.818 PRE-OPERATIVE CLEARANCE: Primary | ICD-10-CM

## 2018-12-14 NOTE — TELEPHONE ENCOUNTER
Sue Stephenson of [id number] [facility] Preadmission Testing calling for medical clearance on patient for endoscopy scheduled 12/18/18. Call transferred to Via Marie Hodge in the office.

## 2018-12-17 ENCOUNTER — ANESTHESIA EVENT (OUTPATIENT)
Dept: ENDOSCOPY | Age: 63
End: 2018-12-17
Payer: MEDICARE

## 2018-12-18 ENCOUNTER — ANESTHESIA (OUTPATIENT)
Dept: ENDOSCOPY | Age: 63
End: 2018-12-18
Payer: MEDICARE

## 2019-01-07 ENCOUNTER — TELEPHONE (OUTPATIENT)
Dept: SURGERY | Age: 64
End: 2019-01-07

## 2019-01-08 NOTE — DISCHARGE SUMMARY
Rehabilitation Discharge Summary     Patient Identification  Aj Eaton is a 58 y.o. female. :  1955  Admit Date:  10/18/2018  Discharge date and time: 10/31/2018 12:20 PM   Attending Provider: No att. providers found                                      Discharge Physician: Mynor Hou    Admission Diagnoses: NPH (normal pressure hydrocephalus) [G91.2]    Discharge Diagnoses: NPH, pulmonary HTN, CKD, CHF, mitral regurgitation, CAD, COPD, protein calorie malnutrition    Admission Functional Status:                    Initial Evaluation  Date: 10/19/18          Short Term Goals Long Term Goals    Was pt agreeable to Eval/treatment? Yes Initial Evaluation Initial Evaluation       Does pt have pain? 8/10 back and L side pain, nursing aware           Bed Mobility  Rolling: Supervision  Supine to sit: SBA  Sit to supine: SBA  Scooting: SBA     Supervision Independent   Transfers Sit to stand: SBA  Stand to sit: SBA  Stand pivot: SBA     Supervision Modified Independent   Ambulation   150 feet with no AD with SBA     150 feet with AAD with Supervision >150 feet with AAD with modified independence. Walking 10 feet on uneven surface 10 feet with no AD with SBA     10 feet with AAD with supervision >10 feet with AAD with modified independence. Wheel Chair Mobility NT           Car Transfers SBA     Supervision Indepence   Stair negotiation: ascended and descended 4 steps with 2 rail with SBA     12 steps with 1 rail with supervision. >12 steps with 0 rail with modified indenpence. Curb Step:   ascended and descended 4 inch step with no AD and SBA     7.5 inch step with AAD and supervison 7.5 inch step with AAD and modified indepence   Picking up object off the floor Picked up 5# with SBA     Will  5# with Supervision. Will  5# with modified independence.    BLE ROM WNL           BLE Strength BLEs are grossly 4-/5 to 4/5           Balance  Sitting EOB: Supervision  Dynamic standing: SBA            Sitting Balance: Independent  Standing Balance: Contact guard assistance (during dynamic ADL tasks)     Functional Mobility  Functional - Mobility Device: No device  Activity: To/from bathroom  Assist Level: Contact guard assistance  Functional Mobility Comments: safety cues for O2 line management     Toilet Transfers  Toilet - Technique: Ambulating  Equipment Used: Grab bars  Toilet Transfer: Contact guard assistance (No Device; cues for O2 line management)     Shower Transfers  Shower Transfers Comments: Patient decline shower transfer this date.     ADL  Feeding: Modified independent  (Dentures)  Grooming: Minimal assistance for combing hair/completed seated at sink d/t decreased endurance  UE Bathing: Supervision  LE Bathing: Contact guard assistance  UE Dressing: Supervision;Setup  LE Dressing: Contact guard assistance;Setup (Patient donned pants, underpants over feet shoes Independently seated on side of bed; CGA/SBA standing to manage clothing over hips  )  Toileting: Stand by assistance;Contact guard assistance (Clothing management CGA/SBA; bladder hygiene - Indepednent seated on commode)     Coordination  Coordination and Movement description: Fine motor impairments  Quality of Movement Other  Comment: difficulty opening packages/containers during grooming tasks  Cognition  Arousal/Alertness: Appropriate responses to stimuli  Following Commands:  Follows one step commands consistently (patient had some difficulty w/ word finding)  Attention Span: Appears intact  Memory:  (Modified Independent)  Safety Judgement:  (cues for safety with O2 line management)  Problem Solving: Assistance required to implement solutions (Patient able to generate solutions for completion of ADL tasks regarding limitation in motor endurance and activity tolerance, she required cues fro follow through and implementation of strategies)  Insights: Fully aware of deficits  Initiation: Does not require cues  Sequencing: Does not require cues  Sensation  Overall Sensation Status:  (Diminished sensation B hands/feet)        SWALLOWING:                     Current level:              MODIFIED INDEPENDENT     RECEPTIVE LANGUAGE:    Current FIM level:       MODIFIED INDEPENDENT     EXPRESSIVE LANGUAGE:  Current FIM level:       SUPERVISION     PROBLEM SOLVING:           Current FIM level:       MIN ASSISTANCE     MEMORY:                              Current FIM level:       MIN ASSISTANCE         Discharge Functional Status:      Initial Evaluation  Date: 10/19/18 Discharge  Date: 10/31/18 Short Term Goals Long Term Goals    Bed Mobility  Rolling: Supervision  Supine to sit: SBA  Sit to supine: SBA  Scooting: SBA Rolling:  Independent  Supine to sit: Independent  Sit to supine:  Independent  Scooting: Independent    Supervision Independent   Transfers Sit to stand: SBA  Stand to sit: SBA  Stand pivot: SBA Sit to stand: Modified Independent  Stand to sit: Modified Independent  Stand pivot w/ [employer]: Modified Independent    Supervision Modified Independent   Ambulation   150 feet with no AD with  feet with [employer] with Supervision 150 feet with AAD with Supervision >150 feet with AAD with modified independence. Walking 10 feet on uneven surface 10 feet with no AD with  feet with WW with supervision 10 feet with AAD with supervision >10 feet with AAD with modified independence. Wheel Chair Mobility NT NT       Car Transfers SBA NT Supervision Indepence   Stair negotiation: ascended and descended 4 steps with 2 rail with SBA 16 stairs with 1 rail with supervision 12 steps with 1 rail with supervision. >12 steps with 0 rail with modified indenpence.    Curb Step:   ascended and descended 4 inch step with no AD and SBA 7.5 inch step with WW with supervision 7.5 inch step with AAD and supervison 7.5 inch step with AAD and modified indepence   Picking up object off the floor Picked up 5# with SBA Picked up 5# weight with supervision Will pick  shunt placement. Rehabilitation Course: She did participate in a comprehensive inpatient rehab program and made excellent gains as outlined above. She had persistent abdominal pain which was manageable with medication. She insisted the pain was new and remained present. She remained stable from a cardiac standpoint. She was maintained on dvt prophylaxis. She had persistent constipation and was managed with multiple medications. Renal function remained stable    Consults: neurosurgery    Significant Diagnostic Studies: labs:   Results for Jv Rosales (MRN [id number]) as of 1/8/2019 00:09   Ref.  Range 10/29/2018 06:06 10/29/2018 06:07   Sodium Latest Ref Range: 132 - 146 mmol/L  142   Potassium Latest Ref Range: 3.5 - 5.0 mmol/L  4.1   Chloride Latest Ref Range: 98 - 107 mmol/L  100   CO2 Latest Ref Range: 22 - 29 mmol/L  30 (H)   BUN Latest Ref Range: 8 - 23 mg/dL  15   Creatinine Latest Ref Range: 0.5 - 1.0 mg/dL  1.0   Anion Gap Latest Ref Range: 7 - 16 mmol/L  12   GFR Non- Latest Ref Range: >=60 mL/min/1.73  56   GFR  Unknown  >60   Glucose Latest Ref Range: 74 - 109 mg/dL  97   Calcium Latest Ref Range: 8.6 - 10.2 mg/dL  10.1   Total Protein Latest Ref Range: 6.4 - 8.3 g/dL  6.7   Albumin Latest Ref Range: 3.5 - 5.2 g/dL  3.7   Alk Phos Latest Ref Range: 35 - 104 U/L  58   ALT Latest Ref Range: 0 - 32 U/L  12   AST Latest Ref Range: 0 - 31 U/L  14   Bilirubin Latest Ref Range: 0.0 - 1.2 mg/dL  <0.2   WBC Latest Ref Range: 4.5 - 11.5 E9/L 6.2    RBC Latest Ref Range: 3.50 - 5.50 E12/L 3.30 (L)    Hemoglobin Quant Latest Ref Range: 11.5 - 15.5 g/dL 10.7 (L)    Hematocrit Latest Ref Range: 34.0 - 48.0 % 32.2 (L)    MCV Latest Ref Range: 80.0 - 99.9 fL 97.6    MCH Latest Ref Range: 26.0 - 35.0 pg 32.4    MCHC Latest Ref Range: 32.0 - 34.5 % 33.2    MPV Latest Ref Range: 7.0 - 12.0 fL 10.6    RDW Latest Ref Range: 11.5 - 15.0 fL 13.9    Platelet Count Latest Ref Range: 130 - 40 MG capsule    Take 40 mg by mouth daily              oxybutynin (DITROPAN-XL) 10 MG extended release tablet  Take 10 mg by mouth daily             OXYGEN  Inhale 3 L into the lungs continuous             rOPINIRole (REQUIP) 3 MG tablet  Take 1 tablet by mouth 2 times daily             rosuvastatin (CRESTOR) 20 MG tablet  Take 20 mg by mouth daily.                sacubitril-valsartan (ENTRESTO) 24-26 MG per tablet  Take 1 tablet by mouth 2 times daily             spironolactone (ALDACTONE) 25 MG tablet  Take 1 tablet by mouth daily             traZODone (DESYREL) 100 MG tablet  Take 1 tablet by mouth nightly             vitamin D (ERGOCALCIFEROL) 73106 UNITS CAPS capsule  Take 50,000 Units by mouth once a week THDAIN               [unfilled]  Activity: activity as tolerated and no lifting driving until cleared  Diet: regular diet  Wound Care: keep wound clean and dry and ok to shower  DME Orders after Discharge: wheeled walker and oxygen    Follow-up with   [facility] 6 11/1: OT at 9:00 AM & PT at 10:00 AM  [address] [place] [address] [facility] MD Zeinab  In 2 weeks    [address] [facility]   Marly Vo MD    As needed  [address] [facility],[place] [address]  [phone]   Vivi Sawant DO    post hospital follow up appt. nov. 5th at 11:30 am  [address] [phone]

## 2019-01-10 ENCOUNTER — TELEPHONE (OUTPATIENT)
Dept: PULMONOLOGY | Age: 64
End: 2019-01-10

## 2019-01-18 RX ORDER — OXYCODONE HYDROCHLORIDE AND ACETAMINOPHEN 5; 325 MG/1; MG/1
1 TABLET ORAL EVERY 4 HOURS PRN
Status: ON HOLD | COMMUNITY
End: 2021-02-04 | Stop reason: HOSPADM

## 2019-01-18 RX ORDER — ASPIRIN 325 MG
325 TABLET ORAL DAILY
Status: ON HOLD | COMMUNITY
End: 2022-10-21 | Stop reason: HOSPADM

## 2019-01-18 RX ORDER — SUCRALFATE 1 G/1
1 TABLET ORAL 2 TIMES DAILY
COMMUNITY
End: 2020-11-04

## 2019-01-18 RX ORDER — ONDANSETRON 4 MG/1
8 TABLET, FILM COATED ORAL 2 TIMES DAILY
COMMUNITY
End: 2020-11-04

## 2019-01-21 ENCOUNTER — HOSPITAL ENCOUNTER (OUTPATIENT)
Age: 64
Setting detail: OUTPATIENT SURGERY
Discharge: HOME | End: 2019-01-21
Attending: SURGERY | Admitting: SURGERY
Payer: MEDICARE

## 2019-01-21 VITALS
DIASTOLIC BLOOD PRESSURE: 62 MMHG | BODY MASS INDEX: 17.89 KG/M2 | WEIGHT: 101 LBS | TEMPERATURE: 97.9 F | RESPIRATION RATE: 18 BRPM | OXYGEN SATURATION: 92 % | SYSTOLIC BLOOD PRESSURE: 117 MMHG | HEIGHT: 63 IN | HEART RATE: 62 BPM

## 2019-01-21 VITALS
OXYGEN SATURATION: 98 % | SYSTOLIC BLOOD PRESSURE: 78 MMHG | RESPIRATION RATE: 31 BRPM | DIASTOLIC BLOOD PRESSURE: 52 MMHG

## 2019-01-21 DIAGNOSIS — Z01.818 PRE-OP EXAM: Primary | ICD-10-CM

## 2019-01-21 LAB — METER GLUCOSE: 139 MG/DL (ref 74–99)

## 2019-01-21 PROCEDURE — 88305 TISSUE EXAM BY PATHOLOGIST: CPT

## 2019-01-21 PROCEDURE — 7100000010 HC PHASE II RECOVERY - FIRST 15 MIN: Performed by: SURGERY

## 2019-01-21 PROCEDURE — C1773 RET DEV, INSERTABLE: HCPCS | Performed by: SURGERY

## 2019-01-21 PROCEDURE — 82962 GLUCOSE BLOOD TEST: CPT

## 2019-01-21 PROCEDURE — 45385 COLONOSCOPY W/LESION REMOVAL: CPT | Performed by: SURGERY

## 2019-01-21 PROCEDURE — 3609010600 HC COLONOSCOPY POLYPECTOMY SNARE/COLD BIOPSY: Performed by: SURGERY

## 2019-01-21 PROCEDURE — 7100000011 HC PHASE II RECOVERY - ADDTL 15 MIN: Performed by: SURGERY

## 2019-01-21 PROCEDURE — 2580000003 HC RX 258: Performed by: SURGERY

## 2019-01-21 PROCEDURE — 45380 COLONOSCOPY AND BIOPSY: CPT | Performed by: SURGERY

## 2019-01-21 PROCEDURE — 6360000002 HC RX W HCPCS: Performed by: NURSE ANESTHETIST, CERTIFIED REGISTERED

## 2019-01-21 PROCEDURE — 3700000001 HC ADD 15 MINUTES (ANESTHESIA): Performed by: SURGERY

## 2019-01-21 PROCEDURE — 2709999900 HC NON-CHARGEABLE SUPPLY: Performed by: SURGERY

## 2019-01-21 PROCEDURE — 3700000000 HC ANESTHESIA ATTENDED CARE: Performed by: SURGERY

## 2019-01-21 RX ORDER — PROPOFOL 10 MG/ML
INJECTION, EMULSION INTRAVENOUS PRN
Status: DISCONTINUED | OUTPATIENT
Start: 2019-01-21 | End: 2019-01-21 | Stop reason: SDUPTHER

## 2019-01-21 RX ORDER — SODIUM CHLORIDE 0.9 % (FLUSH) 0.9 %
10 SYRINGE (ML) INJECTION EVERY 12 HOURS SCHEDULED
Status: DISCONTINUED | OUTPATIENT
Start: 2019-01-21 | End: 2019-01-21 | Stop reason: HOSPADM

## 2019-01-21 RX ORDER — SODIUM CHLORIDE 0.9 % (FLUSH) 0.9 %
10 SYRINGE (ML) INJECTION PRN
Status: DISCONTINUED | OUTPATIENT
Start: 2019-01-21 | End: 2019-01-21 | Stop reason: HOSPADM

## 2019-01-21 RX ORDER — SODIUM CHLORIDE 9 MG/ML
INJECTION, SOLUTION INTRAVENOUS CONTINUOUS
Status: DISCONTINUED | OUTPATIENT
Start: 2019-01-21 | End: 2019-01-21 | Stop reason: HOSPADM

## 2019-01-21 RX ADMIN — PROPOFOL 30 MG: 10 INJECTION, EMULSION INTRAVENOUS at 10:02

## 2019-01-21 RX ADMIN — PROPOFOL 10 MG: 10 INJECTION, EMULSION INTRAVENOUS at 10:20

## 2019-01-21 RX ADMIN — PHENYLEPHRINE HYDROCHLORIDE 100 MCG: 10 INJECTION INTRAVENOUS at 10:20

## 2019-01-21 RX ADMIN — SODIUM CHLORIDE: 9 INJECTION, SOLUTION INTRAVENOUS at 09:44

## 2019-01-21 RX ADMIN — PHENYLEPHRINE HYDROCHLORIDE 100 MCG: 10 INJECTION INTRAVENOUS at 10:23

## 2019-01-21 RX ADMIN — PHENYLEPHRINE HYDROCHLORIDE 100 MCG: 10 INJECTION INTRAVENOUS at 10:04

## 2019-01-21 RX ADMIN — PROPOFOL 20 MG: 10 INJECTION, EMULSION INTRAVENOUS at 10:08

## 2019-01-21 RX ADMIN — PROPOFOL 50 MG: 10 INJECTION, EMULSION INTRAVENOUS at 09:46

## 2019-01-21 RX ADMIN — PHENYLEPHRINE HYDROCHLORIDE 100 MCG: 10 INJECTION INTRAVENOUS at 10:08

## 2019-01-21 RX ADMIN — PROPOFOL 20 MG: 10 INJECTION, EMULSION INTRAVENOUS at 09:55

## 2019-01-21 RX ADMIN — PROPOFOL 20 MG: 10 INJECTION, EMULSION INTRAVENOUS at 10:15

## 2019-01-21 RX ADMIN — PHENYLEPHRINE HYDROCHLORIDE 100 MCG: 10 INJECTION INTRAVENOUS at 09:54

## 2019-01-21 RX ADMIN — PHENYLEPHRINE HYDROCHLORIDE 100 MCG: 10 INJECTION INTRAVENOUS at 09:51

## 2019-01-21 RX ADMIN — PHENYLEPHRINE HYDROCHLORIDE 100 MCG: 10 INJECTION INTRAVENOUS at 09:59

## 2019-01-21 RX ADMIN — PROPOFOL 10 MG: 10 INJECTION, EMULSION INTRAVENOUS at 10:24

## 2019-01-21 RX ADMIN — PHENYLEPHRINE HYDROCHLORIDE 100 MCG: 10 INJECTION INTRAVENOUS at 10:14

## 2019-01-21 ASSESSMENT — COPD QUESTIONNAIRES: CAT_SEVERITY: SEVERE

## 2019-01-21 ASSESSMENT — PAIN SCALES - GENERAL
PAINLEVEL_OUTOF10: 0

## 2019-01-21 ASSESSMENT — ENCOUNTER SYMPTOMS: SHORTNESS OF BREATH: 1

## 2019-01-21 ASSESSMENT — PAIN - FUNCTIONAL ASSESSMENT: PAIN_FUNCTIONAL_ASSESSMENT: 0-10

## 2019-01-23 PROBLEM — Z51.89 VISIT FOR WOUND CHECK: Status: ACTIVE | Noted: 2019-01-23

## 2019-02-18 ENCOUNTER — OFFICE VISIT (OUTPATIENT)
Dept: SURGERY | Age: 64
End: 2019-02-18
Payer: COMMERCIAL

## 2019-02-18 VITALS
DIASTOLIC BLOOD PRESSURE: 78 MMHG | BODY MASS INDEX: 17.42 KG/M2 | TEMPERATURE: 98.5 F | RESPIRATION RATE: 16 BRPM | WEIGHT: 102 LBS | HEART RATE: 93 BPM | SYSTOLIC BLOOD PRESSURE: 120 MMHG | HEIGHT: 64 IN | OXYGEN SATURATION: 89 %

## 2019-02-18 DIAGNOSIS — R10.84 GENERALIZED ABDOMINAL PAIN: Primary | ICD-10-CM

## 2019-02-18 PROCEDURE — G8484 FLU IMMUNIZE NO ADMIN: HCPCS | Performed by: SURGERY

## 2019-02-18 PROCEDURE — G8419 CALC BMI OUT NRM PARAM NOF/U: HCPCS | Performed by: SURGERY

## 2019-02-18 PROCEDURE — 99213 OFFICE O/P EST LOW 20 MIN: CPT | Performed by: SURGERY

## 2019-02-18 PROCEDURE — 1036F TOBACCO NON-USER: CPT | Performed by: SURGERY

## 2019-02-18 PROCEDURE — G8427 DOCREV CUR MEDS BY ELIG CLIN: HCPCS | Performed by: SURGERY

## 2019-02-18 PROCEDURE — G8598 ASA/ANTIPLAT THER USED: HCPCS | Performed by: SURGERY

## 2019-02-18 PROCEDURE — 3017F COLORECTAL CA SCREEN DOC REV: CPT | Performed by: SURGERY

## 2019-02-20 ENCOUNTER — TELEPHONE (OUTPATIENT)
Dept: SURGERY | Age: 64
End: 2019-02-20

## 2019-02-25 ENCOUNTER — TELEPHONE (OUTPATIENT)
Dept: SURGERY | Age: 64
End: 2019-02-25

## 2019-02-25 DIAGNOSIS — I27.20 PULMONARY HYPERTENSION (HCC): Primary | ICD-10-CM

## 2019-02-27 ENCOUNTER — TELEPHONE (OUTPATIENT)
Dept: SURGERY | Age: 64
End: 2019-02-27

## 2019-03-20 ENCOUNTER — TELEPHONE (OUTPATIENT)
Dept: SURGERY | Age: 64
End: 2019-03-20

## 2019-03-21 ENCOUNTER — HOSPITAL ENCOUNTER (OUTPATIENT)
Dept: CT IMAGING | Age: 64
Discharge: HOME | End: 2019-03-23
Payer: MEDICARE

## 2019-03-21 ENCOUNTER — HOSPITAL ENCOUNTER (OUTPATIENT)
Age: 64
Discharge: HOME | End: 2019-03-21
Payer: MEDICARE

## 2019-03-21 DIAGNOSIS — R10.84 GENERALIZED ABDOMINAL PAIN: ICD-10-CM

## 2019-03-21 DIAGNOSIS — I27.20 PULMONARY HYPERTENSION (HCC): ICD-10-CM

## 2019-03-21 LAB
ANION GAP SERPL CALCULATED.3IONS-SCNC: 8 MMOL/L (ref 7–16)
BUN BLDV-MCNC: 16 MG/DL (ref 8–23)
CALCIUM SERPL-MCNC: 9.6 MG/DL (ref 8.6–10.2)
CHLORIDE BLD-SCNC: 101 MMOL/L (ref 98–107)
CO2: 31 MMOL/L (ref 22–29)
CREAT SERPL-MCNC: 1 MG/DL (ref 0.5–1)
GFR AFRICAN AMERICAN: >60
GFR NON-AFRICAN AMERICAN: 56 ML/MIN/1.73
GLUCOSE BLD-MCNC: 122 MG/DL (ref 74–99)
POTASSIUM SERPL-SCNC: 4.5 MMOL/L (ref 3.5–5)
SODIUM BLD-SCNC: 140 MMOL/L (ref 132–146)

## 2019-03-21 PROCEDURE — 6360000004 HC RX CONTRAST MEDICATION: Performed by: RADIOLOGY

## 2019-03-21 PROCEDURE — 36415 COLL VENOUS BLD VENIPUNCTURE: CPT

## 2019-03-21 PROCEDURE — 74174 CTA ABD&PLVS W/CONTRAST: CPT

## 2019-03-21 PROCEDURE — 80048 BASIC METABOLIC PNL TOTAL CA: CPT

## 2019-03-21 RX ADMIN — IOPAMIDOL 90 ML: 755 INJECTION, SOLUTION INTRAVENOUS at 12:13

## 2019-03-25 ENCOUNTER — TELEPHONE (OUTPATIENT)
Dept: SURGERY | Age: 64
End: 2019-03-25

## 2019-04-01 ENCOUNTER — TELEPHONE (OUTPATIENT)
Dept: SURGERY | Age: 64
End: 2019-04-01

## 2019-04-01 NOTE — TELEPHONE ENCOUNTER
Established patient/ Cancelled same day appt on 04/01/2019 for f/u ct scan pt still having bowel issues, since grandson went to the ER. Patient is rescheduled on 04-08-19 (requested afternoon appt).

## 2019-04-08 ENCOUNTER — OFFICE VISIT (OUTPATIENT)
Dept: SURGERY | Age: 64
End: 2019-04-08
Payer: COMMERCIAL

## 2019-04-08 VITALS
WEIGHT: 101.6 LBS | HEART RATE: 86 BPM | DIASTOLIC BLOOD PRESSURE: 47 MMHG | BODY MASS INDEX: 18.69 KG/M2 | HEIGHT: 62 IN | SYSTOLIC BLOOD PRESSURE: 76 MMHG

## 2019-04-08 DIAGNOSIS — K59.09 OTHER CONSTIPATION: Primary | ICD-10-CM

## 2019-04-08 PROCEDURE — G8420 CALC BMI NORM PARAMETERS: HCPCS | Performed by: SURGERY

## 2019-04-08 PROCEDURE — G8427 DOCREV CUR MEDS BY ELIG CLIN: HCPCS | Performed by: SURGERY

## 2019-04-08 PROCEDURE — 3017F COLORECTAL CA SCREEN DOC REV: CPT | Performed by: SURGERY

## 2019-04-08 PROCEDURE — 99213 OFFICE O/P EST LOW 20 MIN: CPT | Performed by: SURGERY

## 2019-04-08 PROCEDURE — G8598 ASA/ANTIPLAT THER USED: HCPCS | Performed by: SURGERY

## 2019-04-08 PROCEDURE — 1036F TOBACCO NON-USER: CPT | Performed by: SURGERY

## 2019-04-08 RX ORDER — MAGNESIUM OXIDE 400 MG/1
400 TABLET ORAL DAILY
Qty: 30 TABLET | Refills: 1 | Status: SHIPPED
Start: 2019-04-08 | End: 2020-09-23

## 2019-04-08 NOTE — PROGRESS NOTES
Progress Note - Follow up    Patient's Name/Date of Birth: Aimee Nation / 1955    Date: 4/8/2019    PCP: Carola Avila DO    Referring Physician:   Lis Lowe [place]  [phone]    Chief Complaint   Patient presents with    Results     CT-ab/pelvis. Pt. still c/o constant nausea        HPI:  The patient said she is still having pain and nausea. She thinks she is constipated. She said she has one BM a week. She said she often feels better after a BM. This has been going on since she left the hospital which was about 6 months ago. She said the Miralax is helping but not as much as she would like. Patient's medications, allergies, past medical, surgical, social and family histories were reviewed and updated as appropriate. Review of Systems  Constitutional: negative  Respiratory: negative  Cardiovascular: negative  Gastrointestinal: as in hpi  Genitourinary:negative  Integument/breast: negative    Physical Exam:  Vitals:    04/08/19 1449   BP: (!) 76/47   Pulse: 86       General appearance: alert, cooperative and in no acute distress. Lungs: clear to auscultation bilaterally  Heart: regular rate and rhythm  Abdomen:  soft, non-tender, no masses,  no organomegaly  Musculoskeletal: symmetrical without clubbing cyanosis or edema. Skin: negative    Data Reviewed:   CT:   Impression   There is anatomic variation with the right hepatic artery originating   from the superior mesenteric artery, and showing less than 50%   diameter reduction near its origin.       Atherosclerotic aortic calcification is minimal, and no occlusions or   high-grade stenoses are noted in the small bowel mesenteric   distribution.  Major aortic branch vessels are widely patent.       The patient is postcholecystectomy with probable sphincterotomy and   mild pneumobilia.       There is a  shunt catheter extending to the pelvis in the anterior   abdomen.             Impression/Plan:  58y.o. year old female with constipation    Miralax  Add Metamucil   Magnesium oxide    Electronically by Candace Holland MD, General Surgery  on 4/8/2019 at 3:47 PM      Send copy of H&P to PCP, Akin Bear DO and referring physician, Jacob Mendoza DO

## 2019-04-11 ENCOUNTER — TELEPHONE (OUTPATIENT)
Dept: ADMINISTRATIVE | Age: 64
End: 2019-04-11

## 2019-04-11 NOTE — TELEPHONE ENCOUNTER
NP calling to schedule a NP apt with \"soonest\" provider per a letter that she received in the mail. Scheduled with Dr. Cresencio Silverman per Dr. Mitul Nick dx: cardiac eval.  Former Tom/Rodrigo consult pt - 2012 [facility] pt - Former bypass N/side - (she is signing a release form to be faxed to [facility] for the bypass report. Cardiology hx sheet scanned.

## 2019-08-20 ENCOUNTER — HOSPITAL ENCOUNTER (OUTPATIENT)
Dept: GENERAL RADIOLOGY | Age: 64
Discharge: HOME | End: 2019-08-22
Payer: MEDICARE

## 2019-08-20 ENCOUNTER — HOSPITAL ENCOUNTER (OUTPATIENT)
Age: 64
Discharge: HOME | End: 2019-08-22
Payer: MEDICARE

## 2019-08-20 DIAGNOSIS — M47.817 ARTHRITIS OF LUMBOSACRAL SPINE: ICD-10-CM

## 2019-08-20 PROCEDURE — 72170 X-RAY EXAM OF PELVIS: CPT

## 2019-08-20 PROCEDURE — 72110 X-RAY EXAM L-2 SPINE 4/>VWS: CPT

## 2020-09-10 ENCOUNTER — HOSPITAL ENCOUNTER (OUTPATIENT)
Age: 65
Discharge: HOME | End: 2020-09-10
Payer: MEDICARE

## 2020-09-10 LAB
ALBUMIN SERPL-MCNC: 4.1 G/DL (ref 3.5–5.2)
ALP BLD-CCNC: 84 U/L (ref 35–104)
ALT SERPL-CCNC: 8 U/L (ref 0–32)
ANION GAP SERPL CALCULATED.3IONS-SCNC: 11 MMOL/L (ref 7–16)
AST SERPL-CCNC: 14 U/L (ref 0–31)
BASOPHILS ABSOLUTE: 0.04 E9/L (ref 0–0.2)
BASOPHILS RELATIVE PERCENT: 0.6 % (ref 0–2)
BILIRUB SERPL-MCNC: 0.3 MG/DL (ref 0–1.2)
BUN BLDV-MCNC: 25 MG/DL (ref 8–23)
CALCIUM SERPL-MCNC: 9.9 MG/DL (ref 8.6–10.2)
CHLORIDE BLD-SCNC: 104 MMOL/L (ref 98–107)
CHOLESTEROL, TOTAL: 187 MG/DL (ref 0–199)
CO2: 26 MMOL/L (ref 22–29)
CREAT SERPL-MCNC: 1.2 MG/DL (ref 0.5–1)
EOSINOPHILS ABSOLUTE: 0.14 E9/L (ref 0.05–0.5)
EOSINOPHILS RELATIVE PERCENT: 2.1 % (ref 0–6)
GFR AFRICAN AMERICAN: 54
GFR NON-AFRICAN AMERICAN: 45 ML/MIN/1.73
GLUCOSE BLD-MCNC: 131 MG/DL (ref 74–99)
HBA1C MFR BLD: 6.2 % (ref 4–5.6)
HCT VFR BLD CALC: 39.1 % (ref 34–48)
HDLC SERPL-MCNC: 38 MG/DL
HEMOGLOBIN: 13.1 G/DL (ref 11.5–15.5)
IMMATURE GRANULOCYTES #: 0.02 E9/L
IMMATURE GRANULOCYTES %: 0.3 % (ref 0–5)
LDL CHOLESTEROL CALCULATED: 118 MG/DL (ref 0–99)
LYMPHOCYTES ABSOLUTE: 1.81 E9/L (ref 1.5–4)
LYMPHOCYTES RELATIVE PERCENT: 27.6 % (ref 20–42)
MCH RBC QN AUTO: 33.1 PG (ref 26–35)
MCHC RBC AUTO-ENTMCNC: 33.5 % (ref 32–34.5)
MCV RBC AUTO: 98.7 FL (ref 80–99.9)
MONOCYTES ABSOLUTE: 0.55 E9/L (ref 0.1–0.95)
MONOCYTES RELATIVE PERCENT: 8.4 % (ref 2–12)
NEUTROPHILS ABSOLUTE: 3.99 E9/L (ref 1.8–7.3)
NEUTROPHILS RELATIVE PERCENT: 61 % (ref 43–80)
PDW BLD-RTO: 12.5 FL (ref 11.5–15)
PLATELET # BLD: 193 E9/L (ref 130–450)
PMV BLD AUTO: 10.2 FL (ref 7–12)
POTASSIUM SERPL-SCNC: 4.9 MMOL/L (ref 3.5–5)
RBC # BLD: 3.96 E12/L (ref 3.5–5.5)
SODIUM BLD-SCNC: 141 MMOL/L (ref 132–146)
TOTAL PROTEIN: 7.6 G/DL (ref 6.4–8.3)
TRIGL SERPL-MCNC: 153 MG/DL (ref 0–149)
TSH SERPL DL<=0.05 MIU/L-ACNC: 0.89 UIU/ML (ref 0.27–4.2)
VITAMIN D 25-HYDROXY: 107 NG/ML (ref 30–100)
VLDLC SERPL CALC-MCNC: 31 MG/DL
WBC # BLD: 6.6 E9/L (ref 4.5–11.5)

## 2020-09-10 PROCEDURE — 80053 COMPREHEN METABOLIC PANEL: CPT

## 2020-09-10 PROCEDURE — 36415 COLL VENOUS BLD VENIPUNCTURE: CPT

## 2020-09-10 PROCEDURE — 80061 LIPID PANEL: CPT

## 2020-09-10 PROCEDURE — 82306 VITAMIN D 25 HYDROXY: CPT

## 2020-09-10 PROCEDURE — 84443 ASSAY THYROID STIM HORMONE: CPT

## 2020-09-10 PROCEDURE — 85025 COMPLETE CBC W/AUTO DIFF WBC: CPT

## 2020-09-10 PROCEDURE — 83036 HEMOGLOBIN GLYCOSYLATED A1C: CPT

## 2020-09-23 ENCOUNTER — OFFICE VISIT (OUTPATIENT)
Dept: CARDIOLOGY CLINIC | Age: 65
End: 2020-09-23
Payer: MEDICARE

## 2020-09-23 ENCOUNTER — HOSPITAL ENCOUNTER (OUTPATIENT)
Age: 65
Discharge: HOME | End: 2020-09-25
Payer: MEDICARE

## 2020-09-23 VITALS
DIASTOLIC BLOOD PRESSURE: 80 MMHG | SYSTOLIC BLOOD PRESSURE: 128 MMHG | HEART RATE: 86 BPM | RESPIRATION RATE: 16 BRPM | BODY MASS INDEX: 21.95 KG/M2 | HEIGHT: 63 IN | OXYGEN SATURATION: 94 % | WEIGHT: 123.9 LBS

## 2020-09-23 LAB — PRO-BNP: 885 PG/ML (ref 0–125)

## 2020-09-23 PROCEDURE — 99202 OFFICE O/P NEW SF 15 MIN: CPT | Performed by: INTERNAL MEDICINE

## 2020-09-23 PROCEDURE — 1090F PRES/ABSN URINE INCON ASSESS: CPT | Performed by: INTERNAL MEDICINE

## 2020-09-23 PROCEDURE — 83880 ASSAY OF NATRIURETIC PEPTIDE: CPT

## 2020-09-23 PROCEDURE — G8420 CALC BMI NORM PARAMETERS: HCPCS | Performed by: INTERNAL MEDICINE

## 2020-09-23 PROCEDURE — 93000 ELECTROCARDIOGRAM COMPLETE: CPT | Performed by: INTERNAL MEDICINE

## 2020-09-23 PROCEDURE — 36415 COLL VENOUS BLD VENIPUNCTURE: CPT | Performed by: INTERNAL MEDICINE

## 2020-09-23 PROCEDURE — G8427 DOCREV CUR MEDS BY ELIG CLIN: HCPCS | Performed by: INTERNAL MEDICINE

## 2020-09-23 RX ORDER — CARVEDILOL 12.5 MG/1
18.75 TABLET ORAL DAILY
Qty: 90 TABLET | Refills: 5 | Status: SHIPPED | OUTPATIENT
Start: 2020-09-23 | End: 2020-09-30 | Stop reason: SDUPTHER

## 2020-09-23 RX ORDER — ALBUTEROL SULFATE 90 UG/1
AEROSOL, METERED RESPIRATORY (INHALATION)
COMMUNITY
Start: 2020-09-16

## 2020-09-23 RX ORDER — VALSARTAN 80 MG/1
80 TABLET ORAL DAILY
Status: ON HOLD | COMMUNITY
End: 2021-06-30 | Stop reason: SDUPTHER

## 2020-09-23 NOTE — PROGRESS NOTES
[facility]  Ingrid Perez. Juan Quiroz M.D. Leandra Boateng M.D.  Trevon Coulter. [employer], M.D. Adrianne Givens M.D. Daniel Lindsey M.D. MD Ross Paul Nicholas Clearyjaleesa   1955  Slade Rodriguez DO      This 70-year-old woman is seen for outpatient consultation today. She has a  history of CABG in 2002. In 2004, catheterization showed occlusion of one graft. In 2012, an exercise test in 2012, was completed to 6.2 METS. There was mild angina and ischemic EKG abnormalities. Perfusion images showed inferolateral ischemia. She was noncompliant with outpatient cardiac followup after that,    She was hospitalized in July 2018. She was diagnosed with NPH. A lumbar intrathecal drain was inserted. She was seen in cardiac consultation by Dr. Lucien Alatorre.  proBNP was 2945. An echocardiogram showed a dilated LV with global hypokinesis and an estimated EF 18-28%. RVSP 56. Moderate to severe MR. She was prescribed sacubitril/valsartan, Spironolactone, Imdur and torsemide    She was hospitalizedin October 2018. A ventriculoperitoneal shunt was inserted. She underwent rehab. Coreg was reduced to 12.5  BID. Subsequently sacubitril/valsartan stopped (cost prohibitive)        Medical History:  1. CABG, 10/30/2002 (Dr. Rachel Medina), [address].  LIMA-LAD. Left radial-OM. 2. Cardiac Cath, 01/08/2004. Radial artery graft occlusion. LIMA-LAD patent. 40-50% LMC stenosis. Diffuse plaque in CX marginal system and RCA. 3. COPD requiring home 02 PRN. 4. Chronic/ongoing cigarette abuse, 40 pack years and one-half pack daily, 05/2015.  5. Surgical history: Cholecystectomy, 2006. Mild TnI elevation perioperatively. Cholecystectomy, cervical fusion, hysterectomy. 6. Pleural effusion treated with right thoracentesis. Hemothorax complication requiring thoracoscopic drainage. 7. Stress MPS, 02/07/2007. Septal hypokinesis. EF 55%. Normal perfusion. 8. Allergies:  Sulfa and Talwin. 9. Exercise MPS, 10/08/2012.  6.2 METS. 89% MPHR. EKG negative for ischemia. Pulse ox 77% on RA. Mild angina. 1.5 mm inferior ST segment depression. No TID. Moderate sized, mild severity inferolateral reversible ischemic defect. 10. Echo 10/11/2012. Global hypokinesis. EF 45%. E/A 0.64. No hemodynamically significant valvular abnormality. RVSP 38.  11. Nephrolithiasis. 12. Exercise EKG, 05/29/2015.  4.1 METS. 68% MPHR. Test stopped due to nonlimiting chest pain. 02 at room air at rest 92-94%. During exercise, 82%. 1.1 mm ST segment depression limb lead II. Positive for ischemia. 13. Neurosurgical consult 07/26/2018: Normal pressure hydrocephalus. S/p lumbar intrathecal drain 07/2018  14. Cardiac consultation  Dr. Kuldeep Eugene in July 2018. proBNP = [id number]. Echo dilated LV with global hypokinesis and an estimated EF 18-28%. RVSP 56. Moderate to severe MR. She was prescribed sacubitril/valsartan, Spironolactone, Imdur and torsemide  15. OP cardiac assessment 09/23/2020: Carvedilol increased to 18.75 BID proBNP obtained. Echo scheduled            Review of Systems:  Constitutional: negative for fever and chills  Respiratory: negative for cough and hemoptysis  Cardiovascular:   Gastrointestinal: negative for abdominal pain, diarrhea, nausea and vomiting  Genitourinary:negative for dysuria and hematuria  Derm: negative for rash and skin lesion(s)  Neurological: negative for seizures and tremors  Endocrine: negative for diabetic symptoms including polydipsia and polyuria  Musculoskeletal: negative for CTD  Psychiatric: negative for psychosis and major depression    On examination, she is a pleasant alert middle-aged  female in no distress   skin is warm and dry. Respirations are unlabored.   /80 (Site: Right Upper Arm, Position: Sitting, Cuff Size: Medium Adult)   Pulse 86   Resp 16   Ht 5' 3\" (1.6 m)   Wt 123 lb 14.4 oz (56.2 kg)   SpO2 94%   BMI 21.95 kg/m² . HEENT negative for scleral icterus. Extraocular muscles intact. No facial asymmetry or central cyanosis. Neck without masses or goiter. No bruit or JVD. Cardiac apex not displaced. Rhythm regular. Apical grade 2 blowing murmur. Diastole silent. No gallop abdomen normal.  Extremities without edema. Lungs clear    EKG today interpretation by Dr. Merlin Boys: Normal sinus with APCs. Incomplete RBBB    Today the patient reports that she is able to do all activities of daily living without dyspnea. If she walks up an incline however she becomes dyspneic. She uses O2 nocturnally. She has an ischemic cardia myopathy and is on suboptimal medications. Today baseline proBNP and echo were obtained. Coreg is increased to 18.75 twice daily. She will have cardiac follow-up in 2 weeks. There was a prolonged discussion with the patient regarding her diagnosis and the strategy of medication changes which will ensue over the next several weeks. Her records from previous hospitalizations were reviewed and summarized for her. At completion of today's visit, medications include the following:    Current Outpatient Medications:     valsartan (DIOVAN) 80 MG tablet, Take 80 mg by mouth daily, Disp: , Rfl:     albuterol sulfate  (90 Base) MCG/ACT inhaler, INHALE 2 PUFFS BY MOUTH EVERY 4 HOURS AS NEEDED, Disp: , Rfl:     tiotropium (SPIRIVA RESPIMAT) 2.5 MCG/ACT AERS inhaler, Inhale 2 puffs into the lungs daily, Disp: , Rfl:     carvedilol (COREG) 12.5 MG tablet, Take 1.5 tablets by mouth daily, Disp: 90 tablet, Rfl: 5    aspirin 325 MG tablet, Take 325 mg by mouth daily Prescribed per PCP, Disp: , Rfl:     oxyCODONE-acetaminophen (PERCOCET) 5-325 MG per tablet, Take 1 tablet by mouth every 4 hours as needed for Pain (neck pain).  Take if needed dos 01/21 ., Disp: , Rfl:     traZODone (DESYREL) 100 MG tablet, Take 1 tablet by mouth nightly, Disp: 30 tablet, Rfl: 3    rOPINIRole (REQUIP) 3 MG tablet, Take 1 tablet by mouth 2 times daily (Patient taking differently: Take 4 mg by mouth 2 times daily ), Disp: 60 tablet, Rfl: 3    linagliptin (TRADJENTA) 5 MG tablet, Take 5 mg by mouth every morning, Disp: , Rfl:     OXYGEN, Inhale 3 L into the lungs continuous, Disp: , Rfl:     spironolactone (ALDACTONE) 25 MG tablet, Take 1 tablet by mouth daily, Disp: 30 tablet, Rfl: 3    oxybutynin (DITROPAN-XL) 10 MG extended release tablet, Take 10 mg by mouth daily, Disp: , Rfl:     fluticasone (FLONASE) 50 MCG/ACT nasal spray, 1 spray by Nasal route daily as needed for Rhinitis, Disp: , Rfl:     omeprazole (PRILOSEC) 40 MG capsule,  Take 40 mg by mouth daily , Disp: , Rfl: 5    vitamin D (ERGOCALCIFEROL) 79367 UNITS CAPS capsule, Take 50,000 Units by mouth once a week THUR, Disp: , Rfl: 5    carvedilol (COREG) 12.5 MG tablet, Take 12.5 mg by mouth 2 times daily , Disp: , Rfl: 5    buPROPion (WELLBUTRIN XL) 300 MG XL tablet, Take 300 mg by mouth every morning.  , Disp: , Rfl:     isosorbide mononitrate (IMDUR) 30 MG CR tablet, Take 30 mg by mouth daily. , Disp: , Rfl:     sucralfate (CARAFATE) 1 GM tablet, Take 1 g by mouth 2 times daily, Disp: , Rfl:     ondansetron (ZOFRAN) 4 MG tablet, Take 8 mg by mouth 2 times daily States has not taken since 01/13 because makes pt tired/sleepy, Disp: , Rfl:     rosuvastatin (CRESTOR) 20 MG tablet, Take 20 mg by mouth daily. , Disp: , Rfl:       Note: This report was completed utilizing computer voice recognition software. Every effort has been made to ensure accuracy, however; inadvertent computerized transcription errors may be present.     --Karl Holguin MD on 9/23/2020 at 3:39 PM

## 2020-09-25 ENCOUNTER — HOSPITAL ENCOUNTER (OUTPATIENT)
Dept: CT IMAGING | Age: 65
Discharge: HOME | End: 2020-09-27
Payer: MEDICARE

## 2020-09-25 PROCEDURE — 70490 CT SOFT TISSUE NECK W/O DYE: CPT

## 2020-09-30 RX ORDER — CARVEDILOL 12.5 MG/1
18.75 TABLET ORAL 2 TIMES DAILY
Qty: 180 TABLET | Refills: 5 | Status: SHIPPED
Start: 2020-09-30 | End: 2020-11-04 | Stop reason: SDUPTHER

## 2020-10-02 ENCOUNTER — TELEPHONE (OUTPATIENT)
Dept: CARDIOLOGY | Age: 65
End: 2020-10-02

## 2020-10-02 NOTE — TELEPHONE ENCOUNTER
CALLED PATIENT AND LEFT MESSAGE TO SCHEDULE ECHO.     Electronically signed by Pankaj Davis on 10/2/2020 at 8:05 AM

## 2020-10-07 ENCOUNTER — HOSPITAL ENCOUNTER (OUTPATIENT)
Age: 65
Discharge: HOME | End: 2020-10-09
Payer: MEDICARE

## 2020-10-07 ENCOUNTER — OFFICE VISIT (OUTPATIENT)
Dept: CARDIOLOGY CLINIC | Age: 65
End: 2020-10-07
Payer: MEDICARE

## 2020-10-07 VITALS
DIASTOLIC BLOOD PRESSURE: 80 MMHG | WEIGHT: 122.2 LBS | RESPIRATION RATE: 16 BRPM | OXYGEN SATURATION: 93 % | HEART RATE: 77 BPM | BODY MASS INDEX: 21.65 KG/M2 | HEIGHT: 63 IN | SYSTOLIC BLOOD PRESSURE: 112 MMHG

## 2020-10-07 LAB — PRO-BNP: 627 PG/ML (ref 0–125)

## 2020-10-07 PROCEDURE — G8484 FLU IMMUNIZE NO ADMIN: HCPCS | Performed by: INTERNAL MEDICINE

## 2020-10-07 PROCEDURE — 4040F PNEUMOC VAC/ADMIN/RCVD: CPT | Performed by: INTERNAL MEDICINE

## 2020-10-07 PROCEDURE — G8420 CALC BMI NORM PARAMETERS: HCPCS | Performed by: INTERNAL MEDICINE

## 2020-10-07 PROCEDURE — 1123F ACP DISCUSS/DSCN MKR DOCD: CPT | Performed by: INTERNAL MEDICINE

## 2020-10-07 PROCEDURE — 93000 ELECTROCARDIOGRAM COMPLETE: CPT | Performed by: INTERNAL MEDICINE

## 2020-10-07 PROCEDURE — 99213 OFFICE O/P EST LOW 20 MIN: CPT | Performed by: INTERNAL MEDICINE

## 2020-10-07 PROCEDURE — 3017F COLORECTAL CA SCREEN DOC REV: CPT | Performed by: INTERNAL MEDICINE

## 2020-10-07 PROCEDURE — 1036F TOBACCO NON-USER: CPT | Performed by: INTERNAL MEDICINE

## 2020-10-07 PROCEDURE — 1090F PRES/ABSN URINE INCON ASSESS: CPT | Performed by: INTERNAL MEDICINE

## 2020-10-07 PROCEDURE — G8400 PT W/DXA NO RESULTS DOC: HCPCS | Performed by: INTERNAL MEDICINE

## 2020-10-07 PROCEDURE — 83880 ASSAY OF NATRIURETIC PEPTIDE: CPT

## 2020-10-07 PROCEDURE — G8427 DOCREV CUR MEDS BY ELIG CLIN: HCPCS | Performed by: INTERNAL MEDICINE

## 2020-10-07 NOTE — PROGRESS NOTES
[facility]  Hanh Lesser. Lisa Dalton M.D. Diallo Ramos M.D.  Mable Block. Deidra Bergman M.D. Veneta Portela, Darlena Bloch, M.D. Denise Nickerson M.D. Donna Barber MD                Hosea Garcia Rito   1955  Jorge EnriqueDO      This 76-year-old woman is seen today for outpatient cardiac follow-up. She overall feels well although she is chronically tired and experiences occasional dyspnea. She has a  history of CABG in 2002.  In 2004, catheterization showed occlusion of one graft.  In 2012, an exercise test in 2012, was completed to 6.2 METS.  There was mild angina and ischemic EKG abnormalities.  Perfusion images showed inferolateral ischemia.  She was noncompliant with outpatient cardiac followup after that,     She was hospitalized in July 2018. She was diagnosed with NPH. A lumbar intrathecal drain was inserted. She was seen in cardiac consultation by Dr. Black Dale.  proBNP was 2856. An echocardiogram showed a dilated LV with global hypokinesis and an estimated EF 18-28%. RVSP 56. Moderate to severe MR. She was prescribed sacubitril/valsartan, Spironolactone, Imdur and torsemide     She was hospitalizedin October 2018. A ventriculoperitoneal shunt was inserted. She underwent rehab. Coreg was reduced to 12.5  BID. Subsequently sacubitril/valsartan stopped (cost prohibitive)      Medical History:  1. CABG, 10/30/2002 (Dr. Bhavya Henry), [facility] [address].  Left radial-OM. 2. Cardiac Cath, 01/08/2004.  Radial artery graft occlusion.  LIMA-LAD patent.  40-50% LMC stenosis.  Diffuse plaque in CX marginal system and RCA. 3. COPD requiring home 02 PRN.    4. Chronic/ongoing cigarette abuse, 40 pack years and one-half pack daily, 05/2015.  5. Surgical history: Cholecystectomy, 2006.  Mild TnI elevation perioperatively. Cholecystectomy, cervical fusion, hysterectomy.   6. Pleural effusion treated with right thoracentesis.  Hemothorax complication requiring thoracoscopic drainage.    7. Stress MPS, 02/07/2007.  Septal hypokinesis.  EF 55%.  Normal perfusion.    8. Allergies:  Sulfa and Talwin. 9. Exercise MPS, 10/08/2012.  6.2 METS.  89% MPHR.  EKG negative for ischemia.  Pulse ox 77% on RA.  Mild angina.  1.5 mm inferior ST segment depression.  No TID.  Moderate sized, mild severity inferolateral reversible ischemic defect. 10. Echo 10/11/2012.  Global hypokinesis.  EF 45%.  E/A 0.64.  No hemodynamically significant valvular abnormality.  RVSP 38.  11. Nephrolithiasis.    12. Exercise EKG, 05/29/2015.  4.1 METS.  68% MPHR.  Test stopped due to nonlimiting chest pain.  02 at room air at rest 92-94%.  During exercise, 82%.  1.1 mm ST segment depression limb lead II.  Positive for ischemia.      13. Neurosurgical consult 07/26/2018: Normal pressure hydrocephalus. S/p lumbar intrathecal drain 07/2018  14. Cardiac consultation  Dr. Bre Lin 08/2018. proBNP = [id number]. Echo dilated LV with global hypokinesis and ~ EF 18-28%. RVSP 56. Moderate to severe MR. rx sacubitril/valsartan, Spironolactone, Imdur and torsemide  15. OP cardiac assessment 09/23/2020: Carvedilol increased to 18.75 BID proBNP obtained. Echo scheduled  16. Labs 09/23/2020: proBNP 885. On zero 9/0 10/2020, BUN was 25 and creatinine 1.2    Review of Systems:  Constitutional: negative for fever and chills  Respiratory: negative for cough and hemoptysis  Cardiovascular:   Gastrointestinal: negative for abdominal pain, diarrhea, nausea and vomiting  Genitourinary:negative for dysuria and hematuria  Derm: negative for rash and skin lesion(s)  Neurological: negative for seizures and tremors  Endocrine: negative for diabetic symptoms including polydipsia and polyuria  Musculoskeletal: negative for CTD  Psychiatric: negative for psychosis and major depression    On examination, she is an alert pleasant middle-age  female who appears her stated age and is in no distress   skin is warm and dry.    Respirations are unlabored. /80 (Site: Right Upper Arm, Position: Sitting, Cuff Size: Medium Adult)   Pulse 77   Resp 16   Ht 5' 2.5\" (1.588 m)   Wt 122 lb 3.2 oz (55.4 kg)   SpO2 93%   BMI 21.99 kg/m² . HEENT negative for scleral icterus. Extraocular muscles intact. No facial asymmetry or central cyanosis. Neck without masses or goiter. No bruit or JVD. Cardiac apex not displaced. Rhythm regular. Abdomen normal.  Extremities without edema. Lungs are clear    EKG today shows sinus rhythm 72/min. Isolated PVCs. Nonspecific septal T abnormality. Nonspecific IVCD. No medication changes will be made today except for Coreg which she will use as 12.5 mgTID for convenience. At completion of today's visit, medications include the following:    Current Outpatient Medications:     carvedilol (COREG) 12.5 MG tablet, Take 1.5 tablets by mouth 2 times daily, Disp: 180 tablet, Rfl: 5    valsartan (DIOVAN) 80 MG tablet, Take 80 mg by mouth daily, Disp: , Rfl:     albuterol sulfate  (90 Base) MCG/ACT inhaler, INHALE 2 PUFFS BY MOUTH EVERY 4 HOURS AS NEEDED, Disp: , Rfl:     aspirin 325 MG tablet, Take 325 mg by mouth daily Prescribed per PCP, Disp: , Rfl:     oxyCODONE-acetaminophen (PERCOCET) 5-325 MG per tablet, Take 1 tablet by mouth every 4 hours as needed for Pain (neck pain).  Take if needed dos 01/21 ., Disp: , Rfl:     traZODone (DESYREL) 100 MG tablet, Take 1 tablet by mouth nightly, Disp: 30 tablet, Rfl: 3    rOPINIRole (REQUIP) 3 MG tablet, Take 1 tablet by mouth 2 times daily (Patient taking differently: Take 4 mg by mouth 2 times daily ), Disp: 60 tablet, Rfl: 3    linagliptin (TRADJENTA) 5 MG tablet, Take 5 mg by mouth every morning, Disp: , Rfl:     OXYGEN, Inhale 3 L into the lungs continuous, Disp: , Rfl:     spironolactone (ALDACTONE) 25 MG tablet, Take 1 tablet by mouth daily, Disp: 30 tablet, Rfl: 3    oxybutynin (DITROPAN-XL) 10 MG extended release tablet, Take 10 mg by mouth daily, Disp: , Rfl:     fluticasone (FLONASE) 50 MCG/ACT nasal spray, 1 spray by Nasal route daily as needed for Rhinitis, Disp: , Rfl:     omeprazole (PRILOSEC) 40 MG capsule,  Take 40 mg by mouth daily , Disp: , Rfl: 5    vitamin D (ERGOCALCIFEROL) 93257 UNITS CAPS capsule, Take 50,000 Units by mouth once a week THUR, Disp: , Rfl: 5    buPROPion (WELLBUTRIN XL) 300 MG XL tablet, Take 300 mg by mouth every morning.  , Disp: , Rfl:     isosorbide mononitrate (IMDUR) 30 MG CR tablet, Take 30 mg by mouth daily. , Disp: , Rfl:     tiotropium (SPIRIVA RESPIMAT) 2.5 MCG/ACT AERS inhaler, Inhale 2 puffs into the lungs daily, Disp: , Rfl:     sucralfate (CARAFATE) 1 GM tablet, Take 1 g by mouth 2 times daily, Disp: , Rfl:     ondansetron (ZOFRAN) 4 MG tablet, Take 8 mg by mouth 2 times daily States has not taken since 01/13 because makes pt tired/sleepy, Disp: , Rfl:     carvedilol (COREG) 12.5 MG tablet, Take 12.5 mg by mouth 2 times daily , Disp: , Rfl: 5    rosuvastatin (CRESTOR) 20 MG tablet, Take 20 mg by mouth daily. , Disp: , Rfl:       Note: This report was completed utilizing computer voice recognition software. Every effort has been made to ensure accuracy, however; inadvertent computerized transcription errors may be present.     --Wolf Drew MD on 10/7/2020 at 4:01 PM

## 2020-10-19 ENCOUNTER — TELEPHONE (OUTPATIENT)
Dept: CARDIOLOGY | Age: 65
End: 2020-10-19

## 2020-10-20 ENCOUNTER — HOSPITAL ENCOUNTER (OUTPATIENT)
Dept: CARDIOLOGY | Age: 65
Discharge: HOME | End: 2020-10-20
Payer: MEDICARE

## 2020-10-20 LAB
LV EF: 43 %
LVEF MODALITY: NORMAL

## 2020-10-20 PROCEDURE — 93306 TTE W/DOPPLER COMPLETE: CPT

## 2020-10-26 ENCOUNTER — TELEPHONE (OUTPATIENT)
Dept: CARDIOLOGY CLINIC | Age: 65
End: 2020-10-26

## 2020-10-26 NOTE — TELEPHONE ENCOUNTER
Patient returned our call and was given results and recommendations per Dr. Antonio Hagen. She verbalized understanding. Letter forwarded to Dr. Mike López.

## 2020-11-04 ENCOUNTER — OFFICE VISIT (OUTPATIENT)
Dept: CARDIOLOGY CLINIC | Age: 65
End: 2020-11-04
Payer: MEDICARE

## 2020-11-04 VITALS
BODY MASS INDEX: 21.79 KG/M2 | RESPIRATION RATE: 16 BRPM | DIASTOLIC BLOOD PRESSURE: 68 MMHG | HEIGHT: 63 IN | WEIGHT: 123 LBS | HEART RATE: 73 BPM | SYSTOLIC BLOOD PRESSURE: 110 MMHG

## 2020-11-04 PROCEDURE — 1090F PRES/ABSN URINE INCON ASSESS: CPT | Performed by: INTERNAL MEDICINE

## 2020-11-04 PROCEDURE — G8400 PT W/DXA NO RESULTS DOC: HCPCS | Performed by: INTERNAL MEDICINE

## 2020-11-04 PROCEDURE — G8484 FLU IMMUNIZE NO ADMIN: HCPCS | Performed by: INTERNAL MEDICINE

## 2020-11-04 PROCEDURE — G8427 DOCREV CUR MEDS BY ELIG CLIN: HCPCS | Performed by: INTERNAL MEDICINE

## 2020-11-04 PROCEDURE — 99213 OFFICE O/P EST LOW 20 MIN: CPT | Performed by: INTERNAL MEDICINE

## 2020-11-04 PROCEDURE — 1036F TOBACCO NON-USER: CPT | Performed by: INTERNAL MEDICINE

## 2020-11-04 PROCEDURE — 4040F PNEUMOC VAC/ADMIN/RCVD: CPT | Performed by: INTERNAL MEDICINE

## 2020-11-04 PROCEDURE — 3017F COLORECTAL CA SCREEN DOC REV: CPT | Performed by: INTERNAL MEDICINE

## 2020-11-04 PROCEDURE — 1123F ACP DISCUSS/DSCN MKR DOCD: CPT | Performed by: INTERNAL MEDICINE

## 2020-11-04 PROCEDURE — 93000 ELECTROCARDIOGRAM COMPLETE: CPT | Performed by: INTERNAL MEDICINE

## 2020-11-04 PROCEDURE — G8420 CALC BMI NORM PARAMETERS: HCPCS | Performed by: INTERNAL MEDICINE

## 2020-11-04 RX ORDER — CARVEDILOL 25 MG/1
25 TABLET ORAL 2 TIMES DAILY WITH MEALS
Qty: 180 TABLET | Refills: 3 | Status: ON HOLD
Start: 2020-11-04 | End: 2021-06-30 | Stop reason: HOSPADM

## 2020-11-04 NOTE — PROGRESS NOTES
[facility]  Sherry Newman. Roxana Bran M.D. Frida Haas M.D.  Pascual Medeiros. Cristofer Razo M.D. Carmella Carrera M.D. Meaghan Barnes M.D. Ryland Fleischer, MD                So Escudero Budd   1955  Naz Wheatley DO      This 69-year-old woman is seen today for outpatient cardiac follow-up. She had recent increase in carvedilol to 12.5 mg TID for treatment of HFrEF. This medication changes been well-tolerated and she denies any chest pain or dyspnea. She has a  history of CABG in 2002.  In 2004, catheterization showed occlusion of one graft.  In 2012, an exercise test in 2012, was completed to 6.2 METS.  There was mild angina and ischemic EKG abnormalities.  Perfusion images showed inferolateral ischemia.  She was noncompliant with outpatient cardiac followup after that,     She was hospitalized in July 2018.  She was diagnosed with NPH.  A lumbar intrathecal drain was inserted.  She was seen in cardiac consultation by Dr. Stefanie Cao. [facility] May was [id number].  An echocardiogram showed a dilated LV with global hypokinesis and an estimated EF 18-28%.  RVSP 56.  Moderate to severe MR. Miguel Palomo was prescribed sacubitril/valsartan, Spironolactone, Imdur and torsemide     She was hospitalizedin October 2018.  A ventriculoperitoneal shunt was inserted.  She underwent rehab.  Coreg was reduced to 12.5  BID.  Subsequently sacubitril/valsartan stopped (cost prohibitive)       History:  1. CABG, 10/30/2002 (Dr. Gerard Bull), [facility] [address].  Left radial-OM.    2. Cardiac Cath, 01/08/2004.  Radial artery graft occlusion.  LIMA-LAD patent.  40-50% LMC stenosis.  Diffuse plaque in CX marginal system and RCA. 3. COPD requiring home 02 PRN.    4. Chronic/ongoing cigarette abuse, 40 pack years and one-half pack daily, 05/2015.  5. Surgical history: Cholecystectomy, 2006.  Mild TnI elevation perioperatively.  Cholecystectomy, cervical fusion, hysterectomy.   6. Pleural effusion treated with right thoracentesis.  Hemothorax complication requiring thoracoscopic drainage.    7. Stress MPS, 02/07/2007.  Septal hypokinesis.  EF 55%.  Normal perfusion.    8. Allergies:  Sulfa and Talwin. 9. Exercise MPS, 10/08/2012.  6.2 METS.  89% MPHR.  EKG negative for ischemia.  Pulse ox 77% on RA.  Mild angina.  1.5 mm inferior ST segment depression.  No TID.  Moderate sized, mild severity inferolateral reversible ischemic defect. 10. Echo 10/11/2012.  Global hypokinesis.  EF 45%.  E/A 0.64.  No hemodynamically significant valvular abnormality.  RVSP 38.  11. Nephrolithiasis.    12. Exercise EKG, 05/29/2015.  4.1 METS.  68% MPHR.  Test stopped due to nonlimiting chest pain.  02 at room air at rest 92-94%.  During exercise, 82%.  1.1 mm ST segment depression limb lead II.  Positive for ischemia.      13. Neurosurgical consult 07/26/2018: Normal pressure hydrocephalus.  S/p lumbar intrathecal drain 07/2018  14. Cardiac consultation  Dr. Lauren Pritchard 08/2018. Suzanna Lopez = [id number]. Echo dilated LV with global hypokinesis and ~ EF 18-28%.  RVSP 56.  Moderate to severe MR. rx sacubitril/valsartan, Spironolactone, Imdur and torsemide  15. OP cardiac assessment 09/23/2020: Carvedilol increased to 18.75 BID proBNP obtained. 16. Labs, 09/23/2020. ProBNP 885. On 09/10/2020, BUN was 25 and creatinine 1.2.  17. Echo, 10/20/2020. LV mildly dilated.  Global hypokinesis.  Biplane EF 43%.  Atrium not dilated.  RVSP 51.  Moderate MR with central/posterior jet. 18. OP cardiac assessment 11/04/2020: Asymptomatic. Carvedilol increased to 25 BID. Imdur stopped          .     Review of Systems:  Constitutional: negative for fever and chills  Respiratory: negative for cough and hemoptysis  Cardiovascular:   Gastrointestinal: negative for abdominal pain, diarrhea, nausea and vomiting  Genitourinary:negative for dysuria and hematuria  Derm: negative for rash and skin lesion(s)  Neurological: negative for seizures and tremors  Endocrine: negative for diabetic symptoms including polydipsia and polyuria  Musculoskeletal: negative for CTD  Psychiatric: negative for psychosis and major depression    On examination, she is an alert pleasant middle-aged female in no distress skin is warm and dry. Respirations are unlabored. /68   Pulse 73   Resp 16   Ht 5' 3\" (1.6 m)   Wt 123 lb (55.8 kg)   BMI 21.79 kg/m² . HEENT negative for scleral icterus. Extraocular muscles intact. No facial asymmetry or central cyanosis. Neck without masses or goiter. No bruit or JVD. Cardiac apex not displaced. Rhythm regular. Abdomen normal.  Extremities without edema. Lungs are clear    EKG today per Dr. Bird Both interpretation:    EKG: Normal sinus rhythm. RBBB    Today carvedilol is increased to 25 mg twice daily. Imdur will be stopped. She will have follow-up in approximately 6-8 weeks    At completion of today's visit, medications include the following:    Current Outpatient Medications:     carvedilol (COREG) 25 MG tablet, Take 1 tablet by mouth 2 times daily (with meals), Disp: 180 tablet, Rfl: 3    valsartan (DIOVAN) 80 MG tablet, Take 80 mg by mouth daily, Disp: , Rfl:     albuterol sulfate  (90 Base) MCG/ACT inhaler, INHALE 2 PUFFS BY MOUTH EVERY 4 HOURS AS NEEDED, Disp: , Rfl:     aspirin 325 MG tablet, Take 325 mg by mouth daily Prescribed per PCP, Disp: , Rfl:     oxyCODONE-acetaminophen (PERCOCET) 5-325 MG per tablet, Take 1 tablet by mouth every 4 hours as needed for Pain (neck pain).  Take if needed dos 01/21 ., Disp: , Rfl:     traZODone (DESYREL) 100 MG tablet, Take 1 tablet by mouth nightly, Disp: 30 tablet, Rfl: 3    rOPINIRole (REQUIP) 3 MG tablet, Take 1 tablet by mouth 2 times daily (Patient taking differently: Take 4 mg by mouth 2 times daily ), Disp: 60 tablet, Rfl: 3    linagliptin (TRADJENTA) 5 MG tablet, Take 5 mg by mouth every morning, Disp: , Rfl:     OXYGEN, Inhale 3 L into the lungs continuous, Disp: , Rfl:     spironolactone (ALDACTONE) 25 MG tablet, Take 1 tablet by mouth daily, Disp: 30 tablet, Rfl: 3    oxybutynin (DITROPAN-XL) 10 MG extended release tablet, Take 10 mg by mouth daily, Disp: , Rfl:     fluticasone (FLONASE) 50 MCG/ACT nasal spray, 1 spray by Nasal route daily as needed for Rhinitis, Disp: , Rfl:     omeprazole (PRILOSEC) 40 MG capsule,  Take 40 mg by mouth daily , Disp: , Rfl: 5    vitamin D (ERGOCALCIFEROL) 49131 UNITS CAPS capsule, Take 50,000 Units by mouth once a week THUR, Disp: , Rfl: 5    carvedilol (COREG) 12.5 MG tablet, Take 12.5 mg by mouth 3 times daily , Disp: , Rfl: 5    buPROPion (WELLBUTRIN XL) 300 MG XL tablet, Take 300 mg by mouth every morning.  , Disp: , Rfl:     tiotropium (SPIRIVA RESPIMAT) 2.5 MCG/ACT AERS inhaler, Inhale 2 puffs into the lungs daily, Disp: , Rfl:     sucralfate (CARAFATE) 1 GM tablet, Take 1 g by mouth 2 times daily, Disp: , Rfl:     ondansetron (ZOFRAN) 4 MG tablet, Take 8 mg by mouth 2 times daily States has not taken since 01/13 because makes pt tired/sleepy, Disp: , Rfl:     rosuvastatin (CRESTOR) 20 MG tablet, Take 20 mg by mouth daily. , Disp: , Rfl:       Note: This report was completed utilizing computer voice recognition software. Every effort has been made to ensure accuracy, however; inadvertent computerized transcription errors may be present.     --Cher Clemente MD on 11/4/2020 at 2:15 PM

## 2020-12-15 ENCOUNTER — HOSPITAL ENCOUNTER (OUTPATIENT)
Dept: MRI IMAGING | Age: 65
Discharge: HOME | End: 2020-12-17
Payer: MEDICARE

## 2020-12-15 PROCEDURE — 72141 MRI NECK SPINE W/O DYE: CPT

## 2021-01-07 ENCOUNTER — HOSPITAL ENCOUNTER (OUTPATIENT)
Dept: GENERAL RADIOLOGY | Age: 66
Discharge: HOME | End: 2021-01-09
Payer: MEDICARE

## 2021-01-07 ENCOUNTER — HOSPITAL ENCOUNTER (OUTPATIENT)
Dept: NUCLEAR MEDICINE | Age: 66
Discharge: HOME | End: 2021-01-09
Payer: MEDICARE

## 2021-01-07 DIAGNOSIS — G91.2 NPH (NORMAL PRESSURE HYDROCEPHALUS) (HCC): ICD-10-CM

## 2021-01-07 DIAGNOSIS — M47.812 CERVICAL SPONDYLOSIS: ICD-10-CM

## 2021-01-07 LAB
APPEARANCE CSF: CLEAR
COLOR CSF: COLORLESS
MONOCYTE, CSF: 100 % (ref 10–70)
NEUTROPHILS, CSF: 0 % (ref 0–10)
PROTEIN CSF: 38 MG/DL (ref 15–40)
RBC CSF: <2000 /UL
TUBE NUMBER CSF: ABNORMAL
WBC CSF: <3 /UL (ref 0–2)

## 2021-01-07 PROCEDURE — 78645 CSF SHUNT EVALUATION: CPT | Performed by: RADIOLOGY

## 2021-01-07 PROCEDURE — 3430000000 HC RX DIAGNOSTIC RADIOPHARMACEUTICAL

## 2021-01-07 PROCEDURE — 78645 CSF SHUNT EVALUATION: CPT

## 2021-01-07 PROCEDURE — 3430000000 HC RX DIAGNOSTIC RADIOPHARMACEUTICAL: Performed by: RADIOLOGY

## 2021-01-07 PROCEDURE — 84157 ASSAY OF PROTEIN OTHER: CPT

## 2021-01-07 PROCEDURE — A9512 TC99M PERTECHNETATE: HCPCS

## 2021-01-07 PROCEDURE — A9512 TC99M PERTECHNETATE: HCPCS | Performed by: RADIOLOGY

## 2021-01-07 PROCEDURE — 72040 X-RAY EXAM NECK SPINE 2-3 VW: CPT

## 2021-01-07 PROCEDURE — 89051 BODY FLUID CELL COUNT: CPT

## 2021-01-07 RX ADMIN — Medication 5 MILLICURIE: at 12:23

## 2021-01-19 ENCOUNTER — TELEPHONE (OUTPATIENT)
Dept: CARDIOLOGY CLINIC | Age: 66
End: 2021-01-19

## 2021-01-19 NOTE — TELEPHONE ENCOUNTER
Patient to have revision of ventriculoperitoneal shunt by Dr. Jae Bramblia (not yet scheduled). They are asking for cardiac clearance. Clearance request scanned into Media. Please advise.

## 2021-01-21 ENCOUNTER — OFFICE VISIT (OUTPATIENT)
Dept: CARDIOLOGY CLINIC | Age: 66
End: 2021-01-21
Payer: MEDICARE

## 2021-01-21 VITALS
WEIGHT: 126.6 LBS | RESPIRATION RATE: 16 BRPM | BODY MASS INDEX: 22.43 KG/M2 | HEART RATE: 78 BPM | SYSTOLIC BLOOD PRESSURE: 132 MMHG | DIASTOLIC BLOOD PRESSURE: 75 MMHG | HEIGHT: 63 IN

## 2021-01-21 DIAGNOSIS — I34.0 MITRAL VALVE INSUFFICIENCY, UNSPECIFIED ETIOLOGY: ICD-10-CM

## 2021-01-21 DIAGNOSIS — I25.10 CORONARY ARTERY DISEASE INVOLVING NATIVE CORONARY ARTERY OF NATIVE HEART WITHOUT ANGINA PECTORIS: ICD-10-CM

## 2021-01-21 DIAGNOSIS — G91.2 NPH (NORMAL PRESSURE HYDROCEPHALUS) (HCC): ICD-10-CM

## 2021-01-21 DIAGNOSIS — I50.22 CHRONIC SYSTOLIC HEART FAILURE (HCC): ICD-10-CM

## 2021-01-21 DIAGNOSIS — Z01.810 PREOP CARDIOVASCULAR EXAM: Primary | ICD-10-CM

## 2021-01-21 PROCEDURE — G8427 DOCREV CUR MEDS BY ELIG CLIN: HCPCS | Performed by: INTERNAL MEDICINE

## 2021-01-21 PROCEDURE — 1123F ACP DISCUSS/DSCN MKR DOCD: CPT | Performed by: INTERNAL MEDICINE

## 2021-01-21 PROCEDURE — G8420 CALC BMI NORM PARAMETERS: HCPCS | Performed by: INTERNAL MEDICINE

## 2021-01-21 PROCEDURE — 99215 OFFICE O/P EST HI 40 MIN: CPT | Performed by: INTERNAL MEDICINE

## 2021-01-21 PROCEDURE — 93000 ELECTROCARDIOGRAM COMPLETE: CPT | Performed by: INTERNAL MEDICINE

## 2021-01-21 PROCEDURE — G8484 FLU IMMUNIZE NO ADMIN: HCPCS | Performed by: INTERNAL MEDICINE

## 2021-01-21 PROCEDURE — G8400 PT W/DXA NO RESULTS DOC: HCPCS | Performed by: INTERNAL MEDICINE

## 2021-01-21 PROCEDURE — 3017F COLORECTAL CA SCREEN DOC REV: CPT | Performed by: INTERNAL MEDICINE

## 2021-01-21 PROCEDURE — 1036F TOBACCO NON-USER: CPT | Performed by: INTERNAL MEDICINE

## 2021-01-21 PROCEDURE — 4040F PNEUMOC VAC/ADMIN/RCVD: CPT | Performed by: INTERNAL MEDICINE

## 2021-01-21 PROCEDURE — 1090F PRES/ABSN URINE INCON ASSESS: CPT | Performed by: INTERNAL MEDICINE

## 2021-01-21 NOTE — PROGRESS NOTES
tablet 3    linagliptin (TRADJENTA) 5 MG tablet Take 5 mg by mouth every morning      OXYGEN Inhale 3 L into the lungs continuous      spironolactone (ALDACTONE) 25 MG tablet Take 1 tablet by mouth daily 30 tablet 3    oxybutynin (DITROPAN-XL) 10 MG extended release tablet Take 10 mg by mouth daily      fluticasone (FLONASE) 50 MCG/ACT nasal spray 1 spray by Nasal route daily as needed for Rhinitis      omeprazole (PRILOSEC) 40 MG capsule   Take 40 mg by mouth daily   5    vitamin D (ERGOCALCIFEROL) 79463 UNITS CAPS capsule Take 50,000 Units by mouth once a week THUR  5    buPROPion (WELLBUTRIN XL) 300 MG XL tablet Take 300 mg by mouth every morning.  carvedilol (COREG) 12.5 MG tablet Take 12.5 mg by mouth 3 times daily   5    rosuvastatin (CRESTOR) 20 MG tablet Take 20 mg by mouth daily. No current facility-administered medications for this visit.          Allergies   Allergen Reactions    Sulfa Antibiotics Anaphylaxis    Pentazocine Lactate Nausea And Vomiting       Vitals:    21 0750   BP: 132/75   Pulse: 78   Resp: 16   Weight: 126 lb 9.6 oz (57.4 kg)   Height: 5' 3\" (1.6 m)       Social History     Socioeconomic History    Marital status:      Spouse name: Not on file    Number of children: Not on file    Years of education: Not on file    Highest education level: Not on file   Occupational History    Not on file   Social Needs    Financial resource strain: Not on file    Food insecurity     Worry: Not on file     Inability: Not on file   [employer] needs     Medical: Not on file     Non-medical: Not on file   Tobacco Use    Smoking status: Former Smoker     Packs/day: 0.50     Years: 40.00     Pack years: 20.00     Types: Cigarettes     Quit date: 2018     Years since quittin.4    Smokeless tobacco: Never Used   Substance and Sexual Activity    Alcohol use: No    Drug use: No    Sexual activity: Not on file   Lifestyle    Physical activity Days per week: Not on file     Minutes per session: Not on file    Stress: Not on file   Relationships    Social connections     Talks on phone: Not on file     Gets together: Not on file     Attends [employer] service: Not on file     Active member of club or organization: Not on file     Attends meetings of clubs or organizations: Not on file     Relationship status: Not on file    Intimate partner violence     Fear of current or ex partner: Not on file     Emotionally abused: Not on file     Physically abused: Not on file     Forced sexual activity: Not on file   Other Topics Concern    Not on file   Social History Narrative    Not on file       Family History   Problem Relation Age of Onset    High Blood Pressure Mother     Diabetes Mother     Emphysema Father     Heart Attack Sister     Heart Failure Sister          SUBJECTIVE: Segunelizabeth Lira presents to the office today for consult - dr. Fred Toro - for pre-po evaluation prior to  shunt revision. DR Carlos Ledger PATIENT. I reviewed his records and recent labs. .  She complains of no new or unstable cardiac symptoms and denies   chest pain, chest pressure/discomfort, claudication, dyspnea, exertional chest pressure/discomfort, fatigue, irregular heart beat, lower extremity edema, near-syncope, orthopnea, palpitations, paroxysmal nocturnal dyspnea, syncope, tachypnea and no drop in functional capacity. if anything, she feels better than november when last seen. Labs show decline in BNP progressively over last year from > 6500 to 627. Not on statin due to finances. Takes no loop diuretic. Review of Systems:   Heart: as above   Lungs: as above   Eyes: denies changes in vision or discharge. Ears: denies changes in hearing or pain. Nose: denies epistaxis or masses   Throat: denies sore throat or trouble swallowing. Neuro: denies numbness, tingling, tremors. Skin: denies rashes or itching.    : denies hematuria, dysuria   GI: denies vomiting, diarrhea   Psych: denies mood changed, anxiety, depression. all others negative. Physical Exam   /75   Pulse 78   Resp 16   Ht 5' 3\" (1.6 m)   Wt 126 lb 9.6 oz (57.4 kg)   BMI 22.43 kg/m²   Constitutional: Oriented to person, place, and time. Well-developed and well-nourished. No distress. Head: Normocephalic and atraumatic. Eyes: EOM are normal. Pupils are equal, round, and reactive to light. Neck: Normal range of motion. Neck supple. No hepatojugular reflux and no JVD present. Carotid bruit is not present. No tracheal deviation present. No thyromegaly present. Cardiovascular: Normal rate, regular rhythm, normal heart sounds and intact distal pulses. Exam reveals no gallop and no friction rub. No murmur heard. Pulmonary/Chest: Effort normal and breath sounds normal. No respiratory distress. No wheezes. No rales. No tenderness. Abdominal: Soft. Bowel sounds are normal. No distension and no mass. No tenderness. No rebound and no guarding. Musculoskeletal: Normal range of motion. No edema and no tenderness. Neurological: Alert and oriented to person, place, and time. Skin: Skin is warm and dry. No rash noted. Not diaphoretic. No erythema. Psychiatric: Normal mood and affect. Behavior is normal.     EKG:  normal sinus rhythm, rate 78 bpm, RBBB with LAD, unchanged from previous tracings.     ASSESSMENT AND PLAN:  Patient Active Problem List   Diagnosis    CAD (coronary artery disease)    Pulmonary hypertension ([facility] [address]    COPD [facility]chronic obstructive pulmonary disease) ([facility]    CKD (chronic kidney disease) stage 3, GFR 30-59 ml/min    Normal pressure hydrocephalus (HCC)    Chronic systolic congestive heart failure (HCC)    Severe mitral regurgitation     Patient with multiple cardiac and non cardiac diagnoses  Hx of CAD with ACB and cath documented occlusion of graft to CX, patent LIMA to LAD, RCA never bypassed  Last stress several years ago ischemic, but no symptoms of angina currently  Hx of HFrEF, Stage C, NYHA I, euvolemic, with dramatic drop in BNP, and rise in EF from 28-43% with GDMT  Hx of significant MR, functional, lessened of late, and no murmur today    OK for surgery at low risk   OK to suspend ASA usage 7 days before          The patient was educated as to the symptoms that would require a prompt return to our office. Return visit in 3 months with dr Clemencia Bacon .     Elissa Arrieta M.D  [facility]

## 2021-01-26 NOTE — H&P
Department of Neurosurgery  Attending Pre-operative History and Physical        DIAGNOSIS:  Normal Pressure Hydrocephalus    INDICATION:  Failure of  shunt. PROCEDURE:  Replacement of Ventriculoperitoneal Shunt    CHIEF COMPLAINT:  Dizziness, confusion with poor memory, shuffled gait, urinary incontinence, ventriculomegaly, NM shunt eval. Reveals   shunt not draining. History Obtained From:  patient    HISTORY OF PRESENT ILLNESS:      The patient is a 76 y.o. female with significant past medical history of NPH who presents with suboptimally functioning of  shunt.     Past Medical History:        Diagnosis Date    Acid reflux disease     Acute on chronic respiratory failure with hypoxia and hypercapnia ([facility]) 7/25/2018    Apraxia 7/23/2018    Arthritis     Ataxia 7/23/2018    CAD (coronary artery disease) 10/4/2012    Choledocholithiasis     recurrent    Chronic systolic congestive heart failure ([facility]) 8/1/2018    Ejection fraction 23% plus/minus 5%    CKD (chronic kidney disease) stage 3, GFR 30-59 ml/min 7/23/2018    COPD (chronic obstructive pulmonary disease) ([facility])     alpha one M1S 183mg/dl    Diabetes mellitus ([address]     Hyperlipidemia     Hypoxia 7/23/2018    Neck pain     Normal pressure hydrocephalus ([facility]) 7/28/2018    Oxygen dependent     3l/min/nc    Pleural effusion years ago    requiring right thoracentesis    Pulmonary hypertension ([address] 7/23/2018    Restless legs     S/P CABG x 2 10/4/2012    Severe mitral regurgitation 8/1/2018     Past Surgical History:        Procedure Laterality Date   [address]    C3-C6    CHOLECYSTECTOMY  2002    COLONOSCOPY N/A 1/21/2019    COLONOSCOPY POLYPECTOMY SNARE/COLD BIOPSY performed by Skye Vickers MD at [place] [address]  10/30/2002    [address] [place] [address]  07/27/2018    resolved   [address] N/A 10/15/2018 VENTRICULAR PERITONEAL SHUNT  PLACEMENT performed by Bj Murphy MD at [address] CSF N/A 7/27/2018    LUMBAR DRAIN INSERTION performed by Bj Murphy MD at [address]     Medications Prior to Admission:   No medications prior to admission. Allergies:  Sulfa antibiotics and Pentazocine lactate  History of allergic reaction to anesthesia:  No      Social History:   TOBACCO:  Former smoker. Type of tobacco used:  Cigarettes. Quantity per day:  .5 pack(s). Duration of tobacco use:  20+ years. Approximate Quit Date:  7/24/2018. Family History:       Problem Relation Age of Onset    High Blood Pressure Mother     Diabetes Mother     Emphysema Father     Heart Attack Sister     Heart Failure Sister      REVIEW OF SYSTEMS:  CONSTITUTIONAL:  negative  PHYSICAL EXAM:  Eyes:  Lids and lashes normal, pupils equal, round and reactive to light, extra ocular muscles intact, sclera clear, conjunctiva normal    Head/ENT:  Normocephalic, without obvious abnormality, atraumatic, sinuses nontender on palpation, external ears without lesions, oral pharynx with moist mucus membranes, tonsils without erythema or exudates, gums normal and good dentition. Neck:  Supple, symmetrical, trachea midline, no adenopathy, thyroid symmetric, not enlarged and no tenderness, skin normal    Heart:  Normal apical impulse, regular rate and rhythm, normal S1 and S2, no S3 or S4, and no murmur noted. Dr. Mayco Evans and Dr. Laurent Henry following for CAD. Lungs:  No increased work of breathing, good air exchange, clear to auscultation bilaterally, no crackles or wheezing. Dr. Katherine Waldrop and consultants for COPD and CKD    Abdomen:  No scars, normal bowel sounds, soft, non-distended, non-tender, no masses palpated, no hepatosplenomegally    Extremities:  No clubbing, cyanosis, or edema    Neurologic:  Awake, alert, oriented to name, place and time. Cranial nerves II-XII are grossly intact. Motor is 5 out of 5 bilaterally. Cerebellar finger to nose, heel to shin intact. Sensory is intact. Babinski down going, Romberg negative, and gait is normal.  EXCEPTIONS:  Ms. Aneta Betancourt had her original  shunt placed 10/15/18, she became symptomatic of failed  shunt in Dec. 2020 exhibiting shuffling gait, off balance, poor memory and confusion, with urinary incontinence. A shunt evaluation confirmed suboptimal drainage from  shunt and CT-head revealed ventriculomegaly. 4/5 strength in right quadriceps, 3/5 strength in hands, Biceps and triceps are 4+/5 both sides. Reflexes 3/5 UEs. Sensations are decreased to 50% in LEs. SLR is 60 degress both LEs. Shoulder abduction is 4/5 both sides. DATA:  Radiology Review:  CT head, NM shunt eval. MR-head. ASSESSMENT AND PLAN:    1. Patient is a 76 y.o. female with above specified procedure planned 2/1/21 with anesthesia  2. Procedure options, risks and benefits reviewed with patient. Patient expresses understanding. Family member present for visit and informed consent.

## 2021-01-26 NOTE — PROGRESS NOTES
Patient agreed to COVID test on 12-27 at the  [facility] (2-)   located at  off of [address]between the hours of 6 am- 2:30 pm, instructed to bring ID. Patient instructed to self isolate until day of surgery.

## 2021-01-27 NOTE — PROGRESS NOTES
[address] PRE-ADMISSION TESTING GENERAL INSTRUCTIONS- [facility]-phone number:[phone]    GENERAL INSTRUCTIONS  [x] Antibacterial Soap shower Night before and/or AM of Surgery  [] Arthur wipe instruction sheet and wipes given. [x] Nothing by mouth after midnight, including gum, candy, mints, or water. [x] You may brush your teeth, gargle, but do NOT swallow water. []Hibiclens shower  the night before and the morning of surgery. Do not use             Hibiclens on your face or head. [x]No smoking, chewing tobacco, illegal drugs, or alcohol within 24 hours of your surgery. [x] Jewelry, valuables or body piercing's should not be brought to the hospital. All body and/or tongue piercing's must be removed prior to arriving to hospital.  ALL hair pins must be removed. [x] Do not wear makeup, lotions, powders, deodorant. Nail polish as directed by the nurse. [x] Arrange transportation with a responsible adult  to and from the hospital. If you do not have a responsible adult  to transport you, you will need to make arrangements with a medical transportation company (i.e. [employer]. A Uber/taxi/bus is not appropriate unless you are accompanied by a responsible adult ). Arrange for someone to be with you for the remainder of the day and for 24 hours after your procedure due to having had anesthesia. Who will be your  for transportation?____Greg_________   Who will be staying with you for 24 hrs after your procedure?__________________  [x] Bring insurance card and photo ID.  [] Transfusion Bracelet: Please bring with you to hospital, day of surgery  [] Bring urine specimen day of surgery. Any small container is acceptable. [x] Use inhalers the morning of surgery and bring with you to hospital.  [] Bring copy of living will or healthcare power of  papers to be placed in your electronic record.   [] CPAP/BI-PAP: Please bring your machine if you are to spend the night in the hospital.     PARKING INSTRUCTIONS:   [x] Arrival Time:_0530_______  [x]  Arrive to [facility] entrance for surgery 2/1/21. You will be directed to pre op. [x] To reach the [facility] [address] from [facility], upon entering the hospital, take elevator B to the 3rd floor. EDUCATION INSTRUCTIONS:      [] Knee or hip replacement booklet & exercise pamphlets given. [] [address] placed in chart. [] Pre-admission Testing educational folder given  [] Incentive Spirometry,coughing & deep breathing exercises reviewed. []Medication information sheet(s)   []Fluoroscopy-Xray used in surgery reviewed with patient. Educational pamphlet placed in chart. [x]Pain: Post-op pain is normal and to be expected. You will be asked to rate your pain from 0-10(a zero is not acceptable-education is needed). Your post-op pain goal is:  [x] Ask your nurse for your pain medication. [] Joint camp offered. [] Joint replacement booklets given. [] Other:___________________________    MEDICATION INSTRUCTIONS:   [x]Bring a complete list of your medications, please write the last time you took the medicine, give this list to the nurse. [x] Take the following medications the morning of surgery with 1-2 ounces of water: SEE LIST  [x] Stop herbal supplements and vitamins 5 days before your surgery. [x] DO NOT take any diabetic medicine the morning of surgery. Follow instructions for insulin the day before surgery. [x] If you are diabetic and your blood sugar is low or you feel symptomatic, you may drink 1-2 ounces of apple juice or take a glucose tablet. The morning of your procedure, you may call the pre-op area if you have concerns about your blood sugar [phone]. [x] Use your inhalers the morning of surgery. Bring your emergency inhaler with you day of surgery. [x] Follow physician instructions regarding any blood thinners you may be taking.     WHAT TO EXPECT:  [x] The day of surgery you will be greeted and checked in by the [employer].  In addition, you will be registered in the [facility] by a Patient Access Representative. Please bring your photo ID and insurance card. A nurse will greet you in accordance to the time you are needed in the pre-op area to prepare you for surgery. Please do not be discouraged if you are not greeted in the order you arrive as there are many variables that are involved in patient preparation. Your patience is greatly appreciated as you wait for your nurse. Please bring in items such as: books, magazines, newspapers, electronics, or any other items  to occupy your time in the waiting area. [x]  Delays may occur with surgery and staff will make a sincere effort to keep you informed of delays. If any delays occur with your procedure, we apologize ahead of time for your inconvenience as we recognize the value of your time.

## 2021-01-29 ENCOUNTER — ANESTHESIA EVENT (OUTPATIENT)
Dept: OPERATING ROOM | Age: 66
DRG: 032 | End: 2021-01-29
Payer: MEDICARE

## 2021-01-29 LAB
SARS-COV-2: NOT DETECTED
SOURCE: NORMAL

## 2021-02-01 ENCOUNTER — ANESTHESIA (OUTPATIENT)
Dept: OPERATING ROOM | Age: 66
DRG: 032 | End: 2021-02-01
Payer: MEDICARE

## 2021-02-01 ENCOUNTER — HOSPITAL ENCOUNTER (INPATIENT)
Age: 66
LOS: 3 days | Discharge: HOME | DRG: 032 | End: 2021-02-04
Attending: NEUROLOGICAL SURGERY | Admitting: NEUROLOGICAL SURGERY
Payer: MEDICARE

## 2021-02-01 VITALS — SYSTOLIC BLOOD PRESSURE: 103 MMHG | OXYGEN SATURATION: 99 % | TEMPERATURE: 95.2 F | DIASTOLIC BLOOD PRESSURE: 63 MMHG

## 2021-02-01 DIAGNOSIS — U07.1 COVID-19: Primary | ICD-10-CM

## 2021-02-01 DIAGNOSIS — Z01.812 PRE-OPERATIVE LABORATORY EXAMINATION: ICD-10-CM

## 2021-02-01 DIAGNOSIS — G91.2 NPH (NORMAL PRESSURE HYDROCEPHALUS) (HCC): ICD-10-CM

## 2021-02-01 LAB
ANION GAP SERPL CALCULATED.3IONS-SCNC: 13 MMOL/L (ref 7–16)
BUN BLDV-MCNC: 22 MG/DL (ref 8–23)
CALCIUM SERPL-MCNC: 9.7 MG/DL (ref 8.6–10.2)
CHLORIDE BLD-SCNC: 103 MMOL/L (ref 98–107)
CO2: 23 MMOL/L (ref 22–29)
CREAT SERPL-MCNC: 1.1 MG/DL (ref 0.5–1)
GFR AFRICAN AMERICAN: >60
GFR NON-AFRICAN AMERICAN: 50 ML/MIN/1.73
GLUCOSE BLD-MCNC: 120 MG/DL (ref 74–99)
HCT VFR BLD CALC: 38.4 % (ref 34–48)
HEMOGLOBIN: 12.8 G/DL (ref 11.5–15.5)
MCH RBC QN AUTO: 32.3 PG (ref 26–35)
MCHC RBC AUTO-ENTMCNC: 33.3 % (ref 32–34.5)
MCV RBC AUTO: 97 FL (ref 80–99.9)
METER GLUCOSE: 113 MG/DL (ref 74–99)
PDW BLD-RTO: 12.7 FL (ref 11.5–15)
PLATELET # BLD: 189 E9/L (ref 130–450)
PMV BLD AUTO: 10.5 FL (ref 7–12)
POTASSIUM SERPL-SCNC: 4.5 MMOL/L (ref 3.5–5)
RBC # BLD: 3.96 E12/L (ref 3.5–5.5)
SODIUM BLD-SCNC: 139 MMOL/L (ref 132–146)
WBC # BLD: 8 E9/L (ref 4.5–11.5)

## 2021-02-01 PROCEDURE — 97166 OT EVAL MOD COMPLEX 45 MIN: CPT

## 2021-02-01 PROCEDURE — 87081 CULTURE SCREEN ONLY: CPT

## 2021-02-01 PROCEDURE — 6370000000 HC RX 637 (ALT 250 FOR IP): Performed by: NEUROLOGICAL SURGERY

## 2021-02-01 PROCEDURE — 97530 THERAPEUTIC ACTIVITIES: CPT

## 2021-02-01 PROCEDURE — 80048 BASIC METABOLIC PNL TOTAL CA: CPT

## 2021-02-01 PROCEDURE — 6360000002 HC RX W HCPCS: Performed by: ANESTHESIOLOGIST ASSISTANT

## 2021-02-01 PROCEDURE — 2720000010 HC SURG SUPPLY STERILE: Performed by: NEUROLOGICAL SURGERY

## 2021-02-01 PROCEDURE — 6360000002 HC RX W HCPCS: Performed by: NEUROLOGICAL SURGERY

## 2021-02-01 PROCEDURE — 2000000000 HC ICU R&B

## 2021-02-01 PROCEDURE — 3700000000 HC ANESTHESIA ATTENDED CARE: Performed by: NEUROLOGICAL SURGERY

## 2021-02-01 PROCEDURE — 2500000003 HC RX 250 WO HCPCS: Performed by: ANESTHESIOLOGIST ASSISTANT

## 2021-02-01 PROCEDURE — 2580000003 HC RX 258: Performed by: NEUROLOGICAL SURGERY

## 2021-02-01 PROCEDURE — 97535 SELF CARE MNGMENT TRAINING: CPT

## 2021-02-01 PROCEDURE — C1729 CATH, DRAINAGE: HCPCS | Performed by: NEUROLOGICAL SURGERY

## 2021-02-01 PROCEDURE — 2709999900 HC NON-CHARGEABLE SUPPLY: Performed by: NEUROLOGICAL SURGERY

## 2021-02-01 PROCEDURE — 00163J6 BYPASS CEREBRAL VENTRICLE TO PERITONEAL CAVITY WITH SYNTHETIC SUBSTITUTE, PERCUTANEOUS APPROACH: ICD-10-PCS | Performed by: NEUROLOGICAL SURGERY

## 2021-02-01 PROCEDURE — 2580000003 HC RX 258: Performed by: ANESTHESIOLOGIST ASSISTANT

## 2021-02-01 PROCEDURE — 3600000012 HC SURGERY LEVEL 2 ADDTL 15MIN: Performed by: NEUROLOGICAL SURGERY

## 2021-02-01 PROCEDURE — 3700000001 HC ADD 15 MINUTES (ANESTHESIA): Performed by: NEUROLOGICAL SURGERY

## 2021-02-01 PROCEDURE — 82962 GLUCOSE BLOOD TEST: CPT

## 2021-02-01 PROCEDURE — C1713 ANCHOR/SCREW BN/BN,TIS/BN: HCPCS | Performed by: NEUROLOGICAL SURGERY

## 2021-02-01 PROCEDURE — 3600000002 HC SURGERY LEVEL 2 BASE: Performed by: NEUROLOGICAL SURGERY

## 2021-02-01 PROCEDURE — 36415 COLL VENOUS BLD VENIPUNCTURE: CPT

## 2021-02-01 PROCEDURE — 00P63JZ REMOVAL OF SYNTHETIC SUBSTITUTE FROM CEREBRAL VENTRICLE, PERCUTANEOUS APPROACH: ICD-10-PCS | Performed by: SURGERY

## 2021-02-01 PROCEDURE — 62230 REPLACE/REVISE BRAIN SHUNT: CPT | Performed by: SURGERY

## 2021-02-01 PROCEDURE — 88300 SURGICAL PATH GROSS: CPT

## 2021-02-01 PROCEDURE — 6370000000 HC RX 637 (ALT 250 FOR IP): Performed by: NURSE PRACTITIONER

## 2021-02-01 PROCEDURE — 85027 COMPLETE CBC AUTOMATED: CPT

## 2021-02-01 PROCEDURE — 97162 PT EVAL MOD COMPLEX 30 MIN: CPT

## 2021-02-01 PROCEDURE — 6360000002 HC RX W HCPCS

## 2021-02-01 PROCEDURE — 2500000003 HC RX 250 WO HCPCS: Performed by: NEUROLOGICAL SURGERY

## 2021-02-01 PROCEDURE — 2780000010 HC IMPLANT OTHER: Performed by: NEUROLOGICAL SURGERY

## 2021-02-01 DEVICE — CATH 43522 C/P CATH OPEN END W/SLITS STD: Type: IMPLANTABLE DEVICE | Site: ABDOMEN | Status: FUNCTIONAL

## 2021-02-01 RX ORDER — POLYETHYLENE GLYCOL 3350 17 G/17G
17 POWDER, FOR SOLUTION ORAL DAILY PRN
Status: DISCONTINUED | OUTPATIENT
Start: 2021-02-01 | End: 2021-02-04 | Stop reason: HOSPADM

## 2021-02-01 RX ORDER — MIDAZOLAM HYDROCHLORIDE 1 MG/ML
INJECTION INTRAMUSCULAR; INTRAVENOUS PRN
Status: DISCONTINUED | OUTPATIENT
Start: 2021-02-01 | End: 2021-02-01 | Stop reason: SDUPTHER

## 2021-02-01 RX ORDER — SODIUM CHLORIDE 0.9 % (FLUSH) 0.9 %
10 SYRINGE (ML) INJECTION PRN
Status: DISCONTINUED | OUTPATIENT
Start: 2021-02-01 | End: 2021-02-01 | Stop reason: HOSPADM

## 2021-02-01 RX ORDER — VANCOMYCIN HYDROCHLORIDE 500 MG/10ML
INJECTION, POWDER, LYOPHILIZED, FOR SOLUTION INTRAVENOUS PRN
Status: DISCONTINUED | OUTPATIENT
Start: 2021-02-01 | End: 2021-02-01 | Stop reason: ALTCHOICE

## 2021-02-01 RX ORDER — KETOROLAC TROMETHAMINE 30 MG/ML
15 INJECTION, SOLUTION INTRAMUSCULAR; INTRAVENOUS ONCE
Status: COMPLETED | OUTPATIENT
Start: 2021-02-01 | End: 2021-02-01

## 2021-02-01 RX ORDER — PROMETHAZINE HYDROCHLORIDE 25 MG/ML
25 INJECTION, SOLUTION INTRAMUSCULAR; INTRAVENOUS PRN
Status: DISCONTINUED | OUTPATIENT
Start: 2021-02-01 | End: 2021-02-01 | Stop reason: HOSPADM

## 2021-02-01 RX ORDER — CLINDAMYCIN PHOSPHATE 900 MG/50ML
900 INJECTION INTRAVENOUS
Status: COMPLETED | OUTPATIENT
Start: 2021-02-01 | End: 2021-02-01

## 2021-02-01 RX ORDER — MEPERIDINE HYDROCHLORIDE 25 MG/ML
12.5 INJECTION INTRAMUSCULAR; INTRAVENOUS; SUBCUTANEOUS EVERY 5 MIN PRN
Status: DISCONTINUED | OUTPATIENT
Start: 2021-02-01 | End: 2021-02-01 | Stop reason: HOSPADM

## 2021-02-01 RX ORDER — OXYBUTYNIN CHLORIDE 10 MG/1
10 TABLET, EXTENDED RELEASE ORAL DAILY
Status: DISCONTINUED | OUTPATIENT
Start: 2021-02-02 | End: 2021-02-04 | Stop reason: HOSPADM

## 2021-02-01 RX ORDER — SODIUM CHLORIDE 9 MG/ML
INJECTION, SOLUTION INTRAVENOUS CONTINUOUS PRN
Status: DISCONTINUED | OUTPATIENT
Start: 2021-02-01 | End: 2021-02-01 | Stop reason: SDUPTHER

## 2021-02-01 RX ORDER — CARVEDILOL 25 MG/1
25 TABLET ORAL 2 TIMES DAILY WITH MEALS
Status: DISCONTINUED | OUTPATIENT
Start: 2021-02-01 | End: 2021-02-04 | Stop reason: HOSPADM

## 2021-02-01 RX ORDER — ACETAMINOPHEN 325 MG/1
650 TABLET ORAL EVERY 6 HOURS PRN
Status: DISCONTINUED | OUTPATIENT
Start: 2021-02-01 | End: 2021-02-04 | Stop reason: HOSPADM

## 2021-02-01 RX ORDER — NEOSTIGMINE METHYLSULFATE 1 MG/ML
INJECTION, SOLUTION INTRAVENOUS PRN
Status: DISCONTINUED | OUTPATIENT
Start: 2021-02-01 | End: 2021-02-01 | Stop reason: SDUPTHER

## 2021-02-01 RX ORDER — ONDANSETRON 2 MG/ML
INJECTION INTRAMUSCULAR; INTRAVENOUS PRN
Status: DISCONTINUED | OUTPATIENT
Start: 2021-02-01 | End: 2021-02-01 | Stop reason: SDUPTHER

## 2021-02-01 RX ORDER — DIAPER,BRIEF,INFANT-TODD,DISP
EACH MISCELLANEOUS PRN
Status: DISCONTINUED | OUTPATIENT
Start: 2021-02-01 | End: 2021-02-01 | Stop reason: ALTCHOICE

## 2021-02-01 RX ORDER — BUPROPION HYDROCHLORIDE 300 MG/1
300 TABLET ORAL EVERY MORNING
Status: DISCONTINUED | OUTPATIENT
Start: 2021-02-02 | End: 2021-02-04 | Stop reason: HOSPADM

## 2021-02-01 RX ORDER — PANTOPRAZOLE SODIUM 40 MG/1
40 TABLET, DELAYED RELEASE ORAL
Status: DISCONTINUED | OUTPATIENT
Start: 2021-02-01 | End: 2021-02-04 | Stop reason: HOSPADM

## 2021-02-01 RX ORDER — LIDOCAINE HYDROCHLORIDE AND EPINEPHRINE 10; 10 MG/ML; UG/ML
INJECTION, SOLUTION INFILTRATION; PERINEURAL PRN
Status: DISCONTINUED | OUTPATIENT
Start: 2021-02-01 | End: 2021-02-01 | Stop reason: ALTCHOICE

## 2021-02-01 RX ORDER — ROPINIROLE 2 MG/1
4 TABLET, FILM COATED ORAL 2 TIMES DAILY
Status: DISCONTINUED | OUTPATIENT
Start: 2021-02-02 | End: 2021-02-04 | Stop reason: HOSPADM

## 2021-02-01 RX ORDER — GLYCOPYRROLATE 1 MG/5 ML
SYRINGE (ML) INTRAVENOUS PRN
Status: DISCONTINUED | OUTPATIENT
Start: 2021-02-01 | End: 2021-02-01 | Stop reason: SDUPTHER

## 2021-02-01 RX ORDER — ACETAMINOPHEN 650 MG/1
650 SUPPOSITORY RECTAL EVERY 6 HOURS PRN
Status: DISCONTINUED | OUTPATIENT
Start: 2021-02-01 | End: 2021-02-04 | Stop reason: HOSPADM

## 2021-02-01 RX ORDER — FENTANYL CITRATE 50 UG/ML
INJECTION, SOLUTION INTRAMUSCULAR; INTRAVENOUS PRN
Status: DISCONTINUED | OUTPATIENT
Start: 2021-02-01 | End: 2021-02-01 | Stop reason: SDUPTHER

## 2021-02-01 RX ORDER — SPIRONOLACTONE 25 MG/1
25 TABLET ORAL DAILY
Status: DISCONTINUED | OUTPATIENT
Start: 2021-02-02 | End: 2021-02-04 | Stop reason: HOSPADM

## 2021-02-01 RX ORDER — EPHEDRINE SULFATE/0.9% NACL/PF 50 MG/5 ML
SYRINGE (ML) INTRAVENOUS PRN
Status: DISCONTINUED | OUTPATIENT
Start: 2021-02-01 | End: 2021-02-01 | Stop reason: SDUPTHER

## 2021-02-01 RX ORDER — SODIUM CHLORIDE 0.9 % (FLUSH) 0.9 %
10 SYRINGE (ML) INJECTION EVERY 12 HOURS SCHEDULED
Status: DISCONTINUED | OUTPATIENT
Start: 2021-02-01 | End: 2021-02-04 | Stop reason: HOSPADM

## 2021-02-01 RX ORDER — PHENYLEPHRINE HCL IN 0.9% NACL 1 MG/10 ML
SYRINGE (ML) INTRAVENOUS PRN
Status: DISCONTINUED | OUTPATIENT
Start: 2021-02-01 | End: 2021-02-01 | Stop reason: SDUPTHER

## 2021-02-01 RX ORDER — LABETALOL HYDROCHLORIDE 5 MG/ML
5 INJECTION, SOLUTION INTRAVENOUS EVERY 10 MIN PRN
Status: DISCONTINUED | OUTPATIENT
Start: 2021-02-01 | End: 2021-02-01 | Stop reason: HOSPADM

## 2021-02-01 RX ORDER — SODIUM CHLORIDE 0.9 % (FLUSH) 0.9 %
10 SYRINGE (ML) INJECTION PRN
Status: DISCONTINUED | OUTPATIENT
Start: 2021-02-01 | End: 2021-02-04 | Stop reason: HOSPADM

## 2021-02-01 RX ORDER — HYDROCODONE BITARTRATE AND ACETAMINOPHEN 5; 325 MG/1; MG/1
1 TABLET ORAL EVERY 6 HOURS PRN
Status: DISCONTINUED | OUTPATIENT
Start: 2021-02-01 | End: 2021-02-01

## 2021-02-01 RX ORDER — KETOROLAC TROMETHAMINE 30 MG/ML
INJECTION, SOLUTION INTRAMUSCULAR; INTRAVENOUS
Status: COMPLETED
Start: 2021-02-01 | End: 2021-02-01

## 2021-02-01 RX ORDER — SODIUM CHLORIDE 0.9 % (FLUSH) 0.9 %
10 SYRINGE (ML) INJECTION EVERY 12 HOURS SCHEDULED
Status: DISCONTINUED | OUTPATIENT
Start: 2021-02-01 | End: 2021-02-01 | Stop reason: HOSPADM

## 2021-02-01 RX ORDER — ERGOCALCIFEROL 1.25 MG/1
50000 CAPSULE ORAL
Status: DISCONTINUED | OUTPATIENT
Start: 2021-02-04 | End: 2021-02-04 | Stop reason: HOSPADM

## 2021-02-01 RX ORDER — LIDOCAINE HYDROCHLORIDE 20 MG/ML
INJECTION, SOLUTION INTRAVENOUS PRN
Status: DISCONTINUED | OUTPATIENT
Start: 2021-02-01 | End: 2021-02-01 | Stop reason: SDUPTHER

## 2021-02-01 RX ORDER — ROCURONIUM BROMIDE 10 MG/ML
INJECTION, SOLUTION INTRAVENOUS PRN
Status: DISCONTINUED | OUTPATIENT
Start: 2021-02-01 | End: 2021-02-01 | Stop reason: SDUPTHER

## 2021-02-01 RX ORDER — HYDROCODONE BITARTRATE AND ACETAMINOPHEN 5; 325 MG/1; MG/1
1 TABLET ORAL EVERY 4 HOURS PRN
Status: DISCONTINUED | OUTPATIENT
Start: 2021-02-01 | End: 2021-02-04 | Stop reason: HOSPADM

## 2021-02-01 RX ORDER — ONDANSETRON 2 MG/ML
4 INJECTION INTRAMUSCULAR; INTRAVENOUS EVERY 6 HOURS PRN
Status: DISCONTINUED | OUTPATIENT
Start: 2021-02-01 | End: 2021-02-04 | Stop reason: HOSPADM

## 2021-02-01 RX ORDER — VALSARTAN 80 MG/1
80 TABLET ORAL DAILY
Status: DISCONTINUED | OUTPATIENT
Start: 2021-02-02 | End: 2021-02-04 | Stop reason: HOSPADM

## 2021-02-01 RX ORDER — PROPOFOL 10 MG/ML
INJECTION, EMULSION INTRAVENOUS PRN
Status: DISCONTINUED | OUTPATIENT
Start: 2021-02-01 | End: 2021-02-01 | Stop reason: SDUPTHER

## 2021-02-01 RX ORDER — MORPHINE SULFATE 2 MG/ML
2 INJECTION, SOLUTION INTRAMUSCULAR; INTRAVENOUS EVERY 4 HOURS PRN
Status: DISCONTINUED | OUTPATIENT
Start: 2021-02-01 | End: 2021-02-03

## 2021-02-01 RX ADMIN — MIDAZOLAM 1 MG: 1 INJECTION INTRAMUSCULAR; INTRAVENOUS at 07:25

## 2021-02-01 RX ADMIN — FENTANYL CITRATE 25 MCG: 50 INJECTION, SOLUTION INTRAMUSCULAR; INTRAVENOUS at 08:04

## 2021-02-01 RX ADMIN — Medication 5 MG: at 08:24

## 2021-02-01 RX ADMIN — MORPHINE SULFATE 2 MG: 2 INJECTION, SOLUTION INTRAMUSCULAR; INTRAVENOUS at 19:23

## 2021-02-01 RX ADMIN — HYDROCODONE BITARTRATE AND ACETAMINOPHEN 1 TABLET: 5; 325 TABLET ORAL at 13:39

## 2021-02-01 RX ADMIN — HYDROCODONE BITARTRATE AND ACETAMINOPHEN 1 TABLET: 5; 325 TABLET ORAL at 18:33

## 2021-02-01 RX ADMIN — Medication 5 MG: at 07:51

## 2021-02-01 RX ADMIN — Medication 10 MG: at 07:42

## 2021-02-01 RX ADMIN — Medication 10 MG: at 07:37

## 2021-02-01 RX ADMIN — FENTANYL CITRATE 50 MCG: 50 INJECTION, SOLUTION INTRAMUSCULAR; INTRAVENOUS at 07:28

## 2021-02-01 RX ADMIN — KETOROLAC TROMETHAMINE 15 MG: 30 INJECTION, SOLUTION INTRAMUSCULAR at 20:01

## 2021-02-01 RX ADMIN — Medication 0.2 MG: at 07:52

## 2021-02-01 RX ADMIN — MORPHINE SULFATE 2 MG: 2 INJECTION, SOLUTION INTRAMUSCULAR; INTRAVENOUS at 11:25

## 2021-02-01 RX ADMIN — Medication 50 MCG: at 07:51

## 2021-02-01 RX ADMIN — ACETAMINOPHEN 650 MG: 325 TABLET, FILM COATED ORAL at 19:53

## 2021-02-01 RX ADMIN — Medication 10 MG: at 08:17

## 2021-02-01 RX ADMIN — ONDANSETRON HYDROCHLORIDE 4 MG: 2 INJECTION, SOLUTION INTRAMUSCULAR; INTRAVENOUS at 08:23

## 2021-02-01 RX ADMIN — CARVEDILOL 25 MG: 25 TABLET, FILM COATED ORAL at 17:57

## 2021-02-01 RX ADMIN — Medication 5 MG: at 08:25

## 2021-02-01 RX ADMIN — CEFAZOLIN 1000 MG: 1 INJECTION, POWDER, FOR SOLUTION INTRAMUSCULAR; INTRAVENOUS at 16:13

## 2021-02-01 RX ADMIN — HYDROCODONE BITARTRATE AND ACETAMINOPHEN 1 TABLET: 5; 325 TABLET ORAL at 23:03

## 2021-02-01 RX ADMIN — MORPHINE SULFATE 2 MG: 2 INJECTION, SOLUTION INTRAMUSCULAR; INTRAVENOUS at 15:07

## 2021-02-01 RX ADMIN — PANTOPRAZOLE SODIUM 40 MG: 40 TABLET, DELAYED RELEASE ORAL at 17:57

## 2021-02-01 RX ADMIN — PROPOFOL 100 MG: 10 INJECTION, EMULSION INTRAVENOUS at 07:28

## 2021-02-01 RX ADMIN — SODIUM CHLORIDE: 9 INJECTION, SOLUTION INTRAVENOUS at 07:25

## 2021-02-01 RX ADMIN — KETOROLAC TROMETHAMINE 15 MG: 30 INJECTION, SOLUTION INTRAMUSCULAR; INTRAVENOUS at 20:01

## 2021-02-01 RX ADMIN — Medication 1 MG: at 08:25

## 2021-02-01 RX ADMIN — Medication 10 MG: at 08:13

## 2021-02-01 RX ADMIN — CLINDAMYCIN IN 5 PERCENT DEXTROSE 900 MG: 18 INJECTION, SOLUTION INTRAVENOUS at 07:35

## 2021-02-01 RX ADMIN — ROCURONIUM BROMIDE 35 MG: 10 INJECTION, SOLUTION INTRAVENOUS at 07:29

## 2021-02-01 RX ADMIN — LIDOCAINE HYDROCHLORIDE 100 MG: 20 INJECTION, SOLUTION INTRAVENOUS at 07:28

## 2021-02-01 ASSESSMENT — PULMONARY FUNCTION TESTS
PIF_VALUE: 18
PIF_VALUE: 3
PIF_VALUE: 4
PIF_VALUE: 18
PIF_VALUE: 13
PIF_VALUE: 13
PIF_VALUE: 3
PIF_VALUE: 19
PIF_VALUE: 3
PIF_VALUE: 18
PIF_VALUE: 3
PIF_VALUE: 16
PIF_VALUE: 16
PIF_VALUE: 18
PIF_VALUE: 13
PIF_VALUE: 23
PIF_VALUE: 25
PIF_VALUE: 2
PIF_VALUE: 13
PIF_VALUE: 14
PIF_VALUE: 2
PIF_VALUE: 24
PIF_VALUE: 25
PIF_VALUE: 15
PIF_VALUE: 20
PIF_VALUE: 18
PIF_VALUE: 18
PIF_VALUE: 20
PIF_VALUE: 18
PIF_VALUE: 24
PIF_VALUE: 17
PIF_VALUE: 26
PIF_VALUE: 18
PIF_VALUE: 27
PIF_VALUE: 0
PIF_VALUE: 18
PIF_VALUE: 17
PIF_VALUE: 16
PIF_VALUE: 19
PIF_VALUE: 25
PIF_VALUE: 19
PIF_VALUE: 19
PIF_VALUE: 13
PIF_VALUE: 26
PIF_VALUE: 1
PIF_VALUE: 10

## 2021-02-01 ASSESSMENT — ENCOUNTER SYMPTOMS: SHORTNESS OF BREATH: 1

## 2021-02-01 ASSESSMENT — PAIN SCALES - GENERAL
PAINLEVEL_OUTOF10: 7
PAINLEVEL_OUTOF10: 6
PAINLEVEL_OUTOF10: 7
PAINLEVEL_OUTOF10: 10
PAINLEVEL_OUTOF10: 8
PAINLEVEL_OUTOF10: 10
PAINLEVEL_OUTOF10: 10

## 2021-02-01 ASSESSMENT — COPD QUESTIONNAIRES: CAT_SEVERITY: SEVERE

## 2021-02-01 ASSESSMENT — PAIN - FUNCTIONAL ASSESSMENT: PAIN_FUNCTIONAL_ASSESSMENT: 0-10

## 2021-02-01 ASSESSMENT — PAIN DESCRIPTION - PROGRESSION
CLINICAL_PROGRESSION: RAPIDLY WORSENING
CLINICAL_PROGRESSION: GRADUALLY WORSENING

## 2021-02-01 ASSESSMENT — PAIN DESCRIPTION - FREQUENCY: FREQUENCY: CONTINUOUS

## 2021-02-01 ASSESSMENT — PAIN DESCRIPTION - LOCATION: LOCATION: HEAD

## 2021-02-01 NOTE — BRIEF OP NOTE
Brief Postoperative Note      Patient: Sheryle Smart  YOB: 1955  MRN: [id number]    Date of Procedure: 2/1/2021    Pre-Op Diagnosis: NORMAL PRESSURE HYDROCEPHALUS    Post-Op Diagnosis: Same       Procedure(s):  VENTRICULAR PERITONEAL SHUNT REPLACEMENT    Surgeon(s):  MD Margo Herman DO    Assistant:  Surgical Assistant: Shane Blood    Anesthesia: General    Estimated Blood Loss (mL): 20    Complications: None    Specimens:   ID Type Source Tests Collected by Time Destination   A : [address] [facility] Trevin Rosenbaum MD 2/1/2021 0818        Implants:  Implant Name Type Inv.  Item Serial No.  Lot No. LRB No. Used Action   CATHETER RAINA L90CM STD OPN END W/ WALL SLT BA IMPREG  CATHETER RAINA L90CM STD OPN END W/ WALL SLT BA IMPREG  [facility] NEUROLOGIC [facility] [place]- [id number] N/A 1 Implanted         Drains: * No LDAs found *    Findings: As expected    Electronically signed by Trevin Rosenbaum MD on 2/1/2021 at 8:49 AM

## 2021-02-01 NOTE — OP NOTE
[address]                  [address] [place],  [facility]                                OPERATIVE REPORT    PATIENT NAME: Darlyn Chandra                      :        1955  MED REC NO:   [id number]                            ROOM:       [facility]  ACCOUNT NO:   [de-identified]                           ADMIT DATE: 2021  PROVIDER:     Komal Brown MD    DATE OF PROCEDURE:  2021    SURGEONS:  Komal Brown MD and Dr. Hank Wiggins. PREOPERATIVE DIAGNOSES:  Normal pressure hydrocephalus and suboptimal  functioning ventriculoperitoneal shunt. POSTOPERATIVE DIAGNOSES:  Normal pressure hydrocephalus and suboptimal  functioning ventriculoperitoneal shunt. PROCEDURES:  Replace ventriculoperitoneal shunt with a new faustino hole  Strata valve set at delta 1.0 and peritoneal catheter. TECHNIQUE:  The patient was placed on the operating room table in the  supine position, and after satisfactory endotracheal general anesthesia  had been administered, the patient's head was turned to the left side,  so right side was facing up. The right retromastoid area of the scalp  was shaved and along with the neck, chest, and abdomen were prepared  with Betadine scrub and solution and routinely draped. Incision was  made over the scalp from the previous surgery through which the  shunt  had been placed. The incision was taken out through the skin,  subcutaneous tissue, and aponeurosis. The scalp flaps were reflected  anterolaterally thus exposing the faustino hole Strata valve. The faustino hole  Strata valve was pulled out of the incision area and was disconnected  from the ventricular catheter. CSF was coming normally through the  ventricular catheter. The ventricular catheter was then connected to a  vertical limb of the faustino hole Strata valve set at delta 1.0. Connection was secured, tied using 3-0 Surgilon ties.   CSF started coming through the horizontal limb of the faustino hole Strata valve. In  the meanwhile, Dr. Arden pederson pulled the old peritoneal catheter out and  brought the proximal end of the new peritoneal catheter, which was then  connected to the horizontal limb of the faustino hole Strata valve. Connection secured, tied using 3-0 Surgilon ties. The CSF started to  come through the distal end of the peritoneal catheter, which was  subsequently inserted in the peritoneal cavity by Dr. Arden pederson. The faustino  hole Strata valve was anchored through the surrounding periosteum with  4-0 Surgilon suture. Ancef solution was used to irrigate over the area for  surgical exposure, followed by sprinkling of the vancomycin powder. Then, the scalp was closed in two layers, the galea aponeurotica with  4-0 Surgilon suture and the skin edges with 3-0 nylon suture. Dry  dressing was applied over the incision area. The patient was sent to  recovery room in satisfactory state. Dr. Arden pederson will dictate his part  of surgery. Estimated blood loss of about 10 mL.         Yandy Vides MD    D: 02/01/2021 8:25:10       T: 02/01/2021 9:01:18     [email or web address]  Job#: [id number]     Doc#: [id number]    CC:

## 2021-02-01 NOTE — ANESTHESIA PRE PROCEDURE
Department of Anesthesiology  Preprocedure Note       Name:  Michelle Gil   Age:  76 y.o.  :  1955                                          MRN:  [id number]         Date:  2021      Surgeon: Yovana Alexander):  MD Radha Mcclain DO    Procedure: VENTRICULAR PERITONEAL SHUNT REPLACEMENT (N/A )    Medications prior to admission:   Prior to Admission medications    Medication Sig Start Date End Date Taking? Authorizing Provider   carvedilol (COREG) 25 MG tablet Take 1 tablet by mouth 2 times daily (with meals) 20   Debra Loo MD   valsartan (DIOVAN) 80 MG tablet Take 80 mg by mouth daily    Historical Provider, MD   albuterol sulfate  (90 Base) MCG/ACT inhaler INHALE 2 PUFFS BY MOUTH EVERY 4 HOURS AS NEEDED 20   Historical Provider, MD   aspirin 325 MG tablet Take 325 mg by mouth daily Prescribed per PCP    Historical Provider, MD   oxyCODONE-acetaminophen (PERCOCET) 5-325 MG per tablet Take 1 tablet by mouth every 4 hours as needed for Pain (neck pain). Take if needed [facility]  .     Historical Provider, MD   traZODone (DESYREL) 100 MG tablet Take 1 tablet by mouth nightly  Patient taking differently: Take 100 mg by mouth as needed for Sleep  10/30/18   Edilia Romo MD   rOPINIRole (REQUIP) 3 MG tablet Take 1 tablet by mouth 2 times daily  Patient taking differently: Take 4 mg by mouth 2 times daily  10/30/18   Edilia Romo MD   linagliptin (TRADJENTA) 5 MG tablet Take 5 mg by mouth every morning    Historical Provider, MD   OXYGEN Inhale 3 L into the lungs continuous    Historical Provider, MD   spironolactone (ALDACTONE) 25 MG tablet Take 1 tablet by mouth daily 18   Adrián Vieira DO   oxybutynin (DITROPAN-XL) 10 MG extended release tablet Take 10 mg by mouth daily    Historical Provider, MD   fluticasone (FLONASE) 50 MCG/ACT nasal spray 1 spray by Nasal route daily as needed for Rhinitis    Historical Provider, MD   omeprazole (PRILOSEC) 40 MG capsule Take 40 mg by mouth daily  5/16/15   Historical Provider, MD   vitamin D (ERGOCALCIFEROL) 01781 UNITS CAPS capsule Take 50,000 Units by mouth once a week THUR 4/30/15   Historical Provider, MD   buPROPion (WELLBUTRIN XL) 300 MG XL tablet Take 300 mg by mouth every morning. Historical Provider, MD       Current medications:    No current facility-administered medications for this visit. No current outpatient medications on file. Facility-Administered Medications Ordered in Other Visits   Medication Dose Route Frequency Provider Last Rate Last Admin    sodium chloride flush 0.9 % injection 10 mL  10 mL Intravenous 2 times per day Francisco Goel MD        sodium chloride flush 0.9 % injection 10 mL  10 mL Intravenous PRN Francisco Goel MD        clindamycin (CLEOCIN) 900 mg in dextrose 5 % 50 mL IVPB  900 mg Intravenous On Call to [address] [place] [address] MD Naomi           Allergies:     Allergies   Allergen Reactions    Sulfa Antibiotics Anaphylaxis    Pentazocine Lactate Nausea And Vomiting       Problem List:    Patient Active Problem List   Diagnosis Code    CAD (coronary artery disease) I25.10    Pulmonary hypertension ([facility]) I27.20    COPD (chronic obstructive pulmonary disease) ([facility]) J44.9    CKD (chronic kidney disease) stage 3, GFR 30-59 ml/min N18.30    Normal pressure hydrocephalus ([facility]) G91.2    Chronic systolic congestive heart failure ([facility]) I50.22    Severe mitral regurgitation I34.0    NPH (normal pressure hydrocephalus) ([facility] G91.2       Past Medical History:        Diagnosis Date    Acid reflux disease     Acute on chronic respiratory failure with hypoxia and hypercapnia ([facility] 7/25/2018    Apraxia 7/23/2018    Arthritis     Ataxia 7/23/2018    CAD (coronary artery disease) 10/4/2012    Choledocholithiasis     recurrent    Chronic systolic congestive heart failure ([facility] 8/1/2018    Ejection fraction 23% plus/minus 5%    CKD (chronic kidney disease) stage 3, GFR 30-59 ml/min height or weight on file to calculate BMI.    CBC:   Lab Results   Component Value Date    WBC 8.0 02/01/2021    RBC 3.96 02/01/2021    HGB 12.8 02/01/2021    HCT 38.4 02/01/2021    MCV 97.0 02/01/2021    RDW 12.7 02/01/2021     02/01/2021       CMP:   Lab Results   Component Value Date     09/10/2020    K 4.9 09/10/2020    K 4.6 10/19/2018     09/10/2020    CO2 26 09/10/2020    BUN 25 09/10/2020    CREATININE 1.2 09/10/2020    GFRAA 54 09/10/2020    LABGLOM 45 09/10/2020    GLUCOSE 131 09/10/2020    GLUCOSE 111 09/30/2011    PROT 7.6 09/10/2020    CALCIUM 9.9 09/10/2020    BILITOT 0.3 09/10/2020    ALKPHOS 84 09/10/2020    AST 14 09/10/2020    ALT 8 09/10/2020       POC Tests: No results for input(s): POCGLU, POCNA, POCK, POCCL, POCBUN, POCHEMO, POCHCT in the last 72 hours.     Coags:   Lab Results   Component Value Date    PROTIME 11.9 10/19/2018    INR 1.0 10/19/2018    APTT <20.0 07/23/2018       HCG (If Applicable): No results found for: PREGTESTUR, PREGSERUM, HCG, HCGQUANT     ABGs: No results found for: PHART, PO2ART, KNI4ELI, TYT0YUW, BEART, U6FHPVAO     Type & Screen (If Applicable):  No results found for: LABABO, [address]    Anesthesia Evaluation  Patient summary reviewed  Airway: Mallampati: II  TM distance: >3 FB   Neck ROM: full  Mouth opening: > = 3 FB Dental:    (+) upper dentures and lower dentures      Pulmonary: breath sounds clear to auscultation  (+) COPD: severe,  shortness of breath: chronic,                            ROS comment: COVID Neg 1/27/21  Former smoker   Cardiovascular:  Exercise tolerance: good (>4 METS),   (+) CAD: obstructive, CABG/stent:, CHF: systolic,       ECG reviewed  Rhythm: regular  Rate: normal  Echocardiogram reviewed                  Neuro/Psych:   (+) TIA,              ROS comment: Normal Pressure Hydrocephalus treated with  shunt GI/Hepatic/Renal:   (+) GERD:,           Endo/Other:        (-) diabetes mellitus, hypothyroidism, blood dyscrasia               Abdominal:         (-) obese     Vascular:   + PVD, aortic or cerebral, . Anesthesia Plan      general     ASA 4       Induction: intravenous. MIPS: Postoperative opioids intended and Prophylactic antiemetics administered. Anesthetic plan and risks discussed with patient. Plan discussed with CRNA.                   Ti Mueller MD   2/1/2021

## 2021-02-01 NOTE — PROGRESS NOTES
Physical Therapy  Physical Therapy Initial Assessment     Name: Pedrito Vernon  : 1955  MRN: [id number]    Referring Provider:  LIZA Neff CNP    Date of Service: 2021    Evaluating PT:  Batsheva Winter PT, DPT [id number]    Room #:  7277/3076-T  Diagnosis:  NPH  Precautions: Falls  Procedure/Surgery:    shunt revision, diagnostic lap  PMHx/PSHx:  CAD, CHF, COPD, COVID, DM, HLD  Equipment Needs:  TBD    SUBJECTIVE:    Pt lives with , daughter and 4 grandchildren in a 1 story home with 3 stairs to enter and no rail. Pt ambulated without device and was independent PTA. OBJECTIVE:   Initial Evaluation  Date: 21 Treatment Short Term/ Long Term   Goals   AM-PAC 6 Clicks 88/42     Was pt agreeable to Eval/treatment? Yes     Does pt have pain?  7/10 head, neck and abdominal pain     Bed Mobility  Rolling: NT  Supine to sit: SBA  Sit to supine: NT  Scooting: SBA  Mod Independent   Transfers Sit to stand: Rachel  Stand to sit: Rachel  Stand pivot: Rachel without device  Independent   Ambulation   125 feet with Rachel without device  >400 feet Independently   Stair negotiation: ascended and descended NT  >10 steps with 1 rail Mod Independent   ROM BUE:  Defer to OT note  BLE:  WFL     Strength BUE:  Defer to OT note  BLE:  4/5  Increase by 1/3 MMT grade   Balance Sitting EOB:  SBA  Dynamic Standing:  Rachel without device  Sitting EOB:  Independent  Dynamic Standing:  Independent     Pt is A & O x 4  CAM-ICU: NT  RASS: 0  Sensation:  WNL  Edema:  None    Vitals:  Heart Rate at rest 79 bpm Heart Rate post session 90 bpm   SpO2 at rest 91% SpO2 post session -%   Blood Pressure at rest 116/62 mmHg Blood Pressure post session - mmHg     Functional Status Score-Intensive Care Unit (FSS-ICU)   Rolling -/7   Supine to sit transfer    Unsupported sitting     Sit to stand transfers /   Ambulation    Total  18/35       Therapeutic Exercises:  NA    Patient education  Pt educated on safety    Patient response to education:   Pt verbalized understanding Pt demonstrated skill Pt requires further education in this area   x x x     ASSESSMENT:    Comments:  RN reported pt was stable for session. Pt was in bed upon arrival, agreeable to initial evaluation. Flat affect noted initially. Pt completed shuffled steps to chair to don pants. Pt then ambulated into hallway with decreased speed and mild unsteadiness. No major LOBs noted. Pt requested to use bathroom upon return to room. Once finished, pt was left in chair with OT. All lines remained intact. Treatment:  Patient practiced and was instructed in the following treatment:     Bed mobility training - pt given verbal cues to facilitate proper sequencing and safety during supine>sit to complete task    Sitting EOB for >3 minutes for upright tolerance, postural awareness and BLE ROM   Transfer training - pt was given verbal and tactile cues to facilitate proper hand placement, technique and safety during sit to stand and stand to sit as well as provided with physical assistance to complete task.  Gait training- pt was given verbal and tactile cues to facilitate safety and balance during ambulation as well as provided with physical assistance to complete task. Pt's/ family goals   1. Return home    Patient and or family understand(s) diagnosis, prognosis, and plan of care. Yes    PLAN OF CARE:    Current Treatment Recommendations     [x] Strengthening     [] ROM   [x] Balance Training   [x] Endurance Training   [x] Transfer Training   [x] Gait Training   [x] Stair Training   [] Positioning   [x] Safety and Education Training   [x] Patient/Caregiver Education   [] HEP  [] Other     Frequency of treatments: 2-5x/week x 1-2 weeks.     Time in  1333  Time out  1355    Total Treatment Time  17 minutes     Evaluation Time includes thorough review of current medical information, gathering information on past medical history/social history and prior level of function, completion of standardized testing/informal observation of tasks, assessment of data and education on plan of care and goals.     CPT codes:  [] Low Complexity PT evaluation 19520  [x] Moderate Complexity PT evaluation 96375  [] High Complexity PT evaluation 21489  [] PT Re-evaluation 58486  [] Gait training 82062 - minutes  [] Manual therapy 39944 - minutes  [x] Therapeutic activities 92843 17 minutes  [] Therapeutic exercises 76707 - minutes  [] Neuromuscular reeducation 22736 - minutes     Batsheva Winter, PT, DPT  [id number]

## 2021-02-01 NOTE — PROGRESS NOTES
OCCUPATIONAL THERAPY INITIAL EVALUATION      Date:2021  Patient Name: Sreedhar Souza  MRN: [id number]  : 1955  Room: [address] [place] [address]    Referring Provider: Cameron Son RN    Evaluating OT: Elias Rigo OTR/L [id number]      AM-PAC Daily Activity Raw Score: 18/24    Recommended Adaptive Equipment: shower seat as needed for energy conservation       Diagnosis: NORMAL PRESSURE HYDROCEPHALUS,  shunt malfunction     Surgery/Procedures:   Diagnostic laparoscopy. Removal and replacement of ventriculoperitoneal shunt.       Pertinent Medical History: Ataxia, CAD, CKD, COPD, DM, hypoxia, neck pain, HTN, CABG x 2    Precautions:  Falls     Home Living: Pt lives with   in a 1 floor plan with 3 step(s) to enter and no rail(s); bed/bath on first floor  Bathroom setup: tub/shower; standard height commode  Equipment owned: no DME    Prior Level of Function: IND with ADLs;  IND with IADLs. No device for ambulation. Driving: yes  Occupation: pt likes to sew    Pain Level: pt c/o 7/10 neck, head, stomach pain  this session      Cognition: A&O: 4/4    Follows 1-2 step commands appropriately. Memory: good; reports difficulty with STM skills   Comprehension good; occasional increased time to process.    Problem solving: good   Judgement/safety: fair+/good               Communication skills: WFL           Vision: grossly WFL; reports new onset \"blurry vision\"               Glasses:no                                                   Hearing: min [place]     RASS: 0  CAM-ICU: (NT) Delirium    UE Assessment:  Hand Dominance: Right [x]  Left []     ROM Strength   RUE  WFL 4/5   LUE WFL 4/5     Sensation: No c/o numbness or tingling in extremities   Tone: WNL   Edema: min B feet/LE     Functional Assessment   Initial Eval Status  Date:  Treatment Status  Date: STG=LTG  2-3   days    Feeding Sarah                        Sarah  while seated up in chair to increase activity tolerance        Grooming S; set up Sarah   while standing sink level requiring no device for balance and demonstrating G tolerance      UB dressing/bathing Min A                        Sarah       LB dressing/bathing Min A                        Sarah   using AE as needed for safe reach/ energy conservation       Toileting SBA                        Sarah     Bed Mobility  Supine to sit: SBA    Sit to supine: SBA                        Sarah  in prep of ADL tasks & transfers   Functional Transfers Sit to stand: Min A    Stand to sit: Min A    SPT: Min A    Commode: Min A                        Sarah  sit<>stand/functional bathroom transfers using AD/DME as needed for balance and safety   Functional Mobility Min A  No device                       Sarah   functional/bathroom mobility using AD as needed & demonstrating G safety     Balance Sitting:     Static:  SBA    Dynamic:SBA  Standing: Min A  IND dynamic sitting balance; Sarah dynamic standing balance  during ADL tasks & transfers   Endurance/Activity Tolerance   F tolerance with moderate activity.    G   tolerance with moderate activity/self care routine   Visual/  Perceptual   WFL                       Vitals:   HR at rest: 79 bpm HR at end of session: 89 bpm   Spo2 at rest:90% Spo2 at end of session NT%   BP at rest:116/62 mmHg BP at end of session NT mmHg       Assessment of current deficits   [x]Functional mobility   [x]ADLs [x]Strength  []Cognition  [x]Functional transfers  [x]IADLs [x]Safety Awareness  [x]Endurance  []Fine Motor Coordination  [x]Balance      []Vision/perception  []Sensation    []Gross Motor Coordination  []ROM  []Delirium                  []Communication     Plan of Care: 1-3 days/week for 1-2 weeks PRN   ADL retraining/adapted techniques and AE recommendations to increase functional independence within precautions                    Energy conservation techniques to improve tolerance for selfcare routine   Functional transfer/mobility training/DME recommendations for increased independence, safety and fall prevention         Patient/family education to increase safety and functional independence             Environmental modifications for safe mobility and completion of ADLs                                           Therapeutic activity to improve functional performance during ADLs. Therapeutic exercise to improve tolerance and functional strength for ADLs   Balance retraining/tolerance tasks for facilitation of postural control with dynamic challenges during ADLs . Neuromuscular re-education: facilitation of righting/equilibrium reactions, normalization muscle tone/facilitation active functional movement                      Delirium prevention/treatment  Positioning to improve functional independence      Treatment: OT intervention provided to achieve goals:    Balance retraining: Performed sitting/standing balance ex's to improve righting reactions with postural changes during ADLS. Pt demo no LOB. ADL retraining: Instruction on adapted techniques/ (seated) to increase independence and safe reach during dressing/bathing activities. Pt demonstrated good tolerance. Delirium Prevention: Environmental and sensory modifications assessed and implemented to decrease ICU acquired delirium and to improve overall orientation, mentation and pt interaction with family/staff. Line management and environmental modifications made prior to and end of session to ensure patient safety and to increase efficiency of session. Skilled monitoring of HR, O2 saturation, blood pressure and patient's response to activity performed throughout session. Comments: OK from RN to see patient. Upon arrival, patient supine in bed; agreeable to session. .  Pt demo fair tolerance with good understanding of education/techniques. At end of session, patient requested to lay down due to fatigue. Call light within reach, all lines and tubes intact.  Pt instructed on use of call light for assistance and fall prevention. .    Patient presents with decreased ROM/strength,activity tolerance, dynamic balance, functional mobility limiting completion of ADLs and safety. Pt can benefit from continued skilled OT to increase safety and functional independence. Evaluation Complexity: Moderate    · History: Expanded chart review of consults, imaging, and psychosocial history related to current functional performance. · Exam: 5+ performance deficits identified limiting functional independence and safe return home   · Assistance/Modification: Min/mod assistance or modifications required to perform tasks. May have comorbidities that affect occupational performance. Rehab Potential: Good for established goals    Patient / Family Goal: to return to [place]    Patient and/or family were instructed/educated on diagnosis, prognosis/goals and plan of care. Pt demonstrated G understanding. [] Malnutrition indicators have been identified and nursing has been notified to ensure a dietitian consult is ordered. Time In:1345              Time Out: 1425       Total Treatment time: 25   Min Units   OT Eval Low 28180     OT Eval Medium 28531 X    OT Eval High 03501     OT Re-Eval [id number]     Therapeutic Ex 72925     Therapeutic Activities 03276 10 1   ADL/Self Care 58781 15 1   Orthotic Management 02174     Neuro Re-Ed 40985     Non-Billable Time        Evaluation time includes thorough review of current medical information, gathering information on past medical history/social history and prior level of function, completion of standardized testing/informal observation of tasks, assessment of data and development of POC/Goals.      Mary Frost, OTR/L [address]

## 2021-02-01 NOTE — CONSULTS
Medical Consultation      REASON FOR CONSULTATION:  Medical management    ATTENDING/ADMITTING PHYSICIAN:  Jennifer Badillo MD    HISTORY OF PRESENT ILLNESS:      The patient is a 76 y.o. female patient of Dr Scout Dill is consulted for post op medical management following ventricular peritoneal shunt placement. The patient is alert and complaining of post op pain and unable to provide details at this time.  Medical history is complex and as charted in the medical record   .      Past Medical History:    Past Medical History:   Diagnosis Date    Acid reflux disease     Acute on chronic respiratory failure with hypoxia and hypercapnia ([address] 7/25/2018    Apraxia 7/23/2018    Arthritis     Ataxia 7/23/2018    CAD (coronary artery disease) 10/4/2012    Choledocholithiasis     recurrent    Chronic systolic congestive heart failure (HCC) 8/1/2018    Ejection fraction 23% plus/minus 5%    CKD (chronic kidney disease) stage 3, GFR 30-59 ml/min 7/23/2018    COPD [facility]chronic obstructive pulmonary disease) ([facility])     alpha one M1S 183mg/dl    Diabetes mellitus ([address]     Hyperlipidemia     Hypoxia 7/23/2018    Neck pain     Normal pressure hydrocephalus (HCC) 7/28/2018    Oxygen dependent     3l/min/nc    Pleural effusion years ago    requiring right thoracentesis    Pulmonary hypertension ([address] 7/23/2018    Restless legs     S/P CABG x 2 10/4/2012    Severe mitral regurgitation 8/1/2018       Past Surgical History:    Past Surgical History:   Procedure Laterality Date    CARDIAC SURGERY      CERVICAL FUSION  1999    C3-C6    CHOLECYSTECTOMY  2002    COLONOSCOPY N/A 1/21/2019    COLONOSCOPY POLYPECTOMY SNARE/COLD BIOPSY performed by Salud Jaramillo MD at [facility].  10/30/2002    [address] [place] [address]  07/27/2018    resolved    [place] CREATE SHUNT:VENTRIC-PERITONEAL N/A 10/15/2018    VENTRICULAR PERITONEAL SHUNT  PLACEMENT performed by Camron Thomas MD at [facility] CSF N/A 7/27/2018    LUMBAR DRAIN INSERTION performed by Camron Thomas MD at [address]       Medications Prior to Admission:    Medications Prior to Admission: carvedilol (COREG) 25 MG tablet, Take 1 tablet by mouth 2 times daily (with meals)  valsartan (DIOVAN) 80 MG tablet, Take 80 mg by mouth daily  albuterol sulfate  (90 Base) MCG/ACT inhaler, INHALE 2 PUFFS BY MOUTH EVERY 4 HOURS AS NEEDED  oxyCODONE-acetaminophen (PERCOCET) 5-325 MG per tablet, Take 1 tablet by mouth every 4 hours as needed for Pain (neck pain). Take if needed [facility] 01/21 . traZODone (DESYREL) 100 MG tablet, Take 1 tablet by mouth nightly (Patient taking differently: Take 100 mg by mouth as needed for Sleep )  rOPINIRole (REQUIP) 3 MG tablet, Take 1 tablet by mouth 2 times daily (Patient taking differently: Take 4 mg by mouth 2 times daily )  linagliptin (TRADJENTA) 5 MG tablet, Take 5 mg by mouth every morning  OXYGEN, Inhale 3 L into the lungs continuous  spironolactone (ALDACTONE) 25 MG tablet, Take 1 tablet by mouth daily  oxybutynin (DITROPAN-XL) 10 MG extended release tablet, Take 10 mg by mouth daily  omeprazole (PRILOSEC) 40 MG capsule,  Take 40 mg by mouth daily   buPROPion (WELLBUTRIN XL) 300 MG XL tablet, Take 300 mg by mouth every morning. aspirin 325 MG tablet, Take 325 mg by mouth daily Prescribed per PCP  fluticasone (FLONASE) 50 MCG/ACT nasal spray, 1 spray by Nasal route daily as needed for Rhinitis  vitamin D (ERGOCALCIFEROL) 48723 UNITS CAPS capsule, Take 50,000 Units by mouth once a week THUR    Allergies:    Sulfa antibiotics and Pentazocine lactate    Social History:    reports that she quit smoking about 2 years ago. Her smoking use included cigarettes. She has a 20.00 pack-year smoking history. She has never used smokeless tobacco. She reports that she does not drink alcohol or use drugs.     Family History:   family history includes Diabetes in her mother; Emphysema in her father; Heart Attack in her sister; Heart Failure in her sister; High Blood Pressure in her mother. REVIEW OF SYSTEMS:  As above in the HPI, otherwise negative    PHYSICAL EXAM:    Vitals:  /65   Pulse 68   Temp 96 °F (35.6 °C) (Axillary)   Resp 15   Ht 5' 3\" (1.6 m)   Wt 126 lb (57.2 kg)   SpO2 97%   BMI 22.32 kg/m²     General:  Awake, alert, oriented X 3. HEENT:  Normocephalic, atraumatic. Pupils equal, round, reactive to light. No scleral icterus. No conjunctival injection. Normal lips, teeth, and gums. No nasal discharge. Neck:  Supple  Heart:  RRR, no murmurs, gallops, rubs  Lungs:  CTA bilaterally, bilat symmetrical expansion, no wheeze, rales, or rhonchi  Abdomen:   Bowel sounds present, soft,   tender, no masses, no organomegaly, no peritoneal signs  Extremities:  No clubbing, cyanosis, or edema  Skin:  Warm and dry, no open lesions or rash  Neuro:  Cranial nerves 2-12 intact, no focal deficits  Breast: deferred  Rectal: deferred  Genitalia:  deferred    LABS:    CBC with Differential:    Lab Results   Component Value Date    WBC 8.0 02/01/2021    RBC 3.96 02/01/2021    HGB 12.8 02/01/2021    HCT 38.4 02/01/2021     02/01/2021    MCV 97.0 02/01/2021    MCH 32.3 02/01/2021    MCHC 33.3 02/01/2021    RDW 12.7 02/01/2021    SEGSPCT 63 01/30/2014    LYMPHOPCT 27.6 09/10/2020    MONOPCT 8.4 09/10/2020    BASOPCT 0.6 09/10/2020    MONOSABS 0.55 09/10/2020    LYMPHSABS 1.81 09/10/2020    EOSABS 0.14 09/10/2020    BASOSABS 0.04 09/10/2020     CMP:    Lab Results   Component Value Date     02/01/2021    K 4.5 02/01/2021    K 4.6 10/19/2018     02/01/2021    CO2 23 02/01/2021    BUN 22 02/01/2021    CREATININE 1.1 02/01/2021    GFRAA >60 02/01/2021    LABGLOM 50 02/01/2021    GLUCOSE 120 02/01/2021    GLUCOSE 111 09/30/2011    PROT 7.6 09/10/2020    LABALBU 4.1 09/10/2020    LABALBU 4.4 09/30/2011    CALCIUM 9.7 02/01/2021    BILITOT 0.3 09/10/2020    ALKPHOS 84 09/10/2020    AST 14 09/10/2020    ALT 8 09/10/2020       ASSESSMENT:      Active Hospital Problems    Diagnosis    NPH (normal pressure hydrocephalus) ([facility] [G91.2]    COVID-19 [U07.1]       PLAN:  Continue tx as outlined               Will follow          Demetrice Dub 37, DO  11:24 AM  2/1/2021

## 2021-02-01 NOTE — OP NOTE
Operative Note      Patient: Elias Vela  YOB: 1955  MRN: [id number]    Date of Procedure: 2/1/2021    Pre-Op Diagnosis: NORMAL PRESSURE HYDROCEPHALUS,  shunt malfunction    Post-Op Diagnosis: Same       Procedure(s):  Diagnostic laparoscopy. Removal and replacement of ventriculoperitoneal shunt. Surgeon(s):  MD Mariela Burnett DO    Assistant:   Surgical Assistant: Christina Abdi    Anesthesia: General    Estimated Blood Loss (mL): Minimal    Complications: None    Specimens:   ID Type Source Tests Collected by Time Destination   A : SHUNT AND CATHETER Hardware Hardware SURGICAL PATHOLOGY (Canceled) Makenzie Miranda MD 2/1/2021 0818        Implants:  Implant Name Type Inv. Item Serial No.  Lot No. LRB No. Used Action   CATHETER RAINA L90CM STD OPN END W/ WALL SLT BA IMPREG  CATHETER RAINA L90CM STD OPN END W/ WALL SLT BA IMPREG  [facility]- [id number] N/A 1 Implanted         Drains: * No LDAs found *    Findings: Ventriculoperitoneal shunt catheter removed and replaced    Detailed Description of Procedure: The procedure is performed the operating room under general anesthesia. Patient is prepped and draped in standard sterile fashion. A 5 mm incision is made in the patient's left upper quadrant with 11 blade. Varies needle was then passed transabdominally into the peritoneal cavity. This is then insufflated to a pressure of 15 mmHg with CO2 gas. Varies is then removed and a 5 mm Optiview port is then passed through the incision into the peritoneal cavity with the laparoscope in situ. Exploration was then performed with the laparoscope. The existing catheter is noted to be on kinked or obstructed. It is noted to be passing into the peritoneal cavity in the general location of the epigastrium. Next, incision is made overlying the upper abdomen insertion point of the catheter. Soft tissues divided with cautery exposing the catheter. Dissection is then carried down to the level of the fascia where the catheter perforates. Once this was completed, the tunneling device was then passed from this incision over the patient's anterior chest wall in the soft tissues also tunneled above the clavicle and up the right side of the neck to the site of the cranial incision which was performed by neurosurgery. The new catheter that was then attached to the tunneling device and then pulled through the tunneling sheath and brought out through the abdominal incision. The catheter itself was then connected to the new valve by neurosurgery. Neurosurgical portion of the case will be dictated under separate cover. Verification of flow of cerebrospinal fluid was confirmed from the tip of the catheter. The old catheter was then removed from the peritoneal cavity and removed from its tunnel. The new catheter was then passed transabdominally into the peritoneal cavity. This was visualized with the laparoscope again. There was good placement of the catheter tip in the patient's left upper quadrant. The fascia was sutured with 2-0 Vicryl suture. Peritoneal cavity was then desufflated. The incisions were closed with 4-0 Monocryl in a layer of skin glue. At the end of the procedure all sponge, needle, and instrument counts were correct.     Electronically signed by Lj Lniares DO on 2/1/2021 at 9:07 AM

## 2021-02-01 NOTE — PROGRESS NOTES
[facility]  Critical Care Consult 2/1/2021      Date of Admission: 2/1    CC: Shunt revision    HPI: 76year old female with a PMH of NPH admitted post op for shunt revision    Problem List:   Patient Active Problem List   Diagnosis    CAD (coronary artery disease)    Pulmonary hypertension ([address]    COPD (chronic obstructive pulmonary disease) ([address]    CKD (chronic kidney disease) stage 3, GFR 30-59 ml/min    Normal pressure hydrocephalus (HCC)    Chronic systolic congestive heart failure (HCC)    Severe mitral regurgitation    NPH (normal pressure hydrocephalus) ([address]    COVID-19       Surgical/Interventional Procedures:   2/1: shunt revision    PMH:    has a past medical history of Acid reflux disease, Acute on chronic respiratory failure with hypoxia and hypercapnia ([address] Apraxia, Arthritis, Ataxia, CAD (coronary artery disease), Choledocholithiasis, Chronic systolic congestive heart failure ([address] CKD (chronic kidney disease) stage 3, GFR 30-59 ml/min, COPD (chronic obstructive pulmonary disease) ([address] Diabetes mellitus ([address] Hyperlipidemia, Hypoxia, Neck pain, Normal pressure hydrocephalus (HCC), Oxygen dependent, Pleural effusion, Pulmonary hypertension ([address] Restless legs, S/P CABG x 2, and Severe mitral regurgitation. PSH:    has a past surgical history that includes Coronary artery bypass graft (10/30/2002); Cholecystectomy (2002); cervical fusion (1999); Hysterectomy (1988); Cardiac surgery; lumbar drain implantation (07/27/2018); pr therapeutic spinal puncture drainage csf (N/A, 7/27/2018); pr create shunt:ventric-peritoneal (N/A, 10/15/2018); and Colonoscopy (N/A, 1/21/2019). Home Medications:   Prior to Admission medications    Medication Sig Start Date End Date Taking?  Authorizing Provider   carvedilol (COREG) 25 MG tablet Take 1 tablet by mouth 2 times daily (with meals) 11/4/20  Yes Lisa Malagon MD   valsartan (DIOVAN) 80 MG tablet Take 80 mg by mouth daily   Yes Historical Provider, MD   albuterol sulfate  (90 Base) MCG/ACT inhaler INHALE 2 PUFFS BY MOUTH EVERY 4 HOURS AS NEEDED 9/16/20  Yes Historical Provider, MD   oxyCODONE-acetaminophen (PERCOCET) 5-325 MG per tablet Take 1 tablet by mouth every 4 hours as needed for Pain (neck pain). Take if needed [facility] 01/21 . Yes Historical Provider, MD   traZODone (DESYREL) 100 MG tablet Take 1 tablet by mouth nightly  Patient taking differently: Take 100 mg by mouth as needed for Sleep  10/30/18  Yes Valeria Gorman MD   rOPINIRole (REQUIP) 3 MG tablet Take 1 tablet by mouth 2 times daily  Patient taking differently: Take 4 mg by mouth 2 times daily  10/30/18  Yes Valeria Gorman MD   linagliptin (TRADJENTA) 5 MG tablet Take 5 mg by mouth every morning   Yes Historical Provider, MD   OXYGEN Inhale 3 L into the lungs continuous   Yes Historical Provider, MD   spironolactone (ALDACTONE) 25 MG tablet Take 1 tablet by mouth daily 8/4/18  Yes Lorena Vieira,    oxybutynin (DITROPAN-XL) 10 MG extended release tablet Take 10 mg by mouth daily   Yes Historical Provider, MD   omeprazole (PRILOSEC) 40 MG capsule   Take 40 mg by mouth daily  5/16/15  Yes Historical Provider, MD   buPROPion (WELLBUTRIN XL) 300 MG XL tablet Take 300 mg by mouth every morning. Yes Historical Provider, MD   aspirin 325 MG tablet Take 325 mg by mouth daily Prescribed per PCP    Historical Provider, MD   fluticasone (FLONASE) 50 MCG/ACT nasal spray 1 spray by Nasal route daily as needed for Rhinitis    Historical Provider, MD   vitamin D (ERGOCALCIFEROL) 94727 UNITS CAPS capsule Take 50,000 Units by mouth once a week THUR 4/30/15   Historical Provider, MD       Allergies:      Allergies   Allergen Reactions    Sulfa Antibiotics Anaphylaxis    Pentazocine Lactate Nausea And Vomiting       Social History:  Social History     Socioeconomic History    Marital status:      Spouse name: Not on file    Number of children: Not on file    Years of education: Not on file    Highest education level: Not on file   Occupational History    Not on file   Social Needs    Financial resource strain: Not on file    Food insecurity     Worry: Not on file     Inability: Not on file    Transportation needs     Medical: Not on file     Non-medical: Not on file   Tobacco Use    Smoking status: Former Smoker     Packs/day: 0.50     Years: 40.00     Pack years: 20.00     Types: Cigarettes     Quit date: 2018     Years since quittin.5    Smokeless tobacco: Never Used   Substance and Sexual Activity    Alcohol use: No    Drug use: No    Sexual activity: Not on file   Lifestyle    Physical activity     Days per week: Not on file     Minutes per session: Not on file    Stress: Not on file   Relationships    Social connections     Talks on phone: Not on file     Gets together: Not on file     Attends Voodoo service: Not on file     Active member of club or organization: Not on file     Attends meetings of clubs or organizations: Not on file     Relationship status: Not on file    Intimate partner violence     Fear of current or ex partner: Not on file     Emotionally abused: Not on file     Physically abused: Not on file     Forced sexual activity: Not on file   Other Topics Concern    Not on file   Social History Narrative    Not on file       Family History:   Family History   Problem Relation Age of Onset    High Blood Pressure Mother     Diabetes Mother     Emphysema Father     Heart Attack Sister     Heart Failure Sister        VITAL SIGNS:   /67   Pulse 73   Temp 96 °F (35.6 °C) (Axillary)   Resp 14   Ht 5' 3\" (1.6 m)   Wt 126 lb (57.2 kg)   SpO2 100%   BMI 22.32 kg/m²     PHYSICAL EXAM:    General appearance:  Comfortable.    Pain Description: moderate HA  GCS:    3 - Opens eyes to loud noise or command   6 - Follows simple motor commands  5 - Alert and oriented    Pupil size:  Left 3 mm    Right 3 mm  Pupil reaction: Yes  Wiggles fingers: Left Yes Right Yes  Hand grasp:   Left normal    Right normal  Wiggles toes: Left Yes    Right Yes  Plantar flexion: Left normal   Right normal    CONSTITUTIONAL: no acute distress, lying in hospital bed. Lethargic but cooperative   NEUROLOGIC: PERRL, oriented x 4, move all extremities without difficulty   CARDIOVASCULAR: S1 S2, regular rate, regular rhythm, no murmur/gallop/rub. Monitor:sinus  PULMONARY: no rhonchi/rales/wheezes, no use of accessory muscles  RENAL: voiding  ABDOMEN: soft, nontender, nondistended, nontympanic, no masses, no organomegaly, normal bowel sounds, incisions CDI  MUSCULOSKELETAL: moves all extremities purposefully, 5/5 strength   SKIN/EXTREMITIES: no rashes/ecchymosis, no edema/clubbing, warm/dry, good capillary refill     Assessment & Plan:       · Neuro:  Shunt malfunction s/o revision. Monitor neuro status, Neurosurgery following. · CV: No acute issues. Monitor hemodynamics. Coreg, Aldactone, Diovan  · Pulm: No acute issues. Monitor RR & SpO2. Pulmonary hygiene   · GI: No acute issues. Diet. Monitor bowel function. · Renal:  No acute issues. Monitor BUN & Cr. Monitor electrolytes & replace as needed. Monitor urine output. · ID: NO acute issues  · Endocrine: Hyperglycemia. Monitor BS. · MSK: No acute issues. PT/OT  · Heme: No acute issues. Monitor CBC. Bowel regime: Glycolax   Pain control/Sedation:  morphine, oxycodone, Norco, Tylenol  DVT prophylaxis: SCDs. No Lovenox/heparin until ok with neurosurgery.    GI prophylaxis:  Protonix, Diet  Mouth/Eye care: as needed  Family update: Questions answered for patient   Code status:  Full    Disposition:  ICU      Electronically signed by LIZA Arambula CNP on 2/1/2021 at 10:36 AM

## 2021-02-02 ENCOUNTER — APPOINTMENT (OUTPATIENT)
Dept: CT IMAGING | Age: 66
DRG: 032 | End: 2021-02-02
Attending: NEUROLOGICAL SURGERY
Payer: MEDICARE

## 2021-02-02 LAB
ANION GAP SERPL CALCULATED.3IONS-SCNC: 8 MMOL/L (ref 7–16)
BUN BLDV-MCNC: 18 MG/DL (ref 8–23)
CALCIUM SERPL-MCNC: 8.8 MG/DL (ref 8.6–10.2)
CHLORIDE BLD-SCNC: 100 MMOL/L (ref 98–107)
CO2: 28 MMOL/L (ref 22–29)
CREAT SERPL-MCNC: 1.3 MG/DL (ref 0.5–1)
GFR AFRICAN AMERICAN: 50
GFR NON-AFRICAN AMERICAN: 41 ML/MIN/1.73
GLUCOSE BLD-MCNC: 117 MG/DL (ref 74–99)
HCT VFR BLD CALC: 39.7 % (ref 34–48)
HEMOGLOBIN: 12.8 G/DL (ref 11.5–15.5)
MAGNESIUM: 1.6 MG/DL (ref 1.6–2.6)
MCH RBC QN AUTO: 32.3 PG (ref 26–35)
MCHC RBC AUTO-ENTMCNC: 32.2 % (ref 32–34.5)
MCV RBC AUTO: 100.3 FL (ref 80–99.9)
MRSA CULTURE ONLY: NORMAL
PDW BLD-RTO: 12.5 FL (ref 11.5–15)
PHOSPHORUS: 4.3 MG/DL (ref 2.5–4.5)
PLATELET # BLD: 164 E9/L (ref 130–450)
PMV BLD AUTO: 11.1 FL (ref 7–12)
POTASSIUM REFLEX MAGNESIUM: 4.4 MMOL/L (ref 3.5–5)
RBC # BLD: 3.96 E12/L (ref 3.5–5.5)
REASON FOR REJECTION: NORMAL
REJECTED TEST: NORMAL
SODIUM BLD-SCNC: 136 MMOL/L (ref 132–146)
WBC # BLD: 7.2 E9/L (ref 4.5–11.5)

## 2021-02-02 PROCEDURE — 6370000000 HC RX 637 (ALT 250 FOR IP): Performed by: NEUROLOGICAL SURGERY

## 2021-02-02 PROCEDURE — 2580000003 HC RX 258: Performed by: NURSE PRACTITIONER

## 2021-02-02 PROCEDURE — 6370000000 HC RX 637 (ALT 250 FOR IP): Performed by: NURSE PRACTITIONER

## 2021-02-02 PROCEDURE — 36415 COLL VENOUS BLD VENIPUNCTURE: CPT

## 2021-02-02 PROCEDURE — 70450 CT HEAD/BRAIN W/O DYE: CPT

## 2021-02-02 PROCEDURE — 84100 ASSAY OF PHOSPHORUS: CPT

## 2021-02-02 PROCEDURE — 97530 THERAPEUTIC ACTIVITIES: CPT

## 2021-02-02 PROCEDURE — 6360000002 HC RX W HCPCS: Performed by: NURSE PRACTITIONER

## 2021-02-02 PROCEDURE — 80048 BASIC METABOLIC PNL TOTAL CA: CPT

## 2021-02-02 PROCEDURE — 1200000000 HC SEMI PRIVATE

## 2021-02-02 PROCEDURE — 97535 SELF CARE MNGMENT TRAINING: CPT

## 2021-02-02 PROCEDURE — 6360000002 HC RX W HCPCS: Performed by: NEUROLOGICAL SURGERY

## 2021-02-02 PROCEDURE — 85027 COMPLETE CBC AUTOMATED: CPT

## 2021-02-02 PROCEDURE — 2580000003 HC RX 258: Performed by: NEUROLOGICAL SURGERY

## 2021-02-02 PROCEDURE — 83735 ASSAY OF MAGNESIUM: CPT

## 2021-02-02 RX ORDER — MAGNESIUM SULFATE IN WATER 40 MG/ML
2000 INJECTION, SOLUTION INTRAVENOUS ONCE
Status: COMPLETED | OUTPATIENT
Start: 2021-02-02 | End: 2021-02-02

## 2021-02-02 RX ADMIN — HYDROCODONE BITARTRATE AND ACETAMINOPHEN 1 TABLET: 5; 325 TABLET ORAL at 13:53

## 2021-02-02 RX ADMIN — CARVEDILOL 25 MG: 25 TABLET, FILM COATED ORAL at 18:31

## 2021-02-02 RX ADMIN — CARVEDILOL 25 MG: 25 TABLET, FILM COATED ORAL at 08:06

## 2021-02-02 RX ADMIN — ACETAMINOPHEN 650 MG: 325 TABLET, FILM COATED ORAL at 02:02

## 2021-02-02 RX ADMIN — MORPHINE SULFATE 2 MG: 2 INJECTION, SOLUTION INTRAMUSCULAR; INTRAVENOUS at 01:09

## 2021-02-02 RX ADMIN — HYDROCODONE BITARTRATE AND ACETAMINOPHEN 1 TABLET: 5; 325 TABLET ORAL at 08:07

## 2021-02-02 RX ADMIN — VALSARTAN 80 MG: 80 TABLET, FILM COATED ORAL at 08:06

## 2021-02-02 RX ADMIN — MORPHINE SULFATE 2 MG: 2 INJECTION, SOLUTION INTRAMUSCULAR; INTRAVENOUS at 11:09

## 2021-02-02 RX ADMIN — MAGNESIUM SULFATE HEPTAHYDRATE 2000 MG: 40 INJECTION, SOLUTION INTRAVENOUS at 07:59

## 2021-02-02 RX ADMIN — MORPHINE SULFATE 2 MG: 2 INJECTION, SOLUTION INTRAMUSCULAR; INTRAVENOUS at 05:06

## 2021-02-02 RX ADMIN — ROPINIROLE HYDROCHLORIDE 4 MG: 2 TABLET, FILM COATED ORAL at 20:51

## 2021-02-02 RX ADMIN — MORPHINE SULFATE 2 MG: 2 INJECTION, SOLUTION INTRAMUSCULAR; INTRAVENOUS at 15:43

## 2021-02-02 RX ADMIN — HYDROCODONE BITARTRATE AND ACETAMINOPHEN 1 TABLET: 5; 325 TABLET ORAL at 22:43

## 2021-02-02 RX ADMIN — OXYBUTYNIN CHLORIDE 10 MG: 10 TABLET, EXTENDED RELEASE ORAL at 08:06

## 2021-02-02 RX ADMIN — PANTOPRAZOLE SODIUM 40 MG: 40 TABLET, DELAYED RELEASE ORAL at 08:06

## 2021-02-02 RX ADMIN — ROPINIROLE HYDROCHLORIDE 4 MG: 2 TABLET, FILM COATED ORAL at 08:06

## 2021-02-02 RX ADMIN — SODIUM CHLORIDE, PRESERVATIVE FREE 10 ML: 5 INJECTION INTRAVENOUS at 07:59

## 2021-02-02 RX ADMIN — SPIRONOLACTONE 25 MG: 25 TABLET ORAL at 08:06

## 2021-02-02 RX ADMIN — ENOXAPARIN SODIUM 40 MG: 40 INJECTION SUBCUTANEOUS at 08:05

## 2021-02-02 RX ADMIN — CEFAZOLIN 1000 MG: 1 INJECTION, POWDER, FOR SOLUTION INTRAMUSCULAR; INTRAVENOUS at 00:04

## 2021-02-02 RX ADMIN — HYDROCODONE BITARTRATE AND ACETAMINOPHEN 1 TABLET: 5; 325 TABLET ORAL at 03:13

## 2021-02-02 RX ADMIN — BUPROPION HYDROCHLORIDE 300 MG: 300 TABLET, FILM COATED, EXTENDED RELEASE ORAL at 08:06

## 2021-02-02 RX ADMIN — SODIUM CHLORIDE, PRESERVATIVE FREE 10 ML: 5 INJECTION INTRAVENOUS at 20:51

## 2021-02-02 RX ADMIN — PANTOPRAZOLE SODIUM 40 MG: 40 TABLET, DELAYED RELEASE ORAL at 15:34

## 2021-02-02 RX ADMIN — MORPHINE SULFATE 2 MG: 2 INJECTION, SOLUTION INTRAMUSCULAR; INTRAVENOUS at 20:52

## 2021-02-02 RX ADMIN — HYDROCODONE BITARTRATE AND ACETAMINOPHEN 1 TABLET: 5; 325 TABLET ORAL at 18:29

## 2021-02-02 ASSESSMENT — PAIN DESCRIPTION - PAIN TYPE
TYPE: SURGICAL PAIN

## 2021-02-02 ASSESSMENT — PAIN SCALES - GENERAL
PAINLEVEL_OUTOF10: 8
PAINLEVEL_OUTOF10: 9
PAINLEVEL_OUTOF10: 7
PAINLEVEL_OUTOF10: 8
PAINLEVEL_OUTOF10: 7
PAINLEVEL_OUTOF10: 6
PAINLEVEL_OUTOF10: 0
PAINLEVEL_OUTOF10: 9
PAINLEVEL_OUTOF10: 7
PAINLEVEL_OUTOF10: 9

## 2021-02-02 ASSESSMENT — PAIN DESCRIPTION - FREQUENCY
FREQUENCY: INTERMITTENT
FREQUENCY: INTERMITTENT

## 2021-02-02 ASSESSMENT — PAIN DESCRIPTION - LOCATION
LOCATION: HEAD
LOCATION: HEAD

## 2021-02-02 ASSESSMENT — PAIN DESCRIPTION - DESCRIPTORS
DESCRIPTORS: HEADACHE
DESCRIPTORS: HEADACHE

## 2021-02-02 ASSESSMENT — PAIN DESCRIPTION - ORIENTATION
ORIENTATION: RIGHT
ORIENTATION: RIGHT

## 2021-02-02 NOTE — CARE COORDINATION
Spoke with Pt about Transition Plan of Care. Pt lives with  in house with 3 steps to back and 1 step to front. No railings. Pt [facility].  PCP:  Dr. Kiko Gilmore. [facility]acy:  [facility].  will provide transport at discharge. Pt choose [facility]. [facility]. SW/ to follow for discharge needs. Referral made to [facility]. Geoffrey Hollins will review and let SW know if accepted.     Rand Rao, L.S.W.  [phone]

## 2021-02-02 NOTE — PROGRESS NOTES
Pt complaining of 10/10 pain on R side of head near incision radiating down neck as well as in R ear. Pt has already received all PRN pain medication. Neuro status WDL with no acute changes. Dr. Tejal Contreras notified of pt's pain status via telephone call. Telephone order with readback, one time dose 15mg IVP toradol. Update 2047: Pt's states she still has the pain but it is getting better, now an 8/10. Will continue to monitor.

## 2021-02-02 NOTE — PROGRESS NOTES
OCCUPATIONAL THERAPY INITIAL EVALUATION      Date:2021  Patient Name: Alexa Oden  MRN: [id number]  : 1955  Room: [address] [place] [address]    Referring Provider: Aaliyah Esparza RN    Evaluating OT: Pop Lockettey OTR/L [id number]      AM-PAC Daily Activity Raw Score: 18/24    Recommended Adaptive Equipment: shower seat as needed for energy conservation       Diagnosis: NORMAL PRESSURE HYDROCEPHALUS,  shunt malfunction     Surgery/Procedures:   Diagnostic laparoscopy. Removal and replacement of ventriculoperitoneal shunt.       Pertinent Medical History: Ataxia, CAD, CKD, COPD, DM, hypoxia, neck pain, HTN, CABG x 2    Precautions:  Falls     Home Living: Pt lives with   in a 1 floor plan with 3 step(s) to enter and no rail(s); bed/bath on first floor  Bathroom setup: tub/shower; standard height commode  Equipment owned: no DME    Prior Level of Function: IND with ADLs;  IND with IADLs. No device for ambulation. Driving: yes  Occupation: pt likes to sew    Pain Level: pt c/o 7/10 neck, head, stomach pain  this session      Cognition: A&O: 4/4    Follows 1-2 step commands appropriately. Memory: good; reports difficulty with STM skills   Comprehension good; occasional increased time to process.    Problem solving: good   Judgement/safety: fair+/good               Communication skills: WFL           Vision: grossly WFL; reports new onset \"blurry vision\"               Glasses:no                                                   Hearing: min [place]     RASS: 0  CAM-ICU: (NT) Delirium    UE Assessment:  Hand Dominance: Right [x]  Left []     ROM Strength   RUE  WFL 4/5   LUE WFL 4/5     Sensation: No c/o numbness or tingling in extremities   Tone: WNL   Edema: min B feet/LE     Functional Assessment   Initial Eval Status  Date:  Treatment Status  Date: STG=LTG  2-3   days    Feeding Sarah                        Sarah  while seated up in chair to increase activity tolerance        Grooming S; set up S  Standing sink level; no LOB  Stood> 10 min to complete self care tasks. Sarah   while standing sink level requiring no device for balance and demonstrating G tolerance      UB dressing/bathing Min A S  Assist with IV lines                         Sarah       LB dressing/bathing Min A SBA  Seated on commode; SBA to pull pants up over  hips                       Sarah   using AE as needed for safe reach/ energy conservation       Toileting SBA SBA                       Sarah     Bed Mobility  Supine to sit: SBA    Sit to supine: SBA Supine <>sit: S                       Sarah  in prep of ADL tasks & transfers   Functional Transfers Sit to stand: Min A    Stand to sit: Min A    SPT: Min A    Commode: Min A Sit to stand: S  Commode: S to sit; Min A to stand from lower seat                       Sarah  sit<>stand/functional bathroom transfers using AD/DME as needed for balance and safety   Functional Mobility Min A  No device S  No device; occasional LOB                      Sarah   functional/bathroom mobility using AD as needed & demonstrating G safety     Balance Sitting:     Static:  SBA    Dynamic:SBA  Standing: Min A Sitting: S  Standing: SBA/S IND dynamic sitting balance; Sarah dynamic standing balance  during ADL tasks & transfers   Endurance/Activity Tolerance   F tolerance with moderate activity. G tolerance with self care routine; c/o dizziness x 1 while standing. G   tolerance with moderate activity/self care routine   Visual/  Perceptual   WFL                       Vitals:   HR at rest: 72 bpm HR at end of session: 78 bpm   Spo2 at rest:95% Spo2 at end of session NT%   BP at rest:103/55 mmHg BP at end of session 112/60 mmHg       Treatment: OT intervention provided to achieve goals:    Balance retraining: Performed sitting/standing balance ex's to improve righting reactions with postural changes during ADLS. Pt demo no LOB. Min c/o dizziness.      ADL retraining: Instruction on adapted techniques/ (seated) to increase independence and safe reach during dressing/bathing activities. Pt demonstrated good tolerance. Pt educated on energy conservation techniques during self care routine; demo and verbalizes G understanding. Line management and environmental modifications made prior to and end of session to ensure patient safety and to increase efficiency of session. Skilled monitoring of HR, O2 saturation, blood pressure and patient's response to activity performed throughout session. Comments: OK from RN to see patient. Upon arrival, patient supine in bed; agreeable to session. .  Pt demo fair tolerance with good understanding of education/techniques. At end of session, patient requested to rest in bed. Call light within reach, all lines and tubes intact. Pt instructed on use of call light for assistance and fall prevention. .      Pt can benefit from continued skilled OT to increase safety and functional independence. Time In:1030            Time Out: 1108  Total Treatment time: 38   Min Units   OT Eval Low 30638     OT Eval Medium 71522     OT Eval High 97697     OT Re-Eval [id number]     Therapeutic Ex 63856     Therapeutic Activities 88076 8 1   ADL/Self Care 04322 30 2   Orthotic Management 10426     Neuro Re-Ed 39623     Non-Billable Time        Evaluation time includes thorough review of current medical information, gathering information on past medical history/social history and prior level of function, completion of standardized testing/informal observation of tasks, assessment of data and development of POC/Goals.      Aleks Julien, OTR/L [address]

## 2021-02-02 NOTE — PROGRESS NOTES
sitting  5/7   Sit to stand transfers 5/7   Ambulation 5/7   Total  20/35       Therapeutic Exercises:  NA    Patient education  Pt educated on safety    Patient response to education:   Pt verbalized understanding Pt demonstrated skill Pt requires further education in this area   x x x     ASSESSMENT:    Comments:  RN reported pt was stable for session. Pt was in bed upon arrival, agreeable to treatment session. Pt demonstrated improved mobility with occasional unsteadiness while ambulating but no LOB noted. Pt was left in care of OT at end of session. Treatment:  Patient practiced and was instructed in the following treatment:     Bed mobility training - pt given verbal cues to facilitate proper sequencing and safety during supine>sit to complete task    Sitting EOB for >3 minutes for upright tolerance, postural awareness and BLE ROM   Transfer training - pt was given verbal cues to facilitate proper hand placement, technique and safety during sit to stand and stand to sit to complete task.  Gait training- pt was given verbal cues to facilitate safety and balance during ambulation to complete task. PLAN:    Patient is making progress towards established goals. Will continue with current POC.       Time in  1020  Time out  1035    Total Treatment Time  15 minutes     CPT codes:  [] Gait training 85911 - minutes  [] Manual therapy 76187 - minutes  [x] Therapeutic activities 79003 15 minutes  [] Therapeutic exercises 77188 - minutes  [] Neuromuscular reeducation 86387 - minutes    Bob Jeong PT, DPT  [id number]

## 2021-02-02 NOTE — PROGRESS NOTES
Chief Complaint:  Abdominal pain    Subjective: The patient is alert. No problems overnight. Denies chest pain & SOB . Denies abdominal pain. Tolerating diet. No nausea or vomiting. Objective:    Vitals:    02/02/21 0700   BP: 105/67   Pulse: 68   Resp: 10   Temp:    SpO2: 97%     A&O x's 3  Heart:  RRR, no murmurs, gallops, or rubs. Lungs:  CTA bilaterally, no wheeze, rales or rhonchi  Abd: bowel sounds present, nontender, nondistended, no masses  Extrem:  No clubbing, cyanosis, or edema      Lab Results   Component Value Date     02/02/2021    K 4.4 02/02/2021     02/02/2021    CO2 28 02/02/2021    BUN 18 02/02/2021    CREATININE 1.3 02/02/2021    CALCIUM 8.8 02/02/2021      Lab Results   Component Value Date    WBC 7.2 02/02/2021    RBC 3.96 02/02/2021    HGB 12.8 02/02/2021    HCT 39.7 02/02/2021    .3 02/02/2021    MCH 32.3 02/02/2021    MCHC 32.2 02/02/2021    RDW 12.5 02/02/2021     02/02/2021    MPV 11.1 02/02/2021     PT/INR:    Lab Results   Component Value Date    PROTIME 11.9 10/19/2018    INR 1.0 10/19/2018     No results for input(s): POCGLU in the last 72 hours. Recent Labs     02/01/21  0625 02/02/21  0503    136   K 4.5 4.4    100   CO2 23 28   BUN 22 18   CREATININE 1.1* 1.3*   CALCIUM 9.7 8.8   GFRAA >60 50   LABGLOM 50 41   GLUCOSE 120* 117*       Ct Head Wo Contrast    Result Date: 2/2/2021  EXAMINATION: CT OF THE HEAD WITHOUT CONTRAST  2/2/2021 4:42 am TECHNIQUE: CT of the head was performed without the administration of intravenous contrast. Dose modulation, iterative reconstruction, and/or weight based adjustment of the mA/kV was utilized to reduce the radiation dose to as low as reasonably achievable. COMPARISON: None. 10/16/2018 HISTORY: ORDERING SYSTEM PROVIDED HISTORY: evaluate post op course TECHNOLOGIST PROVIDED HISTORY:  shunt revision Has a \"code stroke\" or \"stroke alert\" been called? ->No Reason for exam:->evaluate post op course What reading provider will be dictating this exam?->CRC FINDINGS:  shunt revision by history. Right parietal faustino hole and the ventriculostomy catheter remains in good position. The catheter crosses the midline from right to left with the ventriculostomy tip within the left lateral ventricle. Mild dilatation of the lateral ventricles and 3rd ventricle, unchanged. Normal 4th ventricle. No intracranial mass, hemorrhage, or acute parenchymal abnormality. Bilateral coarse vertebral atherosclerotic calcifications and otherwise negative posterior fossa. Bilateral carotid calcifications, as well. No suspicious extra-axial fluid collection. Hyperostosis internus frontalis. 1.   shunt revision and the ventriculostomy catheter remains in good position. Mild ventricular dilatation, unchanged. 2.  No intracranial hemorrhage or acute intracranial disease identified. Scheduled Meds:   magnesium sulfate  2,000 mg Intravenous Once    buPROPion  300 mg Oral QAM    carvedilol  25 mg Oral BID WC    pantoprazole  40 mg Oral BID AC    spironolactone  25 mg Oral Daily    valsartan  80 mg Oral Daily    [START ON 2/4/2021] vitamin D  50,000 Units Oral Once per day on Thu    rOPINIRole  4 mg Oral BID    oxybutynin  10 mg Oral Daily    sodium chloride flush  10 mL Intravenous 2 times per day    enoxaparin  40 mg Subcutaneous Daily     Continuous Infusions:  PRN Meds:.sodium chloride flush, ondansetron, polyethylene glycol, acetaminophen **OR** acetaminophen, morphine, HYDROcodone 5 mg - acetaminophen    I/O last 3 completed shifts: In: 1370 [P.O.:720; I.V.:650]  Out: 10 [Blood:10]  No intake/output data recorded.     Intake/Output Summary (Last 24 hours) at 2/2/2021 0834  Last data filed at 2/2/2021 0600  Gross per 24 hour   Intake 720 ml   Output --   Net 720 ml       Assessment:    Active Hospital Problems    Diagnosis    NPH (normal pressure hydrocephalus) ([facility] [G91.2]    COVID-19 [U07.1]       Plan: Still complains of post op headache and abdominal pain      Rafael Aldrich,   8:34 AM  2/2/2021

## 2021-02-02 NOTE — PLAN OF CARE
Problem: Falls - Risk of:  Goal: Will remain free from falls  Description: Will remain free from falls  4/1/0255 8059 by Pasquale Closs, RN  Outcome: Met This Shift  2/2/2021 0020 by Laura Welch RN  Outcome: Ongoing  Goal: Absence of physical injury  Description: Absence of physical injury  6/1/6505 5847 by Pasquale Closs, RN  Outcome: Met This Shift  2/2/2021 0020 by Laura Welch RN  Outcome: Ongoing     Problem: Discharge Planning:  Goal: Participates in care planning  Description: Participates in care planning  3/1/5741 2140 by Pasquale Closs, RN  Outcome: Ongoing  2/2/2021 0020 by Laura Welch RN  Outcome: Ongoing  Goal: Discharged to appropriate level of care  Description: Discharged to appropriate level of care  2/2/2021 0020 by Laura Welch RN  Outcome: Ongoing     Problem: Mental Status - Impaired:  Goal: Mental status will be restored to baseline  Description: Mental status will be restored to baseline  1/7/9872 6144 by Pasquale Closs, RN  Outcome: Met This Shift  2/2/2021 0020 by Laura Welch RN  Outcome: Ongoing     Problem: Pain:  Goal: Pain level will decrease  Description: Pain level will decrease  4/8/3372 3715 by Pasquale Closs, RN  Outcome: Met This Shift  2/2/2021 0020 by Laura Welch RN  Outcome: Ongoing  Goal: Control of acute pain  Description: Control of acute pain  2/2/2021 0020 by Laura Welch RN  Outcome: Ongoing  Goal: Control of chronic pain  Description: Control of chronic pain  2/2/2021 0020 by Laura Welch RN  Outcome: Ongoing

## 2021-02-02 NOTE — PROGRESS NOTES
PO#1   Replace  shunt  Doing very well. C/O headache over night  Better today. Alert awake & oriented. No focal deficit. Reviewed CT scan of brain:  1.   shunt revision and the ventriculostomy catheter remains in good   position.  Mild ventricular dilatation, unchanged.    2.  No intracranial hemorrhage or acute intracranial disease identified     May be transferred out of NSICU

## 2021-02-02 NOTE — PROGRESS NOTES
[facility]  Critical Care Consult 2/2/2021      Date of Admission: 2/1    CC: Shunt revision    Events:  2/1: shunt revision  2/2: No acute events, continues to c/o HA      VITAL SIGNS:   /67   Pulse 68   Temp 98 °F (36.7 °C) (Oral)   Resp 10   Ht 5' 3\" (1.6 m)   Wt 125 lb 9.6 oz (57 kg)   SpO2 97%   BMI 22.25 kg/m²     PHYSICAL EXAM:    General appearance:  Comfortable. Pain Description: moderate HA  GCS:    3 - Opens eyes to loud noise or command   6 - Follows simple motor commands  5 - Alert and oriented    Pupil size:  Left 3 mm    Right 3 mm  Pupil reaction: Yes  Wiggles fingers: Left Yes Right Yes  Hand grasp:   Left normal    Right normal  Wiggles toes: Left Yes    Right Yes  Plantar flexion: Left normal   Right normal    CONSTITUTIONAL: no acute distress, lying in hospital bed. Alert and cooperative   NEUROLOGIC: PERRL, oriented x 4, move all extremities without difficulty   CARDIOVASCULAR: S1 S2, regular rate, regular rhythm, no murmur/gallop/rub. Monitor:sinus  PULMONARY: no rhonchi/rales/wheezes, no use of accessory muscles  RENAL: voiding  ABDOMEN: soft, nontender, nondistended, nontympanic, no masses, no organomegaly, normal bowel sounds, incisions CDI  MUSCULOSKELETAL: moves all extremities purposefully, 5/5 strength   SKIN/EXTREMITIES: no rashes/ecchymosis, no edema/clubbing, warm/dry, good capillary refill     Assessment & Plan:       · Neuro:  Shunt malfunction s/o revision. Monitor neuro status, Neurosurgery following. · CV: No acute issues. Monitor hemodynamics. Coreg, Aldactone, Diovan  · Pulm: No acute issues. Monitor RR & SpO2. Pulmonary hygiene   · GI: No acute issues. Diet. Monitor bowel function. · Renal:  No acute issues. Monitor BUN & Cr. Monitor electrolytes & replace as needed. Monitor urine output. · ID: No acute issues  · Endocrine: Hyperglycemia. Monitor BS. · MSK: No acute issues. PT/OT  · Heme: No acute issues. Monitor CBC. Bowel regime: Glycolax   Pain control/Sedation:  morphine,  Norco, Tylenol  DVT prophylaxis: SCDs.  Lovenox   GI prophylaxis:  Protonix, Diet  Mouth/Eye care: as needed  Family update: Questions answered for patient   Code status:  Full    Disposition:  Transfer       Electronically signed by LIZA Garcia CNP on 2/2/2021 at 8:51 AM

## 2021-02-02 NOTE — PLAN OF CARE
Problem: Falls - Risk of:  Goal: Will remain free from falls  Description: Will remain free from falls  2/2/2021 0020 by Sarina Fernando RN  Outcome: Ongoing  2/1/2021 2115 by Sarina Fernando RN  Outcome: Ongoing  Goal: Absence of physical injury  Description: Absence of physical injury  2/2/2021 0020 by Sarina Fernando RN  Outcome: Ongoing  2/1/2021 2115 by Sarina Fernando RN  Outcome: Ongoing     Problem: Discharge Planning:  Goal: Participates in care planning  Description: Participates in care planning  2/2/2021 0020 by Sarina Fernando RN  Outcome: Ongoing  2/1/2021 2115 by Sarina Fernando RN  Outcome: Ongoing  Goal: Discharged to appropriate level of care  Description: Discharged to appropriate level of care  2/2/2021 0020 by Sarina Fernando RN  Outcome: Ongoing  2/1/2021 2115 by Sarina Fernando RN  Outcome: Ongoing     Problem: Mental Status - Impaired:  Goal: Mental status will be restored to baseline  Description: Mental status will be restored to baseline  2/2/2021 0020 by Sarina Fernando RN  Outcome: Ongoing  2/1/2021 2115 by Sarina Fernando RN  Outcome: Ongoing     Problem: Neurological  Goal: Maximum potential motor/sensory/cognitive function  2/2/2021 0020 by Sarina Fernando RN  Outcome: Ongoing  2/1/2021 2115 by Sarina Fernando RN  Outcome: Ongoing     Problem: Pain:  Goal: Pain level will decrease  Description: Pain level will decrease  2/2/2021 0020 by Sarina Fernando RN  Outcome: Ongoing  2/1/2021 2115 by Sarina Fernando RN  Outcome: Ongoing  Goal: Control of acute pain  Description: Control of acute pain  2/2/2021 0020 by Sarina Fernando RN  Outcome: Ongoing  2/1/2021 2115 by Sarina Fernando RN  Outcome: Ongoing  Goal: Control of chronic pain  Description: Control of chronic pain  2/2/2021 0020 by Sarina Fernando RN  Outcome: Ongoing  2/1/2021 2115 by Sarina Fernando RN  Outcome: Ongoing

## 2021-02-02 NOTE — PLAN OF CARE
Problem: Falls - Risk of:  Goal: Will remain free from falls  Description: Will remain free from falls  Outcome: Ongoing  Goal: Absence of physical injury  Description: Absence of physical injury  Outcome: Ongoing     Problem: Discharge Planning:  Goal: Participates in care planning  Description: Participates in care planning  Outcome: Ongoing  Goal: Discharged to appropriate level of care  Description: Discharged to appropriate level of care  Outcome: Ongoing     Problem: Mental Status - Impaired:  Goal: Mental status will be restored to baseline  Description: Mental status will be restored to baseline  Outcome: Ongoing     Problem: Neurological  Goal: Maximum potential motor/sensory/cognitive function  Outcome: Ongoing     Problem: Pain:  Goal: Pain level will decrease  Description: Pain level will decrease  Outcome: Ongoing  Goal: Control of acute pain  Description: Control of acute pain  Outcome: Ongoing  Goal: Control of chronic pain  Description: Control of chronic pain  Outcome: Ongoing

## 2021-02-03 PROCEDURE — 6370000000 HC RX 637 (ALT 250 FOR IP): Performed by: NURSE PRACTITIONER

## 2021-02-03 PROCEDURE — 1200000000 HC SEMI PRIVATE

## 2021-02-03 PROCEDURE — 2700000000 HC OXYGEN THERAPY PER DAY

## 2021-02-03 PROCEDURE — 6360000002 HC RX W HCPCS: Performed by: NEUROLOGICAL SURGERY

## 2021-02-03 PROCEDURE — 2580000003 HC RX 258: Performed by: NURSE PRACTITIONER

## 2021-02-03 RX ADMIN — PANTOPRAZOLE SODIUM 40 MG: 40 TABLET, DELAYED RELEASE ORAL at 08:29

## 2021-02-03 RX ADMIN — SODIUM CHLORIDE, PRESERVATIVE FREE 10 ML: 5 INJECTION INTRAVENOUS at 08:30

## 2021-02-03 RX ADMIN — ROPINIROLE HYDROCHLORIDE 4 MG: 2 TABLET, FILM COATED ORAL at 20:33

## 2021-02-03 RX ADMIN — CARVEDILOL 25 MG: 25 TABLET, FILM COATED ORAL at 08:30

## 2021-02-03 RX ADMIN — ACETAMINOPHEN 650 MG: 325 TABLET, FILM COATED ORAL at 15:33

## 2021-02-03 RX ADMIN — HYDROCODONE BITARTRATE AND ACETAMINOPHEN 1 TABLET: 5; 325 TABLET ORAL at 08:29

## 2021-02-03 RX ADMIN — SPIRONOLACTONE 25 MG: 25 TABLET ORAL at 08:30

## 2021-02-03 RX ADMIN — BUPROPION HYDROCHLORIDE 300 MG: 300 TABLET, FILM COATED, EXTENDED RELEASE ORAL at 08:30

## 2021-02-03 RX ADMIN — VALSARTAN 80 MG: 80 TABLET, FILM COATED ORAL at 08:30

## 2021-02-03 RX ADMIN — ROPINIROLE HYDROCHLORIDE 4 MG: 2 TABLET, FILM COATED ORAL at 08:30

## 2021-02-03 RX ADMIN — HYDROCODONE BITARTRATE AND ACETAMINOPHEN 1 TABLET: 5; 325 TABLET ORAL at 12:44

## 2021-02-03 RX ADMIN — PANTOPRAZOLE SODIUM 40 MG: 40 TABLET, DELAYED RELEASE ORAL at 15:34

## 2021-02-03 RX ADMIN — CARVEDILOL 25 MG: 25 TABLET, FILM COATED ORAL at 15:34

## 2021-02-03 RX ADMIN — HYDROCODONE BITARTRATE AND ACETAMINOPHEN 1 TABLET: 5; 325 TABLET ORAL at 20:33

## 2021-02-03 RX ADMIN — OXYBUTYNIN CHLORIDE 10 MG: 10 TABLET, EXTENDED RELEASE ORAL at 08:30

## 2021-02-03 RX ADMIN — HYDROCODONE BITARTRATE AND ACETAMINOPHEN 1 TABLET: 5; 325 TABLET ORAL at 16:53

## 2021-02-03 RX ADMIN — ACETAMINOPHEN 650 MG: 325 TABLET, FILM COATED ORAL at 20:13

## 2021-02-03 RX ADMIN — MORPHINE SULFATE 2 MG: 2 INJECTION, SOLUTION INTRAMUSCULAR; INTRAVENOUS at 06:18

## 2021-02-03 ASSESSMENT — PAIN SCALES - GENERAL
PAINLEVEL_OUTOF10: 9
PAINLEVEL_OUTOF10: 3
PAINLEVEL_OUTOF10: 6
PAINLEVEL_OUTOF10: 9

## 2021-02-03 ASSESSMENT — PAIN DESCRIPTION - FREQUENCY
FREQUENCY: INTERMITTENT
FREQUENCY: INTERMITTENT

## 2021-02-03 ASSESSMENT — PAIN DESCRIPTION - PAIN TYPE
TYPE: SURGICAL PAIN

## 2021-02-03 ASSESSMENT — PAIN DESCRIPTION - ORIENTATION: ORIENTATION: RIGHT

## 2021-02-03 ASSESSMENT — PAIN DESCRIPTION - LOCATION: LOCATION: HEAD

## 2021-02-03 ASSESSMENT — PAIN DESCRIPTION - DESCRIPTORS: DESCRIPTORS: HEADACHE

## 2021-02-03 NOTE — PROGRESS NOTES
Chief Complaint:  Abdominal pain    Subjective: The patient is alert. No problems overnight. Denies chest pain & SOB . Mild abdominal pain today. Tolerating diet. No nausea or vomiting. Objective:    Vitals:    02/03/21 0815   BP: 113/67   Pulse: 88   Resp: 18   Temp: 97 °F (36.1 °C)   SpO2: 94%     A&O x's 3  Heart:  RRR, no murmurs, gallops, or rubs. Lungs:  CTA bilaterally, no wheeze, rales or rhonchi  Abd: bowel sounds present, nontender, nondistended, no masses  Extrem:  No clubbing, cyanosis, or edema      Lab Results   Component Value Date     02/02/2021    K 4.4 02/02/2021     02/02/2021    CO2 28 02/02/2021    BUN 18 02/02/2021    CREATININE 1.3 02/02/2021    CALCIUM 8.8 02/02/2021      Lab Results   Component Value Date    WBC 7.2 02/02/2021    RBC 3.96 02/02/2021    HGB 12.8 02/02/2021    HCT 39.7 02/02/2021    .3 02/02/2021    MCH 32.3 02/02/2021    MCHC 32.2 02/02/2021    RDW 12.5 02/02/2021     02/02/2021    MPV 11.1 02/02/2021     PT/INR:    Lab Results   Component Value Date    PROTIME 11.9 10/19/2018    INR 1.0 10/19/2018     No results for input(s): POCGLU in the last 72 hours. Recent Labs     02/01/21  0625 02/02/21  0503    136   K 4.5 4.4    100   CO2 23 28   BUN 22 18   CREATININE 1.1* 1.3*   CALCIUM 9.7 8.8   GFRAA >60 50   LABGLOM 50 41   GLUCOSE 120* 117*       Ct Head Wo Contrast    Result Date: 2/2/2021  EXAMINATION: CT OF THE HEAD WITHOUT CONTRAST  2/2/2021 4:42 am TECHNIQUE: CT of the head was performed without the administration of intravenous contrast. Dose modulation, iterative reconstruction, and/or weight based adjustment of the mA/kV was utilized to reduce the radiation dose to as low as reasonably achievable. COMPARISON: None. 10/16/2018 HISTORY: ORDERING SYSTEM PROVIDED HISTORY: evaluate post op course TECHNOLOGIST PROVIDED HISTORY:  shunt revision Has a \"code stroke\" or \"stroke alert\" been called? ->No Reason for exam:->evaluate post op course What reading provider will be dictating this exam?->CRC FINDINGS:  shunt revision by history. Right parietal faustino hole and the ventriculostomy catheter remains in good position. The catheter crosses the midline from right to left with the ventriculostomy tip within the left lateral ventricle. Mild dilatation of the lateral ventricles and 3rd ventricle, unchanged. Normal 4th ventricle. No intracranial mass, hemorrhage, or acute parenchymal abnormality. Bilateral coarse vertebral atherosclerotic calcifications and otherwise negative posterior fossa. Bilateral carotid calcifications, as well. No suspicious extra-axial fluid collection. Hyperostosis internus frontalis. 1.   shunt revision and the ventriculostomy catheter remains in good position. Mild ventricular dilatation, unchanged. 2.  No intracranial hemorrhage or acute intracranial disease identified. Scheduled Meds:   buPROPion  300 mg Oral QAM    carvedilol  25 mg Oral BID WC    pantoprazole  40 mg Oral BID AC    spironolactone  25 mg Oral Daily    valsartan  80 mg Oral Daily    [START ON 2/4/2021] vitamin D  50,000 Units Oral Once per day on Thu    rOPINIRole  4 mg Oral BID    oxybutynin  10 mg Oral Daily    sodium chloride flush  10 mL Intravenous 2 times per day     Continuous Infusions:  PRN Meds:.sodium chloride flush, ondansetron, polyethylene glycol, acetaminophen **OR** acetaminophen, HYDROcodone 5 mg - acetaminophen    I/O last 3 completed shifts: In: 7258 [P.O.:1470; IV Piggyback:50]  Out: 2 [Urine:2]  No intake/output data recorded.     Intake/Output Summary (Last 24 hours) at 2/3/2021 1222  Last data filed at 2/3/2021 2756  Gross per 24 hour   Intake 990 ml   Output 2 ml   Net 988 ml       Assessment:    Active Hospital Problems    Diagnosis    Chronic systolic congestive heart failure (HCC) [I50.22]     Priority: High     Class: Chronic    Pulmonary hypertension ([facility] [I27.20] Priority: High     Class: Chronic    CKD (chronic kidney disease) stage 3, GFR 30-59 ml/min [N18.30]     Priority: High    NPH (normal pressure hydrocephalus) (HCC) [G91.2]    COVID-19 [U07.1]    Normal pressure hydrocephalus (HCC) [G91.2]    [facility] [J44.9]       Plan:  Still complains of post op headache and abdominal pain              Discharge today with PCP f/u      Clyde Moreno DO  12:22 PM  2/3/2021

## 2021-02-03 NOTE — CARE COORDINATION
2/3/2021 social work transition of care planning  Pt plan is home with [facility]. Pt family will transport at discharge.   Electronically signed by DEENA Betancourt on 2/3/2021 at 10:49 AM Equal and normal pulses (carotid, femoral, dorsalis pedis)

## 2021-02-03 NOTE — PROGRESS NOTES
Consult received and chart reviewed. Dr Rebekah Sierra is recommending home with [facility] as patient is too high functioning for ARU. Thank you for the consult.

## 2021-02-04 VITALS
TEMPERATURE: 97.8 F | BODY MASS INDEX: 22.25 KG/M2 | HEIGHT: 63 IN | RESPIRATION RATE: 16 BRPM | WEIGHT: 125.6 LBS | HEART RATE: 76 BPM | SYSTOLIC BLOOD PRESSURE: 130 MMHG | DIASTOLIC BLOOD PRESSURE: 73 MMHG | OXYGEN SATURATION: 97 %

## 2021-02-04 PROCEDURE — 6370000000 HC RX 637 (ALT 250 FOR IP): Performed by: NURSE PRACTITIONER

## 2021-02-04 PROCEDURE — 2580000003 HC RX 258: Performed by: NURSE PRACTITIONER

## 2021-02-04 PROCEDURE — 2700000000 HC OXYGEN THERAPY PER DAY

## 2021-02-04 RX ORDER — HYDROCODONE BITARTRATE AND ACETAMINOPHEN 5; 325 MG/1; MG/1
1 TABLET ORAL EVERY 6 HOURS PRN
Qty: 10 TABLET | Refills: 0 | Status: SHIPPED | OUTPATIENT
Start: 2021-02-04 | End: 2021-02-07

## 2021-02-04 RX ADMIN — ROPINIROLE HYDROCHLORIDE 4 MG: 2 TABLET, FILM COATED ORAL at 10:02

## 2021-02-04 RX ADMIN — ERGOCALCIFEROL 50000 UNITS: 1.25 CAPSULE ORAL at 10:01

## 2021-02-04 RX ADMIN — HYDROCODONE BITARTRATE AND ACETAMINOPHEN 1 TABLET: 5; 325 TABLET ORAL at 11:58

## 2021-02-04 RX ADMIN — CARVEDILOL 25 MG: 25 TABLET, FILM COATED ORAL at 10:01

## 2021-02-04 RX ADMIN — BUPROPION HYDROCHLORIDE 300 MG: 300 TABLET, FILM COATED, EXTENDED RELEASE ORAL at 10:01

## 2021-02-04 RX ADMIN — PANTOPRAZOLE SODIUM 40 MG: 40 TABLET, DELAYED RELEASE ORAL at 10:00

## 2021-02-04 RX ADMIN — OXYBUTYNIN CHLORIDE 10 MG: 10 TABLET, EXTENDED RELEASE ORAL at 10:02

## 2021-02-04 RX ADMIN — SPIRONOLACTONE 25 MG: 25 TABLET ORAL at 10:01

## 2021-02-04 RX ADMIN — ACETAMINOPHEN 650 MG: 325 TABLET, FILM COATED ORAL at 03:23

## 2021-02-04 RX ADMIN — VALSARTAN 80 MG: 80 TABLET, FILM COATED ORAL at 10:01

## 2021-02-04 RX ADMIN — HYDROCODONE BITARTRATE AND ACETAMINOPHEN 1 TABLET: 5; 325 TABLET ORAL at 06:31

## 2021-02-04 RX ADMIN — SODIUM CHLORIDE, PRESERVATIVE FREE 10 ML: 5 INJECTION INTRAVENOUS at 10:02

## 2021-02-04 RX ADMIN — HYDROCODONE BITARTRATE AND ACETAMINOPHEN 1 TABLET: 5; 325 TABLET ORAL at 00:53

## 2021-02-04 ASSESSMENT — PAIN DESCRIPTION - PAIN TYPE: TYPE: SURGICAL PAIN

## 2021-02-04 ASSESSMENT — PAIN DESCRIPTION - DESCRIPTORS
DESCRIPTORS: HEADACHE
DESCRIPTORS: HEADACHE

## 2021-02-04 ASSESSMENT — PAIN DESCRIPTION - LOCATION
LOCATION: HEAD
LOCATION: HEAD

## 2021-02-04 ASSESSMENT — PAIN SCALES - GENERAL
PAINLEVEL_OUTOF10: 8
PAINLEVEL_OUTOF10: 8
PAINLEVEL_OUTOF10: 7
PAINLEVEL_OUTOF10: 9

## 2021-02-04 ASSESSMENT — PAIN DESCRIPTION - FREQUENCY
FREQUENCY: INTERMITTENT
FREQUENCY: INTERMITTENT

## 2021-02-04 ASSESSMENT — PAIN DESCRIPTION - ORIENTATION
ORIENTATION: RIGHT

## 2021-02-04 ASSESSMENT — PAIN SCALES - WONG BAKER
WONGBAKER_NUMERICALRESPONSE: 0
WONGBAKER_NUMERICALRESPONSE: 0

## 2021-02-04 NOTE — PROGRESS NOTES
Chief Complaint:  Abdominal pain    Subjective: The patient is alert. No new problems overnight. Denies chest pain & SOB . Mild abdominal pain today. Tolerating diet. No nausea or vomiting. Objective:    Vitals:    02/04/21 0053   BP: 134/63   Pulse: 75   Resp: 18   Temp: 98.1 °F (36.7 °C)   SpO2:      A&O x's 3  Heart:  RRR, no murmurs, gallops, or rubs. Lungs:  CTA bilaterally, no wheeze, rales or rhonchi  Abd: bowel sounds present, nontender, nondistended, no masses  Extrem:  No clubbing, cyanosis, or edema      Lab Results   Component Value Date     02/02/2021    K 4.4 02/02/2021     02/02/2021    CO2 28 02/02/2021    BUN 18 02/02/2021    CREATININE 1.3 02/02/2021    CALCIUM 8.8 02/02/2021      Lab Results   Component Value Date    WBC 7.2 02/02/2021    RBC 3.96 02/02/2021    HGB 12.8 02/02/2021    HCT 39.7 02/02/2021    .3 02/02/2021    MCH 32.3 02/02/2021    MCHC 32.2 02/02/2021    RDW 12.5 02/02/2021     02/02/2021    MPV 11.1 02/02/2021     PT/INR:    Lab Results   Component Value Date    PROTIME 11.9 10/19/2018    INR 1.0 10/19/2018     No results for input(s): POCGLU in the last 72 hours. Recent Labs     02/01/21  0625 02/02/21  0503    136   K 4.5 4.4    100   CO2 23 28   BUN 22 18   CREATININE 1.1* 1.3*   CALCIUM 9.7 8.8   GFRAA >60 50   LABGLOM 50 41   GLUCOSE 120* 117*       Ct Head Wo Contrast    Result Date: 2/2/2021  EXAMINATION: CT OF THE HEAD WITHOUT CONTRAST  2/2/2021 4:42 am TECHNIQUE: CT of the head was performed without the administration of intravenous contrast. Dose modulation, iterative reconstruction, and/or weight based adjustment of the mA/kV was utilized to reduce the radiation dose to as low as reasonably achievable. COMPARISON: None. 10/16/2018 HISTORY: ORDERING SYSTEM PROVIDED HISTORY: evaluate post op course TECHNOLOGIST PROVIDED HISTORY:  shunt revision Has a \"code stroke\" or \"stroke alert\" been called? ->No Reason for (chronic kidney disease) stage 3, GFR 30-59 ml/min [N18.30]     Priority: High    NPH (normal pressure hydrocephalus) (HCC) [G91.2]    COVID-19 [U07.1]    Normal pressure hydrocephalus (HCC) [G91.2]    [facility] [J44.9]       Plan:  Still complains of post op headache and abdominal pain              Discharge delayed pending PMR eval--home rehab arranged      Demetrice Dub [address], DO  4:51 AM  2/4/2021

## 2021-02-04 NOTE — DISCHARGE INSTR - COC
Continuity of Care Form    Patient Name: Luke Grya   :  1955  MRN:  [id number]    Admit date:  2021  Discharge date:  21    Code Status Order: Full Code   Advance Directives:   Advance Care Flowsheet Documentation     Date/Time Healthcare Directive Type of Healthcare Directive Copy in [address] Agent's Name Healthcare Agent's Phone Number    21 0111  No, patient does not have an advance directive for healthcare treatment -- -- -- -- --          Admitting Physician:  Kvng Urban MD  PCP: Mayra Cortes DO    Discharging Nurse: [facility] Unit/Room#: 4138/0704-N  Discharging Unit Phone Number: ***    Emergency Contact:   Extended Emergency Contact Information  Primary Emergency Contact: Ramirez Veras  Address: [address] [place] [address] [address]           [address] Phone: [phone]  Mobile Phone: [phone]  Relation: Spouse  Secondary Emergency Contact: [address] Phone: [phone]  Relation: Child    Past Surgical History:  Past Surgical History:   Procedure Laterality Date   [address]    C3-C6    CHOLECYSTECTOMY      COLONOSCOPY N/A 2019    COLONOSCOPY POLYPECTOMY SNARE/COLD BIOPSY performed by Elías Rodriguez MD at [facility].  10/30/2002   [address]  2018    resolved    [place] CREATE SHUNT:VENTRIC-PERITONEAL N/A 10/15/2018    VENTRICULAR PERITONEAL SHUNT  PLACEMENT performed by Kvng Urban MD at [facility] N/A 2018    LUMBAR DRAIN INSERTION performed by Kvng Urban MD at [facility] N/A 2021    VENTRICULAR PERITONEAL SHUNT REPLACEMENT performed by Kvng Urban MD at [address]       Immunization History:   Immunization History   Administered Date(s) Administered    Tdap (Boostrix, Adacel) 12/22/2016       Active Problems:  Patient Active Problem List   Diagnosis Code    CAD (coronary artery disease) I25.10    Pulmonary hypertension ([facility]) I27.20    COPD (chronic obstructive pulmonary disease) ([facility]) J44.9    CKD (chronic kidney disease) stage 3, GFR 30-59 ml/min N18.30    Normal pressure hydrocephalus ([facility]) G91.2    Chronic systolic congestive heart failure ([facility]) I50.22    Severe mitral regurgitation I34.0    NPH (normal pressure hydrocephalus) ([facility]) G91.2    COVID-19 U07.1       Isolation/Infection:   Isolation          No Isolation        Patient Infection Status     None to display          Nurse Assessment:  Last Vital Signs: /73   Pulse 76   Temp 97.8 °F (36.6 °C) (Temporal)   Resp 16   Ht 5' 3\" (1.6 m)   Wt 125 lb 9.6 oz (57 kg)   SpO2 97%   BMI 22.25 kg/m²     Last documented pain score (0-10 scale): Pain Level: 7  Last Weight:   Wt Readings from Last 1 Encounters:   02/02/21 125 lb 9.6 oz (57 kg)     Mental Status:  oriented and alert    IV Access:  - None    Nursing Mobility/ADLs:  Walking   Independent  Transfer  Independent  Bathing  Independent  Dressing  Independent  Toileting  Independent  Feeding  Independent  Med Admin  Independent  Med Delivery   whole    Wound Care Documentation and Therapy:        Elimination:  Continence:   · Bowel: {YES / QL:74478}  · Bladder: {YES / ER:02685}  Urinary Catheter: None   Colostomy/Ileostomy/Ileal Conduit: {YES / NC:89100}       Date of Last BM: ***    Intake/Output Summary (Last 24 hours) at 2/4/2021 1143  Last data filed at 2/4/2021 0915  Gross per 24 hour   Intake 300 ml   Output --   Net 300 ml     I/O last 3 completed shifts:   In: 240 [P.O.:240]  Out: -     Safety Concerns:     None    Impairments/Disabilities:      None    Nutrition Therapy:  Current Nutrition Therapy:   - Oral Diet:  General    Routes of Feeding: Oral  Liquids: {Slp liquid thickness:26886}  Daily Fluid

## 2021-02-04 NOTE — PROGRESS NOTES
PO#2   Replace  shunt  Doing very well. Incision healthy  Better today. Alert awake & oriented. No focal deficit. Dr Christ Colby consult noted. Will discharge.   Follow up in office in 2 weeks

## 2021-02-04 NOTE — CARE COORDINATION
Spoke with [facility]. Pt is accepted and Dr. John Purvis will follow for [facility].. [address] resumption pf Care order is in under Nursing Communication for [facility]. Confirmed with Pt that Discharge plan is to return home with [facility] with Family providing transport. Pt will call when ready for discharge.    Gio De Leon, L.S.W.  [phone]

## 2021-02-04 NOTE — DISCHARGE SUMMARY
Physician Discharge Summary     Patient ID:  Ant Roland  [id number]  54 y.o.  1955    Admit date: 2/1/2021    Discharge date and time: No discharge date for patient encounter. Admitting Physician: Valentin Looney MD     Admission Diagnoses: NPH (normal pressure hydrocephalus) ([facility] [G91.2]    Problem List:    Patient Active Problem List   Diagnosis    CAD (coronary artery disease)    [facility]    COPD (chronic obstructive pulmonary disease) ([facility]    CKD (chronic kidney disease) stage 3, GFR 30-59 ml/min    Normal pressure hydrocephalus (HCC)    Chronic systolic congestive heart failure (HCC)    Severe mitral regurgitation    NPH (normal [facility]cephalus) [facility]    COVID-19       Discharge Diagnoses: Active Problems:    Pulmonary hypertension (HCC)    CKD (chronic kidney disease) stage 3, GFR 30-59 ml/min    Chronic systolic congestive heart failure (HCC)    COPD (chronic obstructive pulmonary disease) (HCC)    Normal pressure hydrocephalus (HCC)    NPH (normal pressure hydrocephalus) ([facility]    COVID-19  Resolved Problems:    * No resolved hospital problems. *      Procedure this Admission: Procedure(s):  VENTRICULAR PERITONEAL SHUNT REPLACEMENT    Discharged Condition: good    Hospital Course: Post op after shunt replacement did well. Had headache for one day. Ambulatory. CT scan satisfactory. Instructed. Follow up in office in 2 weeks    Discharge Med List:      Medication List      START taking these medications    HYDROcodone-acetaminophen 5-325 MG per tablet  Commonly known as: [address]  Take 1 tablet by mouth every 6 hours as needed for Pain for up to 3 days.         CHANGE how you take these medications    rOPINIRole 3 MG tablet  Commonly known as: REQUIP  Take 1 tablet by mouth 2 times daily  What changed: how much to take     traZODone 100 MG tablet  Commonly known as: DESYREL  Take 1 tablet by mouth nightly  What changed:   · when to take this  · reasons to take this        CONTINUE taking these medications    albuterol sulfate  (90 Base) MCG/ACT inhaler     aspirin 325 MG tablet     buPROPion 300 MG extended release tablet  Commonly known as: WELLBUTRIN XL     carvedilol 25 MG tablet  Commonly known as: COREG  Take 1 tablet by mouth 2 times daily (with meals)     Diovan 80 MG tablet  Generic drug: valsartan     fluticasone 50 MCG/ACT nasal spray  Commonly known as: FLONASE     omeprazole 40 MG delayed release capsule  Commonly known as: PRILOSEC     oxybutynin 10 MG extended release tablet  Commonly known as: DITROPAN-XL     OXYGEN     spironolactone 25 MG tablet  Commonly known as: ALDACTONE  Take 1 tablet by mouth daily     Tradjenta 5 MG tablet  Generic drug: linagliptin     vitamin D 1.25 MG (77918 UT) Caps capsule  Commonly known as: ERGOCALCIFEROL        STOP taking these medications    oxyCODONE-acetaminophen 5-325 MG per tablet  Commonly known as: PERCOCET           Where to Get Your Medications      You can get these medications from any pharmacy    Bring a paper prescription for each of these medications  · HYDROcodone-acetaminophen 5-325 MG per tablet           Tristan Bergeron MD

## 2021-02-12 ENCOUNTER — HOSPITAL ENCOUNTER (OUTPATIENT)
Dept: CT IMAGING | Age: 66
Discharge: HOME | End: 2021-02-14
Payer: MEDICARE

## 2021-02-12 DIAGNOSIS — G91.2 NPH (NORMAL PRESSURE HYDROCEPHALUS) (HCC): ICD-10-CM

## 2021-02-12 PROCEDURE — 70450 CT HEAD/BRAIN W/O DYE: CPT

## 2021-06-28 ENCOUNTER — APPOINTMENT (OUTPATIENT)
Dept: CT IMAGING | Age: 66
DRG: 315 | End: 2021-06-28
Payer: MEDICARE

## 2021-06-28 ENCOUNTER — APPOINTMENT (OUTPATIENT)
Dept: GENERAL RADIOLOGY | Age: 66
DRG: 315 | End: 2021-06-28
Payer: MEDICARE

## 2021-06-28 ENCOUNTER — HOSPITAL ENCOUNTER (INPATIENT)
Age: 66
LOS: 1 days | Discharge: HOME | DRG: 315 | End: 2021-06-30
Attending: EMERGENCY MEDICINE | Admitting: INTERNAL MEDICINE
Payer: MEDICARE

## 2021-06-28 DIAGNOSIS — R55 SYNCOPE AND COLLAPSE: Primary | ICD-10-CM

## 2021-06-28 LAB
ALBUMIN SERPL-MCNC: 3.8 G/DL (ref 3.5–5.2)
ALP BLD-CCNC: 89 U/L (ref 35–104)
ALT SERPL-CCNC: 11 U/L (ref 0–32)
ANION GAP SERPL CALCULATED.3IONS-SCNC: 10 MMOL/L (ref 7–16)
AST SERPL-CCNC: 14 U/L (ref 0–31)
BACTERIA: ABNORMAL /HPF
BASOPHILS ABSOLUTE: 0.02 E9/L (ref 0–0.2)
BASOPHILS RELATIVE PERCENT: 0.4 % (ref 0–2)
BILIRUB SERPL-MCNC: 0.5 MG/DL (ref 0–1.2)
BILIRUBIN URINE: NEGATIVE
BLOOD, URINE: ABNORMAL
BUN BLDV-MCNC: 28 MG/DL (ref 6–23)
CALCIUM SERPL-MCNC: 8.6 MG/DL (ref 8.6–10.2)
CHLORIDE BLD-SCNC: 98 MMOL/L (ref 98–107)
CHP ED QC CHECK: NORMAL
CLARITY: CLEAR
CO2: 26 MMOL/L (ref 22–29)
COLOR: YELLOW
CREAT SERPL-MCNC: 1.4 MG/DL (ref 0.5–1)
EKG ATRIAL RATE: 69 BPM
EKG P AXIS: 80 DEGREES
EKG P-R INTERVAL: 144 MS
EKG Q-T INTERVAL: 424 MS
EKG QRS DURATION: 120 MS
EKG QTC CALCULATION (BAZETT): 454 MS
EKG R AXIS: 27 DEGREES
EKG T AXIS: 65 DEGREES
EKG VENTRICULAR RATE: 69 BPM
EOSINOPHILS ABSOLUTE: 0.02 E9/L (ref 0.05–0.5)
EOSINOPHILS RELATIVE PERCENT: 0.4 % (ref 0–6)
GFR AFRICAN AMERICAN: 45
GFR NON-AFRICAN AMERICAN: 38 ML/MIN/1.73
GLUCOSE BLD-MCNC: 149 MG/DL
GLUCOSE BLD-MCNC: 149 MG/DL (ref 74–99)
GLUCOSE URINE: NEGATIVE MG/DL
HCT VFR BLD CALC: 40 % (ref 34–48)
HEMOGLOBIN: 13.1 G/DL (ref 11.5–15.5)
IMMATURE GRANULOCYTES #: 0.02 E9/L
IMMATURE GRANULOCYTES %: 0.4 % (ref 0–5)
KETONES, URINE: NEGATIVE MG/DL
LEUKOCYTE ESTERASE, URINE: ABNORMAL
LYMPHOCYTES ABSOLUTE: 0.85 E9/L (ref 1.5–4)
LYMPHOCYTES RELATIVE PERCENT: 15 % (ref 20–42)
MAGNESIUM: 1.5 MG/DL (ref 1.6–2.6)
MCH RBC QN AUTO: 32.1 PG (ref 26–35)
MCHC RBC AUTO-ENTMCNC: 32.8 % (ref 32–34.5)
MCV RBC AUTO: 98 FL (ref 80–99.9)
MONOCYTES ABSOLUTE: 0.46 E9/L (ref 0.1–0.95)
MONOCYTES RELATIVE PERCENT: 8.1 % (ref 2–12)
NEUTROPHILS ABSOLUTE: 4.3 E9/L (ref 1.8–7.3)
NEUTROPHILS RELATIVE PERCENT: 75.7 % (ref 43–80)
NITRITE, URINE: NEGATIVE
PDW BLD-RTO: 12.8 FL (ref 11.5–15)
PH UA: 6 (ref 5–9)
PLATELET # BLD: 145 E9/L (ref 130–450)
PMV BLD AUTO: 11.1 FL (ref 7–12)
POTASSIUM REFLEX MAGNESIUM: 3.6 MMOL/L (ref 3.5–5)
PROTEIN UA: NEGATIVE MG/DL
RBC # BLD: 4.08 E12/L (ref 3.5–5.5)
RBC UA: ABNORMAL /HPF (ref 0–2)
SODIUM BLD-SCNC: 134 MMOL/L (ref 132–146)
SPECIFIC GRAVITY UA: <=1.005 (ref 1–1.03)
TOTAL PROTEIN: 6.9 G/DL (ref 6.4–8.3)
TROPONIN, HIGH SENSITIVITY: 7 NG/L (ref 0–9)
TROPONIN, HIGH SENSITIVITY: 9 NG/L (ref 0–9)
UROBILINOGEN, URINE: 0.2 E.U./DL
WBC # BLD: 5.7 E9/L (ref 4.5–11.5)
WBC UA: ABNORMAL /HPF (ref 0–5)

## 2021-06-28 PROCEDURE — 93010 ELECTROCARDIOGRAM REPORT: CPT | Performed by: INTERNAL MEDICINE

## 2021-06-28 PROCEDURE — 6370000000 HC RX 637 (ALT 250 FOR IP): Performed by: PHYSICIAN ASSISTANT

## 2021-06-28 PROCEDURE — 2700000000 HC OXYGEN THERAPY PER DAY

## 2021-06-28 PROCEDURE — 71045 X-RAY EXAM CHEST 1 VIEW: CPT

## 2021-06-28 PROCEDURE — 72125 CT NECK SPINE W/O DYE: CPT

## 2021-06-28 PROCEDURE — 74018 RADEX ABDOMEN 1 VIEW: CPT

## 2021-06-28 PROCEDURE — G0378 HOSPITAL OBSERVATION PER HR: HCPCS

## 2021-06-28 PROCEDURE — 81001 URINALYSIS AUTO W/SCOPE: CPT

## 2021-06-28 PROCEDURE — 83735 ASSAY OF MAGNESIUM: CPT

## 2021-06-28 PROCEDURE — 6360000002 HC RX W HCPCS: Performed by: STUDENT IN AN ORGANIZED HEALTH CARE EDUCATION/TRAINING PROGRAM

## 2021-06-28 PROCEDURE — 82962 GLUCOSE BLOOD TEST: CPT

## 2021-06-28 PROCEDURE — 99285 EMERGENCY DEPT VISIT HI MDM: CPT

## 2021-06-28 PROCEDURE — 36415 COLL VENOUS BLD VENIPUNCTURE: CPT

## 2021-06-28 PROCEDURE — 96374 THER/PROPH/DIAG INJ IV PUSH: CPT

## 2021-06-28 PROCEDURE — 80053 COMPREHEN METABOLIC PANEL: CPT

## 2021-06-28 PROCEDURE — 85025 COMPLETE CBC W/AUTO DIFF WBC: CPT

## 2021-06-28 PROCEDURE — 2580000003 HC RX 258: Performed by: STUDENT IN AN ORGANIZED HEALTH CARE EDUCATION/TRAINING PROGRAM

## 2021-06-28 PROCEDURE — 70260 X-RAY EXAM OF SKULL: CPT

## 2021-06-28 PROCEDURE — 70450 CT HEAD/BRAIN W/O DYE: CPT

## 2021-06-28 PROCEDURE — 2580000003 HC RX 258: Performed by: PHYSICIAN ASSISTANT

## 2021-06-28 PROCEDURE — 84484 ASSAY OF TROPONIN QUANT: CPT

## 2021-06-28 PROCEDURE — 93005 ELECTROCARDIOGRAM TRACING: CPT | Performed by: STUDENT IN AN ORGANIZED HEALTH CARE EDUCATION/TRAINING PROGRAM

## 2021-06-28 RX ORDER — DEXTROSE MONOHYDRATE 50 MG/ML
100 INJECTION, SOLUTION INTRAVENOUS PRN
Status: DISCONTINUED | OUTPATIENT
Start: 2021-06-28 | End: 2021-06-30 | Stop reason: HOSPADM

## 2021-06-28 RX ORDER — ONDANSETRON 2 MG/ML
4 INJECTION INTRAMUSCULAR; INTRAVENOUS EVERY 6 HOURS PRN
Status: DISCONTINUED | OUTPATIENT
Start: 2021-06-28 | End: 2021-06-30 | Stop reason: HOSPADM

## 2021-06-28 RX ORDER — ALBUTEROL SULFATE 90 UG/1
2 AEROSOL, METERED RESPIRATORY (INHALATION) EVERY 4 HOURS PRN
Status: DISCONTINUED | OUTPATIENT
Start: 2021-06-28 | End: 2021-06-28 | Stop reason: CLARIF

## 2021-06-28 RX ORDER — NICOTINE POLACRILEX 4 MG
15 LOZENGE BUCCAL PRN
Status: DISCONTINUED | OUTPATIENT
Start: 2021-06-28 | End: 2021-06-30 | Stop reason: HOSPADM

## 2021-06-28 RX ORDER — ACETAMINOPHEN 325 MG/1
650 TABLET ORAL EVERY 6 HOURS PRN
Status: DISCONTINUED | OUTPATIENT
Start: 2021-06-28 | End: 2021-06-30 | Stop reason: HOSPADM

## 2021-06-28 RX ORDER — TRAZODONE HYDROCHLORIDE 50 MG/1
100 TABLET ORAL NIGHTLY PRN
Status: DISCONTINUED | OUTPATIENT
Start: 2021-06-29 | End: 2021-06-30 | Stop reason: HOSPADM

## 2021-06-28 RX ORDER — POTASSIUM CHLORIDE 20 MEQ/1
40 TABLET, EXTENDED RELEASE ORAL PRN
Status: DISCONTINUED | OUTPATIENT
Start: 2021-06-28 | End: 2021-06-30 | Stop reason: HOSPADM

## 2021-06-28 RX ORDER — SODIUM CHLORIDE 0.9 % (FLUSH) 0.9 %
10 SYRINGE (ML) INJECTION EVERY 12 HOURS SCHEDULED
Status: DISCONTINUED | OUTPATIENT
Start: 2021-06-28 | End: 2021-06-30 | Stop reason: HOSPADM

## 2021-06-28 RX ORDER — OMEPRAZOLE 20 MG/1
40 CAPSULE, DELAYED RELEASE ORAL DAILY
Status: DISCONTINUED | OUTPATIENT
Start: 2021-06-29 | End: 2021-06-28 | Stop reason: CLARIF

## 2021-06-28 RX ORDER — OXYBUTYNIN CHLORIDE 10 MG/1
10 TABLET, EXTENDED RELEASE ORAL DAILY
Status: DISCONTINUED | OUTPATIENT
Start: 2021-06-29 | End: 2021-06-30 | Stop reason: HOSPADM

## 2021-06-28 RX ORDER — POTASSIUM CHLORIDE 7.45 MG/ML
10 INJECTION INTRAVENOUS PRN
Status: DISCONTINUED | OUTPATIENT
Start: 2021-06-28 | End: 2021-06-30 | Stop reason: HOSPADM

## 2021-06-28 RX ORDER — ROPINIROLE 2 MG/1
4 TABLET, FILM COATED ORAL 2 TIMES DAILY
Status: DISCONTINUED | OUTPATIENT
Start: 2021-06-28 | End: 2021-06-30 | Stop reason: HOSPADM

## 2021-06-28 RX ORDER — SODIUM CHLORIDE 0.9 % (FLUSH) 0.9 %
10 SYRINGE (ML) INJECTION PRN
Status: DISCONTINUED | OUTPATIENT
Start: 2021-06-28 | End: 2021-06-30 | Stop reason: HOSPADM

## 2021-06-28 RX ORDER — ALBUTEROL SULFATE 2.5 MG/3ML
2.5 SOLUTION RESPIRATORY (INHALATION) EVERY 4 HOURS PRN
Status: DISCONTINUED | OUTPATIENT
Start: 2021-06-28 | End: 2021-06-30 | Stop reason: HOSPADM

## 2021-06-28 RX ORDER — FLUTICASONE PROPIONATE 50 MCG
1 SPRAY, SUSPENSION (ML) NASAL DAILY PRN
Status: DISCONTINUED | OUTPATIENT
Start: 2021-06-28 | End: 2021-06-30 | Stop reason: HOSPADM

## 2021-06-28 RX ORDER — PANTOPRAZOLE SODIUM 40 MG/1
40 TABLET, DELAYED RELEASE ORAL
Status: DISCONTINUED | OUTPATIENT
Start: 2021-06-29 | End: 2021-06-30 | Stop reason: HOSPADM

## 2021-06-28 RX ORDER — ACETAMINOPHEN 650 MG/1
650 SUPPOSITORY RECTAL EVERY 6 HOURS PRN
Status: DISCONTINUED | OUTPATIENT
Start: 2021-06-28 | End: 2021-06-30 | Stop reason: HOSPADM

## 2021-06-28 RX ORDER — SODIUM CHLORIDE 9 MG/ML
25 INJECTION, SOLUTION INTRAVENOUS PRN
Status: DISCONTINUED | OUTPATIENT
Start: 2021-06-28 | End: 2021-06-30 | Stop reason: HOSPADM

## 2021-06-28 RX ORDER — ASPIRIN 325 MG
325 TABLET ORAL DAILY
Status: DISCONTINUED | OUTPATIENT
Start: 2021-06-29 | End: 2021-06-30 | Stop reason: HOSPADM

## 2021-06-28 RX ORDER — ONDANSETRON 2 MG/ML
4 INJECTION INTRAMUSCULAR; INTRAVENOUS ONCE
Status: COMPLETED | OUTPATIENT
Start: 2021-06-28 | End: 2021-06-28

## 2021-06-28 RX ORDER — SODIUM CHLORIDE 9 MG/ML
INJECTION, SOLUTION INTRAVENOUS CONTINUOUS
Status: DISCONTINUED | OUTPATIENT
Start: 2021-06-28 | End: 2021-06-29

## 2021-06-28 RX ORDER — BUPROPION HYDROCHLORIDE 300 MG/1
300 TABLET ORAL EVERY MORNING
Status: DISCONTINUED | OUTPATIENT
Start: 2021-06-29 | End: 2021-06-30 | Stop reason: HOSPADM

## 2021-06-28 RX ORDER — ONDANSETRON 4 MG/1
4 TABLET, ORALLY DISINTEGRATING ORAL EVERY 8 HOURS PRN
Status: DISCONTINUED | OUTPATIENT
Start: 2021-06-28 | End: 2021-06-30 | Stop reason: HOSPADM

## 2021-06-28 RX ORDER — DEXTROSE MONOHYDRATE 25 G/50ML
12.5 INJECTION, SOLUTION INTRAVENOUS PRN
Status: DISCONTINUED | OUTPATIENT
Start: 2021-06-28 | End: 2021-06-30 | Stop reason: HOSPADM

## 2021-06-28 RX ORDER — SPIRONOLACTONE 25 MG/1
25 TABLET ORAL DAILY
Status: DISCONTINUED | OUTPATIENT
Start: 2021-06-29 | End: 2021-06-29

## 2021-06-28 RX ORDER — 0.9 % SODIUM CHLORIDE 0.9 %
1000 INTRAVENOUS SOLUTION INTRAVENOUS ONCE
Status: COMPLETED | OUTPATIENT
Start: 2021-06-28 | End: 2021-06-28

## 2021-06-28 RX ADMIN — ROPINIROLE HYDROCHLORIDE 4 MG: 2 TABLET, FILM COATED ORAL at 23:53

## 2021-06-28 RX ADMIN — ONDANSETRON 4 MG: 2 INJECTION INTRAMUSCULAR; INTRAVENOUS at 16:29

## 2021-06-28 RX ADMIN — SODIUM CHLORIDE 1000 ML: 9 INJECTION, SOLUTION INTRAVENOUS at 13:20

## 2021-06-28 RX ADMIN — SODIUM CHLORIDE, PRESERVATIVE FREE 10 ML: 5 INJECTION INTRAVENOUS at 23:53

## 2021-06-28 RX ADMIN — SODIUM CHLORIDE: 9 INJECTION, SOLUTION INTRAVENOUS at 23:53

## 2021-06-28 ASSESSMENT — PAIN SCALES - GENERAL: PAINLEVEL_OUTOF10: 0

## 2021-06-28 ASSESSMENT — ENCOUNTER SYMPTOMS
SHORTNESS OF BREATH: 0
SORE THROAT: 0
PHOTOPHOBIA: 0
DIARRHEA: 1
NAUSEA: 1
VOMITING: 1
ABDOMINAL PAIN: 0

## 2021-06-28 NOTE — ED PROVIDER NOTES
43-year-old female with a history of CAD, CHF, pulmonary hypertension, mitral regurgitation and NPH with  shunt presenting for syncopal episode. She states she was sitting in a chair at her doctor's office when she felt diaphoretic, lightheaded, and nauseated. She states the next thing she she was waking up to EMS. She states that since yesterday she has had nausea, emesis, and diarrhea has been unable to keep anything down. She also states she is she felt \"hot\" yesterday but did not have any measured fever. She complains of a mild headache, which she states is always there. She also complains of neck pain. She denies all other complaints. Review of Systems   Constitutional: Positive for diaphoresis. Negative for chills and fever. HENT: Negative for congestion and sore throat. Eyes: Negative for photophobia and visual disturbance. Respiratory: Negative for shortness of breath. Cardiovascular: Negative for chest pain and leg swelling. Gastrointestinal: Positive for diarrhea, nausea and vomiting. Negative for abdominal pain. Genitourinary: Negative for dysuria and flank pain. Musculoskeletal: Positive for neck pain. Negative for neck stiffness. Skin: Negative for rash and wound. Neurological: Positive for syncope, light-headedness and headaches. Negative for dizziness and numbness. Physical Exam  Constitutional:       General: She is not in acute distress. Appearance: She is not ill-appearing or toxic-appearing. HENT:      Head: Normocephalic and atraumatic. Nose: Nose normal.   Eyes:      Extraocular Movements: Extraocular movements intact. Conjunctiva/sclera: Conjunctivae normal.      Pupils: Pupils are equal, round, and reactive to light. Neck:      Comments: Mild midline cervical tenderness to palpation  Cardiovascular:      Rate and Rhythm: Normal rate and regular rhythm.    Pulmonary:      Effort: Pulmonary effort is normal.      Breath sounds: Normal breath sounds. Abdominal:      General: Abdomen is flat. There is no distension. Palpations: Abdomen is soft. Tenderness: There is no abdominal tenderness. Musculoskeletal:         General: No swelling or tenderness. Cervical back: Normal range of motion and neck supple. Skin:     General: Skin is warm and dry. Neurological:      General: No focal deficit present. Mental Status: She is alert. Cranial Nerves: No cranial nerve deficit. Motor: No weakness. Psychiatric:         Mood and Affect: Mood normal.         Behavior: Behavior normal.          Procedures     MDM  Number of Diagnoses or Management Options  Syncope and collapse  Diagnosis management comments: 58-year-old female with extensive cardiac history and  shunt presenting with syncopal episode at rest in the doctor's office today. Vital signs stable on arrival, patient alert. CT head and neck were unremarkable. Chest x-ray normal.  Labs significant for troponin of 9, repeat pending. Patient given fluids and Zofran in the ED. Due to patient's cardiac history and seemingly unprovoked syncopal episode, decision was made to admit patient for further evaluation. Martine CRAVEN was consulted and agreed to admit patient. Patient remained hemodynamically stable in ED and was admitted to telemetry. Amount and/or Complexity of Data Reviewed  Clinical lab tests: reviewed         ED Course as of Jun 28 1935   Mon Jun 28, 2021 1933 Spoke to Lashell with MER[facility], discussed case. She is agreeable to admission. [AP]      ED Course User Index  [AP] Adelia Bright MD      ED Course as of Jun 28 1935   Mon Jun 28, 2021 1933 Spoke to Lashell with MERISSA, discussed case. She is agreeable to admission.     [AP]      ED Course User Index  [AP] Adelia Bright MD       --------------------------------------------- PAST HISTORY ---------------------------------------------  Past Medical History:  has a past medical history of Acid reflux disease, Acute on chronic respiratory failure with hypoxia and hypercapnia ([facility]), Apraxia, Arthritis, Ataxia, CAD (coronary artery disease), Choledocholithiasis, Chronic systolic congestive heart failure ([facility]), CKD (chronic kidney disease) stage 3, GFR 30-59 ml/min ([facility]), COPD (chronic obstructive pulmonary disease) ([facility] [address] Diabetes mellitus ([facility] [address] Hyperlipidemia, Hypoxia, Neck pain, Normal pressure hydrocephalus ([facility]), Oxygen dependent, Pleural effusion, Pulmonary hypertension ([facility] [address] Restless legs, S/P CABG x 2, and Severe mitral regurgitation. Past Surgical History:  has a past surgical history that includes Coronary artery bypass graft (10/30/2002); Cholecystectomy (2002); cervical fusion (1999); Hysterectomy (1988); Cardiac surgery; lumbar drain implantation (07/27/2018); pr therapeutic spinal puncture drainage csf (N/A, 7/27/2018); pr create shunt:ventric-peritoneal (N/A, 10/15/2018); Colonoscopy (N/A, 1/21/2019); and Ventriculoperitoneal shunt (N/A, 2/1/2021). Social History:  reports that she quit smoking about 2 years ago. Her smoking use included cigarettes. She has a 20.00 pack-year smoking history. She has never used smokeless tobacco. She reports that she does not drink alcohol and does not use drugs. Family History: family history includes Diabetes in her mother; Emphysema in her father; Heart Attack in her sister; Heart Failure in her sister; High Blood Pressure in her mother. The patients home medications have been reviewed.     Allergies: Sulfa antibiotics and Pentazocine lactate    -------------------------------------------------- RESULTS -------------------------------------------------    LABS:  Results for orders placed or performed during the hospital encounter of 06/28/21   CBC Auto Differential   Result Value Ref Range    WBC 5.7 4.5 - 11.5 E9/L    RBC 4.08 3.50 - 5.50 E12/L    Hemoglobin 13.1 11.5 - 15.5 g/dL    Hematocrit 40.0 34.0 - 48.0 %    MCV 98.0 80.0 - 99.9 fL MCH 32.1 26.0 - 35.0 pg    MCHC 32.8 32.0 - 34.5 %    RDW 12.8 11.5 - 15.0 fL    Platelets 078 306 - 100 E9/L    MPV 11.1 7.0 - 12.0 fL    Neutrophils % 75.7 43.0 - 80.0 %    Immature Granulocytes % 0.4 0.0 - 5.0 %    Lymphocytes % 15.0 (L) 20.0 - 42.0 %    Monocytes % 8.1 2.0 - 12.0 %    Eosinophils % 0.4 0.0 - 6.0 %    Basophils % 0.4 0.0 - 2.0 %    Neutrophils Absolute 4.30 1.80 - 7.30 E9/L    Immature Granulocytes # 0.02 E9/L    Lymphocytes Absolute 0.85 (L) 1.50 - 4.00 E9/L    Monocytes Absolute 0.46 0.10 - 0.95 E9/L    Eosinophils Absolute 0.02 (L) 0.05 - 0.50 E9/L    Basophils Absolute 0.02 0.00 - 0.20 E9/L   Comprehensive Metabolic Panel w/ Reflex to MG   Result Value Ref Range    Sodium 134 132 - 146 mmol/L    Potassium reflex Magnesium 3.6 3.5 - 5.0 mmol/L    Chloride 98 98 - 107 mmol/L    CO2 26 22 - 29 mmol/L    Anion Gap 10 7 - 16 mmol/L    Glucose 149 (H) 74 - 99 mg/dL    BUN 28 (H) 6 - 23 mg/dL    CREATININE 1.4 (H) 0.5 - 1.0 mg/dL    GFR Non-African American 38 >=60 mL/min/1.73    GFR African American 45     Calcium 8.6 8.6 - 10.2 mg/dL    Total Protein 6.9 6.4 - 8.3 g/dL    Albumin 3.8 3.5 - 5.2 g/dL    Total Bilirubin 0.5 0.0 - 1.2 mg/dL    Alkaline Phosphatase 89 35 - 104 U/L    ALT 11 0 - 32 U/L    AST 14 0 - 31 U/L   Troponin   Result Value Ref Range    Troponin, High Sensitivity 9 0 - 9 ng/L   Urinalysis, reflex to microscopic   Result Value Ref Range    Color, UA Yellow Straw/Yellow    Clarity, UA Clear Clear    Glucose, Ur Negative Negative mg/dL    Bilirubin Urine Negative Negative    Ketones, Urine Negative Negative mg/dL    Specific Gravity, UA <=1.005 1.005 - 1.030    Blood, Urine TRACE-INTACT Negative    pH, UA 6.0 5.0 - 9.0    Protein, UA Negative Negative mg/dL    Urobilinogen, Urine 0.2 <2.0 E.U./dL    Nitrite, Urine Negative Negative    Leukocyte Esterase, Urine SMALL (A) Negative   Microscopic Urinalysis   Result Value Ref Range    WBC, UA 0-1 0 - 5 /HPF    RBC, UA NONE 0 - 2 /HPF    Bacteria, UA MODERATE (A) None Seen /HPF   POCT glucose   Result Value Ref Range    Glucose 149 mg/dL    QC OK? Y    EKG 12 Lead   Result Value Ref Range    Ventricular Rate 69 BPM    Atrial Rate 69 BPM    P-R Interval 144 ms    QRS Duration 120 ms    Q-T Interval 424 ms    QTc Calculation (Bazett) 454 ms    P Axis 80 degrees    R Axis 27 degrees    T Axis 65 degrees   EKG 12 Lead   Result Value Ref Range    Ventricular Rate 63 BPM    Atrial Rate 63 BPM    P-R Interval 144 ms    QRS Duration 124 ms    Q-T Interval 422 ms    QTc Calculation (Bazett) 431 ms    P Axis 74 degrees    R Axis -22 degrees    T Axis 28 degrees       RADIOLOGY:  CT Head WO Contrast   Final Result   CT head without contrast:      1. No skull fracture or acute intracranial abnormality. CT cervical spine without contrast:      1. No fracture or joint dislocation is seen. 2. Degenerative changes, as described. CT Cervical Spine WO Contrast   Final Result   CT head without contrast:      1. No skull fracture or acute intracranial abnormality. CT cervical spine without contrast:      1. No fracture or joint dislocation is seen. 2. Degenerative changes, as described. XR SKULL (MIN 4 VIEWS)   Final Result   1. The visualized aspect of the  shunt is grossly intact. 2. No gross abnormality detected. .         XR CHEST PORTABLE   Final Result   1. No acute abnormality seen in the chest, abdomen or pelvis. 2. Cardiomegaly with probable pulmonary arterial and venous hypertension. 3. Nonobstructive bowel gas pattern. 4. The visualized ventriculoperitoneal shunt is grossly intact, terminating   in the right lower quadrant of the abdomen. XR ABDOMEN (KUB) (SINGLE AP VIEW)   Final Result   1. No acute abnormality seen in the chest, abdomen or pelvis. 2. Cardiomegaly with probable pulmonary arterial and venous hypertension. 3. Nonobstructive bowel gas pattern.    4. The visualized ventriculoperitoneal shunt is grossly intact, terminating   in the right lower quadrant of the abdomen. EKG:  This EKG is signed and interpreted by me. Rate: 69  Rhythm: Sinus  Interpretation: right bundle branch block w/ left axis deviation  Comparison: stable as compared to patient's most recent EKG      ------------------------- NURSING NOTES AND VITALS REVIEWED ---------------------------  Date / Time Roomed:  6/28/2021 12:55 PM  ED Bed Assignment:  17A/17A-17    The nursing notes within the ED encounter and vital signs as below have been reviewed. Patient Vitals for the past 24 hrs:   BP Temp Pulse Resp SpO2   06/28/21 1821 126/74 -- 70 -- --   06/28/21 1614 -- -- -- -- 96 %   06/28/21 1611 101/64 -- 70 23 (!) 89 %   06/28/21 1333 -- -- -- 20 95 %   06/28/21 1256 (!) 99/52 97.3 °F (36.3 °C) 70 18 92 %       Oxygen Saturation Interpretation: Normal    ------------------------------------------ PROGRESS NOTES ------------------------------------------      Counseling:  I have spoken with the patient and discussed todays results, in addition to providing specific details for the plan of care and counseling regarding the diagnosis and prognosis. Their questions are answered at this time and they are agreeable with the plan of admission.    --------------------------------- ADDITIONAL PROVIDER NOTES ---------------------------------    This patient's ED course included: a personal history and physicial examination, re-evaluation prior to disposition, multiple bedside re-evaluations, IV medications, cardiac monitoring and continuous pulse oximetry    This patient has remained hemodynamically stable during their ED course. Diagnosis:  1. Syncope and collapse        Disposition:  Patient's disposition: Admit to telemetry  Patient's condition is stable.              Elva Marion MD  Resident  06/28/21 [id number]

## 2021-06-28 NOTE — ED NOTES
Bed: 17A-17  Expected date:   Expected time:   Means of arrival:   Comments:  Constance Gil RN  06/28/21 [id number]

## 2021-06-28 NOTE — ED NOTES
Pt placed on oxygen-3LNC. 86% RA. States she wears oxygen at night but not during the day.  Patient is not complaining of any SOB     Gaviota Rater, RN  06/28/21 1203

## 2021-06-29 PROBLEM — K52.9 GASTROENTERITIS: Status: ACTIVE | Noted: 2021-06-29

## 2021-06-29 LAB
ANION GAP SERPL CALCULATED.3IONS-SCNC: 9 MMOL/L (ref 7–16)
BASOPHILS ABSOLUTE: 0.03 E9/L (ref 0–0.2)
BASOPHILS RELATIVE PERCENT: 0.6 % (ref 0–2)
BUN BLDV-MCNC: 21 MG/DL (ref 6–23)
CALCIUM SERPL-MCNC: 8.6 MG/DL (ref 8.6–10.2)
CHLORIDE BLD-SCNC: 104 MMOL/L (ref 98–107)
CO2: 27 MMOL/L (ref 22–29)
CREAT SERPL-MCNC: 1.2 MG/DL (ref 0.5–1)
EKG ATRIAL RATE: 63 BPM
EKG P AXIS: 74 DEGREES
EKG P-R INTERVAL: 144 MS
EKG Q-T INTERVAL: 422 MS
EKG QRS DURATION: 124 MS
EKG QTC CALCULATION (BAZETT): 431 MS
EKG R AXIS: -22 DEGREES
EKG T AXIS: 28 DEGREES
EKG VENTRICULAR RATE: 63 BPM
EOSINOPHILS ABSOLUTE: 0.08 E9/L (ref 0.05–0.5)
EOSINOPHILS RELATIVE PERCENT: 1.5 % (ref 0–6)
GFR AFRICAN AMERICAN: 54
GFR NON-AFRICAN AMERICAN: 45 ML/MIN/1.73
GLUCOSE BLD-MCNC: 95 MG/DL (ref 74–99)
HCT VFR BLD CALC: 37.6 % (ref 34–48)
HEMOGLOBIN: 12.5 G/DL (ref 11.5–15.5)
IMMATURE GRANULOCYTES #: 0.03 E9/L
IMMATURE GRANULOCYTES %: 0.6 % (ref 0–5)
LYMPHOCYTES ABSOLUTE: 1.47 E9/L (ref 1.5–4)
LYMPHOCYTES RELATIVE PERCENT: 27.8 % (ref 20–42)
MCH RBC QN AUTO: 32.6 PG (ref 26–35)
MCHC RBC AUTO-ENTMCNC: 33.2 % (ref 32–34.5)
MCV RBC AUTO: 98.2 FL (ref 80–99.9)
METER GLUCOSE: 101 MG/DL (ref 74–99)
METER GLUCOSE: 109 MG/DL (ref 74–99)
METER GLUCOSE: 120 MG/DL (ref 74–99)
MONOCYTES ABSOLUTE: 0.61 E9/L (ref 0.1–0.95)
MONOCYTES RELATIVE PERCENT: 11.5 % (ref 2–12)
NEUTROPHILS ABSOLUTE: 3.07 E9/L (ref 1.8–7.3)
NEUTROPHILS RELATIVE PERCENT: 58 % (ref 43–80)
PDW BLD-RTO: 12.7 FL (ref 11.5–15)
PLATELET # BLD: 148 E9/L (ref 130–450)
PMV BLD AUTO: 11 FL (ref 7–12)
POTASSIUM REFLEX MAGNESIUM: 3.7 MMOL/L (ref 3.5–5)
RBC # BLD: 3.83 E12/L (ref 3.5–5.5)
SODIUM BLD-SCNC: 140 MMOL/L (ref 132–146)
WBC # BLD: 5.3 E9/L (ref 4.5–11.5)

## 2021-06-29 PROCEDURE — 6370000000 HC RX 637 (ALT 250 FOR IP): Performed by: PHYSICIAN ASSISTANT

## 2021-06-29 PROCEDURE — 36415 COLL VENOUS BLD VENIPUNCTURE: CPT

## 2021-06-29 PROCEDURE — 93010 ELECTROCARDIOGRAM REPORT: CPT | Performed by: INTERNAL MEDICINE

## 2021-06-29 PROCEDURE — G0378 HOSPITAL OBSERVATION PER HR: HCPCS

## 2021-06-29 PROCEDURE — 85025 COMPLETE CBC W/AUTO DIFF WBC: CPT

## 2021-06-29 PROCEDURE — 6360000002 HC RX W HCPCS: Performed by: PHYSICIAN ASSISTANT

## 2021-06-29 PROCEDURE — 80048 BASIC METABOLIC PNL TOTAL CA: CPT

## 2021-06-29 PROCEDURE — 96376 TX/PRO/DX INJ SAME DRUG ADON: CPT

## 2021-06-29 PROCEDURE — 96372 THER/PROPH/DIAG INJ SC/IM: CPT

## 2021-06-29 PROCEDURE — 82962 GLUCOSE BLOOD TEST: CPT

## 2021-06-29 PROCEDURE — 6370000000 HC RX 637 (ALT 250 FOR IP): Performed by: INTERNAL MEDICINE

## 2021-06-29 PROCEDURE — 2700000000 HC OXYGEN THERAPY PER DAY

## 2021-06-29 RX ORDER — OXYCODONE HYDROCHLORIDE AND ACETAMINOPHEN 5; 325 MG/1; MG/1
1 TABLET ORAL EVERY 6 HOURS PRN
Status: ON HOLD | COMMUNITY
End: 2022-10-21 | Stop reason: HOSPADM

## 2021-06-29 RX ORDER — SPIRONOLACTONE 25 MG/1
25 TABLET ORAL DAILY
Status: DISCONTINUED | OUTPATIENT
Start: 2021-06-29 | End: 2021-06-30 | Stop reason: HOSPADM

## 2021-06-29 RX ORDER — CARVEDILOL 3.12 MG/1
3.12 TABLET ORAL 2 TIMES DAILY WITH MEALS
Status: DISCONTINUED | OUTPATIENT
Start: 2021-06-29 | End: 2021-06-30 | Stop reason: HOSPADM

## 2021-06-29 RX ORDER — OXYCODONE HYDROCHLORIDE AND ACETAMINOPHEN 5; 325 MG/1; MG/1
1 TABLET ORAL EVERY 6 HOURS PRN
Status: DISCONTINUED | OUTPATIENT
Start: 2021-06-29 | End: 2021-06-30 | Stop reason: HOSPADM

## 2021-06-29 RX ORDER — LOPERAMIDE HYDROCHLORIDE 2 MG/1
2 CAPSULE ORAL 4 TIMES DAILY PRN
Status: DISCONTINUED | OUTPATIENT
Start: 2021-06-29 | End: 2021-06-30 | Stop reason: HOSPADM

## 2021-06-29 RX ADMIN — CARVEDILOL 3.12 MG: 3.12 TABLET, FILM COATED ORAL at 18:58

## 2021-06-29 RX ADMIN — ONDANSETRON 4 MG: 2 INJECTION INTRAMUSCULAR; INTRAVENOUS at 17:21

## 2021-06-29 RX ADMIN — ROPINIROLE HYDROCHLORIDE 4 MG: 2 TABLET, FILM COATED ORAL at 11:12

## 2021-06-29 RX ADMIN — BUPROPION HYDROCHLORIDE 300 MG: 300 TABLET, FILM COATED, EXTENDED RELEASE ORAL at 11:11

## 2021-06-29 RX ADMIN — PANTOPRAZOLE SODIUM 40 MG: 40 TABLET, DELAYED RELEASE ORAL at 06:11

## 2021-06-29 RX ADMIN — ROPINIROLE HYDROCHLORIDE 4 MG: 2 TABLET, FILM COATED ORAL at 21:21

## 2021-06-29 RX ADMIN — ASPIRIN 325 MG: 325 TABLET ORAL at 11:13

## 2021-06-29 RX ADMIN — OXYCODONE HYDROCHLORIDE AND ACETAMINOPHEN 1 TABLET: 5; 325 TABLET ORAL at 12:43

## 2021-06-29 RX ADMIN — SPIRONOLACTONE 25 MG: 25 TABLET ORAL at 11:12

## 2021-06-29 RX ADMIN — OXYBUTYNIN CHLORIDE 10 MG: 10 TABLET, EXTENDED RELEASE ORAL at 11:12

## 2021-06-29 RX ADMIN — CARVEDILOL 3.12 MG: 3.12 TABLET, FILM COATED ORAL at 12:44

## 2021-06-29 RX ADMIN — OXYCODONE HYDROCHLORIDE AND ACETAMINOPHEN 1 TABLET: 5; 325 TABLET ORAL at 19:06

## 2021-06-29 RX ADMIN — ENOXAPARIN SODIUM 40 MG: 100 INJECTION SUBCUTANEOUS at 11:10

## 2021-06-29 ASSESSMENT — PAIN DESCRIPTION - FREQUENCY: FREQUENCY: INTERMITTENT

## 2021-06-29 ASSESSMENT — PAIN SCALES - GENERAL
PAINLEVEL_OUTOF10: 6
PAINLEVEL_OUTOF10: 8
PAINLEVEL_OUTOF10: 8
PAINLEVEL_OUTOF10: 0

## 2021-06-29 ASSESSMENT — PAIN DESCRIPTION - PAIN TYPE: TYPE: CHRONIC PAIN

## 2021-06-29 ASSESSMENT — PAIN DESCRIPTION - DESCRIPTORS: DESCRIPTORS: ACHING

## 2021-06-29 ASSESSMENT — PAIN DESCRIPTION - LOCATION: LOCATION: NECK

## 2021-06-29 NOTE — CARE COORDINATION
Met with pt in the room. Role of  and transition of care discussed. Pt lives in a 2 story home with her  and daughter. She is independent, drives, has a can, walker, O2 3L @ hs and PRN through [facility], nebulizer and glucometer. Has hx of [facility] and [facility] and rehab stay at [facility]. Pt plans to return home and denies any needs. PCP Dr Konrad Pressley. Pharmacy [facility] in [facility].

## 2021-06-29 NOTE — H&P
[address] [facility]  History and Physical      CHIEF COMPLAINT:    Chief Complaint   Patient presents with    Loss of Consciousness     pt had a syncopal episode while at the doctors office today        Patient of Elizabeth Kuhn DO presents with:  Syncope and collapse    History of Present Illness:   Patient states that on Sunday she developed relatively acute onset of profuse nausea vomiting and diarrhea. There is no hematemesis or hematochezia. She denies abdominal pain. No fevers. No recent diet or medication changes. Her  was not sick. She took her blood pressure medications as usual anyways. On Monday she went to the doctor for a scheduled checkup. She felt a little dizzy and lightheaded even prior to going in. While she was at the doctor's office she syncopized while sitting in a chair. She does not remember the exact events to well. He states that when EMS got her her blood pressure was in the 80s. In the ED it was in the 90s. She has been given IV fluids and her antihypertensives have been held. She is feeling a lot better today. She had some mild changes on her EKG. She did not volunteer any history of chest pain, but when asked, she says maybe she had a very very vague sense of nonradiating anterior chest pressure but cannot really describe the context or anything further about this. She is currently chest pain-free. She tolerated her breakfast without issue. No recent antibiotics. REVIEW OF SYSTEMS:  Pertinent negatives are above in HPI. 10 point ROS otherwise negative.       Past Medical History:   Diagnosis Date    Acid reflux disease     Acute on chronic respiratory failure with hypoxia and hypercapnia (HCC) 7/25/2018    Apraxia 7/23/2018    Arthritis     Ataxia 7/23/2018    CAD (coronary artery disease) 10/4/2012    Choledocholithiasis     recurrent    Chronic systolic congestive heart failure ([facility] [address] 8/1/2018    Ejection fraction 23% plus/minus 5%    CKD (chronic kidney disease) stage 3, GFR 30-59 ml/min ([facility]) 7/23/2018    COPD (chronic obstructive pulmonary disease) ([facility])     alpha one M1S 183mg/dl    Diabetes mellitus ([facility] [address]     Hyperlipidemia     Hypoxia 7/23/2018    Neck pain     Normal pressure hydrocephalus ([facility]) 7/28/2018    Oxygen dependent     3l/min/nc    Pleural effusion years ago    requiring right thoracentesis    Pulmonary hypertension ([facility] [address] 7/23/2018    Restless legs     S/P CABG x 2 10/4/2012    Severe mitral regurgitation 8/1/2018         Past Surgical History:   Procedure Laterality Date   [address]    C3-C6    CHOLECYSTECTOMY  2002    COLONOSCOPY N/A 1/21/2019    COLONOSCOPY POLYPECTOMY SNARE/COLD BIOPSY performed by Liliam Calix MD at [facility].  10/30/2002    EYE SURGERY      cataract with Lens implants    HERNIA REPAIR      HYSTERECTOMY  1988    LUMBAR DRAIN IMPLANTATION  07/27/2018    resolved    KS CREATE SHUNT:VENTRIC-PERITONEAL N/A 10/15/2018    VENTRICULAR PERITONEAL SHUNT  PLACEMENT performed by Yaneth Ferguson MD at [address] [facility] [address] CSF N/A 7/27/2018    LUMBAR DRAIN INSERTION performed by Yaneth Ferguson MD at [place] [address] VENTRICULOPERITONEAL SHUNT N/A 2/1/2021    VENTRICULAR PERITONEAL SHUNT REPLACEMENT performed by Yaneth Ferguson MD at [address]       Medications Prior to Admission:    Medications Prior to Admission: oxyCODONE-acetaminophen (PERCOCET) 5-325 MG per tablet, Take 1 tablet by mouth every 6 hours as needed for Pain.  For chronic neck Pain HX of Fusions  carvedilol (COREG) 25 MG tablet, Take 1 tablet by mouth 2 times daily (with meals)  valsartan (DIOVAN) 80 MG tablet, Take 80 mg by mouth daily  albuterol sulfate  (90 Base) MCG/ACT inhaler, INHALE 2 PUFFS BY MOUTH EVERY 4 HOURS AS NEEDED  aspirin 325 MG tablet, Take 325 mg by mouth daily Prescribed per PCP  traZODone (DESYREL) 100 MG tablet, Take 1 tablet by mouth nightly (Patient taking differently: Take 100 mg by mouth as needed for Sleep )  rOPINIRole (REQUIP) 3 MG tablet, Take 1 tablet by mouth 2 times daily (Patient taking differently: Take 4 mg by mouth 2 times daily )  linagliptin (TRADJENTA) 5 MG tablet, Take 5 mg by mouth every morning  OXYGEN, Inhale 3 L into the lungs continuous  spironolactone (ALDACTONE) 25 MG tablet, Take 1 tablet by mouth daily  oxybutynin (DITROPAN-XL) 10 MG extended release tablet, Take 10 mg by mouth daily  fluticasone (FLONASE) 50 MCG/ACT nasal spray, 1 spray by Nasal route daily as needed for Rhinitis  omeprazole (PRILOSEC) 40 MG capsule,  Take 40 mg by mouth daily   vitamin D (ERGOCALCIFEROL) 35110 UNITS CAPS capsule, Take 50,000 Units by mouth once a week THUR  buPROPion (WELLBUTRIN XL) 300 MG XL tablet, Take 300 mg by mouth every morning. Note that the patient's home medications were reviewed and the above list is accurate to the best of my knowledge at the time of the exam.    Allergies:    Sulfa antibiotics and Pentazocine lactate    Social History:    reports that she quit smoking about 2 years ago. Her smoking use included cigarettes. She has a 20.00 pack-year smoking history. She has never used smokeless tobacco. She reports that she does not drink alcohol and does not use drugs. Family History:   family history includes Diabetes in her mother; Emphysema in her father; Heart Attack in her sister; Heart Failure in her sister; High Blood Pressure in her mother.       PHYSICAL EXAM:    Vitals:  /60   Pulse 78   Temp 97.8 °F (36.6 °C) (Temporal)   Resp 23   Ht 5' 3\" (1.6 m)   Wt 123 lb 12.8 oz (56.2 kg)   LMP  (LMP Unknown)   SpO2 94%   BMI 21.93 kg/m²        General appearance: NAD, conversant  Eyes: Sclerae anicteric, PERRLA  HEENT: AT/NC, MMM  Neck: FROM, supple, no thyromegaly  Lymph: No cervical / supraclavicular lymphadenopathy  Lungs: Clear to auscultation, WOB normal  CV: RRR, no MRGs, no lower extremity edema  Abdomen: Soft, non-tender; no masses or HSM, +BS  Extremities: FROM without synovitis. No clubbing or cyanosis of the hands. Skin: no rash, induration, lesions, or ulcers  Psych: Calm and cooperative. Normal judgement and insight. Normal mood and affect. Neuro: Alert and interactive, face symmetric, speech fluent. LABS:  All labs reviewed. Of note:  CBC with Differential:    Lab Results   Component Value Date    WBC 5.3 06/29/2021    RBC 3.83 06/29/2021    HGB 12.5 06/29/2021    HCT 37.6 06/29/2021     06/29/2021    MCV 98.2 06/29/2021    MCH 32.6 06/29/2021    MCHC 33.2 06/29/2021    RDW 12.7 06/29/2021    SEGSPCT 63 01/30/2014    LYMPHOPCT 27.8 06/29/2021    MONOPCT 11.5 06/29/2021    BASOPCT 0.6 06/29/2021    MONOSABS 0.61 06/29/2021    LYMPHSABS 1.47 06/29/2021    EOSABS 0.08 06/29/2021    BASOSABS 0.03 06/29/2021     CMP:    Lab Results   Component Value Date     06/29/2021    K 3.7 06/29/2021     06/29/2021    CO2 27 06/29/2021    BUN 21 06/29/2021    CREATININE 1.2 06/29/2021    GFRAA 54 06/29/2021    LABGLOM 45 06/29/2021    GLUCOSE 95 06/29/2021    GLUCOSE 111 09/30/2011    PROT 6.9 06/28/2021    LABALBU 3.8 06/28/2021    LABALBU 4.4 09/30/2011    CALCIUM 8.6 06/29/2021    BILITOT 0.5 06/28/2021    ALKPHOS 89 06/28/2021    AST 14 06/28/2021    ALT 11 06/28/2021       Imaging:  I've personally reviewed the patient's CXR: some interstitial markings which seem chronic, actually better than 2018, overall clear    EKG:  I've personally reviewed the patient's EKG:  NSR.   There are some anterior TWI's and lateral STD's    Telemetry:  I've personally reviewed the patient's telemetry:  Normal sinus rhythm without arrhythmias     ASSESSMENT/PLAN:  Principal Problem:    Syncope and collapse  Active Problems:    Pulmonary hypertension (HCC)    CKD (chronic kidney disease) stage 3, GFR 30-59 ml/min    Chronic systolic (congestive) heart failure (HCC)    Severe mitral regurgitation    COPD (chronic obstructive pulmonary disease) (HCC)    Gastroenteritis  Resolved Problems:    * No resolved hospital problems. *      She likely syncopized due to combination of GI fluid losses and high doses of multiple antihypertensives. Antiemetics / antidiarrheals as needed    Orthostatics negative this morning    Reduce Coreg temporarily    Continue spironolactone    EKG abnormal compared to prior. Only the vaguest report of chest pain. Check TTE to make sure not any worse. Do not think she needs stress testing at this point, given super vague symptoms, totally negative high sensitivity troponin. Will ask her cardiologist's opinion as well    Creatinine stable.   BUN elevated on admission c/w volume depletion    Code status: Full  Requires obs level of care  Martin Venegas MD    6:59 AM  6/29/2021

## 2021-06-30 ENCOUNTER — APPOINTMENT (OUTPATIENT)
Dept: NUCLEAR MEDICINE | Age: 66
DRG: 315 | End: 2021-06-30
Payer: MEDICARE

## 2021-06-30 ENCOUNTER — APPOINTMENT (OUTPATIENT)
Dept: NON INVASIVE DIAGNOSTICS | Age: 66
DRG: 315 | End: 2021-06-30
Payer: MEDICARE

## 2021-06-30 VITALS
RESPIRATION RATE: 18 BRPM | BODY MASS INDEX: 22.18 KG/M2 | HEIGHT: 63 IN | OXYGEN SATURATION: 97 % | HEART RATE: 74 BPM | TEMPERATURE: 98.6 F | SYSTOLIC BLOOD PRESSURE: 132 MMHG | DIASTOLIC BLOOD PRESSURE: 63 MMHG | WEIGHT: 125.2 LBS

## 2021-06-30 PROBLEM — N18.9 ACUTE KIDNEY INJURY SUPERIMPOSED ON CKD (HCC): Status: ACTIVE | Noted: 2021-06-30

## 2021-06-30 PROBLEM — N17.9 ACUTE KIDNEY INJURY SUPERIMPOSED ON CKD (HCC): Status: ACTIVE | Noted: 2021-06-30

## 2021-06-30 PROBLEM — I95.9 HYPOTENSION: Status: ACTIVE | Noted: 2021-06-30

## 2021-06-30 LAB
ANION GAP SERPL CALCULATED.3IONS-SCNC: 8 MMOL/L (ref 7–16)
BASOPHILS ABSOLUTE: 0 E9/L (ref 0–0.2)
BASOPHILS RELATIVE PERCENT: 0.4 % (ref 0–2)
BUN BLDV-MCNC: 13 MG/DL (ref 6–23)
CALCIUM SERPL-MCNC: 8.6 MG/DL (ref 8.6–10.2)
CHLORIDE BLD-SCNC: 108 MMOL/L (ref 98–107)
CO2: 26 MMOL/L (ref 22–29)
CREAT SERPL-MCNC: 1 MG/DL (ref 0.5–1)
EOSINOPHILS ABSOLUTE: 0.13 E9/L (ref 0.05–0.5)
EOSINOPHILS RELATIVE PERCENT: 2.7 % (ref 0–6)
GFR AFRICAN AMERICAN: >60
GFR NON-AFRICAN AMERICAN: 55 ML/MIN/1.73
GLUCOSE BLD-MCNC: 99 MG/DL (ref 74–99)
HCT VFR BLD CALC: 35.5 % (ref 34–48)
HEMOGLOBIN: 11.4 G/DL (ref 11.5–15.5)
LV EF: 48 %
LV EF: 61 %
LVEF MODALITY: NORMAL
LVEF MODALITY: NORMAL
LYMPHOCYTES ABSOLUTE: 1.32 E9/L (ref 1.5–4)
LYMPHOCYTES RELATIVE PERCENT: 27.4 % (ref 20–42)
MCH RBC QN AUTO: 31.8 PG (ref 26–35)
MCHC RBC AUTO-ENTMCNC: 32.1 % (ref 32–34.5)
MCV RBC AUTO: 98.9 FL (ref 80–99.9)
METER GLUCOSE: 123 MG/DL (ref 74–99)
MONOCYTES ABSOLUTE: 0.39 E9/L (ref 0.1–0.95)
MONOCYTES RELATIVE PERCENT: 8 % (ref 2–12)
NEUTROPHILS ABSOLUTE: 3.04 E9/L (ref 1.8–7.3)
NEUTROPHILS RELATIVE PERCENT: 61.9 % (ref 43–80)
OVALOCYTES: ABNORMAL
PDW BLD-RTO: 12.7 FL (ref 11.5–15)
PLATELET # BLD: 144 E9/L (ref 130–450)
PMV BLD AUTO: 10.8 FL (ref 7–12)
POIKILOCYTES: ABNORMAL
POTASSIUM SERPL-SCNC: 4.1 MMOL/L (ref 3.5–5)
RBC # BLD: 3.59 E12/L (ref 3.5–5.5)
SMUDGE CELLS: ABNORMAL
SODIUM BLD-SCNC: 142 MMOL/L (ref 132–146)
WBC # BLD: 4.9 E9/L (ref 4.5–11.5)

## 2021-06-30 PROCEDURE — 78452 HT MUSCLE IMAGE SPECT MULT: CPT

## 2021-06-30 PROCEDURE — A9500 TC99M SESTAMIBI: HCPCS | Performed by: RADIOLOGY

## 2021-06-30 PROCEDURE — 6360000002 HC RX W HCPCS: Performed by: PHYSICIAN ASSISTANT

## 2021-06-30 PROCEDURE — 80048 BASIC METABOLIC PNL TOTAL CA: CPT

## 2021-06-30 PROCEDURE — 2700000000 HC OXYGEN THERAPY PER DAY

## 2021-06-30 PROCEDURE — 93306 TTE W/DOPPLER COMPLETE: CPT

## 2021-06-30 PROCEDURE — 1200000000 HC SEMI PRIVATE

## 2021-06-30 PROCEDURE — 6370000000 HC RX 637 (ALT 250 FOR IP): Performed by: INTERNAL MEDICINE

## 2021-06-30 PROCEDURE — 6360000002 HC RX W HCPCS: Performed by: INTERNAL MEDICINE

## 2021-06-30 PROCEDURE — 96372 THER/PROPH/DIAG INJ SC/IM: CPT

## 2021-06-30 PROCEDURE — 82962 GLUCOSE BLOOD TEST: CPT

## 2021-06-30 PROCEDURE — 93017 CV STRESS TEST TRACING ONLY: CPT

## 2021-06-30 PROCEDURE — 6370000000 HC RX 637 (ALT 250 FOR IP): Performed by: PHYSICIAN ASSISTANT

## 2021-06-30 PROCEDURE — 97161 PT EVAL LOW COMPLEX 20 MIN: CPT

## 2021-06-30 PROCEDURE — 85025 COMPLETE CBC W/AUTO DIFF WBC: CPT

## 2021-06-30 PROCEDURE — 2580000003 HC RX 258: Performed by: PHYSICIAN ASSISTANT

## 2021-06-30 PROCEDURE — 36415 COLL VENOUS BLD VENIPUNCTURE: CPT

## 2021-06-30 PROCEDURE — 3430000000 HC RX DIAGNOSTIC RADIOPHARMACEUTICAL: Performed by: RADIOLOGY

## 2021-06-30 RX ORDER — CARVEDILOL 3.12 MG/1
3.12 TABLET ORAL 2 TIMES DAILY WITH MEALS
Qty: 60 TABLET | Refills: 3 | Status: SHIPPED | OUTPATIENT
Start: 2021-07-01

## 2021-06-30 RX ORDER — VALSARTAN 40 MG/1
20 TABLET ORAL DAILY
Qty: 30 TABLET | Refills: 3 | Status: SHIPPED | OUTPATIENT
Start: 2021-06-30

## 2021-06-30 RX ADMIN — Medication 35 MILLICURIE: at 14:09

## 2021-06-30 RX ADMIN — ENOXAPARIN SODIUM 40 MG: 100 INJECTION SUBCUTANEOUS at 09:14

## 2021-06-30 RX ADMIN — OXYBUTYNIN CHLORIDE 10 MG: 10 TABLET, EXTENDED RELEASE ORAL at 09:14

## 2021-06-30 RX ADMIN — REGADENOSON 0.4 MG: 0.08 INJECTION, SOLUTION INTRAVENOUS at 13:57

## 2021-06-30 RX ADMIN — CARVEDILOL 3.12 MG: 3.12 TABLET, FILM COATED ORAL at 16:14

## 2021-06-30 RX ADMIN — PANTOPRAZOLE SODIUM 40 MG: 40 TABLET, DELAYED RELEASE ORAL at 07:38

## 2021-06-30 RX ADMIN — CARVEDILOL 3.12 MG: 3.12 TABLET, FILM COATED ORAL at 09:14

## 2021-06-30 RX ADMIN — ROPINIROLE HYDROCHLORIDE 4 MG: 2 TABLET, FILM COATED ORAL at 09:14

## 2021-06-30 RX ADMIN — ASPIRIN 325 MG: 325 TABLET ORAL at 09:14

## 2021-06-30 RX ADMIN — Medication 11 MILLICURIE: at 12:55

## 2021-06-30 RX ADMIN — BUPROPION HYDROCHLORIDE 300 MG: 300 TABLET, FILM COATED, EXTENDED RELEASE ORAL at 09:14

## 2021-06-30 RX ADMIN — SPIRONOLACTONE 25 MG: 25 TABLET ORAL at 09:14

## 2021-06-30 RX ADMIN — OXYCODONE HYDROCHLORIDE AND ACETAMINOPHEN 1 TABLET: 5; 325 TABLET ORAL at 16:14

## 2021-06-30 RX ADMIN — SODIUM CHLORIDE, PRESERVATIVE FREE 10 ML: 5 INJECTION INTRAVENOUS at 09:14

## 2021-06-30 RX ADMIN — OXYCODONE HYDROCHLORIDE AND ACETAMINOPHEN 1 TABLET: 5; 325 TABLET ORAL at 04:02

## 2021-06-30 ASSESSMENT — PAIN SCALES - GENERAL
PAINLEVEL_OUTOF10: 0
PAINLEVEL_OUTOF10: 7
PAINLEVEL_OUTOF10: 7

## 2021-06-30 NOTE — PROGRESS NOTES
Remains chest pain free and really cannot explain her prior chest pain in any detail. Only remembers that it was anterior chest on Monday. Repeat EKG personally reviewed - still with some anterior TWI's, slightly better than admission    Case discussed with cardiology. Will do stress test today due to CP and dynamic TW changes. EUN resolved and BP far better. No arrhythmias on my review of telemetry. Will need to gradually retitrate Coreg dose as outpatient. Keep at low dose for now.     Possibly d/c home today if stress test negative and OK with cards

## 2021-06-30 NOTE — PROGRESS NOTES
Physical Therapy  Physical Therapy Initial Assessment     Name: Mariana Ratliff  : 1955  MRN: [id number]      Date of Service: 2021    Evaluating PT:  Justinerolanda Calderon, PT [id number]      Room #:  6182/2396-J  Diagnosis:  Syncope and collapse [R55]  PMHx/PSHx:     has a past surgical history that includes Coronary artery bypass graft (10/30/2002); Cholecystectomy (); cervical fusion (); Hysterectomy (); Cardiac surgery; lumbar drain implantation (2018); pr therapeutic spinal puncture drainage csf (N/A, 2018); pr create shunt:ventric-peritoneal (N/A, 10/15/2018); Colonoscopy (N/A, 2019); Ventriculoperitoneal shunt (N/A, 2021); eye surgery; and hernia repair. has a past surgical history that includes Coronary artery bypass graft (10/30/2002); Cholecystectomy (); cervical fusion (); Hysterectomy (); Cardiac surgery; lumbar drain implantation (2018); pr therapeutic spinal puncture drainage csf (N/A, 2018); pr create shunt:ventric-peritoneal (N/A, 10/15/2018); Colonoscopy (N/A, 2019); Ventriculoperitoneal shunt (N/A, 2021); eye surgery; and hernia repair. Procedure/Surgery:  none  Precautions:  Falls, O2 , FWB (full weight bearing),   Equipment Needs:  none    SUBJECTIVE:    Patient lives with family  in a two story home resides first  with 3 steps to enter with 1Rail  Bed is on 1 floor and bath is on 1 floor. Patient ambulated independently sometimes using SPC due to previous balance issues PTA. Equipment owned: [employer]      OBJECTIVE:   Initial Evaluation  Date: 21 Treatment Short Term/ Long Term   Goals   AM-PAC 6 Clicks      Was pt agreeable to Eval/treatment? yes     Does pt have pain? no     Bed Mobility  Rolling: Ind  Supine to sit: Ind  Sit to supine: Ind  Scooting: Ind  Rolling: Ind  Supine to sit: Ind  Sit to supine: Ind  Scooting: Ind   Transfers Sit to stand: Ind   Stand to sit: Ind  Stand pivot: Ind  Sit to stand:  Mod only  Pt's/ family goals   1. Home with family when cleared. Prognosis is good for reaching above PT goals. Patient and or family understand(s) diagnosis, prognosis, and plan of care. yes    PHYSICAL THERAPY PLAN OF CARE:    PT POC is established based on physician order and patient diagnosis     Referring provider/PT Order:    06/28/21 [id number]  PT evaluation and treat     LORI Arias       Diagnosis:  Syncope and collapse [R55]  Specific instructions for next treatment:  Increase ambulation distance. Current Treatment Recommendations:     [x] Strengthening to improve independence with functional mobility   [x] ROM to improve independence with functional mobility   [x] Balance Training to improve static/dynamic balance and to reduce fall risk  [x] Endurance Training to improve activity tolerance during functional mobility   [x] Transfer Training to improve safety and independence with all functional transfers   [x] Gait Training to improve gait mechanics, endurance and asses need for appropriate assistive device  [x] Stair Training in preparation for safe discharge home and/or into the community   [] Positioning to prevent skin breakdown and contractures  [x] Safety and Education Training   [] Patient/Caregiver Education   [] HEP  [] Other     PT long term treatment goals are located in above grid    Frequency of treatments: 2-5x/week x 1-2 weeks. Time in  0740  Time out  0755    Total Treatment Time  15 minutes     Evaluation Time includes thorough review of current medical information, gathering information on past medical history/social history and prior level of function, completion of standardized testing/informal observation of tasks, assessment of data and education on plan of care and goals.     CPT codes:  [x] Low Complexity PT evaluation 71874  [] Moderate Complexity PT evaluation 62984  [] High Complexity PT evaluation 04252  [] PT Re-evaluation [id number]  [] Gait training 98005 - minutes  [] Manual therapy 91132 - minutes  [] Therapeutic activities 82228 - minutes  [] Therapeutic exercises 83229 - minutes  [] Neuromuscular reeducation 90364 - minutes     Eliu Peña, [id number] [facility]

## 2021-06-30 NOTE — CONSULTS
CARDIOLOGY CONSULTATION    Patient Name:  Dc Martínez    :  1955    Reason for Consultation:   Chest discomfort; syncope    History of Present Illness:   Dc Martínez presents to [facility] following an episode of near syncope while in her primary care physician's office, Dr. Janes Franco. This was preceded by episode of profound diarrhea and emesis during the days prior to her visit. She does have a history of hypertension and is on both carvedilol and an ARB and upon admission was found to have relative hypotension. Following her multiple episodes of emesis she did note chest discomfort but not related to exertion. She is now admitted for further observation and diagnostic studies and adjustment of her medical regimen. She does have a history of severe coronary artery disease and underwent successful coronary artery bypass surgery by Dr. Aj Hayward on 2002. She denies any further cardiac issues since that time and specifically denies exertional anginal like symptoms nor dyspnea. Past Medical History:   has a past medical history of Acid reflux disease, Acute on chronic respiratory failure with hypoxia and hypercapnia ([address] Apraxia, Arthritis, Ataxia, CAD (coronary artery disease), Choledocholithiasis, Chronic systolic congestive heart failure (HCC), CKD (chronic kidney disease) stage 3, GFR 30-59 ml/min ([facility]), COPD (chronic obstructive pulmonary disease) ([address] Diabetes mellitus ([address] Hyperlipidemia, Hypoxia, Neck pain, Normal pressure hydrocephalus ([facility]), Oxygen dependent, Pleural effusion, Pulmonary hypertension ([address] Restless legs, S/P CABG x 2, and Severe mitral regurgitation. Surgical History:   has a past surgical history that includes Coronary artery bypass graft (10/30/2002); Cholecystectomy (); cervical fusion (); Hysterectomy ();  Cardiac surgery; lumbar drain implantation (2018); pr therapeutic spinal puncture drainage csf (N/A, 7/27/2018); pr create shunt:ventric-peritoneal (N/A, 10/15/2018); Colonoscopy (N/A, 1/21/2019); Ventriculoperitoneal shunt (N/A, 2/1/2021); eye surgery; and hernia repair. Social History:   reports that she quit smoking about 2 years ago. Her smoking use included cigarettes. She has a 20.00 pack-year smoking history. She has never used smokeless tobacco. She reports that she does not drink alcohol and does not use drugs. Family History:  family history includes Diabetes in her mother; Emphysema in her father; Heart Attack in her sister; Heart Failure in her sister; High Blood Pressure in her mother. Medications:  Prior to Admission medications    Medication Sig Start Date End Date Taking? Authorizing Provider   oxyCODONE-acetaminophen (PERCOCET) 5-325 MG per tablet Take 1 tablet by mouth every 6 hours as needed for Pain.  For chronic neck Pain HX of Fusions   Yes Historical Provider, MD   carvedilol (COREG) 25 MG tablet Take 1 tablet by mouth 2 times daily (with meals) 11/4/20  Yes Nicol Newsome MD   valsartan (DIOVAN) 80 MG tablet Take 80 mg by mouth daily   Yes Historical Provider, MD   albuterol sulfate  (90 Base) MCG/ACT inhaler INHALE 2 PUFFS BY MOUTH EVERY 4 HOURS AS NEEDED 9/16/20  Yes Historical Provider, MD   aspirin 325 MG tablet Take 325 mg by mouth daily Prescribed per PCP   Yes Historical Provider, MD   traZODone (DESYREL) 100 MG tablet Take 1 tablet by mouth nightly  Patient taking differently: Take 100 mg by mouth as needed for Sleep  10/30/18  Yes Kevan De La Cruz MD   rOPINIRole (REQUIP) 3 MG tablet Take 1 tablet by mouth 2 times daily  Patient taking differently: Take 4 mg by mouth 2 times daily  10/30/18  Yes Kevan De La Cruz MD   linagliptin (TRADJENTA) 5 MG tablet Take 5 mg by mouth every morning   Yes Historical Provider, MD   OXYGEN Inhale 3 L into the lungs continuous   Yes Historical Provider, MD   spironolactone (ALDACTONE) 25 MG tablet Take 1 tablet by mouth daily 8/4/18  Yes Malik Vieira,    oxybutynin (DITROPAN-XL) 10 MG extended release tablet Take 10 mg by mouth daily   Yes Historical Provider, MD   fluticasone (FLONASE) 50 MCG/ACT nasal spray 1 spray by Nasal route daily as needed for Rhinitis   Yes Historical Provider, MD   omeprazole (PRILOSEC) 40 MG capsule   Take 40 mg by mouth daily  5/16/15  Yes Historical Provider, MD   vitamin D (ERGOCALCIFEROL) 35671 UNITS CAPS capsule Take 50,000 Units by mouth once a week THUR 4/30/15  Yes Historical Provider, MD   buPROPion (WELLBUTRIN XL) 300 MG XL tablet Take 300 mg by mouth every morning. Yes Historical Provider, MD       Allergies:  Sulfa antibiotics and Pentazocine lactate     Review of Systems:   · Constitutional: there has been no unanticipated weight loss. There's been no significant change in energy level, sleep pattern or activity level. No fever chills or rigors. · Eyes: No visual changes or diplopia. No scleral icterus. · ENT: No Headaches, hearing loss or vertigo. No mouth sores or sore throat. No change in taste or smell. · Cardiovascular: No chest discomfort, dyspnea on exertion, palpitations, + loss of consciousness, no phlebitis, no claudication. · Respiratory: No cough or wheezing, no sputum production. No hemoptysis, pleuritic pain. · Gastrointestinal: No abdominal pain, appetite loss, blood in stools. No change in bowel habits. No hematemesis  · Genitourinary: No dysuria, trouble voiding or hematuria. No nocturia or increased frequency. · Musculoskeletal:  No gait disturbance, weakness or joint complaints. · Integumentary: No rash or pruritis. · Neurological: No headache, diplopia, change in muscle strength, numbness or tingling. No change in gait, balance, coordination, mood, affect, memory, mentation, behavior. · Psychiatric: No anxiety or depression. · Endocrine: No temperature intolerance. No excessive thirst, fluid intake, or urination.  No tremor. · Hematologic/Lymphatic: No abnormal bruising or bleeding, blood clots or swollen lymph nodes. · Allergic/Immunologic: No nasal congestion or hives. Physical Examination:    Vital Signs: /63   Pulse 74   Temp 98.6 °F (37 °C) (Temporal)   Resp 18   Ht 5' 3\" (1.6 m)   Wt 125 lb 3.2 oz (56.8 kg)   LMP  (LMP Unknown)   SpO2 97%   BMI 22.18 kg/m²   General appearance: Well preserved, mesomorphic body habitus, alert, no distress. Skin: Skin color, texture, turgor normal. No rashes or lesions. No induration or tightening palpated. Head: Normocephalic. No masses, lesions, tenderness or abnormalities  Eyes: Conjunctivae/corneas clear. PERRL, EOMs intact. Sclera non icteric. Ears: External ears normal. Canals clear. TM's clear bilaterally. Hearing normal to finger rub. Nose/Sinuses: Nares normal. Septum midline. Mucosa normal. No drainage or sinus tenderness. Oropharynx: Lips, mucosa, and tongue normal. Oropharynx clear with no exudate seen. Neck: Neck supple and symmetric. No adenopathy. Thyroid symmetric, normal size, without nodules. Trachea is midline. Carotids brisk in upstroke without bruits, no abnormal JVP noted at 45°. Chest: Even excursion;  Sternotomy scar noted. Lungs: Lungs clear to auscultation bilaterally. No retractions or use of accessory muscles. No tactile vocal fremitus. No rhonchi, crackles or rales. Heart:  S1 > S2. Regular rhythm. No gallop or murmur. No rub, palpable thrill or heave noted. PMI 5th intercostal space midclavicular line. Abdomen: Abdomen soft, scaphoid, non-tender. BS normal. No masses, organomegaly. No hernia noted. Extremities: Extremities normal. No deformities, edema, or skin discoloration. No cyanosis or clubbing noted to the nails. Peripheral pulses present 2+ upper extremities and present 1+  lower extremities. Musculoskeletal: Spine ROM normal. Muscular strength intact. Neuro: Cranial nerves intact. Motor: Strength 5/5 in all extremities. Reflexes 2+ in all extremities. No focal weakness. Sensory: grossly normal to touch. Coordination intact. Pertinent Labs:  CBC:   Recent Labs     21  1330 21  0422 21  0521   WBC 5.7 5.3 4.9   HGB 13.1 12.5 11.4*    148 144     BMP:  Recent Labs     21  1330 21  1330 21  1429 21  0422 21  0422 21  0521     --   --  140  --  142   K 3.6  --   --  3.7  --  4.1   CL 98  --   --  104  --  108*   CO2 26  --   --  27  --  26   BUN 28*  --   --  21  --  13   CREATININE 1.4*  --   --  1.2*  --  1.0   GLUCOSE 149*   < > 149 95  --  99   LABGLOM 38   < >  --  45   < > 55    < > = values in this interval not displayed. ABGs: No results found for: PH, PO2, PCO2  INR:   No results for input(s): INR in the last 72 hours. PRO-BNP:   Lab Results   Component Value Date    PROBNP 627 (H) 10/07/2020    PROBNP 885 (H) 2020      Cardiac Injury Profile: No results for input(s): CKTOTAL, CKMB, CKMBINDEX, TROPONINI in the last 72 hours. Lipid Profile:   Lab Results   Component Value Date    TRIG 153 09/10/2020    HDL 38 09/10/2020    LDLCALC 118 09/10/2020    CHOL 187 09/10/2020      Hemoglobin A1C: No components found for: HGBA1C   ECG:  See report    Radiology:  Ct Head Wo Contrast    Result Date: 2018  Patient MRN:  [id number] : 1955 Age: 60 years Gender: Female Order Date:  2018 3:00 PM EXAM: CT HEAD WO CONTRAST NUMBER OF IMAGES:  101 INDICATION: Difficulty walking and speaking Technique: Multiple contiguous 5 mm axial images were obtained from the skull base to the vertex without administration of IV contrast. Reformatted images were generated in the sagittal and coronal planes. Images were reviewed in brain, subdural, soft tissue and bone windows. Low-dose CT  acquisition technique included one of following options: 1. Automated exposure control 2. Adjustment of MA and or KV according to patient's size or 3.  Use of iterative reconstruction. Comparison: No prior studies available for comparison. Findings: There is mild cortical sulcal prominence and moderate ventricular dilatation, with no clear-cut evidence of hydrocephalus, consistent with central greater than cortical cerebral volume loss. The basal cisterns are preserved. The fourth ventricle is midline. The supratentorial midline structures appear unremarkable. No evidence of parenchymal hemorrhage or extra-axial fluid collection is identified. There is no evidence of cortical infarction or contusion, with preservation of gray-white matter distinction. There are patchy regions of low attenuation in the deep periventricular white matter, most prominently about the frontal horns of lateral ventricles bilaterally, with extension into the corona radiata and centrum semiovale, most compatible with trace microangiopathic ischemic changes. No focal mass lesion or midline shift is identified. There is no depressed skull fracture. There is thickening of the inner table of the frontal bone, consistent with hyperostosis frontalis interna. No lytic or blastic osseous lesions are seen. There is no significant mucosal thickening or fluid levels within paranasal sinuses or mastoid air cells. 1. No evidence of intracranial hemorrhage, extra axial collection, space-occupying mass lesion or mass effect, midline shift, or acute territorial infarction. If there is strong clinical suspicion for acute infarct of the brain, then MR imaging of the brain with diffusion-weighted sequence may be obtained for further evaluation. 2. Central greater than cortical cerebral volume loss. Recommend clinical correlation. 3. Trace microvascular ischemic disease as described above. Mri Cervical Spine Wo Contrast    Result Date: 2018  Patient MRN:  [id number] : 1955 Age: 60 years Gender: Female Order Date:  2018 3:15 PM EXAM: MRI CERVICAL SPINE WO CONTRAST NUMBER OF IMAGES: 127.   INDICATION: Patient is a 57-year-old woman with history of neck pain, r/o spinal compression  COMPARISON: MRI neck May 26, 2018 TECHNIQUE: Multiplanar and multi sequential MRI of the cervical spine was performed with no gadolinium administration. Study is limited due to patient's motion. FINDINGS: There is normal alignment of the vertebral bodies with no spondylolisthesis. Cervico-occipital junction is unremarkable. C5-C6 vertebral fusion. Limited evaluation of the cervical cord signal due to patient's motion. There is no identifiable mass in the cervical canal. There is uniformity of the vertebral body heights. Prevertebral soft tissue are unremarkable. At C2-C3:  There is no focal disc bulge or herniation. No neural foraminal narrowing. At C3-C4: There is no focal disc bulge or herniation. Mild the left neural foraminal narrowing. At C4-C5: There is mild to moderate disc height loss and diffuse disc desiccation. Broad-based right paracentral disc protrusion and moderate central canal narrowing on the right. Flattening anterior aspect of the cord on the right. Moderate to severe bilateral neural foraminal narrowing, more pronounced on the right. At C5-C6: Vertebral fusion. No central canal stenosis. . No neural foraminal narrowing. At C6-C7: Moderate to severe disc height loss and desiccation. Degenerative irregularities of the endplates are present. Mild diffuse disc bulge and mild the central canal narrowing. Moderate to severe left foraminal narrowing. At C7-T1:There is no focal disc bulge or herniation. No neural foraminal narrowing. 1. Limited study due to patient's motion. 2. No compression fracture or a spondylolisthesis. 3. C5-C6 vertebral fusion. 4. Moderate to severe degenerative disc disease at C6-C7. 5. Broad-based focal right paracentral disc protrusion at C4-C5 and moderate central canal narrowing on the right.  6. Multilevel moderate to severe foraminal narrowing more pronounced at left C6-C7 and bilateral C4-C5. Xr Chest Portable    Result Date: 2018  Patient MRN: [id number] : 1955 Age:  60 years Gender: Female Order Date: 2018 3:00 PM Exam: XR CHEST PORTABLE Number of Views: 1 Indication:   Possible transient ischemic attack. Difficulty walking and speaking. Possible sepsis. Comparison: Chest radiograph 2012 Findings: There is a enlarged cardiomediastinal silhouette since the previous exam with abnormal appearance of the thoracic aorta. Vascular calcifications thoracic aorta. Median sternotomy wires. Streaky opacification in the lung bases. No pneumothorax. ALERT:  THIS IS AN ABNORMAL REPORT. Findings communicated directly with Dr. Joseph Lake at approximately 2018 3:43 PM . 1. Enlarged cardiomediastinal silhouette since previous exam with increased tortuosity of the descending thoracic aorta and enlargement. CT scan chest is recommended to exclude any potential of thoracic aneurysm with consideration given to patient's renal status and any potential safety or allergenic concerns. 2. Vascular calcifications thoracic aorta. 3. Suspected bibasilar infiltrate/pneumonia and small amounts of bilateral pleural effusion. Cta Chest W Contrast    Result Date: 2018  Patient MRN:  [id number] : 1955 Age: 60 years Gender: Female Order Date:  2018 4:00 PM EXAM: CTA CHEST W CONTRAST NUMBER OF IMAGES:  26 INDICATION: Aortic disease Technique: CT of the chest was performed from the apices of the lungs through the upper abdomen using contiguous 2.5 mm transaxial sections, following  pulmonary angiogram protocol. 60cc of Isovue-370 IV contrast was administered without incident. Reformations were constructed in the coronal and sagittal planes (5 mm). Images were reviewed in soft tissue, bone and lung windows. Images were reconstructed on a separate dedicated 3-D imaging workstation, using maximum intensity projection (MIP) and volume rendered (3D) datasets.  Low-dose CT  acquisition technique included one of following options: 1. Automated exposure control 2. Adjustment of MA and or KV according to patient's size or 3. Use of iterative reconstruction. Comparison: No prior studies available for comparison. Findings: Pulmonary vasculature: There is no evidence of filling defect in the main pulmonary artery, right or left pulmonary arteries, or segmental branches of the pulmonary arteries to suggest pulmonary embolism. Evaluation of the subsegmental branches is limited due to respiratory artifact. Lung parenchyma and pleura: The tracheobronchial tree is midline and the central bronchi are patent. There are moderate centrilobular bullous on this changes the lungs bilaterally, most prominently involving the upper lobes. There is a calcified pleural plaque in the posterior aspect of the superior segment of the left lower lobe (series 5, image 17). There is no definite pleural or parenchymal mass lesion. There is no focal consolidation, pleural effusion or pneumothorax. There are mild patchy bibasal dependent pulmonary parenchymal opacities consistent with mild atelectatic change. Thyroid: Demonstrates homogeneous enhancement. Mediastinum: No significant lymphadenopathy. Ruth: No significant lymphadenopathy. Heart and pericardium: The heart is enlarged. There is no pericardial effusion. There are moderate coronary artery calcifications. There is dilatation of the main pulmonary artery and, measuring 3.4 cm in maximum diameter (series 4, image 59). There is dilatation of the right pulmonary artery, measuring 2.8 cm in maximum diameter (series 4, image 64). There is dilatation of the left pulmonary artery, measuring 2.9 cm in maximum diameter (series 4, image 61). Chest wall: Midline sternotomy wires are identified, prior sternotomy. Axilla: No enlarged lymph nodes. Visualized upper abdomen: There is a trace amount of perihepatic free fluid.  The gallbladder is absent and surgical clips are present in the gallbladder fossa, consistent with a prior cholecystectomy. There is gas present within the central biliary tree, likely secondary to prior cholecystectomy. The remaining visualized upper abdominal organs are unremarkable. Bones: There are degenerative changes of the spine. CT angiography demonstrates the following: The aorta is normal in caliber without evidence of aneurysmal dilatation, dissection or thrombus. The ascending aorta at the level of the aortic root has a maximum diameter of 2.7 cm The ascending aorta at the level of the right pulmonary artery has a maximum diameter of 3.3 cm The transverse arch has a maximum diameter of 2.8 cm The descending thoracic aorta at the level of the left pulmonary artery has a maximum diameter of 2.4 cm     1. No evidence of pulmonary embolus in the  main pulmonary artery, right or left pulmonary arteries, or segmental branches of the pulmonary arteries. 2. No focal consolidation, pleural effusion or pneumothorax. 3. No definite pleural or parenchymal mass lesion. 4. Moderate pulmonary emphysematous disease. 5. Calcified pleural plaque in the posterior aspect of the superior segment of the left lower lobe, consistent with a history of prior asbestos exposure. 6. Cardiomegaly with dilatation of the main pulmonary artery as well as the right and left pulmonary arteries, consistent with pulmonary artery hypertension. 7. Trace amount of perihepatic free fluid. 8. Status post cholecystectomy with small amount of residual pneumobilia. Fluoro For Surgical Procedures    Result Date: 2018  Patient MRN:  [id number] : 1955 Age: 60 years Gender: Female Order Date:  2018 9:00 AM EXAM: FLUORO FOR SURGICAL PROCEDURES NUMBER OF IMAGES:  4. INDICATION: R 52 Pain. Hardware Removal Findings: 3 fluoroscopic spot films were submitted for interpretation. Fluoroscopic images demonstrate localization of the lumbar spine with interval placement of a probable drain. Fluoroscopy equipment and a technologist were provided by the Department of Radiology. No radiologist was present for the examination. Total fluoroscopic time was 4.0 seconds. Intraoperative fluoroscopy, as detailed above. Mri Brain Wo Contrast    Result Date: 2018  Patient MRN:  [id number] : 1955 Age: 60 years Gender: Female Order Date:  2018 12:45 AM EXAM: MRI BRAIN WO CONTRAST NUMBER OF IMAGES:  36 INDICATION:  Patient is a 79-year-old woman with history of hyperlipidemia, CAD, hypertension, STROKE TECHNIQUE: Multiplanar, multi sequential images of the brain were obtained without gadolinium administration. COMPARISON: CT head 2018 FINDINGS: The ventricular system appears moderately distended which is a slightly disproportionate to cortical sulci widening raising possibility of normal pressure hydrocephalus. Clinical correlation is recommended. Moderate diffuse age-related cerebral atrophy. There are mild to moderate periventricular and deep subcortical T-2/flair hyperintensities representing chronic microvascular ischemic disease. No intracranial mass lesions are identified. There is no evidence of restricted diffusion to suggest acute/early subacute ischemic infarction. The posterior fossa contents and brainstem are unremarkable. There is evidence of prior bilateral cataract removal. The  orbital structures, paranasal sinuses, and mastoid air cells are unremarkable. Patent flow-voids are noted within the intracranial vessels and dural venous sinuses. The calvarium is intact. IMPRESSION: 1. No evidence for acute intracranial ischemic infarct or hemorrhage. 2. Moderate diffuse age-related cerebral atrophy and chronic microvascular ischemic disease. 3. Ventricular enlargement slightly disproportionate to cortical sulci widening which could raise possibility of normal pressure hydrocephalus. Clinical correlation is recommended.  4. No identifiable mass, mass effect or abnormal diffusion restriction. Us Carotid Artery Bilateral    Result Date: 2018  Patient MRN:  [id number] : 1955 Age: 60 years Gender: Female Order Date:  2018 10:45 PM EXAM: US CAROTID ARTERY BILATERAL NUMBER OF IMAGES:  55 TECHNIQUE: Duplex carotid ultrasound with color-flow Doppler, and a velocity blood flow characteristic and spectral analysis of the carotid arteries were obtained and documented. INDICATION: Patient is a 21-year-old woman with history of CAD, hyperlipidemia, COPD, TIA COMPARISON: None FINDINGS: Bilateral CCA, ICA and ECA are patent. The right internal carotid artery has a peak systolic velocity of 62 cm/sec proximally with an  internal to common carotid artery ratio of 1. Based on Gosink criteria, no evidence of hemodynamically significant stenosis is seen. The left internal carotid artery has a peak systolic velocity of 46.8 cm/sec proximally with an internal to common carotid artery ratio of 1.3. Based on Gosink criteria, no evidence of hemodynamically significant stenosis is seen. Bilateral antegrade vertebral artery flow is seen. Mild atherosclerotic plaque formation is seen on the right without evidence of hemodynamically significant stenosis. Mild atherosclerotic plaque formation is seen on the left without evidence of hemodynamically significant stenosis. 1. No evidence to suggest hemodynamically significant stenosis. Assessment:    Principal Problem:    Syncope and collapse  Active Problems:    Pulmonary hypertension (HCC)    CKD (chronic kidney disease) stage 3, GFR 30-59 ml/min    Chronic systolic (congestive) heart failure (HCC)    Severe mitral regurgitation    COPD (chronic obstructive pulmonary disease) (HCC)    Gastroenteritis    Acute kidney injury superimposed on CKD ([address]  Resolved Problems:    * No resolved hospital problems.  *      Plan:  Based upon Mrs. Deep Rogers clinical presentation, it would appear that her main reason for syncope most likely was related to her profound volume loss in the presence of continued antihypertensive medications. Fortunately her nuclear stress test does not demonstrate any evidence of ongoing stress-induced ischemia in her left ventricular systolic function is well-preserved with a calculated gated SPECT left ventricular ejection fraction of 61%. Thus I see no reason not to discharge her but reduce her dose of antihypertensives as per Dr. Eunice Miranda and retitrate them as an outpatient monitoring her blood pressure to maintain her systolic pressure >206 mmHg and < 140 mmHg with an ideal goal of 120 mmHg. Additionally I have instructed her on the symptoms of underlying coronary artery disease in detail and have told her to immediately contact either Dr. Nano Champagne or myself should she recognize them. Finally, would also maintain her LDL cholesterol within updated 2017 ACC/AHA cholesterol guidelines. I have spent more than 45 minutes face to face with Charlie Stanley reviewing notes and laboratory data with greater than 50% of this time instructing and counseling the patient regarding my findings and recommendations and I have answered all questions as posed to me by Ms. Domitila Trevizo. Thank you, Merlin Notice, DO for allowing me to consult in the care of this patient. Travis Dela Cruz DO DO, FACP, [facility], [facility]    NOTE:  This report was transcribed using voice recognition software. Every effort was made to ensure accuracy; however, inadvertent computerized transcription errors may be present.

## 2021-07-04 LAB
EKG ATRIAL RATE: 68 BPM
EKG P AXIS: 76 DEGREES
EKG P-R INTERVAL: 144 MS
EKG Q-T INTERVAL: 428 MS
EKG QRS DURATION: 116 MS
EKG QTC CALCULATION (BAZETT): 455 MS
EKG R AXIS: -8 DEGREES
EKG T AXIS: 49 DEGREES
EKG VENTRICULAR RATE: 68 BPM

## 2021-07-27 ENCOUNTER — HOSPITAL ENCOUNTER (OUTPATIENT)
Age: 66
Discharge: HOME | End: 2021-07-27
Payer: MEDICARE

## 2021-07-27 LAB
CHOLESTEROL, FASTING: 232 MG/DL (ref 0–199)
HBA1C MFR BLD: 6.8 % (ref 4–5.6)
HDLC SERPL-MCNC: 40 MG/DL
LDL CHOLESTEROL CALCULATED: 148 MG/DL (ref 0–99)
TRIGLYCERIDE, FASTING: 219 MG/DL (ref 0–149)
TSH SERPL DL<=0.05 MIU/L-ACNC: 1.78 UIU/ML (ref 0.27–4.2)
VITAMIN D 25-HYDROXY: 67 NG/ML (ref 30–100)
VLDLC SERPL CALC-MCNC: 44 MG/DL

## 2021-07-27 PROCEDURE — 84443 ASSAY THYROID STIM HORMONE: CPT

## 2021-07-27 PROCEDURE — 36415 COLL VENOUS BLD VENIPUNCTURE: CPT

## 2021-07-27 PROCEDURE — 80061 LIPID PANEL: CPT

## 2021-07-27 PROCEDURE — 83036 HEMOGLOBIN GLYCOSYLATED A1C: CPT

## 2021-07-27 PROCEDURE — 82306 VITAMIN D 25 HYDROXY: CPT

## 2021-08-20 NOTE — DISCHARGE SUMMARY
D/I/A: Pt roomed on 3C in [address].  Arrived via litter and accompanied by cath lab staff. Pt placed on monitor.  VSSA.  Rhythm upon arrival sinus teresa on monitor.  Denies pain or sob.  Reviewed activity restrictions and when to notify RN, ie-changes to breathing or increased chest pressure or chest pain.  CCL access:  L radial TR band in place with 18 ml air. Per orders, TR band to start deflation at 1640. RIJ site with slight hardening, pressure placed on site for ~10 minutes, now soft.   P: Continue to monitor status.  Discharge to home once meeting criteria.     Physician Discharge Summary     Patient ID:  Feli Lynn  [id number]  75 y.o.  1955    Admit date: 6/28/2021    Discharge date and time:  6/30/2021     Admission Diagnoses:   Chief Complaint   Patient presents with    Loss of Consciousness     pt had a syncopal episode while at the doctors office today      Syncope and collapse     Discharge Diagnoses:   Principal Problem:    Syncope and collapse  Active Problems:    Pulmonary hypertension (HCC)    CKD (chronic kidney disease) stage 3, GFR 30-59 ml/min    Chronic systolic (congestive) heart failure (HCC)    Severe mitral regurgitation    COPD (chronic obstructive pulmonary disease) (HCC)    Gastroenteritis    Acute kidney injury superimposed on CKD ([facility]    Hypotension  Resolved Problems:    * No resolved hospital problems. *       Consults: Cardiology    Procedures:   Regadenoson nuke    1. Normal pharmacologic myocardial perfusion imaging without evidence   for myocardial ischemia. 2. Normal global myocardial segmental wall motion with calculated   gated SPECT left ventricular ejection fraction of 61%. Hospital Course:   Patient presented with syncopal episode in the setting of a recent nonspecific GI illness with vomiting and diarrhea. She apparently syncopized at her doctor's office and was found to be hypotensive. The GI illness had pretty much resolved by the time she got to the hospital.    Here in the hospital she was found to have EUN, and in the ED her blood pressure was still somewhat low in the 90s. She has pretty extensive cardiac history including history of systolic CHF so she does need some cardiac medications. I cut her carvedilol all the way back to 3.125 mg twice daily, down to 25 mg.  I have cut her Diovan dosage as well. Her EUN has resolved and her discharge creatinine is 1.0    The patient had the vaguest report of very nonspecific chest pain around the time of syncope.   Her EKG in the ED shows prominent T wave inversions which seem to be new. Her troponins were negative. She underwent both a nuclear stress test and echocardiogram.  Her stress test does not show any signs of reversible ischemia. Her echocardiogram shows mildly depressed LVEF and mitral regurgitation, but no major abnormalities otherwise. She has remained chest pain-free throughout the time here in the hospital.  She is okay for discharge and needs to follow-up with her family doctor and cardiologist in the near future. Discharge Exam:  Vitals:    06/30/21 0300 06/30/21 0603 06/30/21 0830 06/30/21 1123   BP: (!) 102/57  136/68 132/63   Pulse: 82  78 74   Resp: 23  18 18   Temp: 98.1 °F (36.7 °C)  97.8 °F (36.6 °C) 98.6 °F (37 °C)   TempSrc: Temporal  Temporal Temporal   SpO2: 93%  98% 97%   Weight:  125 lb 3.2 oz (56.8 kg)     Height:            General appearance: NAD, conversant  HEENT: AT/NC, MMM  Neck: FROM, supple  Lungs: Clear to auscultation, WOB normal  CV: RRR, no MRGs  Abdomen: Soft, non-tender; no masses or HSM, +BS  Extremities: No peripheral edema or digital cyanosis  Skin: no rash, lesions or ulcers  Psych: Calm and cooperative  Neuro: Alert and interactive, nonfocal     Condition:  Stable    Disposition: home    Patient Instructions:   Current Discharge Medication List      CONTINUE these medications which have CHANGED    Details   carvedilol (COREG) 3.125 MG tablet Take 1 tablet by mouth 2 times daily (with meals)  Qty: 60 tablet, Refills: 3      valsartan (DIOVAN) 40 MG tablet Take 0.5 tablets by mouth daily  Qty: 30 tablet, Refills: 3         CONTINUE these medications which have NOT CHANGED    Details   oxyCODONE-acetaminophen (PERCOCET) 5-325 MG per tablet Take 1 tablet by mouth every 6 hours as needed for Pain.  For chronic neck Pain HX of Fusions      albuterol sulfate  (90 Base) MCG/ACT inhaler INHALE 2 PUFFS BY MOUTH EVERY 4 HOURS AS NEEDED      aspirin 325 MG tablet Take 325 mg by mouth daily Prescribed per PCP      traZODone (DESYREL) 100 MG tablet Take 1 tablet by mouth nightly  Qty: 30 tablet, Refills: 3      rOPINIRole (REQUIP) 3 MG tablet Take 1 tablet by mouth 2 times daily  Qty: 60 tablet, Refills: 3      linagliptin (TRADJENTA) 5 MG tablet Take 5 mg by mouth every morning      OXYGEN Inhale 3 L into the lungs continuous      spironolactone (ALDACTONE) 25 MG tablet Take 1 tablet by mouth daily  Qty: 30 tablet, Refills: 3      oxybutynin (DITROPAN-XL) 10 MG extended release tablet Take 10 mg by mouth daily      fluticasone (FLONASE) 50 MCG/ACT nasal spray 1 spray by Nasal route daily as needed for Rhinitis      omeprazole (PRILOSEC) 40 MG capsule   Take 40 mg by mouth daily   Refills: 5      vitamin D (ERGOCALCIFEROL) 23554 UNITS CAPS capsule Take 50,000 Units by mouth once a week THUR  Refills: 5      buPROPion (WELLBUTRIN XL) 300 MG XL tablet Take 300 mg by mouth every morning. Activity: activity as tolerated  Diet: regular diet    Follow-up with PCP in 1 week.     Note that over 30 minutes was spent in preparing discharge papers, discussing discharge with patient, medication review, etc.    Signed:  Etelvina Coley MD    6/30/2021  6:38 PM

## 2022-03-17 ENCOUNTER — HOSPITAL ENCOUNTER (OUTPATIENT)
Dept: CT IMAGING | Age: 67
Discharge: HOME | End: 2022-03-19
Payer: MEDICARE

## 2022-03-17 DIAGNOSIS — G91.2 NPH (NORMAL PRESSURE HYDROCEPHALUS) (HCC): ICD-10-CM

## 2022-03-17 PROCEDURE — 70450 CT HEAD/BRAIN W/O DYE: CPT

## 2022-03-21 DIAGNOSIS — Z01.818 PRE-OP TESTING: Primary | ICD-10-CM

## 2022-03-22 PROBLEM — Z98.2 S/P VENTRICULOPERITONEAL SHUNT: Status: ACTIVE | Noted: 2022-03-22

## 2022-05-03 ENCOUNTER — HOSPITAL ENCOUNTER (OUTPATIENT)
Age: 67
Discharge: HOME | End: 2022-05-03
Payer: MEDICARE

## 2022-05-03 ENCOUNTER — HOSPITAL ENCOUNTER (OUTPATIENT)
Dept: MRI IMAGING | Age: 67
Discharge: HOME | End: 2022-05-05
Payer: MEDICARE

## 2022-05-03 DIAGNOSIS — R27.0 ATAXIA: ICD-10-CM

## 2022-05-03 LAB
ALBUMIN SERPL-MCNC: 4.1 G/DL (ref 3.5–5.2)
ALP BLD-CCNC: 104 U/L (ref 35–104)
ALT SERPL-CCNC: 23 U/L (ref 0–32)
ANION GAP SERPL CALCULATED.3IONS-SCNC: 11 MMOL/L (ref 7–16)
AST SERPL-CCNC: 22 U/L (ref 0–31)
BILIRUB SERPL-MCNC: 0.2 MG/DL (ref 0–1.2)
BUN BLDV-MCNC: 28 MG/DL (ref 6–23)
CALCIUM SERPL-MCNC: 9.6 MG/DL (ref 8.6–10.2)
CHLORIDE BLD-SCNC: 102 MMOL/L (ref 98–107)
CO2: 26 MMOL/L (ref 22–29)
CREAT SERPL-MCNC: 1.5 MG/DL (ref 0.5–1)
GFR AFRICAN AMERICAN: 42
GFR NON-AFRICAN AMERICAN: 35 ML/MIN/1.73
GLUCOSE BLD-MCNC: 195 MG/DL (ref 74–99)
POTASSIUM SERPL-SCNC: 5.2 MMOL/L (ref 3.5–5)
SODIUM BLD-SCNC: 139 MMOL/L (ref 132–146)
TOTAL PROTEIN: 7.6 G/DL (ref 6.4–8.3)

## 2022-05-03 PROCEDURE — 36415 COLL VENOUS BLD VENIPUNCTURE: CPT

## 2022-05-03 PROCEDURE — 70551 MRI BRAIN STEM W/O DYE: CPT

## 2022-05-03 PROCEDURE — 80053 COMPREHEN METABOLIC PANEL: CPT

## 2022-09-06 ENCOUNTER — OFFICE VISIT (OUTPATIENT)
Dept: NEUROSURGERY | Age: 67
End: 2022-09-06
Payer: MEDICARE

## 2022-09-06 VITALS
DIASTOLIC BLOOD PRESSURE: 111 MMHG | HEIGHT: 63 IN | SYSTOLIC BLOOD PRESSURE: 149 MMHG | HEART RATE: 102 BPM | WEIGHT: 124 LBS | BODY MASS INDEX: 21.97 KG/M2

## 2022-09-06 DIAGNOSIS — G95.9 CERVICAL MYELOPATHY (HCC): ICD-10-CM

## 2022-09-06 DIAGNOSIS — G91.2 NORMAL PRESSURE HYDROCEPHALUS (HCC): Primary | ICD-10-CM

## 2022-09-06 PROCEDURE — 1123F ACP DISCUSS/DSCN MKR DOCD: CPT | Performed by: PHYSICIAN ASSISTANT

## 2022-09-06 PROCEDURE — 99203 OFFICE O/P NEW LOW 30 MIN: CPT | Performed by: PHYSICIAN ASSISTANT

## 2022-09-06 PROCEDURE — 99202 OFFICE O/P NEW SF 15 MIN: CPT

## 2022-09-06 ASSESSMENT — ENCOUNTER SYMPTOMS
EYES NEGATIVE: 1
ALLERGIC/IMMUNOLOGIC NEGATIVE: 1
GASTROINTESTINAL NEGATIVE: 1
RESPIRATORY NEGATIVE: 1

## 2022-09-06 NOTE — PROGRESS NOTES
Subjective:      Patient ID: Jill Dsouza is a 78 y.o. female. Neurologic Problem  Primary symptoms comment: history of  shunt place in 2018 for NPH with subsequent revision in 2020. For the past 8 months she has had worsening of her gait, speech, and some urgency with urination. . This is a new problem. Associated symptoms include headaches. Past treatments include acetaminophen (oxycodone). The treatment provided mild relief. Review of Systems   Constitutional: Negative. HENT: Negative. Eyes: Negative. Respiratory: Negative. Cardiovascular: Negative. Gastrointestinal: Negative. Endocrine: Negative. Genitourinary: Negative. Skin: Negative. Allergic/Immunologic: Negative. Neurological:  Positive for headaches. Hematological: Negative. Psychiatric/Behavioral: Negative. Objective:   Physical Exam  Constitutional:       Appearance: Normal appearance. HENT:      Head: Normocephalic and atraumatic. Nose: Nose normal.   Eyes:      Pupils: Pupils are equal, round, and reactive to light. Pulmonary:      Effort: Pulmonary effort is normal.   Abdominal:      General: There is no distension. Skin:     General: Skin is warm and dry. Neurological:      Mental Status: She is alert. GCS: GCS eye subscore is 4. GCS verbal subscore is 5. GCS motor subscore is 6. Cranial Nerves: Cranial nerves are intact. Sensory: Sensation is intact. Motor: Weakness present. Coordination: Coordination is intact. Gait: Gait abnormal.      Deep Tendon Reflexes:      Reflex Scores:       Tricep reflexes are 3+ on the right side and 3+ on the left side. Bicep reflexes are 3+ on the right side and 3+ on the left side. Brachioradialis reflexes are 3+ on the right side and 3+ on the left side. Patellar reflexes are 3+ on the right side and 3+ on the left side. Achilles reflexes are 3+ on the right side and 3+ on the left side. Comments: 4+/5 UE weakness   Psychiatric:         Mood and Affect: Mood normal.       Assessment:      78year old female with multiple symptoms including:  poor memory, gait dysfunction, urinary urgency, and hand numbness/dexterity issues. She has a history of a prior cervical fusion and  shunt for NPH.  shunt reservoir is not refilling when pumped today suggesting valve failure. The Strata valve is set at 1.0    Her exam is consistent with myelopathy as well. Plan: We will order cervical MRI and head CT. She will return to our office the day of MRI to have her shunt reprogrammed.          LORI Jones

## 2022-09-09 DIAGNOSIS — Z29.8 ENCOUNTER FOR HYDRATION PRIOR TO CT SCAN: Primary | ICD-10-CM

## 2022-09-20 ENCOUNTER — HOSPITAL ENCOUNTER (OUTPATIENT)
Age: 67
Discharge: HOME | End: 2022-09-20
Payer: MEDICARE

## 2022-09-20 ENCOUNTER — HOSPITAL ENCOUNTER (OUTPATIENT)
Dept: MRI IMAGING | Age: 67
Discharge: HOME | End: 2022-09-22
Payer: MEDICARE

## 2022-09-20 ENCOUNTER — HOSPITAL ENCOUNTER (OUTPATIENT)
Dept: CT IMAGING | Age: 67
Discharge: HOME | End: 2022-09-20
Payer: MEDICARE

## 2022-09-20 ENCOUNTER — HOSPITAL ENCOUNTER (OUTPATIENT)
Dept: CT IMAGING | Age: 67
Discharge: HOME | End: 2022-09-20

## 2022-09-20 DIAGNOSIS — G95.9 CERVICAL MYELOPATHY (HCC): ICD-10-CM

## 2022-09-20 DIAGNOSIS — G91.2 NORMAL PRESSURE HYDROCEPHALUS (HCC): ICD-10-CM

## 2022-09-20 DIAGNOSIS — Z29.8 ENCOUNTER FOR HYDRATION PRIOR TO CT SCAN: ICD-10-CM

## 2022-09-20 DIAGNOSIS — M79.89 MUSCLE SWELLING: ICD-10-CM

## 2022-09-20 LAB
BUN BLDV-MCNC: 25 MG/DL (ref 6–23)
CREAT SERPL-MCNC: 1 MG/DL (ref 0.5–1)
GFR AFRICAN AMERICAN: >60
GFR NON-AFRICAN AMERICAN: 55 ML/MIN/1.73

## 2022-09-20 PROCEDURE — 6360000004 HC RX CONTRAST MEDICATION: Performed by: RADIOLOGY

## 2022-09-20 PROCEDURE — 72156 MRI NECK SPINE W/O & W/DYE: CPT

## 2022-09-20 PROCEDURE — 84520 ASSAY OF UREA NITROGEN: CPT

## 2022-09-20 PROCEDURE — A9577 INJ MULTIHANCE: HCPCS | Performed by: RADIOLOGY

## 2022-09-20 PROCEDURE — 82565 ASSAY OF CREATININE: CPT

## 2022-09-20 PROCEDURE — 70491 CT SOFT TISSUE NECK W/DYE: CPT

## 2022-09-20 PROCEDURE — 36415 COLL VENOUS BLD VENIPUNCTURE: CPT

## 2022-09-20 PROCEDURE — 70450 CT HEAD/BRAIN W/O DYE: CPT

## 2022-09-20 RX ADMIN — IOPAMIDOL 75 ML: 755 INJECTION, SOLUTION INTRAVENOUS at 15:22

## 2022-09-20 RX ADMIN — GADOBENATE DIMEGLUMINE 11 ML: 529 INJECTION, SOLUTION INTRAVENOUS at 14:31

## 2022-09-21 ENCOUNTER — OFFICE VISIT (OUTPATIENT)
Dept: NEUROSURGERY | Age: 67
End: 2022-09-21
Payer: MEDICARE

## 2022-09-21 VITALS
TEMPERATURE: 97.6 F | HEIGHT: 63 IN | HEART RATE: 100 BPM | OXYGEN SATURATION: 90 % | WEIGHT: 124 LBS | SYSTOLIC BLOOD PRESSURE: 128 MMHG | BODY MASS INDEX: 21.97 KG/M2 | RESPIRATION RATE: 16 BRPM | DIASTOLIC BLOOD PRESSURE: 73 MMHG

## 2022-09-21 DIAGNOSIS — G95.9 CERVICAL MYELOPATHY (HCC): Primary | ICD-10-CM

## 2022-09-21 PROCEDURE — 99212 OFFICE O/P EST SF 10 MIN: CPT

## 2022-09-21 PROCEDURE — 99213 OFFICE O/P EST LOW 20 MIN: CPT | Performed by: PHYSICIAN ASSISTANT

## 2022-09-21 PROCEDURE — 1123F ACP DISCUSS/DSCN MKR DOCD: CPT | Performed by: PHYSICIAN ASSISTANT

## 2022-09-21 ASSESSMENT — ENCOUNTER SYMPTOMS
GASTROINTESTINAL NEGATIVE: 1
ALLERGIC/IMMUNOLOGIC NEGATIVE: 1
RESPIRATORY NEGATIVE: 1
EYES NEGATIVE: 1

## 2022-09-21 NOTE — PROGRESS NOTES
Subjective:      Patient ID: Terrell Garsia is a 78 y.o. female. Neurologic Problem  Primary symptoms comment: history of  shunt place in 2018 for NPH with subsequent revision in 2020. For the past 8 months she has had worsening of her gait, speech, and some urgency with urination. . This is a new problem. Associated symptoms include headaches. Past treatments include acetaminophen (oxycodone). The treatment provided mild relief. Headache    Review of Systems   Constitutional: Negative. HENT: Negative. Eyes: Negative. Respiratory: Negative. Cardiovascular: Negative. Gastrointestinal: Negative. Endocrine: Negative. Genitourinary: Negative. Skin: Negative. Allergic/Immunologic: Negative. Neurological:  Positive for headaches. Hematological: Negative. Psychiatric/Behavioral: Negative. Objective:   Physical Exam  Constitutional:       Appearance: Normal appearance. HENT:      Head: Normocephalic and atraumatic. Nose: Nose normal.   Eyes:      Pupils: Pupils are equal, round, and reactive to light. Pulmonary:      Effort: Pulmonary effort is normal.   Abdominal:      General: There is no distension. Skin:     General: Skin is warm and dry. Neurological:      Mental Status: She is alert. GCS: GCS eye subscore is 4. GCS verbal subscore is 5. GCS motor subscore is 6. Cranial Nerves: Cranial nerves are intact. Sensory: Sensation is intact. Motor: Weakness present. Coordination: Coordination is intact. Gait: Gait abnormal.      Deep Tendon Reflexes:      Reflex Scores:       Tricep reflexes are 3+ on the right side and 3+ on the left side. Bicep reflexes are 3+ on the right side and 3+ on the left side. Brachioradialis reflexes are 3+ on the right side and 3+ on the left side. Patellar reflexes are 3+ on the right side and 3+ on the left side. Achilles reflexes are 3+ on the right side and 3+ on the left side. Comments: 4+/5 UE weakness   Psychiatric:         Mood and Affect: Mood normal.       Assessment:      78year old female with multiple symptoms including:  poor memory, gait dysfunction, urinary urgency, and hand numbness/dexterity issues. She has a history of a prior cervical fusion and  shunt for NPH.  shunt reservoir is not refilling when pumped today suggesting valve failure. The Strata valve is set at 1.0    Her exam is consistent with myelopathy as well. Cervical MRI reveals severe central stenosis at C4-5 with foraminal  stenosis  from C3-C7 with cord signal evident. Plan:      Due to myelopathy, I am recommending a posterior C3-C7 laminectomy and C3-T1 fusion        LORI Donovan    I have interviewed and examined the patient and agree with above. She has neck pain with bilateral arm pain and signs of myelopathy. She also has bilateal arm numbness an dtingling. She has stenosis from C3-C7 and is myelopathy. I am recommending a posterior C3-T1 fusion and C3-& laminemctomy. She will need medical and cardiac clearance.      Gerardo Harley MD

## 2022-09-22 ENCOUNTER — PREP FOR PROCEDURE (OUTPATIENT)
Dept: NEUROSURGERY | Age: 67
End: 2022-09-22

## 2022-09-22 DIAGNOSIS — Z01.818 PRE-OP TESTING: Primary | ICD-10-CM

## 2022-09-22 RX ORDER — SODIUM CHLORIDE 0.9 % (FLUSH) 0.9 %
5-40 SYRINGE (ML) INJECTION EVERY 12 HOURS SCHEDULED
Status: CANCELLED | OUTPATIENT
Start: 2022-09-22

## 2022-09-22 RX ORDER — SODIUM CHLORIDE 9 MG/ML
INJECTION, SOLUTION INTRAVENOUS CONTINUOUS
Status: CANCELLED | OUTPATIENT
Start: 2022-09-22

## 2022-09-22 RX ORDER — SODIUM CHLORIDE 0.9 % (FLUSH) 0.9 %
5-40 SYRINGE (ML) INJECTION PRN
Status: CANCELLED | OUTPATIENT
Start: 2022-09-22

## 2022-09-22 RX ORDER — SODIUM CHLORIDE 9 MG/ML
INJECTION, SOLUTION INTRAVENOUS PRN
Status: CANCELLED | OUTPATIENT
Start: 2022-09-22

## 2022-09-27 ENCOUNTER — OFFICE VISIT (OUTPATIENT)
Dept: NEUROSURGERY | Age: 67
End: 2022-09-27
Payer: MEDICARE

## 2022-09-27 VITALS — HEART RATE: 74 BPM | SYSTOLIC BLOOD PRESSURE: 132 MMHG | DIASTOLIC BLOOD PRESSURE: 86 MMHG

## 2022-09-27 DIAGNOSIS — M48.02 SPINAL STENOSIS IN CERVICAL REGION: Primary | ICD-10-CM

## 2022-09-27 DIAGNOSIS — G91.2 NPH (NORMAL PRESSURE HYDROCEPHALUS) (HCC): Primary | ICD-10-CM

## 2022-09-27 PROCEDURE — 99213 OFFICE O/P EST LOW 20 MIN: CPT | Performed by: PHYSICIAN ASSISTANT

## 2022-09-27 PROCEDURE — 99212 OFFICE O/P EST SF 10 MIN: CPT

## 2022-09-27 PROCEDURE — 1123F ACP DISCUSS/DSCN MKR DOCD: CPT | Performed by: PHYSICIAN ASSISTANT

## 2022-09-27 ASSESSMENT — ENCOUNTER SYMPTOMS
ALLERGIC/IMMUNOLOGIC NEGATIVE: 1
EYES NEGATIVE: 1
RESPIRATORY NEGATIVE: 1
GASTROINTESTINAL NEGATIVE: 1

## 2022-09-27 NOTE — PROGRESS NOTES
Subjective:      Patient ID: James Taylor is a 78 y.o. female. Neurologic Problem  Primary symptoms comment: history of  shunt place in 2018 for NPH with subsequent revision in 2020. For the past 8 months she has had worsening of her gait, speech, and some urgency with urination. . This is a new problem. Associated symptoms include headaches. Past treatments include acetaminophen (oxycodone). The treatment provided mild relief. Headache    Review of Systems   Constitutional: Negative. HENT: Negative. Eyes: Negative. Respiratory: Negative. Cardiovascular: Negative. Gastrointestinal: Negative. Endocrine: Negative. Genitourinary: Negative. Skin: Negative. Allergic/Immunologic: Negative. Neurological:  Positive for headaches. Hematological: Negative. Psychiatric/Behavioral: Negative. Objective:   Physical Exam  Constitutional:       Appearance: Normal appearance. HENT:      Head: Normocephalic and atraumatic. Nose: Nose normal.   Eyes:      Pupils: Pupils are equal, round, and reactive to light. Pulmonary:      Effort: Pulmonary effort is normal.   Abdominal:      General: There is no distension. Skin:     General: Skin is warm and dry. Neurological:      Mental Status: She is alert. GCS: GCS eye subscore is 4. GCS verbal subscore is 5. GCS motor subscore is 6. Sensory: Sensation is intact. Motor: Weakness present. Coordination: Coordination is intact. Gait: Gait abnormal.      Deep Tendon Reflexes:      Reflex Scores:       Tricep reflexes are 3+ on the right side and 3+ on the left side. Bicep reflexes are 3+ on the right side and 3+ on the left side. Brachioradialis reflexes are 3+ on the right side and 3+ on the left side. Patellar reflexes are 3+ on the right side and 3+ on the left side. Achilles reflexes are 3+ on the right side and 3+ on the left side.      Comments: 4+/5 UE weakness   Psychiatric: Mood and Affect: Mood normal.       Assessment:      78year old female with multiple symptoms including:  poor memory, gait dysfunction, urinary urgency, and hand numbness/dexterity issues. She has a history of a prior cervical fusion and  shunt for NPH.  shunt reservoir is not refilling when pumped today suggesting valve failure. The Strata valve is set at 1.0. She also habs neck and bilateral arm pain with nubness and tingling. Her exam is consistent with myelopathy as well. Cervical MRI reveals severe central stenosis at C4-5 with foraminal  stenosis  from C3-C7 with cord signal evident. Plan:      She is scheduled for a posterior C3-C7 laminectomy and C3-T1 fusion. Her shunt was reprogrammed today to 1.0 since she just had an MRI. LORI Pickett    She has neck pain with numbness and tingling and bilateral arm pain with signs of myelopathy.   I am recommending posterior C3-C7 laminectomy and C3-T1 fusion    Ros Da iSlva MD

## 2022-10-03 NOTE — PROGRESS NOTES
Dr. Holly Escamilla, [facility] office patient is to hold aspirin 7 days prior to surgery.

## 2022-10-04 RX ORDER — PRAMIPEXOLE DIHYDROCHLORIDE 0.5 MG/1
0.75 TABLET ORAL 2 TIMES DAILY
COMMUNITY

## 2022-10-04 RX ORDER — SPIRONOLACTONE 25 MG/1
25 TABLET ORAL DAILY
COMMUNITY

## 2022-10-10 ENCOUNTER — HOSPITAL ENCOUNTER (OUTPATIENT)
Dept: PREADMISSION TESTING | Age: 67
Discharge: HOME | End: 2022-10-10
Payer: MEDICARE

## 2022-10-10 ENCOUNTER — HOSPITAL ENCOUNTER (OUTPATIENT)
Dept: GENERAL RADIOLOGY | Age: 67
Discharge: HOME | End: 2022-10-12
Payer: MEDICARE

## 2022-10-10 VITALS
HEART RATE: 100 BPM | OXYGEN SATURATION: 92 % | HEIGHT: 63 IN | DIASTOLIC BLOOD PRESSURE: 78 MMHG | TEMPERATURE: 98 F | WEIGHT: 120.3 LBS | RESPIRATION RATE: 22 BRPM | SYSTOLIC BLOOD PRESSURE: 121 MMHG | BODY MASS INDEX: 21.32 KG/M2

## 2022-10-10 DIAGNOSIS — Z01.812 PRE-OPERATIVE LABORATORY EXAMINATION: Primary | ICD-10-CM

## 2022-10-10 DIAGNOSIS — Z01.818 PRE-OP TESTING: ICD-10-CM

## 2022-10-10 LAB
ABO/RH: NORMAL
ANION GAP SERPL CALCULATED.3IONS-SCNC: 12 MMOL/L (ref 7–16)
ANTIBODY SCREEN: NORMAL
BACTERIA: NORMAL /HPF
BASOPHILS ABSOLUTE: 0.06 E9/L (ref 0–0.2)
BASOPHILS RELATIVE PERCENT: 0.9 % (ref 0–2)
BILIRUBIN URINE: NEGATIVE
BLOOD, URINE: NEGATIVE
BUN BLDV-MCNC: 26 MG/DL (ref 6–23)
CALCIUM SERPL-MCNC: 9.8 MG/DL (ref 8.6–10.2)
CHLORIDE BLD-SCNC: 100 MMOL/L (ref 98–107)
CLARITY: CLEAR
CO2: 24 MMOL/L (ref 22–29)
COLOR: YELLOW
CREAT SERPL-MCNC: 1.1 MG/DL (ref 0.5–1)
EKG ATRIAL RATE: 93 BPM
EKG P AXIS: 70 DEGREES
EKG P-R INTERVAL: 156 MS
EKG Q-T INTERVAL: 392 MS
EKG QRS DURATION: 134 MS
EKG QTC CALCULATION (BAZETT): 487 MS
EKG R AXIS: -60 DEGREES
EKG T AXIS: 109 DEGREES
EKG VENTRICULAR RATE: 93 BPM
EOSINOPHILS ABSOLUTE: 0.06 E9/L (ref 0.05–0.5)
EOSINOPHILS RELATIVE PERCENT: 0.9 % (ref 0–6)
GFR AFRICAN AMERICAN: 60
GFR NON-AFRICAN AMERICAN: 49 ML/MIN/1.73
GLUCOSE BLD-MCNC: 266 MG/DL (ref 74–99)
GLUCOSE URINE: 100 MG/DL
HCT VFR BLD CALC: 42.8 % (ref 34–48)
HEMOGLOBIN: 13.5 G/DL (ref 11.5–15.5)
IMMATURE GRANULOCYTES #: 0.02 E9/L
IMMATURE GRANULOCYTES %: 0.3 % (ref 0–5)
INR BLD: 1.1
KETONES, URINE: NEGATIVE MG/DL
LEUKOCYTE ESTERASE, URINE: NEGATIVE
LYMPHOCYTES ABSOLUTE: 1.39 E9/L (ref 1.5–4)
LYMPHOCYTES RELATIVE PERCENT: 21.2 % (ref 20–42)
MCH RBC QN AUTO: 29.5 PG (ref 26–35)
MCHC RBC AUTO-ENTMCNC: 31.5 % (ref 32–34.5)
MCV RBC AUTO: 93.4 FL (ref 80–99.9)
MONOCYTES ABSOLUTE: 0.59 E9/L (ref 0.1–0.95)
MONOCYTES RELATIVE PERCENT: 9 % (ref 2–12)
NEUTROPHILS ABSOLUTE: 4.43 E9/L (ref 1.8–7.3)
NEUTROPHILS RELATIVE PERCENT: 67.7 % (ref 43–80)
NITRITE, URINE: NEGATIVE
PDW BLD-RTO: 14.3 FL (ref 11.5–15)
PH UA: 6 (ref 5–9)
PLATELET # BLD: 201 E9/L (ref 130–450)
PMV BLD AUTO: 11.1 FL (ref 7–12)
POTASSIUM REFLEX MAGNESIUM: 4.8 MMOL/L (ref 3.5–5)
PROTEIN UA: ABNORMAL MG/DL
PROTHROMBIN TIME: 12.2 SEC (ref 9.3–12.4)
RBC # BLD: 4.58 E12/L (ref 3.5–5.5)
RBC UA: NORMAL /HPF (ref 0–2)
SODIUM BLD-SCNC: 136 MMOL/L (ref 132–146)
SPECIFIC GRAVITY UA: 1.02 (ref 1–1.03)
UROBILINOGEN, URINE: 1 E.U./DL
WBC # BLD: 6.6 E9/L (ref 4.5–11.5)
WBC UA: NORMAL /HPF (ref 0–5)

## 2022-10-10 PROCEDURE — 87081 CULTURE SCREEN ONLY: CPT

## 2022-10-10 PROCEDURE — 86901 BLOOD TYPING SEROLOGIC RH(D): CPT

## 2022-10-10 PROCEDURE — 85610 PROTHROMBIN TIME: CPT

## 2022-10-10 PROCEDURE — 36415 COLL VENOUS BLD VENIPUNCTURE: CPT

## 2022-10-10 PROCEDURE — 71046 X-RAY EXAM CHEST 2 VIEWS: CPT

## 2022-10-10 PROCEDURE — 86900 BLOOD TYPING SEROLOGIC ABO: CPT

## 2022-10-10 PROCEDURE — 85025 COMPLETE CBC W/AUTO DIFF WBC: CPT

## 2022-10-10 PROCEDURE — 80048 BASIC METABOLIC PNL TOTAL CA: CPT

## 2022-10-10 PROCEDURE — 87088 URINE BACTERIA CULTURE: CPT

## 2022-10-10 PROCEDURE — 86850 RBC ANTIBODY SCREEN: CPT

## 2022-10-10 PROCEDURE — 81001 URINALYSIS AUTO W/SCOPE: CPT

## 2022-10-10 PROCEDURE — 93005 ELECTROCARDIOGRAM TRACING: CPT

## 2022-10-10 NOTE — PROGRESS NOTES
BMP results routed to neurosurgical clinical staff.
Called Dr. Resa Mortimer office regarding medical clearance. Office staff confirmed that the patient was in to see the doctor 2 weeks ago. They are requesting that the request for medical clearance be faxed to their office before they will clear the patient for surgery. I spoke to [facility] at Dr. Francisca Foss office regarding the request.  [facility] stated that she would fax the required document to Dr. Resa Mortimer office. I also notified Milena that Dr. Bambi Rojas is requesting that the patient has an echocardiogram done before he will clear the patient for surgery.
Spoke with Dayo Oscar at Dr. Irizarry Hard office regarding needing cardiac clearance for surgery patient is scheduled for on 10/17. Per Dayo Oscar, Dr. Jack Petty wants the patient to have an echocardiogram before he will clear the patient for surgery. The patient will be scheduled for the echocardiogram at the hospital once insurance approves the test.  Dayo Oscar stated that she would notify the patient of the above.
the following medications the morning of surgery with 1-2 ounces of water: carbamazepine (Tegretol-XR), bupropion (Wellbutrin XL), pramipexole (Mirapex), carvedilol (Coreg), omeprazole (Prilosec) and you may take a Percocet (if needed). [x] Stop all herbal supplements and vitamins 5 days before surgery. Stop NSAIDS 7 days before surgery. [x] DO NOT take any diabetic medicine the morning of surgery. Follow instructions for insulin the day before surgery. [x] If you are diabetic and your blood sugar is low or you feel symptomatic, you may drink 1-2 ounces of apple juice or take a glucose tablet.            -The morning of your procedure, you may call the pre-op area if you have concerns about your blood sugar [phone]. [x] Follow physician instructions regarding any blood thinners you may be taking. Aspirin: LAST DOSE ON 10/9/22 then HOLD. WHAT TO EXPECT:    [x] The day of surgery you will be greeted and checked in by the [employer].  In addition, you will be registered in the [place] by a Patient Access Representative. Please bring your photo ID and insurance card. A nurse will greet you in accordance to the time you are needed in the pre-op area to prepare you for surgery. Please do not be discouraged if you are not greeted in the order you arrive as there are many variables that are involved in patient preparation. Your patience is greatly appreciated as you wait for your nurse. Please bring in items such as: books, magazines, newspapers, electronics, or any other items  to occupy your time in the waiting area. [x]  Delays may occur with surgery and staff will make a sincere effort to keep you informed of delays. If any delays occur with your procedure, we apologize ahead of time for your inconvenience as we recognize the value of your time.

## 2022-10-11 LAB — MRSA CULTURE ONLY: NORMAL

## 2022-10-12 ENCOUNTER — TELEPHONE (OUTPATIENT)
Dept: NON INVASIVE DIAGNOSTICS | Age: 67
End: 2022-10-12

## 2022-10-12 LAB — URINE CULTURE, ROUTINE: NORMAL

## 2022-10-13 ENCOUNTER — HOSPITAL ENCOUNTER (OUTPATIENT)
Dept: NON INVASIVE DIAGNOSTICS | Age: 67
Discharge: HOME | End: 2022-10-13
Payer: MEDICARE

## 2022-10-13 LAB
LV EF: 30 %
LVEF MODALITY: NORMAL

## 2022-10-13 PROCEDURE — 93307 TTE W/O DOPPLER COMPLETE: CPT

## 2022-10-14 NOTE — H&P
Patient ID: Isabela Malcolm is a 78 y.o. female. Neurologic Problem  Primary symptoms comment: history of  shunt place in 2018 for NPH with subsequent revision in 2020. For the past 8 months she has had worsening of her gait, speech, and some urgency with urination. . This is a new problem. Associated symptoms include headaches. Past treatments include acetaminophen (oxycodone). The treatment provided mild relief.    Headache    Past Medical History:   Diagnosis Date    Acid reflux disease     Acute on chronic respiratory failure with hypoxia and hypercapnia ([facility]) 7/25/2018    Apraxia 7/23/2018    Arthritis     Ataxia 7/23/2018    CAD (coronary artery disease) 10/4/2012    Choledocholithiasis     recurrent    Chronic systolic congestive heart failure ([address] 8/1/2018    Ejection fraction 23% plus/minus 5%    CKD (chronic kidney disease) stage 3, GFR 30-59 ml/min ([facility]) 7/23/2018    COPD (chronic obstructive pulmonary disease) ([address]     alpha one M1S 183mg/dl    Diabetes mellitus ([address]     Hyperlipidemia     Hypoxia 7/23/2018    Neck pain     Normal pressure hydrocephalus ([address] 7/28/2018    Oxygen dependent     3l/min/nc    Pleural effusion years ago    requiring right thoracentesis    Pulmonary hypertension ([address] 7/23/2018    Restless legs     S/P CABG x 2 10/4/2012    Severe mitral regurgitation 8/1/2018     Past Surgical History:   Procedure Laterality Date    [address] [place]    C3-C6    CHOLECYSTECTOMY  2002    COLONOSCOPY N/A 1/21/2019    COLONOSCOPY POLYPECTOMY SNARE/COLD BIOPSY performed by Pari Bach MD at [facility]  10/30/2002    EYE SURGERY      cataract with Lens implants    HERNIA REPAIR      HYSTERECTOMY (CERVIX STATUS UNKNOWN)  [id number] [facility]  07/27/2018    resolved    VA CREATE SHUNT:VENTRIC-PERITONEAL N/A 10/15/2018    VENTRICULAR PERITONEAL SHUNT  PLACEMENT performed by Tiana Anderson MD at [place] [address] THERAPEUTIC SPINAL PUNCTURE DRAINAGE CSF N/A 2018    LUMBAR DRAIN INSERTION performed by Tanvi Casas MD at [address] N/A 2021    VENTRICULAR PERITONEAL SHUNT REPLACEMENT performed by Tanvi Casas MD at [place] [address] History     Socioeconomic History    Marital status:      Spouse name: Reny Fernandez    Number of children: 2    Years of education: 16    Highest education level: Not on file   Occupational History    Occupation: retired   Tobacco Use    Smoking status: Former     Packs/day: 0.50     Years: 40.00     Pack years: 20.00     Types: Cigarettes     Quit date: 2018     Years since quittin.2    Smokeless tobacco: Never   Vaping Use    Vaping Use: Never used   Substance and Sexual Activity    Alcohol use: No    Drug use: No    Sexual activity: Not Currently     Partners: Male   Other Topics Concern    Not on file   Social History Narrative    Not on file     Social Determinants of Health     Financial Resource Strain: Not on file   Food Insecurity: Not on file   Transportation Needs: Not on file   Physical Activity: Not on file   Stress: Not on file   Social Connections: Not on file   Intimate Partner Violence: Not on file   Housing Stability: Not on file     Family History   Problem Relation Age of Onset    High Blood Pressure Mother     Diabetes Mother     Emphysema Father     Heart Attack Sister     Heart Failure Sister      Scheduled Meds:  Continuous Infusions:  PRN Meds:. Allergies   Allergen Reactions    Sulfa Antibiotics Anaphylaxis    Pentazocine Lactate Nausea And Vomiting          Review of Systems   Constitutional: Negative. HENT: Negative. Eyes: Negative. Respiratory: Negative. Cardiovascular: Negative. Gastrointestinal: Negative. Endocrine: Negative. Genitourinary: Negative. Skin: Negative. Allergic/Immunologic: Negative. Neurological:  Positive for headaches. Hematological: Negative. Psychiatric/Behavioral: Negative. Objective:   Physical Exam  Constitutional:       Appearance: Normal appearance. HENT:      Head: Normocephalic and atraumatic. Nose: Nose normal.   Eyes:      Pupils: Pupils are equal, round, and reactive to light. Pulmonary:      Effort: Pulmonary effort is normal.   Abdominal:      General: There is no distension. Skin:     General: Skin is warm and dry. Neurological:      Mental Status: She is alert. GCS: GCS eye subscore is 4. GCS verbal subscore is 5. GCS motor subscore is 6. Cranial Nerves: Cranial nerves are intact. Sensory: Sensation is intact. Motor: Weakness present. Coordination: Coordination is intact. Gait: Gait abnormal.      Deep Tendon Reflexes:      Reflex Scores:       Tricep reflexes are 3+ on the right side and 3+ on the left side. Bicep reflexes are 3+ on the right side and 3+ on the left side. Brachioradialis reflexes are 3+ on the right side and 3+ on the left side. Patellar reflexes are 3+ on the right side and 3+ on the left side. Achilles reflexes are 3+ on the right side and 3+ on the left side. Comments: 4+/5 UE weakness   Psychiatric:         Mood and Affect: Mood normal.         Assessment:   78year old female with multiple symptoms including:  poor memory, gait dysfunction, urinary urgency, and hand numbness/dexterity issues. She has a history of a prior cervical fusion and  shunt for NPH.  shunt reservoir is not refilling when pumped today suggesting valve failure. The Strata valve is set at 1.0    Her exam is consistent with myelopathy as well. Cervical MRI reveals severe central stenosis at C4-5 with foraminal  stenosis  from C3-C7 with cord signal evident.                   Plan:   Due to myelopathy, I am recommending a posterior C3-C7 laminectomy and C3-T1 fusion                   LORI Peterson     I have interviewed and examined the patient and agree with above. She has neck pain with bilateral arm pain and signs of myelopathy. She also has bilateal arm numbness an dtingling. She has stenosis from C3-C7 and is myelopathy. I am recommending a posterior C3-T1 fusion and C3-C7 laminemctomy. She will need medical and cardiac clearance.  R/B/A of surgery have been discussed and she wishes to proceed     Iesha Charles MD

## 2022-10-16 ENCOUNTER — ANESTHESIA EVENT (OUTPATIENT)
Dept: OPERATING ROOM | Age: 67
DRG: 471 | End: 2022-10-16
Payer: MEDICARE

## 2022-10-17 ENCOUNTER — ANESTHESIA (OUTPATIENT)
Dept: OPERATING ROOM | Age: 67
DRG: 471 | End: 2022-10-17
Payer: MEDICARE

## 2022-10-17 ENCOUNTER — APPOINTMENT (OUTPATIENT)
Dept: GENERAL RADIOLOGY | Age: 67
DRG: 471 | End: 2022-10-17
Attending: NEUROLOGICAL SURGERY
Payer: MEDICARE

## 2022-10-17 ENCOUNTER — HOSPITAL ENCOUNTER (INPATIENT)
Age: 67
LOS: 4 days | Discharge: NURSING, REHAB OR LONG-TERM CARE | DRG: 471 | End: 2022-10-21
Attending: NEUROLOGICAL SURGERY | Admitting: NEUROLOGICAL SURGERY
Payer: MEDICARE

## 2022-10-17 DIAGNOSIS — M48.02 CERVICAL STENOSIS OF SPINAL CANAL: Primary | ICD-10-CM

## 2022-10-17 DIAGNOSIS — Z01.812 PRE-OPERATIVE LABORATORY EXAMINATION: ICD-10-CM

## 2022-10-17 PROBLEM — E11.9 TYPE 2 DIABETES MELLITUS, WITHOUT LONG-TERM CURRENT USE OF INSULIN (HCC): Status: ACTIVE | Noted: 2022-10-17

## 2022-10-17 LAB
METER GLUCOSE: 204 MG/DL (ref 74–99)
METER GLUCOSE: 219 MG/DL (ref 74–99)
METER GLUCOSE: 238 MG/DL (ref 74–99)
METER GLUCOSE: 254 MG/DL (ref 74–99)

## 2022-10-17 PROCEDURE — 01N80ZZ RELEASE THORACIC NERVE, OPEN APPROACH: ICD-10-PCS | Performed by: NEUROLOGICAL SURGERY

## 2022-10-17 PROCEDURE — 1200000000 HC SEMI PRIVATE

## 2022-10-17 PROCEDURE — A4217 STERILE WATER/SALINE, 500 ML: HCPCS | Performed by: NEUROLOGICAL SURGERY

## 2022-10-17 PROCEDURE — 6360000002 HC RX W HCPCS: Performed by: NEUROLOGICAL SURGERY

## 2022-10-17 PROCEDURE — 82962 GLUCOSE BLOOD TEST: CPT

## 2022-10-17 PROCEDURE — 00NW0ZZ RELEASE CERVICAL SPINAL CORD, OPEN APPROACH: ICD-10-PCS | Performed by: NEUROLOGICAL SURGERY

## 2022-10-17 PROCEDURE — 2580000003 HC RX 258: Performed by: STUDENT IN AN ORGANIZED HEALTH CARE EDUCATION/TRAINING PROGRAM

## 2022-10-17 PROCEDURE — 3600000014 HC SURGERY LEVEL 4 ADDTL 15MIN: Performed by: NEUROLOGICAL SURGERY

## 2022-10-17 PROCEDURE — 7100000001 HC PACU RECOVERY - ADDTL 15 MIN: Performed by: NEUROLOGICAL SURGERY

## 2022-10-17 PROCEDURE — 0RG4071 FUSION OF CERVICOTHORACIC VERTEBRAL JOINT WITH AUTOLOGOUS TISSUE SUBSTITUTE, POSTERIOR APPROACH, POSTERIOR COLUMN, OPEN APPROACH: ICD-10-PCS | Performed by: NEUROLOGICAL SURGERY

## 2022-10-17 PROCEDURE — 22614 ARTHRD PST TQ 1NTRSPC EA ADD: CPT | Performed by: NEUROLOGICAL SURGERY

## 2022-10-17 PROCEDURE — 94664 DEMO&/EVAL PT USE INHALER: CPT

## 2022-10-17 PROCEDURE — 7100000000 HC PACU RECOVERY - FIRST 15 MIN: Performed by: NEUROLOGICAL SURGERY

## 2022-10-17 PROCEDURE — 22600 ARTHRD PST TQ 1NTRSPC CRV: CPT | Performed by: NEUROLOGICAL SURGERY

## 2022-10-17 PROCEDURE — 0RG2071 FUSION OF 2 OR MORE CERVICAL VERTEBRAL JOINTS WITH AUTOLOGOUS TISSUE SUBSTITUTE, POSTERIOR APPROACH, POSTERIOR COLUMN, OPEN APPROACH: ICD-10-PCS | Performed by: NEUROLOGICAL SURGERY

## 2022-10-17 PROCEDURE — 2709999900 HC NON-CHARGEABLE SUPPLY: Performed by: NEUROLOGICAL SURGERY

## 2022-10-17 PROCEDURE — 94640 AIRWAY INHALATION TREATMENT: CPT

## 2022-10-17 PROCEDURE — 2780000010 HC IMPLANT OTHER: Performed by: NEUROLOGICAL SURGERY

## 2022-10-17 PROCEDURE — 2720000010 HC SURG SUPPLY STERILE: Performed by: NEUROLOGICAL SURGERY

## 2022-10-17 PROCEDURE — 2580000003 HC RX 258: Performed by: NEUROLOGICAL SURGERY

## 2022-10-17 PROCEDURE — 63015 REMOVE SPINE LAMINA >2 CRVCL: CPT | Performed by: NEUROLOGICAL SURGERY

## 2022-10-17 PROCEDURE — C1729 CATH, DRAINAGE: HCPCS | Performed by: NEUROLOGICAL SURGERY

## 2022-10-17 PROCEDURE — L0174 CERV SR 2PC THOR EXT PRE OTS: HCPCS | Performed by: NEUROLOGICAL SURGERY

## 2022-10-17 PROCEDURE — 2500000003 HC RX 250 WO HCPCS

## 2022-10-17 PROCEDURE — 97161 PT EVAL LOW COMPLEX 20 MIN: CPT

## 2022-10-17 PROCEDURE — 3600000004 HC SURGERY LEVEL 4 BASE: Performed by: NEUROLOGICAL SURGERY

## 2022-10-17 PROCEDURE — 3700000001 HC ADD 15 MINUTES (ANESTHESIA): Performed by: NEUROLOGICAL SURGERY

## 2022-10-17 PROCEDURE — 6360000002 HC RX W HCPCS

## 2022-10-17 PROCEDURE — 6360000002 HC RX W HCPCS: Performed by: ANESTHESIOLOGY

## 2022-10-17 PROCEDURE — 2700000000 HC OXYGEN THERAPY PER DAY

## 2022-10-17 PROCEDURE — 6360000002 HC RX W HCPCS: Performed by: STUDENT IN AN ORGANIZED HEALTH CARE EDUCATION/TRAINING PROGRAM

## 2022-10-17 PROCEDURE — 97530 THERAPEUTIC ACTIVITIES: CPT

## 2022-10-17 PROCEDURE — 2580000003 HC RX 258

## 2022-10-17 PROCEDURE — C1713 ANCHOR/SCREW BN/BN,TIS/BN: HCPCS | Performed by: NEUROLOGICAL SURGERY

## 2022-10-17 PROCEDURE — 61783 SCAN PROC SPINAL: CPT | Performed by: NEUROLOGICAL SURGERY

## 2022-10-17 PROCEDURE — 6370000000 HC RX 637 (ALT 250 FOR IP): Performed by: NEUROLOGICAL SURGERY

## 2022-10-17 PROCEDURE — 22842 INSERT SPINE FIXATION DEVICE: CPT | Performed by: NEUROLOGICAL SURGERY

## 2022-10-17 PROCEDURE — 97165 OT EVAL LOW COMPLEX 30 MIN: CPT

## 2022-10-17 PROCEDURE — 3209999900 FLUORO FOR SURGICAL PROCEDURES

## 2022-10-17 PROCEDURE — 2500000003 HC RX 250 WO HCPCS: Performed by: NEUROLOGICAL SURGERY

## 2022-10-17 PROCEDURE — 3700000000 HC ANESTHESIA ATTENDED CARE: Performed by: NEUROLOGICAL SURGERY

## 2022-10-17 PROCEDURE — C9359 IMPLNT,BON VOID FILLER-PUTTY: HCPCS | Performed by: NEUROLOGICAL SURGERY

## 2022-10-17 DEVICE — QUARTEX 3.5MM POLYAXIAL SCREW, 14MM
Type: IMPLANTABLE DEVICE | Site: NECK | Status: FUNCTIONAL
Brand: QUARTEX

## 2022-10-17 DEVICE — XEMPLIFI DBM PLUS, 10CC
Type: IMPLANTABLE DEVICE | Site: NECK | Status: FUNCTIONAL
Brand: XEMPLIFI

## 2022-10-17 DEVICE — QUARTEX 5.0MM POLYAXIAL SCREW, 24MM
Type: IMPLANTABLE DEVICE | Site: NECK | Status: FUNCTIONAL
Brand: QUARTEX

## 2022-10-17 DEVICE — 3.5MM CAPITOL CURVED ROD, 80MM
Type: IMPLANTABLE DEVICE | Site: NECK | Status: FUNCTIONAL
Brand: CAPITOL

## 2022-10-17 DEVICE — QUARTEX THREADED LOCKING CAP
Type: IMPLANTABLE DEVICE | Site: NECK | Status: FUNCTIONAL
Brand: QUARTEX

## 2022-10-17 RX ORDER — SODIUM CHLORIDE 9 MG/ML
INJECTION, SOLUTION INTRAVENOUS CONTINUOUS PRN
Status: DISCONTINUED | OUTPATIENT
Start: 2022-10-17 | End: 2022-10-17 | Stop reason: SDUPTHER

## 2022-10-17 RX ORDER — DIPHENHYDRAMINE HYDROCHLORIDE 50 MG/ML
25 INJECTION INTRAMUSCULAR; INTRAVENOUS EVERY 6 HOURS PRN
Status: DISCONTINUED | OUTPATIENT
Start: 2022-10-17 | End: 2022-10-21 | Stop reason: HOSPADM

## 2022-10-17 RX ORDER — BISACODYL 10 MG
10 SUPPOSITORY, RECTAL RECTAL DAILY PRN
Status: DISCONTINUED | OUTPATIENT
Start: 2022-10-17 | End: 2022-10-21 | Stop reason: HOSPADM

## 2022-10-17 RX ORDER — SODIUM CHLORIDE 9 MG/ML
INJECTION, SOLUTION INTRAVENOUS PRN
Status: DISCONTINUED | OUTPATIENT
Start: 2022-10-17 | End: 2022-10-17 | Stop reason: HOSPADM

## 2022-10-17 RX ORDER — PRAMIPEXOLE DIHYDROCHLORIDE 0.25 MG/1
0.75 TABLET ORAL 2 TIMES DAILY
Status: DISCONTINUED | OUTPATIENT
Start: 2022-10-17 | End: 2022-10-21 | Stop reason: HOSPADM

## 2022-10-17 RX ORDER — SODIUM CHLORIDE 9 MG/ML
INJECTION, SOLUTION INTRAVENOUS PRN
Status: DISCONTINUED | OUTPATIENT
Start: 2022-10-17 | End: 2022-10-21 | Stop reason: HOSPADM

## 2022-10-17 RX ORDER — LIDOCAINE HYDROCHLORIDE AND EPINEPHRINE 5; 5 MG/ML; UG/ML
INJECTION, SOLUTION INFILTRATION; PERINEURAL PRN
Status: DISCONTINUED | OUTPATIENT
Start: 2022-10-17 | End: 2022-10-17 | Stop reason: ALTCHOICE

## 2022-10-17 RX ORDER — SODIUM PHOSPHATE, DIBASIC AND SODIUM PHOSPHATE, MONOBASIC 7; 19 G/133ML; G/133ML
1 ENEMA RECTAL DAILY PRN
Status: DISCONTINUED | OUTPATIENT
Start: 2022-10-17 | End: 2022-10-21 | Stop reason: HOSPADM

## 2022-10-17 RX ORDER — SODIUM CHLORIDE 0.9 % (FLUSH) 0.9 %
5-40 SYRINGE (ML) INJECTION PRN
Status: DISCONTINUED | OUTPATIENT
Start: 2022-10-17 | End: 2022-10-17 | Stop reason: HOSPADM

## 2022-10-17 RX ORDER — DIPHENHYDRAMINE HYDROCHLORIDE 50 MG/ML
12.5 INJECTION INTRAMUSCULAR; INTRAVENOUS
Status: DISCONTINUED | OUTPATIENT
Start: 2022-10-17 | End: 2022-10-17 | Stop reason: HOSPADM

## 2022-10-17 RX ORDER — FENTANYL CITRATE 50 UG/ML
INJECTION, SOLUTION INTRAMUSCULAR; INTRAVENOUS PRN
Status: DISCONTINUED | OUTPATIENT
Start: 2022-10-17 | End: 2022-10-17 | Stop reason: SDUPTHER

## 2022-10-17 RX ORDER — ONDANSETRON 4 MG/1
4 TABLET, ORALLY DISINTEGRATING ORAL EVERY 8 HOURS PRN
Status: DISCONTINUED | OUTPATIENT
Start: 2022-10-17 | End: 2022-10-21 | Stop reason: HOSPADM

## 2022-10-17 RX ORDER — DIPHENHYDRAMINE HCL 25 MG
25 TABLET ORAL EVERY 6 HOURS PRN
Status: DISCONTINUED | OUTPATIENT
Start: 2022-10-17 | End: 2022-10-21 | Stop reason: HOSPADM

## 2022-10-17 RX ORDER — CYCLOBENZAPRINE HCL 10 MG
10 TABLET ORAL EVERY 12 HOURS PRN
Status: DISCONTINUED | OUTPATIENT
Start: 2022-10-17 | End: 2022-10-21 | Stop reason: HOSPADM

## 2022-10-17 RX ORDER — ROCURONIUM BROMIDE 10 MG/ML
INJECTION, SOLUTION INTRAVENOUS PRN
Status: DISCONTINUED | OUTPATIENT
Start: 2022-10-17 | End: 2022-10-17 | Stop reason: SDUPTHER

## 2022-10-17 RX ORDER — LIDOCAINE HYDROCHLORIDE 20 MG/ML
INJECTION, SOLUTION INTRAVENOUS PRN
Status: DISCONTINUED | OUTPATIENT
Start: 2022-10-17 | End: 2022-10-17 | Stop reason: SDUPTHER

## 2022-10-17 RX ORDER — PROPOFOL 10 MG/ML
INJECTION, EMULSION INTRAVENOUS PRN
Status: DISCONTINUED | OUTPATIENT
Start: 2022-10-17 | End: 2022-10-17 | Stop reason: SDUPTHER

## 2022-10-17 RX ORDER — SPIRONOLACTONE 25 MG/1
25 TABLET ORAL DAILY
Status: DISCONTINUED | OUTPATIENT
Start: 2022-10-17 | End: 2022-10-21 | Stop reason: HOSPADM

## 2022-10-17 RX ORDER — MEPERIDINE HYDROCHLORIDE 25 MG/ML
12.5 INJECTION INTRAMUSCULAR; INTRAVENOUS; SUBCUTANEOUS EVERY 5 MIN PRN
Status: DISCONTINUED | OUTPATIENT
Start: 2022-10-17 | End: 2022-10-17 | Stop reason: HOSPADM

## 2022-10-17 RX ORDER — SODIUM CHLORIDE 0.9 % (FLUSH) 0.9 %
5-40 SYRINGE (ML) INJECTION PRN
Status: DISCONTINUED | OUTPATIENT
Start: 2022-10-17 | End: 2022-10-21 | Stop reason: HOSPADM

## 2022-10-17 RX ORDER — SODIUM CHLORIDE 9 MG/ML
INJECTION, SOLUTION INTRAVENOUS CONTINUOUS
Status: DISCONTINUED | OUTPATIENT
Start: 2022-10-17 | End: 2022-10-18

## 2022-10-17 RX ORDER — OXYBUTYNIN CHLORIDE 10 MG/1
10 TABLET, EXTENDED RELEASE ORAL DAILY
Status: DISCONTINUED | OUTPATIENT
Start: 2022-10-17 | End: 2022-10-21 | Stop reason: HOSPADM

## 2022-10-17 RX ORDER — NEOSTIGMINE METHYLSULFATE 1 MG/ML
INJECTION, SOLUTION INTRAVENOUS PRN
Status: DISCONTINUED | OUTPATIENT
Start: 2022-10-17 | End: 2022-10-17 | Stop reason: SDUPTHER

## 2022-10-17 RX ORDER — SODIUM CHLORIDE 0.9 % (FLUSH) 0.9 %
5-40 SYRINGE (ML) INJECTION EVERY 12 HOURS SCHEDULED
Status: DISCONTINUED | OUTPATIENT
Start: 2022-10-17 | End: 2022-10-21 | Stop reason: HOSPADM

## 2022-10-17 RX ORDER — DEXTROSE MONOHYDRATE 100 MG/ML
INJECTION, SOLUTION INTRAVENOUS CONTINUOUS PRN
Status: DISCONTINUED | OUTPATIENT
Start: 2022-10-17 | End: 2022-10-21 | Stop reason: HOSPADM

## 2022-10-17 RX ORDER — SODIUM CHLORIDE 0.9 % (FLUSH) 0.9 %
5-40 SYRINGE (ML) INJECTION EVERY 12 HOURS SCHEDULED
Status: DISCONTINUED | OUTPATIENT
Start: 2022-10-17 | End: 2022-10-17 | Stop reason: HOSPADM

## 2022-10-17 RX ORDER — SODIUM CHLORIDE 9 MG/ML
INJECTION, SOLUTION INTRAVENOUS CONTINUOUS
Status: DISCONTINUED | OUTPATIENT
Start: 2022-10-17 | End: 2022-10-17 | Stop reason: HOSPADM

## 2022-10-17 RX ORDER — PANTOPRAZOLE SODIUM 40 MG/1
40 TABLET, DELAYED RELEASE ORAL
Status: DISCONTINUED | OUTPATIENT
Start: 2022-10-18 | End: 2022-10-21 | Stop reason: HOSPADM

## 2022-10-17 RX ORDER — GLYCOPYRROLATE 0.2 MG/ML
INJECTION INTRAMUSCULAR; INTRAVENOUS PRN
Status: DISCONTINUED | OUTPATIENT
Start: 2022-10-17 | End: 2022-10-17 | Stop reason: SDUPTHER

## 2022-10-17 RX ORDER — ALBUTEROL SULFATE 90 UG/1
2 AEROSOL, METERED RESPIRATORY (INHALATION) 4 TIMES DAILY
Status: DISCONTINUED | OUTPATIENT
Start: 2022-10-17 | End: 2022-10-17

## 2022-10-17 RX ORDER — CARVEDILOL 3.12 MG/1
3.12 TABLET ORAL 2 TIMES DAILY WITH MEALS
Status: DISCONTINUED | OUTPATIENT
Start: 2022-10-17 | End: 2022-10-21 | Stop reason: HOSPADM

## 2022-10-17 RX ORDER — CARBAMAZEPINE 100 MG/1
100 TABLET, EXTENDED RELEASE ORAL 2 TIMES DAILY
Status: DISCONTINUED | OUTPATIENT
Start: 2022-10-17 | End: 2022-10-17

## 2022-10-17 RX ORDER — MORPHINE SULFATE 4 MG/ML
4 INJECTION, SOLUTION INTRAMUSCULAR; INTRAVENOUS
Status: DISCONTINUED | OUTPATIENT
Start: 2022-10-17 | End: 2022-10-21 | Stop reason: HOSPADM

## 2022-10-17 RX ORDER — INSULIN LISPRO 100 [IU]/ML
0-8 INJECTION, SOLUTION INTRAVENOUS; SUBCUTANEOUS
Status: DISCONTINUED | OUTPATIENT
Start: 2022-10-17 | End: 2022-10-21 | Stop reason: HOSPADM

## 2022-10-17 RX ORDER — ONDANSETRON 2 MG/ML
4 INJECTION INTRAMUSCULAR; INTRAVENOUS EVERY 6 HOURS PRN
Status: DISCONTINUED | OUTPATIENT
Start: 2022-10-17 | End: 2022-10-21 | Stop reason: HOSPADM

## 2022-10-17 RX ORDER — BUPIVACAINE HYDROCHLORIDE 2.5 MG/ML
INJECTION, SOLUTION EPIDURAL; INFILTRATION; INTRACAUDAL PRN
Status: DISCONTINUED | OUTPATIENT
Start: 2022-10-17 | End: 2022-10-17 | Stop reason: ALTCHOICE

## 2022-10-17 RX ORDER — VALSARTAN 40 MG/1
20 TABLET ORAL DAILY
Status: DISCONTINUED | OUTPATIENT
Start: 2022-10-17 | End: 2022-10-21 | Stop reason: HOSPADM

## 2022-10-17 RX ORDER — MORPHINE SULFATE 2 MG/ML
2 INJECTION, SOLUTION INTRAMUSCULAR; INTRAVENOUS
Status: DISCONTINUED | OUTPATIENT
Start: 2022-10-17 | End: 2022-10-21 | Stop reason: HOSPADM

## 2022-10-17 RX ORDER — DEXAMETHASONE SODIUM PHOSPHATE 10 MG/ML
INJECTION INTRAMUSCULAR; INTRAVENOUS PRN
Status: DISCONTINUED | OUTPATIENT
Start: 2022-10-17 | End: 2022-10-17 | Stop reason: SDUPTHER

## 2022-10-17 RX ORDER — VANCOMYCIN HYDROCHLORIDE 500 MG/10ML
INJECTION, POWDER, LYOPHILIZED, FOR SOLUTION INTRAVENOUS PRN
Status: DISCONTINUED | OUTPATIENT
Start: 2022-10-17 | End: 2022-10-17 | Stop reason: ALTCHOICE

## 2022-10-17 RX ORDER — ACETAMINOPHEN 325 MG/1
650 TABLET ORAL EVERY 6 HOURS SCHEDULED
Status: DISCONTINUED | OUTPATIENT
Start: 2022-10-17 | End: 2022-10-21

## 2022-10-17 RX ORDER — BUPROPION HYDROCHLORIDE 300 MG/1
900 TABLET ORAL EVERY MORNING
Status: DISCONTINUED | OUTPATIENT
Start: 2022-10-17 | End: 2022-10-17

## 2022-10-17 RX ORDER — SENNA AND DOCUSATE SODIUM 50; 8.6 MG/1; MG/1
1 TABLET, FILM COATED ORAL 2 TIMES DAILY
Status: DISCONTINUED | OUTPATIENT
Start: 2022-10-17 | End: 2022-10-18

## 2022-10-17 RX ORDER — BUPROPION HYDROCHLORIDE 300 MG/1
300 TABLET ORAL DAILY
Status: DISCONTINUED | OUTPATIENT
Start: 2022-10-18 | End: 2022-10-21 | Stop reason: HOSPADM

## 2022-10-17 RX ORDER — OXYCODONE HYDROCHLORIDE AND ACETAMINOPHEN 5; 325 MG/1; MG/1
2 TABLET ORAL EVERY 4 HOURS PRN
Status: DISCONTINUED | OUTPATIENT
Start: 2022-10-17 | End: 2022-10-21 | Stop reason: HOSPADM

## 2022-10-17 RX ORDER — OMEPRAZOLE 40 MG/1
40 CAPSULE, DELAYED RELEASE ORAL DAILY
Status: DISCONTINUED | OUTPATIENT
Start: 2022-10-17 | End: 2022-10-17 | Stop reason: CLARIF

## 2022-10-17 RX ORDER — ALBUTEROL SULFATE 2.5 MG/3ML
2.5 SOLUTION RESPIRATORY (INHALATION) 4 TIMES DAILY
Status: DISCONTINUED | OUTPATIENT
Start: 2022-10-17 | End: 2022-10-21 | Stop reason: HOSPADM

## 2022-10-17 RX ORDER — FAMOTIDINE 20 MG/1
20 TABLET, FILM COATED ORAL 2 TIMES DAILY
Status: DISCONTINUED | OUTPATIENT
Start: 2022-10-17 | End: 2022-10-17 | Stop reason: SDUPTHER

## 2022-10-17 RX ORDER — PHENYLEPHRINE HCL IN 0.9% NACL 1 MG/10 ML
SYRINGE (ML) INTRAVENOUS PRN
Status: DISCONTINUED | OUTPATIENT
Start: 2022-10-17 | End: 2022-10-17 | Stop reason: SDUPTHER

## 2022-10-17 RX ORDER — ONDANSETRON 2 MG/ML
INJECTION INTRAMUSCULAR; INTRAVENOUS PRN
Status: DISCONTINUED | OUTPATIENT
Start: 2022-10-17 | End: 2022-10-17 | Stop reason: SDUPTHER

## 2022-10-17 RX ORDER — ALOGLIPTIN 12.5 MG/1
12.5 TABLET, FILM COATED ORAL DAILY
Status: DISCONTINUED | OUTPATIENT
Start: 2022-10-17 | End: 2022-10-19

## 2022-10-17 RX ORDER — KETAMINE HCL IN NACL, ISO-OSM 100MG/10ML
SYRINGE (ML) INJECTION PRN
Status: DISCONTINUED | OUTPATIENT
Start: 2022-10-17 | End: 2022-10-17 | Stop reason: SDUPTHER

## 2022-10-17 RX ORDER — MIDAZOLAM HYDROCHLORIDE 1 MG/ML
INJECTION INTRAMUSCULAR; INTRAVENOUS PRN
Status: DISCONTINUED | OUTPATIENT
Start: 2022-10-17 | End: 2022-10-17 | Stop reason: SDUPTHER

## 2022-10-17 RX ORDER — POLYETHYLENE GLYCOL 3350 17 G/17G
17 POWDER, FOR SOLUTION ORAL DAILY
Status: DISCONTINUED | OUTPATIENT
Start: 2022-10-17 | End: 2022-10-18

## 2022-10-17 RX ORDER — INSULIN LISPRO 100 [IU]/ML
0-4 INJECTION, SOLUTION INTRAVENOUS; SUBCUTANEOUS NIGHTLY
Status: DISCONTINUED | OUTPATIENT
Start: 2022-10-17 | End: 2022-10-21 | Stop reason: HOSPADM

## 2022-10-17 RX ADMIN — INSULIN LISPRO 2 UNITS: 100 INJECTION, SOLUTION INTRAVENOUS; SUBCUTANEOUS at 18:37

## 2022-10-17 RX ADMIN — PHENYLEPHRINE HYDROCHLORIDE 200 MCG: 10 INJECTION INTRAVENOUS at 06:45

## 2022-10-17 RX ADMIN — ALBUTEROL SULFATE 2.5 MG: 2.5 SOLUTION RESPIRATORY (INHALATION) at 21:13

## 2022-10-17 RX ADMIN — POLYETHYLENE GLYCOL 3350 17 G: 17 POWDER, FOR SOLUTION ORAL at 12:37

## 2022-10-17 RX ADMIN — ACETAMINOPHEN 650 MG: 325 TABLET, FILM COATED ORAL at 14:34

## 2022-10-17 RX ADMIN — ONDANSETRON 4 MG: 2 INJECTION INTRAMUSCULAR; INTRAVENOUS at 09:09

## 2022-10-17 RX ADMIN — PHENYLEPHRINE HYDROCHLORIDE 200 MCG: 10 INJECTION INTRAVENOUS at 07:02

## 2022-10-17 RX ADMIN — SODIUM CHLORIDE: 9 INJECTION, SOLUTION INTRAVENOUS at 18:27

## 2022-10-17 RX ADMIN — PRAMIPEXOLE DIHYDROCHLORIDE 0.75 MG: 0.25 TABLET ORAL at 22:46

## 2022-10-17 RX ADMIN — Medication 10 ML: at 22:59

## 2022-10-17 RX ADMIN — Medication 100 MCG: at 07:22

## 2022-10-17 RX ADMIN — INSULIN LISPRO 4 UNITS: 100 INJECTION, SOLUTION INTRAVENOUS; SUBCUTANEOUS at 14:38

## 2022-10-17 RX ADMIN — Medication 3 MG: at 09:16

## 2022-10-17 RX ADMIN — HYDROMORPHONE HYDROCHLORIDE 0.5 MG: 1 INJECTION, SOLUTION INTRAMUSCULAR; INTRAVENOUS; SUBCUTANEOUS at 10:33

## 2022-10-17 RX ADMIN — LIDOCAINE HYDROCHLORIDE 80 MG: 20 INJECTION, SOLUTION INTRAVENOUS at 06:41

## 2022-10-17 RX ADMIN — FENTANYL CITRATE 50 MCG: 50 INJECTION, SOLUTION INTRAMUSCULAR; INTRAVENOUS at 06:41

## 2022-10-17 RX ADMIN — Medication 100 MCG: at 08:01

## 2022-10-17 RX ADMIN — GLYCOPYRROLATE 0.6 MG: 0.2 INJECTION INTRAMUSCULAR; INTRAVENOUS at 09:16

## 2022-10-17 RX ADMIN — ALOGLIPTIN 12.5 MG: 12.5 TABLET, FILM COATED ORAL at 14:36

## 2022-10-17 RX ADMIN — SENNOSIDES AND DOCUSATE SODIUM 1 TABLET: 50; 8.6 TABLET ORAL at 14:48

## 2022-10-17 RX ADMIN — PROPOFOL 80 MG: 10 INJECTION, EMULSION INTRAVENOUS at 06:41

## 2022-10-17 RX ADMIN — CEFAZOLIN 2000 MG: 2 INJECTION, POWDER, FOR SOLUTION INTRAMUSCULAR; INTRAVENOUS at 06:49

## 2022-10-17 RX ADMIN — ALBUTEROL SULFATE 2.5 MG: 2.5 SOLUTION RESPIRATORY (INHALATION) at 15:48

## 2022-10-17 RX ADMIN — WATER 2000 MG: 1 INJECTION INTRAMUSCULAR; INTRAVENOUS; SUBCUTANEOUS at 14:49

## 2022-10-17 RX ADMIN — CARVEDILOL 3.12 MG: 6.25 TABLET, FILM COATED ORAL at 18:23

## 2022-10-17 RX ADMIN — MIDAZOLAM 1 MG: 1 INJECTION INTRAMUSCULAR; INTRAVENOUS at 06:40

## 2022-10-17 RX ADMIN — VALSARTAN 20 MG: 40 TABLET, FILM COATED ORAL at 16:29

## 2022-10-17 RX ADMIN — PRAMIPEXOLE DIHYDROCHLORIDE 0.75 MG: 0.25 TABLET ORAL at 14:36

## 2022-10-17 RX ADMIN — Medication 30 MG: at 06:41

## 2022-10-17 RX ADMIN — ROCURONIUM BROMIDE 50 MG: 10 INJECTION, SOLUTION INTRAVENOUS at 06:41

## 2022-10-17 RX ADMIN — CYCLOBENZAPRINE 10 MG: 10 TABLET, FILM COATED ORAL at 14:32

## 2022-10-17 RX ADMIN — SENNOSIDES AND DOCUSATE SODIUM 1 TABLET: 50; 8.6 TABLET ORAL at 22:45

## 2022-10-17 RX ADMIN — BISACODYL 5 MG: 5 TABLET, COATED ORAL at 14:34

## 2022-10-17 RX ADMIN — Medication 100 MCG: at 09:32

## 2022-10-17 RX ADMIN — SODIUM CHLORIDE: 9 INJECTION, SOLUTION INTRAVENOUS at 06:50

## 2022-10-17 RX ADMIN — OXYCODONE AND ACETAMINOPHEN 2 TABLET: 5; 325 TABLET ORAL at 14:49

## 2022-10-17 RX ADMIN — SODIUM CHLORIDE: 9 INJECTION, SOLUTION INTRAVENOUS at 09:10

## 2022-10-17 RX ADMIN — MORPHINE SULFATE 4 MG: 4 INJECTION, SOLUTION INTRAMUSCULAR; INTRAVENOUS at 16:33

## 2022-10-17 RX ADMIN — PHENYLEPHRINE HYDROCHLORIDE 200 MCG: 10 INJECTION INTRAVENOUS at 06:47

## 2022-10-17 RX ADMIN — DEXAMETHASONE SODIUM PHOSPHATE 10 MG: 10 INJECTION INTRAMUSCULAR; INTRAVENOUS at 07:26

## 2022-10-17 RX ADMIN — PHENYLEPHRINE HYDROCHLORIDE 200 MCG: 10 INJECTION INTRAVENOUS at 06:53

## 2022-10-17 RX ADMIN — Medication 100 MCG: at 09:13

## 2022-10-17 RX ADMIN — PHENYLEPHRINE HYDROCHLORIDE 100 MCG/MIN: 10 INJECTION, SOLUTION INTRAMUSCULAR; INTRAVENOUS; SUBCUTANEOUS at 07:23

## 2022-10-17 RX ADMIN — Medication 100 MCG: at 07:25

## 2022-10-17 RX ADMIN — HYDROMORPHONE HYDROCHLORIDE 0.5 MG: 1 INJECTION, SOLUTION INTRAMUSCULAR; INTRAVENOUS; SUBCUTANEOUS at 10:14

## 2022-10-17 RX ADMIN — OXYCODONE AND ACETAMINOPHEN 2 TABLET: 5; 325 TABLET ORAL at 22:56

## 2022-10-17 RX ADMIN — PHENYLEPHRINE HYDROCHLORIDE 200 MCG: 10 INJECTION INTRAVENOUS at 06:55

## 2022-10-17 RX ADMIN — ROCURONIUM BROMIDE 20 MG: 10 INJECTION, SOLUTION INTRAVENOUS at 07:29

## 2022-10-17 RX ADMIN — Medication 100 MCG: at 09:30

## 2022-10-17 RX ADMIN — SODIUM CHLORIDE: 9 INJECTION, SOLUTION INTRAVENOUS at 06:14

## 2022-10-17 RX ADMIN — PHENYLEPHRINE HYDROCHLORIDE 100 MCG: 10 INJECTION INTRAVENOUS at 08:50

## 2022-10-17 RX ADMIN — SODIUM CHLORIDE: 9 INJECTION, SOLUTION INTRAVENOUS at 11:54

## 2022-10-17 RX ADMIN — WATER 2000 MG: 1 INJECTION INTRAMUSCULAR; INTRAVENOUS; SUBCUTANEOUS at 22:59

## 2022-10-17 ASSESSMENT — PAIN DESCRIPTION - ORIENTATION
ORIENTATION: RIGHT;LEFT;POSTERIOR
ORIENTATION: RIGHT;LEFT;POSTERIOR
ORIENTATION: MID
ORIENTATION: RIGHT;LEFT
ORIENTATION: MID

## 2022-10-17 ASSESSMENT — PAIN DESCRIPTION - LOCATION
LOCATION: NECK;SHOULDER
LOCATION: NECK
LOCATION: NECK;SHOULDER
LOCATION: NECK;SHOULDER
LOCATION: NECK

## 2022-10-17 ASSESSMENT — PAIN DESCRIPTION - FREQUENCY
FREQUENCY: CONTINUOUS

## 2022-10-17 ASSESSMENT — PAIN DESCRIPTION - DIRECTION
RADIATING_TOWARDS: SHOULDERS

## 2022-10-17 ASSESSMENT — PAIN SCALES - GENERAL
PAINLEVEL_OUTOF10: 10
PAINLEVEL_OUTOF10: 6
PAINLEVEL_OUTOF10: 6
PAINLEVEL_OUTOF10: 8
PAINLEVEL_OUTOF10: 6
PAINLEVEL_OUTOF10: 9
PAINLEVEL_OUTOF10: 10
PAINLEVEL_OUTOF10: 7
PAINLEVEL_OUTOF10: 7

## 2022-10-17 ASSESSMENT — PAIN DESCRIPTION - DESCRIPTORS
DESCRIPTORS: DISCOMFORT;SORE;TENDER;CRAMPING
DESCRIPTORS: SHARP;SORE;CRAMPING
DESCRIPTORS: THROBBING;ACHING
DESCRIPTORS: ACHING;THROBBING

## 2022-10-17 ASSESSMENT — PAIN DESCRIPTION - ONSET
ONSET: ON-GOING

## 2022-10-17 ASSESSMENT — PAIN - FUNCTIONAL ASSESSMENT
PAIN_FUNCTIONAL_ASSESSMENT: PREVENTS OR INTERFERES SOME ACTIVE ACTIVITIES AND ADLS
PAIN_FUNCTIONAL_ASSESSMENT: 0-10

## 2022-10-17 ASSESSMENT — PAIN DESCRIPTION - PAIN TYPE
TYPE: SURGICAL PAIN

## 2022-10-17 ASSESSMENT — COPD QUESTIONNAIRES: CAT_SEVERITY: MODERATE

## 2022-10-17 NOTE — PROGRESS NOTES
Physical Therapy  Physical Therapy Initial Assessment     Name: Concepcion Llanos  : 1955  MRN: [id number]      Date of Service: 10/17/2022    Evaluating PT:  Andres Maynard PT, DPT    Room #:  4324/8826-E  Diagnosis:  Spinal stenosis in cervical region [M48.02]  Cervical stenosis of spinal canal [M48.02]  PMHx/PSHx:  CKD, CAD, COPD, NPH, hypotension  Procedure/Surgery:  s/p C3-C7 laminectomy, C3-T1 fusion  Precautions:  fall risk, c/s collar, spine, drain  Equipment Needs:  TBD    SUBJECTIVE:    Pt lives with spouse and dtr in a 2 story home with 3 steps to enter and 1 handrail. Bed/bath is on 1st floor. Pt ambulated with cane PTA. OBJECTIVE:   Initial Evaluation  Date: 10/17/22 Treatment Short Term/ Long Term   Goals   AM-PAC 6 Clicks 64/96     Was pt agreeable to Eval/treatment? yes     Does pt have pain? 8/10 neck     Bed Mobility  Rolling: mod A  Supine to sit: mod A  Sit to supine: mod A  Scooting: mod A  Rolling: mod I  Supine to sit: mod I  Sit to supine: mod I  Scooting: mod I   Transfers Sit to stand: mod A  Stand to sit: mod A  Stand pivot: NT  Sit to stand: mod I  Stand to sit: mod I  Stand pivot: mod I with AAD   Ambulation    2' with HHA mod A  150'+ with AAD mod I   Stair negotiation: ascended and descended  NT  3 steps with 1 handrail mod I     Strength/ROM:   BLE grossly 3+/5  BLE AROM WFL    Balance:   Static Sitting: SBA  Dynamic Sitting: CGA  Static Standing: min A with HHA  Dynamic Standing: mod A with HHA    Pt is A & O x 3  Sensation:  Pt denies numbness and tingling to extremities  Edema:  unremarkable    Therapeutic Exercises:    Bed mobility: supine<>sit, cued for EOB positioning  Transfers:  STSx1, cued for hand placement and postural correction  Ambulation: 2' with HHA  BLE AROM    Patient education  Pt educated on role of PT, importance of functional mobility during hospital stay, safety with functional mobility    Patient response to education:   Pt verbalized understanding Pt demonstrated skill Pt requires further education in this area   yes partial yes     ASSESSMENT:    Conditions Requiring Skilled Therapeutic Intervention:    [x]Decreased strength     []Decreased ROM  [x]Decreased functional mobility  [x]Decreased balance   [x]Decreased endurance   [x]Decreased posture  []Decreased sensation  []Decreased coordination   []Decreased vision  [x]Decreased safety awareness   [x]Increased pain       Comments:    Pt supine in bed upon entering, agreeable to participate. Pt's HOB elevated, assisted to EOB, support provided with pt's BLE and trunk. Pt with limited tolerance this date, with c/o pain, increased time required to complete transfers. Pt sitting upright with good static sitting balance. Pt cued for hand placement and instructed to stand from EOB. Pt standing with fair static standing balance with HHA. Pt was instructed to side step toward [facility], demonstrating difficulty advancing BLE. Pt was assisted back to supine at the end of session, all needs met and call bell in reach prior to exiting. Treatment:  Patient practiced and was instructed in the following treatment:    Bed mobility training - pt given verbal and tactile cues to facilitate proper sequencing and safety during rolling and supine>sit as well as provided with physical assistance to complete task   Sitting EOB for >10 minutes for upright tolerance, postural awareness and BLE ROM  STS and pivot transfer training - pt educated on proper hand and foot placement, safety and sequencing, and use of verbal and tactile cues to safely complete sit<>stand and pivot transfers with hands on assistance to complete task safely   Skilled positioning - Pt placed in the chair position with pillows utilized to facilitate upright posture, joint and skin integrity, and interaction with environment. Pt's/ family goals   1. Return home    Prognosis is good for reaching above PT goals.     Patient and or family understand(s) diagnosis, prognosis, and plan of care. yes    PHYSICAL THERAPY PLAN OF CARE:    PT POC is established based on physician order and patient diagnosis     Referring provider/PT Order:  Elle Padgett MD  Diagnosis:  Spinal stenosis in cervical region [M48.02]  Cervical stenosis of spinal canal [M48.02]  Specific instructions for next treatment:  Progress as tolerated    Current Treatment Recommendations:     [x] Strengthening to improve independence with functional mobility   [] ROM to improve independence with functional mobility   [x] Balance Training to improve static/dynamic balance and to reduce fall risk  [x] Endurance Training to improve activity tolerance during functional mobility   [x] Transfer Training to improve safety and independence with all functional transfers   [x] Gait Training to improve gait mechanics, endurance and assess need for appropriate assistive device  [x] Stair Training in preparation for safe discharge home and/or into the community   [] Positioning to prevent skin breakdown and contractures  [x] Safety and Education Training   [x] Patient/Caregiver Education   [] HEP  [] Other     PT long term treatment goals are located in above grid    Frequency of treatments: 2-5x/week x 1-2 weeks. Time in  1449  Time out  1524    Total Treatment Time  15 minutes     Evaluation Time includes thorough review of current medical information, gathering information on past medical history/social history and prior level of function, completion of standardized testing/informal observation of tasks, assessment of data and education on plan of care and goals.     CPT codes:  [x] Low Complexity PT evaluation 98589  [] Moderate Complexity PT evaluation 45213  [] High Complexity PT evaluation 07627  [] PT Re-evaluation 46366  [] Gait training 49379 -- minutes  [] Manual therapy [id number] -- minutes  [x] Therapeutic activities 64827 15 minutes  [] Therapeutic exercises 16391 -- minutes  [] Neuromuscular reeducation [address] [facility]     [place], [place]  [id number]

## 2022-10-17 NOTE — BRIEF OP NOTE
Brief Postoperative Note      Patient: Francisco Carrillo  YOB: 1955  MRN: [id number]    Date of Procedure: 10/17/2022    Pre-Op Diagnosis: Spinal stenosis in cervical region    Post-Op Diagnosis: Same       Procedure(s):  POSTERIOR C3-C7 LAMINECTOMY, C3-T1 FUSION, NEEDS O-ARM, EDUARD TABLE, CELL SAVER, PLATLET GEL, POSTERIOR INSTRUMENTATION, GLOBUS    Surgeon(s):  Gavin Cormier MD    Assistant:  Resident: Fan Braswell DO    Anesthesia: General    Estimated Blood Loss (mL): less than 174     Complications: None    Specimens:   * No specimens in log *    Implants:  Implant Name Type Inv. Item Serial No.  Lot No. LRB No. Used Action   GRAFT BONE SUB 10CC DEMIN BONE MTRX PLUSXEMPLIFI - [id number]  GRAFT BONE SUB 10CC DEMIN BONE [id number] [facility] [id number] [email or web address]  N/A 1 Implanted   SCREW QUARTEX 5.0MM POLYAX 24MM IMPL - [id number]  SCREW QUARTEX 5.0MM POLYAX 24MM IMPL  [email or web address]  N/A 12 Implanted   SCREW SPNL L14MM DIA3. 5MM POLYAX QUARTEX - [id number]  SCREW SPNL L14MM DIA3. 5MM POLYAX QUARTEX  [email or web address]  N/A 8 Implanted   CAP SPNL OCCIPITOCERVICOTHORACIC FISH THRD QUARTEX - [id number]  CAP SPNL OCCIPITOCERVICOTHORACIC FISH THRD QUARTEX  [email or web address]  N/A 10 Implanted   SHIRLEY SPNL L80MM DIA3.5MM CVD CAPITOL - [id number]  SHIRLEY SPNL L80MM DIA3.5MM CVD CAPITOL  [email or web address]  N/A 2 Implanted         Drains:   Closed/Suction Drain Posterior Neck Accordion (Active)       Urinary Catheter 10/17/22 Decker (Active)       Findings: see dictated op note    Electronically signed by Tona Corado MD on 10/17/2022 at 9:19 AM

## 2022-10-17 NOTE — PROGRESS NOTES
Admit to pre op. Short of breath on minimal exertion. Sat 89 on room air. States she sometimes has o2 she wears at home.     Placed on 3 l nc per home settings

## 2022-10-17 NOTE — PROGRESS NOTES
Patient arrived to PACU with aspen collar. This RN received custom collar from patient's . [place] collar switched with custom collar.   Electronically signed by Masood Guillen RN on 10/17/2022 at 10:48 AM

## 2022-10-17 NOTE — CONSULTS
Initial Consult Note      REASON FOR CONSULTATION: Trouble walking    ATTENDING/ADMITTING PHYSICIAN: Dr. Corina Byrd:      The patient is a 75 y.o. female patient of [facility] history of CAD, GERD, CKD 3, COPD, diabetes, hyperlipidemia, pulmonary hypertension, valvular heart disease, NPH status post  shunt in 2018 who presents with trouble walking, trouble speaking, urinary retention. Patient was seen outpatient by Dr. Esmer Espinoza. He brought her in for a elective C3-C7 laminectomy, C3-T1 fusion 10/17/2022. Pt denies uncontrolled pain. No chest pain, trouble breathing, vomiting, or diarrhea. No fevers or chills. Patient states  chronic medical problems are well controlled.          Past Medical History:   Diagnosis Date    Acid reflux disease     Acute on chronic respiratory failure with hypoxia and hypercapnia ([facility]) 7/25/2018    Apraxia 7/23/2018    Arthritis     Ataxia 7/23/2018    CAD (coronary artery disease) 10/4/2012    Choledocholithiasis     recurrent    Chronic systolic congestive heart failure ([address] 8/1/2018    Ejection fraction 23% plus/minus 5%    CKD (chronic kidney disease) stage 3, GFR 30-59 ml/min ([facility]) 7/23/2018    COPD (chronic obstructive pulmonary disease) ([address]     alpha one M1S 183mg/dl    Diabetes mellitus ([address]     Hyperlipidemia     Hypoxia 7/23/2018    Neck pain     Normal pressure hydrocephalus ([address] 7/28/2018    Oxygen dependent     3l/min/nc    Pleural effusion years ago    requiring right thoracentesis    Pulmonary hypertension ([address] 7/23/2018    Restless legs     S/P CABG x 2 10/4/2012    Severe mitral regurgitation 8/1/2018           Past Surgical History:   Procedure Laterality Date    [address]    C3-C6    CHOLECYSTECTOMY  2002    COLONOSCOPY N/A 1/21/2019    COLONOSCOPY POLYPECTOMY SNARE/COLD BIOPSY performed by Cait Barrientos MD at [facility]  10/30/2002    [facility] SURGERY      cataract with Lens implants    HERNIA REPAIR      HYSTERECTOMY (CERVIX STATUS UNKNOWN)  1988    LUMBAR DRAIN IMPLANTATION  07/27/2018    resolved    IL CREATE SHUNT:VENTRIC-PERITONEAL N/A 10/15/2018    VENTRICULAR PERITONEAL SHUNT  PLACEMENT performed by Katerina Whitmore MD at [address] CSF N/A 7/27/2018    LUMBAR DRAIN INSERTION performed by Katerina Whitmore MD at [facility] N/A 2/1/2021    VENTRICULAR PERITONEAL SHUNT REPLACEMENT performed by Katerina Whitmore MD at [address]       Medications Prior to Admission:    Medications Prior to Admission: spironolactone (ALDACTONE) 25 MG tablet, Take 25 mg by mouth daily  pramipexole (MIRAPEX) 0.5 MG tablet, Take 0.75 mg by mouth in the morning and at bedtime  SITagliptin (JANUVIA) 100 MG tablet, Take 100 mg by mouth daily  carBAMazepine (TEGRETOL-XR) 100 MG extended release tablet, Take 1 tablet by mouth 2 times daily (Patient not taking: Reported on 10/17/2022)  carvedilol (COREG) 3.125 MG tablet, Take 1 tablet by mouth 2 times daily (with meals)  valsartan (DIOVAN) 40 MG tablet, Take 0.5 tablets by mouth daily  oxyCODONE-acetaminophen (PERCOCET) 5-325 MG per tablet, Take 1 tablet by mouth every 6 hours as needed for Pain.  For chronic neck Pain HX of Fusions  albuterol sulfate  (90 Base) MCG/ACT inhaler, INHALE 2 PUFFS BY MOUTH EVERY 4 HOURS AS NEEDED  aspirin 325 MG tablet, Take 325 mg by mouth daily Prescribed per PCP  OXYGEN, Inhale 3 L into the lungs as needed  oxybutynin (DITROPAN-XL) 10 MG extended release tablet, Take 10 mg by mouth daily  omeprazole (PRILOSEC) 40 MG capsule,  Take 40 mg by mouth daily   vitamin D (ERGOCALCIFEROL) 23460 UNITS CAPS capsule, Take 50,000 Units by mouth once a week THUR  buPROPion (WELLBUTRIN XL) 300 MG XL tablet, Take 900 mg by mouth every morning    Note that the patient's home medications were reviewed and the above list is accurate to the best of my knowledge at the time of the exam.      Allergies:    Sulfa antibiotics and Pentazocine lactate    Social History:    reports that she quit smoking about 4 years ago. Her smoking use included cigarettes. She has a 20.00 pack-year smoking history. She has never used smokeless tobacco. She reports that she does not drink alcohol and does not use drugs. Family History:   family history includes Diabetes in her mother; Emphysema in her father; Heart Attack in her sister; Heart Failure in her sister; High Blood Pressure in her mother. REVIEW OF SYSTEMS:  As above in the HPI, otherwise negative    PHYSICAL EXAM:    Vitals:  BP (!) 157/93   Pulse 94   Temp 97.7 °F (36.5 °C) (Temporal)   Resp 16   Ht 5' 3\" (1.6 m)   Wt 120 lb (54.4 kg)   LMP  (LMP Unknown)   SpO2 99%   BMI 21.26 kg/m²     General:  Awake, alert, oriented X 3. Well developed, well nourished, well groomed. No apparent distress. HEENT:  Normocephalic, atraumatic. Pupils equal, round, reactive to light. No scleral icterus. No conjunctival injection. Normal lips, teeth, and gums. No nasal discharge. Neck: C-collar intact  Heart:  RRR, no murmurs, gallops, rubs  Lungs:  CTA bilaterally, bilat symmetrical expansion, no wheeze, rales, or rhonchi  Abdomen:   Bowel sounds present, soft, nontender, no masses, no organomegaly, no peritoneal signs  Extremities:  No clubbing, cyanosis, or edema  Skin:  Warm and dry, no open lesions or rash  Neuro:  Cranial nerves 2-12 intact, no focal deficits  Breast: deferred  Rectal: deferred  Genitalia:  deferred    LABS:    CBC with Differential:    Lab Results   Component Value Date/Time    WBC 6.6 10/10/2022 11:40 AM    RBC 4.58 10/10/2022 11:40 AM    HGB 13.5 10/10/2022 11:40 AM    HCT 42.8 10/10/2022 11:40 AM     10/10/2022 11:40 AM    MCV 93.4 10/10/2022 11:40 AM    MCH 29.5 10/10/2022 11:40 AM    MCHC 31.5 10/10/2022 11:40 AM    RDW 14.3 10/10/2022 11:40 AM    SEGSPCT 63 01/30/2014 09:26 AM LYMPHOPCT 21.2 10/10/2022 11:40 AM    MONOPCT 9.0 10/10/2022 11:40 AM    BASOPCT 0.9 10/10/2022 11:40 AM    MONOSABS 0.59 10/10/2022 11:40 AM    LYMPHSABS 1.39 10/10/2022 11:40 AM    EOSABS 0.06 10/10/2022 11:40 AM    BASOSABS 0.06 10/10/2022 11:40 AM     CMP:    Lab Results   Component Value Date/Time     10/10/2022 11:40 AM    K 4.8 10/10/2022 11:40 AM     10/10/2022 11:40 AM    CO2 24 10/10/2022 11:40 AM    BUN 26 10/10/2022 11:40 AM    CREATININE 1.1 10/10/2022 11:40 AM    GFRAA 60 10/10/2022 11:40 AM    LABGLOM 49 10/10/2022 11:40 AM    GLUCOSE 266 10/10/2022 11:40 AM    GLUCOSE 111 09/30/2011 02:00 PM    PROT 7.6 05/03/2022 12:57 PM    LABALBU 4.1 05/03/2022 12:57 PM    LABALBU 4.4 09/30/2011 02:00 PM    CALCIUM 9.8 10/10/2022 11:40 AM    BILITOT 0.2 05/03/2022 12:57 PM    ALKPHOS 104 05/03/2022 12:57 PM    AST 22 05/03/2022 12:57 PM    ALT 23 05/03/2022 12:57 PM     Magnesium:    Lab Results   Component Value Date/Time    MG 1.5 06/28/2021 01:30 PM     Phosphorus:    Lab Results   Component Value Date/Time    PHOS 4.3 02/02/2021 05:03 AM     PT/INR:    Lab Results   Component Value Date/Time    PROTIME 12.2 10/10/2022 11:40 AM    INR 1.1 10/10/2022 11:40 AM     U/A:    Lab Results   Component Value Date/Time    COLORU Yellow 10/10/2022 11:45 AM    PROTEINU TRACE 10/10/2022 11:45 AM    PHUR 6.0 10/10/2022 11:45 AM    WBCUA NONE 10/10/2022 11:45 AM    RBCUA NONE 10/10/2022 11:45 AM    BACTERIA NONE SEEN 10/10/2022 11:45 AM    CLARITYU Clear 10/10/2022 11:45 AM    SPECGRAV 1.020 10/10/2022 11:45 AM    LEUKOCYTESUR Negative 10/10/2022 11:45 AM    UROBILINOGEN 1.0 10/10/2022 11:45 AM    BILIRUBINUR Negative 10/10/2022 11:45 AM    BLOODU Negative 10/10/2022 11:45 AM    GLUCOSEU 100 10/10/2022 11:45 AM     FLP:    Lab Results   Component Value Date/Time    TRIG 153 09/10/2020 08:56 AM    HDL 40 07/27/2021 07:52 AM    LDLCALC 148 07/27/2021 07:52 AM    LABVLDL 44 07/27/2021 07:52 AM     TSH:    Lab Results   Component Value Date/Time    TSH 1.780 07/27/2021 07:52 AM       ASSESSMENT:      Principal Problem:    Cervical stenosis of spinal canal  Active Problems:    CKD (chronic kidney disease) stage 3, GFR 30-59 ml/min    Chronic systolic (congestive) heart failure (HCC)    Type 2 diabetes mellitus, without long-term current use of insulin (HCC)    COPD (chronic obstructive pulmonary disease) ([facility])    S/P ventriculoperitoneal shunt  Resolved Problems:    * No resolved hospital problems. *      PLAN:    Perioperative care per primary service  Pain control  Bowel regimen  PT/OT  Monitor volume status and renal function  DM 2 MBS, SSI, A1c, alogliptin  Discussed importance of not smoking  Medications for other co morbidities cont as appropriate w dosage adjustments as necessary     Thank you for allowing me to participate in the care of this patient. Fili Hastings MD  4:51 PM  10/17/2022    NOTE: This report was transcribed using voice recognition software. Every effort was made to ensure accuracy; however, inadvertent computerized transcription errors may be present.

## 2022-10-17 NOTE — ANESTHESIA PRE PROCEDURE
Department of Anesthesiology  Preprocedure Note       Name:  Simin Hernandez   Age:  75 y.o.  :  1955                                          MRN:  [id number]         Date:  10/17/2022      Surgeon: Mary Jo Barreto):  Piero Espinal MD    Procedure: Procedure(s):  POSTERIOR C3-C7 LAMINECTOMY, C3-T1 FUSION, NEEDS O-ARM, EDUARD TABLE, CELL SAVER, PLATLET GEL, POSTERIOR INSTRUMENTATION, GLOBUS    Medications prior to admission:   Prior to Admission medications    Medication Sig Start Date End Date Taking? Authorizing Provider   spironolactone (ALDACTONE) 25 MG tablet Take 25 mg by mouth daily    Historical Provider, MD   pramipexole (MIRAPEX) 0.5 MG tablet Take 0.75 mg by mouth in the morning and at bedtime    Historical Provider, MD   SITagliptin (JANUVIA) 100 MG tablet Take 100 mg by mouth daily    Historical Provider, MD   carBAMazepine (TEGRETOL-XR) 100 MG extended release tablet Take 1 tablet by mouth 2 times daily  Patient not taking: Reported on 10/17/2022 7/13/21   Serena Pinedo MD   carvedilol (COREG) 3.125 MG tablet Take 1 tablet by mouth 2 times daily (with meals) 21   Nae Flaherty MD   valsartan (DIOVAN) 40 MG tablet Take 0.5 tablets by mouth daily 21   Nae Flaherty MD   oxyCODONE-acetaminophen (PERCOCET) 5-325 MG per tablet Take 1 tablet by mouth every 6 hours as needed for Pain.  For chronic neck Pain HX of Fusions    Historical Provider, MD   albuterol sulfate  (90 Base) MCG/ACT inhaler INHALE 2 PUFFS BY MOUTH EVERY 4 HOURS AS NEEDED 20   Historical Provider, MD   aspirin 325 MG tablet Take 325 mg by mouth daily Prescribed per PCP    Historical Provider, MD   OXYGEN Inhale 3 L into the lungs as needed    Historical Provider, MD   oxybutynin (DITROPAN-XL) 10 MG extended release tablet Take 10 mg by mouth daily    Historical Provider, MD   omeprazole (PRILOSEC) 40 MG capsule   Take 40 mg by mouth daily  5/16/15   Historical Provider, MD   vitamin D (ERGOCALCIFEROL) 74674 UNITS CAPS capsule Take 50,000 Units by mouth once a week THUR 4/30/15   Historical Provider, MD   buPROPion (WELLBUTRIN XL) 300 MG XL tablet Take 900 mg by mouth every morning    Historical Provider, MD       Current medications:    Current Facility-Administered Medications   Medication Dose Route Frequency Provider Last Rate Last Admin    sodium chloride flush 0.9 % injection 5-40 mL  5-40 mL IntraVENous 2 times per day LORI Nation        sodium chloride flush 0.9 % injection 5-40 mL  5-40 mL IntraVENous PRN LORI Nation        0.9 % sodium chloride infusion   IntraVENous PRN LORI Nation        0.9 % sodium chloride infusion   IntraVENous Continuous Moon Nation        ceFAZolin (ANCEF) 2,000 mg in sterile water 20 mL IV syringe  2,000 mg IntraVENous On Call to [facility]           Allergies:     Allergies   Allergen Reactions    Sulfa Antibiotics Anaphylaxis    Pentazocine Lactate Nausea And Vomiting       Problem List:    Patient Active Problem List   Diagnosis Code    CAD (coronary artery disease) I25.10    Pulmonary hypertension ([facility]) I27.20    COPD (chronic obstructive pulmonary disease) ([facility]) J44.9    CKD (chronic kidney disease) stage 3, GFR 30-59 ml/min N18.30    Normal pressure hydrocephalus ([facility]) G91.2    Chronic systolic (congestive) heart failure ([facility]) I50.22    Severe mitral regurgitation I34.0    NPH (normal pressure hydrocephalus) ([facility]) G91.2    COVID-19 U07.1    Syncope and collapse R55    Gastroenteritis K52.9    Acute kidney injury superimposed on CKD ([facility] N17.9, N18.9    Hypotension I95.9    S/P ventriculoperitoneal shunt Z98.2    Cervical stenosis of spinal canal M48.02       Past Medical History:        Diagnosis Date    Acid reflux disease     Acute on chronic respiratory failure with hypoxia and hypercapnia ([facility] 7/25/2018    Apraxia 7/23/2018    Arthritis     Ataxia 7/23/2018    CAD (coronary artery disease) 10/4/2012    Choledocholithiasis     recurrent    Chronic systolic congestive heart failure ([facility] 2018    Ejection fraction 23% plus/minus 5%    CKD (chronic kidney disease) stage 3, GFR 30-59 ml/min ([facility]) 2018    COPD (chronic obstructive pulmonary disease) ([facility])     alpha one M1S 183mg/dl    Diabetes mellitus ([facility]     Hyperlipidemia     Hypoxia 2018    Neck pain     Normal pressure hydrocephalus ([facility]) 2018    Oxygen dependent     3l/min/nc    Pleural effusion years ago    requiring right thoracentesis    Pulmonary hypertension ([facility] 2018    Restless legs     S/P CABG x 2 10/4/2012    Severe mitral regurgitation 2018       Past Surgical History:        Procedure Laterality Date   [address]    C3-C6    CHOLECYSTECTOMY      COLONOSCOPY N/A 2019    COLONOSCOPY POLYPECTOMY SNARE/COLD BIOPSY performed by Lanna Dandy, MD at [facility]  10/30/2002    EYE SURGERY      cataract with Lens implants    HERNIA REPAIR      HYSTERECTOMY (CERVIX STATUS UNKNOWN)  [address]   2018    resolved    ND CREATE SHUNT:VENTRIC-PERITONEAL N/A 10/15/2018    VENTRICULAR PERITONEAL SHUNT  PLACEMENT performed by Katerina Whitmore MD at [address] [facility] [address] CSF N/A 2018    LUMBAR DRAIN INSERTION performed by Katerina Whitmore MD at [facility] VENTRICULOPERITONEAL SHUNT N/A 2021    VENTRICULAR PERITONEAL SHUNT REPLACEMENT performed by Katerina Whitmore MD at [address] [place] [address] History:    Social History     Tobacco Use    Smoking status: Former     Packs/day: 0.50     Years: 40.00     Pack years: 20.00     Types: Cigarettes     Quit date: 2018     Years since quittin.2    Smokeless tobacco: Never   Substance Use Topics    Alcohol use:  No                                Counseling given: Not Answered      Vital Signs (Current):   Vitals: 10/17/22 0549 10/17/22 0553   BP: 111/78    Pulse: 87    Resp: 22    Temp: 36.4 °C (97.6 °F)    TempSrc: Temporal    SpO2: (!) 87% 95%   Weight: 120 lb (54.4 kg)    Height: 5' 3\" (1.6 m)                                               BP Readings from Last 3 Encounters:   10/17/22 111/78   10/10/22 121/78   09/27/22 132/86       NPO Status: Time of last liquid consumption: 1900                        Time of last solid consumption: 1900                        Date of last liquid consumption: 10/16/22                        Date of last solid food consumption: 10/16/22    BMI:   Wt Readings from Last 3 Encounters:   10/17/22 120 lb (54.4 kg)   10/10/22 120 lb 4.8 oz (54.6 kg)   09/21/22 124 lb (56.2 kg)     Body mass index is 21.26 kg/m². CBC:   Lab Results   Component Value Date/Time    WBC 6.6 10/10/2022 11:40 AM    RBC 4.58 10/10/2022 11:40 AM    HGB 13.5 10/10/2022 11:40 AM    HCT 42.8 10/10/2022 11:40 AM    MCV 93.4 10/10/2022 11:40 AM    RDW 14.3 10/10/2022 11:40 AM     10/10/2022 11:40 AM       CMP:   Lab Results   Component Value Date/Time     10/10/2022 11:40 AM    K 4.8 10/10/2022 11:40 AM     10/10/2022 11:40 AM    CO2 24 10/10/2022 11:40 AM    BUN 26 10/10/2022 11:40 AM    CREATININE 1.1 10/10/2022 11:40 AM    GFRAA 60 10/10/2022 11:40 AM    LABGLOM 49 10/10/2022 11:40 AM    GLUCOSE 266 10/10/2022 11:40 AM    GLUCOSE 111 09/30/2011 02:00 PM    PROT 7.6 05/03/2022 12:57 PM    CALCIUM 9.8 10/10/2022 11:40 AM    BILITOT 0.2 05/03/2022 12:57 PM    ALKPHOS 104 05/03/2022 12:57 PM    AST 22 05/03/2022 12:57 PM    ALT 23 05/03/2022 12:57 PM       POC Tests: No results for input(s): POCGLU, POCNA, POCK, POCCL, POCBUN, POCHEMO, POCHCT in the last 72 hours.     Coags:   Lab Results   Component Value Date/Time    PROTIME 12.2 10/10/2022 11:40 AM    INR 1.1 10/10/2022 11:40 AM    APTT <20.0 07/23/2018 03:00 PM       HCG (If Applicable): No results found for: PREGTESTUR, PREGSERUM, HCG, HCGQUANT ABGs: No results found for: PHART, PO2ART, QAJ8WTY, KQD9ZZN, BEART, O9IPTBJW     Type & Screen (If Applicable):  No results found for: LABABO, LABRH    Drug/Infectious Status (If Applicable):  No results found for: HIV, HEPCAB    COVID-19 Screening (If Applicable):   Lab Results   Component Value Date/Time    COVID19 Not Detected 01/27/2021 10:17 AM           Anesthesia Evaluation  Patient summary reviewed and Nursing notes reviewed no history of anesthetic complications:   Airway: Mallampati: I     Neck ROM: full  Mouth opening: > = 3 FB   Dental:    (+) upper dentures and lower dentures      Pulmonary: breath sounds clear to auscultation  (+) COPD: moderate,            Patient did not smoke on day of surgery. ROS comment: Pulmonary hypertension   COVID-19  WEAR O2 3 l at night   PE comment: diminished bal Cardiovascular:  Exercise tolerance: poor (<4 METS),   (+) CAD: non-obstructive, CABG/stent (no stent placed):, CHF: systolic, pulmonary hypertension: moderate,       ECG reviewed  Rhythm: regular  Rate: normal  Echocardiogram reviewed         Beta Blocker:  Dose within 24 Hrs      ROS comment: Severe mitral regurgitation  Chronic systolic (congestive) heart failure   Hypotension     Neuro/Psych:   Negative Neuro/Psych ROS               ROS comment: Cervical stenosis of spinal canal   SHUNT RIGHT  Numbness on upper body GI/Hepatic/Renal:   (+) GERD: well controlled,      Renal disease: CKD (chronic kidney disease) stage 3, GFR 30-59 ml/min. ROS comment: Bladder stone 20 years, no stent placed. Endo/Other:    (+) DiabetesType II DM, well controlled, , : arthritis: OA., .                 Abdominal:       Abdomen: soft. Vascular: negative vascular ROS. Other Findings:           Anesthesia Plan      general     ASA 4       Induction: intravenous. Anesthetic plan and risks discussed with patient and spouse.     Use of blood products discussed with patient and spouse whom consented to blood products. Plan discussed with CRNA.                     Fani Gomez RN   10/17/2022

## 2022-10-17 NOTE — ANESTHESIA POSTPROCEDURE EVALUATION
Department of Anesthesiology  Postprocedure Note    Patient: Margo Mills  MRN: [id number]  YOB: 1955  Date of evaluation: 10/17/2022      Procedure Summary     Date: 10/17/22 Room / Location: [facility] OR 06 / [facility]    Anesthesia Start: 0262 Anesthesia Stop: 1000    Procedure: POSTERIOR C3-C7 LAMINECTOMY, C3-T1 FUSION, NEEDS O-ARM, EDUARD TABLE, CELL SAVER, PLATLET GEL, POSTERIOR INSTRUMENTATION, GLOBUS (Neck) Diagnosis:       Spinal stenosis in cervical region      (Spinal stenosis in cervical region)    Surgeons: Peña Lewis MD Responsible Provider: Jayjay Acuña MD    Anesthesia Type: General ASA Status: 4          Anesthesia Type: General    Sg Phase I: Sg Score: 8    Sg Phase II:        Anesthesia Post Evaluation    Patient location during evaluation: PACU  Patient participation: complete - patient participated  Level of consciousness: awake and alert  Airway patency: patent  Nausea & Vomiting: no nausea and no vomiting  Complications: no  Cardiovascular status: hemodynamically stable  Respiratory status: acceptable  Hydration status: euvolemic  Multimodal analgesia pain management approach

## 2022-10-17 NOTE — ANESTHESIA PROCEDURE NOTES
Arterial Line:    An arterial line was placed using ultrasound guidance, in the OR for the following indication(s): continuous blood pressure monitoring and blood sampling needed. A 20 gauge (size), 1 and 3/4 inch (length), Arrow (type) catheter was placed, Seldinger technique used, into the right radial artery, secured by tape and Tegaderm. Anesthesia type: General    Events:  patient tolerated procedure well with no complications.   Anesthesiologist: Elvin Carney MD  Resident/CRNA: LIZA Yates - CRNA  Other anesthesia staff: Chino Fabian, RN  Performed: Resident/CRNA   Preanesthetic Checklist  Completed: patient identified, IV checked, site marked, risks and benefits discussed, surgical/procedural consents, equipment checked, pre-op evaluation, timeout performed, anesthesia consent given, oxygen available, monitors applied/VS acknowledged, fire risk safety assessment completed and verbalized and blood product R/B/A discussed and consented

## 2022-10-17 NOTE — PROGRESS NOTES
[id number] [place] [address] Ave  [address]      Date:10/17/2022                 Patient Name: Marina Harvey  MRN: [id number]  : 1955  Room: [address] [place] [address]-A    Referring Jamal Valente MD  Specific Provider Orders/Date: OT evaluation and treat 10/17/22    Evaluating OT: Gregoria Mayer OTR/L #[id number]    Diagnosis: Spinal stenosis in cervical region [M48.02]  Cervical stenosis of spinal canal [M48.02]      Surgery:   Past Surgical History:   Procedure Laterality Date    CARDIAC SURGERY      CERVICAL FUSION      C3-C6    CHOLECYSTECTOMY      COLONOSCOPY N/A 2019    COLONOSCOPY POLYPECTOMY SNARE/COLD BIOPSY performed by Cheri Hoang MD at [address]  10/30/2002    EYE SURGERY      cataract with Lens implants    HERNIA REPAIR      HYSTERECTOMY (CERVIX STATUS UNKNOWN)  [facility]  2018    resolved    NC CREATE SHUNT:VENTRIC-PERITONEAL N/A 10/15/2018    VENTRICULAR PERITONEAL SHUNT  PLACEMENT performed by Watson Egan MD at [facility] CSF N/A 2018    LUMBAR DRAIN INSERTION performed by Watson Egan MD at [address] N/A 2021    VENTRICULAR PERITONEAL SHUNT REPLACEMENT performed by Watson Egan MD at [address]          Pertinent Medical History:  has a past medical history of Acid reflux disease, Acute on chronic respiratory failure with hypoxia and hypercapnia ([address] Apraxia, Arthritis, Ataxia, CAD (coronary artery disease), Choledocholithiasis, Chronic systolic congestive heart failure ([address] CKD (chronic kidney disease) stage 3, GFR 30-59 ml/min ([facility]), COPD (chronic obstructive pulmonary disease) ([address] Diabetes mellitus ([address] Hyperlipidemia, Hypoxia, Neck pain, Normal pressure hydrocephalus ([address] Oxygen dependent, Pleural effusion, Pulmonary hypertension (HCC), Restless legs, S/P CABG x 2, and Severe mitral regurgitation.      Precautions:  Fall Risk, C spine, hemovac drain, C collar    Assessment of current deficits   [x] Functional mobility  [x]ADLs  [x] Strength               []Cognition   [x] Functional transfers   [x] IADLs         [x] Safety Awareness   [x]Endurance   [x] Fine Coordination              [x] Balance      [] Vision/perception   [x]Sensation    [x]Gross Motor Coordination  [x] ROM  [] Delirium                   [] Motor Control     OT PLAN OF CARE   OT POC based on physician orders, patient diagnosis and results of clinical assessment    Frequency/Duration   2-4 days/wk for 1 week PRN   Specific OT Treatment Interventions to include:   * Instruction/training on adapted ADL techniques and AE recommendations to increase functional independence within precautions       * Training on energy conservation strategies, correct breathing pattern and techniques to improve independence/tolerance for self-care routine  * Functional transfer/mobility training/DME recommendations for increased independence, safety, and fall prevention  * Patient/Family education to increase follow through with safety techniques and functional independence  * Recommendation of environmental modifications for increased safety with functional transfers/mobility and ADLs  * Sensory re-education to improve body/limb awareness, maintain/improve skin integrity, and improve hand/UE motor function  * Therapeutic exercise to improve motor endurance, ROM, and functional strength for ADLs/functional transfers  * Therapeutic activities to facilitate/challenge dynamic balance, stand tolerance for increased safety and independence with ADLs  * Therapeutic activities to facilitate gross/fine motor skills for increased independence with ADLs  * Positioning to improve skin integrity, interaction with environment and functional independence    Recommended Adaptive Equipment: AE for LE dressing and bathing, shower seat, BSC with HR    Home Living: Pt lives with  Marylin Sorenson in a 3 story home with one steps and no hand rails. Bed and bath on main  floor with laundry on main floor with no steps and noHR's.   Family/ Outside assistance available:   Bathroom setup: tub shower    Equipment owned: spc    Prior Level of Function: recent weakness yet mod I  with ADLs , shared with IADLs; ambulated spc prn  Driving: yes   Occupation: n/a  Medication management: self  Leisure: enjoys casino    Pain Level: 8/10 neck pain    Cognition: A&O: 3/4; Follows one step directions with increased volume   Memory:  fair   Sequencing:  fair   Problem solving:  fair   Judgement/safety:  fair     Functional Assessment:  AM-PAC Daily Activity Raw Score: 10/24   Initial Eval Status  Date: 10/17/22 Treatment Status  Date: STGs = LTGs  Time frame: 2-4 days   Feeding Mod A  Mod I    Grooming Mod A   Mod I    UB Dressing Max A  Mod I    LB Dressing dependent  Min A with AE prn   Bathing Simulated max A   Min A seated with LHS   Toileting dependent  SBA to BSC   Bed Mobility  Supine to sit: mod A with increased time   Sit to supine:  mod A with increased time   Supine to sit: mod I   Sit to supine: mod I log rolling   Functional Transfers Sit to stand mod A hand held x2 with increased time   SBA with ww    Functional Mobility Side steps to HOB mod A HH x2 with increased time and cues  SBA with ww   Balance Sitting:     Static:  min A     Dynamic:mod A  Standing: mod A with UE support                                                                       Activity Tolerance Poor O2 4L 99% HR 91 limited by pain and confusion  Good    Visual/  Perceptual Glasses: reading [place]         Safety fair                                  Good     Hand Dominance R    AROM (PROM) Strength Additional Info:    RUE  Limited in shoulder by pain - noted increase with functional task  3-/5 poor  and fair FMC/dexterity noted during ADL tasks       LUE Shoulder limited by pain and weakness to 40*  2/5 poor  and poor FMC/dexterity noted during ADL tasks     Hearing: [place]  Sensation:  decreased in B feet and L UE >RUE   Tone: WFL   Edema: none noted    Comments: Upon arrival patient supine and agreeable to evaluation. Performed OT evaluation with education on C spine precautions and benefit of AROm to UE's and safety with movmeent and ADL completion. At end of session, patient supine with HOB elevated and  in room and  with call light and phone within reach, all lines and tubes intact. Nursing notified. Overall patient demonstrated  decreased independence and safety during completion of ADL/functional transfer/mobility tasks. Pt would benefit from continued skilled OT to increase safety and independence with completion of ADL/IADL tasks for functional independence and quality of life. Treatment: OT treatment provided this date includes:   Instruction/training on safety and adapted techniques for completion of ADLs: to increase Sutter in self care with AE/DME prn    Instruction/training on safe functional mobility/transfer techniques: with focus on safety, technique & precautions with ww   Instruction/training on energy conservation/work simplification for completion of ADLs: techniques to increase Sutter with self care ADLs & iADLs, work     simplification to improve endurance   Proper Positioning/Alignment: for optimal healing, skin integrity to prevent breakdown, decrease edema  Skilled monitoring of vitals: to include BP, spO2 & HR during session  Sitting/standing Balance/Tolerance- to increased balance & activity tolerance during ADLs as well as facilitate proper posture and/or positioning.   Therapeutic exercise- Instruction on B  UE ROM exercises to improve strength/function for increased Sutter with ADLs & iADLs     Rehab Potential: Good  for established goals     Patient / Family Goal: decrease pain and increase function- wants to walk again      Patient and/or family were instructed on functional diagnosis, prognosis/goals and OT plan of care. Demonstrated good understanding. Eval Complexity: low    Time In: 14:35  Time Out: 15:25  Total Treatment Time: 30 min. Min Units   OT Eval Low 02155  X     OT Eval Medium 50331      OT Eval High [id number]       OT Re-Eval [id number]       Therapeutic Ex [phone]       Therapeutic Activities 51225  30  1   ADL/Self Care 86595       Orthotic Management 15396       Neuro Re-Ed 39105       Non-Billable Time  5        Evaluation Time includes thorough review of current medical information, gathering information on past medical history/social history and prior level of function, completion of standardized testing/informal observation of tasks, assessment of data and education on plan of care and goals. Chinmay Gottlieb.  Ana 72, Marissa 70

## 2022-10-18 PROBLEM — Z99.81 O2 DEPENDENT: Status: ACTIVE | Noted: 2022-10-18

## 2022-10-18 LAB
ANION GAP SERPL CALCULATED.3IONS-SCNC: 13 MMOL/L (ref 7–16)
BUN BLDV-MCNC: 27 MG/DL (ref 6–23)
CALCIUM SERPL-MCNC: 8.8 MG/DL (ref 8.6–10.2)
CHLORIDE BLD-SCNC: 104 MMOL/L (ref 98–107)
CO2: 22 MMOL/L (ref 22–29)
CREAT SERPL-MCNC: 1.2 MG/DL (ref 0.5–1)
GFR SERPL CREATININE-BSD FRML MDRD: 49 ML/MIN/1.73
GLUCOSE BLD-MCNC: 216 MG/DL (ref 74–99)
HCT VFR BLD CALC: 37.2 % (ref 34–48)
HEMOGLOBIN: 12.1 G/DL (ref 11.5–15.5)
MCH RBC QN AUTO: 30.8 PG (ref 26–35)
MCHC RBC AUTO-ENTMCNC: 32.5 % (ref 32–34.5)
MCV RBC AUTO: 94.7 FL (ref 80–99.9)
METER GLUCOSE: 197 MG/DL (ref 74–99)
METER GLUCOSE: 223 MG/DL (ref 74–99)
METER GLUCOSE: 262 MG/DL (ref 74–99)
METER GLUCOSE: 268 MG/DL (ref 74–99)
PDW BLD-RTO: 14 FL (ref 11.5–15)
PLATELET # BLD: 154 E9/L (ref 130–450)
PMV BLD AUTO: 10.8 FL (ref 7–12)
POTASSIUM SERPL-SCNC: 4.8 MMOL/L (ref 3.5–5)
RBC # BLD: 3.93 E12/L (ref 3.5–5.5)
SODIUM BLD-SCNC: 139 MMOL/L (ref 132–146)
TSH SERPL DL<=0.05 MIU/L-ACNC: 0.6 UIU/ML (ref 0.27–4.2)
WBC # BLD: 13 E9/L (ref 4.5–11.5)

## 2022-10-18 PROCEDURE — 82962 GLUCOSE BLOOD TEST: CPT

## 2022-10-18 PROCEDURE — 97530 THERAPEUTIC ACTIVITIES: CPT

## 2022-10-18 PROCEDURE — 97535 SELF CARE MNGMENT TRAINING: CPT

## 2022-10-18 PROCEDURE — 84443 ASSAY THYROID STIM HORMONE: CPT

## 2022-10-18 PROCEDURE — 85027 COMPLETE CBC AUTOMATED: CPT

## 2022-10-18 PROCEDURE — 6360000002 HC RX W HCPCS: Performed by: NEUROLOGICAL SURGERY

## 2022-10-18 PROCEDURE — 97116 GAIT TRAINING THERAPY: CPT

## 2022-10-18 PROCEDURE — 80048 BASIC METABOLIC PNL TOTAL CA: CPT

## 2022-10-18 PROCEDURE — 94640 AIRWAY INHALATION TREATMENT: CPT

## 2022-10-18 PROCEDURE — 36415 COLL VENOUS BLD VENIPUNCTURE: CPT

## 2022-10-18 PROCEDURE — 6370000000 HC RX 637 (ALT 250 FOR IP): Performed by: INTERNAL MEDICINE

## 2022-10-18 PROCEDURE — 2700000000 HC OXYGEN THERAPY PER DAY

## 2022-10-18 PROCEDURE — 6370000000 HC RX 637 (ALT 250 FOR IP): Performed by: NEUROLOGICAL SURGERY

## 2022-10-18 PROCEDURE — 2580000003 HC RX 258: Performed by: NEUROLOGICAL SURGERY

## 2022-10-18 PROCEDURE — 1200000000 HC SEMI PRIVATE

## 2022-10-18 RX ORDER — SENNA AND DOCUSATE SODIUM 50; 8.6 MG/1; MG/1
2 TABLET, FILM COATED ORAL EVERY EVENING
Status: DISCONTINUED | OUTPATIENT
Start: 2022-10-18 | End: 2022-10-21 | Stop reason: HOSPADM

## 2022-10-18 RX ORDER — MECOBALAMIN 5000 MCG
5 TABLET,DISINTEGRATING ORAL NIGHTLY
Status: DISCONTINUED | OUTPATIENT
Start: 2022-10-18 | End: 2022-10-21 | Stop reason: HOSPADM

## 2022-10-18 RX ORDER — MECOBALAMIN 5000 MCG
5 TABLET,DISINTEGRATING ORAL NIGHTLY PRN
Status: DISCONTINUED | OUTPATIENT
Start: 2022-10-18 | End: 2022-10-21 | Stop reason: HOSPADM

## 2022-10-18 RX ORDER — CLONIDINE HYDROCHLORIDE 0.1 MG/1
0.1 TABLET ORAL EVERY 4 HOURS PRN
Status: DISCONTINUED | OUTPATIENT
Start: 2022-10-18 | End: 2022-10-21 | Stop reason: HOSPADM

## 2022-10-18 RX ORDER — SENNA AND DOCUSATE SODIUM 50; 8.6 MG/1; MG/1
2 TABLET, FILM COATED ORAL 2 TIMES DAILY PRN
Status: DISCONTINUED | OUTPATIENT
Start: 2022-10-18 | End: 2022-10-21 | Stop reason: HOSPADM

## 2022-10-18 RX ORDER — LACTOBACILLUS RHAMNOSUS GG 10B CELL
1 CAPSULE ORAL 2 TIMES DAILY
Status: DISCONTINUED | OUTPATIENT
Start: 2022-10-18 | End: 2022-10-21 | Stop reason: HOSPADM

## 2022-10-18 RX ORDER — POLYETHYLENE GLYCOL 3350 17 G/17G
17 POWDER, FOR SOLUTION ORAL 2 TIMES DAILY
Status: DISCONTINUED | OUTPATIENT
Start: 2022-10-18 | End: 2022-10-21 | Stop reason: HOSPADM

## 2022-10-18 RX ADMIN — SPIRONOLACTONE 25 MG: 25 TABLET ORAL at 10:25

## 2022-10-18 RX ADMIN — CARVEDILOL 3.12 MG: 6.25 TABLET, FILM COATED ORAL at 10:25

## 2022-10-18 RX ADMIN — ALBUTEROL SULFATE 2.5 MG: 2.5 SOLUTION RESPIRATORY (INHALATION) at 15:23

## 2022-10-18 RX ADMIN — Medication 1 CAPSULE: at 10:34

## 2022-10-18 RX ADMIN — Medication 10 ML: at 21:02

## 2022-10-18 RX ADMIN — OXYBUTYNIN CHLORIDE 10 MG: 10 TABLET, EXTENDED RELEASE ORAL at 10:24

## 2022-10-18 RX ADMIN — ACETAMINOPHEN 650 MG: 325 TABLET, FILM COATED ORAL at 17:43

## 2022-10-18 RX ADMIN — BUPROPION HYDROCHLORIDE 300 MG: 300 TABLET, FILM COATED, EXTENDED RELEASE ORAL at 10:25

## 2022-10-18 RX ADMIN — CARVEDILOL 3.12 MG: 6.25 TABLET, FILM COATED ORAL at 17:51

## 2022-10-18 RX ADMIN — INSULIN LISPRO 4 UNITS: 100 INJECTION, SOLUTION INTRAVENOUS; SUBCUTANEOUS at 12:48

## 2022-10-18 RX ADMIN — MORPHINE SULFATE 4 MG: 4 INJECTION, SOLUTION INTRAMUSCULAR; INTRAVENOUS at 06:24

## 2022-10-18 RX ADMIN — WATER 2000 MG: 1 INJECTION INTRAMUSCULAR; INTRAVENOUS; SUBCUTANEOUS at 23:37

## 2022-10-18 RX ADMIN — ACETAMINOPHEN 650 MG: 325 TABLET, FILM COATED ORAL at 12:20

## 2022-10-18 RX ADMIN — OXYCODONE AND ACETAMINOPHEN 2 TABLET: 5; 325 TABLET ORAL at 10:34

## 2022-10-18 RX ADMIN — ALBUTEROL SULFATE 2.5 MG: 2.5 SOLUTION RESPIRATORY (INHALATION) at 19:09

## 2022-10-18 RX ADMIN — ALBUTEROL SULFATE 2.5 MG: 2.5 SOLUTION RESPIRATORY (INHALATION) at 08:18

## 2022-10-18 RX ADMIN — MORPHINE SULFATE 4 MG: 4 INJECTION, SOLUTION INTRAMUSCULAR; INTRAVENOUS at 00:15

## 2022-10-18 RX ADMIN — PANTOPRAZOLE SODIUM 40 MG: 40 TABLET, DELAYED RELEASE ORAL at 06:08

## 2022-10-18 RX ADMIN — PRAMIPEXOLE DIHYDROCHLORIDE 0.75 MG: 0.25 TABLET ORAL at 10:25

## 2022-10-18 RX ADMIN — Medication 1 CAPSULE: at 20:53

## 2022-10-18 RX ADMIN — OXYCODONE AND ACETAMINOPHEN 2 TABLET: 5; 325 TABLET ORAL at 17:33

## 2022-10-18 RX ADMIN — WATER 2000 MG: 1 INJECTION INTRAMUSCULAR; INTRAVENOUS; SUBCUTANEOUS at 16:30

## 2022-10-18 RX ADMIN — SENNOSIDES AND DOCUSATE SODIUM 2 TABLET: 50; 8.6 TABLET ORAL at 21:01

## 2022-10-18 RX ADMIN — WATER 2000 MG: 1 INJECTION INTRAMUSCULAR; INTRAVENOUS; SUBCUTANEOUS at 06:08

## 2022-10-18 RX ADMIN — OXYCODONE AND ACETAMINOPHEN 2 TABLET: 5; 325 TABLET ORAL at 03:12

## 2022-10-18 RX ADMIN — PRAMIPEXOLE DIHYDROCHLORIDE 0.75 MG: 0.25 TABLET ORAL at 20:53

## 2022-10-18 RX ADMIN — CYCLOBENZAPRINE 10 MG: 10 TABLET, FILM COATED ORAL at 03:12

## 2022-10-18 RX ADMIN — INSULIN LISPRO 2 UNITS: 100 INJECTION, SOLUTION INTRAVENOUS; SUBCUTANEOUS at 17:34

## 2022-10-18 RX ADMIN — Medication 5 MG: at 20:55

## 2022-10-18 RX ADMIN — POLYETHYLENE GLYCOL 3350 17 G: 17 POWDER, FOR SOLUTION ORAL at 20:55

## 2022-10-18 RX ADMIN — VALSARTAN 20 MG: 40 TABLET, FILM COATED ORAL at 10:25

## 2022-10-18 RX ADMIN — CYCLOBENZAPRINE 10 MG: 10 TABLET, FILM COATED ORAL at 16:31

## 2022-10-18 RX ADMIN — ALOGLIPTIN 12.5 MG: 12.5 TABLET, FILM COATED ORAL at 10:25

## 2022-10-18 RX ADMIN — ALBUTEROL SULFATE 2.5 MG: 2.5 SOLUTION RESPIRATORY (INHALATION) at 11:35

## 2022-10-18 RX ADMIN — POLYETHYLENE GLYCOL 3350 17 G: 17 POWDER, FOR SOLUTION ORAL at 10:24

## 2022-10-18 RX ADMIN — SODIUM CHLORIDE, PRESERVATIVE FREE 10 ML: 5 INJECTION INTRAVENOUS at 16:31

## 2022-10-18 RX ADMIN — BISACODYL 5 MG: 5 TABLET, COATED ORAL at 10:25

## 2022-10-18 ASSESSMENT — PAIN SCALES - GENERAL
PAINLEVEL_OUTOF10: 6
PAINLEVEL_OUTOF10: 9
PAINLEVEL_OUTOF10: 7
PAINLEVEL_OUTOF10: 10
PAINLEVEL_OUTOF10: 6
PAINLEVEL_OUTOF10: 7
PAINLEVEL_OUTOF10: 7
PAINLEVEL_OUTOF10: 10
PAINLEVEL_OUTOF10: 7
PAINLEVEL_OUTOF10: 7
PAINLEVEL_OUTOF10: 8
PAINLEVEL_OUTOF10: 9
PAINLEVEL_OUTOF10: 7

## 2022-10-18 ASSESSMENT — PAIN DESCRIPTION - ORIENTATION
ORIENTATION: POSTERIOR
ORIENTATION: RIGHT;LEFT

## 2022-10-18 ASSESSMENT — PAIN DESCRIPTION - LOCATION
LOCATION: SHOULDER
LOCATION: NECK

## 2022-10-18 ASSESSMENT — PAIN - FUNCTIONAL ASSESSMENT
PAIN_FUNCTIONAL_ASSESSMENT: PREVENTS OR INTERFERES SOME ACTIVE ACTIVITIES AND ADLS
PAIN_FUNCTIONAL_ASSESSMENT: ACTIVITIES ARE NOT PREVENTED
PAIN_FUNCTIONAL_ASSESSMENT: ACTIVITIES ARE NOT PREVENTED
PAIN_FUNCTIONAL_ASSESSMENT: PREVENTS OR INTERFERES SOME ACTIVE ACTIVITIES AND ADLS
PAIN_FUNCTIONAL_ASSESSMENT: ACTIVITIES ARE NOT PREVENTED
PAIN_FUNCTIONAL_ASSESSMENT: ACTIVITIES ARE NOT PREVENTED

## 2022-10-18 ASSESSMENT — PAIN DESCRIPTION - DESCRIPTORS
DESCRIPTORS: ACHING;DISCOMFORT;DULL
DESCRIPTORS: CRAMPING;STABBING
DESCRIPTORS: ACHING;DISCOMFORT;DULL

## 2022-10-18 NOTE — PROGRESS NOTES
Met with patient at bedside. She is agreeable to come to ARU. She is aware that it is 3 hours of therapy a day. She lives with her  and daughter who will help care for her at home. She lives in a 2 story home with bed and bath on the 1st floor. She wears 3 L N/C oxygen at home.    Opal Britt RN  ARU Pre-screener/  [phone]

## 2022-10-18 NOTE — CARE COORDINATION
10/18/22 Update CM note: Patient out of network with insurance for [address] [place] [address]. Referral made to Volodymyr Hoang at [facility] acute rehab. Will await review and determination of acceptance. Electronically signed by Gatito Dorantes RN CM on 10/18/2022 at 3:07 PM     The Plan for Transition of Care is related to the following treatment goals: Acute Rehab     The Patient and/or patient representative was provided with a choice of provider and agrees   with the discharge plan. [x] Yes [] No    Freedom of choice list was provided with basic dialogue that supports the patient's individualized plan of care/goals, treatment preferences and shares the quality data associated with the providers.  [x] Yes [] No

## 2022-10-18 NOTE — PROGRESS NOTES
Department of Neurosurgery  Progress Note    CHIEF COMPLAINT: s/p posterior C3-T1 fusion 10/17/22    SUBJECTIVE:  Agitated overnight and this AM. Pain meds held due to confusion. C/o incisional pain. REVIEW OF SYSTEMS :  Constitutional: Negative for chills and fever. Neurological: Negative for dizziness, tremors and speech change.      OBJECTIVE:   VITALS:  BP (!) 156/93   Pulse 100   Temp 98 °F (36.7 °C) (Temporal)   Resp 15   Ht 5' 3\" (1.6 m)   Wt 120 lb (54.4 kg)   LMP  (LMP Unknown)   SpO2 95%   BMI 21.26 kg/m²     PHYSICAL:  Neurologic: Alert and oriented x3; PERRL  Motor Exam:  Motor exam upper strength 4/5  Sensory:  Sensory intact  Incision c/d/I  Custom collar intact      DATA:  CBC:   Lab Results   Component Value Date/Time    WBC 13.0 10/18/2022 06:19 AM    RBC 3.93 10/18/2022 06:19 AM    HGB 12.1 10/18/2022 06:19 AM    HCT 37.2 10/18/2022 06:19 AM    MCV 94.7 10/18/2022 06:19 AM    MCH 30.8 10/18/2022 06:19 AM    MCHC 32.5 10/18/2022 06:19 AM    RDW 14.0 10/18/2022 06:19 AM     10/18/2022 06:19 AM    MPV 10.8 10/18/2022 06:19 AM     BMP:    Lab Results   Component Value Date/Time     10/18/2022 06:19 AM    K 4.8 10/18/2022 06:19 AM    K 4.8 10/10/2022 11:40 AM     10/18/2022 06:19 AM    CO2 22 10/18/2022 06:19 AM    BUN 27 10/18/2022 06:19 AM    LABALBU 4.1 05/03/2022 12:57 PM    LABALBU 4.4 09/30/2011 02:00 PM    CREATININE 1.2 10/18/2022 06:19 AM    CALCIUM 8.8 10/18/2022 06:19 AM    GFRAA 60 10/10/2022 11:40 AM    LABGLOM 49 10/18/2022 06:19 AM    GLUCOSE 216 10/18/2022 06:19 AM    GLUCOSE 111 09/30/2011 02:00 PM     PT/INR:    Lab Results   Component Value Date/Time    PROTIME 12.2 10/10/2022 11:40 AM    INR 1.1 10/10/2022 11:40 AM     PTT:    Lab Results   Component Value Date/Time    APTT <20.0 07/23/2018 03:00 PM   [APTT}    Current Inpatient Medications  Current Facility-Administered Medications: melatonin disintegrating tablet 5 mg, 5 mg, Oral, Nightly PRN  melatonin disintegrating tablet 5 mg, 5 mg, Oral, Nightly  polyethylene glycol (GLYCOLAX) packet 17 g, 17 g, Oral, BID  sennosides-docusate sodium (SENOKOT-S) 8.6-50 MG tablet 2 tablet, 2 tablet, Oral, QPM  sennosides-docusate sodium (SENOKOT-S) 8.6-50 MG tablet 2 tablet, 2 tablet, Oral, BID PRN  lactobacillus (CULTURELLE) capsule 1 capsule, 1 capsule, Oral, BID  carvedilol (COREG) tablet 3.125 mg, 3.125 mg, Oral, BID WC  oxybutynin (DITROPAN-XL) extended release tablet 10 mg, 10 mg, Oral, Daily  oxyCODONE-acetaminophen (PERCOCET) 5-325 MG per tablet 2 tablet, 2 tablet, Oral, Q4H PRN  pramipexole (MIRAPEX) tablet 0.75 mg, 0.75 mg, Oral, BID  spironolactone (ALDACTONE) tablet 25 mg, 25 mg, Oral, Daily  valsartan (DIOVAN) tablet 20 mg, 20 mg, Oral, Daily  sodium chloride flush 0.9 % injection 5-40 mL, 5-40 mL, IntraVENous, 2 times per day  sodium chloride flush 0.9 % injection 5-40 mL, 5-40 mL, IntraVENous, PRN  0.9 % sodium chloride infusion, , IntraVENous, PRN  acetaminophen (TYLENOL) tablet 650 mg, 650 mg, Oral, 4 times per day  ondansetron (ZOFRAN-ODT) disintegrating tablet 4 mg, 4 mg, Oral, Q8H PRN **OR** ondansetron (ZOFRAN) injection 4 mg, 4 mg, IntraVENous, Q6H PRN  ceFAZolin (ANCEF) 2,000 mg in sterile water 20 mL IV syringe, 2,000 mg, IntraVENous, Q8H  morphine (PF) injection 2 mg, 2 mg, IntraVENous, Q2H PRN **OR** morphine sulfate (PF) injection 4 mg, 4 mg, IntraVENous, Q2H PRN  cyclobenzaprine (FLEXERIL) tablet 10 mg, 10 mg, Oral, Q12H PRN  diphenhydrAMINE (BENADRYL) tablet 25 mg, 25 mg, Oral, Q6H PRN **OR** diphenhydrAMINE (BENADRYL) injection 25 mg, 25 mg, IntraVENous, Q6H PRN  bisacodyl (DULCOLAX) EC tablet 5 mg, 5 mg, Oral, Daily  bisacodyl (DULCOLAX) suppository 10 mg, 10 mg, Rectal, Daily PRN  fleet rectal enema 1 enema, 1 enema, Rectal, Daily PRN  benzocaine-menthol (CEPACOL SORE THROAT) lozenge 1 lozenge, 1 lozenge, Oral, Q2H PRN  insulin lispro (HUMALOG) injection vial 0-8 Units, 0-8 Units, SubCUTAneous, TID WC  insulin lispro (HUMALOG) injection vial 0-4 Units, 0-4 Units, SubCUTAneous, Nightly  glucose chewable tablet 16 g, 4 tablet, Oral, PRN  dextrose bolus 10% 125 mL, 125 mL, IntraVENous, PRN **OR** dextrose bolus 10% 250 mL, 250 mL, IntraVENous, PRN  glucagon (rDNA) injection 1 mg, 1 mg, SubCUTAneous, PRN  dextrose 10 % infusion, , IntraVENous, Continuous PRN  albuterol (PROVENTIL) nebulizer solution 2.5 mg, 2.5 mg, Nebulization, 4x daily  pantoprazole (PROTONIX) tablet 40 mg, 40 mg, Oral, QAM AC  alogliptin (NESINA) tablet 12.5 mg, 12.5 mg, Oral, Daily  buPROPion (WELLBUTRIN XL) extended release tablet 300 mg, 300 mg, Oral, Daily    ASSESSMENT:   s/p posterior C3-T1 fusion 10/17/22    PLAN:  -Pain control  -PT/OT  -Drain care.  Plan to remove Thursday  -D/c monzon  -PMR consult  -Follow up in neurosurgery clinic in 2 weeks for staple removal and in 4 weeks with XR      Electronically signed by Seamus Ames PA-C on 10/18/2022 at 11:22 AM

## 2022-10-18 NOTE — PROGRESS NOTES
Physical Therapy  Treatment Note    Name: Keke Isabel  : 1955  MRN: [id number]      Date of Service: 10/18/2022    Evaluating PT:  Romelia NEIL Reyes, DPT    Room #:  6863/2105-X  Diagnosis:  Spinal stenosis in cervical region [M48.02]  Cervical stenosis of spinal canal [M48.02]  PMHx/PSHx:  CKD, CAD, COPD, NPH, hypotension  Procedure/Surgery:  s/p C3-C7 laminectomy, C3-T1 fusion  Precautions:  fall risk, c/s collar, spine, drain  Equipment Needs:  TBD    SUBJECTIVE:    Pt lives with spouse and dtr in a 2 story home with 3 steps to enter and 1 handrail. Bed/bath is on 1st floor. Pt ambulated with cane PTA. OBJECTIVE:   Initial Evaluation  Date: 10/17/22 Treatment  Date: 10/18/22 Short Term/ Long Term   Goals   AM-PAC 6 Clicks     Was pt agreeable to Eval/treatment? yes yes    Does pt have pain?  8/10 neck Unrated neck pain    Bed Mobility  Rolling: mod A  Supine to sit: mod A  Sit to supine: mod A  Scooting: mod A Rolling: NT  Supine to sit: NT  Sit to supine: mod A  Scooting: min A Rolling: mod I  Supine to sit: mod I  Sit to supine: mod I  Scooting: mod I   Transfers Sit to stand: mod A  Stand to sit: mod A  Stand pivot: NT Sit to stand: min A  Stand to sit: min A  Stand pivot: min A with ww Sit to stand: mod I  Stand to sit: mod I  Stand pivot: mod I with AAD   Ambulation    2' with HHA mod A 40'x2 with ww mod A 150'+ with AAD mod I   Stair negotiation: ascended and descended  NT NT 3 steps with 1 handrail mod I     Strength/ROM:   BLE grossly 3+/5  BLE AROM WFL    Balance:   Static Sitting: SBA  Dynamic Sitting: CGA  Static Standing: min A with ww  Dynamic Standing: min<>mod A with ww    Pt is A & O x 3  Sensation:  Pt denies numbness and tingling to extremities  Edema:  unremarkable    Therapeutic Exercises:    Bed mobility: supine<>sit, cued for EOB positioning  Transfers: STSx2, cued for hand placement and postural correction  Ambulation: 40'x2 with ww  BLE AROM    Patient education  Pt educated on role of PT, importance of functional mobility during hospital stay, safety with functional mobility    Patient response to education:   Pt verbalized understanding Pt demonstrated skill Pt requires further education in this area   yes partial yes     ASSESSMENT:  Comments:    Pt sitting at EOB upon entering, agreeable to participate. Pt demonstrating good static sitting balance at EOB, pt on phone upset, somewhat confused. Pt required increased cueing and time to maintain safety and follow therapist instructions appropriately. Pt was instructed to stand from EOB, provided ww for increased support. Pt demonstrating fair static standing balance with ww. Pt was instructed to ambulate to tolerance. Pt demonstrating fair alyssa with intermittent standing rest periods during bout. Pt ambulating with short step length and flexed posture. Pt was cued for postural correction and ww spacing for safety. Toward the end of bout, pt began to buckle slightly 2/2 fatigue, however, she was able to return to bedside at the end of session. Pt was transferred back to supine and positioned for comfort. All needs met and call bell in reach prior to exiting. Spouse present. Treatment:  Patient practiced and was instructed in the following treatment:    Bed mobility training - pt given verbal and tactile cues to facilitate proper sequencing and safety during rolling and supine>sit as well as provided with physical assistance to complete task   Sitting EOB for >8 minutes for upright tolerance, postural awareness and BLE ROM  STS and pivot transfer training - pt educated on proper hand and foot placement, safety and sequencing, and use of verbal and tactile cues to safely complete sit<>stand and pivot transfers with physical assistance to complete task safely   Gait training- pt was given verbal and tactile cues to facilitate pt safety with ww use during ambulation as well as provided with physical assistance.   Skilled positioning - Pt placed in the chair position with pillows utilized to facilitate upright posture, joint and skin integrity, and interaction with environment. PLAN:    Patient is making good progress towards established goals. Will continue with current POC.       Time in  0920  Time out  0950    Total Treatment Time  25 minutes     CPT codes:  [x] Gait training 69500 15 minutes  [] Manual therapy [id number] -- minutes  [x] Therapeutic activities 85423 10 minutes  [] Therapeutic exercises 64844 -- minutes  [] Neuromuscular reeducation 56572 -- minutes    Josey Rudolph, PT, DPT  [id number]

## 2022-10-18 NOTE — PROGRESS NOTES
Attempted to initiate pre-cert with [facility]. She is not in network for ARU facility and has no out of network benefits.  notified.    Tim Timmons RN  ARU Pre-screener/  [phone]

## 2022-10-18 NOTE — CONSULTS
[address]                  [address] [facility], [address] [facility]                                  CONSULTATION    PATIENT NAME: Tre Maher                      :        1955  MED REC NO:   [id number]                            ROOM:       3616  ACCOUNT NO:   [de-identified]                           ADMIT DATE: 10/17/2022  PROVIDER:     Corrie Britton MD    CONSULT DATE:  10/18/2022    AGE:  75. SEX:  Female. CHIEF COMPLAINT:  Cervical myelopathy. HISTORY OF PRESENT ILLNESS:  The patient has had progressively worsening  weakness of all four extremities for some time. She had previously been  seen by Dr. Kedar Edge who retired. There was then some delay in her seeing  Dr. Remington Olivares and by the time she did that she was having a great deal of  difficulty with her walking at home and had worsening weakness. She was  admitted [facility] on [facility]2022 with cervical stenosis with  myelopathy and underwent decompressive surgery. She tolerated the  surgery well. Postoperatively, she is still at a mod-to-max assist  level with all of her functioning, has significant weakness, and I was  asked to see her in terms of planning further rehab. PAST MEDICAL HISTORY:  1. COPD. 2.  Coronary artery disease. 3.  Congestive heart failure. 4.  Chronic renal impairment. 5.  Type 2 diabetes mellitus. 6.  History of normal pressure hydrocephalus. The patient does not appear to be very aware of any of these issues and  only related to me about her problems with the neck. ALLERGIES:  SULFA and PENTAZOCINE. CURRENT MEDICATIONS:  Tylenol, Proventil, Nesina, Wellbutrin, Coreg,  sliding scale insulin, Ditropan, Protonix, Mirapex, Senokot, Aldactone,  Diovan, and oxycodone as needed for pain. SOCIAL HISTORY:  She lives with her . REVIEW OF SYSTEMS:  HEENT:  Negative for prior HEENT problems. PULMONARY:  Positive for COPD.   CARDIAC:  Positive for coronary disease. GI:  Positive for reflux. :  Positive for incontinence. ORTHOPEDIC:   Positive for the cervical stenosis. NEUROLOGIC:  Positive for cervical  myelopathy with quadriparesis. PHYSICAL EXAMINATION:  GENERAL:  Thin, frail-appearing white female, in no acute distress with  a rigid cervical collar. HEAD:  Normocephalic, atraumatic. NECK:  Rigid cervical collar. LUNGS:  Decreased breath sounds throughout with scattered rales. HEART:  Regular rate and rhythm. S1, S2 normal.  No murmurs or gallops. ABDOMEN:  Bowel sounds normal.  Soft, nontender. No masses. EXTREMITIES:  Without clubbing, cyanosis or edema. MENTAL STATUS:  Alert and oriented but with a long response time. SENSATION:  Diminished in all four extremities. NEUROMUSCULAR:  Strength is only about grade 3 throughout and she has  weakness in all four extremities. PROBLEM LIST:  1. Cervical myelopathy. 2.  Quadriparesis. 3.  Status post cervical decompression and fusion. 4.  COPD. 5.  Coronary artery disease. 6.  Type 2 diabetes mellitus. RECOMMENDATIONS:  I think the patient has significant neurologic  deficits and is going to require acute level rehab because we are  basically dealing with an incomplete spinal cord injury. She has been  decompressed. She tolerated that well. I think there is good potential  for improvement, but she has got significant deficits right now and is  not safe. We will try to get her precerted for acute rehab because of  her medical issues and the extent of her neurologic deficits.         Elli Garsia MD    D: 10/18/2022 8:42:37       T: 10/18/2022 8:46:40     [email or web address]  Job#: [id number]     Doc#: [id number]    CC:

## 2022-10-18 NOTE — PROGRESS NOTES
Hospitalist Progress Note            Patient: Alejandra Rivas Age: 75 y.o.   DOA: 10/17/2022 Admit Dx / CC: Spinal stenosis in cervical region [M48.02]  Cervical stenosis of spinal canal [M48.02]   LOS:  LOS: 1 day      Assessment/ Plan:     Cervical stenosis of spinal canal s/p laminectomy and C-spine fusion 10/17  Mx per Nsx  Surgical drain in place  Taught her to use IS and encouraged use    CKD 3, stable    Chronic systolic heart failure, stable  Cont home meds    DM2, controlled  Cont home meds  Check A1c    COPD, stable  On 3 L O2 nocturnally  Cont home meds    Hx/o NPH s/p ventriculoperitoneal shunt in 2018    DVT ppx: Per NSx  Code status: Full code    Thank you for allowing me to participate in Ms Rito care, will follow prn    Plan of care discussed with: patient and bedside RN, CM    Patient Active Problem List   Diagnosis    CAD (coronary artery disease)    Pulmonary hypertension (HCC)    COPD (chronic obstructive pulmonary disease) ([address]    CKD (chronic kidney disease) stage 3, GFR 30-59 ml/min    Normal pressure hydrocephalus (HCC)    Chronic systolic (congestive) heart failure (HCC)    Severe mitral regurgitation    NPH (normal pressure hydrocephalus) ([address]    COVID-19    Syncope and collapse    Gastroenteritis    Acute kidney injury superimposed on CKD (HCC)    Hypotension    S/P ventriculoperitoneal shunt    Cervical stenosis of spinal canal    Type 2 diabetes mellitus, without long-term current use of insulin (HCC)        Medications:  Reviewed    Infusion Medications    sodium chloride      dextrose       Scheduled Medications    melatonin  5 mg Oral Nightly    polyethylene glycol  17 g Oral BID    sennosides-docusate sodium  2 tablet Oral QPM    lactobacillus  1 capsule Oral BID    carvedilol  3.125 mg Oral BID WC    oxybutynin  10 mg Oral Daily    pramipexole  0.75 mg Oral BID    spironolactone  25 mg Oral Daily    valsartan  20 mg Oral Daily    sodium chloride flush  5-40 mL IntraVENous 2 times per day    acetaminophen  650 mg Oral 4 times per day    ceFAZolin (ANCEF) IVPB  2,000 mg IntraVENous Q8H    bisacodyl  5 mg Oral Daily    insulin lispro  0-8 Units SubCUTAneous TID WC    insulin lispro  0-4 Units SubCUTAneous Nightly    albuterol  2.5 mg Nebulization 4x daily    pantoprazole  40 mg Oral QAM AC    alogliptin  12.5 mg Oral Daily    buPROPion  300 mg Oral Daily     PRN Meds: melatonin, sennosides-docusate sodium, oxyCODONE-acetaminophen, sodium chloride flush, sodium chloride, ondansetron **OR** ondansetron, morphine **OR** morphine, cyclobenzaprine, diphenhydramine **OR** diphenhydrAMINE, bisacodyl, fleet, benzocaine-menthol, glucose, dextrose bolus **OR** dextrose bolus, glucagon (rDNA), dextrose    I/O    Intake/Output Summary (Last 24 hours) at 10/18/2022 1223  Last data filed at 10/17/2022 2319  Gross per 24 hour   Intake 180 ml   Output 750 ml   Net -570 ml       Labs:   Recent Labs     10/18/22  0619   WBC 13.0*   HGB 12.1   HCT 37.2          Recent Labs     10/18/22  0619      K 4.8      CO2 22   BUN 27*   CREATININE 1.2*   CALCIUM 8.8       No results for input(s): PROT, ALB, ALKPHOS, ALT, AST, BILITOT, AMYLASE, LIPASE in the last 72 hours. No results for input(s): INR in the last 72 hours. No results for input(s): Merlin Camp in the last 72 hours. Chronic labs:  Lab Results   Component Value Date    CHOL 187 09/10/2020    TRIG 153 (H) 09/10/2020    HDL 40 07/27/2021    LDLCALC 148 (H) 07/27/2021    TSH 1.780 07/27/2021    INR 1.1 10/10/2022    LABA1C 6.8 (H) 07/27/2021       Radiology:  Imaging studies reviewed today. Subjective: Concepcion Llanos had a bad dream in the morning and thought she was dead and started screaming.     Objective:     Physical Exam:   /70   Pulse 97   Temp 98.2 °F (36.8 °C) (Temporal)   Resp 16   Ht 5' 3\" (1.6 m)   Wt 120 lb (54.4 kg)   LMP  (LMP Unknown)   SpO2 99%   BMI 21.26 kg/m²     General appearance:in no distress, up in bed in hard neck collar  Lungs: Clear to auscultation bilaterally, no wheezing or crackles   Heart: Regular rate and rhythm, S1, S2 normal   Abdomen: Soft, non-tender and not-distended.  Bowel sounds normal.   Extremities: no edema   Skin: Skin color, texture, turgor normal. No rashes or lesions   Neurologic: Grossly normal and non focal, CN II-XII intact       Kelly Adams MD   Hospitalist Service   10/18/22 12:23 PM

## 2022-10-18 NOTE — PLAN OF CARE
Problem: Discharge Planning  Goal: Discharge to home or other facility with appropriate resources  Outcome: Progressing     Problem: Chronic Conditions and Co-morbidities  Goal: Patient's chronic conditions and co-morbidity symptoms are monitored and maintained or improved  Outcome: Progressing     Problem: Pain  Goal: Verbalizes/displays adequate comfort level or baseline comfort level  Outcome: Completed     Problem: Safety - Adult  Goal: Free from fall injury  Outcome: Completed     Problem: Skin/Tissue Integrity  Goal: Absence of new skin breakdown  Description: 1. Monitor for areas of redness and/or skin breakdown  2. Assess vascular access sites hourly  3. Every 4-6 hours minimum:  Change oxygen saturation probe site  4. Every 4-6 hours:  If on nasal continuous positive airway pressure, respiratory therapy assess nares and determine need for appliance change or resting period.   Outcome: Completed     Problem: ABCDS Injury Assessment  Goal: Absence of physical injury  Outcome: Completed

## 2022-10-18 NOTE — OP NOTE
[address]                  [address] [facility], [address] [place] [address]                                OPERATIVE REPORT    PATIENT NAME: Tanya Meyer                      :        1955  MED REC NO:   [id number]                            ROOM:       8234  ACCOUNT NO:   [de-identified]                           ADMIT DATE: 10/17/2022  PROVIDER:     Phuong Luque MD    DATE OF PROCEDURE:  10/17/2022    PREOPERATIVE DIAGNOSIS:  Cervical stenosis from C3 to C7. POSTOPERATIVE DIAGNOSIS:  Cervical stenosis from C3 to C7. OPERATIONS PERFORMED:  1.  Bilateral segmental arthrodesis and fusion from C3 to T1 with the  use of bilateral C3, C4, C5, and C6 lateral mass screws and bilateral T1  pedicle screws with the use of locally harvested autograft plus  allograft in the form of XEMPLIFI putty for posterolateral fusion from  C3 to T1.  2.  Bilateral C3, C4, C5, C6, and C7 laminectomy. 3.  Use of O-arm assisted navigation with placement of T1 pedicle  screws. SURGEON:  Phuong Luque MD    ASSISTANT:  Lilia Fowler DO    ANESTHESIA:  Generalized endotracheal anesthesia. COMPLICATIONS:  None. ESTIMATED BLOOD LOSS:  200 mL. SPECIMEN:  None. OPERATIVE INDICATIONS:  The patient is a 58-year-old lady who presented  to the office complaining of neck pain with gait instability, numbness,  tingling, weakness. She was found to have an MRI of her cervical spine  that showed severe stenosis from C3 to C7. She had failed conservative  therapy, and after risks, benefits, and alternatives were discussed with  the patient, it was determined that she would undergo the above-listed  procedure. OPERATIVE PROCEDURE:  The patient was brought into the operating room. A timeout was performed where she was identified by her name, medical  record number, and the operative procedure which she was about to  undergo.   Next, induction of generalized endotracheal anesthesia was  then commenced. Upon completion of induction of generalized  endotracheal anesthesia, she received preoperative antibiotics. Decker  catheter was placed. She was then flipped into prone position on a  Bret table. All pressure points were padded. Hair was clipped. Posterior cervicothoracic region was prepped and draped in the usual  sterile fashion. After this was done, a #10 blade was used to make a  skin incision. Monopolar cautery was used to dissect through the  subcutaneous tissue. I placed self-retaining Weitlaner retractor into  the wound. Next, I opened up the posterior cervical fascia sharply with  monopolar cautery and exposed the spinous processes at C3, C4, C5, C6,  C7, and T1. Once this was completed, I then placed self-retaining  angled cerebellar retractors into the wound. I performed subperiosteal  dissection to expose the bilateral laminae and lateral masses at C3, C4,  C5, and C6 and bilateral laminae and transverse processes at C7 and T1. After this was done, I attached the O-arm reference frame to the T1  spinous process. Using O-arm assisted navigation, I placed bilateral T1  pedicle screws. I performed another O-arm spin that showed that screws  were within the pedicles. I then removed the O-arm from the field. I  then replaced self-retaining angled cerebellar retractors into the  wound. I subsequently used a Magerl technique to cannulate the lateral  masses bilaterally at C3, C4, C5, and C6, placing a 3.5 x 14-mm Globus  Quartex screws. After this was done, I then proceeded to use a Leksell  rongeur to bite up the spinous processes at C3, C4, C5, C6, and C7. I  then used a high-speed bur with a matchstick bit to drill troughs at the  laminar facet lines bilaterally at C3, C4, C5, and C6.   Next, I used a  #2 Kerrison punch to detach the lamina from the facets bilaterally at  C3, C4, C5, C6, and C7, in essence performing a bilateral laminectomy at  C3, C4, C5, C6, and C7.  After this was done, I placed rods into the  screw heads and locked the rods now using locking caps in  torque/counter-torque mechanism. I then used a high-speed bur to  decorticate the lateral masses bilaterally at C3, C4, C5, and C6 and  transverse processes at C7 and T1. I irrigated the wound copiously with  antibiotic-impregnated saline. I laid out my bone grafting material in  lateral gutters which consisted of locally harvested autograft plus  allograft in the form of XEMPLIFI putty. After this was done and after  I confirmed that the rods were placed into the screw heads and locked  securely, I then proceeded to obtain adequate hemostasis with monopolar  and bipolar cautery. Next, a Hemovac drain was placed into the wound  after I placed my bone grafting material and then proceeded to obtain  adequate hemostasis with monopolar and bipolar cautery. I then closed  the wound in layers using 0 Vicryl for the fascia, 2-0 Vicryl for the  subcutaneous layer, and staples for the skin. A dry sterile dressing  was placed over this. The patient was then flipped into supine position  on her hospital bed, was extubated, and transported to the  postanesthesia care unit in stable condition. There were no  complications. Counts were correct. I was present for the entire case.         Tiny Dominguez MD    D: 10/17/2022 19:40:50       T: 10/17/2022 19:43:46     [id number]  Job#: [id number]     Doc#: [id number]    CC:

## 2022-10-18 NOTE — PROGRESS NOTES
Occupational Therapy  OCCUPATIONAL THERAPY BEDSIDE TREATMENT NOTE    [address] [place], [address] [facility]     Date:10/18/2022  Patient Name: Mariel Monreal  MRN: [id number]  : 1955  Room: [address] [place] [address]-A     Referring Niranjan Lino MD  Specific Provider Orders/Date: OT evaluation and treat 10/17/22     Evaluating OT: Mera Bañuelos.  Moreno, OTR/L #[id number]     Diagnosis: Spinal stenosis in cervical region [M48.02]  Cervical stenosis of spinal canal [M48.02]       Surgery:   Past Surgical History         Past Surgical History:   Procedure Laterality Date    CARDIAC SURGERY        CERVICAL FUSION        C3-C6    CHOLECYSTECTOMY       COLONOSCOPY N/A 2019     COLONOSCOPY POLYPECTOMY SNARE/COLD BIOPSY performed by Marium Reese MD at [facility]   10/30/2002    EYE SURGERY         cataract with Lens implants    HERNIA REPAIR        HYSTERECTOMY (CERVIX STATUS UNKNOWN)   [facility]   2018     resolved    MO CREATE SHUNT:VENTRIC-PERITONEAL N/A 10/15/2018     VENTRICULAR PERITONEAL SHUNT  PLACEMENT performed by Riaz Steinberg MD at [address] N/A 2018     LUMBAR DRAIN INSERTION performed by Riaz Steinberg MD at [facility] N/A 2021     VENTRICULAR PERITONEAL SHUNT REPLACEMENT performed by Riaz Steinberg MD at [address] [place]         Pertinent Medical History:  has a past medical history of Acid reflux disease, Acute on chronic respiratory failure with hypoxia and hypercapnia ([address] Apraxia, Arthritis, Ataxia, CAD (coronary artery disease), Choledocholithiasis, Chronic systolic congestive heart failure ([address] CKD (chronic kidney disease) stage 3, GFR 30-59 ml/min ([facility]), COPD (chronic obstructive pulmonary disease) ([address] Diabetes mellitus ([address] Hyperlipidemia, Hypoxia, Neck pain, Normal pressure hydrocephalus ([facility] Oxygen dependent, Pleural effusion, Pulmonary hypertension (HCC), Restless legs, S/P CABG x 2, and Severe mitral regurgitation.       Precautions:  Fall Risk, C spine, hemovac drain, C collar     Assessment of current deficits   [x] Functional mobility             [x]ADLs           [x] Strength                  []Cognition   [x] Functional transfers           [x] IADLs         [x] Safety Awareness   [x]Endurance   [x] Fine Coordination              [x] Balance      [] Vision/perception   [x]Sensation     [x]Gross Motor Coordination  [x] ROM           [] Delirium                   [] Motor Control      OT PLAN OF CARE   OT POC based on physician orders, patient diagnosis and results of clinical assessment     Frequency/Duration   2-4 days/wk for 1 week PRN   Specific OT Treatment Interventions to include:   * Instruction/training on adapted ADL techniques and AE recommendations to increase functional independence within precautions       * Training on energy conservation strategies, correct breathing pattern and techniques to improve independence/tolerance for self-care routine  * Functional transfer/mobility training/DME recommendations for increased independence, safety, and fall prevention  * Patient/Family education to increase follow through with safety techniques and functional independence  * Recommendation of environmental modifications for increased safety with functional transfers/mobility and ADLs  * Sensory re-education to improve body/limb awareness, maintain/improve skin integrity, and improve hand/UE motor function  * Therapeutic exercise to improve motor endurance, ROM, and functional strength for ADLs/functional transfers  * Therapeutic activities to facilitate/challenge dynamic balance, stand tolerance for increased safety and independence with ADLs  * Therapeutic activities to facilitate gross/fine motor skills for increased independence with ADLs  * Positioning to improve skin integrity, interaction with environment and functional independence     Recommended Adaptive Equipment: AE for LE dressing and bathing, shower seat, BSC with HR     Home Living: Pt lives with  Tereza Avendaño in a 3 story home with one steps and no hand rails. Bed and bath on main  floor with laundry on main floor with no steps and noHR's. Family/ Outside assistance available:   Bathroom setup: tub shower    Equipment owned: spc     Prior Level of Function: recent weakness yet mod I  with ADLs , shared with IADLs; ambulated spc prn  Driving: yes   Occupation: n/a  Medication management: self  Leisure: enjoys casino     Pain Level: neck pain    Cognition: A&O: 3/4; Follows one step directions with increased volume and irritation when entering room. Patient still showing confusion and agitation from anesthesia. Memory:  fair              Sequencing:  fair              Problem solving:  fair-              Judgement/safety:  fair-                Functional Assessment:  AM-PAC Daily Activity Raw Score: 13/24    Initial Eval Status  Date: 10/17/22 Treatment Status  Date: 10/18/22 STGs = LTGs  Time frame: 2-4 days   Feeding Mod A  Min A   Mod I    Grooming Mod A   Min A; To simulate washing face and combing hair in sitting. Mod I    UB Dressing Max A  Mod A;    Donning gown around back with verbal prompting to increase completion and understanding. Mod I    LB Dressing dependent N/T:    Complete on next session due to pt irritation this session. Min A with AE prn   Bathing Simulated max A   N/T   Min A seated with LHS   Toileting dependent  Dep-  catheter in place. Min A - to simulate toilet transfer. SBA to [facility]   Bed Mobility  Supine to sit: mod A with increased time   Sit to supine:  mod A with increased time   Sup <> sit: Min A; Increased verbal prompting to improve proper positioning and safety.     Supine to sit: mod I   Sit to supine: mod I log rolling   Functional Transfers Sit to stand mod A hand held x2 with increased time   Sit to stand: Min A SBA with ww    Functional Mobility Side steps to HOB mod A HH x2 with increased time and cues  Mod A with use of w/w;    Patient required increase verbal prompting for direction and use of w/w. Patient also required assistance due to knee buckling. SBA with ww   Balance Sitting:     Static:  min A     Dynamic:mod A  Standing: mod A with UE support  Sitting: Min A      Standing: Mod A with w/w. Activity Tolerance Poor O2 4L 99% HR 91 limited by pain and confusion  O2  4L during treatment. Attempted to get reading of O2 stats however patient confused and resistant with task. Good    Visual/  Perceptual Glasses: reading [place]           Safety fair  Fair-                                 Good      Hand Dominance R     AROM (PROM) Strength Additional Info:    RUE  Limited in shoulder by pain - noted increase with functional task  3-/5 poor  and fair FMC/dexterity noted during ADL tasks         LUE Shoulder limited by pain and weakness to 40*  2/5 poor  and poor FMC/dexterity noted during ADL tasks      Hearing: [employer]  Sensation:  decreased in B feet and L UE >RUE   Tone: WFL   Edema: none noted     Comments: Upon arrival patient supine and agreeable to evaluation after much verbal motivation this date. Performed OT treatment with PT while completing education on C spine precautions and benefit of AROm to UE's and safety with movment and ADL completion. At end of session, patient supine with HOB elevated and  in room and with call light and phone within reach, all lines and tubes intact. Nursing notified. Overall patient demonstrated  decreased independence and safety during completion of ADL/functional transfer/mobility tasks.   Pt would benefit from continued skilled OT to increase safety and independence with completion of ADL/IADL tasks for functional independence and quality of life. Time In: 0920  Time Out: 0950  Total Treatment Time: 30 min.        Min Units   OT Eval Low 10132      OT Eval Medium [id number]      OT Eval High [id number]      OT Re-Eval [id number]      Therapeutic Ex [id number]      Therapeutic Activities 41877 20 1    ADL/Self Care 82106 10 1    Orthotic Management 19671      Neuro Re-Ed [id number]      Non-Billable Time        Roderick [address], [address] [facility]

## 2022-10-18 NOTE — CARE COORDINATION
10/18/22  Spoke with patient and her  regarding transition of care. Patient admitted for spinal stenosis and is s/p laminectomy of C3-C-7 and C-3-T1 fusion. Patient has a hemo vac in place as well as a monzon which is planned for removal today. Patient lives at home with her  in a 2 story house. Patient states bed and bath are on the first floor. Patient has 2 steps to enter with no rails. Patient voices having a walker and cane at home. Patient also on 3L of O2 at baseline. Patient has a history of a rehab stay at [address] [place] [address] as well as h/o home care with [facility]. PCP is Dr. Rina Newman and pharmacy is Hardik Joshi in [facility]. Spoke with patient about a rehab stay for strengthening prior to returning home and patient agreeable. Patient is being followed by [facility] and patient is agreeable with a stay at [address] [place] [address]  should [facility] not accept. Shakir called with referral given and pending. [facility] to follow for discharge planning needs.     Electronically signed by DEENA Beltrán on 10/18/2022 at 11:03 AM

## 2022-10-19 PROBLEM — E43 SEVERE PROTEIN-CALORIE MALNUTRITION (HCC): Chronic | Status: ACTIVE | Noted: 2022-10-19

## 2022-10-19 LAB
ALBUMIN SERPL-MCNC: 3.1 G/DL (ref 3.5–5.2)
ALP BLD-CCNC: 100 U/L (ref 35–104)
ALT SERPL-CCNC: <5 U/L (ref 0–32)
ANION GAP SERPL CALCULATED.3IONS-SCNC: 11 MMOL/L (ref 7–16)
AST SERPL-CCNC: 19 U/L (ref 0–31)
BASOPHILS ABSOLUTE: 0.03 E9/L (ref 0–0.2)
BASOPHILS RELATIVE PERCENT: 0.3 % (ref 0–2)
BILIRUB SERPL-MCNC: 0.2 MG/DL (ref 0–1.2)
BUN BLDV-MCNC: 20 MG/DL (ref 6–23)
CALCIUM SERPL-MCNC: 9.4 MG/DL (ref 8.6–10.2)
CHLORIDE BLD-SCNC: 103 MMOL/L (ref 98–107)
CO2: 24 MMOL/L (ref 22–29)
CREAT SERPL-MCNC: 1 MG/DL (ref 0.5–1)
EOSINOPHILS ABSOLUTE: 0.05 E9/L (ref 0.05–0.5)
EOSINOPHILS RELATIVE PERCENT: 0.5 % (ref 0–6)
FOLATE: 3.3 NG/ML (ref 4.8–24.2)
GFR SERPL CREATININE-BSD FRML MDRD: >60 ML/MIN/1.73
GLUCOSE BLD-MCNC: 218 MG/DL (ref 74–99)
HBA1C MFR BLD: 9.8 % (ref 4–5.6)
HCT VFR BLD CALC: 36.7 % (ref 34–48)
HEMOGLOBIN: 11.8 G/DL (ref 11.5–15.5)
IMMATURE GRANULOCYTES #: 0.04 E9/L
IMMATURE GRANULOCYTES %: 0.4 % (ref 0–5)
IRON SATURATION: 9 % (ref 15–50)
IRON: 34 MCG/DL (ref 37–145)
LYMPHOCYTES ABSOLUTE: 1.77 E9/L (ref 1.5–4)
LYMPHOCYTES RELATIVE PERCENT: 17.5 % (ref 20–42)
MAGNESIUM: 1.5 MG/DL (ref 1.6–2.6)
MCH RBC QN AUTO: 31.3 PG (ref 26–35)
MCHC RBC AUTO-ENTMCNC: 32.2 % (ref 32–34.5)
MCV RBC AUTO: 97.3 FL (ref 80–99.9)
METER GLUCOSE: 202 MG/DL (ref 74–99)
METER GLUCOSE: 204 MG/DL (ref 74–99)
METER GLUCOSE: 210 MG/DL (ref 74–99)
METER GLUCOSE: 248 MG/DL (ref 74–99)
MONOCYTES ABSOLUTE: 0.85 E9/L (ref 0.1–0.95)
MONOCYTES RELATIVE PERCENT: 8.4 % (ref 2–12)
NEUTROPHILS ABSOLUTE: 7.36 E9/L (ref 1.8–7.3)
NEUTROPHILS RELATIVE PERCENT: 72.9 % (ref 43–80)
PDW BLD-RTO: 14.4 FL (ref 11.5–15)
PLATELET # BLD: 152 E9/L (ref 130–450)
PMV BLD AUTO: 11.2 FL (ref 7–12)
POTASSIUM SERPL-SCNC: 5 MMOL/L (ref 3.5–5)
POTASSIUM SERPL-SCNC: 5.5 MMOL/L (ref 3.5–5)
RBC # BLD: 3.77 E12/L (ref 3.5–5.5)
SODIUM BLD-SCNC: 138 MMOL/L (ref 132–146)
TOTAL IRON BINDING CAPACITY: 364 MCG/DL (ref 250–450)
TOTAL PROTEIN: 6.6 G/DL (ref 6.4–8.3)
VITAMIN B-12: 917 PG/ML (ref 211–946)
VITAMIN D 25-HYDROXY: 45 NG/ML (ref 30–100)
WBC # BLD: 10.1 E9/L (ref 4.5–11.5)

## 2022-10-19 PROCEDURE — 83540 ASSAY OF IRON: CPT

## 2022-10-19 PROCEDURE — 82746 ASSAY OF FOLIC ACID SERUM: CPT

## 2022-10-19 PROCEDURE — 6370000000 HC RX 637 (ALT 250 FOR IP): Performed by: NEUROLOGICAL SURGERY

## 2022-10-19 PROCEDURE — 6360000002 HC RX W HCPCS: Performed by: NEUROLOGICAL SURGERY

## 2022-10-19 PROCEDURE — 80053 COMPREHEN METABOLIC PANEL: CPT

## 2022-10-19 PROCEDURE — 82607 VITAMIN B-12: CPT

## 2022-10-19 PROCEDURE — S5553 INSULIN LONG ACTING 5 U: HCPCS | Performed by: INTERNAL MEDICINE

## 2022-10-19 PROCEDURE — 97530 THERAPEUTIC ACTIVITIES: CPT

## 2022-10-19 PROCEDURE — 83550 IRON BINDING TEST: CPT

## 2022-10-19 PROCEDURE — 82962 GLUCOSE BLOOD TEST: CPT

## 2022-10-19 PROCEDURE — 97535 SELF CARE MNGMENT TRAINING: CPT

## 2022-10-19 PROCEDURE — 85025 COMPLETE CBC W/AUTO DIFF WBC: CPT

## 2022-10-19 PROCEDURE — 1200000000 HC SEMI PRIVATE

## 2022-10-19 PROCEDURE — 83735 ASSAY OF MAGNESIUM: CPT

## 2022-10-19 PROCEDURE — 94640 AIRWAY INHALATION TREATMENT: CPT

## 2022-10-19 PROCEDURE — 6370000000 HC RX 637 (ALT 250 FOR IP): Performed by: INTERNAL MEDICINE

## 2022-10-19 PROCEDURE — 36415 COLL VENOUS BLD VENIPUNCTURE: CPT

## 2022-10-19 PROCEDURE — 6360000002 HC RX W HCPCS: Performed by: INTERNAL MEDICINE

## 2022-10-19 PROCEDURE — 2580000003 HC RX 258: Performed by: NEUROLOGICAL SURGERY

## 2022-10-19 PROCEDURE — 84132 ASSAY OF SERUM POTASSIUM: CPT

## 2022-10-19 PROCEDURE — 2700000000 HC OXYGEN THERAPY PER DAY

## 2022-10-19 PROCEDURE — 83036 HEMOGLOBIN GLYCOSYLATED A1C: CPT

## 2022-10-19 PROCEDURE — 82306 VITAMIN D 25 HYDROXY: CPT

## 2022-10-19 RX ORDER — ALOGLIPTIN 12.5 MG/1
12.5 TABLET, FILM COATED ORAL DAILY
Status: DISCONTINUED | OUTPATIENT
Start: 2022-10-20 | End: 2022-10-21 | Stop reason: HOSPADM

## 2022-10-19 RX ORDER — MAGNESIUM SULFATE IN WATER 40 MG/ML
4000 INJECTION, SOLUTION INTRAVENOUS ONCE
Status: COMPLETED | OUTPATIENT
Start: 2022-10-19 | End: 2022-10-19

## 2022-10-19 RX ORDER — INSULIN GLARGINE-YFGN 100 [IU]/ML
10 INJECTION, SOLUTION SUBCUTANEOUS DAILY
Status: DISCONTINUED | OUTPATIENT
Start: 2022-10-20 | End: 2022-10-21 | Stop reason: HOSPADM

## 2022-10-19 RX ORDER — INSULIN GLARGINE-YFGN 100 [IU]/ML
5 INJECTION, SOLUTION SUBCUTANEOUS ONCE
Status: COMPLETED | OUTPATIENT
Start: 2022-10-19 | End: 2022-10-19

## 2022-10-19 RX ORDER — INSULIN GLARGINE-YFGN 100 [IU]/ML
5 INJECTION, SOLUTION SUBCUTANEOUS DAILY
Status: DISCONTINUED | OUTPATIENT
Start: 2022-10-19 | End: 2022-10-19

## 2022-10-19 RX ORDER — ALOGLIPTIN 25 MG/1
25 TABLET, FILM COATED ORAL DAILY
Status: DISCONTINUED | OUTPATIENT
Start: 2022-10-20 | End: 2022-10-19

## 2022-10-19 RX ADMIN — MORPHINE SULFATE 4 MG: 4 INJECTION, SOLUTION INTRAMUSCULAR; INTRAVENOUS at 12:24

## 2022-10-19 RX ADMIN — ALBUTEROL SULFATE 2.5 MG: 2.5 SOLUTION RESPIRATORY (INHALATION) at 18:56

## 2022-10-19 RX ADMIN — ALBUTEROL SULFATE 2.5 MG: 2.5 SOLUTION RESPIRATORY (INHALATION) at 12:09

## 2022-10-19 RX ADMIN — INSULIN LISPRO 2 UNITS: 100 INJECTION, SOLUTION INTRAVENOUS; SUBCUTANEOUS at 12:23

## 2022-10-19 RX ADMIN — PRAMIPEXOLE DIHYDROCHLORIDE 0.75 MG: 0.25 TABLET ORAL at 22:52

## 2022-10-19 RX ADMIN — OXYCODONE AND ACETAMINOPHEN 2 TABLET: 5; 325 TABLET ORAL at 10:56

## 2022-10-19 RX ADMIN — SPIRONOLACTONE 25 MG: 25 TABLET ORAL at 09:55

## 2022-10-19 RX ADMIN — ALBUTEROL SULFATE 2.5 MG: 2.5 SOLUTION RESPIRATORY (INHALATION) at 15:43

## 2022-10-19 RX ADMIN — Medication 1 CAPSULE: at 09:52

## 2022-10-19 RX ADMIN — BISACODYL 5 MG: 5 TABLET, COATED ORAL at 09:52

## 2022-10-19 RX ADMIN — INSULIN GLARGINE-YFGN 5 UNITS: 100 INJECTION, SOLUTION SUBCUTANEOUS at 22:51

## 2022-10-19 RX ADMIN — WATER 2000 MG: 1 INJECTION INTRAMUSCULAR; INTRAVENOUS; SUBCUTANEOUS at 15:54

## 2022-10-19 RX ADMIN — BENZOCAINE AND MENTHOL 1 LOZENGE: 15; 3.6 LOZENGE ORAL at 23:17

## 2022-10-19 RX ADMIN — ALOGLIPTIN 12.5 MG: 12.5 TABLET, FILM COATED ORAL at 09:53

## 2022-10-19 RX ADMIN — OXYCODONE AND ACETAMINOPHEN 2 TABLET: 5; 325 TABLET ORAL at 06:59

## 2022-10-19 RX ADMIN — POLYETHYLENE GLYCOL 3350 17 G: 17 POWDER, FOR SOLUTION ORAL at 22:49

## 2022-10-19 RX ADMIN — INSULIN GLARGINE-YFGN 5 UNITS: 100 INJECTION, SOLUTION SUBCUTANEOUS at 11:08

## 2022-10-19 RX ADMIN — POLYETHYLENE GLYCOL 3350 17 G: 17 POWDER, FOR SOLUTION ORAL at 09:52

## 2022-10-19 RX ADMIN — ALBUTEROL SULFATE 2.5 MG: 2.5 SOLUTION RESPIRATORY (INHALATION) at 07:47

## 2022-10-19 RX ADMIN — WATER 2000 MG: 1 INJECTION INTRAMUSCULAR; INTRAVENOUS; SUBCUTANEOUS at 07:03

## 2022-10-19 RX ADMIN — INSULIN LISPRO 2 UNITS: 100 INJECTION, SOLUTION INTRAVENOUS; SUBCUTANEOUS at 17:32

## 2022-10-19 RX ADMIN — VALSARTAN 20 MG: 40 TABLET, FILM COATED ORAL at 09:55

## 2022-10-19 RX ADMIN — OXYCODONE AND ACETAMINOPHEN 2 TABLET: 5; 325 TABLET ORAL at 22:50

## 2022-10-19 RX ADMIN — BUPROPION HYDROCHLORIDE 300 MG: 300 TABLET, FILM COATED, EXTENDED RELEASE ORAL at 09:52

## 2022-10-19 RX ADMIN — Medication 10 ML: at 09:55

## 2022-10-19 RX ADMIN — Medication 10 ML: at 22:49

## 2022-10-19 RX ADMIN — MAGNESIUM SULFATE HEPTAHYDRATE 4000 MG: 40 INJECTION, SOLUTION INTRAVENOUS at 10:43

## 2022-10-19 RX ADMIN — Medication 1 CAPSULE: at 22:49

## 2022-10-19 RX ADMIN — WATER 2000 MG: 1 INJECTION INTRAMUSCULAR; INTRAVENOUS; SUBCUTANEOUS at 22:48

## 2022-10-19 RX ADMIN — PRAMIPEXOLE DIHYDROCHLORIDE 0.75 MG: 0.25 TABLET ORAL at 09:53

## 2022-10-19 RX ADMIN — CARVEDILOL 3.12 MG: 6.25 TABLET, FILM COATED ORAL at 09:09

## 2022-10-19 RX ADMIN — Medication 5 MG: at 22:49

## 2022-10-19 RX ADMIN — SENNOSIDES AND DOCUSATE SODIUM 2 TABLET: 50; 8.6 TABLET ORAL at 18:10

## 2022-10-19 RX ADMIN — ACETAMINOPHEN 650 MG: 325 TABLET, FILM COATED ORAL at 23:17

## 2022-10-19 RX ADMIN — OXYBUTYNIN CHLORIDE 10 MG: 10 TABLET, EXTENDED RELEASE ORAL at 09:53

## 2022-10-19 RX ADMIN — INSULIN LISPRO 2 UNITS: 100 INJECTION, SOLUTION INTRAVENOUS; SUBCUTANEOUS at 09:11

## 2022-10-19 ASSESSMENT — PAIN - FUNCTIONAL ASSESSMENT
PAIN_FUNCTIONAL_ASSESSMENT: PREVENTS OR INTERFERES SOME ACTIVE ACTIVITIES AND ADLS
PAIN_FUNCTIONAL_ASSESSMENT: PREVENTS OR INTERFERES SOME ACTIVE ACTIVITIES AND ADLS

## 2022-10-19 ASSESSMENT — PAIN SCALES - GENERAL
PAINLEVEL_OUTOF10: 9
PAINLEVEL_OUTOF10: 6
PAINLEVEL_OUTOF10: 9
PAINLEVEL_OUTOF10: 6
PAINLEVEL_OUTOF10: 9

## 2022-10-19 ASSESSMENT — PAIN DESCRIPTION - LOCATION
LOCATION: BACK;SHOULDER
LOCATION: NECK
LOCATION: NECK;SHOULDER
LOCATION: SHOULDER;BACK

## 2022-10-19 ASSESSMENT — PAIN DESCRIPTION - DESCRIPTORS
DESCRIPTORS: BURNING;THROBBING;DISCOMFORT
DESCRIPTORS: BURNING;THROBBING;STABBING
DESCRIPTORS: SHARP;BURNING;STABBING

## 2022-10-19 ASSESSMENT — PAIN DESCRIPTION - ORIENTATION
ORIENTATION: POSTERIOR
ORIENTATION: LEFT;RIGHT
ORIENTATION: POSTERIOR

## 2022-10-19 NOTE — PROGRESS NOTES
Sent Dr. Bobby Schafer a message regarding labs. K+=5.5, Mag=1.5 and Albumin 3.1    Also coughed up sputum this morning, green in color.

## 2022-10-19 NOTE — PROGRESS NOTES
Physical Therapy  Treatment Note    Name: Alpa Thomas  : 1955  MRN: [id number]      Date of Service: 10/19/2022    Evaluating PT:  Edmar Jones PT, DPT    Room #:  4274/0308-R  Diagnosis:  Spinal stenosis in cervical region [M48.02]  Cervical stenosis of spinal canal [M48.02]  PMHx/PSHx:  CKD, CAD, COPD, NPH, hypotension  Procedure/Surgery:  s/p C3-C7 laminectomy, C3-T1 fusion  Precautions:  fall risk, c/s collar, spine, drain, monzon, B knee buckling  Equipment Needs:  TBD    SUBJECTIVE:    Pt lives with spouse and dtr in a 2 story home with 3 steps to enter and 1 handrail. Bed/bath is on 1st floor. Pt ambulated with cane PTA. OBJECTIVE:   Initial Evaluation  Date: 10/17/22 Treatment  Date: 10/19/22 Short Term/ Long Term   Goals   AM-PAC 6 Clicks 57/11 86/80    Was pt agreeable to Eval/treatment? yes Yes    Does pt have pain? 8/10 neck 8/10 neck, B shoulders at rest  10/10 back with activity    Bed Mobility  Rolling: mod A  Supine to sit: mod A  Sit to supine: mod A  Scooting: mod A Rolling: ModA  Supine to sit: MaxA  Sit to supine: NT  Scooting: NT Rolling: mod I  Supine to sit: mod I  Sit to supine: mod I  Scooting: mod I   Transfers Sit to stand: mod A  Stand to sit: mod A  Stand pivot: NT Sit to stand: Rachel  Stand to sit: Rachel  Stand pivot: NT Sit to stand: mod I  Stand to sit: mod I  Stand pivot: mod I with AAD   Ambulation    2' with HHA mod A 10' with FWW ModA 150'+ with AAD mod I   Stair negotiation: ascended and descended  NT NT 3 steps with 1 handrail mod I     Pt is A & O x 4  Sensation:  Pt reports numbness and tingling to B feet that has been ongoing. Edema:  Unremarkable    Vitals:  Heart Rate at rest 95-99 bpm Heart Rate post session 105 bpm   SPO2 at rest 79% on room air; 89-92% on 4L O2. SpO2 recovered to 90% in ~3 min. SPO2 post session 88-93% on 4L O2. SpO2 recovered to 90% in ~1 min. Nursing notified.      Therapeutic Exercises:    Bed mobility: supine<>sit, cued for positioning at EOB  Transfers: STSx2, cued for hand placement and postural correction  Ambulation: 10 feet with FWW  Static sitting EOB ~5 min  Static standing with FWW ~10 min  BLE AROM    Patient education  Pt educated on spinal precautions, role of PT, importance of functional mobility during hospital stay, safety with functional mobility. Patient response to education:   Pt verbalized understanding Pt demonstrated skill Pt requires further education in this area   Yes Partial Yes     ASSESSMENT:    Comments:    Pt supine in bed with cervical collar donned and nasal cannula doffed upon entering, agreeable to participate in PT session. Nasal cannula reapplied d/t low SpO2. Required increased time, cueing, and increased physical assistance to complete bed mobility via log roll technique to sit EOB. Pt tolerated extended period of static sitting EOB while vitals were monitored. Required increased time to complete sit>stand transfer to [facility]. Pt completed ambulation bout with use of FWW; severely decreased speed and B knee buckling noted. Required multiple standing rest breaks throughout ambulation bout. Pt required increased time and cueing to complete stand>sit transfer. Nursing notified of pt presentation and low SpO2. Activity limited by pain. Pt left seated in beside chair with cervical collar and nasal cannula donned and call light in reach. Instructed not to get up on own - pt verbalized understanding of this. Treatment:  Patient practiced and was instructed in the following treatment:    Bed mobility training - pt given verbal and tactile cues to facilitate proper sequencing and safety during rolling and supine>sit as well as provided with physical assistance to complete task   Functional transfers - verbal cues for proper positioning and sequencing of movement. Physical assistance provided. Static sitting - performed to promote upright tolerance and improve sitting balance.   Static standing - performed to promote upright tolerance, improve postural awareness, and standing balance. Ambulation - verbal and tactile cues to ensure safe walker approximation. Physical assistance provided. PLAN:    Patient is making good progress towards established goals. Will continue with current POC.       Time in  0913  Time out  0945    Total Treatment Time  32 minutes     CPT codes:  [] Gait training 54761 0 minutes  [] Manual therapy 50672 0 minutes  [x] Therapeutic activities 69495 32 minutes  [] Therapeutic exercises 94267 0 minutes  [] Neuromuscular reeducation 24726 0 minutes    Rebecca Duarte, PT, DPT  [id number]  Shahana Zavala, SPT

## 2022-10-19 NOTE — CARE COORDINATION
10/19/22 Update CM note: Patient accepted at [address] [place] [address] will begin precert.  Electronically signed by Nikko Jefferson RN CM on 10/19/2022 at 7:21 AM

## 2022-10-19 NOTE — PROGRESS NOTES
Occupational Therapy  OCCUPATIONAL THERAPY BEDSIDE TREATMENT NOTE   [place], [address] [facility]     Date:10/19/2022  Patient Name: Colten Conway  MRN: [id number]  : 1955  Room: [address] [place] [address]     Referring Sandra Parr MD  Specific Provider Orders/Date: OT evaluation and treat 10/17/22     Evaluating OT: Roseanna Mayer, OTR/L #[id number]     Diagnosis: Spinal stenosis in cervical region [M48.02]  Cervical stenosis of spinal canal [M48.02]       Surgery:   Past Surgical History         Past Surgical History:   Procedure Laterality Date    CARDIAC SURGERY        CERVICAL FUSION        C3-C6    CHOLECYSTECTOMY       COLONOSCOPY N/A 2019     COLONOSCOPY POLYPECTOMY SNARE/COLD BIOPSY performed by Bhavani Aggarwal MD at [address]  GRAFT   10/30/2002    EYE SURGERY         cataract with Lens implants    HERNIA REPAIR        HYSTERECTOMY (CERVIX STATUS UNKNOWN)   [address]   2018     resolved    [place] CREATE SHUNT:VENTRIC-PERITONEAL N/A 10/15/2018     VENTRICULAR PERITONEAL SHUNT  PLACEMENT performed by Lydia Squires MD at [address] [place] [address] N/A 2018     LUMBAR DRAIN INSERTION performed by Lydia Squires MD at [address] [facility] N/A 2021     VENTRICULAR PERITONEAL SHUNT REPLACEMENT performed by Lydia Squires MD at [address] [place]         Pertinent Medical History:  has a past medical history of Acid reflux disease, Acute on chronic respiratory failure with hypoxia and hypercapnia ([address] Apraxia, Arthritis, Ataxia, CAD (coronary artery disease), Choledocholithiasis, Chronic systolic congestive heart failure ([address] CKD (chronic kidney disease) stage 3, GFR 30-59 ml/min ([facility]), COPD (chronic obstructive pulmonary disease) ([address] Diabetes mellitus ([address] Hyperlipidemia, Hypoxia, Neck pain, Normal pressure hydrocephalus ([facility] Oxygen dependent, Pleural effusion, Pulmonary hypertension (HCC), Restless legs, S/P CABG x 2, and Severe mitral regurgitation.       Precautions:  Fall Risk, C spine, hemovac drain, C collar     Assessment of current deficits   [x] Functional mobility             [x]ADLs           [x] Strength                  []Cognition   [x] Functional transfers           [x] IADLs         [x] Safety Awareness   [x]Endurance   [x] Fine Coordination              [x] Balance      [] Vision/perception   [x]Sensation     [x]Gross Motor Coordination  [x] ROM           [] Delirium                   [] Motor Control      OT PLAN OF CARE   OT POC based on physician orders, patient diagnosis and results of clinical assessment     Frequency/Duration   2-4 days/wk for 1 week PRN   Specific OT Treatment Interventions to include:   * Instruction/training on adapted ADL techniques and AE recommendations to increase functional independence within precautions       * Training on energy conservation strategies, correct breathing pattern and techniques to improve independence/tolerance for self-care routine  * Functional transfer/mobility training/DME recommendations for increased independence, safety, and fall prevention  * Patient/Family education to increase follow through with safety techniques and functional independence  * Recommendation of environmental modifications for increased safety with functional transfers/mobility and ADLs  * Sensory re-education to improve body/limb awareness, maintain/improve skin integrity, and improve hand/UE motor function  * Therapeutic exercise to improve motor endurance, ROM, and functional strength for ADLs/functional transfers  * Therapeutic activities to facilitate/challenge dynamic balance, stand tolerance for increased safety and independence with ADLs  * Therapeutic activities to facilitate gross/fine motor skills for increased independence with ADLs  * Positioning to improve skin integrity, interaction with environment and functional independence     Recommended Adaptive Equipment: AE for LE dressing and bathing, shower seat, BSC with HR     Home Living: Pt lives with  Alyssa Man in a 3 story home with one steps and no hand rails. Bed and bath on main  floor with laundry on main floor with no steps and noHR's. Family/ Outside assistance available:   Bathroom setup: tub shower    Equipment owned: spc     Prior Level of Function: recent weakness yet mod I  with ADLs , shared with IADLs; ambulated spc prn  Driving: yes   Occupation: n/a  Medication management: self  Leisure: enjoys casino     Pain Level: neck pain    Cognition: A&O: 3/4; Follows one step directions with increased volume and irritation when entering room. Patient still showing confusion and agitation from anesthesia. Memory:  fair              Sequencing:  fair              Problem solving:  fair-              Judgement/safety:  fair-                Functional Assessment:  AM-PAC Daily Activity Raw Score: 13/24    Initial Eval Status  Date: 10/17/22 Treatment Status  Date: 10/19/22 STGs = LTGs  Time frame: 2-4 days   Feeding Mod A  Min A   Mod I    Grooming Mod A   Min A; To simulate washing face and combing hair in sitting with limited B UE ROM    Mod I    UB Dressing Max A  Mod A;    Donning gown around back with verbal prompting to increase completion and understanding.    Mod I    LB Dressing dependent MIN A to emile pants/underwear with cross over technique v/c's for spinal precautions    SBA with AE prn   Bathing Simulated max A   N/t pt educated with regards to DME, bathing AE and safety  Min A seated with LHS   Toileting dependent N/t    SBA to BSC   Bed Mobility  Supine to sit: mod A with increased time   Sit to supine:  mod A with increased time   n/t pt seated EOB upon arrival    Supine to sit: mod I   Sit to supine: mod I log rolling   Functional Transfers Sit to stand mod A hand held x2 with increased time   Sit to stand: Min A SBA with ww    Functional Mobility Side steps to HOB mod A HH x2 with increased time and cues N/t MOD A per last report     SBA with ww   Balance Sitting:     Static:  min A     Dynamic:mod A  Standing: mod A with UE support  Sitting: Min A      Standing: Mod A with w/w. Activity Tolerance Poor O2 4L 99% HR 91 limited by pain and confusion  O2  4L during treatment. Attempted to get reading of O2 stats however patient confused and resistant with task. Good    Visual/  Perceptual Glasses: reading [place]           Safety fair  Fair-                                 Good           Comments: Upon arrival patient seated EOB , agreeable to evaluation after much verbal motivation this date. Performed OT treatment with PT while completing education on C spine precautions and benefit of AROm to UE's and safety with movment and ADL completion. At end of session, patient supine with HOB elevated and  in room and with call light and phone within reach, all lines and tubes intact. Nursing notified. Overall patient demonstrated  decreased independence and safety during completion of ADL/functional transfer/mobility tasks. Pt would benefit from continued skilled OT to increase safety and independence with completion of ADL/IADL tasks for functional independence and quality of life.      Time In:1030  Time Out: 1057  Total Treatment Time:  27 min        Min Units   OT Eval Low 31442      OT Eval Medium 19353      OT Eval High 05650      OT Re-Eval [id number]      Therapeutic Ex 66831      Therapeutic Activities 59033  12 1   ADL/Self Care 83183 15 1   Orthotic Management 10015      Neuro Re-Ed 58911      Non-Billable Time          [address] [address]

## 2022-10-19 NOTE — PROGRESS NOTES
Department of Neurosurgery  Progress Note    CHIEF COMPLAINT: s/p posterior C3-T1 fusion 10/17/22    SUBJECTIVE:  c/o op site pain. +productive cough    REVIEW OF SYSTEMS :  Constitutional: Negative for chills and fever. Neurological: Negative for dizziness, tremors and speech change.      OBJECTIVE:   VITALS:  /73   Pulse 98   Temp 97.9 °F (36.6 °C) (Temporal)   Resp 18   Ht 5' 3\" (1.6 m)   Wt 120 lb (54.4 kg)   LMP  (LMP Unknown)   SpO2 97%   BMI 21.26 kg/m²     PHYSICAL:  Neurologic: Alert and oriented x3; PERRL  Motor Exam:  Motor exam upper strength 4/5  Sensory:  Sensory intact  Incision c/d/I  Custom collar intact      DATA:  CBC:   Lab Results   Component Value Date/Time    WBC 10.1 10/19/2022 05:28 AM    RBC 3.77 10/19/2022 05:28 AM    HGB 11.8 10/19/2022 05:28 AM    HCT 36.7 10/19/2022 05:28 AM    MCV 97.3 10/19/2022 05:28 AM    MCH 31.3 10/19/2022 05:28 AM    MCHC 32.2 10/19/2022 05:28 AM    RDW 14.4 10/19/2022 05:28 AM     10/19/2022 05:28 AM    MPV 11.2 10/19/2022 05:28 AM     BMP:    Lab Results   Component Value Date/Time     10/19/2022 05:28 AM    K 5.5 10/19/2022 05:28 AM    K 4.8 10/10/2022 11:40 AM     10/19/2022 05:28 AM    CO2 24 10/19/2022 05:28 AM    BUN 20 10/19/2022 05:28 AM    LABALBU 3.1 10/19/2022 05:28 AM    LABALBU 4.4 09/30/2011 02:00 PM    CREATININE 1.0 10/19/2022 05:28 AM    CALCIUM 9.4 10/19/2022 05:28 AM    GFRAA 60 10/10/2022 11:40 AM    LABGLOM >60 10/19/2022 05:28 AM    GLUCOSE 218 10/19/2022 05:28 AM    GLUCOSE 111 09/30/2011 02:00 PM     PT/INR:    Lab Results   Component Value Date/Time    PROTIME 12.2 10/10/2022 11:40 AM    INR 1.1 10/10/2022 11:40 AM     PTT:    Lab Results   Component Value Date/Time    APTT <20.0 07/23/2018 03:00 PM   [APTT}    Current Inpatient Medications  Current Facility-Administered Medications: melatonin disintegrating tablet 5 mg, 5 mg, Oral, Nightly PRN  melatonin disintegrating tablet 5 mg, 5 mg, Oral, Nightly  polyethylene glycol (GLYCOLAX) packet 17 g, 17 g, Oral, BID  sennosides-docusate sodium (SENOKOT-S) 8.6-50 MG tablet 2 tablet, 2 tablet, Oral, QPM  sennosides-docusate sodium (SENOKOT-S) 8.6-50 MG tablet 2 tablet, 2 tablet, Oral, BID PRN  lactobacillus (CULTURELLE) capsule 1 capsule, 1 capsule, Oral, BID  cloNIDine (CATAPRES) tablet 0.1 mg, 0.1 mg, Oral, Q4H PRN  carvedilol (COREG) tablet 3.125 mg, 3.125 mg, Oral, BID WC  oxybutynin (DITROPAN-XL) extended release tablet 10 mg, 10 mg, Oral, Daily  oxyCODONE-acetaminophen (PERCOCET) 5-325 MG per tablet 2 tablet, 2 tablet, Oral, Q4H PRN  pramipexole (MIRAPEX) tablet 0.75 mg, 0.75 mg, Oral, BID  spironolactone (ALDACTONE) tablet 25 mg, 25 mg, Oral, Daily  valsartan (DIOVAN) tablet 20 mg, 20 mg, Oral, Daily  sodium chloride flush 0.9 % injection 5-40 mL, 5-40 mL, IntraVENous, 2 times per day  sodium chloride flush 0.9 % injection 5-40 mL, 5-40 mL, IntraVENous, PRN  0.9 % sodium chloride infusion, , IntraVENous, PRN  acetaminophen (TYLENOL) tablet 650 mg, 650 mg, Oral, 4 times per day  ondansetron (ZOFRAN-ODT) disintegrating tablet 4 mg, 4 mg, Oral, Q8H PRN **OR** ondansetron (ZOFRAN) injection 4 mg, 4 mg, IntraVENous, Q6H PRN  ceFAZolin (ANCEF) 2,000 mg in sterile water 20 mL IV syringe, 2,000 mg, IntraVENous, Q8H  morphine (PF) injection 2 mg, 2 mg, IntraVENous, Q2H PRN **OR** morphine sulfate (PF) injection 4 mg, 4 mg, IntraVENous, Q2H PRN  cyclobenzaprine (FLEXERIL) tablet 10 mg, 10 mg, Oral, Q12H PRN  diphenhydrAMINE (BENADRYL) tablet 25 mg, 25 mg, Oral, Q6H PRN **OR** diphenhydrAMINE (BENADRYL) injection 25 mg, 25 mg, IntraVENous, Q6H PRN  bisacodyl (DULCOLAX) EC tablet 5 mg, 5 mg, Oral, Daily  bisacodyl (DULCOLAX) suppository 10 mg, 10 mg, Rectal, Daily PRN  fleet rectal enema 1 enema, 1 enema, Rectal, Daily PRN  benzocaine-menthol (CEPACOL SORE THROAT) lozenge 1 lozenge, 1 lozenge, Oral, Q2H PRN  insulin lispro (HUMALOG) injection vial 0-8 Units, 0-8 Units, SubCUTAneous, TID WC  insulin lispro (HUMALOG) injection vial 0-4 Units, 0-4 Units, SubCUTAneous, Nightly  glucose chewable tablet 16 g, 4 tablet, Oral, PRN  dextrose bolus 10% 125 mL, 125 mL, IntraVENous, PRN **OR** dextrose bolus 10% 250 mL, 250 mL, IntraVENous, PRN  glucagon (rDNA) injection 1 mg, 1 mg, SubCUTAneous, PRN  dextrose 10 % infusion, , IntraVENous, Continuous PRN  albuterol (PROVENTIL) nebulizer solution 2.5 mg, 2.5 mg, Nebulization, 4x daily  pantoprazole (PROTONIX) tablet 40 mg, 40 mg, Oral, QAM AC  alogliptin (NESINA) tablet 12.5 mg, 12.5 mg, Oral, Daily  buPROPion (WELLBUTRIN XL) extended release tablet 300 mg, 300 mg, Oral, Daily    ASSESSMENT:   s/p posterior C3-T1 fusion 10/17/22    PLAN:  -Pain control  -PT/OT  -Drain care.  Plan to remove Thursday  -D/c monzon  -PMR consult  -Follow up in neurosurgery clinic in 2 weeks for staple removal and in 4 weeks with XR      Electronically signed by LORI Call on 10/19/2022 at 9:14 AM

## 2022-10-19 NOTE — CONSULTS
Comprehensive Nutrition Assessment    Type and Reason for Visit:  Initial, Consult (poor PO)    Nutrition Recommendations/Plan:   Continue current diet  Start vanilla Ensure HP BID and gelatein BID per discussion w/ pt  Will monitor     Malnutrition Assessment:  Malnutrition Status:  Severe malnutrition (10/19/22 1425)    Context:  Chronic Illness     Findings of the 6 clinical characteristics of malnutrition:  Energy Intake:  75% or less estimated energy requirements for 1 month or longer  Weight Loss:  Unable to assess     Body Fat Loss:  Severe body fat loss Buccal region, Triceps, Orbital   Muscle Mass Loss:  Severe muscle mass loss Temples (temporalis), Clavicles (pectoralis & deltoids), Calf (gastrocnemius)  Fluid Accumulation:  No significant fluid accumulation     Strength:  Not Performed    Nutrition Assessment:    Pt adm d/t trouble walking/speaking and urinary retention; pt s/p recent laminectomy/fusion 10/7/22; pt w/ cervical stenosis; PMhx of resp failure, CAD s/p CABGx2 (10/4/12), CHF,DM,COPD; pt meets criteria for severe malnutrition; per discussion w/ pt she tells me she just isn't hungry and experiences pain when eating-states that her appetite is terrible; pt does not like Glucerna shakes but likes Ensure- will start vanilla Ensure HP BID and Gelatein BID per discussion w/ pt; noted pt w/ hyperglycemia d/t uncontrolled DM. Will monitor. Nutrition Related Findings:    severe muscle/fat wasting; I/O WNL; A&Ox4; active BS; no edema; hyperglycemia; A1c=9.8 Wound Type: Surgical Incision       Current Nutrition Intake & Therapies:    Average Meal Intake: 26-50%  Average Supplements Intake: None Ordered  ADULT DIET; Regular; 4 carb choices (60 gm/meal)  ADULT ORAL NUTRITION SUPPLEMENT; Lunch, Breakfast; Low Calorie/High Protein Oral Supplement  ADULT ORAL NUTRITION SUPPLEMENT; Lunch, Dinner;  Other Oral Supplement; Gelatein    Anthropometric Measures:  Height: 5' 3\" (160 cm)  Ideal Body Weight (IBW): 115 lbs (52 kg)    Admission Body Weight: 120 lb 5 oz (54.6 kg) (10/10-SS)  Current Body Weight: 120 lb 5 oz (54.6 kg) (10/10-SS), 104.6 % IBW. Current BMI (kg/m2): 21.3  Usual Body Weight: 125 lb 3 oz (56.8 kg) (6/30/21-SS)  % Weight Change (Calculated): -3.9  Weight Adjustment For: No Adjustment                 BMI Categories: Underweight (BMI less than 22) age over 72    Estimated Daily Nutrient Needs:  Energy Requirements Based On: Formula  Weight Used for Energy Requirements: Current  Energy (kcal/day): 3325-8974  Weight Used for Protein Requirements: Current  Protein (g/day): 1.3-1.5g/kgxCBW=70-80g  Method Used for Fluid Requirements: 1 ml/kcal  Fluid (ml/day): 2227-2819    Nutrition Diagnosis:   Severe malnutrition, In context of chronic illness related to catabolic illness (multiple comorbids) as evidenced by Criteria as identified in malnutrition assessment    Nutrition Interventions:   Food and/or Nutrient Delivery: Continue Current Diet, Start Oral Nutrition Supplement (vanilla Ensure HP BID and Gelatein BID)  Nutrition Education/Counseling: Education not indicated  Coordination of Nutrition Care: Continue to monitor while inpatient       Goals:     Goals: PO intake 75% or greater, by next RD assessment       Nutrition Monitoring and Evaluation:   Behavioral-Environmental Outcomes: None Identified  Food/Nutrient Intake Outcomes: Food and Nutrient Intake, Supplement Intake  Physical Signs/Symptoms Outcomes: Biochemical Data, Nutrition Focused Physical Findings, Skin, Weight, Chewing or Swallowing, GI Status, Fluid Status or Edema    Discharge Planning:     Too soon to determine     Xiomara Romo RD  Contact: [phone]

## 2022-10-19 NOTE — PROGRESS NOTES
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 Stated she knows how to do v/s and pen method of insulin. Post-education Assessment  [x]  Attentive to teaching  [x]  Answered questions appropriately when asked   [x]  Seems able to apply concepts to daily lifestyle  [x]  Seems motivated to do well  [x]  Verbalized an understanding of the plate method for portion control   Label reading also reviewed. [x]  Verbalized an understanding of prescribed antidiabetes medications   [x]  Verbalized an understanding of target glucose ranges/A1C level  [x]  Expresses an intent to comply with treatment plan   []  Showed very little interest in complying with treatment plan   []  Seems to have trouble applying concepts to daily lifestyle       COMMENTS:  Dr. Jessica Morales in to see while I was there and pt agrees to be on Lantus and metformin along with Januvia.   Concerned about cost of insulin. Recommendations:   [x] Carbohydrate-controlled diet    [] Script for glucometer and supplies (per preference of patient's insurance)  [x] Script for insulin pens and pen needles (if insulin is ordered at discharge for home use) Whatever is cheaper  [] Consult to social work: patient has no insurance or has financial hardship  [] Inpatient consult to endocrinologist   [] Follow up with endocrinologist as an outpatient   [] Home healthcare nursing to increase compliance to treatment plan   [] Script for outpatient diabetes education classes (from doctor)        Thank you for this consult.     Sid Negro RN [facility]

## 2022-10-19 NOTE — PROGRESS NOTES
This patient is on medication that requires renal, weight, and/or indication dose adjustment. Date Body Weight IBW  Adjusted BW SCr  CrCl Dialysis status   10/19/2022 120 lb (54.4 kg) Ideal body weight: 52.4 kg (115 lb 8.3 oz)  Adjusted ideal body weight: 53.2 kg (117 lb 5 oz) Serum creatinine: 1 mg/dL 10/19/22 0528  Estimated creatinine clearance: 45 mL/min N/a       Pharmacy has dose-adjusted the following medication(s):    Date Previous Order Adjusted Order   10/19/2022 Alogliptin 25 mg daily Alogliptin 12.5 mg daily       These changes were made per protocol according to the [facility] Clinical Guidance for Pharmacists. *Please note this dose may need readjusted if patient's condition changes. Please contact pharmacy with any questions regarding these changes.     [address], [facility]  10/19/2022  4:13 PM

## 2022-10-19 NOTE — CARE COORDINATION
10/19/22 Update CM note: Patient is pending precert with [facility]. She is POD 2 of Cervical fusion posterior, she remains on iv ancef. Her drain remains intact. She is also with productive cough today. PT 12/24 and OT pending update for today. Will follow for readiness to discharge.  Electronically signed by Allen Aviles RN CM on 10/19/2022 at 11:57 AM

## 2022-10-19 NOTE — PROGRESS NOTES
Hospitalist Progress Note            Patient: Jodi Vásquez Age: 75 y.o.   DOA: 10/17/2022 Admit Dx / CC: Spinal stenosis in cervical region [M48.02]  Cervical stenosis of spinal canal [M48.02]   LOS:  LOS: 2 days      Assessment/ Plan:     Cervical stenosis of spinal canal s/p laminectomy and C-spine fusion 10/17  Mx per Nsx  Surgical drain in place to be removed tomorrow  Encouraged use of IS    CKD 3, stable    Chronic systolic heart failure, stable  Cont home meds    DM2, uncontrolled  Increase home januvia (on discharge) and cont hospital formulary equivalent   D/w pt option of adding another oral med vs starting insulin and she is in agreement with once or twice daily injection of LA insulin as long as her insurance approves  She adamantly refuses metformin even though highly recommended  Start small dose lantus, titrate as needed and Rx to our pharmacy on discharge to see if her insurance would cover  ISS  Titrate as needed  A1c 9.8  Diabetes ED completed    COPD, stable  On 3 L O2 nocturnally  Cont home meds  Wean off O2    Mild HyperK  Repeat ok  Hold aldactone today    Hypomagnesemia  Replace IV    Hx/o NPH s/p ventriculoperitoneal shunt in 2018    DVT ppx: Per NSx  Code status: Full code    Thank you for allowing me to participate in Ms Veras care, will follow tomorrow    Plan of care discussed with: patient and bedside RN, CM    Patient Active Problem List   Diagnosis    CAD (coronary artery disease)    Pulmonary hypertension ([address]    COPD (chronic obstructive pulmonary disease) ([address]    CKD (chronic kidney disease) stage 3, GFR 30-59 ml/min    Normal pressure hydrocephalus (HCC)    Chronic systolic (congestive) heart failure ([address]    Severe mitral regurgitation    NPH (normal pressure hydrocephalus) ([address]    COVID-19    Syncope and collapse    Gastroenteritis    Acute kidney injury superimposed on CKD ([address]    Hypotension    S/P ventriculoperitoneal shunt    Cervical stenosis of spinal canal    Type 2 diabetes mellitus, without long-term current use of insulin ([facility])    O2 dependent        Medications:  Reviewed    Infusion Medications    sodium chloride      dextrose       Scheduled Medications    magnesium sulfate  4,000 mg IntraVENous Once    insulin glargine  5 Units SubCUTAneous Daily    [START ON 10/20/2022] alogliptin  25 mg Oral Daily    melatonin  5 mg Oral Nightly    polyethylene glycol  17 g Oral BID    sennosides-docusate sodium  2 tablet Oral QPM    lactobacillus  1 capsule Oral BID    carvedilol  3.125 mg Oral BID WC    oxybutynin  10 mg Oral Daily    pramipexole  0.75 mg Oral BID    spironolactone  25 mg Oral Daily    valsartan  20 mg Oral Daily    sodium chloride flush  5-40 mL IntraVENous 2 times per day    acetaminophen  650 mg Oral 4 times per day    ceFAZolin (ANCEF) IVPB  2,000 mg IntraVENous Q8H    bisacodyl  5 mg Oral Daily    insulin lispro  0-8 Units SubCUTAneous TID WC    insulin lispro  0-4 Units SubCUTAneous Nightly    albuterol  2.5 mg Nebulization 4x daily    pantoprazole  40 mg Oral QAM AC    buPROPion  300 mg Oral Daily     PRN Meds: melatonin, sennosides-docusate sodium, cloNIDine, oxyCODONE-acetaminophen, sodium chloride flush, sodium chloride, ondansetron **OR** ondansetron, morphine **OR** morphine, cyclobenzaprine, diphenhydramine **OR** diphenhydrAMINE, bisacodyl, fleet, benzocaine-menthol, glucose, dextrose bolus **OR** dextrose bolus, glucagon (rDNA), dextrose    I/O    Intake/Output Summary (Last 24 hours) at 10/19/2022 1211  Last data filed at 10/19/2022 0850  Gross per 24 hour   Intake 180 ml   Output 1850 ml   Net -1670 ml       Labs:   Recent Labs     10/18/22  0619 10/19/22  0528   WBC 13.0* 10.1   HGB 12.1 11.8   HCT 37.2 36.7    152       Recent Labs     10/18/22  0619 10/19/22  0528 10/19/22  0956    138  --    K 4.8 5.5* 5.0    103  --    CO2 22 24  --    BUN 27* 20  --    CREATININE 1.2* 1.0  --    CALCIUM 8.8 9.4  -- Recent Labs     10/19/22  0528   PROT 6.6   ALKPHOS 100   ALT <5   AST 19   BILITOT 0.2       No results for input(s): INR in the last 72 hours. No results for input(s): Normajean Elvaston in the last 72 hours. Chronic labs:  Lab Results   Component Value Date    CHOL 187 09/10/2020    TRIG 153 (H) 09/10/2020    HDL 40 07/27/2021    LDLCALC 148 (H) 07/27/2021    TSH 0.596 10/18/2022    INR 1.1 10/10/2022    LABA1C 9.8 (H) 10/19/2022       Radiology:  Imaging studies reviewed today. Subjective: Pat GUZMAN Budd up in chair, just completed diabetes education. Does not like her lunch  D/w her at length need better control of BG and we discussed oral options vs insulin    Objective:     Physical Exam:   /68   Pulse 98   Temp 97.8 °F (36.6 °C) (Temporal)   Resp 16   Ht 5' 3\" (1.6 m)   Wt 120 lb (54.4 kg)   LMP  (LMP Unknown)   SpO2 93%   BMI 21.26 kg/m²     General appearance:in no distress, up in chair in hard neck collar  Lungs: Clear to auscultation bilaterally, no wheezing or crackles   Heart: Regular rate and rhythm, S1, S2 normal   Abdomen: Soft, non-tender and not-distended.  Bowel sounds normal.   Extremities: no edema   Skin: Skin color, texture, turgor normal. No rashes or lesions   Neurologic: Grossly normal and non focal, CN II-XII intact       Ana Squires MD   Hospitalist Service   10/19/22 12:11 PM

## 2022-10-20 ENCOUNTER — APPOINTMENT (OUTPATIENT)
Dept: GENERAL RADIOLOGY | Age: 67
DRG: 471 | End: 2022-10-20
Attending: NEUROLOGICAL SURGERY
Payer: MEDICARE

## 2022-10-20 LAB
ANION GAP SERPL CALCULATED.3IONS-SCNC: 16 MMOL/L (ref 7–16)
BUN BLDV-MCNC: 17 MG/DL (ref 6–23)
CALCIUM SERPL-MCNC: 9.3 MG/DL (ref 8.6–10.2)
CHLORIDE BLD-SCNC: 96 MMOL/L (ref 98–107)
CO2: 22 MMOL/L (ref 22–29)
CREAT SERPL-MCNC: 1.1 MG/DL (ref 0.5–1)
GFR SERPL CREATININE-BSD FRML MDRD: 55 ML/MIN/1.73
GLUCOSE BLD-MCNC: 264 MG/DL (ref 74–99)
HCT VFR BLD CALC: 37.3 % (ref 34–48)
HEMOGLOBIN: 11.9 G/DL (ref 11.5–15.5)
MAGNESIUM: 2.2 MG/DL (ref 1.6–2.6)
MCH RBC QN AUTO: 30.5 PG (ref 26–35)
MCHC RBC AUTO-ENTMCNC: 31.9 % (ref 32–34.5)
MCV RBC AUTO: 95.6 FL (ref 80–99.9)
METER GLUCOSE: 125 MG/DL (ref 74–99)
METER GLUCOSE: 146 MG/DL (ref 74–99)
METER GLUCOSE: 221 MG/DL (ref 74–99)
METER GLUCOSE: 282 MG/DL (ref 74–99)
PDW BLD-RTO: 14.5 FL (ref 11.5–15)
PLATELET # BLD: 184 E9/L (ref 130–450)
PMV BLD AUTO: 11.4 FL (ref 7–12)
POTASSIUM SERPL-SCNC: 5.2 MMOL/L (ref 3.5–5)
RBC # BLD: 3.9 E12/L (ref 3.5–5.5)
SODIUM BLD-SCNC: 134 MMOL/L (ref 132–146)
WBC # BLD: 11.7 E9/L (ref 4.5–11.5)

## 2022-10-20 PROCEDURE — 97530 THERAPEUTIC ACTIVITIES: CPT

## 2022-10-20 PROCEDURE — 6360000002 HC RX W HCPCS: Performed by: NEUROLOGICAL SURGERY

## 2022-10-20 PROCEDURE — 82962 GLUCOSE BLOOD TEST: CPT

## 2022-10-20 PROCEDURE — 85027 COMPLETE CBC AUTOMATED: CPT

## 2022-10-20 PROCEDURE — 1200000000 HC SEMI PRIVATE

## 2022-10-20 PROCEDURE — 83735 ASSAY OF MAGNESIUM: CPT

## 2022-10-20 PROCEDURE — 6370000000 HC RX 637 (ALT 250 FOR IP): Performed by: INTERNAL MEDICINE

## 2022-10-20 PROCEDURE — 6370000000 HC RX 637 (ALT 250 FOR IP): Performed by: NEUROLOGICAL SURGERY

## 2022-10-20 PROCEDURE — 80048 BASIC METABOLIC PNL TOTAL CA: CPT

## 2022-10-20 PROCEDURE — 2700000000 HC OXYGEN THERAPY PER DAY

## 2022-10-20 PROCEDURE — 2580000003 HC RX 258: Performed by: NEUROLOGICAL SURGERY

## 2022-10-20 PROCEDURE — 36415 COLL VENOUS BLD VENIPUNCTURE: CPT

## 2022-10-20 PROCEDURE — S5553 INSULIN LONG ACTING 5 U: HCPCS | Performed by: INTERNAL MEDICINE

## 2022-10-20 PROCEDURE — 94640 AIRWAY INHALATION TREATMENT: CPT

## 2022-10-20 PROCEDURE — 71045 X-RAY EXAM CHEST 1 VIEW: CPT

## 2022-10-20 RX ORDER — 0.9 % SODIUM CHLORIDE 0.9 %
500 INTRAVENOUS SOLUTION INTRAVENOUS ONCE
Status: COMPLETED | OUTPATIENT
Start: 2022-10-20 | End: 2022-10-20

## 2022-10-20 RX ORDER — 0.9 % SODIUM CHLORIDE 0.9 %
1000 INTRAVENOUS SOLUTION INTRAVENOUS ONCE
Status: COMPLETED | OUTPATIENT
Start: 2022-10-20 | End: 2022-10-20

## 2022-10-20 RX ADMIN — INSULIN GLARGINE-YFGN 10 UNITS: 100 INJECTION, SOLUTION SUBCUTANEOUS at 09:00

## 2022-10-20 RX ADMIN — MORPHINE SULFATE 4 MG: 4 INJECTION, SOLUTION INTRAMUSCULAR; INTRAVENOUS at 13:42

## 2022-10-20 RX ADMIN — Medication 10 ML: at 22:01

## 2022-10-20 RX ADMIN — PRAMIPEXOLE DIHYDROCHLORIDE 0.75 MG: 0.25 TABLET ORAL at 22:00

## 2022-10-20 RX ADMIN — OXYCODONE AND ACETAMINOPHEN 2 TABLET: 5; 325 TABLET ORAL at 12:36

## 2022-10-20 RX ADMIN — POLYETHYLENE GLYCOL 3350 17 G: 17 POWDER, FOR SOLUTION ORAL at 22:00

## 2022-10-20 RX ADMIN — SODIUM CHLORIDE 1000 ML: 9 INJECTION, SOLUTION INTRAVENOUS at 08:13

## 2022-10-20 RX ADMIN — SENNOSIDES AND DOCUSATE SODIUM 2 TABLET: 50; 8.6 TABLET ORAL at 08:51

## 2022-10-20 RX ADMIN — ALBUTEROL SULFATE 2.5 MG: 2.5 SOLUTION RESPIRATORY (INHALATION) at 19:45

## 2022-10-20 RX ADMIN — OXYBUTYNIN CHLORIDE 10 MG: 10 TABLET, EXTENDED RELEASE ORAL at 08:51

## 2022-10-20 RX ADMIN — INSULIN LISPRO 2 UNITS: 100 INJECTION, SOLUTION INTRAVENOUS; SUBCUTANEOUS at 17:45

## 2022-10-20 RX ADMIN — Medication 5 MG: at 22:01

## 2022-10-20 RX ADMIN — CYCLOBENZAPRINE 10 MG: 10 TABLET, FILM COATED ORAL at 22:00

## 2022-10-20 RX ADMIN — OXYCODONE AND ACETAMINOPHEN 2 TABLET: 5; 325 TABLET ORAL at 07:12

## 2022-10-20 RX ADMIN — Medication 1 CAPSULE: at 08:51

## 2022-10-20 RX ADMIN — OXYCODONE AND ACETAMINOPHEN 2 TABLET: 5; 325 TABLET ORAL at 17:34

## 2022-10-20 RX ADMIN — SENNOSIDES AND DOCUSATE SODIUM 2 TABLET: 50; 8.6 TABLET ORAL at 17:34

## 2022-10-20 RX ADMIN — SODIUM CHLORIDE 1000 ML: 9 INJECTION, SOLUTION INTRAVENOUS at 11:35

## 2022-10-20 RX ADMIN — Medication 10 ML: at 08:54

## 2022-10-20 RX ADMIN — PANTOPRAZOLE SODIUM 40 MG: 40 TABLET, DELAYED RELEASE ORAL at 08:51

## 2022-10-20 RX ADMIN — POLYETHYLENE GLYCOL 3350 17 G: 17 POWDER, FOR SOLUTION ORAL at 08:50

## 2022-10-20 RX ADMIN — ALBUTEROL SULFATE 2.5 MG: 2.5 SOLUTION RESPIRATORY (INHALATION) at 08:01

## 2022-10-20 RX ADMIN — BUPROPION HYDROCHLORIDE 300 MG: 300 TABLET, FILM COATED, EXTENDED RELEASE ORAL at 08:51

## 2022-10-20 RX ADMIN — ALBUTEROL SULFATE 2.5 MG: 2.5 SOLUTION RESPIRATORY (INHALATION) at 15:47

## 2022-10-20 RX ADMIN — WATER 2000 MG: 1 INJECTION INTRAMUSCULAR; INTRAVENOUS; SUBCUTANEOUS at 07:00

## 2022-10-20 RX ADMIN — INSULIN LISPRO 4 UNITS: 100 INJECTION, SOLUTION INTRAVENOUS; SUBCUTANEOUS at 08:59

## 2022-10-20 RX ADMIN — Medication 1 CAPSULE: at 22:01

## 2022-10-20 RX ADMIN — ALOGLIPTIN 12.5 MG: 12.5 TABLET, FILM COATED ORAL at 08:51

## 2022-10-20 RX ADMIN — ACETAMINOPHEN 650 MG: 325 TABLET, FILM COATED ORAL at 06:56

## 2022-10-20 RX ADMIN — BISACODYL 5 MG: 5 TABLET, COATED ORAL at 08:51

## 2022-10-20 RX ADMIN — CYCLOBENZAPRINE 10 MG: 10 TABLET, FILM COATED ORAL at 08:51

## 2022-10-20 RX ADMIN — PRAMIPEXOLE DIHYDROCHLORIDE 0.75 MG: 0.25 TABLET ORAL at 08:51

## 2022-10-20 ASSESSMENT — PAIN DESCRIPTION - LOCATION
LOCATION: KNEE;SHOULDER
LOCATION: NECK
LOCATION: NECK;SHOULDER

## 2022-10-20 ASSESSMENT — PAIN SCALES - GENERAL
PAINLEVEL_OUTOF10: 8

## 2022-10-20 ASSESSMENT — PAIN DESCRIPTION - DESCRIPTORS
DESCRIPTORS: ACHING;THROBBING;SHOOTING
DESCRIPTORS: DISCOMFORT;SORE;TENDER
DESCRIPTORS: ACHING;SHOOTING;THROBBING

## 2022-10-20 NOTE — PROGRESS NOTES
Hospitalist progress note    Patient:  Chet Hayden  YOB: 1955  Date of Service: 10/20/22  MRN: [id number]   Acct:  [de-identified]   Primary Care Physician: Zulema Billings DO  10/17/2022     Patient Seen, Chart, Consults notes, Labs, Radiology, family and social history were reviewed. Assessment and Plan:     Cervical stenosis : continue collar , post operative   Cough : chest x ray ordered show atelectasis   Continue DVT &  GERD prophylaxis    Neck pain , controlled   CKD III no change   diabetes mellitus , insulin coverage   Copd : no change continue oxygen   Correct electrolytes   History of NPH status post shunt   2016            Chief Complaint:  cough     She still complains of cough           Subjective     Patient complains of cough condition is mild to moderate the cough is dry, the condition is not associated with shortness of breath or hemoptysis  She has COPD   There is no fever, or chills or sweating. There is no abdominal pain, change in bowel habits, or black or bloody stools, there is no upper GI bleeding, no nausea, no vomiting, or jaundice. Her neck pain is well controlled   There is no abdominal pain, change in bowel habits, or black or bloody stools, there is no upper GI bleeding, no nausea, no vomiting, or jaundice.                  Review of systems:    All 10 review of system were negative unless what is mentioned in the present history               PHYSICAL EXAM:  /66   Pulse (!) 105   Temp 97.7 °F (36.5 °C) (Temporal)   Resp 16   Ht 5' 3\" (1.6 m)   Wt 120 lb (54.4 kg)   LMP  (LMP Unknown)   SpO2 97%   BMI 21.26 kg/m²     General appearance - alert, well appearing, and in no distress   Head: atraumatic   Pupil: equal, round reactive to light   Neck:  post operative C collar, Tender   Chest - c ++ wheezes  Distant Breath Sounds: yes  Heart - S1 and S2 normal   Abdomen - soft, nontender, nondistended, no masses or organomegaly   normal without focal findings, mental status, speech normal, alert and oriented x3, DONAVAN and reflexes normal and symmetric   Integumentary - Skin color, texture, turgor normal. No rashes or lesions. Musculoskeletal - no joint tenderness, deformity or swelling   Extremities - peripheral pulses normal, no pedal edema, no clubbing or cyanosis times 4         Review of Labs and Diagnostic Testing:         XR CHEST (2 VW)    Result Date: 10/12/2022  EXAMINATION: TWO XRAY VIEWS OF THE CHEST 10/10/2022 11:59 am COMPARISON: 28 June 2021. HISTORY: ORDERING SYSTEM PROVIDED HISTORY: Pre-op testing TECHNOLOGIST PROVIDED HISTORY: Reason for exam:->Pre-Op testing What reading provider will be dictating this exam?->CRC FINDINGS: Central venous catheter on the right terminates in the SVC as before. Stable cardiomegaly. Enlargement of the main pulmonary artery suggests likely pulmonary arterial hypertension. No evidence of pulmonary venous vascular congestion. No new abnormal lung findings. There is obstructive airways disease. Neither costophrenic angle is newly blunted. Cardiomegaly and pulmonary arterial hypertension suggested. Obstructive airways disease. No new abnormal findings. CT HEAD WO CONTRAST    Result Date: 9/20/2022  EXAMINATION: CT OF THE HEAD WITHOUT CONTRAST  9/20/2022 3:07 pm TECHNIQUE: CT of the head was performed without the administration of intravenous contrast. Automated exposure control, iterative reconstruction, and/or weight based adjustment of the mA/kV was utilized to reduce the radiation dose to as low as reasonably achievable. COMPARISON: None. HISTORY: ORDERING SYSTEM PROVIDED HISTORY: Normal pressure hydrocephalus [facility]) FINDINGS: Reviewed the previous studies of February 12, 2021, June 28, 2021 and the March 17, 2022. There is a right parietal ventricular shunt catheter, tip crosses the midline extending to the anterior body of the left lateral ventricle.  Based on the biventricular/by frontal ratio the ventricular system appears to be slightly decreased since the previous study is. The there is no evidence for focal mass effect or midline shift. There is no evidence for a sizable area of a new acute recent insult progression to the brain parenchyma. There is no indication for acute intracranial hemorrhagic. There is no midline shift. Noted however is a discrete isodense left subdural collection measuring up to 3.5 mm in thickness covering the left cerebral convexity from occipital/parietal to frontal region which was not seen previously. The this is a very small left subdural collection which does not cause midline shift or effacement of peripheral sulci/gyri interfaces. There are preserved differentiation between gray and white matter density of the brain parenchyma. Visualized intraorbital structures have unremarkable appearance. 1.  Development of a discrete left subdural isodense collection which could represent a chronic subdural hematoma or a hygroma, measuring up to 3.5 mm in thickness, that was not present on the study March 17 2022 or on the MRI of the brain May 3, 2022. 2.  No change in the position of right ventricular shunt catheter. 3.  The ventricular system appears to be equal or slightly decreased since the previous study March 17, 2022. CT SOFT TISSUE NECK W CONTRAST    Result Date: 9/21/2022  EXAMINATION: CT OF THE NECK SOFT TISSUE WITH CONTRAST  9/20/2022 TECHNIQUE: CT of the neck was performed with the administration of intravenous contrast. Multiplanar reformatted images are provided for review. Automated exposure control, iterative reconstruction, and/or weight based adjustment of the mA/kV was utilized to reduce the radiation dose to as low as reasonably achievable. COMPARISON: CT cervical spine performed 06/28/2021. MRI cervical spine performed 12/15/2020.  HISTORY: ORDERING SYSTEM PROVIDED HISTORY: Muscle swelling TECHNOLOGIST PROVIDED HISTORY: STAT Creatinine as needed:->No FINDINGS: PHARYNX/LARYNX:  The palatine tonsils are normal in appearance. The tongue is normal in appearance. The valleculae, epiglottis, aryepiglottic folds and pyriform sinuses appear unremarkable. The true and false vocal cords are normal in appearance. No mass or abscess is seen. SALIVARY GLANDS/THYROID:  The parotid and submandibular glands appear unremarkable. The thyroid gland appears unremarkable. LYMPH NODES:  No cervical or supraclavicular lymphadenopathy is seen. SOFT TISSUES:  There is heterogeneously enhancing mass adjacent to the left carotid bifurcation that measures 1.7 x 1.7 x 2.9 cm. There is a heterogeneously enhancing mass adjacent to the right carotid bifurcation that measures 1.7 x 1.0 x 1.7 cm. BRAIN/ORBITS/SINUSES:  The visualized portion of the intracranial contents appear unremarkable. The visualized portion of the orbits, paranasal sinuses and mastoid air cells demonstrate no acute abnormality. LUNG APICES/SUPERIOR MEDIASTINUM:  No focal consolidation is seen within the visualized lung apices. There is emphysematous change in lung apices. No superior mediastinal lymphadenopathy or mass. The visualized portion of the trachea appears unremarkable. BONES:  No aggressive appearing lytic or blastic bony lesion. Heterogeneously enhancing masses adjacent to the carotid bifurcation bilaterally with the left being larger than the right. These are similar compared to prior examination and may relate to carotid body tumors. MRI CERVICAL SPINE W WO CONTRAST    Result Date: 9/20/2022  EXAMINATION: MRI OF THE CERVICAL SPINE WITHOUT AND WITH CONTRAST  9/20/2022 2:30 pm: TECHNIQUE: Multiplanar multisequence MRI of the cervical spine was performed without and with the administration of intravenous contrast. COMPARISON: CT of the cervical spine without contrast, 06/28/2021.  MRI of the cervical spine, 12/15/2020 HISTORY: ORDERING SYSTEM PROVIDED HISTORY: Cervical myelopathy ([facility] [address] TECHNOLOGIST PROVIDED HISTORY: STAT Creatinine as needed:->Yes FINDINGS: BONES/ALIGNMENT:  No fracture or joint dislocation. The vertebral body heights are preserved. There is fusion of the C5 and C6 vertebral bodies. There is mild compression fracture deformity along the superior C7 vertebral body. No marrow edema or infiltrative process is seen. SPINAL CORD: Mild high signal in the cord at C4-5 may represent edema or myelomalacia. SOFT TISSUES: No paraspinal mass or edema is identified. There is pachymeningeal enhancement in the cervical cord and partially visualized brain. C2-C3: Disc desiccation without herniation. Mild left greater than right facet hypertrophy. No central canal stenosis. Mild left neural foraminal stenosis. C3-C4: Minimal anterolisthesis of C3 over C4 by approximately 2 mm. Disc desiccation with a small disc bulge. Mild uncovertebral joint hypertrophy. Moderate bilateral facet hypertrophy. Mild central canal stenosis. Moderate to severe bilateral neural foraminal stenoses. C4-C5: Moderate loss of disc height with a disc osteophyte complex. Facet and uncovertebral joint hypertrophic changes are noted. Moderate central canal stenosis, with narrowing of the AP diameter of the thecal sac in the midsagittal plane to 8.3 mm. Severe bilateral neural foraminal stenoses. C5-C6: Fusion of the C5 and C6 intervertebral disc. Mild facet hypertrophy. No significant central canal or neural foraminal stenosis. C6-C7: Severe loss of disc height with a small disc osteophyte complex. Facet and uncovertebral joint hypertrophic changes are noted. Mild central canal stenosis. Moderate right and severe left neural foraminal stenoses. C7-T1: Disc desiccation with a small disc bulge. Mild facet hypertrophy. No significant central canal stenosis. Mild neural foraminal stenoses.  MISCELLANEOUS: In the limit masses along the carotid bulbs bilaterally, measuring 1.8 x 0.8 cm on the right and 2.0 x 1.7 cm on the left. 1. At C4-5, there is mildly increased T2 signal in the cord which may be due to EDEMA OR MYELOMALACIA. No associated enhancement is seen. 2. Diffuse pachymeningeal enhancement, most notably in the partially visualized brain. It is most likely due to INTRACRANIAL HYPOTENSION. If indicated, further evaluation with pre and post contrast and MRI of the brain may be obtained. 3. Grossly STABLE INDOLENT MASSES along the carotid bulbs bilaterally. Possibility of CAROTID BODY TUMOR OR SCHWANNOMAS is raised. (The lesions are grossly stable as of the MRI of the neck 05/26/2018.) 4. Moderate central canal stenosis at C4-5. Mild stenoses at C3-4 and C6-7. 5.  Multilevel neural foraminal stenoses, worst (severe) at bilateral C4-5 and left C6-7 levels. The RECOMMENDATIONS: Unavailable     FLUORO FOR SURGICAL PROCEDURES    Result Date: 10/17/2022  EXAMINATION: SPOT FLUOROSCOPIC IMAGES 10/17/2022 10:09 am TECHNIQUE: Fluoroscopy was provided by the radiology department for procedure. Radiologist was not present during examination. FLUOROSCOPY DOSE AND TYPE OR TIME AND EXPOSURES: Fluoroscopy time equals 3.4 seconds. Total dose equals 1.49 mGy COMPARISON: None HISTORY: ORDERING SYSTEM PROVIDED HISTORY: cervical thoracic fusion TECHNOLOGIST PROVIDED HISTORY: Reason for exam:->cervical thoracic fusion What reading provider will be dictating this exam?->CRC Intraprocedural imaging. FINDINGS: 2 spot images of the cervical spine were obtained. Intraprocedural fluoroscopic spot images as above. See separate procedure report for more information.      Recent Results (from the past 24 hour(s))   Potassium    Collection Time: 10/19/22  9:56 AM   Result Value Ref Range    Potassium 5.0 3.5 - 5.0 mmol/L   POCT Glucose    Collection Time: 10/19/22 11:54 AM   Result Value Ref Range    Meter Glucose 210 (H) 74 - 99 mg/dL   POCT Glucose    Collection Time: 10/19/22  4:50 PM   Result Value Ref Range    Meter Glucose 248 (H) 74 - 99 mg/dL   POCT Glucose    Collection Time: 10/19/22  9:48 PM   Result Value Ref Range    Meter Glucose 204 (H) 74 - 99 mg/dL   Basic Metabolic Panel    Collection Time: 10/20/22  5:42 AM   Result Value Ref Range    Sodium 134 132 - 146 mmol/L    Potassium 5.2 (H) 3.5 - 5.0 mmol/L    Chloride 96 (L) 98 - 107 mmol/L    CO2 22 22 - 29 mmol/L    Anion Gap 16 7 - 16 mmol/L    Glucose 264 (H) 74 - 99 mg/dL    BUN 17 6 - 23 mg/dL    Creatinine 1.1 (H) 0.5 - 1.0 mg/dL    Est, Glom Filt Rate 55 >=60 mL/min/1.73    Calcium 9.3 8.6 - 10.2 mg/dL   Magnesium    Collection Time: 10/20/22  5:42 AM   Result Value Ref Range    Magnesium 2.2 1.6 - 2.6 mg/dL   CBC    Collection Time: 10/20/22  5:42 AM   Result Value Ref Range    WBC 11.7 (H) 4.5 - 11.5 E9/L    RBC 3.90 3.50 - 5.50 E12/L    Hemoglobin 11.9 11.5 - 15.5 g/dL    Hematocrit 37.3 34.0 - 48.0 %    MCV 95.6 80.0 - 99.9 fL    MCH 30.5 26.0 - 35.0 pg    MCHC 31.9 (L) 32.0 - 34.5 %    RDW 14.5 11.5 - 15.0 fL    Platelets 612 526 - 038 E9/L    MPV 11.4 7.0 - 12.0 fL   POCT Glucose    Collection Time: 10/20/22  7:14 AM   Result Value Ref Range    Meter Glucose 282 (H) 74 - 99 mg/dL   ]     Current Facility-Administered Medications: alogliptin (NESINA) tablet 12.5 mg, 12.5 mg, Oral, Daily  insulin glargine-yfgn (SEMGLEE-YFGN) injection vial 10 Units, 10 Units, SubCUTAneous, Daily  melatonin disintegrating tablet 5 mg, 5 mg, Oral, Nightly PRN  melatonin disintegrating tablet 5 mg, 5 mg, Oral, Nightly  polyethylene glycol (GLYCOLAX) packet 17 g, 17 g, Oral, BID  sennosides-docusate sodium (SENOKOT-S) 8.6-50 MG tablet 2 tablet, 2 tablet, Oral, QPM  sennosides-docusate sodium (SENOKOT-S) 8.6-50 MG tablet 2 tablet, 2 tablet, Oral, BID PRN  lactobacillus (CULTURELLE) capsule 1 capsule, 1 capsule, Oral, BID  cloNIDine (CATAPRES) tablet 0.1 mg, 0.1 mg, Oral, Q4H PRN  carvedilol (COREG) tablet 3.125 mg, 3.125 mg, Oral, BID WC  oxybutynin (DITROPAN-XL) extended release tablet 10 mg, 10 mg, Oral, Daily  oxyCODONE-acetaminophen (PERCOCET) 5-325 MG per tablet 2 tablet, 2 tablet, Oral, Q4H PRN  pramipexole (MIRAPEX) tablet 0.75 mg, 0.75 mg, Oral, BID  [Held by provider] spironolactone (ALDACTONE) tablet 25 mg, 25 mg, Oral, Daily  valsartan (DIOVAN) tablet 20 mg, 20 mg, Oral, Daily  sodium chloride flush 0.9 % injection 5-40 mL, 5-40 mL, IntraVENous, 2 times per day  sodium chloride flush 0.9 % injection 5-40 mL, 5-40 mL, IntraVENous, PRN  0.9 % sodium chloride infusion, , IntraVENous, PRN  acetaminophen (TYLENOL) tablet 650 mg, 650 mg, Oral, 4 times per day  ondansetron (ZOFRAN-ODT) disintegrating tablet 4 mg, 4 mg, Oral, Q8H PRN **OR** ondansetron (ZOFRAN) injection 4 mg, 4 mg, IntraVENous, Q6H PRN  morphine (PF) injection 2 mg, 2 mg, IntraVENous, Q2H PRN **OR** morphine sulfate (PF) injection 4 mg, 4 mg, IntraVENous, Q2H PRN  cyclobenzaprine (FLEXERIL) tablet 10 mg, 10 mg, Oral, Q12H PRN  diphenhydrAMINE (BENADRYL) tablet 25 mg, 25 mg, Oral, Q6H PRN **OR** diphenhydrAMINE (BENADRYL) injection 25 mg, 25 mg, IntraVENous, Q6H PRN  bisacodyl (DULCOLAX) EC tablet 5 mg, 5 mg, Oral, Daily  bisacodyl (DULCOLAX) suppository 10 mg, 10 mg, Rectal, Daily PRN  fleet rectal enema 1 enema, 1 enema, Rectal, Daily PRN  benzocaine-menthol (CEPACOL SORE THROAT) lozenge 1 lozenge, 1 lozenge, Oral, Q2H PRN  insulin lispro (HUMALOG) injection vial 0-8 Units, 0-8 Units, SubCUTAneous, TID WC  insulin lispro (HUMALOG) injection vial 0-4 Units, 0-4 Units, SubCUTAneous, Nightly  glucose chewable tablet 16 g, 4 tablet, Oral, PRN  dextrose bolus 10% 125 mL, 125 mL, IntraVENous, PRN **OR** dextrose bolus 10% 250 mL, 250 mL, IntraVENous, PRN  glucagon (rDNA) injection 1 mg, 1 mg, SubCUTAneous, PRN  dextrose 10 % infusion, , IntraVENous, Continuous PRN  albuterol (PROVENTIL) nebulizer solution 2.5 mg, 2.5 mg, Nebulization, 4x daily  pantoprazole (PROTONIX) tablet 40 mg, 40 mg, Oral, QAM AC  buPROPion (WELLBUTRIN XL) extended release tablet 300 mg, 300 mg, Oral, Daily   Hospital Problems             Last Modified POA    * (Principal) Cervical stenosis of spinal canal 10/17/2022 Yes    CKD (chronic kidney disease) stage 3, GFR 30-59 ml/min 10/17/2022 Yes    Chronic systolic (congestive) heart failure ([address] 10/17/2022 Yes    Overview Addendum 5/8/2019  6:31 AM by MD LORNA Nelson.  [place]cho 2018 [facility] Ejection fraction 23% plus/minus 5%         Type 2 diabetes mellitus, without long-term current use of insulin ([address] 10/17/2022 Yes    O2 dependent 10/18/2022 Yes    Severe protein-calorie malnutrition ([address] (Chronic) 10/19/2022 Yes    COPD (chronic obstructive pulmonary disease) ([address] 10/17/2022 Yes    S/P ventriculoperitoneal shunt 10/17/2022 Yes                   Electronically signed by Orlando Aviles MD on 10/20/2022 at 7:18 AM

## 2022-10-20 NOTE — PLAN OF CARE
Problem: Discharge Planning  Goal: Discharge to home or other facility with appropriate resources  Outcome: Progressing     Problem: Chronic Conditions and Co-morbidities  Goal: Patient's chronic conditions and co-morbidity symptoms are monitored and maintained or improved  Outcome: Progressing     Problem: Nutrition Deficit:  Goal: Optimize nutritional status  Outcome: Progressing

## 2022-10-20 NOTE — PROGRESS NOTES
Occupational Therapy  OT BEDSIDE TREATMENT NOTE   [address] [facility] [address] [address]      Date:10/20/2022  Patient Name: Baljinder Wasserman  MRN: [id number]  : 1955  Room: [address] [place] [address]-A     Holding this date due to low BP, will attempt at a later time/date.         Dinorah Melendez

## 2022-10-20 NOTE — CARE COORDINATION
10/20/22 Update CM Note: POD 3 Posterior Cervical Fusion. She remains on flds and iv ancef. She has had heart rate and bp issues throughout the early morning. BP 78/56 - 106/67. Her aldactone is on hold. She has received iv fld bolus. Unable to participate with therapies today due to bp issues. CXR is pending. Labs are unremarkable. She did receive oxy at 7:12am today. [facility] is pending precert for her. Will follow for precert completion and clinical stability for discharge.  Electronically signed by Karo Nick RN CM on 10/20/2022 at 11:10 AM

## 2022-10-20 NOTE — PROGRESS NOTES
Department of Neurosurgery  Progress Note    CHIEF COMPLAINT: s/p posterior C3-T1 fusion 10/17/22    SUBJECTIVE:  Continued op site pain. Low BP overnight and this AM. Continued cough. REVIEW OF SYSTEMS :  Constitutional: Negative for chills and fever. Neurological: Negative for dizziness, tremors and speech change.      OBJECTIVE:   VITALS:  BP 84/65   Pulse 100   Temp 97.7 °F (36.5 °C) (Temporal)   Resp 18   Ht 5' 3\" (1.6 m)   Wt 120 lb (54.4 kg)   LMP  (LMP Unknown)   SpO2 95%   BMI 21.26 kg/m²     PHYSICAL:  Neurologic: Alert and oriented x3; PERRL  Motor Exam:  Motor exam upper strength 4/5  Sensory:  Sensory intact  Incision c/d/I  Custom collar intact      DATA:  CBC:   Lab Results   Component Value Date/Time    WBC 11.7 10/20/2022 05:42 AM    RBC 3.90 10/20/2022 05:42 AM    HGB 11.9 10/20/2022 05:42 AM    HCT 37.3 10/20/2022 05:42 AM    MCV 95.6 10/20/2022 05:42 AM    MCH 30.5 10/20/2022 05:42 AM    MCHC 31.9 10/20/2022 05:42 AM    RDW 14.5 10/20/2022 05:42 AM     10/20/2022 05:42 AM    MPV 11.4 10/20/2022 05:42 AM     BMP:    Lab Results   Component Value Date/Time     10/20/2022 05:42 AM    K 5.2 10/20/2022 05:42 AM    K 4.8 10/10/2022 11:40 AM    CL 96 10/20/2022 05:42 AM    CO2 22 10/20/2022 05:42 AM    BUN 17 10/20/2022 05:42 AM    LABALBU 3.1 10/19/2022 05:28 AM    LABALBU 4.4 09/30/2011 02:00 PM    CREATININE 1.1 10/20/2022 05:42 AM    CALCIUM 9.3 10/20/2022 05:42 AM    GFRAA 60 10/10/2022 11:40 AM    LABGLOM 55 10/20/2022 05:42 AM    GLUCOSE 264 10/20/2022 05:42 AM    GLUCOSE 111 09/30/2011 02:00 PM     PT/INR:    Lab Results   Component Value Date/Time    PROTIME 12.2 10/10/2022 11:40 AM    INR 1.1 10/10/2022 11:40 AM     PTT:    Lab Results   Component Value Date/Time    APTT <20.0 07/23/2018 03:00 PM   [APTT}    Current Inpatient Medications  Current Facility-Administered Medications: 0.9 % sodium chloride bolus, 1,000 mL, IntraVENous, Once  0.9 % sodium chloride bolus, 500 mL, IntraVENous, Once  alogliptin (NESINA) tablet 12.5 mg, 12.5 mg, Oral, Daily  insulin glargine-yfgn (SEMGLEE-YFGN) injection vial 10 Units, 10 Units, SubCUTAneous, Daily  melatonin disintegrating tablet 5 mg, 5 mg, Oral, Nightly PRN  melatonin disintegrating tablet 5 mg, 5 mg, Oral, Nightly  polyethylene glycol (GLYCOLAX) packet 17 g, 17 g, Oral, BID  sennosides-docusate sodium (SENOKOT-S) 8.6-50 MG tablet 2 tablet, 2 tablet, Oral, QPM  sennosides-docusate sodium (SENOKOT-S) 8.6-50 MG tablet 2 tablet, 2 tablet, Oral, BID PRN  lactobacillus (CULTURELLE) capsule 1 capsule, 1 capsule, Oral, BID  cloNIDine (CATAPRES) tablet 0.1 mg, 0.1 mg, Oral, Q4H PRN  carvedilol (COREG) tablet 3.125 mg, 3.125 mg, Oral, BID WC  oxybutynin (DITROPAN-XL) extended release tablet 10 mg, 10 mg, Oral, Daily  oxyCODONE-acetaminophen (PERCOCET) 5-325 MG per tablet 2 tablet, 2 tablet, Oral, Q4H PRN  pramipexole (MIRAPEX) tablet 0.75 mg, 0.75 mg, Oral, BID  [Held by provider] spironolactone (ALDACTONE) tablet 25 mg, 25 mg, Oral, Daily  valsartan (DIOVAN) tablet 20 mg, 20 mg, Oral, Daily  sodium chloride flush 0.9 % injection 5-40 mL, 5-40 mL, IntraVENous, 2 times per day  sodium chloride flush 0.9 % injection 5-40 mL, 5-40 mL, IntraVENous, PRN  0.9 % sodium chloride infusion, , IntraVENous, PRN  acetaminophen (TYLENOL) tablet 650 mg, 650 mg, Oral, 4 times per day  ondansetron (ZOFRAN-ODT) disintegrating tablet 4 mg, 4 mg, Oral, Q8H PRN **OR** ondansetron (ZOFRAN) injection 4 mg, 4 mg, IntraVENous, Q6H PRN  morphine (PF) injection 2 mg, 2 mg, IntraVENous, Q2H PRN **OR** morphine sulfate (PF) injection 4 mg, 4 mg, IntraVENous, Q2H PRN  cyclobenzaprine (FLEXERIL) tablet 10 mg, 10 mg, Oral, Q12H PRN  diphenhydrAMINE (BENADRYL) tablet 25 mg, 25 mg, Oral, Q6H PRN **OR** diphenhydrAMINE (BENADRYL) injection 25 mg, 25 mg, IntraVENous, Q6H PRN  bisacodyl (DULCOLAX) EC tablet 5 mg, 5 mg, Oral, Daily  bisacodyl (DULCOLAX) suppository 10 mg, 10 mg, Rectal, Daily PRN  fleet rectal enema 1 enema, 1 enema, Rectal, Daily PRN  benzocaine-menthol (CEPACOL SORE THROAT) lozenge 1 lozenge, 1 lozenge, Oral, Q2H PRN  insulin lispro (HUMALOG) injection vial 0-8 Units, 0-8 Units, SubCUTAneous, TID WC  insulin lispro (HUMALOG) injection vial 0-4 Units, 0-4 Units, SubCUTAneous, Nightly  glucose chewable tablet 16 g, 4 tablet, Oral, PRN  dextrose bolus 10% 125 mL, 125 mL, IntraVENous, PRN **OR** dextrose bolus 10% 250 mL, 250 mL, IntraVENous, PRN  glucagon (rDNA) injection 1 mg, 1 mg, SubCUTAneous, PRN  dextrose 10 % infusion, , IntraVENous, Continuous PRN  albuterol (PROVENTIL) nebulizer solution 2.5 mg, 2.5 mg, Nebulization, 4x daily  pantoprazole (PROTONIX) tablet 40 mg, 40 mg, Oral, QAM AC  buPROPion (WELLBUTRIN XL) extended release tablet 300 mg, 300 mg, Oral, Daily    ASSESSMENT:   s/p posterior C3-T1 fusion 10/17/22    PLAN:  -Pain control  -PT/OT  -Drain care.  Plan to remove today  -Saline bolus  -Chest XR  -PMR consult  -Follow up in neurosurgery clinic in 2 weeks for staple removal and in 4 weeks with XR      Electronically signed by Edel Villanueva PA-C on 10/20/2022 at 9:44 AM

## 2022-10-20 NOTE — PROGRESS NOTES
Physical Therapy  Treatment Note    Name: Eun Girard  : 1955  MRN: [id number]      Date of Service: 10/20/2022    Evaluating PT:  Tamie Betancourt, PT, DPT    Room #:  2851/4549-L  Diagnosis:  Spinal stenosis in cervical region [M48.02]  Cervical stenosis of spinal canal [M48.02]  PMHx/PSHx:  CKD, CAD, COPD, NPH, hypotension  Procedure/Surgery:  s/p C3-C7 laminectomy, C3-T1 fusion  Precautions:  fall risk, c/s collar, spine, drain, monzon, B knee buckling  Equipment Needs:  TBD    SUBJECTIVE:    Pt lives with spouse and dtr in a 2 story home with 3 steps to enter and 1 handrail. Bed/bath is on 1st floor. Pt ambulated with cane PTA. OBJECTIVE:   Initial Evaluation  Date: 10/17/22 Treatment  Date: 10/20/22 Short Term/ Long Term   Goals   AM-PAC 6 Clicks /    Was pt agreeable to Eval/treatment? yes Yes    Does pt have pain? 8/10 neck Unrated back and neck pain    Bed Mobility  Rolling: mod A  Supine to sit: mod A  Sit to supine: mod A  Scooting: mod A Rolling: NT  Supine to sit: NT  Sit to supine: NT  Scooting: NT Rolling: mod I  Supine to sit: mod I  Sit to supine: mod I  Scooting: mod I   Transfers Sit to stand: mod A  Stand to sit: mod A  Stand pivot: NT Sit to stand: min A  Stand to sit: min A  Stand pivot: min A with ww Sit to stand: mod I  Stand to sit: mod I  Stand pivot: mod I with AAD   Ambulation    2' with HHA mod A 20'x4 with ww min A 150'+ with AAD mod I   Stair negotiation: ascended and descended  NT NT 3 steps with 1 handrail mod I     Pt is A & O x 4  Sensation:  Pt reports numbness and tingling to B feet that has been ongoing.   Edema:  Unremarkable    Therapeutic Exercises:    Bed mobility: supine<>sit, cued for positioning at EOB  Transfers: STSx2, cued for hand placement and postural correction  Ambulation: 20'x4 with ww  BLE AROM    Patient education  Pt educated on spinal precautions, role of PT, importance of functional mobility during hospital stay, safety with functional mobility. Patient response to education:   Pt verbalized understanding Pt demonstrated skill Pt requires further education in this area   yes yes reinforce     ASSESSMENT:    Comments:    Pt sitting at EOB upon entering, pt agreeable to participate. Pt cued for hand placement and instructed to stand from EOB. Pt standing with ww, demonstrating fair static standing balance. Pt instructed to ambulate to tolerance. Pt ambulating with shuffled gait and narrow DIAMOND. Pt assisted with ww spacing and positioning to negotiate room objects appropriately. Pt was assisted back to EOB to rest, SPO2 and BP WNL after bout. Pt was agreeable to ambulate a second time, demonstrating similar deviations as previous bout. Pt was transferred to bedside chair at the end of session. All needs met and call bell in reach prior to exiting. Treatment:  Patient practiced and was instructed in the following treatment:    Bed mobility training - pt given verbal and tactile cues to facilitate proper sequencing and safety during rolling and supine>sit as well as provided with physical assistance to complete task   STS and pivot transfer training - pt educated on proper hand and foot placement, safety and sequencing, and use of verbal and tactile cues to safely complete sit<>stand and pivot transfers with physical assistance to complete task safely   Gait training- pt was given verbal and tactile cues to facilitate pt safety with ww use during ambulation as well as provided with physical assistance. PLAN:    Patient is making good progress towards established goals. Will continue with current POC.       Time in  1358  Time out  1421    Total Treatment Time  23 minutes     CPT codes:  [] Gait training 10357 -- minutes  [] Manual therapy [id number] -- minutes  [x] Therapeutic activities 22591 23 minutes  [] Therapeutic exercises 14629 -- minutes  [] Neuromuscular reeducation 95584 -- minutes    Kendra Olguin PT, DPT  [id number]

## 2022-10-20 NOTE — PROGRESS NOTES
Hemovac drain removed intact, Pt tolerated well. Area cleansed with Iodine and a new dressing was applied. . Incision well aligned and without redness, swelling or irritation.

## 2022-10-21 VITALS
BODY MASS INDEX: 21.26 KG/M2 | RESPIRATION RATE: 18 BRPM | SYSTOLIC BLOOD PRESSURE: 105 MMHG | WEIGHT: 120 LBS | DIASTOLIC BLOOD PRESSURE: 69 MMHG | HEART RATE: 101 BPM | HEIGHT: 63 IN | OXYGEN SATURATION: 96 % | TEMPERATURE: 97.9 F

## 2022-10-21 LAB
METER GLUCOSE: 154 MG/DL (ref 74–99)
METER GLUCOSE: 297 MG/DL (ref 74–99)

## 2022-10-21 PROCEDURE — 2700000000 HC OXYGEN THERAPY PER DAY

## 2022-10-21 PROCEDURE — 97535 SELF CARE MNGMENT TRAINING: CPT

## 2022-10-21 PROCEDURE — 97530 THERAPEUTIC ACTIVITIES: CPT

## 2022-10-21 PROCEDURE — U0003 INFECTIOUS AGENT DETECTION BY NUCLEIC ACID (DNA OR RNA); SEVERE ACUTE RESPIRATORY SYNDROME CORONAVIRUS 2 (SARS-COV-2) (CORONAVIRUS DISEASE [COVID-19]), AMPLIFIED PROBE TECHNIQUE, MAKING USE OF HIGH THROUGHPUT TECHNOLOGIES AS DESCRIBED BY CMS-2020-01-R: HCPCS

## 2022-10-21 PROCEDURE — U0005 INFEC AGEN DETEC AMPLI PROBE: HCPCS

## 2022-10-21 PROCEDURE — S5553 INSULIN LONG ACTING 5 U: HCPCS | Performed by: INTERNAL MEDICINE

## 2022-10-21 PROCEDURE — 82962 GLUCOSE BLOOD TEST: CPT

## 2022-10-21 PROCEDURE — 6360000002 HC RX W HCPCS: Performed by: NEUROLOGICAL SURGERY

## 2022-10-21 PROCEDURE — 97116 GAIT TRAINING THERAPY: CPT

## 2022-10-21 PROCEDURE — 2580000003 HC RX 258: Performed by: NEUROLOGICAL SURGERY

## 2022-10-21 PROCEDURE — 6370000000 HC RX 637 (ALT 250 FOR IP): Performed by: INTERNAL MEDICINE

## 2022-10-21 PROCEDURE — 6370000000 HC RX 637 (ALT 250 FOR IP): Performed by: NEUROLOGICAL SURGERY

## 2022-10-21 PROCEDURE — 94640 AIRWAY INHALATION TREATMENT: CPT

## 2022-10-21 RX ORDER — CYCLOBENZAPRINE HCL 10 MG
10 TABLET ORAL EVERY 12 HOURS PRN
Qty: 30 TABLET | Refills: 0 | Status: SHIPPED | OUTPATIENT
Start: 2022-10-21 | End: 2022-10-31

## 2022-10-21 RX ORDER — OXYCODONE HYDROCHLORIDE 5 MG/1
5 TABLET ORAL EVERY 4 HOURS PRN
Qty: 42 TABLET | Refills: 0 | Status: SHIPPED | OUTPATIENT
Start: 2022-10-21 | End: 2022-10-28

## 2022-10-21 RX ORDER — CYCLOBENZAPRINE HCL 10 MG
10 TABLET ORAL 3 TIMES DAILY PRN
Qty: 30 TABLET | Refills: 0 | Status: SHIPPED | OUTPATIENT
Start: 2022-10-21 | End: 2022-10-31

## 2022-10-21 RX ORDER — OXYCODONE HYDROCHLORIDE AND ACETAMINOPHEN 5; 325 MG/1; MG/1
1 TABLET ORAL ONCE
Status: COMPLETED | OUTPATIENT
Start: 2022-10-21 | End: 2022-10-21

## 2022-10-21 RX ORDER — OXYCODONE HYDROCHLORIDE AND ACETAMINOPHEN 5; 325 MG/1; MG/1
1 TABLET ORAL EVERY 4 HOURS PRN
Qty: 42 TABLET | Refills: 0 | Status: SHIPPED | OUTPATIENT
Start: 2022-10-21 | End: 2022-10-28

## 2022-10-21 RX ADMIN — ALOGLIPTIN 12.5 MG: 12.5 TABLET, FILM COATED ORAL at 09:45

## 2022-10-21 RX ADMIN — Medication 1 CAPSULE: at 09:45

## 2022-10-21 RX ADMIN — INSULIN GLARGINE-YFGN 10 UNITS: 100 INJECTION, SOLUTION SUBCUTANEOUS at 09:46

## 2022-10-21 RX ADMIN — Medication 10 ML: at 09:46

## 2022-10-21 RX ADMIN — ALBUTEROL SULFATE 2.5 MG: 2.5 SOLUTION RESPIRATORY (INHALATION) at 12:20

## 2022-10-21 RX ADMIN — BUPROPION HYDROCHLORIDE 300 MG: 300 TABLET, FILM COATED, EXTENDED RELEASE ORAL at 09:45

## 2022-10-21 RX ADMIN — BISACODYL 5 MG: 5 TABLET, COATED ORAL at 09:45

## 2022-10-21 RX ADMIN — PRAMIPEXOLE DIHYDROCHLORIDE 0.75 MG: 0.25 TABLET ORAL at 09:45

## 2022-10-21 RX ADMIN — PANTOPRAZOLE SODIUM 40 MG: 40 TABLET, DELAYED RELEASE ORAL at 06:07

## 2022-10-21 RX ADMIN — OXYCODONE AND ACETAMINOPHEN 2 TABLET: 5; 325 TABLET ORAL at 09:45

## 2022-10-21 RX ADMIN — INSULIN LISPRO 4 UNITS: 100 INJECTION, SOLUTION INTRAVENOUS; SUBCUTANEOUS at 12:11

## 2022-10-21 RX ADMIN — ACETAMINOPHEN 650 MG: 325 TABLET, FILM COATED ORAL at 06:07

## 2022-10-21 RX ADMIN — ALBUTEROL SULFATE 2.5 MG: 2.5 SOLUTION RESPIRATORY (INHALATION) at 07:55

## 2022-10-21 RX ADMIN — OXYCODONE AND ACETAMINOPHEN 2 TABLET: 5; 325 TABLET ORAL at 15:21

## 2022-10-21 RX ADMIN — POLYETHYLENE GLYCOL 3350 17 G: 17 POWDER, FOR SOLUTION ORAL at 09:45

## 2022-10-21 RX ADMIN — ALBUTEROL SULFATE 2.5 MG: 2.5 SOLUTION RESPIRATORY (INHALATION) at 15:23

## 2022-10-21 RX ADMIN — OXYBUTYNIN CHLORIDE 10 MG: 10 TABLET, EXTENDED RELEASE ORAL at 09:44

## 2022-10-21 RX ADMIN — CYCLOBENZAPRINE 10 MG: 10 TABLET, FILM COATED ORAL at 11:14

## 2022-10-21 RX ADMIN — OXYCODONE AND ACETAMINOPHEN 2 TABLET: 5; 325 TABLET ORAL at 00:51

## 2022-10-21 RX ADMIN — OXYCODONE AND ACETAMINOPHEN 1 TABLET: 5; 325 TABLET ORAL at 11:41

## 2022-10-21 ASSESSMENT — PAIN DESCRIPTION - DESCRIPTORS
DESCRIPTORS: SHARP;STABBING;SHOOTING
DESCRIPTORS: SHARP;STABBING;BURNING
DESCRIPTORS: SHARP;SORE;SHOOTING
DESCRIPTORS: DISCOMFORT;SORE;TENDER
DESCRIPTORS: SHARP;SHOOTING;STABBING

## 2022-10-21 ASSESSMENT — PAIN DESCRIPTION - LOCATION
LOCATION: NECK;SHOULDER
LOCATION: NECK;SHOULDER
LOCATION: NECK
LOCATION: NECK;SHOULDER
LOCATION: NECK

## 2022-10-21 ASSESSMENT — PAIN SCALES - GENERAL
PAINLEVEL_OUTOF10: 10
PAINLEVEL_OUTOF10: 10
PAINLEVEL_OUTOF10: 8

## 2022-10-21 ASSESSMENT — PAIN DESCRIPTION - ORIENTATION
ORIENTATION: POSTERIOR;LEFT;RIGHT
ORIENTATION: RIGHT;LEFT;POSTERIOR
ORIENTATION: RIGHT;LEFT;POSTERIOR
ORIENTATION: POSTERIOR;LEFT;MID

## 2022-10-21 NOTE — CARE COORDINATION
10/21/22 Update CM Note: Peer to Peer completed and denial overturned. Authorization completed. PAS ambulette set up to  patient at 4pm with O2 3lnc for transport to [facility]. Spoke with , [place], and given information. Envelope completed and in soft chart.  Electronically signed by Tanner Powell RN CM on 10/21/2022 at 2:55 PM

## 2022-10-21 NOTE — PROGRESS NOTES
Physical Therapy  Treatment Note    Name: Colten Conway  : 1955  MRN: [id number]      Date of Service: 10/21/2022    Evaluating PT:  Jacinto Meng PT, DPT    Room #:  3731/4192-H  Diagnosis:  Spinal stenosis in cervical region [M48.02]  Cervical stenosis of spinal canal [M48.02]  PMHx/PSHx:  CKD, CAD, COPD, NPH, hypotension  Procedure/Surgery:  s/p C3-C7 laminectomy, C3-T1 fusion  Precautions:  fall risk, c/s collar, spine, drain, monzon, B knee buckling  Equipment Needs:  TBD    SUBJECTIVE:    Pt lives with spouse and dtr in a 2 story home with 3 steps to enter and 1 handrail. Bed/bath is on 1st floor. Pt ambulated with cane PTA. OBJECTIVE:   Initial Evaluation  Date: 10/17/22 Treatment  Date: 10/21/22 Short Term/ Long Term   Goals   AM-PAC 6 Clicks 50/65 91/75    Was pt agreeable to Eval/treatment? yes Yes    Does pt have pain? 8/10 neck 10/10 neck/back    Bed Mobility  Rolling: mod A  Supine to sit: mod A  Sit to supine: mod A  Scooting: mod A Rolling: NT  Supine to sit: NT  Sit to supine: NT  Scooting: NT Rolling: mod I  Supine to sit: mod I  Sit to supine: mod I  Scooting: mod I   Transfers Sit to stand: mod A  Stand to sit: mod A  Stand pivot: NT Sit to stand: min A  Stand to sit: min A  Stand pivot: min A with ww Sit to stand: mod I  Stand to sit: mod I  Stand pivot: mod I with AAD   Ambulation    2' with HHA mod A 40'x2, 20'x2 with ww min A 150'+ with AAD mod I   Stair negotiation: ascended and descended  NT NT 3 steps with 1 handrail mod I     Pt is A & O x 4  Sensation:  Pt reports numbness and tingling to B feet that has been ongoing. Edema:  Unremarkable    Therapeutic Exercises:    Transfers: STSx2, cued for hand placement and postural correction  Ambulation: 40'x2, 20'x2 with ww  BLE AROM    Patient education  Pt educated on spinal precautions, role of PT, importance of functional mobility during hospital stay, safety with functional mobility.     Patient response to education:   Pt verbalized understanding Pt demonstrated skill Pt requires further education in this area   yes yes reinforce     ASSESSMENT:    Comments:    Pt sitting in bedside chair upon entering, pt agreeable to participate. Pt cued for hand placement and instructed to stand from chair. Pt standing with ww, demonstrating fair static standing balance. Pt instructed to ambulate to tolerance. Pt ambulating with short step length and narrow DIAMOND. Pt assisted with ww spacing and positioning as needed. Pt was assisted back to bedside chair to rest, SPO2 WNL after bout. Pt was agreeable to ambulate a second time, demonstrating similar deviations as previous bout. Pt ambulated longer distance during second bout, reporting increased pain in neck and arms, posture remained flexed during bout. Pt was transferred to bedside chair at the end of session. All needs met and call bell in reach prior to exiting. Treatment:  Patient practiced and was instructed in the following treatment:    Bed mobility training - pt given verbal and tactile cues to facilitate proper sequencing and safety during rolling and supine>sit as well as provided with physical assistance to complete task   STS and pivot transfer training - pt educated on proper hand and foot placement, safety and sequencing, and use of verbal and tactile cues to safely complete sit<>stand and pivot transfers with physical assistance to complete task safely   Gait training- pt was given verbal and tactile cues to facilitate pt safety with ww use during ambulation as well as provided with physical assistance. PLAN:    Patient is making good progress towards established goals. Will continue with current POC.       Time in  1024  Time out  1105    Total Treatment Time  38 minutes     CPT codes:  [x] Gait training 38476 15 minutes  [] Manual therapy [id number] -- minutes  [x] Therapeutic activities 49131 23 minutes  [] Therapeutic exercises 34874 -- minutes  [] Neuromuscular reeducation 78923 -- minutes    Josey Rudolph, PT, DPT  [id number]

## 2022-10-21 NOTE — DISCHARGE INSTRUCTIONS
Discharge Instructions:     1. No lifting more than 10 pounds   2. Walking is encouraged, slowly increase time distance   3. Refrain from excessive bending and twisting of the neck   4. Shawn Cater will be removed 2 weeks after surgery  5. Patient may shower. DO NOT soak or scrub at the incision site. 6. Hard cervical (neck) collar is to be worn for the next 3 months  7. Take medications as prescribed. Continue taking stool softener while taking narcotic pain medications. 8. No sexual activity for one (1) month after surgery   9. Follow-up in the office in 4 weeks in the Neurosurgery clinic with repeat upright AP and lateral cervical x-rays. Call with any questions/concerns, pain control issues, or medication refills. 10. Okay to restart aspirin one (1) week after surgery          Cervical Spinal Fusion: What to Expect at [facility]     Cervical spinal fusion is surgery that joins two or more of the vertebrae in your neck. It made your neck more stable. After surgery, you can expect your neck to feel stiff and sore. This should improve in the weeks after surgery. You may have trouble sitting or standing in one position for very long. You may need to wear a neck brace for a while. Most people can go back to work after 4 to 6 weeks. But it may take a few months to get back to your usual activities. How long it takes depends on what kind of surgery you had and the type of work and other activities you do. Your doctor may advise you to work with a physical therapist to strengthen the muscles around your neck and back. This care sheet gives you a general idea about how long it will take for you to recover. But each person recovers at a different pace. Follow the steps below to get better as quickly as possible. How can you care for yourself at home? Activity    Rest when you feel tired. Getting enough sleep will help you recover. Try to walk each day.  Start by walking a little more than you did the day before. Bit by bit, increase the amount you walk. Walking boosts blood flow and helps prevent pneumonia and constipation. Walking may also decrease your muscle soreness after surgery. Follow your doctor's directions about not lifting anything that would strain your neck and back. This may include a child, heavy grocery bags and milk containers, a heavy briefcase or backpack, cat litter or dog food bags, or a vacuum . Avoid strenuous activities, such as bicycle riding, jogging, weightlifting, or aerobic exercise, until your doctor says it is okay. Do not drive for 2 to 4 weeks after your surgery or until your doctor says it is okay. Avoid taking long car trips for 2 to 4 weeks after surgery. Your neck may become tired and painful from sitting too long in one position. You will probably need to take 4 to 6 weeks off from work. It depends on the type of work you do and how you feel. You may have sex as soon as you feel able, but avoid positions that put stress on your neck or cause pain. Diet    You can eat your normal diet. If your stomach is upset, try bland, low-fat foods like plain rice, broiled chicken, toast, and yogurt. Drink plenty of fluids. If you have kidney, heart, or liver disease and have to limit fluids, talk with your doctor before you increase the amount of fluids you drink. You may notice that your bowel movements are not regular right after your surgery. This is common. Try to avoid constipation and straining with bowel movements. You may want to take a fiber supplement every day. If you have not had a bowel movement after a couple of days, ask your doctor about taking a mild laxative. Medicines    Your doctor will tell you if and when you can restart your medicines. The doctor will also give you instructions about taking any new medicines.      If you stopped taking aspirin or some other blood thinner, your doctor will tell you when to start taking it again. Be safe with medicines. Take pain medicines exactly as directed. If the doctor gave you a prescription medicine for pain, take it as prescribed. If you are not taking a prescription pain medicine, ask your doctor if you can take an over-the-counter medicine. If you think your pain pill is making you sick to your stomach: Take your pills after meals (unless your doctor has told you not to). Ask your doctor for a different pain pill. Incision care    You will be given specific instructions about how to care for the cut (incision) the doctor made. The instructions will depend on the type of materials used to close the cut. Exercise    Do exercises as instructed by your doctor or physical therapist to improve your strength and flexibility. Other instructions    Follow your doctor's instructions about wearing a brace or collar to support your neck. To reduce stiffness and help sore muscles, use a warm water bottle, a heating pad set on low, or a warm cloth on your neck. Do not put heat right over the incision. Do not go to sleep with a heating pad on your skin. Follow-up care is a key part of your treatment and safety. Be sure to make and go to all appointments, and call your doctor if you are having problems. It's also a good idea to know your test results and keep a list of the medicines you take. When should you call for help? Call 911 anytime you think you may need emergency care. For example, call if:    You passed out (lost consciousness). You have chest pain, are short of breath, or cough up blood. You are unable to move an arm or a leg at all. Call your doctor now or seek immediate medical care if:    You have pain that does not get better after you take pain medicine. You have loose stitches, or your incision comes open. Bright red blood has soaked through the bandage over your incision.      You have signs of a blood clot in your leg (called a deep vein thrombosis), such as:  Pain in your calf, back of the knee, thigh, or groin. Redness or swelling in your leg. You have signs of infection, such as: Increased pain, swelling, warmth, or redness. Red streaks leading from the incision. Pus draining from the incision. A fever. You have new or worse symptoms in your arms, legs, chest, belly, or buttocks. Symptoms may include:  Numbness or tingling. Weakness. Pain. You lose bladder or bowel control. Watch closely for any changes in your health, and be sure to contact your doctor if:    You do not get better as expected. Where can you learn more? Go to https://AppShare.Epiphyte. org and sign in to your GTRAN account. Enter R476 in the CostPrize box to learn more about \"Cervical Spinal Fusion: What to Expect at Home. \"     If you do not have an account, please click on the \"Sign Up Now\" link. Current as of: March 9, 2022               Content Version: 13.4  © 2006-2022 Healthwise, Incorporated. Care instructions adapted under license by [facility] ([facility]). If you have questions about a medical condition or this instruction, always ask your healthcare professional. [place] [address] any warranty or liability for your use of this information.

## 2022-10-21 NOTE — CARE COORDINATION
10/21/22 Update CM Note: Insurance denied approval for Leo. Per Dylan Montez she forwarded denial to Dr Pedro Villalta for possible Peer 2 Peer. She will let CM know if he is doing peer to peer and when. Call placed to rehab for updated PT evaluation.  Updated OT eval with 16/24 and mod/max assist. Electronically signed by Anthony Yoder RN CM on 10/21/2022 at 12:52 PM

## 2022-10-21 NOTE — PROGRESS NOTES
Occupational Therapy  OCCUPATIONAL THERAPY BEDSIDE TREATMENT NOTE    [address] [place] [facility]     Date:10/21/2022  Patient Name: Sophie Stern  MRN: [id number]  : 1955  Room: [address] [place] [address]A     Per eval:  Referring Olman Weinberg MD  Specific Provider Orders/Date: OT evaluation and treat 10/17/22     Evaluating OT: Jhoana Mayer, OTR/L #[id number]     Diagnosis: Spinal stenosis in cervical region [M48.02]  Cervical stenosis of spinal canal [M48.02]       Surgery:   Past Surgical History         Past Surgical History:   Procedure Laterality Date    CARDIAC SURGERY        CERVICAL FUSION        C3-C6    CHOLECYSTECTOMY       COLONOSCOPY N/A 2019     COLONOSCOPY POLYPECTOMY SNARE/COLD BIOPSY performed by Loretta Mueller MD at [address] [facility]   10/30/2002    EYE SURGERY         cataract with Lens implants    HERNIA REPAIR        HYSTERECTOMY (CERVIX STATUS UNKNOWN)   [address] [facility]   2018     resolved    RI CREATE SHUNT:VENTRIC-PERITONEAL N/A 10/15/2018     VENTRICULAR PERITONEAL SHUNT  PLACEMENT performed by Valeria Gillette MD at [address] CSF N/A 2018     LUMBAR DRAIN INSERTION performed by Valeria Gillette MD at [facility] N/A 2021     VENTRICULAR PERITONEAL SHUNT REPLACEMENT performed by Valeria Gillette MD at [address]         Pertinent Medical History:  has a past medical history of Acid reflux disease, Acute on chronic respiratory failure with hypoxia and hypercapnia ([address] Apraxia, Arthritis, Ataxia, CAD (coronary artery disease), Choledocholithiasis, Chronic systolic congestive heart failure ([address] CKD (chronic kidney disease) stage 3, GFR 30-59 ml/min ([facility]), COPD (chronic obstructive pulmonary disease) ([address] Diabetes mellitus ([address] Hyperlipidemia, Hypoxia, Neck pain, Normal pressure hydrocephalus (HCC), Oxygen dependent, Pleural effusion, Pulmonary hypertension (HCC), Restless legs, S/P CABG x 2, and Severe mitral regurgitation.       Precautions:  Fall Risk, C spine, hemovac drain, C collar, monitor BP     Assessment of current deficits   [x] Functional mobility             [x]ADLs           [x] Strength                  []Cognition   [x] Functional transfers           [x] IADLs         [x] Safety Awareness   [x]Endurance   [x] Fine Coordination              [x] Balance      [] Vision/perception   [x]Sensation     [x]Gross Motor Coordination  [x] ROM           [] Delirium                   [] Motor Control      OT PLAN OF CARE   OT POC based on physician orders, patient diagnosis and results of clinical assessment     Frequency/Duration   2-4 days/wk for 1 week PRN   Specific OT Treatment Interventions to include:   * Instruction/training on adapted ADL techniques and AE recommendations to increase functional independence within precautions       * Training on energy conservation strategies, correct breathing pattern and techniques to improve independence/tolerance for self-care routine  * Functional transfer/mobility training/DME recommendations for increased independence, safety, and fall prevention  * Patient/Family education to increase follow through with safety techniques and functional independence  * Recommendation of environmental modifications for increased safety with functional transfers/mobility and ADLs  * Sensory re-education to improve body/limb awareness, maintain/improve skin integrity, and improve hand/UE motor function  * Therapeutic exercise to improve motor endurance, ROM, and functional strength for ADLs/functional transfers  * Therapeutic activities to facilitate/challenge dynamic balance, stand tolerance for increased safety and independence with ADLs  * Therapeutic activities to facilitate gross/fine motor skills for increased independence with ADLs  * Positioning to improve skin integrity, interaction with environment and functional independence     Recommended Adaptive Equipment: AE for LE dressing and bathing, shower seat, BSC with HR     Home Living: Pt lives with  Jason Abarca in a 3 story home with one steps and no hand rails. Bed and bath on main  floor with laundry on main floor with no steps and noHR's. Family/ Outside assistance available:   Bathroom setup: tub shower    Equipment owned: spc     Prior Level of Function: recent weakness yet mod I  with ADLs , shared with IADLs; ambulated spc prn  Driving: yes   Occupation: n/a  Medication management: self  Leisure: enjoys casino     Pain Level: neck pain    Cognition: A&O: 3/4; Follows one step directions with increased volume and irritation when entering room. Patient still showing confusion and agitation from anesthesia.                Memory:  fair              Sequencing:  fair              Problem solving:  fair-              Judgement/safety:  fair-                Functional Assessment:  AM-PAC Daily Activity Raw Score: 16/24    Initial Eval Status  Date: 10/17/22 Treatment Status  Date: 10/21/22 STGs = LTGs  Time frame: 2-4 days   Feeding Mod A SBA (seated in chair) Mod I    Grooming Mod A  Min A Mod I    UB Dressing Max A Mod A (due TO limited ROM) Mod I    LB Dressing dependent Min A (pt used crossing of leg technique to doff/emile B socks) SBA with AE prn   Bathing Simulated max A  Mod A (simulated, pt declined) Min A seated with LHS   Toileting dependent Mod A (assist with balance) SBA to BSC   Bed Mobility  Supine to sit: mod A with increased time   Sit to supine:  mod A with increased time  Mod A Supine to sit: mod I   Sit to supine: mod I log rolling   Functional Transfers Sit to stand mod A hand held x2 with increased time  Min A SBA with ww    Functional Mobility Side steps to HOB mod A HH x2 with increased time and cues NT (due to low BP) SBA with ww   Balance Sitting:     Static: min A     Dynamic:mod A  Standing: mod A with UE support Sitting: SBA    Standing: Min A                                                                      Activity Tolerance Poor O2 4L 99% HR 91 limited by pain and confusion fair Good    Visual/  Perceptual Glasses: Remicalm fair  Fair-                                 Good           Comments: Upon arrival patient supine in bed and and agreeable to OT session. Cleared by RN to see pt. Pt required vc's and physical assist for proper technique/safety with hand placement/body mechanics/posture for bed mobility/ADLs/functional tranfers/ww management. Pt required vc's for sequencing/initiation of ADLs/functional transfers. Pt able to  sit EOB  ~5 mins to increase core strength/balance/activity tolerance for ease with ADLs. Pt required increased time to complete ADLs/functional transfers due to rest breaks, physical assist, and monitoring of BP. Pt required skilled monitoring of SpO2 during session, which was >91% 3L. Pt appeared to have tolerated session well and appears motivated/cooperative/pleasant . Pt instructed on use of call light for assistance and fall prevention. Pt demo'ing fair understanding of education provided. Continue to educate. At end of session, patient seated in chair with call light and phone within reach, all lines and tubes intact.      BP supine:100/58  BP sititng EOB: 95/64  BP standin/64      -pt has made fair progress toward goals  -continue current POC    Time In:0935  Time Out: 0950  Total Treatment Time:  15 min        Min Units   OT Eval Low 36136      OT Eval Medium 50018      OT Eval High [id number]      OT Re-Eval [id number]      Therapeutic Ex [id number]      Therapeutic Activities 03574     ADL/Self Care 72032 15 1   Orthotic Management 92851      Neuro Re-Ed 58256      Non-Billable Time          Denia Ureña, OTR/L [id number]

## 2022-10-21 NOTE — DISCHARGE INSTR - COC
Continuity of Care Form    Patient Name: Katie Ryan   :  1955  MRN:  [id number]    Admit date:  10/17/2022  Discharge date:  10/21/2022    Code Status Order: Full Code   Advance Directives:   [facility] Documentation       Date/Time Healthcare Directive Type of Healthcare Directive Copy in [facility] [address] Agent's Name Healthcare Agent's Phone Number    10/17/22 0556 No, patient does not have an advance directive for healthcare treatment -- -- -- -- --            Admitting Physician:  Hayden Ferreira MD  PCP: Tabby Henry DO    Discharging Nurse: Karthik [facility] Unit/Room#: 8812/4441-H  Discharging Unit Phone Number: [phone]    Emergency Contact:   Extended Emergency Contact Information  Primary Emergency Contact: Summer RANDOLPH  Address: [address], [facility] [address] Phone: [phone]  Mobile Phone: [phone]  Relation: Spouse  Secondary Emergency Contact: Bulmarois[place] Tanja Jackson of [address] Phone: [phone]  Relation: Child    Past Surgical History:  Past Surgical History:   Procedure Laterality Date    CARDIAC SURGERY      CERVICAL FUSION      C3-C6    CERVICAL FUSION N/A 10/17/2022    POSTERIOR C3-C7 LAMINECTOMY, C3-T1 FUSION, NEEDS O-ARM, EDUARD TABLE, CELL SAVER, PLATLET GEL, POSTERIOR INSTRUMENTATION, GLOBUS performed by Hayden Ferreira MD at [address] [place] [address]      COLONOSCOPY N/A 2019    COLONOSCOPY POLYPECTOMY SNARE/COLD BIOPSY performed by Hema Smith MD at [address]  10/30/2002    EYE SURGERY      cataract with Lens implants    HERNIA REPAIR      HYSTERECTOMY (CERVIX STATUS UNKNOWN)  [address]  2018    resolved    TN CREATE SHUNT:VENTRIC-PERITONEAL N/A 10/15/2018    VENTRICULAR PERITONEAL SHUNT  PLACEMENT performed by Danis Sen MD at [facility] SPINAL PUNCTURE DRAINAGE CSF N/A 7/27/2018    LUMBAR DRAIN INSERTION performed by Soha Bates MD at [facility] N/A 2/1/2021    VENTRICULAR PERITONEAL SHUNT REPLACEMENT performed by Soha Bates MD at [address]       Immunization History:   Immunization History   Administered Date(s) Administered    COVID-19, J&J, (age 18y+), IM, 0.5 mL 03/09/2021    Influenza, High Dose (Fluzone 65 yrs and older) 10/06/2020    Pneumococcal Polysaccharide ([id number]) 06/11/2019    Tdap (Boostrix, Adacel) 12/22/2016       Active Problems:  Patient Active Problem List   Diagnosis Code    CAD (coronary artery disease) I25.10    Pulmonary hypertension ([facility]) I27.20    COPD (chronic obstructive pulmonary disease) ([facility]) J44.9    CKD (chronic kidney disease) stage 3, GFR 30-59 ml/min N18.30    Normal pressure hydrocephalus ([facility]) G91.2    Chronic systolic (congestive) heart failure ([facility]) I50.22    Severe mitral regurgitation I34.0    NPH (normal pressure hydrocephalus) ([facility]) G91.2    COVID-19 U07.1    Syncope and collapse R55    Gastroenteritis K52.9    Acute kidney injury superimposed on CKD ([facility] [address] N17.9, N18.9    Hypotension I95.9    S/P ventriculoperitoneal shunt Z98.2    Cervical stenosis of spinal canal M48.02    Type 2 diabetes mellitus, without long-term current use of insulin ([facility]) E11.9    O2 dependent Z99.81    Severe protein-calorie malnutrition ([facility]) E43       Isolation/Infection:   Isolation            No Isolation          Patient Infection Status       None to display            Nurse Assessment:  Last Vital Signs: /69   Pulse (!) 101   Temp 97.9 °F (36.6 °C) (Temporal)   Resp 18   Ht 5' 3\" (1.6 m)   Wt 120 lb (54.4 kg)   LMP  (LMP Unknown)   SpO2 96%   BMI 21.26 kg/m²     Last documented pain score (0-10 scale): Pain Level: 10  Last Weight:   Wt Readings from Last 1 Encounters:   10/17/22 120 lb (54.4 kg)     Mental Status:  oriented, alert, and thought processes intact    IV Access:  - None    Nursing Mobility/ADLs:  Walking   Assisted  Transfer  Assisted  Bathing  Assisted  Dressing  Assisted  Toileting  Assisted  Feeding  Independent  Med Admin  Assisted  Med Delivery   whole    Wound Care Documentation and Therapy:  Incision 10/17/22 Neck Posterior (Active)   Dressing Status Clean;Dry; Intact 10/21/22 1228   Dressing Change Due 10/22/22 10/21/22 1029   Incision Cleansed Cleansed with saline 10/21/22 1029   Dressing/Treatment Dry dressing 10/21/22 1228   Closure Staples 10/21/22 1228   Drainage Amount None 10/21/22 1228   Drainage Description Sanguinous 10/20/22 1600   Odor None 10/21/22 1029   Negar-incision Assessment Intact 10/21/22 1029   Number of days: 4        Elimination:  Continence: Bowel: Yes  Bladder: Yes  Urinary Catheter: None   Colostomy/Ileostomy/Ileal Conduit: No       Date of Last BM: 10/21/2022  No intake or output data in the 24 hours ending 10/21/22 1520  I/O last 3 completed shifts:  In: -   Out: 700 [Urine:650; Drains:50]    Safety Concerns:     History of Falls (last 30 days) and At Risk for Falls    Impairments/Disabilities:      None    Nutrition Therapy:  Current Nutrition Therapy:   - Oral Diet:  Carb Control 4 carbs/meal (1800kcals/day)    Routes of Feeding: Oral  Liquids: No Restrictions  Daily Fluid Restriction: no  Last Modified Barium Swallow with Video (Video Swallowing Test): not done    Treatments at the Time of Hospital Discharge:   Respiratory Treatments: albuterol 4 x daily   Oxygen Therapy:  is on oxygen at 3 L/min per nasal cannula.   Ventilator:    - No ventilator support    Rehab Therapies: Physical Therapy, Occupational Therapy, and Orthotics/Prosthetics  Weight Bearing Status/Restrictions: No weight bearing restrictions  Other Medical Equipment (for information only, NOT a DME order):  walker, bedside commode, and braces Custom Collar  Other Treatments: ***    Patient's personal belongings (please select all that are sent with patient):  Glasses, Dentures upper and lower, Jewelry    RN SIGNATURE:  Electronically signed by Yoselin Gilman RN on 10/21/22 at 3:29 PM EDT    CASE MANAGEMENT/SOCIAL WORK SECTION    Inpatient Status Date: ***    Readmission Risk Assessment Score:  Readmission Risk              Risk of Unplanned Readmission:  12           Discharging to Facility/ Agency   Name:   Address:  Phone:  Fax:    Dialysis Facility (if applicable)   Name:  Address:  Dialysis Schedule:  Phone:  Fax:    / signature: {Esignature:[id number]}    PHYSICIAN SECTION    Prognosis: {Prognosis:0736711486}    Condition at Discharge: Elijah Martinez Patient Condition:[id number]}    Rehab Potential (if transferring to Rehab): {Prognosis:0645479563}    Recommended Labs or Other Treatments After Discharge: ***    Physician Certification: I certify the above information and transfer of Marc Meng  is necessary for the continuing treatment of the diagnosis listed and that she requires {Admit to Appropriate Level of Care:09012} for {GREATER/LESS:540540034} 30 days.      Update Admission H&P: {CHP DME Changes in QNJFP:060714640}    PHYSICIAN SIGNATURE:  {Esignature:[id number]}

## 2022-10-21 NOTE — PROGRESS NOTES
Department of Neurosurgery  Progress Note    CHIEF COMPLAINT: s/p posterior C3-T1 fusion 10/17/22    SUBJECTIVE:  Resting peacefully. Continued low BP overnight. REVIEW OF SYSTEMS :  Constitutional: Negative for chills and fever. Neurological: Negative for dizziness, tremors and speech change.      OBJECTIVE:   VITALS:  /70   Pulse 99   Temp 98.4 °F (36.9 °C) (Temporal)   Resp 18   Ht 5' 3\" (1.6 m)   Wt 120 lb (54.4 kg)   LMP  (LMP Unknown)   SpO2 91%   BMI 21.26 kg/m²     PHYSICAL:  Neurologic: Alert and oriented x3; PERRL  Motor Exam:  Motor exam upper strength 4/5  Sensory:  Sensory intact  Incision c/d/I  Custom collar intact      DATA:  CBC:   Lab Results   Component Value Date/Time    WBC 11.7 10/20/2022 05:42 AM    RBC 3.90 10/20/2022 05:42 AM    HGB 11.9 10/20/2022 05:42 AM    HCT 37.3 10/20/2022 05:42 AM    MCV 95.6 10/20/2022 05:42 AM    MCH 30.5 10/20/2022 05:42 AM    MCHC 31.9 10/20/2022 05:42 AM    RDW 14.5 10/20/2022 05:42 AM     10/20/2022 05:42 AM    MPV 11.4 10/20/2022 05:42 AM     BMP:    Lab Results   Component Value Date/Time     10/20/2022 05:42 AM    K 5.2 10/20/2022 05:42 AM    K 4.8 10/10/2022 11:40 AM    CL 96 10/20/2022 05:42 AM    CO2 22 10/20/2022 05:42 AM    BUN 17 10/20/2022 05:42 AM    LABALBU 3.1 10/19/2022 05:28 AM    LABALBU 4.4 09/30/2011 02:00 PM    CREATININE 1.1 10/20/2022 05:42 AM    CALCIUM 9.3 10/20/2022 05:42 AM    GFRAA 60 10/10/2022 11:40 AM    LABGLOM 55 10/20/2022 05:42 AM    GLUCOSE 264 10/20/2022 05:42 AM    GLUCOSE 111 09/30/2011 02:00 PM     PT/INR:    Lab Results   Component Value Date/Time    PROTIME 12.2 10/10/2022 11:40 AM    INR 1.1 10/10/2022 11:40 AM     PTT:    Lab Results   Component Value Date/Time    APTT <20.0 07/23/2018 03:00 PM   [APTT}    Current Inpatient Medications  Current Facility-Administered Medications: alogliptin (NESINA) tablet 12.5 mg, 12.5 mg, Oral, Daily  insulin glargine-yfgn (SEMGLEE-YFGN) injection vial 10 Units, 10 Units, SubCUTAneous, Daily  melatonin disintegrating tablet 5 mg, 5 mg, Oral, Nightly PRN  melatonin disintegrating tablet 5 mg, 5 mg, Oral, Nightly  polyethylene glycol (GLYCOLAX) packet 17 g, 17 g, Oral, BID  sennosides-docusate sodium (SENOKOT-S) 8.6-50 MG tablet 2 tablet, 2 tablet, Oral, QPM  sennosides-docusate sodium (SENOKOT-S) 8.6-50 MG tablet 2 tablet, 2 tablet, Oral, BID PRN  lactobacillus (CULTURELLE) capsule 1 capsule, 1 capsule, Oral, BID  cloNIDine (CATAPRES) tablet 0.1 mg, 0.1 mg, Oral, Q4H PRN  [Held by provider] carvedilol (COREG) tablet 3.125 mg, 3.125 mg, Oral, BID WC  [Held by provider] oxybutynin (DITROPAN-XL) extended release tablet 10 mg, 10 mg, Oral, Daily  oxyCODONE-acetaminophen (PERCOCET) 5-325 MG per tablet 2 tablet, 2 tablet, Oral, Q4H PRN  pramipexole (MIRAPEX) tablet 0.75 mg, 0.75 mg, Oral, BID  [Held by provider] spironolactone (ALDACTONE) tablet 25 mg, 25 mg, Oral, Daily  [Held by provider] valsartan (DIOVAN) tablet 20 mg, 20 mg, Oral, Daily  sodium chloride flush 0.9 % injection 5-40 mL, 5-40 mL, IntraVENous, 2 times per day  sodium chloride flush 0.9 % injection 5-40 mL, 5-40 mL, IntraVENous, PRN  0.9 % sodium chloride infusion, , IntraVENous, PRN  acetaminophen (TYLENOL) tablet 650 mg, 650 mg, Oral, 4 times per day  ondansetron (ZOFRAN-ODT) disintegrating tablet 4 mg, 4 mg, Oral, Q8H PRN **OR** ondansetron (ZOFRAN) injection 4 mg, 4 mg, IntraVENous, Q6H PRN  morphine (PF) injection 2 mg, 2 mg, IntraVENous, Q2H PRN **OR** morphine sulfate (PF) injection 4 mg, 4 mg, IntraVENous, Q2H PRN  cyclobenzaprine (FLEXERIL) tablet 10 mg, 10 mg, Oral, Q12H PRN  diphenhydrAMINE (BENADRYL) tablet 25 mg, 25 mg, Oral, Q6H PRN **OR** diphenhydrAMINE (BENADRYL) injection 25 mg, 25 mg, IntraVENous, Q6H PRN  bisacodyl (DULCOLAX) EC tablet 5 mg, 5 mg, Oral, Daily  bisacodyl (DULCOLAX) suppository 10 mg, 10 mg, Rectal, Daily PRN  fleet rectal enema 1 enema, 1 enema, Rectal, Daily PRN  benzocaine-menthol (CEPACOL SORE THROAT) lozenge 1 lozenge, 1 lozenge, Oral, Q2H PRN  insulin lispro (HUMALOG) injection vial 0-8 Units, 0-8 Units, SubCUTAneous, TID WC  insulin lispro (HUMALOG) injection vial 0-4 Units, 0-4 Units, SubCUTAneous, Nightly  glucose chewable tablet 16 g, 4 tablet, Oral, PRN  dextrose bolus 10% 125 mL, 125 mL, IntraVENous, PRN **OR** dextrose bolus 10% 250 mL, 250 mL, IntraVENous, PRN  glucagon (rDNA) injection 1 mg, 1 mg, SubCUTAneous, PRN  dextrose 10 % infusion, , IntraVENous, Continuous PRN  albuterol (PROVENTIL) nebulizer solution 2.5 mg, 2.5 mg, Nebulization, 4x daily  pantoprazole (PROTONIX) tablet 40 mg, 40 mg, Oral, QAM AC  buPROPion (WELLBUTRIN XL) extended release tablet 300 mg, 300 mg, Oral, Daily    ASSESSMENT:   s/p posterior C3-T1 fusion 10/17/22    PLAN:  -Pain control  -PT/OT  -Follow up in neurosurgery clinic in 2 weeks for staple removal and in 4 weeks with XR  -[place] planning. Scripts printed.          Electronically signed by Vicente Sexton PA-C on 10/21/2022 at 10:38 AM

## 2022-10-21 NOTE — PROGRESS NOTES
Hospitalist progress note    Patient:  Mio Lechuga  YOB: 1955  Date of Service: 10/21/22  MRN: [id number]   Acct:  [de-identified]   Primary Care Physician: Marii Hurtado DO  10/17/2022     Patient Seen, Chart, Consults notes, Labs, Radiology, family and social history were reviewed. Assessment and Plan:    Hypotension, hold spironolactone Diovan Coreg and Ditropan  Continue DVT &  GERD prophylaxis    Cervical stenosis status postoperative  Cough x-ray shows atelectasis  Neck pain, well controlled  History of chronic kidney disease stage III, no change  diabetes mellitus , insulin coverage   History of COPD without exacerbation  History of normal pressure hydrocephalus s/p shunt 2016            Chief Complaint: No complaints        Subjective     Patient has no medical complaints or concerns today      There is no fever, or chills or sweating. The patient denied any chest pain, shortness of breath , palpitations, or irregular heart beats               Review of systems:    All 10 review of system were negative unless what is mentioned in the present history               PHYSICAL EXAM:  BP 80/65   Pulse (!) 115   Temp 97.1 °F (36.2 °C)   Resp 20   Ht 5' 3\" (1.6 m)   Wt 120 lb (54.4 kg)   LMP  (LMP Unknown)   SpO2 91%   BMI 21.26 kg/m²     General appearance - alert, well appearing, and in no distress   Head: atraumatic   Pupil: equal, round reactive to light   Neck: Supple, postoperative neck tenderness  Chest - clear to auscultation, no wheezes, rales or rhonchi, symmetric air entry   Distant Breath Sounds: No   Heart - S1 and S2 normal   Abdomen - soft, nontender, nondistended,   Integumentary - No rashes or lesions.    Musculoskeletal - no joint tenderness, deformity or swelling   Extremities - peripheral pulses normal, no pedal edema, no clubbing or cyanosis times 4         Review of Labs and Diagnostic Testing:         XR CHEST (2 VW)    Result Date: 10/12/2022  EXAMINATION: TWO XRAY VIEWS OF THE CHEST 10/10/2022 11:59 am COMPARISON: 28 June 2021. HISTORY: ORDERING SYSTEM PROVIDED HISTORY: Pre-op testing TECHNOLOGIST PROVIDED HISTORY: Reason for exam:->Pre-Op testing What reading provider will be dictating this exam?->CRC FINDINGS: Central venous catheter on the right terminates in the SVC as before. Stable cardiomegaly. Enlargement of the main pulmonary artery suggests likely pulmonary arterial hypertension. No evidence of pulmonary venous vascular congestion. No new abnormal lung findings. There is obstructive airways disease. Neither costophrenic angle is newly blunted. Cardiomegaly and pulmonary arterial hypertension suggested. Obstructive airways disease. No new abnormal findings. XR CHEST PORTABLE    Result Date: 10/20/2022  EXAMINATION: ONE XRAY VIEW OF THE CHEST 10/20/2022 3:19 pm COMPARISON: October 10, 2022 HISTORY: ORDERING SYSTEM PROVIDED HISTORY: adventitious chest sounds TECHNOLOGIST PROVIDED HISTORY: Reason for exam:->adventitious chest sounds What reading provider will be dictating this exam?->CRC FINDINGS: Median sternotomy wires are present. Mild cardiomegaly. No airspace opacity or pleural effusion. Subsegmental atelectasis in the lung bases. Hyperinflation of both lungs. No pneumothorax. No focal pneumonia or pleural effusion. FLUORO FOR SURGICAL PROCEDURES    Result Date: 10/17/2022  EXAMINATION: SPOT FLUOROSCOPIC IMAGES 10/17/2022 10:09 am TECHNIQUE: Fluoroscopy was provided by the radiology department for procedure. Radiologist was not present during examination. FLUOROSCOPY DOSE AND TYPE OR TIME AND EXPOSURES: Fluoroscopy time equals 3.4 seconds. Total dose equals 1.49 mGy COMPARISON: None HISTORY: ORDERING SYSTEM PROVIDED HISTORY: cervical thoracic fusion TECHNOLOGIST PROVIDED HISTORY: Reason for exam:->cervical thoracic fusion What reading provider will be dictating this exam?->CRC Intraprocedural imaging.  FINDINGS: 2 spot images of the cervical spine were obtained. Intraprocedural fluoroscopic spot images as above. See separate procedure report for more information.      Recent Results (from the past 24 hour(s))   POCT Glucose    Collection Time: 10/20/22 12:30 PM   Result Value Ref Range    Meter Glucose 125 (H) 74 - 99 mg/dL   POCT Glucose    Collection Time: 10/20/22  5:35 PM   Result Value Ref Range    Meter Glucose 221 (H) 74 - 99 mg/dL   POCT Glucose    Collection Time: 10/20/22  9:58 PM   Result Value Ref Range    Meter Glucose 146 (H) 74 - 99 mg/dL   POCT Glucose    Collection Time: 10/21/22  6:06 AM   Result Value Ref Range    Meter Glucose 154 (H) 74 - 99 mg/dL   ]     Current Facility-Administered Medications: alogliptin (NESINA) tablet 12.5 mg, 12.5 mg, Oral, Daily  insulin glargine-yfgn (SEMGLEE-YFGN) injection vial 10 Units, 10 Units, SubCUTAneous, Daily  melatonin disintegrating tablet 5 mg, 5 mg, Oral, Nightly PRN  melatonin disintegrating tablet 5 mg, 5 mg, Oral, Nightly  polyethylene glycol (GLYCOLAX) packet 17 g, 17 g, Oral, BID  sennosides-docusate sodium (SENOKOT-S) 8.6-50 MG tablet 2 tablet, 2 tablet, Oral, QPM  sennosides-docusate sodium (SENOKOT-S) 8.6-50 MG tablet 2 tablet, 2 tablet, Oral, BID PRN  lactobacillus (CULTURELLE) capsule 1 capsule, 1 capsule, Oral, BID  cloNIDine (CATAPRES) tablet 0.1 mg, 0.1 mg, Oral, Q4H PRN  carvedilol (COREG) tablet 3.125 mg, 3.125 mg, Oral, BID WC  oxybutynin (DITROPAN-XL) extended release tablet 10 mg, 10 mg, Oral, Daily  oxyCODONE-acetaminophen (PERCOCET) 5-325 MG per tablet 2 tablet, 2 tablet, Oral, Q4H PRN  pramipexole (MIRAPEX) tablet 0.75 mg, 0.75 mg, Oral, BID  [Held by provider] spironolactone (ALDACTONE) tablet 25 mg, 25 mg, Oral, Daily  valsartan (DIOVAN) tablet 20 mg, 20 mg, Oral, Daily  sodium chloride flush 0.9 % injection 5-40 mL, 5-40 mL, IntraVENous, 2 times per day  sodium chloride flush 0.9 % injection 5-40 mL, 5-40 mL, IntraVENous, PRN  0.9 % sodium chloride infusion, , IntraVENous, PRN  acetaminophen (TYLENOL) tablet 650 mg, 650 mg, Oral, 4 times per day  ondansetron (ZOFRAN-ODT) disintegrating tablet 4 mg, 4 mg, Oral, Q8H PRN **OR** ondansetron (ZOFRAN) injection 4 mg, 4 mg, IntraVENous, Q6H PRN  morphine (PF) injection 2 mg, 2 mg, IntraVENous, Q2H PRN **OR** morphine sulfate (PF) injection 4 mg, 4 mg, IntraVENous, Q2H PRN  cyclobenzaprine (FLEXERIL) tablet 10 mg, 10 mg, Oral, Q12H PRN  diphenhydrAMINE (BENADRYL) tablet 25 mg, 25 mg, Oral, Q6H PRN **OR** diphenhydrAMINE (BENADRYL) injection 25 mg, 25 mg, IntraVENous, Q6H PRN  bisacodyl (DULCOLAX) EC tablet 5 mg, 5 mg, Oral, Daily  bisacodyl (DULCOLAX) suppository 10 mg, 10 mg, Rectal, Daily PRN  fleet rectal enema 1 enema, 1 enema, Rectal, Daily PRN  benzocaine-menthol (CEPACOL SORE THROAT) lozenge 1 lozenge, 1 lozenge, Oral, Q2H PRN  insulin lispro (HUMALOG) injection vial 0-8 Units, 0-8 Units, SubCUTAneous, TID WC  insulin lispro (HUMALOG) injection vial 0-4 Units, 0-4 Units, SubCUTAneous, Nightly  glucose chewable tablet 16 g, 4 tablet, Oral, PRN  dextrose bolus 10% 125 mL, 125 mL, IntraVENous, PRN **OR** dextrose bolus 10% 250 mL, 250 mL, IntraVENous, PRN  glucagon (rDNA) injection 1 mg, 1 mg, SubCUTAneous, PRN  dextrose 10 % infusion, , IntraVENous, Continuous PRN  albuterol (PROVENTIL) nebulizer solution 2.5 mg, 2.5 mg, Nebulization, 4x daily  pantoprazole (PROTONIX) tablet 40 mg, 40 mg, Oral, QAM AC  buPROPion (WELLBUTRIN XL) extended release tablet 300 mg, 300 mg, Oral, Daily   Hospital Problems             Last Modified POA    * (Principal) Cervical stenosis of spinal canal 10/17/2022 Yes    CKD (chronic kidney disease) stage 3, GFR 30-59 ml/min 10/17/2022 Yes    Chronic systolic (congestive) heart failure ([address] 10/17/2022 Yes    Overview Addendum 5/8/2019  6:31 AM by MD LORNA Blum.  Echo 2018 [facility]): Ejection fraction 23% plus/minus 5%         Type 2 diabetes mellitus, without long-term current use of insulin ([facility] [address] 10/17/2022 Yes    O2 dependent 10/18/2022 Yes    Severe protein-calorie malnutrition ([facility] [address] (Chronic) 10/19/2022 Yes    COPD (chronic obstructive pulmonary disease) ([facility] [address] 10/17/2022 Yes    S/P ventriculoperitoneal shunt 10/17/2022 Yes                   Electronically signed by Aditi Meehan MD on 10/21/2022 at 7:36 AM

## 2022-10-21 NOTE — PROGRESS NOTES
Pt BP 80/65, MAP 70 and asymptomatic. Alexis Lewis notified. No new orders received, will continue to monitor.

## 2022-10-21 NOTE — PLAN OF CARE
Problem: Discharge Planning  Goal: Discharge to home or other facility with appropriate resources  Outcome: Adequate for Discharge     Problem: Chronic Conditions and Co-morbidities  Goal: Patient's chronic conditions and co-morbidity symptoms are monitored and maintained or improved  Outcome: Adequate for Discharge     Problem: Nutrition Deficit:  Goal: Optimize nutritional status  Outcome: Adequate for Discharge

## 2022-10-21 NOTE — CARE COORDINATION
10/21/22 Update CM Note: POD 4 Posterior Cervical Fusion. Drain is coming out. Collar is on. Precert remains pending for discharge to [address] [place] [address]. Will stop the iv narcotics as bp runs low. She is taking oral narcotics for pain. Orthos were done this am and negative. She remains on 3lnc sat 91%. CXR negative. Plan is to discharge to [facility]. Will obtain updated therapy notes today.  Electronically signed by Yumiko Pelaez RN CM on 10/21/2022 at 10:30 AM

## 2022-10-22 LAB — SARS-COV-2, PCR: NOT DETECTED

## 2022-10-24 NOTE — DISCHARGE SUMMARY
Neurosurgery Surgery Discharge Summary    [address] [facility] SUMMARY:                The patient is a 75 y.o. female who was admitted to the hospital on 10/17/2022  5:33 AM for treatment of cervical stenosis. On the day of admission, a posterior C3-T1 fusion was performed. The patient's hospital course consisted of physical therapy, incision observation, and a return to normal oral intake. She did experience hypotension and need for oxygen supplementation which was management by internal medicine. The patient was discharged on 10/21/2022  4:30 PM tolerating a diet, moving bowels, and urinating without difficulty. The incisions were clean and intact. The patient was discharged to [facility] acute rehab in satisfactory condition with instructions to call the office for a follow up appointment. Hospital Problem List:  Principal Problem:    Cervical stenosis of spinal canal  Active Problems:    CKD (chronic kidney disease) stage 3, GFR 30-59 ml/min    Chronic systolic (congestive) heart failure ([facility])    Type 2 diabetes mellitus, without long-term current use of insulin ([facility])    O2 dependent    Severe protein-calorie malnutrition ([facility])    COPD (chronic obstructive pulmonary disease) ([facility])    S/P ventriculoperitoneal shunt  Resolved Problems:    * No resolved hospital problems. *     Procedure(s) (LRB):  POSTERIOR C3-C7 LAMINECTOMY, C3-T1 FUSION, NEEDS O-ARM, EDUARD TABLE, CELL SAVER, PLATLET GEL, POSTERIOR INSTRUMENTATION, GLOBUS (N/A)    Discharge Medications:   Discharge Medication List as of 10/21/2022  3:44 PM        START taking these medications    Details   cyclobenzaprine (FLEXERIL) 10 MG tablet Take 1 tablet by mouth every 12 hours as needed for Muscle spasms, Disp-30 tablet, R-0Print           CONTINUE these medications which have CHANGED    Details   oxyCODONE-acetaminophen (PERCOCET) 5-325 MG per tablet Take 1 tablet by mouth every 4 hours as needed for Pain for up to 7 days. , Disp-42 tablet, R-0Print           CONTINUE these medications which have NOT CHANGED    Details   spironolactone (ALDACTONE) 25 MG tablet Take 25 mg by mouth dailyHistorical Med      pramipexole (MIRAPEX) 0.5 MG tablet Take 0.75 mg by mouth in the morning and at bedtimeHistorical Med      SITagliptin (JANUVIA) 100 MG tablet Take 100 mg by mouth dailyHistorical Med      carBAMazepine (TEGRETOL-XR) 100 MG extended release tablet Take 1 tablet by mouth 2 times daily, Disp-60 tablet, R-4Normal      carvedilol (COREG) 3.125 MG tablet Take 1 tablet by mouth 2 times daily (with meals), Disp-60 tablet, R-3Normal      valsartan (DIOVAN) 40 MG tablet Take 0.5 tablets by mouth daily, Disp-30 tablet, R-3Normal      albuterol sulfate  (90 Base) MCG/ACT inhaler INHALE 2 PUFFS BY MOUTH EVERY 4 HOURS AS NEEDEDHistorical Med      OXYGEN Inhale 3 L into the lungs as neededHistorical Med      oxybutynin (DITROPAN-XL) 10 MG extended release tablet Take 10 mg by mouth dailyHistorical Med      omeprazole (PRILOSEC) 40 MG capsule   Take 40 mg by mouth daily , R-5      vitamin D (ERGOCALCIFEROL) 16158 UNITS CAPS capsule Take 50,000 Units by mouth once a week THUR, R-5Historical Med      buPROPion (WELLBUTRIN XL) 300 MG XL tablet Take 900 mg by mouth every morningHistorical Med           STOP taking these medications       aspirin 325 MG tablet Comments:   Reason for Stopping:               Maritza Booker PA-C  10/24/2022

## 2022-10-31 ENCOUNTER — TELEPHONE (OUTPATIENT)
Dept: NEUROSURGERY | Age: 67
End: 2022-10-31

## 2022-10-31 NOTE — TELEPHONE ENCOUNTER
Wound care nurse from [facility] / [phone] or [phone]) office to see if patients staples can be removed in facility instead of coming into office. Please advise.

## 2022-11-10 DIAGNOSIS — Z98.1 S/P CERVICAL SPINAL FUSION: Primary | ICD-10-CM

## 2022-11-14 ENCOUNTER — HOSPITAL ENCOUNTER (OUTPATIENT)
Dept: GENERAL RADIOLOGY | Age: 67
Discharge: HOME | End: 2022-11-16
Payer: MEDICARE

## 2022-11-14 ENCOUNTER — OFFICE VISIT (OUTPATIENT)
Dept: NEUROSURGERY | Age: 67
End: 2022-11-14

## 2022-11-14 ENCOUNTER — HOSPITAL ENCOUNTER (OUTPATIENT)
Age: 67
Discharge: HOME | End: 2022-11-16
Payer: MEDICARE

## 2022-11-14 VITALS — HEART RATE: 100 BPM | SYSTOLIC BLOOD PRESSURE: 144 MMHG | DIASTOLIC BLOOD PRESSURE: 83 MMHG | OXYGEN SATURATION: 93 %

## 2022-11-14 DIAGNOSIS — Z98.1 S/P CERVICAL SPINAL FUSION: ICD-10-CM

## 2022-11-14 DIAGNOSIS — M48.02 SPINAL STENOSIS IN CERVICAL REGION: Primary | ICD-10-CM

## 2022-11-14 DIAGNOSIS — G95.9 CERVICAL MYELOPATHY (HCC): ICD-10-CM

## 2022-11-14 PROCEDURE — 99024 POSTOP FOLLOW-UP VISIT: CPT | Performed by: PHYSICIAN ASSISTANT

## 2022-11-14 PROCEDURE — 72040 X-RAY EXAM NECK SPINE 2-3 VW: CPT

## 2022-11-14 NOTE — PROGRESS NOTES
Post Operative Follow-up     This is a 75year old female who presents to the office for a 1 month follow-up s/p C3-T1 posterior cervical fusion      Subjective: Patient presents on a stretcher from [facility] rehab. States overall she has been doing well. C/o cervical muscle spasms. Participates in PT/OT and feels she has made progress in her ambulation and strength in her arms. Custom collar compliance. Denies issues with incision site. Cerivcal XR reviewed. Physical Exam:              WDWN, no apparent distress              Non-labored breathing               Vitals Stable              Alert and oriented x3              CN 3-12 intact              PERRL              EOMI              Upper strength 4/5              Sensation to LT intact bilaterally   Incision healing well with no signs of infection. Steri strips noted. Imaging:  Cervical XR 11/14/22: Stable alignment, stable fusion. No acute abnormalities noted. Final report pending. Assessment: This is a 75 y.o.  female presenting for a 1 month follow-up s/p C3-T1 PCF. Stable. Plan:  -Pain control and expectations discussed  -Only need to wear custom collar when walking  -Continue PT/OT  -Incision healing well. Okay to d/c steri strips. Okay to soak or scrub if needed. -OARRS report reviewed   -Follow-up in neurosurgery clinic in 2 months for 3 month follow up with repeat cervical XR  -Call or return to neurosurgery office sooner if symptoms worsen or if new issues arise in the interim.     Electronically signed by Amanda Schwarz PA-C on 11/14/2022 at 12:44 PM

## 2023-01-06 ENCOUNTER — APPOINTMENT (OUTPATIENT)
Dept: GENERAL RADIOLOGY | Age: 68
End: 2023-01-06
Payer: MEDICARE

## 2023-01-06 ENCOUNTER — APPOINTMENT (OUTPATIENT)
Dept: CT IMAGING | Age: 68
End: 2023-01-06
Payer: MEDICARE

## 2023-01-06 ENCOUNTER — HOSPITAL ENCOUNTER (INPATIENT)
Age: 68
LOS: 5 days | Discharge: NURSING, REHAB OR LONG-TERM CARE | End: 2023-01-12
Attending: EMERGENCY MEDICINE | Admitting: INTERNAL MEDICINE
Payer: MEDICARE

## 2023-01-06 DIAGNOSIS — R29.90 STROKE-LIKE SYMPTOMS: ICD-10-CM

## 2023-01-06 DIAGNOSIS — G45.9 TIA (TRANSIENT ISCHEMIC ATTACK): ICD-10-CM

## 2023-01-06 DIAGNOSIS — R41.82 ALTERED MENTAL STATUS, UNSPECIFIED ALTERED MENTAL STATUS TYPE: Primary | ICD-10-CM

## 2023-01-06 DIAGNOSIS — R73.9 HYPERGLYCEMIA: ICD-10-CM

## 2023-01-06 DIAGNOSIS — E11.65 TYPE 2 DIABETES MELLITUS WITH HYPERGLYCEMIA, WITHOUT LONG-TERM CURRENT USE OF INSULIN (HCC): ICD-10-CM

## 2023-01-06 LAB
ALBUMIN SERPL-MCNC: 3.8 G/DL (ref 3.5–5.2)
ALP BLD-CCNC: 90 U/L (ref 35–104)
ALT SERPL-CCNC: 13 U/L (ref 0–32)
ANION GAP SERPL CALCULATED.3IONS-SCNC: 15 MMOL/L (ref 7–16)
APTT: 29.5 SEC (ref 24.5–35.1)
AST SERPL-CCNC: 18 U/L (ref 0–31)
BACTERIA: ABNORMAL /HPF
BASOPHILS ABSOLUTE: 0.07 E9/L (ref 0–0.2)
BASOPHILS RELATIVE PERCENT: 1 % (ref 0–2)
BILIRUB SERPL-MCNC: 0.5 MG/DL (ref 0–1.2)
BILIRUBIN URINE: NEGATIVE
BLOOD, URINE: NEGATIVE
BUN BLDV-MCNC: 39 MG/DL (ref 6–23)
CALCIUM SERPL-MCNC: 9.8 MG/DL (ref 8.6–10.2)
CHLORIDE BLD-SCNC: 98 MMOL/L (ref 98–107)
CLARITY: CLEAR
CO2: 24 MMOL/L (ref 22–29)
COLOR: YELLOW
CREAT SERPL-MCNC: 1 MG/DL (ref 0.5–1)
EOSINOPHILS ABSOLUTE: 0.08 E9/L (ref 0.05–0.5)
EOSINOPHILS RELATIVE PERCENT: 1.2 % (ref 0–6)
GFR SERPL CREATININE-BSD FRML MDRD: >60 ML/MIN/1.73
GLUCOSE BLD-MCNC: 304 MG/DL (ref 74–99)
GLUCOSE URINE: 100 MG/DL
HCT VFR BLD CALC: 38.2 % (ref 34–48)
HEMOGLOBIN: 11.7 G/DL (ref 11.5–15.5)
IMMATURE GRANULOCYTES #: 0.01 E9/L
IMMATURE GRANULOCYTES %: 0.1 % (ref 0–5)
INFLUENZA A: NOT DETECTED
INFLUENZA B: NOT DETECTED
INR BLD: 2.8
KETONES, URINE: NEGATIVE MG/DL
LEUKOCYTE ESTERASE, URINE: NEGATIVE
LYMPHOCYTES ABSOLUTE: 1.57 E9/L (ref 1.5–4)
LYMPHOCYTES RELATIVE PERCENT: 23.3 % (ref 20–42)
MCH RBC QN AUTO: 27.5 PG (ref 26–35)
MCHC RBC AUTO-ENTMCNC: 30.6 % (ref 32–34.5)
MCV RBC AUTO: 89.9 FL (ref 80–99.9)
MONOCYTES ABSOLUTE: 0.65 E9/L (ref 0.1–0.95)
MONOCYTES RELATIVE PERCENT: 9.6 % (ref 2–12)
NEUTROPHILS ABSOLUTE: 4.37 E9/L (ref 1.8–7.3)
NEUTROPHILS RELATIVE PERCENT: 64.8 % (ref 43–80)
NITRITE, URINE: NEGATIVE
PDW BLD-RTO: 16.3 FL (ref 11.5–15)
PH UA: 6 (ref 5–9)
PLATELET # BLD: 226 E9/L (ref 130–450)
PMV BLD AUTO: 11.3 FL (ref 7–12)
POTASSIUM SERPL-SCNC: 4.3 MMOL/L (ref 3.5–5)
PROTEIN UA: NEGATIVE MG/DL
PROTHROMBIN TIME: 30.7 SEC (ref 9.3–12.4)
RBC # BLD: 4.25 E12/L (ref 3.5–5.5)
RBC UA: ABNORMAL /HPF (ref 0–2)
SARS-COV-2 RNA, RT PCR: NOT DETECTED
SODIUM BLD-SCNC: 137 MMOL/L (ref 132–146)
SPECIFIC GRAVITY UA: 1.01 (ref 1–1.03)
TOTAL PROTEIN: 7.4 G/DL (ref 6.4–8.3)
TROPONIN, HIGH SENSITIVITY: 15 NG/L (ref 0–9)
TROPONIN, HIGH SENSITIVITY: 16 NG/L (ref 0–9)
UROBILINOGEN, URINE: 0.2 E.U./DL
WBC # BLD: 6.8 E9/L (ref 4.5–11.5)
WBC UA: ABNORMAL /HPF (ref 0–5)

## 2023-01-06 PROCEDURE — 85730 THROMBOPLASTIN TIME PARTIAL: CPT

## 2023-01-06 PROCEDURE — 81001 URINALYSIS AUTO W/SCOPE: CPT

## 2023-01-06 PROCEDURE — 85610 PROTHROMBIN TIME: CPT

## 2023-01-06 PROCEDURE — 70498 CT ANGIOGRAPHY NECK: CPT

## 2023-01-06 PROCEDURE — 96374 THER/PROPH/DIAG INJ IV PUSH: CPT

## 2023-01-06 PROCEDURE — 87636 SARSCOV2 & INF A&B AMP PRB: CPT

## 2023-01-06 PROCEDURE — 36415 COLL VENOUS BLD VENIPUNCTURE: CPT

## 2023-01-06 PROCEDURE — 99285 EMERGENCY DEPT VISIT HI MDM: CPT

## 2023-01-06 PROCEDURE — 6360000004 HC RX CONTRAST MEDICATION: Performed by: RADIOLOGY

## 2023-01-06 PROCEDURE — 80053 COMPREHEN METABOLIC PANEL: CPT

## 2023-01-06 PROCEDURE — 6370000000 HC RX 637 (ALT 250 FOR IP): Performed by: EMERGENCY MEDICINE

## 2023-01-06 PROCEDURE — 71045 X-RAY EXAM CHEST 1 VIEW: CPT

## 2023-01-06 PROCEDURE — 93005 ELECTROCARDIOGRAM TRACING: CPT | Performed by: EMERGENCY MEDICINE

## 2023-01-06 PROCEDURE — 70496 CT ANGIOGRAPHY HEAD: CPT

## 2023-01-06 PROCEDURE — 84484 ASSAY OF TROPONIN QUANT: CPT

## 2023-01-06 PROCEDURE — 85025 COMPLETE CBC W/AUTO DIFF WBC: CPT

## 2023-01-06 PROCEDURE — 2580000003 HC RX 258: Performed by: RADIOLOGY

## 2023-01-06 RX ORDER — SODIUM CHLORIDE 0.9 % (FLUSH) 0.9 %
10 SYRINGE (ML) INJECTION ONCE
Status: COMPLETED | OUTPATIENT
Start: 2023-01-06 | End: 2023-01-06

## 2023-01-06 RX ORDER — LEVETIRACETAM 500 MG/1
1000 TABLET ORAL ONCE
Status: COMPLETED | OUTPATIENT
Start: 2023-01-06 | End: 2023-01-06

## 2023-01-06 RX ORDER — ASPIRIN 81 MG/1
324 TABLET, CHEWABLE ORAL ONCE
Status: COMPLETED | OUTPATIENT
Start: 2023-01-06 | End: 2023-01-06

## 2023-01-06 RX ADMIN — IOPAMIDOL 75 ML: 755 INJECTION, SOLUTION INTRAVENOUS at 17:55

## 2023-01-06 RX ADMIN — ASPIRIN 81 MG CHEWABLE TABLET 324 MG: 81 TABLET CHEWABLE at 19:05

## 2023-01-06 RX ADMIN — INSULIN HUMAN 10 UNITS: 100 INJECTION, SOLUTION PARENTERAL at 19:06

## 2023-01-06 RX ADMIN — LEVETIRACETAM 1000 MG: 500 TABLET, FILM COATED ORAL at 19:05

## 2023-01-06 RX ADMIN — SODIUM CHLORIDE, PRESERVATIVE FREE 10 ML: 5 INJECTION INTRAVENOUS at 17:55

## 2023-01-06 NOTE — ED NOTES
Radiology Procedure Waiver   Name: Chet Hayden  : 1955  MRN: [id number]    Date:  23    Time: 5:32 PM EST    Benefits of immediately proceeding with Radiology exam(s) without pre-testing outweigh the risks or are not indicated as specified below and therefore the following is/are being waived:    [] Pregnancy test   [] Patients LMP on-time and regular.   [] Patient had Tubal Ligation or has other Contraception Device. [] Patient  is Menopausal or Premenarcheal.    [] Patient had Full or Partial Hysterectomy. [] Protocol for Iodine allergy    [] MRI Questionnaire     [x] BUN/Creatinine   [] Patient age w/no hx of renal dysfunction. [] Patient on Dialysis. [] Recent Normal Labs.   Electronically signed by Rayray Hillman MD on 23 at 5:32 PM EST          Emergent     Rayray Hillman MD  23 0015

## 2023-01-06 NOTE — ED NOTES
Patient placed on cardiac monitor & pulse oximetry.         [facility]  01/06/23 2188

## 2023-01-06 NOTE — ED PROVIDER NOTES
HPI:  1/6/23, Time: 5:32 PM ROBERT Isabel is a 75 y.o. female presenting to the ED for generalized weakness beginning in October after patient had cervical surgery by Dr. Lisseth Donovan. She states that she has been having weakness since October, and it just got worse today. She is a poor historian. Her  is at bedside who helps provide a better history. He states that she last was normal last evening. He states that she woke up this morning and was confused and intermittently combative. Around 4:10 PM patient had an episode of garbled speech and combativeness that has improved but patient is not back to baseline. No recent falls or trauma. No prior history of stroke. Patient takes no anticoagulation. She denies any fevers, chest pain, shortness of breath, cough, abdominal pain, emesis, diarrhea, dysuria. She is on 4 L of oxygen at baseline due to COPD. Per EMS, blood sugar was normal.    Patient has a history of normal pressure hydrocephalus. --------------------------------------------- PAST HISTORY ---------------------------------------------  Past Medical History:  has a past medical history of Acid reflux disease, Acute on chronic respiratory failure with hypoxia and hypercapnia ([address] Apraxia, Arthritis, Ataxia, CAD (coronary artery disease), Choledocholithiasis, Chronic systolic congestive heart failure (HCC), CKD (chronic kidney disease) stage 3, GFR 30-59 ml/min ([facility]), COPD (chronic obstructive pulmonary disease) ([address] Diabetes mellitus ([address] Hyperlipidemia, Hypoxia, Neck pain, Normal pressure hydrocephalus ([facility]), Oxygen dependent, Pleural effusion, Pulmonary hypertension ([address] Restless legs, S/P CABG x 2, and Severe mitral regurgitation. Past Surgical History:  has a past surgical history that includes Coronary artery bypass graft (10/30/2002); Cholecystectomy (2002); cervical fusion (1999); Hysterectomy (1988);  Cardiac surgery; lumbar drain implantation (07/27/2018); pr therapeutic spinal puncture drainage csf (N/A, 7/27/2018); pr crtj shunt vlxjhaziuc-cqvhvtcti-xdcuiqd terminus (N/A, 10/15/2018); Colonoscopy (N/A, 1/21/2019); Ventriculoperitoneal shunt (N/A, 2/1/2021); eye surgery; hernia repair; and cervical fusion (N/A, 10/17/2022). Social History:  reports that she quit smoking about 4 years ago. Her smoking use included cigarettes. She has a 20.00 pack-year smoking history. She has never used smokeless tobacco. She reports that she does not drink alcohol and does not use drugs. Family History: family history includes Diabetes in her mother; Emphysema in her father; Heart Attack in her sister; Heart Failure in her sister; High Blood Pressure in her mother. The patients home medications have been reviewed.     Allergies: Sulfa antibiotics and Pentazocine lactate    -------------------------------------------------- RESULTS -------------------------------------------------  All laboratory and radiology results have been personally reviewed by myself   LABS:  Results for orders placed or performed during the hospital encounter of 01/06/23   COVID-19 & Influenza Combo    Specimen: Nasopharyngeal Swab   Result Value Ref Range    SARS-CoV-2 RNA, RT PCR NOT DETECTED NOT DETECTED    INFLUENZA A NOT DETECTED NOT DETECTED    INFLUENZA B NOT DETECTED NOT DETECTED   CBC with Auto Differential   Result Value Ref Range    WBC 6.8 4.5 - 11.5 E9/L    RBC 4.25 3.50 - 5.50 E12/L    Hemoglobin 11.7 11.5 - 15.5 g/dL    Hematocrit 38.2 34.0 - 48.0 %    MCV 89.9 80.0 - 99.9 fL    MCH 27.5 26.0 - 35.0 pg    MCHC 30.6 (L) 32.0 - 34.5 %    RDW 16.3 (H) 11.5 - 15.0 fL    Platelets 679 301 - 122 E9/L    MPV 11.3 7.0 - 12.0 fL    Neutrophils % 64.8 43.0 - 80.0 %    Immature Granulocytes % 0.1 0.0 - 5.0 %    Lymphocytes % 23.3 20.0 - 42.0 %    Monocytes % 9.6 2.0 - 12.0 %    Eosinophils % 1.2 0.0 - 6.0 %    Basophils % 1.0 0.0 - 2.0 %    Neutrophils Absolute 4.37 1.80 - 7.30 E9/L    Immature Granulocytes # 0.01 E9/L    Lymphocytes Absolute 1.57 1.50 - 4.00 E9/L    Monocytes Absolute 0.65 0.10 - 0.95 E9/L    Eosinophils Absolute 0.08 0.05 - 0.50 E9/L    Basophils Absolute 0.07 0.00 - 0.20 E9/L   CMP   Result Value Ref Range    Sodium 137 132 - 146 mmol/L    Potassium 4.3 3.5 - 5.0 mmol/L    Chloride 98 98 - 107 mmol/L    CO2 24 22 - 29 mmol/L    Anion Gap 15 7 - 16 mmol/L    Glucose 304 (H) 74 - 99 mg/dL    BUN 39 (H) 6 - 23 mg/dL    Creatinine 1.0 0.5 - 1.0 mg/dL    Est, Glom Filt Rate >60 >=60 mL/min/1.73    Calcium 9.8 8.6 - 10.2 mg/dL    Total Protein 7.4 6.4 - 8.3 g/dL    Albumin 3.8 3.5 - 5.2 g/dL    Total Bilirubin 0.5 0.0 - 1.2 mg/dL    Alkaline Phosphatase 90 35 - 104 U/L    ALT 13 0 - 32 U/L    AST 18 0 - 31 U/L   Protime-INR   Result Value Ref Range    Protime 30.7 (H) 9.3 - 12.4 sec    INR 2.8    Troponin   Result Value Ref Range    Troponin, High Sensitivity 16 (H) 0 - 9 ng/L   APTT   Result Value Ref Range    aPTT 29.5 24.5 - 35.1 sec   Urinalysis with Microscopic   Result Value Ref Range    Color, UA Yellow Straw/Yellow    Clarity, UA Clear Clear    Glucose, Ur 100 (A) Negative mg/dL    Bilirubin Urine Negative Negative    Ketones, Urine Negative Negative mg/dL    Specific Gravity, UA 1.015 1.005 - 1.030    Blood, Urine Negative Negative    pH, UA 6.0 5.0 - 9.0    Protein, UA Negative Negative mg/dL    Urobilinogen, Urine 0.2 <2.0 E.U./dL    Nitrite, Urine Negative Negative    Leukocyte Esterase, Urine Negative Negative       RADIOLOGY:  Interpreted by Radiologist.  XR CHEST PORTABLE   Final Result   Cardiomegaly and mild pulmonary vascular congestion. Mild right pleural effusion. CTA HEAD W CONTRAST   Final Result   1. No focal hemodynamically significant stenosis, occlusion or aneurysm in   the large arteries of the head and neck. 2. No acute intracranial hemorrhage or mass effect. 3. Stable right occipital approach ventriculoperitoneal shunt catheter.   The   ventricles are similar in size and shape to the prior study. No progressive   hydrocephalus. 4. Stable small left-sided subdural low-attenuation fluid collection which   may represent a chronic hygroma. CTA NECK W CONTRAST   Final Result   1. No focal hemodynamically significant stenosis, occlusion or aneurysm in   the large arteries of the head and neck. 2. No acute intracranial hemorrhage or mass effect. 3. Stable right occipital approach ventriculoperitoneal shunt catheter. The   ventricles are similar in size and shape to the prior study. No progressive   hydrocephalus. 4. Stable small left-sided subdural low-attenuation fluid collection which   may represent a chronic hygroma. ------------------------- NURSING NOTES AND VITALS REVIEWED ---------------------------   The nursing notes within the ED encounter and vital signs as below have been reviewed. /78   Pulse 92   Temp 98.1 °F (36.7 °C) (Oral)   Resp 16   Ht 5' 3\" (1.6 m)   Wt 108 lb (49 kg)   LMP  (LMP Unknown)   SpO2 97%   BMI 19.13 kg/m²   Oxygen Saturation Interpretation: Abnormal - but at baseline      ---------------------------------------------------PHYSICAL EXAM--------------------------------------      Constitutional/General: Alert and oriented x3, appears ill, non toxic in NAD  Head: Normocephalic and atraumatic  Eyes: EOMI  Mouth: Oropharynx clear, handling secretions, no trismus  Neck: Supple, full ROM,   Pulmonary: Lungs clear to auscultation bilaterally, no wheezes, rales, or rhonchi. Not in respiratory distress  Cardiovascular:  Regular rate and rhythm, no murmurs, gallops, or rubs. 2+ distal pulses  Abdomen: Soft, non tender, non distended,   Extremities: Moves all extremities x 4. Warm and well perfused. No leg edema  Skin: warm and dry without rash  Neurologic: GCS 15, see NIH below   Psych: Normal Affect.  Behavior normal.    NIH Stroke Scale/Score at time of initial evaluation:  1A: Level of Consciousness 0 - alert; keenly responsive   1B: Ask Month and Age 0 - answers both questions correctly   1C: Tell Patient To Open and Close Eyes, then Hand  Squeeze 0 - performs both tasks correctly   2: Test Horizontal Extraocular Movements 0 - normal   3: Test Visual Fields 0 - no visual loss   4: Test Facial Palsy 0 - normal symmetric movement   5A: Test Left Arm Motor Drift 0 - no drift, limb holds 90 (or 45) degrees for full 10 seconds   5B: Test Right Arm Motor Drift 0 - no drift, limb holds 90 (or 45) degrees for full 10 seconds   6A: Test Left Leg Motor Drift 2 - some effort against gravity; leg falls to bed by 5 seconds but has some effort against gravity   6B: Test Right Leg Motor Drift 1 - drift; leg falls by the end of the 5 second period but does not hit bed   7: Test Limb Ataxia   (FNF/Heel-Shin) 1 - present in one limb   8: Test Sensation 0 - normal; no sensory loss   9: Test Language/Aphasia 0 - no aphasia, normal   10: Test Dysarthria 0 - normal   11: Test Extinction/Inattention 0 - no abnormality   Total 4         ------------------------------ ED COURSE/MEDICAL DECISION MAKING----------------------  Medications   levETIRAcetam (KEPPRA) tablet 1,000 mg (has no administration in time range)   aspirin chewable tablet 324 mg (has no administration in time range)   insulin regular (HUMULIN R;NOVOLIN R) injection 10 Units (has no administration in time range)   iopamidol (ISOVUE-370) 76 % injection 75 mL (75 mLs IntraVENous Given 1/6/23 1755)   sodium chloride flush 0.9 % injection 10 mL (10 mLs IntraVENous Given 1/6/23 1755)       Medical Decision Making/ED COURSE:    History From: patient and      Patient is a 75 y.o. female presenting to the ED for subacute onset altered mental status, garbled speech, moderate to severe in severity. In the ED, patient was hemodynamically stable and afebrile. On exam, patient had an initial NIH of 4, reportedly improving symptoms per .   Last known well was last night, the patient is not a candidate for TNK. Patient was placed on the cardiac monitor. I interpreted findings. Rhythm - sinus. Labs, chest x-ray, noncontrast head CT, and CTA head and neck obtained to evaluate for stroke/LVO. I reviewed and interpreted labs. Labs are significant for hyperglycemia of 304 but no anion gap to suggest DKA. Patient was given 10 units of IV insulin. Urinalysis did not appear infected. INR was elevated at 2.8. Sensitivity troponin was 16, repeat pending. No acute EKG changes. Chest xray interpreted by me. Interpretation-pulmonary congestion. Radiologist confirms read. No LVO seen on imaging. Patient was administered IV Keppra and aspirin per recommendations neurology. TIAs versus seizures. She will be transferred downtown for further neurologic work-up and monitoring. CONSULTS: (Who and What was discussed)  None  Neurology-Dr. Kimberlee Watkins reviewed imaging. No LVO. Recommends Keppra and neurologic evaluation for seizure versus TIAs. Social Determinants of Health : None    Chronic Conditions affecting care:    has a past medical history of Acid reflux disease, Acute on chronic respiratory failure with hypoxia and hypercapnia (HCC) (7/25/2018), Apraxia (7/23/2018), Arthritis, Ataxia (7/23/2018), CAD (coronary artery disease) (10/4/2012), Choledocholithiasis, Chronic systolic congestive heart failure ([address] (8/1/2018), CKD (chronic kidney disease) stage 3, GFR 30-59 ml/min ([address] (7/23/2018), COPD (chronic obstructive pulmonary disease) ([address] Diabetes mellitus ([address] Hyperlipidemia, Hypoxia (7/23/2018), Neck pain, Normal pressure hydrocephalus ([address] (7/28/2018), Oxygen dependent, Pleural effusion (years ago), Pulmonary hypertension ([address] (7/23/2018), Restless legs, S/P CABG x 2 (10/4/2012), and Severe mitral regurgitation (8/1/2018).      Records Reviewed( Source)   Patient underwent bilateral arthrodesis and fusion from C3-T1 by Dr. Todd Deleon on 10/17/22      Patient remained hemodynamically stable throughout ED course. ED Course as of 01/06/23 1906   Braulio Castanon Jan 06, 2023   1725 Patient's last known well was yesterday evening. Alexa alert called. [JA]   1727 EKG: This EKG is signed and interpreted by me, Malka Polk MD.    Rate: 93  Rhythm: Sinus  Interpretation: Sinus rhythm, PVCs, right bundle branch block, T wave abnormalities appear unchanged when compared to prior EKG, QTC is prolonged at 509, bifascicular block  Comparison: stable as compared to patient's most recent EKG   [JA]   1804 Patient is refusing ABG [JA]   135 [facility] Stroke neurology was consulted. I spoke with Dr. Ronda Barnes. He will review images and call back. [JA]   [id number] I spoke with Dr. Ronda Barnes once again. He states no LVO. He states considered seizures versus TIAs. He recommends giving the patient Keppra and neurologic evaluation by general neurology upon admission. Patient will be transferred downtown for admission. [JA]      ED Course User Index  [JA] Malka Polk MD       New Prescriptions    No medications on file         Critical Care:  Please note that the withdrawal or failure to initiate urgent interventions for this patient would likely result in a life threatening deterioration or permanent disability. Accordingly this patient received 33 minutes of critical care time, excluding separately billable procedures. Counseling: The emergency provider has spoken with the patient and spouse/SO and discussed todays results, in addition to providing specific details for the plan of care and counseling regarding the diagnosis and prognosis. Questions are answered at this time and they are agreeable with the plan.      --------------------------------- IMPRESSION AND DISPOSITION ---------------------------------    IMPRESSION  1. Altered mental status, unspecified altered mental status type    2. Stroke-like symptoms    3.  Hyperglycemia DISPOSITION  Disposition: Transfer to [facility] for admission to telemetry  Patient condition is stable      NOTE: This report was transcribed using voice recognition software.  Every effort was made to ensure accuracy; however, inadvertent computerized transcription errors may be present    IJennifer MD, am the primary provider of this record        Jennifer Segura MD  01/06/23 0899

## 2023-01-06 NOTE — LETTER
[place] Department Medicaid  CERTIFICATION OF NECESSITY  FOR NON-EMERGENCY TRANSPORTATION   BY GROUND AMBULANCE      Individual Information   1. Name: Francisco Carrillo 2. [place] Medicaid Billing Number:    3. Address: [address] [place], [place] [address].      Transportation Provider Information   4. Provider Name:    5. [place] Medicaid Provider Number:  National Provider Identifier (NPI):      Certification  7. Criteria:  During transport, this individual requires:  [x] Medical treatment or continuous     supervision by an EMT. [x] The administration or regulation of oxygen by another person. [] Supervised protective restraint. 8. Period Beginning Date:    5. Length  [x] Not more than 1 day(s)  [] One Year     Additional Information Relevant to Certification   10. Comments or Explanations, If Necessary or Appropriate   Altered Mental Status-alert x 1, Cognition, 4L 02, Cabg x2, Congestive Heart Failure, Seizure Precastions, Generalized Weakness, Spinal Neutrality     Certifying Practitioner Information   11. Name of Practitioner: Dr. Deep Willis MD   12. [place] Medicaid Provider Number, If Applicable:  [address] Provider Identifier (NPI):      Signature Information   14. Date of Signature:  13. Name of Person Signin. Signature and Professional Designation:        ODM [id number]  Rev. 2015    [address] [place] [address] Encounter Date/Time: 2023 [address] Jagjit Page2Imagesyu Penaloza Account: [de-identified]    MRN: [id number]    Patient: Francisco Carrillo    Contact Serial #: [id number]      ENCOUNTER          Patient Class: I Private Enc? No Unit RM BD: SEYZ 8S CDU 8211/8211-A   Hospital Service:  INM   Encounter DX: Hyperglycemia [R73.9]   ADM Provider: Asha White MD   Procedure:     ATT Provider: Deep Willis MD   REF Provider:        Admission DX: Hyperglycemia, Stroke-like symptoms, Altered mental status, unspecified altered mental status type, TIA (transient ischemic attack) and DX codes: R73.9, R29.90, R41.82, G45.9    PATIENT                 Name: Candice Aleman : 1955 (67 yrs)   Address: [address] [place] Sex: Female   [place] city: [address] [place] [address]         Marital Status:    Employer: RETIRED         [facility]   Primary Care Provider: DO Maximino Rebolledo Phone: [phone]   EMERGENCY CONTACT   Contact Name Legal Guardian? Relationship to Patient Home Phone Work Phone   1. Yamila Veras  2. *No Contact Specified*      Child    [phone]                 GUARANTOR            Guarantor: Candice Aleman     : 1955   Address: [address] [place] [address] Sex: Female     [place] [id number]     Relation to Patient: Self       Home Phone: [phone]   Guarantor ID: [id number]       Work Phone:     Guarantor Employer: RETIRED         Status: RETIRED      COVERAGE        PRIMARY INSURANCE   Payor: [employer] Plan: [id number] MEDICARE ADVANTAGE*   Payor Address: [facility] [id number],  [facility]       Group Number: [id number] Insurance Type: [address] Name: Carleen Feliciano : 1955   Subscriber ID: [id number] Pat. Rel. to Sub: Self   SECONDARY INSURANCE   Payor:   Plan:     Payor Address:  ,           Group Number:   Insurance Type:     Subscriber Name:   Subscriber :     Subscriber ID:   Pat.  Rel. to Sub:           CSN: [id number]

## 2023-01-07 PROBLEM — G45.9 TIA (TRANSIENT ISCHEMIC ATTACK): Status: ACTIVE | Noted: 2023-01-07

## 2023-01-07 LAB
AMPHETAMINE SCREEN, URINE: NOT DETECTED
BARBITURATE SCREEN URINE: NOT DETECTED
BENZODIAZEPINE SCREEN, URINE: NOT DETECTED
CANNABINOID SCREEN URINE: NOT DETECTED
COCAINE METABOLITE SCREEN URINE: NOT DETECTED
FENTANYL SCREEN, URINE: NOT DETECTED
FOLATE: 14.6 NG/ML (ref 4.8–24.2)
HBA1C MFR BLD: 11 % (ref 4–5.6)
Lab: ABNORMAL
METER GLUCOSE: 106 MG/DL (ref 74–99)
METER GLUCOSE: 188 MG/DL (ref 74–99)
METER GLUCOSE: 194 MG/DL (ref 74–99)
METER GLUCOSE: 202 MG/DL (ref 74–99)
METER GLUCOSE: 250 MG/DL (ref 74–99)
METER GLUCOSE: 308 MG/DL (ref 74–99)
METHADONE SCREEN, URINE: NOT DETECTED
OPIATE SCREEN URINE: NOT DETECTED
OXYCODONE URINE: POSITIVE
PHENCYCLIDINE SCREEN URINE: NOT DETECTED
T4 FREE: 1.39 NG/DL (ref 0.93–1.7)
TROPONIN, HIGH SENSITIVITY: 21 NG/L (ref 0–9)
TROPONIN, HIGH SENSITIVITY: 23 NG/L (ref 0–9)
TSH SERPL DL<=0.05 MIU/L-ACNC: 1.3 UIU/ML (ref 0.27–4.2)
VITAMIN B-12: 806 PG/ML (ref 211–946)

## 2023-01-07 PROCEDURE — 36415 COLL VENOUS BLD VENIPUNCTURE: CPT

## 2023-01-07 PROCEDURE — 82607 VITAMIN B-12: CPT

## 2023-01-07 PROCEDURE — 6370000000 HC RX 637 (ALT 250 FOR IP): Performed by: INTERNAL MEDICINE

## 2023-01-07 PROCEDURE — 2580000003 HC RX 258: Performed by: INTERNAL MEDICINE

## 2023-01-07 PROCEDURE — 6360000002 HC RX W HCPCS: Performed by: INTERNAL MEDICINE

## 2023-01-07 PROCEDURE — 97535 SELF CARE MNGMENT TRAINING: CPT

## 2023-01-07 PROCEDURE — 6370000000 HC RX 637 (ALT 250 FOR IP): Performed by: FAMILY MEDICINE

## 2023-01-07 PROCEDURE — 97165 OT EVAL LOW COMPLEX 30 MIN: CPT

## 2023-01-07 PROCEDURE — 84484 ASSAY OF TROPONIN QUANT: CPT

## 2023-01-07 PROCEDURE — 82746 ASSAY OF FOLIC ACID SERUM: CPT

## 2023-01-07 PROCEDURE — 84439 ASSAY OF FREE THYROXINE: CPT

## 2023-01-07 PROCEDURE — 83036 HEMOGLOBIN GLYCOSYLATED A1C: CPT

## 2023-01-07 PROCEDURE — 82962 GLUCOSE BLOOD TEST: CPT

## 2023-01-07 PROCEDURE — 2700000000 HC OXYGEN THERAPY PER DAY

## 2023-01-07 PROCEDURE — 92610 EVALUATE SWALLOWING FUNCTION: CPT

## 2023-01-07 PROCEDURE — 2140000000 HC CCU INTERMEDIATE R&B

## 2023-01-07 PROCEDURE — 80307 DRUG TEST PRSMV CHEM ANLYZR: CPT

## 2023-01-07 PROCEDURE — 84443 ASSAY THYROID STIM HORMONE: CPT

## 2023-01-07 RX ORDER — ASPIRIN 81 MG/1
81 TABLET, CHEWABLE ORAL DAILY
Status: DISCONTINUED | OUTPATIENT
Start: 2023-01-07 | End: 2023-01-12 | Stop reason: HOSPADM

## 2023-01-07 RX ORDER — INSULIN LISPRO 100 [IU]/ML
0-8 INJECTION, SOLUTION INTRAVENOUS; SUBCUTANEOUS
Status: DISCONTINUED | OUTPATIENT
Start: 2023-01-07 | End: 2023-01-07

## 2023-01-07 RX ORDER — DEXTROSE MONOHYDRATE 100 MG/ML
INJECTION, SOLUTION INTRAVENOUS CONTINUOUS PRN
Status: DISCONTINUED | OUTPATIENT
Start: 2023-01-07 | End: 2023-01-12 | Stop reason: HOSPADM

## 2023-01-07 RX ORDER — PANTOPRAZOLE SODIUM 40 MG/1
40 TABLET, DELAYED RELEASE ORAL
Status: DISCONTINUED | OUTPATIENT
Start: 2023-01-07 | End: 2023-01-12 | Stop reason: HOSPADM

## 2023-01-07 RX ORDER — PRAMIPEXOLE DIHYDROCHLORIDE 0.25 MG/1
0.75 TABLET ORAL 2 TIMES DAILY
Status: DISCONTINUED | OUTPATIENT
Start: 2023-01-07 | End: 2023-01-12 | Stop reason: HOSPADM

## 2023-01-07 RX ORDER — ALBUTEROL SULFATE 2.5 MG/3ML
2.5 SOLUTION RESPIRATORY (INHALATION) EVERY 4 HOURS PRN
Status: DISCONTINUED | OUTPATIENT
Start: 2023-01-07 | End: 2023-01-12 | Stop reason: HOSPADM

## 2023-01-07 RX ORDER — OXYCODONE HYDROCHLORIDE 5 MG/1
5 TABLET ORAL EVERY 4 HOURS PRN
Status: DISCONTINUED | OUTPATIENT
Start: 2023-01-07 | End: 2023-01-12 | Stop reason: HOSPADM

## 2023-01-07 RX ORDER — INSULIN LISPRO 100 [IU]/ML
0-16 INJECTION, SOLUTION INTRAVENOUS; SUBCUTANEOUS
Status: DISCONTINUED | OUTPATIENT
Start: 2023-01-07 | End: 2023-01-12 | Stop reason: HOSPADM

## 2023-01-07 RX ORDER — ACETAMINOPHEN 650 MG/1
650 SUPPOSITORY RECTAL EVERY 6 HOURS PRN
Status: DISCONTINUED | OUTPATIENT
Start: 2023-01-07 | End: 2023-01-12 | Stop reason: HOSPADM

## 2023-01-07 RX ORDER — ENOXAPARIN SODIUM 100 MG/ML
30 INJECTION SUBCUTANEOUS DAILY
Status: DISCONTINUED | OUTPATIENT
Start: 2023-01-07 | End: 2023-01-12 | Stop reason: HOSPADM

## 2023-01-07 RX ORDER — BUPROPION HYDROCHLORIDE 150 MG/1
150 TABLET, EXTENDED RELEASE ORAL 2 TIMES DAILY
Status: DISCONTINUED | OUTPATIENT
Start: 2023-01-07 | End: 2023-01-12 | Stop reason: HOSPADM

## 2023-01-07 RX ORDER — LEVETIRACETAM 250 MG/1
750 TABLET ORAL 2 TIMES DAILY
Status: DISCONTINUED | OUTPATIENT
Start: 2023-01-07 | End: 2023-01-11

## 2023-01-07 RX ORDER — SODIUM CHLORIDE 0.9 % (FLUSH) 0.9 %
10 SYRINGE (ML) INJECTION EVERY 12 HOURS SCHEDULED
Status: DISCONTINUED | OUTPATIENT
Start: 2023-01-07 | End: 2023-01-12 | Stop reason: HOSPADM

## 2023-01-07 RX ORDER — OXYBUTYNIN CHLORIDE 10 MG/1
10 TABLET, EXTENDED RELEASE ORAL DAILY
Status: DISCONTINUED | OUTPATIENT
Start: 2023-01-07 | End: 2023-01-12 | Stop reason: HOSPADM

## 2023-01-07 RX ORDER — ALBUTEROL SULFATE 90 UG/1
1 AEROSOL, METERED RESPIRATORY (INHALATION) EVERY 4 HOURS PRN
Status: DISCONTINUED | OUTPATIENT
Start: 2023-01-07 | End: 2023-01-07 | Stop reason: CLARIF

## 2023-01-07 RX ORDER — ATORVASTATIN CALCIUM 40 MG/1
40 TABLET, FILM COATED ORAL NIGHTLY
Status: DISCONTINUED | OUTPATIENT
Start: 2023-01-07 | End: 2023-01-12 | Stop reason: HOSPADM

## 2023-01-07 RX ORDER — INSULIN LISPRO 100 [IU]/ML
0-4 INJECTION, SOLUTION INTRAVENOUS; SUBCUTANEOUS NIGHTLY
Status: DISCONTINUED | OUTPATIENT
Start: 2023-01-07 | End: 2023-01-12 | Stop reason: HOSPADM

## 2023-01-07 RX ORDER — ACETAMINOPHEN 325 MG/1
650 TABLET ORAL EVERY 6 HOURS PRN
Status: DISCONTINUED | OUTPATIENT
Start: 2023-01-07 | End: 2023-01-12 | Stop reason: HOSPADM

## 2023-01-07 RX ORDER — SODIUM CHLORIDE 9 MG/ML
INJECTION, SOLUTION INTRAVENOUS PRN
Status: DISCONTINUED | OUTPATIENT
Start: 2023-01-07 | End: 2023-01-12 | Stop reason: HOSPADM

## 2023-01-07 RX ORDER — SODIUM CHLORIDE 0.9 % (FLUSH) 0.9 %
10 SYRINGE (ML) INJECTION PRN
Status: DISCONTINUED | OUTPATIENT
Start: 2023-01-07 | End: 2023-01-12 | Stop reason: HOSPADM

## 2023-01-07 RX ORDER — INSULIN LISPRO 100 [IU]/ML
0-4 INJECTION, SOLUTION INTRAVENOUS; SUBCUTANEOUS NIGHTLY
Status: DISCONTINUED | OUTPATIENT
Start: 2023-01-07 | End: 2023-01-07

## 2023-01-07 RX ORDER — ONDANSETRON 4 MG/1
4 TABLET, ORALLY DISINTEGRATING ORAL EVERY 8 HOURS PRN
Status: DISCONTINUED | OUTPATIENT
Start: 2023-01-07 | End: 2023-01-12 | Stop reason: HOSPADM

## 2023-01-07 RX ORDER — ONDANSETRON 2 MG/ML
4 INJECTION INTRAMUSCULAR; INTRAVENOUS EVERY 6 HOURS PRN
Status: DISCONTINUED | OUTPATIENT
Start: 2023-01-07 | End: 2023-01-12 | Stop reason: HOSPADM

## 2023-01-07 RX ORDER — LEVETIRACETAM 500 MG/1
1000 TABLET ORAL 2 TIMES DAILY
Status: DISCONTINUED | OUTPATIENT
Start: 2023-01-07 | End: 2023-01-07

## 2023-01-07 RX ADMIN — OXYCODONE 5 MG: 5 TABLET ORAL at 05:47

## 2023-01-07 RX ADMIN — BUPROPION HYDROCHLORIDE 150 MG: 150 TABLET, EXTENDED RELEASE ORAL at 08:51

## 2023-01-07 RX ADMIN — ASPIRIN 81 MG CHEWABLE TABLET 81 MG: 81 TABLET CHEWABLE at 08:18

## 2023-01-07 RX ADMIN — INSULIN LISPRO 12 UNITS: 100 INJECTION, SOLUTION INTRAVENOUS; SUBCUTANEOUS at 11:54

## 2023-01-07 RX ADMIN — OXYBUTYNIN CHLORIDE 10 MG: 10 TABLET, EXTENDED RELEASE ORAL at 08:52

## 2023-01-07 RX ADMIN — PRAMIPEXOLE DIHYDROCHLORIDE 0.75 MG: 0.25 TABLET ORAL at 20:33

## 2023-01-07 RX ADMIN — LEVETIRACETAM 750 MG: 250 TABLET, FILM COATED ORAL at 20:32

## 2023-01-07 RX ADMIN — ENOXAPARIN SODIUM 30 MG: 100 INJECTION SUBCUTANEOUS at 08:18

## 2023-01-07 RX ADMIN — ATORVASTATIN CALCIUM 40 MG: 40 TABLET, FILM COATED ORAL at 20:32

## 2023-01-07 RX ADMIN — BUPROPION HYDROCHLORIDE 150 MG: 150 TABLET, EXTENDED RELEASE ORAL at 20:33

## 2023-01-07 RX ADMIN — PRAMIPEXOLE DIHYDROCHLORIDE 0.75 MG: 0.25 TABLET ORAL at 08:51

## 2023-01-07 RX ADMIN — Medication 10 ML: at 08:52

## 2023-01-07 RX ADMIN — PANTOPRAZOLE SODIUM 40 MG: 40 TABLET, DELAYED RELEASE ORAL at 05:48

## 2023-01-07 RX ADMIN — LEVETIRACETAM 750 MG: 250 TABLET, FILM COATED ORAL at 08:52

## 2023-01-07 ASSESSMENT — PAIN SCALES - GENERAL
PAINLEVEL_OUTOF10: 7
PAINLEVEL_OUTOF10: 10

## 2023-01-07 ASSESSMENT — PAIN DESCRIPTION - LOCATION
LOCATION: NECK;BACK
LOCATION: NECK;BACK

## 2023-01-07 ASSESSMENT — PAIN DESCRIPTION - DESCRIPTORS
DESCRIPTORS: SHARP;STABBING
DESCRIPTORS: SHARP;STABBING

## 2023-01-07 ASSESSMENT — PAIN DESCRIPTION - ORIENTATION
ORIENTATION: POSTERIOR
ORIENTATION: POSTERIOR

## 2023-01-07 NOTE — PROGRESS NOTES
SPEECH/LANGUAGE PATHOLOGY  CLINICAL ASSESSMENT OF SWALLOWING FUNCTION   and PLAN OF CARE    PATIENT NAME:  Jean Pierre Coon  (female)     MRN:  [id number]    :  1955  (75 y.o.)  STATUS:  Inpatient: Room 8211/8211-A    TODAY'S DATE:  2023  REFERRING PROVIDER:   Dr. Szymanski Halo: SLP swallowing-dysphagia evaluation and treatment Date of order:  23  REASON FOR REFERRAL: TIA   EVALUATING THERAPIST: RASHAD Puri                 RESULTS:    DYSPHAGIA DIAGNOSIS:   Clinical indicators of mild oral/pharyngeal phase dysphagia       DIET RECOMMENDATIONS:  Regular consistency solids (IDDSI level 7) with  thin liquids (IDDSI level 0)     FEEDING RECOMMENDATIONS:     Assistance level:  Supervision as needed during all oral intake      Compensatory strategies recommended: Small bites/sips, single cup sips, and Alternate solids and liquids      Discussed recommendations with nursing and/or faxed report to referring provider: Chris    [address]   The dysphagia POC is established based on physician order, dysphagia diagnosis and results of clinical assessment     Meal time assessment for 1-2 sessions to provide diet modification and compensatory strategy implementation due to need to ensure proper implementation of compensatory strategies during PO intake     Conditions Requiring Skilled Therapeutic Intervention for dysphagia:    Impaired mastication  Throat clearing during PO intake     Specific dysphagia interventions to include:     Compensatory strategy training   Meal time assessment for 1-2 sessions to provide diet modification and compensatory strategy implementation due to need to ensure proper implementation of compensatory strategies during PO intake     Specific instructions for next treatment:  development and training of compensatory swallow strategies to improve airway protection and swallow function and ongoing PO analysis to upgrade diet and evaluate tolerance of current PO recommendation    Patient Treatment Goals:    Short Term Goals:  Pt will implement identified compensatory swallowing strategies on 90% of opportunities or greater to improve airway protection and swallow function. Pt will participate in meal time assessment for 1-2 sessions to provide diet modification and compensatory strategy implementation due to need to ensure proper implementation of compensatory strategies during PO intake     Long Term Goals:  Pt will maintain adequate nutrition/hydration via PO intake of the least restrictive oral diet with implementation of safe swallow/ compensatory strategies and decrease signs/symptoms of aspiration to less than 1 x/day. Patient/family Goal:    Did not state. Will further assess during treatment. Plan of care discussed with Patient   The Patient understand(s) the diagnosis, prognosis and plan of care     Rehabilitation Potential/Prognosis: good                    ADMITTING DIAGNOSIS: Hyperglycemia [R73.9]  Stroke-like symptoms [R29.90]  Altered mental status, unspecified altered mental status type [R41.82]  TIA (transient ischemic attack) [G45.9]    VISIT DIAGNOSIS:   Visit Diagnoses         Codes    Altered mental status, unspecified altered mental status type    -  Primary R41.82    Stroke-like symptoms     R29.90    Hyperglycemia     R73.9             PATIENT REPORT/COMPLAINT: denies difficulty swallowing  RN cleared patient for participation in assessment     yes     PRIOR LEVEL OF SWALLOW FUNCTION:    PAST HISTORY OF DYSPHAGIA?: none reported      Current Diet Order:  ADULT DIET;  Regular; 3 carb choices (45 gm/meal)    PROCEDURE:  Consistencies Administered During the Evaluation   Liquids: thin liquid   Solids:  pureed foods and solid foods      Method of Intake:   cup, straw, spoon  Self fed, Fed by clinician      Position:   Seated, reclined     CLINICAL ASSESSMENT:  Oral Stage:      Decreased mastication due to: ill fitting dentures       Pharyngeal Stage:    Throat clearing present after presentation of thin liquid during multiple gulp swallows    Cognition:   Follows 1-2 - step directions appropriate for this assessment    Oral Peripheral Examination   Generalized oral weakness    Current Respiratory Status    nasal cannula     Parameters of Speech Production  Respiration:  Adequate for speech production  Quality:   Within functional limits  Intensity: Within functional limits    Volitional Swallow: present     Volitional Cough:   DNT     Pain: No pain reported. EDUCATION:   The Speech Language Pathologist (SLP) completed education regarding results of evaluation and that intervention is warranted at this time. Learner: Patient  Education: Reviewed results and recommendations of this evaluation  Evaluation of Education:  Cj Moy understanding    This plan may be re-evaluated and revised as warranted. Evaluation Time includes thorough review of current medical information, gathering information on past medical history/social history and prior level of function, completion of standardized testing/informal observation of tasks, assessment of data and education on plan of care and goals. [x]The admitting diagnosis and active problem list, have been reviewed prior to initiation of this evaluation.         ACTIVE PROBLEM LIST:   Patient Active Problem List   Diagnosis    CAD (coronary artery disease)    Pulmonary hypertension ([facility])    COPD (chronic obstructive pulmonary disease) ([facility])    CKD (chronic kidney disease) stage 3, GFR 30-59 ml/min    Normal pressure hydrocephalus ([facility])    Chronic systolic (congestive) heart failure ([facility])    Severe mitral regurgitation    NPH (normal pressure hydrocephalus) ([facility]    COVID-19    Syncope and collapse    Gastroenteritis    Acute kidney injury superimposed on CKD ([facility])    Hypotension    S/P ventriculoperitoneal shunt    Cervical stenosis of spinal canal    Type 2 diabetes mellitus, without long-term current use of insulin ([facility])    O2 dependent    Severe protein-calorie malnutrition ([facility])    TIA (transient ischemic attack)         CPT code:  68795  bedside swallow sonia Uriostegui M.S., CCC-SLP/L  Speech-Language Pathologist

## 2023-01-07 NOTE — PROGRESS NOTES
Orthostatic Vitals:  Orthostatic Vitals 1/7/2023   Orthostatic B/P and Pulse?  Yes Yes   Blood Pressure Lying 109/71 109/71   Pulse Lying 78 78   Blood Pressure Sitting 112/69 112/69   Pulse Sitting 81 81   Blood Pressure Standing 122/74 122/74   Pulse Standing 82 82

## 2023-01-07 NOTE — ED NOTES
Patient urgently had to urinate. Assisted to BR. Very unsteady. O2 sat decreased to 75% on RA.   Returned to 96% after O2 reapplied     Sony Sykes, RN  01/07/23 [id number]

## 2023-01-07 NOTE — ED NOTES
[facility] notified of transfer, spoke with Nell Adamson, advised 2-3hrs ETA ([id number])     Mariangel Fulton  01/06/23 2012

## 2023-01-07 NOTE — PROGRESS NOTES
[address]        YXTZ:3/2/3837                                                  Patient Name: Eun Girard    MRN: [id number]    : 1955    Room: [address] [place] [address]          Evaluating OT: Caleb Conti OTR/L; [id number]       Referring Provider: Cynthia Llanos MD    Specific Provider Orders/Date: OT Eval and Treat 23       Diagnosis: TIA (transient ischemic attack); Generalized weakness. Pt confused & combative PTA. Surgery: None this admission     Pertinent Medical History:  has a past medical history of Acid reflux disease, Acute on chronic respiratory failure with hypoxia and hypercapnia ([address] Apraxia, Arthritis, Ataxia, CAD (coronary artery disease), Choledocholithiasis, Chronic systolic congestive heart failure ([facility]), CKD (chronic kidney disease) stage 3, GFR 30-59 ml/min ([facility]), COPD (chronic obstructive pulmonary disease) ([address] Diabetes mellitus ([address] Hyperlipidemia, Hypoxia, Neck pain, Normal pressure hydrocephalus ([facility]), Oxygen dependent, Pleural effusion, Pulmonary hypertension ([address] Restless legs, S/P CABG x 2, and Severe mitral regurgitation.      Recommended Adaptive Equipment: TBD     Precautions:  Fall Risk, spinal neutrality for comfort, O2, seizure precautions, +alarms, cognition     Assessment of current deficits    [x] Functional mobility  [x]ADLs  [x] Strength               [x]Cognition    [x] Functional transfers   [x] IADLs         [x] Safety Awareness   [x]Endurance    [] Fine Coordination              [x] Balance      [] Vision/perception   []Sensation     []Gross Motor Coordination  [] ROM  [] Delirium                   [] Motor Control     OT PLAN OF CARE   OT POC based on physician orders, patient diagnosis and results of clinical assessment    Frequency/Duration 1-3 days/wk for 2 weeks PRN   Specific OT Treatment Interventions to include:   * Instruction/training on adapted ADL techniques and AE recommendations to increase functional independence within precautions       * Training on energy conservation strategies, correct breathing pattern and techniques to improve independence/tolerance for self-care routine  * Functional transfer/mobility training/DME recommendations for increased independence, safety, and fall prevention  * Patient/Family education to increase follow through with safety techniques and functional independence  * Recommendation of environmental modifications for increased safety with functional transfers/mobility and ADLs  * Cognitive retraining/development of therapeutic activities to improve problem solving, judgement, memory, and attention for increased safety/participation in ADL/IADL tasks  * Therapeutic exercise to improve motor endurance, ROM, and functional strength for ADLs/functional transfers  * Therapeutic activities to facilitate/challenge dynamic balance, stand tolerance for increased safety and independence with ADLs  * Positioning to improve skin integrity, interaction with environment and functional independence    Modified [place] Scale   Score     Description  0             No symptoms  1             No significant disability despite symptoms  2             Slight disability; able to look after own affairs  3             Moderate disability; able to ambulate without assist/ requires assist with ADLs  4             Moderate/Severe disability;requires assist to ambulate/assist with ADLs  5             Severe disability;bedridden/incontinent   6               Score:   4    Pt questionable historian. Home Living: Pt lives with  in 2 story home with 1 step & 0 handrails to enter.    Bathroom setup: Tub/shower with shower chair   Equipment owned: abram CRANE  Prior Level of Function: IND with ADLs , IND with IADLs; engaged in functional mobility with use of  ww  Driving: Yes  Occupation: None reported    Pain Level: Pt c/o godwin shoulder & neck pain during session; therapist provided repositioning techniques. Cognition: A&O: 3/4; Follows 1 step directions. Pt slightly impulsive & easily distracted. Memory:  Fair   Sequencing:  Fair-   Problem solving:  Fair-   Judgement/safety:  Fair-     Functional Assessment:  AM-PAC Daily Activity Raw Score: 18/24   Initial Eval Status  Date: 1/7/23 Treatment Status  Date: STGs = LTGs  Time frame: 10-14 days   Feeding Setup   Independent    Grooming Stand by Assist   Modified [place]    UB Dressing Stand by Assist   Modified [place]    LB Dressing Minimal Assist   Modified [place]    Bathing Minimal Assist  Modified [place]    Toileting Minimal Assist   Modified [place]    Bed Mobility  Log Roll: Supervision  Supine to sit: Supervision   Sit to supine: Supervision   Supine to sit: Independent   Sit to supine: Independent    Functional Transfers Sit to stand:SBA   Stand to sit:SBA  Stand pivot: SBA  Commode: SBA  Sit to stand:Modified [place]   Stand to sit:Modified [place]  Stand pivot: Modified [place]  Commode: Modified [place]    Functional Mobility SBA  Use of ww to<>from bathroom. Modified [place] with use of Appropriate AD PRN   Balance Sitting:     Static - Supervision     Dynamic - SBA  Standing: SBA  Sitting:     Static: Independent     Dynamic: Independent  Standing: Modified [place]   Activity Tolerance Fair  Good   Visual/  Perceptual Glasses: Yes - readers  Appears WFL        Safety Fair-  Good  during ADL completion     Hand Dominance Right   AROM (PROM) Strength Additional Info:    RUE  WFL 4-/5 grossly tested good  and wfl FMC/dexterity noted during ADL tasks     LUE WFL 4-/5 grossly tested Good  and wfl FMC/dexterity noted during ADL tasks       Fine Motor Coordination:  Pt noted with mild difficulty completing finger-to-nose assessment with 100% completion.   Hearing: WFL  Sensation:  No c/o numbness or tingling BUE  Tone: WFL BUE  Edema: Unremarkable    Comment: Cleared by RN to see pt. RN reporting no use of c-collar needed for OOB activity. Upon arrival patient supine in bed and agreeable to OT session. At end of session, patient seated upright in bed with RN in room, call light and phone within reach, all lines and tubes intact. Overall patient demonstrated decreased independence and safety during completion of ADL/functional transfer/mobility tasks. Therapist facilitated ADL tasks, functional transfers, functional mobility, bed mobility to address safety awareness, implementation of fall prevention strategies, & engagement throughout functional tasks. Pt appeared pleasantly confused at times & slightly impulsive requiring min verbal cues to maintain safety & attention to task. Pt would benefit from continued skilled OT to increase safety and independence with completion of ADL/IADL tasks for functional independence and quality of life. Treatment: OT treatment provided this date includes: ADL-  Instruction/training on safety and adapted techniques for completion of ADLs: Pt required assist to don & tie gown seated EOB. Pt able to void self of urine, complete pericare, & clothing management during toileting task requiring light assist/cues to maintain safety awareness. Mobility-  Instruction/training on safety and improved independence with bed mobility/functional transfers and functional mobility. Pt utilized ww throughout session to maintain static/dynamic standing balance. Pt required assist/cues for ww management/safety throughout session as pt attempting to prematurely release ww during functional transfers/mobility. Sitting EOB ~10 minutes to improve dynamic sitting balance and activity tolerance during ADLs. Skilled positioning/alignment-  Proper Positioning/Alignment.  Pt required assist/cues to maintain proper body mechanics throughout session to promote skin/joint integrity, safety awareness, & implementation of fall prevention strategies throughout daily activities. Skilled monitoring of O2 sats-   Pt on 4L O2 nasal cannula upon arrival. SpO2 96% & above throughout session. BP seated 117/75. RN made aware. Rehab Potential: Good for established goals     LTG: maximize independence with ADLs to return to PLOF    Patient and/or family were instructed on functional diagnosis, prognosis/goals and OT plan of care. Demonstrated fair- understanding. Eval Complexity: Low  History: Expanded chart review of medical records and additional review of physical, cognitive, or psychosocial history related to current functional performance  Exam: 3+ performance deficits  Assistance/Modification: Min/mod assistance or modifications required to perform tasks. May have comorbidities that affect occupational performance. Evaluation time includes thorough review of current medical information, gathering information on past medical & social history & PLOF, completion of standardized testing, informal observation of tasks, consultation with other medical professions/disciplines, assessment of data & development of POC/goals. Time In: 11:28a  Time Out: 11:54a  Total Treatment Time: 11 minutes    Min Units   OT Eval Low 32829  x     OT Eval Medium 46474      OT Eval High 88362      OT Re-Eval [id number]       Therapeutic Ex 90372       Therapeutic Activities 61324       ADL/Self Care 88831  11 1    Orthotic Management 69485       Manual [id number]     Neuro Re-Ed 83992       Non-Billable Time          Evaluation Time additionally includes thorough review of current medical information, gathering information on past medical history/social history and prior level of function, interpretation of standardized testing/informal observation of tasks, assessment of data and development of plan of care and goals.               Bridgett Duran OTR/L; [id number]

## 2023-01-07 NOTE — H&P
Hospitalist History & Physical      PATIENT NAME:  Eun Girard    MRN:  [id number]  SERVICE DATE:  01/07/23    Primary Care Physician: Elidia Olivares DO       SUBJECTIVE  CHIEF COMPLAINT:  had concerns including Fatigue (Mumbled speech and lethargic). HPI:  Ms. Eun Girard, a 75y.o. year old female  who  has a past medical history of Acid reflux disease, Acute on chronic respiratory failure with hypoxia and hypercapnia ([address] Apraxia, Arthritis, Ataxia, CAD (coronary artery disease), Choledocholithiasis, Chronic systolic congestive heart failure ([address] CKD (chronic kidney disease) stage 3, GFR 30-59 ml/min ([facility]), COPD (chronic obstructive pulmonary disease) ([address] Diabetes mellitus ([address] Hyperlipidemia, Hypoxia, Neck pain, Normal pressure hydrocephalus (HCC), Oxygen dependent, Pleural effusion, Pulmonary hypertension ([address] Restless legs, S/P CABG x 2, and Severe mitral regurgitation. presents with transfer from [facility] with TIA, asa, keppra, need MRI and EEG. Presented there with weakness started October after having cervical surgery by Dr Jeronimo Young but got worse today. woke up this morning confused and intermittently combative. Had garbled speech at 4pm then back to baseline. No falls or head trauma. Denies fever chills no chest pain or SOB.     PAST MEDICAL HISTORY:    Past Medical History:   Diagnosis Date    Acid reflux disease     Acute on chronic respiratory failure with hypoxia and hypercapnia ([facility]) 7/25/2018    Apraxia 7/23/2018    Arthritis     Ataxia 7/23/2018    CAD (coronary artery disease) 10/4/2012    Choledocholithiasis     recurrent    Chronic systolic congestive heart failure ([address] 8/1/2018    Ejection fraction 23% plus/minus 5%    CKD (chronic kidney disease) stage 3, GFR 30-59 ml/min ([facility]) 7/23/2018    COPD (chronic obstructive pulmonary disease) ([address]     alpha one M1S 183mg/dl    Diabetes mellitus ([address]     Hyperlipidemia     Hypoxia 7/23/2018    Neck pain     Normal pressure hydrocephalus ([facility] 7/28/2018    Oxygen dependent     3l/min/nc    Pleural effusion years ago    requiring right thoracentesis    Pulmonary hypertension ([facility] 7/23/2018    Restless legs     S/P CABG x 2 10/4/2012    Severe mitral regurgitation 8/1/2018     PAST SURGICAL HISTORY:    Past Surgical History:   Procedure Laterality Date    CARDIAC SURGERY      CERVICAL FUSION  1999    C3-C6    CERVICAL FUSION N/A 10/17/2022    POSTERIOR C3-C7 LAMINECTOMY, C3-T1 FUSION, NEEDS O-ARM, EDUARD TABLE, CELL SAVER, PLATLET GEL, POSTERIOR INSTRUMENTATION, GLOBUS performed by Tona Zaman MD at [address] [place] [address]    COLONOSCOPY N/A 1/21/2019    COLONOSCOPY POLYPECTOMY SNARE/COLD BIOPSY performed by Cristopher Hernandez MD at [facility]  10/30/2002    EYE SURGERY      cataract with Lens implants    HERNIA REPAIR      HYSTERECTOMY (CERVIX STATUS UNKNOWN)  [address] [facility]  07/27/2018    resolved    SD CRTJ SHUNT [id number] TERMINUS N/A 10/15/2018    VENTRICULAR PERITONEAL SHUNT  PLACEMENT performed by Riaz Steinberg MD at [address] CSF N/A 7/27/2018    LUMBAR DRAIN INSERTION performed by Riaz Steinberg MD at [facility] N/A 2/1/2021    VENTRICULAR PERITONEAL SHUNT REPLACEMENT performed by Riaz Steinberg MD at [address] [facility] HISTORY:    Family History   Problem Relation Age of Onset    High Blood Pressure Mother     Diabetes Mother     Emphysema Father     Heart Attack Sister     Heart Failure Sister      SOCIAL HISTORY:    Social History     Socioeconomic History    Marital status:      Spouse name: [place] Quale    Number of children: 2    Years of education: 16    Highest education level: Not on file   Occupational History    Occupation: retired   Tobacco Use    Smoking status: Former     Packs/day: 0.50     Years: 40.00     Pack years: 20.00     Types: Cigarettes     Quit date: 2018     Years since quittin.4    Smokeless tobacco: Never   Vaping Use    Vaping Use: Never used   Substance and Sexual Activity    Alcohol use: No    Drug use: No    Sexual activity: Not Currently     Partners: Male   Other Topics Concern    Not on file   Social History Narrative    Not on file     Social Determinants of Health     Financial Resource Strain: Not on file   Food Insecurity: Not on file   Transportation Needs: Not on file   Physical Activity: Not on file   Stress: Not on file   Social Connections: Not on file   Intimate Partner Violence: Not on file   Housing Stability: Not on file    TOBACCO:   reports that she quit smoking about 4 years ago. Her smoking use included cigarettes. She has a 20.00 pack-year smoking history. She has never used smokeless tobacco.  ETOH:   reports no history of alcohol use. MEDICATIONS:   Prior to Admission medications    Medication Sig Start Date End Date Taking?  Authorizing Provider   spironolactone (ALDACTONE) 25 MG tablet Take 25 mg by mouth daily    Historical Provider, MD   pramipexole (MIRAPEX) 0.5 MG tablet Take 0.75 mg by mouth in the morning and at bedtime    Historical Provider, MD   SITagliptin (JANUVIA) 100 MG tablet Take 100 mg by mouth daily    Historical Provider, MD   carBAMazepine (TEGRETOL-XR) 100 MG extended release tablet Take 1 tablet by mouth 2 times daily  Patient not taking: No sig reported 21   Andrew Stephenson MD   carvedilol (COREG) 3.125 MG tablet Take 1 tablet by mouth 2 times daily (with meals) 21   Angel Moore MD   valsartan (DIOVAN) 40 MG tablet Take 0.5 tablets by mouth daily 21   Angel Moore MD   albuterol sulfate  (90 Base) MCG/ACT inhaler INHALE 2 PUFFS BY MOUTH EVERY 4 HOURS AS NEEDED 20   Historical Provider, MD   OXYGEN Inhale 3 L into the lungs as needed    Historical Provider, MD   oxybutynin (DITROPAN-XL) 10 MG extended release tablet Take 10 mg by mouth daily Historical Provider, MD   omeprazole (PRILOSEC) 40 MG capsule   Take 40 mg by mouth daily  5/16/15   Historical Provider, MD   vitamin D (ERGOCALCIFEROL) 11470 UNITS CAPS capsule Take 50,000 Units by mouth once a week THUR 4/30/15   Historical Provider, MD   buPROPion (WELLBUTRIN XL) 300 MG XL tablet Take 900 mg by mouth every morning    Historical Provider, MD        ALLERGIES: Sulfa antibiotics and Pentazocine lactate    REVIEW OF SYSTEM:   ROS as noted in HPI, 12 point ROS reviewed and otherwise negative. OBJECTIVE  PHYSICAL EXAM:   Vitals:    01/06/23 1729 01/06/23 2109 01/07/23 0034 01/07/23 0243   BP: 123/78 95/64 132/75 124/70   Pulse: 92 76 82 81   Resp: 16 16 18 16   Temp: 98.1 °F (36.7 °C) 98.1 °F (36.7 °C) 97.6 °F (36.4 °C) 97.7 °F (36.5 °C)   TempSrc: Oral Oral Oral Temporal   SpO2: 97% 96% 95% 94%   Weight: 108 lb (49 kg)      Height: 5' 3\" (1.6 m)          General appearance: alert, appears stated age and cooperative  CONSTITUTIONAL:  no apparent distress  ENT:  normocephalic, without obvious abnormality, atraumatic  NECK:  supple, symmetrical, trachea midline  Heart: regular rate and rhythm, S1, S2 normal   Lungs: clear to auscultation bilaterally  Abdomen: soft lax, not tender, not distended, positive bowel sounds  Extremities: extremities normal, atraumatic, no cyanosis, edema  Skin: Normal skin color. No rashes or lesions. Neurologic:  drift weakness of both lower extremities. Neurovascularly intact without any focal sensory/motor deficits. Cranial nerves: II-XII intact, grossly non-focal.  Psychiatric: Alert and oriented, thought content appropriate, normal insight, affect      DATA:     Diagnostic tests reviewed for today's visit:    Most recent labs and imaging results reviewed.    Labs:   Recent Results (from the past 72 hour(s))   CBC with Auto Differential    Collection Time: 01/06/23  5:46 PM   Result Value Ref Range    WBC 6.8 4.5 - 11.5 E9/L    RBC 4.25 3.50 - 5.50 E12/L    Hemoglobin 11.7 11.5 - 15.5 g/dL    Hematocrit 38.2 34.0 - 48.0 %    MCV 89.9 80.0 - 99.9 fL    MCH 27.5 26.0 - 35.0 pg    MCHC 30.6 (L) 32.0 - 34.5 %    RDW 16.3 (H) 11.5 - 15.0 fL    Platelets 224 568 - 898 E9/L    MPV 11.3 7.0 - 12.0 fL    Neutrophils % 64.8 43.0 - 80.0 %    Immature Granulocytes % 0.1 0.0 - 5.0 %    Lymphocytes % 23.3 20.0 - 42.0 %    Monocytes % 9.6 2.0 - 12.0 %    Eosinophils % 1.2 0.0 - 6.0 %    Basophils % 1.0 0.0 - 2.0 %    Neutrophils Absolute 4.37 1.80 - 7.30 E9/L    Immature Granulocytes # 0.01 E9/L    Lymphocytes Absolute 1.57 1.50 - 4.00 E9/L    Monocytes Absolute 0.65 0.10 - 0.95 E9/L    Eosinophils Absolute 0.08 0.05 - 0.50 E9/L    Basophils Absolute 0.07 0.00 - 0.20 E9/L   CMP    Collection Time: 01/06/23  5:46 PM   Result Value Ref Range    Sodium 137 132 - 146 mmol/L    Potassium 4.3 3.5 - 5.0 mmol/L    Chloride 98 98 - 107 mmol/L    CO2 24 22 - 29 mmol/L    Anion Gap 15 7 - 16 mmol/L    Glucose 304 (H) 74 - 99 mg/dL    BUN 39 (H) 6 - 23 mg/dL    Creatinine 1.0 0.5 - 1.0 mg/dL    Est, Glom Filt Rate >60 >=60 mL/min/1.73    Calcium 9.8 8.6 - 10.2 mg/dL    Total Protein 7.4 6.4 - 8.3 g/dL    Albumin 3.8 3.5 - 5.2 g/dL    Total Bilirubin 0.5 0.0 - 1.2 mg/dL    Alkaline Phosphatase 90 35 - 104 U/L    ALT 13 0 - 32 U/L    AST 18 0 - 31 U/L   Protime-INR    Collection Time: 01/06/23  5:46 PM   Result Value Ref Range    Protime 30.7 (H) 9.3 - 12.4 sec    INR 2.8    Troponin    Collection Time: 01/06/23  5:46 PM   Result Value Ref Range    Troponin, High Sensitivity 16 (H) 0 - 9 ng/L   APTT    Collection Time: 01/06/23  5:46 PM   Result Value Ref Range    aPTT 29.5 24.5 - 35.1 sec   COVID-19 & Influenza Combo    Collection Time: 01/06/23  5:47 PM    Specimen: Nasopharyngeal Swab   Result Value Ref Range    SARS-CoV-2 RNA, RT PCR NOT DETECTED NOT DETECTED    INFLUENZA A NOT DETECTED NOT DETECTED    INFLUENZA B NOT DETECTED NOT DETECTED   Urinalysis with Microscopic    Collection Time: 01/06/23  6:42 PM   Result Value Ref Range    Color, UA Yellow Straw/Yellow    Clarity, UA Clear Clear    Glucose, Ur 100 (A) Negative mg/dL    Bilirubin Urine Negative Negative    Ketones, Urine Negative Negative mg/dL    Specific Gravity, UA 1.015 1.005 - 1.030    Blood, Urine Negative Negative    pH, UA 6.0 5.0 - 9.0    Protein, UA Negative Negative mg/dL    Urobilinogen, Urine 0.2 <2.0 E.U./dL    Nitrite, Urine Negative Negative    Leukocyte Esterase, Urine Negative Negative    WBC, UA NONE 0 - 5 /HPF    RBC, UA NONE 0 - 2 /HPF    Bacteria, UA NONE SEEN None Seen /HPF   Troponin    Collection Time: 01/06/23  8:05 PM   Result Value Ref Range    Troponin, High Sensitivity 15 (H) 0 - 9 ng/L   POCT Glucose    Collection Time: 01/07/23  1:37 AM   Result Value Ref Range    Meter Glucose 194 (H) 74 - 99 mg/dL     Oupatient labs:  Lab Results   Component Value Date    CHOL 187 09/10/2020    TRIG 153 (H) 09/10/2020    HDL 40 07/27/2021    LDLCALC 148 (H) 07/27/2021    TSH 0.596 10/18/2022    INR 2.8 01/06/2023    LABA1C 9.8 (H) 10/19/2022       Urinalysis:    Lab Results   Component Value Date/Time    NITRU Negative 01/06/2023 06:42 PM    WBCUA NONE 01/06/2023 06:42 PM    BACTERIA NONE SEEN 01/06/2023 06:42 PM    RBCUA NONE 01/06/2023 06:42 PM    BLOODU Negative 01/06/2023 06:42 PM    SPECGRAV 1.015 01/06/2023 06:42 PM    GLUCOSEU 100 01/06/2023 06:42 PM       Imaging:  XR CHEST PORTABLE    Result Date: 1/6/2023  EXAMINATION: ONE XRAY VIEW OF THE CHEST 1/6/2023 5:56 pm COMPARISON: None. HISTORY: ORDERING SYSTEM PROVIDED HISTORY: stroke TECHNOLOGIST PROVIDED HISTORY: Reason for exam:->stroke FINDINGS: The heart is enlarged. Pulmonary vessels are prominent. No airspace consolidation. No pneumothorax. There is blunting of the right costophrenic angle. Cardiomegaly and mild pulmonary vascular congestion. Mild right pleural effusion.      CTA NECK W CONTRAST    Result Date: 1/6/2023  EXAMINATION: CTA OF THE NECK; CTA OF THE HEAD WITH CONTRAST 1/6/2023 5:42 pm: TECHNIQUE: CTA of the neck was performed with the administration of intravenous contrast. Multiplanar reformatted images are provided for review. MIP images are provided for review. Stenosis of the internal carotid arteries measured using NASCET criteria. Automated exposure control, iterative reconstruction, and/or weight based adjustment of the mA/kV was utilized to reduce the radiation dose to as low as reasonably achievable.; CTA of the head/brain was performed with the administration of intravenous contrast. Multiplanar reformatted images are provided for review. MIP images are provided for review. Automated exposure control, iterative reconstruction, and/or weight based adjustment of the mA/kV was utilized to reduce the radiation dose to as low as reasonably achievable. Noncontrast CT of the head with reconstructed 2-D images are also provided for review. COMPARISON: CT head done September 20, 2022 and March 17, 2022. CT soft tissue neck done September 20, 2022. HISTORY: ORDERING SYSTEM PROVIDED HISTORY: garbled speech, bilateral LE weakness TECHNOLOGIST PROVIDED HISTORY: Reason for exam:->garbled speech, bilateral LE weakness Has a \"code stroke\" or \"stroke alert\" been called? ->Yes Decision Support Exception - unselect if not a suspected or confirmed emergency medical condition->Emergency Medical Condition ([place]); ORDERING SYSTEM PROVIDED HISTORY: bilateral leg weakness, garbled speech TECHNOLOGIST PROVIDED HISTORY: Reason for exam:->bilateral leg weakness, garbled speech Has a \"code stroke\" or \"stroke alert\" been called? ->Yes Decision Support Exception - unselect if not a suspected or confirmed emergency medical condition->Emergency Medical Condition ([place]) FINDINGS: CT HEAD: BRAIN/VENTRICLES: Right occipital approach ventriculoperitoneal shunt catheter which crosses the midline with tip in the body of the left lateral ventricle.   The ventricles are similar in size and shape compared to the prior studies. No progressive hydrocephalus. No acute intracranial hemorrhage. Small low attenuation left-sided subdural fluid collection measures up to 3-4 mm in thickness and is similar to the prior study. This may represent a small chronic hygroma. Grey-white differentiation is maintained. No evidence of mass, mass effect or midline shift. Mild generalized cerebral and cerebellar volume loss. Atherosclerosis. ORBITS: The visualized portion of the orbits demonstrate no acute abnormality. SINUSES:  The visualized paranasal sinuses and mastoid air cells demonstrate no acute abnormality. SOFT TISSUES/SKULL: No acute abnormality of the visualized skull or soft tissues. CTA NECK: AORTIC ARCH/ARCH VESSELS: No dissection or arterial injury. No significant stenosis of the brachiocephalic or subclavian arteries. Atherosclerosis in the visualized thoracic aorta, right brachiocephalic and bilateral subclavian arteries. CAROTID ARTERIES: No dissection, arterial injury, or hemodynamically significant stenosis by NASCET criteria. Bilateral carotid bulb atherosclerotic plaque. VERTEBRAL ARTERIES: No dissection, arterial injury, or significant stenosis. Mild atherosclerotic narrowing at the origin of the left vertebral artery. SOFT TISSUES: The lung apices are clear. No cervical or superior mediastinal lymphadenopathy. The larynx and pharynx are unremarkable. No acute abnormality of the salivary and thyroid glands. Multiple enhancing masses in the left greater than right carotid sheaths are similar to the prior CT soft tissue neck from September 20, 2022 and may represent carotid body tumors. BONES: Multilevel degenerative changes in the visualized spine. Postsurgical changes of C3-C7 posterior decompression and C3-T1 posterior fusion with hardware. No obvious hardware fracture. Partially visualized median sternotomy. Diffusely heterogeneous marrow suggests osteopenia.  CTA HEAD: ANTERIOR CIRCULATION: No significant stenosis of the intracranial internal carotid, anterior cerebral, or middle cerebral arteries. No aneurysm. Calcified atherosclerotic plaque in the bilateral intracranial internal carotid arteries without hemodynamically significant stenosis. POSTERIOR CIRCULATION: No significant stenosis of the vertebral, basilar, or posterior cerebral arteries. No aneurysm. Calcified atherosclerotic plaque in the bilateral intracranial vertebral arteries without hemodynamically significant stenosis. OTHER: No dural venous sinus thrombosis on this non-dedicated study. 1. No focal hemodynamically significant stenosis, occlusion or aneurysm in the large arteries of the head and neck. 2. No acute intracranial hemorrhage or mass effect. 3. Stable right occipital approach ventriculoperitoneal shunt catheter. The ventricles are similar in size and shape to the prior study. No progressive hydrocephalus. 4. Stable small left-sided subdural low-attenuation fluid collection which may represent a chronic hygroma. CTA HEAD W CONTRAST    Result Date: 1/6/2023  EXAMINATION: CTA OF THE NECK; CTA OF THE HEAD WITH CONTRAST 1/6/2023 5:42 pm: TECHNIQUE: CTA of the neck was performed with the administration of intravenous contrast. Multiplanar reformatted images are provided for review. MIP images are provided for review. Stenosis of the internal carotid arteries measured using NASCET criteria. Automated exposure control, iterative reconstruction, and/or weight based adjustment of the mA/kV was utilized to reduce the radiation dose to as low as reasonably achievable.; CTA of the head/brain was performed with the administration of intravenous contrast. Multiplanar reformatted images are provided for review. MIP images are provided for review. Automated exposure control, iterative reconstruction, and/or weight based adjustment of the mA/kV was utilized to reduce the radiation dose to as low as reasonably achievable.  Noncontrast CT of the head with reconstructed 2-D images are also provided for review. COMPARISON: CT head done September 20, 2022 and March 17, 2022. CT soft tissue neck done September 20, 2022. HISTORY: ORDERING SYSTEM PROVIDED HISTORY: garbled speech, bilateral LE weakness TECHNOLOGIST PROVIDED HISTORY: Reason for exam:->garbled speech, bilateral LE weakness Has a \"code stroke\" or \"stroke alert\" been called? ->Yes Decision Support Exception - unselect if not a suspected or confirmed emergency medical condition->Emergency Medical Condition ([place]); ORDERING SYSTEM PROVIDED HISTORY: bilateral leg weakness, garbled speech TECHNOLOGIST PROVIDED HISTORY: Reason for exam:->bilateral leg weakness, garbled speech Has a \"code stroke\" or \"stroke alert\" been called? ->Yes Decision Support Exception - unselect if not a suspected or confirmed emergency medical condition->Emergency Medical Condition ([place]) FINDINGS: CT HEAD: BRAIN/VENTRICLES: Right occipital approach ventriculoperitoneal shunt catheter which crosses the midline with tip in the body of the left lateral ventricle. The ventricles are similar in size and shape compared to the prior studies. No progressive hydrocephalus. No acute intracranial hemorrhage. Small low attenuation left-sided subdural fluid collection measures up to 3-4 mm in thickness and is similar to the prior study. This may represent a small chronic hygroma. Grey-white differentiation is maintained. No evidence of mass, mass effect or midline shift. Mild generalized cerebral and cerebellar volume loss. Atherosclerosis. ORBITS: The visualized portion of the orbits demonstrate no acute abnormality. SINUSES:  The visualized paranasal sinuses and mastoid air cells demonstrate no acute abnormality. SOFT TISSUES/SKULL: No acute abnormality of the visualized skull or soft tissues. CTA NECK: AORTIC ARCH/ARCH VESSELS: No dissection or arterial injury.   No significant stenosis of the brachiocephalic or subclavian arteries. Atherosclerosis in the visualized thoracic aorta, right brachiocephalic and bilateral subclavian arteries. CAROTID ARTERIES: No dissection, arterial injury, or hemodynamically significant stenosis by NASCET criteria. Bilateral carotid bulb atherosclerotic plaque. VERTEBRAL ARTERIES: No dissection, arterial injury, or significant stenosis. Mild atherosclerotic narrowing at the origin of the left vertebral artery. SOFT TISSUES: The lung apices are clear. No cervical or superior mediastinal lymphadenopathy. The larynx and pharynx are unremarkable. No acute abnormality of the salivary and thyroid glands. Multiple enhancing masses in the left greater than right carotid sheaths are similar to the prior CT soft tissue neck from September 20, 2022 and may represent carotid body tumors. BONES: Multilevel degenerative changes in the visualized spine. Postsurgical changes of C3-C7 posterior decompression and C3-T1 posterior fusion with hardware. No obvious hardware fracture. Partially visualized median sternotomy. Diffusely heterogeneous marrow suggests osteopenia. CTA HEAD: ANTERIOR CIRCULATION: No significant stenosis of the intracranial internal carotid, anterior cerebral, or middle cerebral arteries. No aneurysm. Calcified atherosclerotic plaque in the bilateral intracranial internal carotid arteries without hemodynamically significant stenosis. POSTERIOR CIRCULATION: No significant stenosis of the vertebral, basilar, or posterior cerebral arteries. No aneurysm. Calcified atherosclerotic plaque in the bilateral intracranial vertebral arteries without hemodynamically significant stenosis. OTHER: No dural venous sinus thrombosis on this non-dedicated study. 1. No focal hemodynamically significant stenosis, occlusion or aneurysm in the large arteries of the head and neck. 2. No acute intracranial hemorrhage or mass effect. 3. Stable right occipital approach ventriculoperitoneal shunt catheter.   The ventricles are similar in size and shape to the prior study. No progressive hydrocephalus. 4. Stable small left-sided subdural low-attenuation fluid collection which may represent a chronic hygroma. XR CHEST PORTABLE   Final Result   Cardiomegaly and mild pulmonary vascular congestion. Mild right pleural effusion. CTA HEAD W CONTRAST   Final Result   1. No focal hemodynamically significant stenosis, occlusion or aneurysm in   the large arteries of the head and neck. 2. No acute intracranial hemorrhage or mass effect. 3. Stable right occipital approach ventriculoperitoneal shunt catheter. The   ventricles are similar in size and shape to the prior study. No progressive   hydrocephalus. 4. Stable small left-sided subdural low-attenuation fluid collection which   may represent a chronic hygroma. CTA NECK W CONTRAST   Final Result   1. No focal hemodynamically significant stenosis, occlusion or aneurysm in   the large arteries of the head and neck. 2. No acute intracranial hemorrhage or mass effect. 3. Stable right occipital approach ventriculoperitoneal shunt catheter. The   ventricles are similar in size and shape to the prior study. No progressive   hydrocephalus. 4. Stable small left-sided subdural low-attenuation fluid collection which   may represent a chronic hygroma. ASSESSMENT AND PLAN  Principal Problem:    TIA (transient ischemic attack)  Resolved Problems:    * No resolved hospital problems.  *    - AMS  - TIA  - suspecting seizure  - T2DM  - HTN  - elevated troponin   - CKD  - HLP  - Hypoxia and oxygen dependant   - CAD s/p CABG    Plan:  - admit to tele monitoring   - seizure precautions  - neuro checks  - pt/ot   - had received asa ands keppra in ER per tele neurology, will consult Neurology for further recommendations and continue keppra and asa   - accuchecks sand ssi  - serial troponin       VTE Prophylaxis: unfractionated heparin -    DVT Prophylaxis: []Lovenox [x]Heparin []PCD [] [address] []Encouraged ambulation    Diet: No diet orders on file  Code Status: Prior  Surrogate decision maker confirmed with patient:  Primary Emergency Contact: Ramirez Veras, Home Phone: [phone]      Disposition: []Med/Surg [x] Intermediate [] ICU/CCU   Admit status: [] Observation [x] Inpatient       Additional work up or/and treatment plan may be added today or thereafter based on clinical progression. I am managing a portion of pt care. Some medical issues are handled by other specialists. Additional work up and treatment should be done by my colleague hospitalist and at out pt setting by pt PCP and other out pt providers.      SIGNATURECarlettgage Francois MD  DATE: January 7, 2023

## 2023-01-08 LAB
ALBUMIN SERPL-MCNC: 3.4 G/DL (ref 3.5–5.2)
ALP BLD-CCNC: 82 U/L (ref 35–104)
ALT SERPL-CCNC: 12 U/L (ref 0–32)
AMMONIA: 45 UMOL/L (ref 11–51)
ANION GAP SERPL CALCULATED.3IONS-SCNC: 11 MMOL/L (ref 7–16)
AST SERPL-CCNC: 16 U/L (ref 0–31)
BACTERIA: ABNORMAL /HPF
BASOPHILS ABSOLUTE: 0.04 E9/L (ref 0–0.2)
BASOPHILS RELATIVE PERCENT: 0.6 % (ref 0–2)
BILIRUB SERPL-MCNC: 0.5 MG/DL (ref 0–1.2)
BILIRUBIN URINE: NEGATIVE
BLOOD, URINE: NEGATIVE
BUN BLDV-MCNC: 25 MG/DL (ref 6–23)
CALCIUM SERPL-MCNC: 9.3 MG/DL (ref 8.6–10.2)
CHLORIDE BLD-SCNC: 100 MMOL/L (ref 98–107)
CLARITY: CLEAR
CO2: 25 MMOL/L (ref 22–29)
COLOR: YELLOW
CREAT SERPL-MCNC: 1 MG/DL (ref 0.5–1)
EOSINOPHILS ABSOLUTE: 0.12 E9/L (ref 0.05–0.5)
EOSINOPHILS RELATIVE PERCENT: 1.7 % (ref 0–6)
EPITHELIAL CELLS, UA: ABNORMAL /HPF
GFR SERPL CREATININE-BSD FRML MDRD: >60 ML/MIN/1.73
GLUCOSE BLD-MCNC: 216 MG/DL (ref 74–99)
GLUCOSE URINE: NEGATIVE MG/DL
HCT VFR BLD CALC: 32.7 % (ref 34–48)
HEMOGLOBIN: 10.1 G/DL (ref 11.5–15.5)
IMMATURE GRANULOCYTES #: 0.02 E9/L
IMMATURE GRANULOCYTES %: 0.3 % (ref 0–5)
KETONES, URINE: NEGATIVE MG/DL
LEUKOCYTE ESTERASE, URINE: NEGATIVE
LYMPHOCYTES ABSOLUTE: 1.74 E9/L (ref 1.5–4)
LYMPHOCYTES RELATIVE PERCENT: 25.1 % (ref 20–42)
MAGNESIUM: 1.8 MG/DL (ref 1.6–2.6)
MCH RBC QN AUTO: 27.2 PG (ref 26–35)
MCHC RBC AUTO-ENTMCNC: 30.9 % (ref 32–34.5)
MCV RBC AUTO: 88.1 FL (ref 80–99.9)
METER GLUCOSE: 168 MG/DL (ref 74–99)
METER GLUCOSE: 169 MG/DL (ref 74–99)
METER GLUCOSE: 241 MG/DL (ref 74–99)
METER GLUCOSE: 258 MG/DL (ref 74–99)
MONOCYTES ABSOLUTE: 0.78 E9/L (ref 0.1–0.95)
MONOCYTES RELATIVE PERCENT: 11.3 % (ref 2–12)
NEUTROPHILS ABSOLUTE: 4.22 E9/L (ref 1.8–7.3)
NEUTROPHILS RELATIVE PERCENT: 61 % (ref 43–80)
NITRITE, URINE: NEGATIVE
PDW BLD-RTO: 16.3 FL (ref 11.5–15)
PH UA: 6 (ref 5–9)
PLATELET # BLD: 197 E9/L (ref 130–450)
PMV BLD AUTO: 11.3 FL (ref 7–12)
POTASSIUM SERPL-SCNC: 4.8 MMOL/L (ref 3.5–5)
PRO-BNP: 3239 PG/ML (ref 0–125)
PROTEIN UA: NEGATIVE MG/DL
RBC # BLD: 3.71 E12/L (ref 3.5–5.5)
RBC UA: ABNORMAL /HPF (ref 0–2)
SODIUM BLD-SCNC: 136 MMOL/L (ref 132–146)
SPECIFIC GRAVITY UA: 1.01 (ref 1–1.03)
TOTAL PROTEIN: 6.7 G/DL (ref 6.4–8.3)
UROBILINOGEN, URINE: 0.2 E.U./DL
WBC # BLD: 6.9 E9/L (ref 4.5–11.5)
WBC UA: ABNORMAL /HPF (ref 0–5)

## 2023-01-08 PROCEDURE — 36415 COLL VENOUS BLD VENIPUNCTURE: CPT

## 2023-01-08 PROCEDURE — 87088 URINE BACTERIA CULTURE: CPT

## 2023-01-08 PROCEDURE — 87040 BLOOD CULTURE FOR BACTERIA: CPT

## 2023-01-08 PROCEDURE — 6360000002 HC RX W HCPCS: Performed by: FAMILY MEDICINE

## 2023-01-08 PROCEDURE — 80053 COMPREHEN METABOLIC PANEL: CPT

## 2023-01-08 PROCEDURE — 99223 1ST HOSP IP/OBS HIGH 75: CPT | Performed by: PSYCHIATRY & NEUROLOGY

## 2023-01-08 PROCEDURE — 6370000000 HC RX 637 (ALT 250 FOR IP): Performed by: INTERNAL MEDICINE

## 2023-01-08 PROCEDURE — 83880 ASSAY OF NATRIURETIC PEPTIDE: CPT

## 2023-01-08 PROCEDURE — 2700000000 HC OXYGEN THERAPY PER DAY

## 2023-01-08 PROCEDURE — 83735 ASSAY OF MAGNESIUM: CPT

## 2023-01-08 PROCEDURE — 82140 ASSAY OF AMMONIA: CPT

## 2023-01-08 PROCEDURE — 81001 URINALYSIS AUTO W/SCOPE: CPT

## 2023-01-08 PROCEDURE — 2580000003 HC RX 258: Performed by: INTERNAL MEDICINE

## 2023-01-08 PROCEDURE — 2140000000 HC CCU INTERMEDIATE R&B

## 2023-01-08 PROCEDURE — 82962 GLUCOSE BLOOD TEST: CPT

## 2023-01-08 PROCEDURE — 6360000002 HC RX W HCPCS: Performed by: INTERNAL MEDICINE

## 2023-01-08 PROCEDURE — 85025 COMPLETE CBC W/AUTO DIFF WBC: CPT

## 2023-01-08 PROCEDURE — 94640 AIRWAY INHALATION TREATMENT: CPT

## 2023-01-08 PROCEDURE — 94664 DEMO&/EVAL PT USE INHALER: CPT

## 2023-01-08 PROCEDURE — 6370000000 HC RX 637 (ALT 250 FOR IP): Performed by: FAMILY MEDICINE

## 2023-01-08 RX ORDER — FUROSEMIDE 20 MG/1
20 TABLET ORAL DAILY
Status: DISCONTINUED | OUTPATIENT
Start: 2023-01-09 | End: 2023-01-12 | Stop reason: HOSPADM

## 2023-01-08 RX ORDER — ESCITALOPRAM OXALATE 10 MG/1
10 TABLET ORAL DAILY
Status: DISCONTINUED | OUTPATIENT
Start: 2023-01-09 | End: 2023-01-12 | Stop reason: HOSPADM

## 2023-01-08 RX ORDER — FUROSEMIDE 20 MG/1
20 TABLET ORAL DAILY
Status: DISCONTINUED | OUTPATIENT
Start: 2023-01-08 | End: 2023-01-08

## 2023-01-08 RX ORDER — BUDESONIDE 0.5 MG/2ML
0.5 INHALANT ORAL 2 TIMES DAILY
Status: DISCONTINUED | OUTPATIENT
Start: 2023-01-08 | End: 2023-01-12 | Stop reason: HOSPADM

## 2023-01-08 RX ORDER — IPRATROPIUM BROMIDE AND ALBUTEROL SULFATE 2.5; .5 MG/3ML; MG/3ML
1 SOLUTION RESPIRATORY (INHALATION) EVERY 4 HOURS PRN
Status: DISCONTINUED | OUTPATIENT
Start: 2023-01-08 | End: 2023-01-12 | Stop reason: HOSPADM

## 2023-01-08 RX ORDER — CETIRIZINE HYDROCHLORIDE 10 MG/1
10 TABLET ORAL DAILY
Status: DISCONTINUED | OUTPATIENT
Start: 2023-01-08 | End: 2023-01-12 | Stop reason: HOSPADM

## 2023-01-08 RX ORDER — ARFORMOTEROL TARTRATE 15 UG/2ML
15 SOLUTION RESPIRATORY (INHALATION) 2 TIMES DAILY
Status: DISCONTINUED | OUTPATIENT
Start: 2023-01-08 | End: 2023-01-12 | Stop reason: HOSPADM

## 2023-01-08 RX ORDER — FUROSEMIDE 10 MG/ML
20 INJECTION INTRAMUSCULAR; INTRAVENOUS ONCE
Status: COMPLETED | OUTPATIENT
Start: 2023-01-08 | End: 2023-01-08

## 2023-01-08 RX ADMIN — LEVETIRACETAM 750 MG: 250 TABLET, FILM COATED ORAL at 20:23

## 2023-01-08 RX ADMIN — ARFORMOTEROL TARTRATE 15 MCG: 15 SOLUTION RESPIRATORY (INHALATION) at 19:45

## 2023-01-08 RX ADMIN — ENOXAPARIN SODIUM 30 MG: 100 INJECTION SUBCUTANEOUS at 08:33

## 2023-01-08 RX ADMIN — CETIRIZINE HYDROCHLORIDE 10 MG: 10 TABLET, FILM COATED ORAL at 13:23

## 2023-01-08 RX ADMIN — BUDESONIDE 500 MCG: 0.5 SUSPENSION RESPIRATORY (INHALATION) at 19:46

## 2023-01-08 RX ADMIN — INSULIN LISPRO 4 UNITS: 100 INJECTION, SOLUTION INTRAVENOUS; SUBCUTANEOUS at 09:25

## 2023-01-08 RX ADMIN — PRAMIPEXOLE DIHYDROCHLORIDE 0.75 MG: 0.25 TABLET ORAL at 20:23

## 2023-01-08 RX ADMIN — OXYBUTYNIN CHLORIDE 10 MG: 10 TABLET, EXTENDED RELEASE ORAL at 08:33

## 2023-01-08 RX ADMIN — FUROSEMIDE 20 MG: 10 INJECTION, SOLUTION INTRAMUSCULAR; INTRAVENOUS at 13:24

## 2023-01-08 RX ADMIN — OXYCODONE 5 MG: 5 TABLET ORAL at 05:42

## 2023-01-08 RX ADMIN — PRAMIPEXOLE DIHYDROCHLORIDE 0.75 MG: 0.25 TABLET ORAL at 08:33

## 2023-01-08 RX ADMIN — ATORVASTATIN CALCIUM 40 MG: 40 TABLET, FILM COATED ORAL at 20:23

## 2023-01-08 RX ADMIN — ASPIRIN 81 MG CHEWABLE TABLET 81 MG: 81 TABLET CHEWABLE at 08:33

## 2023-01-08 RX ADMIN — Medication 10 ML: at 09:27

## 2023-01-08 RX ADMIN — BUPROPION HYDROCHLORIDE 150 MG: 150 TABLET, EXTENDED RELEASE ORAL at 08:33

## 2023-01-08 RX ADMIN — PANTOPRAZOLE SODIUM 40 MG: 40 TABLET, DELAYED RELEASE ORAL at 05:39

## 2023-01-08 RX ADMIN — INSULIN LISPRO 8 UNITS: 100 INJECTION, SOLUTION INTRAVENOUS; SUBCUTANEOUS at 16:53

## 2023-01-08 RX ADMIN — LEVETIRACETAM 750 MG: 250 TABLET, FILM COATED ORAL at 08:33

## 2023-01-08 ASSESSMENT — PAIN SCALES - GENERAL
PAINLEVEL_OUTOF10: 7
PAINLEVEL_OUTOF10: 5

## 2023-01-08 ASSESSMENT — PAIN DESCRIPTION - LOCATION: LOCATION: NECK

## 2023-01-08 ASSESSMENT — PAIN DESCRIPTION - DESCRIPTORS: DESCRIPTORS: ACHING

## 2023-01-08 NOTE — PROGRESS NOTES
Hospitalist Progress Note      Synopsis: Patient admitted on 1/6/2023  Ms. Baljinder Wasserman, a 75y.o. year old female  who  has a past medical history of Acid reflux disease, Acute on chronic respiratory failure with hypoxia and hypercapnia ([facility]), Apraxia, Arthritis, Ataxia, CAD (coronary artery disease), Choledocholithiasis, Chronic systolic congestive heart failure ([address] CKD (chronic kidney disease) stage 3, GFR 30-59 ml/min ([facility]), COPD (chronic obstructive pulmonary disease) ([address] Diabetes mellitus ([address] Hyperlipidemia, Hypoxia, Neck pain, Normal pressure hydrocephalus ([facility]), Oxygen dependent, Pleural effusion, Pulmonary hypertension ([address] Restless legs, S/P CABG x 2, and Severe mitral regurgitation. presents with transfer from [facility] with TIA, asa, keppra, need MRI and EEG. Presented there with weakness started October after having cervical surgery by Dr Alma Trinh but got worse today. woke up this morning confused and intermittently combative. Had garbled speech at 4pm then back to baseline. No falls or head trauma. Denies fever chills no chest pain or SOB. Subjective  Seen and examined chart reviewed. Patient complains of feeling confused. She is AAO x2-3 answering most questions appropriately  Complains of generalized itching which she says is not new to her    Exam:  /66   Pulse 98   Temp 97.3 °F (36.3 °C) (Temporal)   Resp 18   Ht 5' 3\" (1.6 m)   Wt 109 lb (49.4 kg)   LMP  (LMP Unknown)   SpO2 95%   BMI 19.31 kg/m²   -General:  Awake, alert, oriented X 2-3. Well developed, well nourished, well groomed. No apparent distress. -HEENT:  Normocephalic, atraumatic. Pupils equal, round, reactive to light. No scleral icterus. No conjunctival injection. Normal lips, teeth, and gums. No nasal discharge. Neck:  Supple    -Heart:  RRR, no murmurs, gallops, rubs    -Lungs:  CTA bilaterally, bilat symmetrical expansion, no wheeze, rales, or rhonchi    -Abdomen:   Bowel sounds present, soft, nontender, no masses, no organomegaly, no peritoneal signs    -Extremities: normal ROM. No Cyanosis clubbing or edema    -Skin: Warm and dry    -Neuro:  AAO x 2-3 . Cranial nerves 2-12 intact, no focal deficits     -Psych : normal .    Medications:  Reviewed    Infusion Medications    dextrose      sodium chloride       Scheduled Medications    cetirizine  10 mg Oral Daily    buPROPion  150 mg Oral BID    pantoprazole  40 mg Oral QAM AC    oxybutynin  10 mg Oral Daily    pramipexole  0.75 mg Oral BID    sodium chloride flush  10 mL IntraVENous 2 times per day    enoxaparin  30 mg SubCUTAneous Daily    aspirin  81 mg Oral Daily    levETIRAcetam  750 mg Oral BID    atorvastatin  40 mg Oral Nightly    insulin lispro  0-16 Units SubCUTAneous TID WC    insulin lispro  0-4 Units SubCUTAneous Nightly     PRN Meds: glucose, dextrose bolus **OR** dextrose bolus, glucagon (rDNA), dextrose, sodium chloride flush, sodium chloride, ondansetron **OR** ondansetron, magnesium hydroxide, acetaminophen **OR** acetaminophen, oxyCODONE, albuterol    I/O    Intake/Output Summary (Last 24 hours) at 1/8/2023 1027  Last data filed at 1/7/2023 1857  Gross per 24 hour   Intake 360 ml   Output --   Net 360 ml       Labs:   Recent Labs     01/06/23  1746 01/08/23 0617   WBC 6.8 6.9   HGB 11.7 10.1*   HCT 38.2 32.7*    197       Recent Labs     01/06/23  1746 01/08/23  0617    136   K 4.3 4.8   CL 98 100   CO2 24 25   BUN 39* 25*   CREATININE 1.0 1.0   CALCIUM 9.8 9.3       Recent Labs     01/06/23  1746 01/08/23  0617   PROT 7.4 6.7   ALKPHOS 90 82   ALT 13 12   AST 18 16   BILITOT 0.5 0.5       Recent Labs     01/06/23 1746   INR 2.8       No results for input(s): Mary Ann Coleman in the last 72 hours.     Chronic labs:  Lab Results   Component Value Date    CHOL 187 09/10/2020    TRIG 153 (H) 09/10/2020    HDL 40 07/27/2021    LDLCALC 148 (H) 07/27/2021    TSH 1.300 01/07/2023    INR 2.8 01/06/2023    LABA1C 11.0 (H) 01/07/2023       Radiology:  Imaging studies reviewed today. ASSESSMENT:/Plan       - AMS  - TIA  - suspecting seizure  - T2DM  - HTN  - elevated troponin   - CKD  - HLP  - Hypoxia and oxygen dependant   - CAD s/p CABG   -COPD  -Hypoxia    Plan:  - admit to tele monitoring   - seizure precautions  - neuro checks  - pt/ot   - had received asa ands keppra in ER per tele neurology, will consult Neurology for further recommendations and continue keppra and asa   - accuchecks sand ssi  - serial troponin   -Diuresis as tolerated, inhalers supplemental oxygen              Diet: ADULT DIET; Regular; 3 carb choices (45 gm/meal)  Code Status: Full Code  PT/OT Eval Status:     DVT Prophylaxis:     Recommended disposition at discharge:      +++++++++++++++++++++++++++++++++++++++++++++++++  Blu Cedeno MD  1/8/2023  [facility].  +++++++++++++++++++++++++++++++++++++++++++++++++  NOTE: This report was transcribed using voice recognition software. Every effort was made to ensure accuracy; however, inadvertent computerized transcription errors may be present.

## 2023-01-08 NOTE — CONSULTS
NEUROLOGY CONSULT NOTE      Requesting Physician:  Ivan Miller MD    Reason for Consult:  Evaluate for TIA    History of Present Illness:  Sunday Joseph is a 75 y.o. female  with h/o DM, CAD status post CABG x2, HLD, pulmonary hypertension, NPH, COPD, CKD, CHF, and restless leg syndrome who was admitted to [facility] on 1/6/2023 with presentation of confusion and intermittent combative behavior. He initially presented to [facility] with symptoms that included garbled speech along with confusion and combative behavior. According to her  who was present at the bedside, patient has had recent neck surgery and trouble last year where cervical fusion was performed. She then went to [facility] for rehab and subsequently went home. She did end up with recent UTIs x2 that led to brief hospitalization and report of altered mental status associated. On Friday when symptoms occurred,  suspected that this may have been another UTI. She was admitted to the hospital for further evaluation with so far negative work-up for UTI. They did find that her blood glucose was elevated above 300 which is the first time that Malick White has been reported. She is on Januvia for her diabetes and apparently does not check her blood glucose that frequently since it had not been required. She was fluctuating with respect to her mood and behavior as noted by her . Even patient admits that she has been a lot more combative and aggressive than she normally is and she is much more rasmussen than normal with frequent mood swings. This too is different from her norm. There is no report of any precipitating trauma infection, or fall. Patient denied any shortness of breath, cough, fever, chills, chest pain, abdominal pain, or focal neurologic deficits. Significant cardiac history and was evaluated due to cardiac arrest as well as other concerns.   Patient with prior chronic respiratory failure with hypoxia and tachypnea and is O2 dependent at home according to reports. There is no report of any significant hypoxia at time of presentation. He was able to return to baseline soon after onset of symptoms with minimal symptoms noted during his ED evaluation in [place].       Past Medical History:        Diagnosis Date    Acid reflux disease     Acute on chronic respiratory failure with hypoxia and hypercapnia ([facility]) 7/25/2018    Apraxia 7/23/2018    Arthritis     Ataxia 7/23/2018    CAD (coronary artery disease) 10/4/2012    Choledocholithiasis     recurrent    Chronic systolic congestive heart failure ([facility] 8/1/2018    Ejection fraction 23% plus/minus 5%    CKD (chronic kidney disease) stage 3, GFR 30-59 ml/min ([facility]) 7/23/2018    COPD (chronic obstructive pulmonary disease) ([facility])     alpha one M1S 183mg/dl    Diabetes mellitus ([facility] [address]     Hyperlipidemia     Hypoxia 7/23/2018    Neck pain     Normal pressure hydrocephalus ([facility] [address] 7/28/2018    Oxygen dependent     3l/min/nc    Pleural effusion years ago    requiring right thoracentesis    Pulmonary hypertension ([facility]) 7/23/2018    Restless legs     S/P CABG x 2 10/4/2012    Severe mitral regurgitation 8/1/2018           Procedure Laterality Date    CARDIAC SURGERY      CERVICAL FUSION  1999    C3-C6    CERVICAL FUSION N/A 10/17/2022    POSTERIOR C3-C7 LAMINECTOMY, C3-T1 FUSION, NEEDS O-ARM, EDUARD TABLE, CELL SAVER, PLATLET GEL, POSTERIOR INSTRUMENTATION, GLOBUS performed by Almas Domingo MD at [address] [place] [address]  2002    COLONOSCOPY N/A 1/21/2019    COLONOSCOPY POLYPECTOMY SNARE/COLD BIOPSY performed by Lanna Dandy, MD at [facility]  10/30/2002    EYE SURGERY      cataract with Lens implants    HERNIA REPAIR      HYSTERECTOMY (CERVIX STATUS UNKNOWN)  [id number] [facility]  07/27/2018    resolved    ID CRTJ SHUNT [id number] TERMINUS N/A 10/15/2018    VENTRICULAR PERITONEAL SHUNT  PLACEMENT performed by Marina Herndon MD at [address] [facility] N/A 2018    LUMBAR DRAIN INSERTION performed by Marina Herndon MD at [facility] N/A 2021    VENTRICULAR PERITONEAL SHUNT REPLACEMENT performed by Marina Herndon MD at [address] History:  Social History     Tobacco Use   Smoking Status Former    Packs/day: 0.50    Years: 40.00    Pack years: 20.00    Types: Cigarettes    Quit date: 2018    Years since quittin.4   Smokeless Tobacco Never     Social History     Substance and Sexual Activity   Alcohol Use No     Social History     Substance and Sexual Activity   Drug Use No         Family History:       Problem Relation Age of Onset    High Blood Pressure Mother     Diabetes Mother     Emphysema Father     Heart Attack Sister     Heart Failure Sister        Review of Systems:  All systems reviewed are negative except what is mentioned in history of present illness. Allergies:     Allergies   Allergen Reactions    Sulfa Antibiotics Anaphylaxis    Pentazocine Lactate Nausea And Vomiting        Current Medications:   cetirizine (ZYRTEC) tablet 10 mg, Daily  [START ON 2023] furosemide (LASIX) tablet 20 mg, Daily  ipratropium-albuterol (DUONEB) nebulizer solution 1 ampule, Q4H PRN  arformoterol tartrate (BROVANA) nebulizer solution 15 mcg, BID   And  budesonide (PULMICORT) nebulizer suspension 500 mcg, BID  [Held by provider] buPROPion [facility] SR) extended release tablet 150 mg, BID  pantoprazole (PROTONIX) tablet 40 mg, QAM AC  [Held by provider] oxybutynin (DITROPAN-XL) extended release tablet 10 mg, Daily  pramipexole (MIRAPEX) tablet 0.75 mg, BID  glucose chewable tablet 16 g, PRN  dextrose bolus 10% 125 mL, PRN   Or  dextrose bolus 10% 250 mL, PRN  glucagon (rDNA) injection 1 mg, PRN  dextrose 10 % infusion, Continuous PRN  sodium chloride flush 0.9 % injection 10 mL, 2 times per day  sodium chloride flush 0.9 % injection 10 mL, PRN  0.9 % sodium chloride infusion, PRN  enoxaparin Sodium (LOVENOX) injection 30 mg, Daily  ondansetron (ZOFRAN-ODT) disintegrating tablet 4 mg, Q8H PRN   Or  ondansetron (ZOFRAN) injection 4 mg, Q6H PRN  magnesium hydroxide (MILK OF MAGNESIA) 400 MG/5ML suspension 30 mL, Daily PRN  acetaminophen (TYLENOL) tablet 650 mg, Q6H PRN   Or  acetaminophen (TYLENOL) suppository 650 mg, Q6H PRN  [Held by provider] oxyCODONE (ROXICODONE) immediate release tablet 5 mg, Q4H PRN  albuterol (PROVENTIL) nebulizer solution 2.5 mg, Q4H PRN  aspirin chewable tablet 81 mg, Daily  levETIRAcetam (KEPPRA) tablet 750 mg, BID  atorvastatin (LIPITOR) tablet 40 mg, Nightly  insulin lispro (HUMALOG) injection vial 0-16 Units, TID WC  insulin lispro (HUMALOG) injection vial 0-4 Units, Nightly       Physical Exam:  /66   Pulse 98   Temp 97.3 °F (36.3 °C) (Temporal)   Resp 18   Ht 5' 3\" (1.6 m)   Wt 109 lb (49.4 kg)   LMP  (LMP Unknown)   SpO2 95%   BMI 19.31 kg/m²  I Body mass index is 19.31 kg/m². I   Wt Readings from Last 1 Encounters:   01/07/23 109 lb (49.4 kg)          HEENT: Normocephalic, atraumatic, no lesions or abnormalities noted. Neck:  supple with full ROM; no masses, nodes or bruits; no cervical tenderness on palpation. Lungs:  clear to auscultation  bilaterally     CV: RRR without gallops or murmurs     Extremities: no c/c/e        Neurologic Exam   Mental Status:  Patient was alert, responsive, oriented, appropriate, answering questions, and following commands. Speech was fluent with normal sensorium and cognition; she was very rasmussen with onset of crying when trying to discuss her findings and plan of care. Cranial Nerves: Pupils were equal round and reactive to light and accommodation;    Visual fields were full on confrontation;  Funduscopic exam was normal; no pappilledema   Extraocular movements were intact; no nystagmus;      Intact facial sensation to temp, pinprick, and light touch; Symmetric facial movements with good lip and eye closure bilaterally; Hearing was intact; West was midline;    Normal palatal elevation with midline uvula  Trapezius strength of 5/5. Tongue was midline with no atrophy or fasciculations  Motor Exam:   diffuse weakness with upper and lower extremity head 5 -/5;  normal tone and bulk; no atrophy or fasiculations noted. Sensory Exam:  Normal sensation to light touch, pin-prick, and temperature; distal sensory loss of bilateral lower extremity consistent with pattern of diabetic neuropathy. Cerebella Exam:  Intact finger-nose-finger, rapidly alternating movements, fine motor movements, and heel-down-shin maneuvers; No cerebellar rebound or drift; mild fine motor tremors. Gait:  Gait was not tested. Reflexes: normal and symmetric bilaterally; Babinski is negative    Labs:    CBC:   Recent Labs     01/06/23 1746 01/08/23 0617   WBC 6.8 6.9   HGB 11.7 10.1*    197   MCV 89.9 88.1   MCH 27.5 27.2   MCHC 30.6* 30.9*   RDW 16.3* 16.3*     CMP:  Recent Labs     01/06/23 1746 01/08/23 0617    136   K 4.3 4.8   CL 98 100   CO2 24 25   BUN 39* 25*   CREATININE 1.0 1.0   LABGLOM >60 >60   GLUCOSE 304* 216*   CALCIUM 9.8 9.3     Liver:   Recent Labs     01/08/23 0617   AST 16   ALT 12   ALKPHOS 82   PROT 6.7   LABALBU 3.4*   BILITOT 0.5     INR:   Recent Labs     01/06/23 1746   PROTIME 30.7*   INR 2.8       ToxicologyNo results for input(s): PHENYTOIN, CARBTOT, PHENOBARB, VALPROATE, LAMOTRIG in the last 72 hours. Invalid input(s):  KEPPRA  No results for input(s): AMPMETHURSCR, BARBTQTU, BDZQTU, CANNABQUANT, COCMETQTU, OPIAU, PCPQUANT in the last 72 hours. Radiology:  XR CHEST PORTABLE    Result Date: 1/6/2023  EXAMINATION: ONE XRAY VIEW OF THE CHEST 1/6/2023 5:56 pm COMPARISON: None. HISTORY: ORDERING SYSTEM PROVIDED HISTORY: stroke TECHNOLOGIST PROVIDED HISTORY: Reason for exam:->stroke FINDINGS: The heart is enlarged. Pulmonary vessels are prominent. No airspace consolidation. No pneumothorax. There is blunting of the right costophrenic angle. Cardiomegaly and mild pulmonary vascular congestion. Mild right pleural effusion. CTA NECK W CONTRAST    Result Date: 1/6/2023  EXAMINATION: CTA OF THE NECK; CTA OF THE HEAD WITH CONTRAST 1/6/2023 5:42 pm: TECHNIQUE: CTA of the neck was performed with the administration of intravenous contrast. Multiplanar reformatted images are provided for review. MIP images are provided for review. Stenosis of the internal carotid arteries measured using NASCET criteria. Automated exposure control, iterative reconstruction, and/or weight based adjustment of the mA/kV was utilized to reduce the radiation dose to as low as reasonably achievable.; CTA of the head/brain was performed with the administration of intravenous contrast. Multiplanar reformatted images are provided for review. MIP images are provided for review. Automated exposure control, iterative reconstruction, and/or weight based adjustment of the mA/kV was utilized to reduce the radiation dose to as low as reasonably achievable. Noncontrast CT of the head with reconstructed 2-D images are also provided for review. COMPARISON: CT head done September 20, 2022 and March 17, 2022. CT soft tissue neck done September 20, 2022. HISTORY: ORDERING SYSTEM PROVIDED HISTORY: garbled speech, bilateral LE weakness TECHNOLOGIST PROVIDED HISTORY: Reason for exam:->garbled speech, bilateral LE weakness Has a \"code stroke\" or \"stroke alert\" been called? ->Yes Decision Support Exception - unselect if not a suspected or confirmed emergency medical condition->Emergency Medical Condition (MA); ORDERING SYSTEM PROVIDED HISTORY: bilateral leg weakness, garbled speech TECHNOLOGIST PROVIDED HISTORY: Reason for exam:->bilateral leg weakness, garbled speech Has a \"code stroke\" or \"stroke alert\" been called? ->Yes Decision Support Exception - unselect if not a suspected or confirmed emergency medical condition->Emergency Medical Condition (MA) FINDINGS: CT HEAD: BRAIN/VENTRICLES: Right occipital approach ventriculoperitoneal shunt catheter which crosses the midline with tip in the body of the left lateral ventricle. The ventricles are similar in size and shape compared to the prior studies. No progressive hydrocephalus. No acute intracranial hemorrhage. Small low attenuation left-sided subdural fluid collection measures up to 3-4 mm in thickness and is similar to the prior study. This may represent a small chronic hygroma. Grey-white differentiation is maintained. No evidence of mass, mass effect or midline shift. Mild generalized cerebral and cerebellar volume loss. Atherosclerosis. ORBITS: The visualized portion of the orbits demonstrate no acute abnormality. SINUSES:  The visualized paranasal sinuses and mastoid air cells demonstrate no acute abnormality. SOFT TISSUES/SKULL: No acute abnormality of the visualized skull or soft tissues. CTA NECK: AORTIC ARCH/ARCH VESSELS: No dissection or arterial injury. No significant stenosis of the brachiocephalic or subclavian arteries. Atherosclerosis in the visualized thoracic aorta, right brachiocephalic and bilateral subclavian arteries. CAROTID ARTERIES: No dissection, arterial injury, or hemodynamically significant stenosis by NASCET criteria. Bilateral carotid bulb atherosclerotic plaque. VERTEBRAL ARTERIES: No dissection, arterial injury, or significant stenosis. Mild atherosclerotic narrowing at the origin of the left vertebral artery. SOFT TISSUES: The lung apices are clear. No cervical or superior mediastinal lymphadenopathy. The larynx and pharynx are unremarkable. No acute abnormality of the salivary and thyroid glands. Multiple enhancing masses in the left greater than right carotid sheaths are similar to the prior CT soft tissue neck from September 20, 2022 and may represent carotid body tumors. BONES: Multilevel degenerative changes in the visualized spine. Postsurgical changes of C3-C7 posterior decompression and C3-T1 posterior fusion with hardware. No obvious hardware fracture. Partially visualized median sternotomy. Diffusely heterogeneous marrow suggests osteopenia. CTA HEAD: ANTERIOR CIRCULATION: No significant stenosis of the intracranial internal carotid, anterior cerebral, or middle cerebral arteries. No aneurysm. Calcified atherosclerotic plaque in the bilateral intracranial internal carotid arteries without hemodynamically significant stenosis. POSTERIOR CIRCULATION: No significant stenosis of the vertebral, basilar, or posterior cerebral arteries. No aneurysm. Calcified atherosclerotic plaque in the bilateral intracranial vertebral arteries without hemodynamically significant stenosis. OTHER: No dural venous sinus thrombosis on this non-dedicated study. 1. No focal hemodynamically significant stenosis, occlusion or aneurysm in the large arteries of the head and neck. 2. No acute intracranial hemorrhage or mass effect. 3. Stable right occipital approach ventriculoperitoneal shunt catheter. The ventricles are similar in size and shape to the prior study. No progressive hydrocephalus. 4. Stable small left-sided subdural low-attenuation fluid collection which may represent a chronic hygroma. CTA HEAD W CONTRAST    Result Date: 1/6/2023  EXAMINATION: CTA OF THE NECK; CTA OF THE HEAD WITH CONTRAST 1/6/2023 5:42 pm: TECHNIQUE: CTA of the neck was performed with the administration of intravenous contrast. Multiplanar reformatted images are provided for review. MIP images are provided for review. Stenosis of the internal carotid arteries measured using NASCET criteria.  Automated exposure control, iterative reconstruction, and/or weight based adjustment of the mA/kV was utilized to reduce the radiation dose to as low as reasonably achievable.; CTA of the head/brain was performed with the administration of intravenous contrast. Multiplanar reformatted images are provided for review. MIP images are provided for review. Automated exposure control, iterative reconstruction, and/or weight based adjustment of the mA/kV was utilized to reduce the radiation dose to as low as reasonably achievable. Noncontrast CT of the head with reconstructed 2-D images are also provided for review. COMPARISON: CT head done September 20, 2022 and March 17, 2022. CT soft tissue neck done September 20, 2022. HISTORY: ORDERING SYSTEM PROVIDED HISTORY: garbled speech, bilateral LE weakness TECHNOLOGIST PROVIDED HISTORY: Reason for exam:->garbled speech, bilateral LE weakness Has a \"code stroke\" or \"stroke alert\" been called? ->Yes Decision Support Exception - unselect if not a suspected or confirmed emergency medical condition->Emergency Medical Condition (MA); ORDERING SYSTEM PROVIDED HISTORY: bilateral leg weakness, garbled speech TECHNOLOGIST PROVIDED HISTORY: Reason for exam:->bilateral leg weakness, garbled speech Has a \"code stroke\" or \"stroke alert\" been called? ->Yes Decision Support Exception - unselect if not a suspected or confirmed emergency medical condition->Emergency Medical Condition (MA) FINDINGS: CT HEAD: BRAIN/VENTRICLES: Right occipital approach ventriculoperitoneal shunt catheter which crosses the midline with tip in the body of the left lateral ventricle. The ventricles are similar in size and shape compared to the prior studies. No progressive hydrocephalus. No acute intracranial hemorrhage. Small low attenuation left-sided subdural fluid collection measures up to 3-4 mm in thickness and is similar to the prior study. This may represent a small chronic hygroma. Grey-white differentiation is maintained. No evidence of mass, mass effect or midline shift. Mild generalized cerebral and cerebellar volume loss. Atherosclerosis.  ORBITS: The visualized portion of the orbits demonstrate no acute abnormality. SINUSES:  The visualized paranasal sinuses and mastoid air cells demonstrate no acute abnormality. SOFT TISSUES/SKULL: No acute abnormality of the visualized skull or soft tissues. CTA NECK: AORTIC ARCH/ARCH VESSELS: No dissection or arterial injury. No significant stenosis of the brachiocephalic or subclavian arteries. Atherosclerosis in the visualized thoracic aorta, right brachiocephalic and bilateral subclavian arteries. CAROTID ARTERIES: No dissection, arterial injury, or hemodynamically significant stenosis by NASCET criteria. Bilateral carotid bulb atherosclerotic plaque. VERTEBRAL ARTERIES: No dissection, arterial injury, or significant stenosis. Mild atherosclerotic narrowing at the origin of the left vertebral artery. SOFT TISSUES: The lung apices are clear. No cervical or superior mediastinal lymphadenopathy. The larynx and pharynx are unremarkable. No acute abnormality of the salivary and thyroid glands. Multiple enhancing masses in the left greater than right carotid sheaths are similar to the prior CT soft tissue neck from September 20, 2022 and may represent carotid body tumors. BONES: Multilevel degenerative changes in the visualized spine. Postsurgical changes of C3-C7 posterior decompression and C3-T1 posterior fusion with hardware. No obvious hardware fracture. Partially visualized median sternotomy. Diffusely heterogeneous marrow suggests osteopenia. CTA HEAD: ANTERIOR CIRCULATION: No significant stenosis of the intracranial internal carotid, anterior cerebral, or middle cerebral arteries. No aneurysm. Calcified atherosclerotic plaque in the bilateral intracranial internal carotid arteries without hemodynamically significant stenosis. POSTERIOR CIRCULATION: No significant stenosis of the vertebral, basilar, or posterior cerebral arteries. No aneurysm.   Calcified atherosclerotic plaque in the bilateral intracranial vertebral arteries without hemodynamically significant stenosis. OTHER: No dural venous sinus thrombosis on this non-dedicated study. 1. No focal hemodynamically significant stenosis, occlusion or aneurysm in the large arteries of the head and neck. 2. No acute intracranial hemorrhage or mass effect. 3. Stable right occipital approach ventriculoperitoneal shunt catheter. The ventricles are similar in size and shape to the prior study. No progressive hydrocephalus. 4. Stable small left-sided subdural low-attenuation fluid collection which may represent a chronic hygroma. The patient's records from referring provider and available information in the EHR was reviewed. Impression:   Acute metabolic encephalopathy  Generalized weakness  Mood disturbance with suspicion of depression       Principal Problem:    TIA (transient ischemic attack)  Resolved Problems:    * No resolved hospital problems. *      Recommendations:                                            EEG for evaluation at five encephalopathy versus possible intermittent seizure activity . Blood glucose monitoring for correlation with weakness and behavior   Add Lexapro 10 mg daily for mood. Further on follow up. It was my pleasure to evaluate Batsheva Taye Veras today. Please call with questions.       Electronically signed by Corrie Merritt MD on 1/8/2023 at 5:49 PM

## 2023-01-09 ENCOUNTER — APPOINTMENT (OUTPATIENT)
Dept: NEUROLOGY | Age: 68
End: 2023-01-09
Payer: MEDICARE

## 2023-01-09 PROBLEM — R41.82 ALTERED MENTAL STATUS: Status: ACTIVE | Noted: 2023-01-09

## 2023-01-09 PROBLEM — E43 SEVERE PROTEIN-CALORIE MALNUTRITION (HCC): Chronic | Status: ACTIVE | Noted: 2023-01-09

## 2023-01-09 LAB
ALBUMIN SERPL-MCNC: 3.4 G/DL (ref 3.5–5.2)
ALP BLD-CCNC: 85 U/L (ref 35–104)
ALT SERPL-CCNC: 10 U/L (ref 0–32)
ANION GAP SERPL CALCULATED.3IONS-SCNC: 13 MMOL/L (ref 7–16)
ANION GAP SERPL CALCULATED.3IONS-SCNC: 17 MMOL/L (ref 7–16)
AST SERPL-CCNC: 15 U/L (ref 0–31)
BASOPHILS ABSOLUTE: 0.05 E9/L (ref 0–0.2)
BASOPHILS RELATIVE PERCENT: 0.6 % (ref 0–2)
BETA-HYDROXYBUTYRATE: 1.12 MMOL/L (ref 0.02–0.27)
BILIRUB SERPL-MCNC: 0.8 MG/DL (ref 0–1.2)
BUN BLDV-MCNC: 17 MG/DL (ref 6–23)
BUN BLDV-MCNC: 20 MG/DL (ref 6–23)
CALCIUM SERPL-MCNC: 8.9 MG/DL (ref 8.6–10.2)
CALCIUM SERPL-MCNC: 9.5 MG/DL (ref 8.6–10.2)
CHLORIDE BLD-SCNC: 100 MMOL/L (ref 98–107)
CHLORIDE BLD-SCNC: 96 MMOL/L (ref 98–107)
CHOLESTEROL, TOTAL: 93 MG/DL (ref 0–199)
CO2: 22 MMOL/L (ref 22–29)
CO2: 24 MMOL/L (ref 22–29)
CREAT SERPL-MCNC: 0.9 MG/DL (ref 0.5–1)
CREAT SERPL-MCNC: 0.9 MG/DL (ref 0.5–1)
EKG ATRIAL RATE: 93 BPM
EKG P AXIS: 77 DEGREES
EKG P-R INTERVAL: 144 MS
EKG Q-T INTERVAL: 410 MS
EKG QRS DURATION: 132 MS
EKG QTC CALCULATION (BAZETT): 509 MS
EKG R AXIS: -62 DEGREES
EKG T AXIS: 143 DEGREES
EKG VENTRICULAR RATE: 93 BPM
EOSINOPHILS ABSOLUTE: 0.13 E9/L (ref 0.05–0.5)
EOSINOPHILS RELATIVE PERCENT: 1.6 % (ref 0–6)
GFR SERPL CREATININE-BSD FRML MDRD: >60 ML/MIN/1.73
GFR SERPL CREATININE-BSD FRML MDRD: >60 ML/MIN/1.73
GLUCOSE BLD-MCNC: 186 MG/DL (ref 74–99)
GLUCOSE BLD-MCNC: 201 MG/DL (ref 74–99)
HCT VFR BLD CALC: 36.8 % (ref 34–48)
HDLC SERPL-MCNC: 26 MG/DL
HEMOGLOBIN: 11.3 G/DL (ref 11.5–15.5)
IMMATURE GRANULOCYTES #: 0.02 E9/L
IMMATURE GRANULOCYTES %: 0.3 % (ref 0–5)
LACTIC ACID: 1.6 MMOL/L (ref 0.5–2.2)
LDL CHOLESTEROL CALCULATED: 48 MG/DL (ref 0–99)
LYMPHOCYTES ABSOLUTE: 1.94 E9/L (ref 1.5–4)
LYMPHOCYTES RELATIVE PERCENT: 24.4 % (ref 20–42)
MAGNESIUM: 1.5 MG/DL (ref 1.6–2.6)
MCH RBC QN AUTO: 27.7 PG (ref 26–35)
MCHC RBC AUTO-ENTMCNC: 30.7 % (ref 32–34.5)
MCV RBC AUTO: 90.2 FL (ref 80–99.9)
METER GLUCOSE: 185 MG/DL (ref 74–99)
METER GLUCOSE: 201 MG/DL (ref 74–99)
METER GLUCOSE: 202 MG/DL (ref 74–99)
METER GLUCOSE: 218 MG/DL (ref 74–99)
MONOCYTES ABSOLUTE: 0.91 E9/L (ref 0.1–0.95)
MONOCYTES RELATIVE PERCENT: 11.4 % (ref 2–12)
NEUTROPHILS ABSOLUTE: 4.9 E9/L (ref 1.8–7.3)
NEUTROPHILS RELATIVE PERCENT: 61.7 % (ref 43–80)
PDW BLD-RTO: 16.1 FL (ref 11.5–15)
PLATELET # BLD: 213 E9/L (ref 130–450)
PMV BLD AUTO: 11.3 FL (ref 7–12)
POTASSIUM REFLEX MAGNESIUM: 3.6 MMOL/L (ref 3.5–5)
POTASSIUM SERPL-SCNC: 3.9 MMOL/L (ref 3.5–5)
RBC # BLD: 4.08 E12/L (ref 3.5–5.5)
SODIUM BLD-SCNC: 133 MMOL/L (ref 132–146)
SODIUM BLD-SCNC: 139 MMOL/L (ref 132–146)
TOTAL PROTEIN: 6.9 G/DL (ref 6.4–8.3)
TRIGL SERPL-MCNC: 96 MG/DL (ref 0–149)
VLDLC SERPL CALC-MCNC: 19 MG/DL
WBC # BLD: 8 E9/L (ref 4.5–11.5)

## 2023-01-09 PROCEDURE — 80048 BASIC METABOLIC PNL TOTAL CA: CPT

## 2023-01-09 PROCEDURE — 95816 EEG AWAKE AND DROWSY: CPT | Performed by: PSYCHIATRY & NEUROLOGY

## 2023-01-09 PROCEDURE — 2700000000 HC OXYGEN THERAPY PER DAY

## 2023-01-09 PROCEDURE — 97161 PT EVAL LOW COMPLEX 20 MIN: CPT

## 2023-01-09 PROCEDURE — 36415 COLL VENOUS BLD VENIPUNCTURE: CPT

## 2023-01-09 PROCEDURE — 94640 AIRWAY INHALATION TREATMENT: CPT

## 2023-01-09 PROCEDURE — 2140000000 HC CCU INTERMEDIATE R&B

## 2023-01-09 PROCEDURE — 83735 ASSAY OF MAGNESIUM: CPT

## 2023-01-09 PROCEDURE — 2580000003 HC RX 258: Performed by: INTERNAL MEDICINE

## 2023-01-09 PROCEDURE — 93010 ELECTROCARDIOGRAM REPORT: CPT | Performed by: INTERNAL MEDICINE

## 2023-01-09 PROCEDURE — 82010 KETONE BODYS QUAN: CPT

## 2023-01-09 PROCEDURE — 85025 COMPLETE CBC W/AUTO DIFF WBC: CPT

## 2023-01-09 PROCEDURE — 6370000000 HC RX 637 (ALT 250 FOR IP): Performed by: INTERNAL MEDICINE

## 2023-01-09 PROCEDURE — 80061 LIPID PANEL: CPT

## 2023-01-09 PROCEDURE — 6370000000 HC RX 637 (ALT 250 FOR IP): Performed by: FAMILY MEDICINE

## 2023-01-09 PROCEDURE — 83605 ASSAY OF LACTIC ACID: CPT

## 2023-01-09 PROCEDURE — 95816 EEG AWAKE AND DROWSY: CPT

## 2023-01-09 PROCEDURE — 99232 SBSQ HOSP IP/OBS MODERATE 35: CPT | Performed by: NURSE PRACTITIONER

## 2023-01-09 PROCEDURE — 97530 THERAPEUTIC ACTIVITIES: CPT

## 2023-01-09 PROCEDURE — 6370000000 HC RX 637 (ALT 250 FOR IP): Performed by: PSYCHIATRY & NEUROLOGY

## 2023-01-09 PROCEDURE — 82962 GLUCOSE BLOOD TEST: CPT

## 2023-01-09 PROCEDURE — 6360000002 HC RX W HCPCS: Performed by: INTERNAL MEDICINE

## 2023-01-09 PROCEDURE — 80053 COMPREHEN METABOLIC PANEL: CPT

## 2023-01-09 RX ORDER — MAGNESIUM SULFATE IN WATER 40 MG/ML
2000 INJECTION, SOLUTION INTRAVENOUS ONCE
Status: COMPLETED | OUTPATIENT
Start: 2023-01-09 | End: 2023-01-09

## 2023-01-09 RX ORDER — LORAZEPAM 2 MG/ML
0.5 INJECTION INTRAMUSCULAR EVERY 6 HOURS PRN
Status: DISCONTINUED | OUTPATIENT
Start: 2023-01-09 | End: 2023-01-09

## 2023-01-09 RX ORDER — CARVEDILOL 6.25 MG/1
6.25 TABLET ORAL 2 TIMES DAILY WITH MEALS
Status: DISCONTINUED | OUTPATIENT
Start: 2023-01-10 | End: 2023-01-12 | Stop reason: HOSPADM

## 2023-01-09 RX ADMIN — CETIRIZINE HYDROCHLORIDE 10 MG: 10 TABLET, FILM COATED ORAL at 12:56

## 2023-01-09 RX ADMIN — PRAMIPEXOLE DIHYDROCHLORIDE 0.75 MG: 0.25 TABLET ORAL at 12:58

## 2023-01-09 RX ADMIN — ASPIRIN 81 MG CHEWABLE TABLET 81 MG: 81 TABLET CHEWABLE at 12:57

## 2023-01-09 RX ADMIN — ESCITALOPRAM OXALATE 10 MG: 10 TABLET ORAL at 12:57

## 2023-01-09 RX ADMIN — ARFORMOTEROL TARTRATE 15 MCG: 15 SOLUTION RESPIRATORY (INHALATION) at 08:22

## 2023-01-09 RX ADMIN — LORAZEPAM 0.5 MG: 2 INJECTION INTRAMUSCULAR; INTRAVENOUS at 15:06

## 2023-01-09 RX ADMIN — MAGNESIUM SULFATE HEPTAHYDRATE 2000 MG: 40 INJECTION, SOLUTION INTRAVENOUS at 13:14

## 2023-01-09 RX ADMIN — ATORVASTATIN CALCIUM 40 MG: 40 TABLET, FILM COATED ORAL at 21:54

## 2023-01-09 RX ADMIN — LEVETIRACETAM 750 MG: 250 TABLET, FILM COATED ORAL at 12:58

## 2023-01-09 RX ADMIN — PRAMIPEXOLE DIHYDROCHLORIDE 0.75 MG: 0.25 TABLET ORAL at 21:54

## 2023-01-09 RX ADMIN — LEVETIRACETAM 750 MG: 250 TABLET, FILM COATED ORAL at 21:54

## 2023-01-09 RX ADMIN — FUROSEMIDE 20 MG: 20 TABLET ORAL at 12:57

## 2023-01-09 RX ADMIN — BUDESONIDE 500 MCG: 0.5 SUSPENSION RESPIRATORY (INHALATION) at 08:22

## 2023-01-09 RX ADMIN — Medication 10 ML: at 21:54

## 2023-01-09 NOTE — PROGRESS NOTES
Cachorro Mar is a 75 y.o. right handed female     Neurology following for TIA    PMH of DM, CAD status post CABG x2, HLD, pulmonary hypertension, NPH, COPD, CKD, CHF, and restless leg syndrome    Admitted to [facility] on 1/6/2023 with presentation of confusion and intermittent combative behavior. He initially presented to [facility] with symptoms that included garbled speech along with confusion and combative behavior. According to her  who was present at the bedside, patient has had recent neck surgery and trouble last year where cervical fusion was performed. She then went to [facility] for rehab and subsequently went home. She did end up with recent UTIs x 2 that led to brief hospitalization and report of altered mental status associated. She was fluctuating with respect to her mood and behavior as noted by her . Even patient admits that she has been a lot more combative and aggressive than she normally is and she is much more rasmussen than normal with frequent mood swings. This too is different from her norm. There is no report of any precipitating trauma infection, or fall. CTA head/neck: 1. No focal hemodynamically significant stenosis, occlusion or aneurysm in the large arteries of the head and neck. 2. No acute intracranial hemorrhage or mass effect. 3. Stable right occipital approach ventriculoperitoneal shunt catheter. The ventricles are similar in size and shape to the prior study. No progressive hydrocephalus. 4. Stable small left-sided subdural low-attenuation fluid collection which may represent a chronic hygroma. Pt lying in bed. Just had EEG completed. Awaiting MRI brain w/wo. She notes she has had some memory loss issues recently and is worried about her health. Advised her testing that would be done. Addressed questions and concerns.        No chest pain or palpitations  No coughing or wheezing    No vertigo, lightheadedness or loss of consciousness  No falls, tripping or stumbling  No incontinence of bowels or bladder  No itching or bruising appreciated  No numbness, tingling or focal arm/leg weakness    ROS otherwise negative      Objective:     /78   Pulse 91   Temp 97.5 °F (36.4 °C) (Temporal)   Resp 16   Ht 5' 3\" (1.6 m)   Wt 109 lb (49.4 kg)   LMP  (LMP Unknown)   SpO2 95%   BMI 19.31 kg/m²     General appearance: alert, appears stated age, cooperative and no distress  Head: normocephalic, without obvious abnormality, atraumatic  Eyes: conjunctivae/corneas clear  Neck: supple, symmetrical, trachea midline   Lungs: respirations non labored   Extremities:  normal, atraumatic, no cyanosis or edema  Skin: color, texture, turgor normal---no rashes or lesions      Mental Status: Alert, oriented, thought content appropriate.     Appropriate attention/concentration  Intact fundus of knowledge  Repetition intact  Impaired memories      Speech: no dysarthria  Language: no aphasias     Cranial Nerves:  I: smell NA   II: visual acuity  NA   II: visual fields Full to confrontation   II: pupils CHANI   III,VII: ptosis None   III,IV,VI: extraocular muscles  Full ROM   V: mastication Normal   V: facial light touch sensation  Normal   V,VII: corneal reflex     VII: facial muscle function - upper  Normal   VII: facial muscle function - lower Normal   VIII: hearing Normal   IX: soft palate elevation  Normal   IX,X: gag reflex    XI: trapezius strength  5/5   XI: sternocleidomastoid strength 5/5   XI: neck extension strength  5/5   XII: tongue strength  Normal     Motor:  5/5 throughout  Normal bulk and tone  No drift   No abnormal movements    Sensory:  LT normal    Coordination:   FN, FFM and MARIBELL normal  HS normal    DTR:   2+ throughout     No Babinskis    Laboratory/Radiology:     CBC with Differential:    Lab Results   Component Value Date/Time    WBC 8.0 01/09/2023 06:30 AM    RBC 4.08 01/09/2023 06:30 AM    HGB 11.3 01/09/2023 06:30 AM    HCT 36.8 01/09/2023 06:30 AM     01/09/2023 06:30 AM    MCV 90.2 01/09/2023 06:30 AM    MCH 27.7 01/09/2023 06:30 AM    MCHC 30.7 01/09/2023 06:30 AM    RDW 16.1 01/09/2023 06:30 AM    SEGSPCT 63 01/30/2014 09:26 AM    LYMPHOPCT 24.4 01/09/2023 06:30 AM    MONOPCT 11.4 01/09/2023 06:30 AM    BASOPCT 0.6 01/09/2023 06:30 AM    MONOSABS 0.91 01/09/2023 06:30 AM    LYMPHSABS 1.94 01/09/2023 06:30 AM    EOSABS 0.13 01/09/2023 06:30 AM    BASOSABS 0.05 01/09/2023 06:30 AM     CMP:    Lab Results   Component Value Date/Time     01/09/2023 06:30 AM    K 3.9 01/09/2023 06:30 AM    K 4.8 10/10/2022 11:40 AM     01/09/2023 06:30 AM    CO2 22 01/09/2023 06:30 AM    BUN 20 01/09/2023 06:30 AM    CREATININE 0.9 01/09/2023 06:30 AM    GFRAA 60 10/10/2022 11:40 AM    LABGLOM >60 01/09/2023 06:30 AM    GLUCOSE 186 01/09/2023 06:30 AM    GLUCOSE 111 09/30/2011 02:00 PM    PROT 6.9 01/09/2023 06:30 AM    LABALBU 3.4 01/09/2023 06:30 AM    LABALBU 4.4 09/30/2011 02:00 PM    CALCIUM 9.5 01/09/2023 06:30 AM    BILITOT 0.8 01/09/2023 06:30 AM    ALKPHOS 85 01/09/2023 06:30 AM    AST 15 01/09/2023 06:30 AM    ALT 10 01/09/2023 06:30 AM     Hepatic Function Panel:    Lab Results   Component Value Date/Time    ALKPHOS 85 01/09/2023 06:30 AM    ALT 10 01/09/2023 06:30 AM    AST 15 01/09/2023 06:30 AM    PROT 6.9 01/09/2023 06:30 AM    BILITOT 0.8 01/09/2023 06:30 AM    BILIDIR <0.2 07/30/2014 10:35 AM    IBILI 0.1 07/30/2014 10:35 AM    LABALBU 3.4 01/09/2023 06:30 AM    LABALBU 4.4 09/30/2011 02:00 PM     HgBA1c:    Lab Results   Component Value Date/Time    LABA1C 11.0 01/07/2023 06:10 AM     FLP:    Lab Results   Component Value Date/Time    TRIG 96 01/09/2023 06:30 AM    HDL 26 01/09/2023 06:30 AM    LDLCALC 48 01/09/2023 06:30 AM    LABVLDL 19 01/09/2023 06:30 AM     CTA head/neck: 1. No focal hemodynamically significant stenosis, occlusion or aneurysm in the large arteries of the head and neck.  2. No acute intracranial hemorrhage or mass effect. 3. Stable right occipital approach ventriculoperitoneal shunt catheter. The ventricles are similar in size and shape to the prior study. No progressive hydrocephalus. 4. Stable small left-sided subdural low-attenuation fluid collection which may represent a chronic hygroma.     All labs and imaging studies reviewed independently today     Assessment:     Acute metabolic encephalopathy with mood disturbance   --- EEG pending read  --- MRI brain w/wo pending   --- recent adjustment to  shunt per pt   --- awaiting dx studies to rule out structural abnormality     Plan:     MRI brain w/wo pending  EEG pending  Continue Lexapro 10 mg daily   Neurology to follow       LIZA Hanson CNP  12:53 PM  1/9/2023

## 2023-01-09 NOTE — PROGRESS NOTES
Comprehensive Nutrition Assessment    Type and Reason for Visit:  Initial, Positive Nutrition Screen (Weight loss, poor appetite/intake)    Nutrition Recommendations/Plan:   Continue current diet. Recommend and start Ensure HP BID to optimize intake in the setting of severe malnutrition. Will continue to monitor. Malnutrition Assessment:  Malnutrition Status:  Severe malnutrition (01/09/23 1526)    Context:  Chronic Illness     Findings of the 6 clinical characteristics of malnutrition:  Energy Intake:  75% or less estimated energy requirements for 1 month or longer  Weight Loss:  Unable to assess (significant 10% weight loss x 3months noted however true weight loss difficult to assess d/t possible fluid shifts and hx of CKD/CHF)     Body Fat Loss:  Severe body fat loss Orbital, Triceps, Buccal region   Muscle Mass Loss:  Severe muscle mass loss Clavicles (pectoralis & deltoids), Calf (gastrocnemius)  Fluid Accumulation:  No significant fluid accumulation     Strength:  Not Performed    Nutrition Assessment:    Pt. admit d/t TIA. Admit w/ Acute metabolic encephalopathy w/ mood disturbance-EEG/MRI pending. PMhx of CAD s/p CABGx2, CHF,DM,COPD,CKD,NPH,severe malnutrition. Pt. does meet criteria for severe malnutrition. Noted PO intakes x2 of %. Will start ONS and monitor. Nutrition Related Findings:    A&Ox4, -I/O, hyperglycemia, GI WDL, no edema, pet previous RD assessment pt. dislikes glucerna and amendable to ensure +/ensure HP Wound Type: None       Current Nutrition Intake & Therapies:    Average Meal Intake: % (x2)  Average Supplements Intake: None Ordered  ADULT DIET; Regular; 3 carb choices (45 gm/meal); Low Sodium (2 gm)    Anthropometric Measures:  Height: 5' 3\" (160 cm)  Ideal Body Weight (IBW): 115 lbs (52 kg)    Admission Body Weight: 109 lb (49.4 kg) (1/07 standing scale;first measured)  Current Body Weight: 109 lb (49.4 kg) (standing scale; 1/07), 94.8 % IBW.  Weight Source: Standing Scale  Current BMI (kg/m2): 19.3  Usual Body Weight: 120 lb 5 oz (54.6 kg) (10/10/22 - noted hx of CKD/CHF true weight loss difficult to assess d/t possible fluid shifts)  % Weight Change (Calculated): -9.4  Weight Adjustment For: No Adjustment                 BMI Categories: Underweight (BMI less than 22) age over 72    Estimated Daily Nutrient Needs:  Energy Requirements Based On: Kcal/kg  Weight Used for Energy Requirements: Current  Energy (kcal/day): 1500-1600kcal (30-32kcal/kg)  Weight Used for Protein Requirements: Current  Protein (g/day): 69-79g (1.4-1.6g/kg) (as tolerated w/ CKD)  Method Used for Fluid Requirements: 1 ml/kcal  Fluid (ml/day):     Nutrition Diagnosis:   Severe malnutrition, In context of chronic illness related to catabolic illness as evidenced by Criteria as identified in malnutrition assessment    Nutrition Interventions:   Food and/or Nutrient Delivery: Continue Current Diet, Start Oral Nutrition Supplement (start ensure HP BID)  Nutrition Education/Counseling: Education not indicated  Coordination of Nutrition Care: Continue to monitor while inpatient       Goals:     Goals: PO intake 75% or greater, by next RD assessment (to continue)       Nutrition Monitoring and Evaluation:   Behavioral-Environmental Outcomes: None Identified  Food/Nutrient Intake Outcomes: Food and Nutrient Intake, Supplement Intake  Physical Signs/Symptoms Outcomes: Biochemical Data, GI Status, Fluid Status or Edema, Nutrition Focused Physical Findings, Weight, Skin    Discharge Planning:    Continue Oral Nutrition Supplement     Delores Mckeon RD  Contact: ext [phone]

## 2023-01-09 NOTE — PLAN OF CARE
Patient's chart updated to reflect:      . - HF care plan, HF education points and HF discharge instructions.  -Orders: 2 gram sodium diet, daily weights, I/O.  -PCP and/or Cardiologist appointment to be scheduled within 7 days of hospital discharge.  -History of HF, not primary admission Dx.   Patient admitted for treatment of ASSESSMENT AND PLAN  Principal Problem:    TIA (transient ischemic attack)  Rodrigo Diana RN RN, BSN  Heart Failure Navigator HPI:        REVIEW OF SYSTEMS:    CONSTITUTIONAL: No weakness, fevers or chills  EYES/ENT: No visual changes, no throat pain   RESPIRATORY: No cough, wheezing, hemoptysis; No shortness of breath  CARDIOVASCULAR: No chest pain or palpitations  GASTROINTESTINAL: No abdominal, nausea, vomiting, or hematemesis; No diarrhea or constipation. No melena or hematochezia.  GENITOURINARY: No dysuria, frequency or hematuria  NEUROLOGICAL: No dizziness, numbness, or weakness  SKIN: No itching, burning, rashes, or lesions   All other review of systems is negative unless indicated above.      PAST MEDICAL HISTORY:  Bulging lumbar disc L4-5; treated with PINA (last in June 2013)    Cervical disc disease herniated discs; unsure levels (asymptomatic)    Cluster headache     COVID-19 vaccine series completed moderna 4/2/21    Diverticulosis asymptomatic    Esophageal spasm endoscopy and colonoscopy negative; no reflux    History of recurrent UTIs stable since colpopexy    HTN (hypertension)     Hyperlipemia     Hypothyroid     Osteoarthritis hip.     PAST SURGICAL HISTORY:  History of total left hip replacement 2014    Normal vaginal delivery x 3    Pelvic prolapse laparoscopic colpopexy 2019    S/P hysterectomy 2004 after prolapse.    Allergies/Medications:   Allergies:        Allergies:  	Sulfur: Drug, Rash  	clindamycin: Drug, Rash  	strawberry: Food, Rash  	sulfa drugs: Drug Category, Unknown, Originally Entered as [Unknown] reaction to [SULFA]        FAMILY HISTORY:        SOCIAL HISTORY:        VITAL SIGNS:    Vital Signs Last 24 Hrs  T(C): 36.7 (01 Jul 2021 11:15), Max: 36.7 (01 Jul 2021 11:15)  T(F): 98 (01 Jul 2021 11:15), Max: 98 (01 Jul 2021 11:15)  HR: 63 (01 Jul 2021 13:30) (61 - 67)  BP: 99/65 (01 Jul 2021 13:30) (86/46 - 136/87)  BP(mean): --  RR: 16 (01 Jul 2021 13:30) (10 - 18)  SpO2: 100% (01 Jul 2021 13:30) (98% - 100%)    PHYSICAL EXAM:     GENERAL: no acute distress  HEENT: NC/AT, EOMI, neck supple, MMM  RESPIRATORY: LCTAB/L, no rhonchi, rales, or wheezing  CARDIOVASCULAR: RRR, no murmurs, gallops, rubs  ABDOMINAL: soft, non-tender, non-distended, positive bowel sounds   EXTREMITIES: no clubbing, cyanosis, or edema  NEUROLOGICAL: alert and oriented x 3, non-focal  SKIN: no rashes or lesions   MUSCULOSKELETAL: no gross joint deformity        MEDICATIONS  (STANDING):  lactated ringers. 1000 milliLiter(s) (75 mL/Hr) IV Continuous <Continuous>  sodium chloride 0.9% Bolus 500 milliLiter(s) IV Bolus once       HPI:    62 y/o female with PMH of HTN, HLD, Hypothyroidism, UTI p/w two years of  progressively worsening right hip pain, severe, constant, radiating to groin, worsened with walking, stairs and while sleeping.  Analegesics were not providing sufficient relief.  Pt had total right hip replacement today , tolerated procedure well, without nay major complications.  Pt is doing well without any major complaints of hip pain.    REVIEW OF SYSTEMS:    MUSCULOSKELETAL: Right hip pain controlled  CONSTITUTIONAL: No weakness, fevers or chills  EYES/ENT: No visual changes, no throat pain   RESPIRATORY: No cough, wheezing, hemoptysis; No shortness of breath  CARDIOVASCULAR: No chest pain or palpitations  GASTROINTESTINAL: No abdominal, nausea, vomiting, or hematemesis; No diarrhea or constipation. No melena or hematochezia.  GENITOURINARY: No dysuria, frequency or hematuria  NEUROLOGICAL: No dizziness, numbness, or weakness  SKIN: No itching, burning, rashes, or lesions         PAST MEDICAL HISTORY:  Bulging lumbar disc L4-5; treated with PINA (last in 2013)    Cervical disc disease herniated discs; unsure levels (asymptomatic)    Cluster headache     COVID-19 vaccine series completed moderna 21    Diverticulosis asymptomatic    Esophageal spasm endoscopy and colonoscopy negative; no reflux    History of recurrent UTIs stable since colpopexy    HTN (hypertension)     Hyperlipemia     Hypothyroid     Osteoarthritis hip.     PAST SURGICAL HISTORY:  History of total left hip replacement 2014    Normal vaginal delivery x 3    Pelvic prolapse laparoscopic colpopexy 2019    S/P hysterectomy 2004 after prolapse.    Allergies/Medications:   Allergies:        Allergies:  	Sulfur: Drug, Rash  	clindamycin: Drug, Rash  	strawberry: Food, Rash  	sulfa drugs: Drug Category, Unknown, Originally Entered as [Unknown] reaction to [SULFA]        FAMILY HISTORY:    Father -  from [place], htn  Mother - HTN    SOCIAL HISTORY:    lives with   no tob, etoh, illicits    VITAL SIGNS:    Vital Signs Last 24 Hrs  T(C): 36.7 (2021 11:15), Max: 36.7 (2021 11:15)  T(F): 98 (2021 11:15), Max: 98 (2021 11:15)  HR: 63 (2021 13:30) (61 - 67)  BP: 99/65 (2021 13:30) (86/46 - 136/87)  BP(mean): --  RR: 16 (2021 13:30) (10 - 18)  SpO2: 100% (2021 13:30) (98% - 100%)    PHYSICAL EXAM:     GENERAL: no acute distress  HEENT: perrla, eomi  RESPIRATORY: LCTAB/L, no rhonchi, rales, or wheezing  CARDIOVASCULAR: RRR, no murmurs, gallops, rubs  ABDOMINAL: soft, non-tender, non-distended, positive bowel sounds   EXTREMITIES: no clubbing, cyanosis, or edema  NEUROLOGICAL: alert and oriented x 3, non-focal  SKIN: no new rashes or lesions   MUSCULOSKELETAL: s/p R THR        MEDICATIONS  (STANDING):  lactated ringers. 1000 milliLiter(s) (75 mL/Hr) IV Continuous <Continuous>  sodium chloride 0.9% Bolus 500 milliLiter(s) IV Bolus once

## 2023-01-09 NOTE — CONSULTS
According to her  who was present at the bedside, patient has had recent neck surgery and trouble last year where cervical fusion was performed. She then went to [facility] for rehab and subsequently went home. She did end up with recent UTIs x2 that led to brief hospitalization and report of altered mental status associated. On Friday when symptoms occurred,  suspected that this may have been another UTI. She was admitted to the hospital for further evaluation with so far negative work-up for UTI. They did find that her blood glucose was elevated above 300 which is the first time that Bradly Rasmussen has been reported. She is on Januvia for her diabetes and apparently does not check her blood glucose that frequently since it had not been required. She was fluctuating with respect to her mood and behavior as noted by her . Even patient admits that she has been a lot more combative and aggressive than she normally is and she is much more rasmussen than normal with frequent mood swings. This too is different from her norm. On examination, she was very rasmussen with onset of crying when trying to discuss her findings and plan of care. Cranial nerves were within normal limits. Motor exam showed diffuse weakness with upper and lower extremity head 5 minutes/5. Cerebellar exam was normal except for some mild fine motor tremors. Sensory exam was present throughout with no active distal sensory loss of bilateral lower extremity consistent with pattern of diabetic neuropathy. Gait was not tested. Reflexes were normal. New    Impression:  1. Acute metabolic encephalopathy  2. Generalized weakness  3. Mood disturbance with suspicion of depression       Recommendations:  1. EEG for evaluation at five encephalopathy versus possible intermittent seizure activity . 2. frequent blood glucose monitoring for correlation with weakness and behavior change. 3. Add Lexapro 10 mg daily for mood. 4. Further on follow up.

## 2023-01-09 NOTE — PROCEDURES
[address] Report    MRN: [id number]   PATIENT NAME: Sophie Stern   DATE OF REPORT: 2023  DATE OF SERVICE: 2023    PHYSICIAN NAME: Mejia Thornton DO  Referring Physician: Dr. Anthony Winter      Patient's : 1955   Patient's Age: 75 y.o. Gender: female     PROCEDURE: Routine EEG with video      Clinical Interpretation: This abnormal study showed evidence of:    A mild to moderate nonspecific encephalopathy    No seizures or definite epileptiform discharges were noted during this study. ____________________________  Electronically signed by: Mejia Thornton DO, 2023 10:22 AM      Patient Clinical Information   Reason for Study: Patient undergoing evaluation for altered mental status  Patient State: Awake  Primary neurological diagnosis: Altered mental status   Primary indication for monitoring: Diagnosis of nonconvulsive seizures    Pertinent Medications and Treatments    escitalopram     pramipexole     levetiracetam       Sedatives administered: No  Intubated: No  Pharmacological paralytic: No    Reporting Period  Start of Study: 958, 2023   End of Study:  , 2023       EEG Description  Digital video and scalp EEG monitoring was performed using the standard protocol for this laboratory. Scalp electrodes were applied in the international 10/20 system. Multiple digital montage arrangements were utilized for evaluation. EKG and video were recorded. This study was limited due to frequent movement artifact and poor impedance values as reported by the technician. Background:      Occipital rhythm (posterior dominant rhythm or PDR): Present intermittently  Frequency: 6.5 Hz  Voltage: Medium   Organization: poor to fair  Reactivity to eye opening/closure: fair    Drowsiness: Present - abnormal, moderately excessive generalized irregular delta activity noted  Sleep: Absent    Comments:  In the waking state the background is sometimes composed of generalized irregular theta activity. Technical and Activation Procedures:  Hyperventilation: Not done        Photic stimulation: Not done        Reactivity to stimulation: Yes    Abnormalities:    I. Seizures? No    II. Rhythmic or Periodic Patterns? Occasional generalized quasi-periodic discharges noted with triphasic morphology with no significant evolution noted. III. Other Abnormalities?         No

## 2023-01-09 NOTE — PROGRESS NOTES
1/9- 3:15pm- Spoke to RN and she will find out who is going to check shunt Yolis/Helen? Also she asked if patient could be done tomorrow, due to patient probably not being able to tolerate it today. Has meds ordered.

## 2023-01-09 NOTE — PROGRESS NOTES
Physical Therapy Initial Assessment     Name: Keke Isabel  : 1955  MRN: [id number]      Date of Service: 2023    Evaluating PT:  Lucas Chan, PT, DPT [id number]    Room #:  4117/8111-B  Diagnosis:  Hyperglycemia [R73.9]  Stroke-like symptoms [R29.90]  Altered mental status, unspecified altered mental status type [R41.82]  TIA (transient ischemic attack) [G45.9]  PMHx/PSHx:    Past Medical History:   Diagnosis Date    Acid reflux disease     Acute on chronic respiratory failure with hypoxia and hypercapnia ([facility] 2018    Apraxia 2018    Arthritis     Ataxia 2018    CAD (coronary artery disease) 10/4/2012    Choledocholithiasis     recurrent    Chronic systolic congestive heart failure ([address] 2018    Ejection fraction 23% plus/minus 5%    CKD (chronic kidney disease) stage 3, GFR 30-59 ml/min ([facility] 2018    COPD (chronic obstructive pulmonary disease) ([address]     alpha one M1S 183mg/dl    Diabetes mellitus ([address]     Hyperlipidemia     Hypoxia 2018    Neck pain     Normal pressure hydrocephalus ([address] 2018    Oxygen dependent     3l/min/nc    Pleural effusion years ago    requiring right thoracentesis    Pulmonary hypertension ([address] 2018    Restless legs     S/P CABG x 2 10/4/2012    Severe mitral regurgitation 2018     Procedure/Surgery:  none noted this admission   Reason for admission: TIA (transient ischemic attack); Generalized weakness  Precautions:  fall risk, O2 (4L baseline), spinal neutrality for comfort, seizure precautions, cognition, bed alarm   Equipment Needs:  FWW (owns)    SUBJECTIVE:  Pt lives in two story home with  and children with 1 CINDI home without HR. Pt is questionable historian noting confusion since admission. OBJECTIVE:   Initial Evaluation  Date: 23 Treatment Short Term/ Long Term   Goals   AM-PAC 6 Clicks      Was pt agreeable to Eval/treatment? Yes     Does pt have pain?  Yes, neck pain      Bed Mobility  Rolling: SBA  Supine to sit: SBA  Sit to supine: SBA  Scooting: SBA  Rolling: ind  Supine to sit: ind  Sit to supine: ind  Scooting: ind   Transfers Sit to stand: SBA  Stand to sit: SBA  Stand pivot: SBA  Sit to stand: ind  Stand to sit: ind  Stand pivot: ind   Ambulation    50 feet with FWW SBA  100 feet with AAD ind/mod I    Stair negotiation: ascended and descended NT  4 steps with one rail mod I    ROM BUE:  Refer to OT eval   BLE:  WNL     Strength BUE:  Refer to OT eval   BLE:  4/5 globally  4+/5   Balance Sitting EOB:  SBA  Dynamic Standing:  SBA FWW  Sitting EOB:  ind  Dynamic Standing:  ind/mod I fww     Pt is A & O x 3, not oriented to date   Sensation:  Pt denies numbness and tingling to extremities  Edema:  none noted     Vitals:  Blood Pressure at rest NT Blood Pressure post session NT   Heart Rate at rest 95 BPM Heart Rate post session 101 BPM   SPO2 at rest 94% SPO2 post session 94%     Therapeutic Exercises:  none performed this visit    Patient education  Pt educated on safety with OOB activity, FWW safety     Patient response to education:   Pt verbalized understanding Pt demonstrated skill Pt requires further education in this area   x x x     ASSESSMENT:    Conditions Requiring Skilled Therapeutic Intervention:    [x]Decreased strength     []Decreased ROM  [x]Decreased functional mobility  []Decreased balance   [x]Decreased endurance   []Decreased posture  []Decreased sensation  []Decreased coordination   []Decreased vision  [x]Decreased safety awareness   [x]Increased pain       Comments:  Pt in bed on arrival, O2 NC out of nose, pt assisted with donning correctly. Pt wears 4L O2 at baseline d/t COPD, SpO2 93-94% throughout session with activity and at rest today. Pt is pleasantly confused and frequently re-oriented and educated regarding safety awareness throughout session today. Pt impulsive at times with mobility.  Pt with good balance, no notable LOB with [employer] use today but displayed poor management of O2 line. Pt assisted back to bed at end of session with all needs met. Pt will benefit from supervision at home for safety d/t cognition at this time. Treatment:  Patient practiced and was instructed in the following treatment:    Bed mobility: performed with cues for safety awareness and proper hand placement to promote improved functional independence. Transfer Training: STS, SPT transfer performed at EOB with education for safety   Gait training: short distance ambulation performed in room with assist for [employer] and O2 management     Pt's/ family goals   1. Return home safely. Prognosis is good for reaching above PT goals. Patient and or family understand(s) diagnosis, prognosis, and plan of care.   yes    PHYSICAL THERAPY PLAN OF CARE:    PT POC is established based on physician order and patient diagnosis     Referring provider/PT Order:    01/07/23 0415  PT eval and treat  Start:  01/07/23 0415,   End:  01/07/23 0415,   ONE TIME,   Standing Count:  1 Occurrences,   Jami Barthel, MD Acknowledge New     Diagnosis:  Hyperglycemia [R73.9]  Stroke-like symptoms [R29.90]  Altered mental status, unspecified altered mental status type [R41.82]  TIA (transient ischemic attack) [G45.9]  Specific instructions for next treatment:  increase ambulation distance     Current Treatment Recommendations:   [x] Strengthening to improve independence with functional mobility   [] ROM to improve independence with functional mobility   [] Balance Training to improve static/dynamic balance and to reduce fall risk  [x] Endurance Training to improve activity tolerance during functional mobility   [x] Transfer Training to improve safety and independence with all functional transfers   [x] Gait Training to improve gait mechanics, endurance and assess need for appropriate assistive device  [x] Stair Training in preparation for safe discharge home and/or into the community   [] Positioning to prevent skin breakdown and contractures  [] Safety and Education Training   [] Patient/Caregiver Education   [] HEP  [] Other     PT long term treatment goals are located in above grid    Frequency of treatments: 2-5x/week x 1-2 weeks. Time in  0845  Time out  0910    Total Treatment Time  10 minutes     Evaluation Time includes thorough review of current medical information, gathering information on past medical history/social history and prior level of function, completion of standardized testing/informal observation of tasks, assessment of data and education on plan of care and goals.     CPT codes:  [x] Low Complexity PT evaluation 74932  [] Moderate Complexity PT evaluation 73209  [] High Complexity PT evaluation 32042  [] PT Re-evaluation 98169  [] Gait training 05423 -- minutes  [] Manual therapy [address] [facility] -- minutes  [x] Therapeutic activities 39643 10 minutes  [] Therapeutic exercises 81238 -- minutes  [] Neuromuscular reeducation 43498 -- minutes     Jesi Martin, PT, DPT  [id number]

## 2023-01-09 NOTE — PROGRESS NOTES
OT SESSION ATTEMPT     Date:2023  Patient Name: Kristen Martinez  MRN: [id number]  : 1955  Room: [address] [place] [address]A     OCCUPATIONAL THERAPY TREATMENT NOTE    [address] [place] [address]      Attempted OT session this date:    [] unavailable due to other medical staff currently with pt   [x] on hold per nursing staff due to pt just receiving ativan with pt being agitated     [] on hold per nursing staff secondary to lab / radiology results    [] declined treatment  this date due to ____. Benefits of participation in therapy reviewed with pt.    [] off unit   [] Other:     Continue with current OT P. [facility] at a later date/time.       Marilou HERNANDEZ/MEGAN [id number]

## 2023-01-09 NOTE — CARE COORDINATION
1/9, patient admitted for TIA. SW met with patient at bedside along with  in room. Patient was resting so spoke with . Patient lives with  hiral 2 story home with 1/2 step in the front door. DME owned is [employer] and cane. [facility] has been [facility] and would use again if needed. [place] is [facility] and [facility] in past.  PCP is Dr. Ting Hillman and Pharmacy is [facility] in [facility]. Patient is on 4L 02 and uses 4L 02 at home.  says that lately the 4L 02 has been continuous use.  would bring portable to hospital upon discharge for patient. [address] is [address] [facility]. Discharge plan would be home once medically cleared for discharge. Recent PT was 17/24 and ambulated 50 feet with FWW. SW to follow for further discharge planning needs.      Manuel Sierra, [facility] Case Management  [phone]

## 2023-01-10 LAB
ALBUMIN SERPL-MCNC: 3.7 G/DL (ref 3.5–5.2)
ALP BLD-CCNC: 90 U/L (ref 35–104)
ALT SERPL-CCNC: 12 U/L (ref 0–32)
ANION GAP SERPL CALCULATED.3IONS-SCNC: 12 MMOL/L (ref 7–16)
AST SERPL-CCNC: 14 U/L (ref 0–31)
BASOPHILS ABSOLUTE: 0.05 E9/L (ref 0–0.2)
BASOPHILS RELATIVE PERCENT: 0.8 % (ref 0–2)
BILIRUB SERPL-MCNC: 0.8 MG/DL (ref 0–1.2)
BUN BLDV-MCNC: 15 MG/DL (ref 6–23)
CALCIUM SERPL-MCNC: 9.1 MG/DL (ref 8.6–10.2)
CHLORIDE BLD-SCNC: 100 MMOL/L (ref 98–107)
CO2: 27 MMOL/L (ref 22–29)
CREAT SERPL-MCNC: 0.9 MG/DL (ref 0.5–1)
EOSINOPHILS ABSOLUTE: 0.12 E9/L (ref 0.05–0.5)
EOSINOPHILS RELATIVE PERCENT: 1.9 % (ref 0–6)
GFR SERPL CREATININE-BSD FRML MDRD: >60 ML/MIN/1.73
GLUCOSE BLD-MCNC: 144 MG/DL (ref 74–99)
HCT VFR BLD CALC: 39 % (ref 34–48)
HEMOGLOBIN: 11.7 G/DL (ref 11.5–15.5)
IMMATURE GRANULOCYTES #: 0.01 E9/L
IMMATURE GRANULOCYTES %: 0.2 % (ref 0–5)
LYMPHOCYTES ABSOLUTE: 2.24 E9/L (ref 1.5–4)
LYMPHOCYTES RELATIVE PERCENT: 34.8 % (ref 20–42)
MAGNESIUM: 1.8 MG/DL (ref 1.6–2.6)
MCH RBC QN AUTO: 27.2 PG (ref 26–35)
MCHC RBC AUTO-ENTMCNC: 30 % (ref 32–34.5)
MCV RBC AUTO: 90.7 FL (ref 80–99.9)
METER GLUCOSE: 149 MG/DL (ref 74–99)
METER GLUCOSE: 187 MG/DL (ref 74–99)
METER GLUCOSE: 195 MG/DL (ref 74–99)
METER GLUCOSE: 269 MG/DL (ref 74–99)
MONOCYTES ABSOLUTE: 0.73 E9/L (ref 0.1–0.95)
MONOCYTES RELATIVE PERCENT: 11.4 % (ref 2–12)
NEUTROPHILS ABSOLUTE: 3.28 E9/L (ref 1.8–7.3)
NEUTROPHILS RELATIVE PERCENT: 50.9 % (ref 43–80)
PDW BLD-RTO: 16.1 FL (ref 11.5–15)
PLATELET # BLD: 218 E9/L (ref 130–450)
PMV BLD AUTO: 11.2 FL (ref 7–12)
POTASSIUM SERPL-SCNC: 3.7 MMOL/L (ref 3.5–5)
RBC # BLD: 4.3 E12/L (ref 3.5–5.5)
SODIUM BLD-SCNC: 139 MMOL/L (ref 132–146)
TOTAL PROTEIN: 7.1 G/DL (ref 6.4–8.3)
URINE CULTURE, ROUTINE: NORMAL
WBC # BLD: 6.4 E9/L (ref 4.5–11.5)

## 2023-01-10 PROCEDURE — 2580000003 HC RX 258: Performed by: INTERNAL MEDICINE

## 2023-01-10 PROCEDURE — 92526 ORAL FUNCTION THERAPY: CPT

## 2023-01-10 PROCEDURE — 82962 GLUCOSE BLOOD TEST: CPT

## 2023-01-10 PROCEDURE — 6370000000 HC RX 637 (ALT 250 FOR IP): Performed by: FAMILY MEDICINE

## 2023-01-10 PROCEDURE — 80053 COMPREHEN METABOLIC PANEL: CPT

## 2023-01-10 PROCEDURE — 2140000000 HC CCU INTERMEDIATE R&B

## 2023-01-10 PROCEDURE — 6370000000 HC RX 637 (ALT 250 FOR IP): Performed by: PSYCHIATRY & NEUROLOGY

## 2023-01-10 PROCEDURE — 97535 SELF CARE MNGMENT TRAINING: CPT

## 2023-01-10 PROCEDURE — 6360000002 HC RX W HCPCS: Performed by: INTERNAL MEDICINE

## 2023-01-10 PROCEDURE — 2700000000 HC OXYGEN THERAPY PER DAY

## 2023-01-10 PROCEDURE — 36415 COLL VENOUS BLD VENIPUNCTURE: CPT

## 2023-01-10 PROCEDURE — 94640 AIRWAY INHALATION TREATMENT: CPT

## 2023-01-10 PROCEDURE — 99232 SBSQ HOSP IP/OBS MODERATE 35: CPT | Performed by: PHYSICIAN ASSISTANT

## 2023-01-10 PROCEDURE — 85025 COMPLETE CBC W/AUTO DIFF WBC: CPT

## 2023-01-10 PROCEDURE — 83735 ASSAY OF MAGNESIUM: CPT

## 2023-01-10 PROCEDURE — 6370000000 HC RX 637 (ALT 250 FOR IP): Performed by: INTERNAL MEDICINE

## 2023-01-10 RX ADMIN — ATORVASTATIN CALCIUM 40 MG: 40 TABLET, FILM COATED ORAL at 21:36

## 2023-01-10 RX ADMIN — ENOXAPARIN SODIUM 30 MG: 100 INJECTION SUBCUTANEOUS at 09:25

## 2023-01-10 RX ADMIN — ACETAMINOPHEN 650 MG: 325 TABLET ORAL at 05:54

## 2023-01-10 RX ADMIN — PRAMIPEXOLE DIHYDROCHLORIDE 0.75 MG: 0.25 TABLET ORAL at 09:28

## 2023-01-10 RX ADMIN — PRAMIPEXOLE DIHYDROCHLORIDE 0.75 MG: 0.25 TABLET ORAL at 21:36

## 2023-01-10 RX ADMIN — PANTOPRAZOLE SODIUM 40 MG: 40 TABLET, DELAYED RELEASE ORAL at 05:40

## 2023-01-10 RX ADMIN — BUDESONIDE 500 MCG: 0.5 SUSPENSION RESPIRATORY (INHALATION) at 20:40

## 2023-01-10 RX ADMIN — Medication 10 ML: at 09:29

## 2023-01-10 RX ADMIN — CARVEDILOL 6.25 MG: 6.25 TABLET, FILM COATED ORAL at 09:26

## 2023-01-10 RX ADMIN — ACETAMINOPHEN 650 MG: 325 TABLET ORAL at 21:36

## 2023-01-10 RX ADMIN — ASPIRIN 81 MG CHEWABLE TABLET 81 MG: 81 TABLET CHEWABLE at 09:26

## 2023-01-10 RX ADMIN — CARVEDILOL 6.25 MG: 6.25 TABLET, FILM COATED ORAL at 16:33

## 2023-01-10 RX ADMIN — LEVETIRACETAM 750 MG: 250 TABLET, FILM COATED ORAL at 21:35

## 2023-01-10 RX ADMIN — Medication 10 ML: at 21:38

## 2023-01-10 RX ADMIN — LEVETIRACETAM 750 MG: 250 TABLET, FILM COATED ORAL at 09:27

## 2023-01-10 RX ADMIN — CETIRIZINE HYDROCHLORIDE 10 MG: 10 TABLET, FILM COATED ORAL at 09:26

## 2023-01-10 RX ADMIN — ARFORMOTEROL TARTRATE 15 MCG: 15 SOLUTION RESPIRATORY (INHALATION) at 20:40

## 2023-01-10 RX ADMIN — ESCITALOPRAM OXALATE 10 MG: 10 TABLET ORAL at 09:26

## 2023-01-10 RX ADMIN — FUROSEMIDE 20 MG: 20 TABLET ORAL at 09:26

## 2023-01-10 ASSESSMENT — PAIN DESCRIPTION - PAIN TYPE: TYPE: ACUTE PAIN

## 2023-01-10 ASSESSMENT — PAIN DESCRIPTION - ORIENTATION
ORIENTATION: POSTERIOR
ORIENTATION: POSTERIOR

## 2023-01-10 ASSESSMENT — PAIN SCALES - GENERAL
PAINLEVEL_OUTOF10: 6
PAINLEVEL_OUTOF10: 7

## 2023-01-10 ASSESSMENT — PAIN DESCRIPTION - LOCATION
LOCATION: NECK
LOCATION: NECK

## 2023-01-10 ASSESSMENT — PAIN DESCRIPTION - DESCRIPTORS
DESCRIPTORS: THROBBING;SORE
DESCRIPTORS: SORE;ACHING

## 2023-01-10 NOTE — PROGRESS NOTES
Hospitalist Progress Note      Synopsis: Patient admitted on 1/6/2023  Ms. Lio Reyna, a 75y.o. year old female  who  has a past medical history of Acid reflux disease, Acute on chronic respiratory failure with hypoxia and hypercapnia ([facility]), Apraxia, Arthritis, Ataxia, CAD (coronary artery disease), Choledocholithiasis, Chronic systolic congestive heart failure ([address] CKD (chronic kidney disease) stage 3, GFR 30-59 ml/min ([facility]), COPD (chronic obstructive pulmonary disease) ([address] Diabetes mellitus ([address] Hyperlipidemia, Hypoxia, Neck pain, Normal pressure hydrocephalus ([facility]), Oxygen dependent, Pleural effusion, Pulmonary hypertension ([address] Restless legs, S/P CABG x 2, and Severe mitral regurgitation. presents with transfer from [facility] with TIA, asa, keppra, need MRI and EEG. Presented there with weakness started October after having cervical surgery by Dr Skinny Newman but got worse today. woke up this morning confused and intermittently combative. Had garbled speech at 4pm then back to baseline. No falls or head trauma. Denies fever chills no chest pain or SOB. Subjective  Seen and examined chart reviewed. Patient alert and oriented X3. Remembers feeling anxious yesterday. Wants to know answers. Exam:  /63   Pulse 88   Temp 98.4 °F (36.9 °C) (Temporal)   Resp 18   Ht 5' 3\" (1.6 m)   Wt 108 lb 1.6 oz (49 kg)   LMP  (LMP Unknown)   SpO2 93%   BMI 19.15 kg/m²   -General:  Awake, alert, oriented X 2-3. Well developed, well nourished, well groomed. No apparent distress. -HEENT:  Normocephalic, atraumatic. Pupils equal, round, reactive to light.      -Heart:  RRR, no murmurs, gallops, rubs    -Lungs:  CTA bilaterally, bilat symmetrical expansion, no wheeze, rales, or rhonchi    -Abdomen: Bowel sounds present, soft, nontender, no masses, no organomegaly, no peritoneal signs    -Extremities: normal ROM. No Cyanosis clubbing or edema    -Skin: Warm and dry    -Neuro:  AAO x 2-3 . Cranial nerves 2-12 intact, no focal deficits     -Psych : normal .    Medications:  Reviewed    Infusion Medications    dextrose      sodium chloride       Scheduled Medications    carvedilol  6.25 mg Oral BID WC    cetirizine  10 mg Oral Daily    furosemide  20 mg Oral Daily    arformoterol tartrate  15 mcg Nebulization BID    And    budesonide  0.5 mg Nebulization BID    escitalopram  10 mg Oral Daily    [Held by provider] buPROPion  150 mg Oral BID    pantoprazole  40 mg Oral QAM AC    [Held by provider] oxybutynin  10 mg Oral Daily    pramipexole  0.75 mg Oral BID    sodium chloride flush  10 mL IntraVENous 2 times per day    enoxaparin  30 mg SubCUTAneous Daily    aspirin  81 mg Oral Daily    levETIRAcetam  750 mg Oral BID    atorvastatin  40 mg Oral Nightly    insulin lispro  0-16 Units SubCUTAneous TID WC    insulin lispro  0-4 Units SubCUTAneous Nightly     PRN Meds: ipratropium-albuterol, glucose, dextrose bolus **OR** dextrose bolus, glucagon (rDNA), dextrose, sodium chloride flush, sodium chloride, ondansetron **OR** ondansetron, magnesium hydroxide, acetaminophen **OR** acetaminophen, [Held by provider] oxyCODONE, albuterol    I/O    Intake/Output Summary (Last 24 hours) at 1/10/2023 1632  Last data filed at 1/10/2023 0548  Gross per 24 hour   Intake 240 ml   Output --   Net 240 ml       Labs:   Recent Labs     01/08/23  0617 01/09/23  0630 01/10/23  0605   WBC 6.9 8.0 6.4   HGB 10.1* 11.3* 11.7   HCT 32.7* 36.8 39.0    213 218       Recent Labs     01/09/23  0630 01/09/23  2111 01/10/23  0605    133 139   K 3.9 3.6 3.7    96* 100   CO2 22 24 27   BUN 20 17 15   CREATININE 0.9 0.9 0.9   CALCIUM 9.5 8.9 9.1       Recent Labs     01/08/23  0617 01/09/23  0630 01/10/23  0605   PROT 6.7 6.9 7.1   ALKPHOS 82 85 90   ALT 12 10 12   AST 16 15 14   BILITOT 0.5 0.8 0.8       No results for input(s): INR in the last 72 hours.       No results for input(s): Jeronimo Fallon in the last 72 hours.    Chronic labs:  Lab Results   Component Value Date    CHOL 93 01/09/2023    TRIG 96 01/09/2023    HDL 26 01/09/2023    LDLCALC 48 01/09/2023    TSH 1.300 01/07/2023    INR 2.8 01/06/2023    LABA1C 11.0 (H) 01/07/2023       Radiology:  Imaging studies reviewed today. ASSESSMENT:/Plan       - AMS  - TIA  - suspecting seizure  - T2DM  - HTN  - elevated troponin   - CKD  - HLP  - Hypoxia and oxygen dependant   - CAD s/p CABG   -COPD  -Hypoxia    Plan:  - admit to tele monitoring   - seizure precautions  - neuro checks  - pt/ot   - had received asa ands keppra in ER per tele neurology, neurology with EEG reviewed. No seizure activity  MRI brain pending with neurosurgery to evaluate shunt.   - accuchecks sand ssi  - serial troponin   -Diuresis as tolerated, inhalers supplemental oxygen   -Patient on 4L of 02 at baseline  No focal deficits were noted. Mag 1.8 today. Gap resolved. Lactate was negative. Diet: ADULT DIET; Regular; 3 carb choices (45 gm/meal); Low Sodium (2 gm)  ADULT ORAL NUTRITION SUPPLEMENT; Breakfast, Dinner; Low Calorie/High Protein Oral Supplement  Code Status: Full Code      Disposition: Awaiting MRI/shunt evaluation     +++++++++++++++++++++++++++++++++++++++++++++++++  Bruce Thomaslin DO  1/10/2023  [facility].  +++++++++++++++++++++++++++++++++++++++++++++++++  NOTE: This report was transcribed using voice recognition software. Every effort was made to ensure accuracy; however, inadvertent computerized transcription errors may be present.

## 2023-01-10 NOTE — PROGRESS NOTES
Occupational Therapy  OT BEDSIDE TREATMENT NOTE   [facility] [address] [facility]  [address] [place] [address]        GDZK:  Patient Name: Candice Aleman  MRN: [id number]  : 1955  Room: [address] [place] [address]A     Per OT Eval:    Evaluating OT: Selvin Howell OTR/L; [id number]        Referring Provider: Chata Cornejo MD    Specific Provider Orders/Date: OT Eval and Treat 23        Diagnosis: TIA (transient ischemic attack); Generalized weakness. Pt confused & combative PTA. Surgery: None this admission     Pertinent Medical History:  has a past medical history of Acid reflux disease, Acute on chronic respiratory failure with hypoxia and hypercapnia ([address] Apraxia, Arthritis, Ataxia, CAD (coronary artery disease), Choledocholithiasis, Chronic systolic congestive heart failure (HCC), CKD (chronic kidney disease) stage 3, GFR 30-59 ml/min (HCC), COPD (chronic obstructive pulmonary disease) ([address] Diabetes mellitus ([address] Hyperlipidemia, Hypoxia, Neck pain, Normal pressure hydrocephalus (HCC), Oxygen dependent, Pleural effusion, Pulmonary hypertension ([address] Restless legs, S/P CABG x 2, and Severe mitral regurgitation.       Recommended Adaptive Equipment: TBD      Precautions:  Fall Risk, spinal neutrality for comfort, O2, seizure precautions, +alarms, cognition      Assessment of current deficits    [x] Functional mobility            [x]ADLs           [x] Strength                  [x]Cognition    [x] Functional transfers          [x] IADLs         [x] Safety Awareness   [x]Endurance    [] Fine Coordination                         [x] Balance      [] Vision/perception   []Sensation      []Gross Motor Coordination             [] ROM           [] Delirium                   [] Motor Control      OT PLAN OF CARE   OT POC based on physician orders, patient diagnosis and results of clinical assessment     Frequency/Duration 1-3 days/wk for 2 weeks PRN   Specific OT Treatment Interventions to include:   * Instruction/training on adapted ADL techniques and AE recommendations to increase functional independence within precautions       * Training on energy conservation strategies, correct breathing pattern and techniques to improve independence/tolerance for self-care routine  * Functional transfer/mobility training/DME recommendations for increased independence, safety, and fall prevention  * Patient/Family education to increase follow through with safety techniques and functional independence  * Recommendation of environmental modifications for increased safety with functional transfers/mobility and ADLs  * Cognitive retraining/development of therapeutic activities to improve problem solving, judgement, memory, and attention for increased safety/participation in ADL/IADL tasks  * Therapeutic exercise to improve motor endurance, ROM, and functional strength for ADLs/functional transfers  * Therapeutic activities to facilitate/challenge dynamic balance, stand tolerance for increased safety and independence with ADLs  * Positioning to improve skin integrity, interaction with environment and functional independence     Modified [place] Scale   Score     Description  0             No symptoms  1             No significant disability despite symptoms  2             Slight disability; able to look after own affairs  3             Moderate disability; able to ambulate without assist/ requires assist with ADLs  4             Moderate/Severe disability;requires assist to ambulate/assist with ADLs  5             Severe disability;bedridden/incontinent   6               Score:   4     Pt questionable historian. Home Living: Pt lives with  in 2 story home with 1 step & 0 handrails to enter.    Bathroom setup: Tub/shower with shower chair   Equipment owned: abram CRANE  Prior Level of Function: IND with ADLs , IND with IADLs; engaged in functional mobility with use of  ww  Driving: Yes  Occupation: None reported     Pain Level: Pt did not complain of pain this session  Cognition: A&O: 3/4; Follows 1 step directions. Pt slightly impulsive & easily distracted. Memory:  Fair              Sequencing:  Fair-              Problem solving:  Fair-              Judgement/safety:  Fair-                Functional Assessment:  AM-PAC Daily Activity Raw Score: 18/24    Initial Eval Status  Date: 1/7/23 Treatment Status  Date: 1/10/23 STGs = LTGs  Time frame: 10-14 days   Feeding Setup   Setup  Pt able to grasp cup, bring to mouth Independent    Grooming Stand by Assist  SBA  Pt washed face, applied deodorant, combed hair seated upright in chair at sink  Modified [place]    UB Dressing Stand by Assist  SBA  [facility] gown seated  Modified [place]    LB Dressing Minimal Assist  SBA  [facility] pants, able to thread and stand to pull over hips. Pt donned socks sitting upright in bed  Modified [place]    Bathing Minimal Assist SBA  Sponge bathing task seated/standing at sink, pt able to wash of UB, LB and stand to wash of buttocks/bert area  Modified [place]    Toileting Minimal Assist   DNT  Delfin per previous session Modified [place]    Bed Mobility  Log Roll: Supervision  Supine to sit: Supervision   Sit to supine: Supervision   Supervision  Supine<>EOB  EOB<>Supine Supine to sit: Independent   Sit to supine: Independent    Functional Transfers Sit to stand:SBA   Stand to sit:SBA  Stand pivot: SBA  Commode: SBA SBA  Sit to Stand  Stand to Sit    Cueing for hand placement Sit to stand:Modified [place]   Stand to sit:Modified [place]  Stand pivot: Modified [place]  Commode: Modified [place]    Functional Mobility SBA  Use of ww to<>from bathroom.   CGA  Pt ambulated short household distance in room EOB<>Bathroom with no device  Modified [place] with use of Appropriate AD PRN   Balance Sitting:     Static - Supervision     Dynamic - SBA  Standing: SBA  Sitting EOB:  Supervision    Standing:  SBA/CGA Sitting:     Static: Independent     Dynamic: Independent  Standing: Modified [place]   Activity Tolerance Fair Fair-  Good   Visual/  Perceptual Glasses: Yes - readers  Appears WFL          Safety Fair-   Good  during ADL completion      Hand Dominance Right    AROM (PROM) Strength Additional Info:    RUE  WFL 4-/5 grossly tested good  and wfl FMC/dexterity noted during ADL tasks      LUE WFL 4-/5 grossly tested Good  and wfl FMC/dexterity noted during ADL tasks         Fine Motor Coordination:  Pt noted with mild difficulty completing finger-to-nose assessment with 100% completion. Hearing: WFL  Sensation:  No c/o numbness or tingling BUE  Tone: WFL BUE  Edema: Unremarkable    Education:  Pt was educated on role of OT, goals to be reached, importance of OOB activity, safety with bed mobility, safety and hand placement with transfers, safety/balance with functional mobility, and techniques to assist with LB dressing bathing/tasks. Comments: Upon arrival pt supine in bed, agreeable to therapy, speaking with nursing okaying pt to be seen this session. Pt completed of bed mobility, functional mobility, transfers and ADL tasks this session. Pt stating of being very fatigued this session, providing of rest breaks as needed, cueing on energy conservation techniques. At end of session, pt seated upright in bed, all lines and tubes intact, call light within reach. Pt has made fair progress towards set goals.    Continue with current plan of care focusing on increasing of independency with transfers and ADL tasks      Treatment Time In: 1:20pm           Treatment Time Out: 1:45pm                Treatment Charges: Mins Units   Ther Ex  [id number]     Manual Therapy [address] [place] [address]     ADL/Home Mgt [id number] 25 2   Neuro Re-ed 75440     Group Therapy      Orthotic manage/training  72661     Non-Billable Time     Total Timed Treatment 25 2        Verónica MENJIVAR [id number]

## 2023-01-10 NOTE — PROGRESS NOTES
Saritha Faria is a 75 y.o. right handed female     Neurology following for TIA    PMH of DM, CAD status post CABG x2, HLD, pulmonary hypertension, NPH, COPD, CKD, CHF, and restless leg syndrome    Hospital course   Admitted to [facility] on 1/6/2023 with presentation of confusion and intermittent combative behavior. He initially presented to [facility] with symptoms that included garbled speech along with confusion and combative behavior. According to her  who was present at the bedside, patient has had recent neck surgery and trouble last year where cervical fusion was performed. She then went to [facility] for rehab and subsequently went home. She did end up with recent UTIs x 2 that led to brief hospitalization and report of altered mental status associated. She was fluctuating with respect to her mood and behavior as noted by her . Even patient admits that she has been a lot more combative and aggressive than she normally is and she is much more rasmussen than normal with frequent mood swings. This too is different from her norm. There is no report of any precipitating trauma infection, or fall. Interval history   EEG with a mild to moderate encephalopathy. No seizures. MRI brain planned for tomorrow morning. She is tired, but otherwise voices no compliants. No family at bedside.      No chest pain or palpitations  No coughing or wheezing    No vertigo, lightheadedness or loss of consciousness  No falls, tripping or stumbling  No incontinence of bowels or bladder  No itching or bruising appreciated  No numbness, tingling or focal arm/leg weakness    ROS otherwise negative      Objective:     /70   Pulse 88   Temp 97.6 °F (36.4 °C) (Temporal)   Resp 20   Ht 5' 3\" (1.6 m)   Wt 108 lb 1.6 oz (49 kg)   LMP  (LMP Unknown)   SpO2 90%   BMI 19.15 kg/m²     General appearance: alert, appears stated age, cooperative and no distress  Head: normocephalic, without obvious abnormality, atraumatic  Eyes: conjunctivae/corneas clear  Neck: supple, symmetrical, trachea midline   Lungs: respirations non labored   Extremities:  normal, atraumatic, no cyanosis or edema  Skin: color, texture, turgor normal---no rashes or lesions      Mental Status: Alert, oriented, thought content appropriate.     Appropriate attention/concentration  Intact fundus of knowledge  Repetition intact  Impaired memories    Speech: no dysarthria  Language: no aphasias     Cranial Nerves:  I: smell    II: visual acuity     II: visual fields Full to confrontation   II: pupils CHANI   III,VII: ptosis None   III,IV,VI: extraocular muscles  Full ROM   V: mastication Normal   V: facial light touch sensation  Normal   V,VII: corneal reflex     VII: facial muscle function - upper  Normal   VII: facial muscle function - lower Normal   VIII: hearing Normal   IX: soft palate elevation  Normal   IX,X: gag reflex    XI: trapezius strength  5/5   XI: sternocleidomastoid strength 5/5   XI: neck extension strength  5/5   XII: tongue strength  Normal     Motor:  5/5 throughout  Normal bulk and tone  No drift   No abnormal movements    Sensory:  LT normal    Coordination:   FN, FFM and MARIBELL normal  HS normal    DTR:   2+ throughout     No Babinskis    Laboratory/Radiology:     CBC with Differential:    Lab Results   Component Value Date/Time    WBC 6.4 01/10/2023 06:05 AM    RBC 4.30 01/10/2023 06:05 AM    HGB 11.7 01/10/2023 06:05 AM    HCT 39.0 01/10/2023 06:05 AM     01/10/2023 06:05 AM    MCV 90.7 01/10/2023 06:05 AM    MCH 27.2 01/10/2023 06:05 AM    MCHC 30.0 01/10/2023 06:05 AM    RDW 16.1 01/10/2023 06:05 AM    SEGSPCT 63 01/30/2014 09:26 AM    LYMPHOPCT 34.8 01/10/2023 06:05 AM    MONOPCT 11.4 01/10/2023 06:05 AM    BASOPCT 0.8 01/10/2023 06:05 AM    MONOSABS 0.73 01/10/2023 06:05 AM    LYMPHSABS 2.24 01/10/2023 06:05 AM    EOSABS 0.12 01/10/2023 06:05 AM    BASOSABS 0.05 01/10/2023 06:05 AM     CMP:    Lab Results   Component Value Date/Time     01/10/2023 06:05 AM    K 3.7 01/10/2023 06:05 AM    K 3.6 01/09/2023 09:11 PM     01/10/2023 06:05 AM    CO2 27 01/10/2023 06:05 AM    BUN 15 01/10/2023 06:05 AM    CREATININE 0.9 01/10/2023 06:05 AM    GFRAA 60 10/10/2022 11:40 AM    LABGLOM >60 01/10/2023 06:05 AM    GLUCOSE 144 01/10/2023 06:05 AM    GLUCOSE 111 09/30/2011 02:00 PM    PROT 7.1 01/10/2023 06:05 AM    LABALBU 3.7 01/10/2023 06:05 AM    LABALBU 4.4 09/30/2011 02:00 PM    CALCIUM 9.1 01/10/2023 06:05 AM    BILITOT 0.8 01/10/2023 06:05 AM    ALKPHOS 90 01/10/2023 06:05 AM    AST 14 01/10/2023 06:05 AM    ALT 12 01/10/2023 06:05 AM     Hepatic Function Panel:    Lab Results   Component Value Date/Time    ALKPHOS 90 01/10/2023 06:05 AM    ALT 12 01/10/2023 06:05 AM    AST 14 01/10/2023 06:05 AM    PROT 7.1 01/10/2023 06:05 AM    BILITOT 0.8 01/10/2023 06:05 AM    BILIDIR <0.2 07/30/2014 10:35 AM    IBILI 0.1 07/30/2014 10:35 AM    LABALBU 3.7 01/10/2023 06:05 AM    LABALBU 4.4 09/30/2011 02:00 PM     HgBA1c:    Lab Results   Component Value Date/Time    LABA1C 11.0 01/07/2023 06:10 AM     FLP:    Lab Results   Component Value Date/Time    TRIG 96 01/09/2023 06:30 AM    HDL 26 01/09/2023 06:30 AM    LDLCALC 48 01/09/2023 06:30 AM    LABVLDL 19 01/09/2023 06:30 AM     CTA head/neck: 1. No focal hemodynamically significant stenosis, occlusion or aneurysm in the large arteries of the head and neck. 2. No acute intracranial hemorrhage or mass effect. 3. Stable right occipital approach ventriculoperitoneal shunt catheter. The ventricles are similar in size and shape to the prior study. No progressive hydrocephalus. 4. Stable small left-sided subdural low-attenuation fluid collection which may represent a chronic hygroma. EEG  This abnormal study showed evidence of:     A mild to moderate nonspecific encephalopathy     No seizures or definite epileptiform discharges were noted during this study.     All labs and imaging studies reviewed independently today     Assessment:     AMS in patient with history of NPH and  shunt   EEG with a mild to moderate encephalopathy. She was loaded with Keppra in the ED with concern for seizure. No epileptiform activity on EEG.  MRI keya pending, but needs to coordinate with NSGY due to  shunt   Plan:     MRI brain w/wo pending    Perla Turner PA-C  2:13 PM  1/10/2023

## 2023-01-10 NOTE — CONSULTS
Neurosurgery Attending    Patient with NPH and  shunt.   Will have MRI tomorrow morning at 0630 and will re-program shunt to 1.0 on strata    Vladimir Hernandez MD

## 2023-01-10 NOTE — PROGRESS NOTES
Hospitalist Progress Note      Synopsis: Patient admitted on 1/6/2023  Ms. Marina Harvey, a 75y.o. year old female  who  has a past medical history of Acid reflux disease, Acute on chronic respiratory failure with hypoxia and hypercapnia ([facility]), Apraxia, Arthritis, Ataxia, CAD (coronary artery disease), Choledocholithiasis, Chronic systolic congestive heart failure ([address] CKD (chronic kidney disease) stage 3, GFR 30-59 ml/min ([facility]), COPD (chronic obstructive pulmonary disease) ([address] Diabetes mellitus ([address] Hyperlipidemia, Hypoxia, Neck pain, Normal pressure hydrocephalus ([facility]), Oxygen dependent, Pleural effusion, Pulmonary hypertension ([address] Restless legs, S/P CABG x 2, and Severe mitral regurgitation. presents with transfer from [facility] with TIA, asa, keppra, need MRI and EEG. Presented there with weakness started October after having cervical surgery by Dr Baylee Reese but got worse today. woke up this morning confused and intermittently combative. Had garbled speech at 4pm then back to baseline. No falls or head trauma. Denies fever chills no chest pain or SOB. Subjective  Seen and examined chart reviewed. Patient complains of feeling confused. She is AAO x2-3 answering most questions appropriately  States that she is very anxious. Trying to climb out of bed-but is aware of where she is. Just states she does not feel like herself and is very anxious. Exam:  /78   Pulse 91   Temp 97.5 °F (36.4 °C) (Temporal)   Resp 16   Ht 5' 3\" (1.6 m)   Wt 109 lb (49.4 kg)   LMP  (LMP Unknown)   SpO2 95%   BMI 19.31 kg/m²   -General:  Awake, alert, oriented X 2-3. Well developed, well nourished, well groomed. No apparent distress. -HEENT:  Normocephalic, atraumatic. Pupils equal, round, reactive to light. No scleral icterus. No conjunctival injection. Normal lips, teeth, and gums. No nasal discharge.   Neck:  Supple    -Heart:  RRR, no murmurs, gallops, rubs    -Lungs:  CTA bilaterally, bilat symmetrical expansion, no wheeze, rales, or rhonchi    -Abdomen: Bowel sounds present, soft, nontender, no masses, no organomegaly, no peritoneal signs    -Extremities: normal ROM. No Cyanosis clubbing or edema    -Skin: Warm and dry    -Neuro:  AAO x 2-3 . Cranial nerves 2-12 intact, no focal deficits     -Psych : normal .    Medications:  Reviewed    Infusion Medications    dextrose      sodium chloride       Scheduled Medications    cetirizine  10 mg Oral Daily    furosemide  20 mg Oral Daily    arformoterol tartrate  15 mcg Nebulization BID    And    budesonide  0.5 mg Nebulization BID    escitalopram  10 mg Oral Daily    [Held by provider] buPROPion  150 mg Oral BID    pantoprazole  40 mg Oral QAM AC    [Held by provider] oxybutynin  10 mg Oral Daily    pramipexole  0.75 mg Oral BID    sodium chloride flush  10 mL IntraVENous 2 times per day    enoxaparin  30 mg SubCUTAneous Daily    aspirin  81 mg Oral Daily    levETIRAcetam  750 mg Oral BID    atorvastatin  40 mg Oral Nightly    insulin lispro  0-16 Units SubCUTAneous TID WC    insulin lispro  0-4 Units SubCUTAneous Nightly     PRN Meds: LORazepam, ipratropium-albuterol, glucose, dextrose bolus **OR** dextrose bolus, glucagon (rDNA), dextrose, sodium chloride flush, sodium chloride, ondansetron **OR** ondansetron, magnesium hydroxide, acetaminophen **OR** acetaminophen, [Held by provider] oxyCODONE, albuterol    I/O    Intake/Output Summary (Last 24 hours) at 1/9/2023 1900  Last data filed at 1/9/2023 0631  Gross per 24 hour   Intake --   Output 1250 ml   Net -1250 ml       Labs:   Recent Labs     01/08/23  0617 01/09/23  0630   WBC 6.9 8.0   HGB 10.1* 11.3*   HCT 32.7* 36.8    213       Recent Labs     01/08/23  0617 01/09/23  0630    139   K 4.8 3.9    100   CO2 25 22   BUN 25* 20   CREATININE 1.0 0.9   CALCIUM 9.3 9.5       Recent Labs     01/08/23  0617 01/09/23  0630   PROT 6.7 6.9   ALKPHOS 82 85   ALT 12 10   AST 16 15 BILITOT 0.5 0.8       No results for input(s): INR in the last 72 hours. No results for input(s): [place] Ill in the last 72 hours. Chronic labs:  Lab Results   Component Value Date    CHOL 93 01/09/2023    TRIG 96 01/09/2023    HDL 26 01/09/2023    LDLCALC 48 01/09/2023    TSH 1.300 01/07/2023    INR 2.8 01/06/2023    LABA1C 11.0 (H) 01/07/2023       Radiology:  Imaging studies reviewed today. ASSESSMENT:/Plan       - AMS  - TIA  - suspecting seizure  - T2DM  - HTN  - elevated troponin   - CKD  - HLP  - Hypoxia and oxygen dependant   - CAD s/p CABG   -COPD  -Hypoxia    Plan:  - admit to tele monitoring   - seizure precautions  - neuro checks  - pt/ot   - had received asa ands keppra in ER per tele neurology, will consult Neurology for further recommendations and continue keppra and asa   - accuchecks sand ssi  - serial troponin   -Diuresis as tolerated, inhalers supplemental oxygen   -Patient on 4L of 02 at baseline  -EEG with no seizure activity. -MRI pending  -Neurosurgery to check shunt   -Gave dose of ativan for agitation and severe anxiety from patient. No focal deficits were noted. Mag was 1.5. Give 2 GM of IV mag. Continue to monitor  Gap 17-ordered lactate and BHOB as well as repeat BmP-no acidosis        Diet: ADULT DIET; Regular; 3 carb choices (45 gm/meal); Low Sodium (2 gm)  ADULT ORAL NUTRITION SUPPLEMENT; Breakfast, Dinner; Low Calorie/High Protein Oral Supplement  Code Status: Full Code      +++++++++++++++++++++++++++++++++++++++++++++++++  Nancy Uriostegui DO  1/9/2023  [facility].  +++++++++++++++++++++++++++++++++++++++++++++++++  NOTE: This report was transcribed using voice recognition software. Every effort was made to ensure accuracy; however, inadvertent computerized transcription errors may be present.

## 2023-01-11 ENCOUNTER — APPOINTMENT (OUTPATIENT)
Dept: MRI IMAGING | Age: 68
End: 2023-01-11
Payer: MEDICARE

## 2023-01-11 VITALS
BODY MASS INDEX: 19.49 KG/M2 | OXYGEN SATURATION: 99 % | DIASTOLIC BLOOD PRESSURE: 94 MMHG | WEIGHT: 110 LBS | HEIGHT: 63 IN | SYSTOLIC BLOOD PRESSURE: 133 MMHG | HEART RATE: 88 BPM | TEMPERATURE: 97.8 F | RESPIRATION RATE: 20 BRPM

## 2023-01-11 LAB
ALBUMIN SERPL-MCNC: 3.6 G/DL (ref 3.5–5.2)
ALP BLD-CCNC: 86 U/L (ref 35–104)
ALT SERPL-CCNC: 10 U/L (ref 0–32)
ANION GAP SERPL CALCULATED.3IONS-SCNC: 13 MMOL/L (ref 7–16)
AST SERPL-CCNC: 13 U/L (ref 0–31)
BASOPHILS ABSOLUTE: 0.05 E9/L (ref 0–0.2)
BASOPHILS RELATIVE PERCENT: 0.8 % (ref 0–2)
BILIRUB SERPL-MCNC: 0.6 MG/DL (ref 0–1.2)
BUN BLDV-MCNC: 20 MG/DL (ref 6–23)
CALCIUM SERPL-MCNC: 9.3 MG/DL (ref 8.6–10.2)
CHLORIDE BLD-SCNC: 99 MMOL/L (ref 98–107)
CO2: 27 MMOL/L (ref 22–29)
CREAT SERPL-MCNC: 0.9 MG/DL (ref 0.5–1)
EOSINOPHILS ABSOLUTE: 0.12 E9/L (ref 0.05–0.5)
EOSINOPHILS RELATIVE PERCENT: 1.9 % (ref 0–6)
GFR SERPL CREATININE-BSD FRML MDRD: >60 ML/MIN/1.73
GLUCOSE BLD-MCNC: 213 MG/DL (ref 74–99)
HCT VFR BLD CALC: 40.6 % (ref 34–48)
HEMOGLOBIN: 12.3 G/DL (ref 11.5–15.5)
IMMATURE GRANULOCYTES #: 0.03 E9/L
IMMATURE GRANULOCYTES %: 0.5 % (ref 0–5)
LYMPHOCYTES ABSOLUTE: 1.87 E9/L (ref 1.5–4)
LYMPHOCYTES RELATIVE PERCENT: 29.3 % (ref 20–42)
MAGNESIUM: 1.6 MG/DL (ref 1.6–2.6)
MCH RBC QN AUTO: 27.3 PG (ref 26–35)
MCHC RBC AUTO-ENTMCNC: 30.3 % (ref 32–34.5)
MCV RBC AUTO: 90.2 FL (ref 80–99.9)
METER GLUCOSE: 102 MG/DL (ref 74–99)
METER GLUCOSE: 154 MG/DL (ref 74–99)
METER GLUCOSE: 192 MG/DL (ref 74–99)
METER GLUCOSE: 280 MG/DL (ref 74–99)
MONOCYTES ABSOLUTE: 0.7 E9/L (ref 0.1–0.95)
MONOCYTES RELATIVE PERCENT: 11 % (ref 2–12)
NEUTROPHILS ABSOLUTE: 3.61 E9/L (ref 1.8–7.3)
NEUTROPHILS RELATIVE PERCENT: 56.5 % (ref 43–80)
PDW BLD-RTO: 15.5 FL (ref 11.5–15)
PLATELET # BLD: 228 E9/L (ref 130–450)
PMV BLD AUTO: 11.2 FL (ref 7–12)
POTASSIUM SERPL-SCNC: 3.5 MMOL/L (ref 3.5–5)
RBC # BLD: 4.5 E12/L (ref 3.5–5.5)
SODIUM BLD-SCNC: 139 MMOL/L (ref 132–146)
TOTAL PROTEIN: 7.2 G/DL (ref 6.4–8.3)
WBC # BLD: 6.4 E9/L (ref 4.5–11.5)

## 2023-01-11 PROCEDURE — 94640 AIRWAY INHALATION TREATMENT: CPT

## 2023-01-11 PROCEDURE — 6370000000 HC RX 637 (ALT 250 FOR IP): Performed by: INTERNAL MEDICINE

## 2023-01-11 PROCEDURE — 6370000000 HC RX 637 (ALT 250 FOR IP): Performed by: PSYCHIATRY & NEUROLOGY

## 2023-01-11 PROCEDURE — 99232 SBSQ HOSP IP/OBS MODERATE 35: CPT | Performed by: PHYSICIAN ASSISTANT

## 2023-01-11 PROCEDURE — 97530 THERAPEUTIC ACTIVITIES: CPT

## 2023-01-11 PROCEDURE — 6370000000 HC RX 637 (ALT 250 FOR IP): Performed by: FAMILY MEDICINE

## 2023-01-11 PROCEDURE — 6360000004 HC RX CONTRAST MEDICATION: Performed by: RADIOLOGY

## 2023-01-11 PROCEDURE — 82962 GLUCOSE BLOOD TEST: CPT

## 2023-01-11 PROCEDURE — 6360000002 HC RX W HCPCS: Performed by: INTERNAL MEDICINE

## 2023-01-11 PROCEDURE — 2580000003 HC RX 258: Performed by: INTERNAL MEDICINE

## 2023-01-11 PROCEDURE — 2700000000 HC OXYGEN THERAPY PER DAY

## 2023-01-11 PROCEDURE — 92526 ORAL FUNCTION THERAPY: CPT

## 2023-01-11 PROCEDURE — 83735 ASSAY OF MAGNESIUM: CPT

## 2023-01-11 PROCEDURE — 70553 MRI BRAIN STEM W/O & W/DYE: CPT

## 2023-01-11 PROCEDURE — A9579 GAD-BASE MR CONTRAST NOS,1ML: HCPCS | Performed by: RADIOLOGY

## 2023-01-11 PROCEDURE — 97535 SELF CARE MNGMENT TRAINING: CPT

## 2023-01-11 PROCEDURE — 6370000000 HC RX 637 (ALT 250 FOR IP): Performed by: PHYSICIAN ASSISTANT

## 2023-01-11 PROCEDURE — 36415 COLL VENOUS BLD VENIPUNCTURE: CPT

## 2023-01-11 PROCEDURE — 2140000000 HC CCU INTERMEDIATE R&B

## 2023-01-11 PROCEDURE — 85025 COMPLETE CBC W/AUTO DIFF WBC: CPT

## 2023-01-11 PROCEDURE — 80053 COMPREHEN METABOLIC PANEL: CPT

## 2023-01-11 RX ORDER — LANCETS 30 GAUGE
1 EACH MISCELLANEOUS 4 TIMES DAILY
Qty: 600 EACH | Refills: 1 | Status: SHIPPED | OUTPATIENT
Start: 2023-01-11

## 2023-01-11 RX ORDER — GLUCOSAMINE HCL/CHONDROITIN SU 500-400 MG
CAPSULE ORAL
Qty: 90 STRIP | Refills: 3 | Status: SHIPPED | OUTPATIENT
Start: 2023-01-11

## 2023-01-11 RX ORDER — CLOPIDOGREL BISULFATE 75 MG/1
75 TABLET ORAL DAILY
Status: DISCONTINUED | OUTPATIENT
Start: 2023-01-11 | End: 2023-01-12 | Stop reason: HOSPADM

## 2023-01-11 RX ORDER — PEN NEEDLE, DIABETIC 31 G X1/4"
1 NEEDLE, DISPOSABLE MISCELLANEOUS DAILY
Qty: 100 EACH | Refills: 3 | Status: SHIPPED | OUTPATIENT
Start: 2023-01-11

## 2023-01-11 RX ORDER — ASPIRIN 81 MG/1
81 TABLET, CHEWABLE ORAL DAILY
Qty: 30 TABLET | Refills: 3 | Status: SHIPPED | OUTPATIENT
Start: 2023-01-12

## 2023-01-11 RX ORDER — LORAZEPAM 2 MG/ML
0.5 INJECTION INTRAMUSCULAR EVERY 6 HOURS PRN
Status: DISCONTINUED | OUTPATIENT
Start: 2023-01-11 | End: 2023-01-12 | Stop reason: DRUGHIGH

## 2023-01-11 RX ORDER — CARVEDILOL 6.25 MG/1
6.25 TABLET ORAL 2 TIMES DAILY WITH MEALS
Qty: 60 TABLET | Refills: 3 | Status: SHIPPED | OUTPATIENT
Start: 2023-01-11

## 2023-01-11 RX ORDER — INSULIN DETEMIR 100 [IU]/ML
5 INJECTION, SOLUTION SUBCUTANEOUS NIGHTLY
Qty: 5 ADJUSTABLE DOSE PRE-FILLED PEN SYRINGE | Refills: 3 | Status: SHIPPED | OUTPATIENT
Start: 2023-01-11

## 2023-01-11 RX ORDER — CLOPIDOGREL BISULFATE 75 MG/1
75 TABLET ORAL DAILY
Qty: 21 TABLET | Refills: 0 | Status: SHIPPED | OUTPATIENT
Start: 2023-01-11 | End: 2023-02-01

## 2023-01-11 RX ORDER — ESCITALOPRAM OXALATE 10 MG/1
10 TABLET ORAL DAILY
Qty: 30 TABLET | Refills: 3 | Status: SHIPPED | OUTPATIENT
Start: 2023-01-12

## 2023-01-11 RX ORDER — ATORVASTATIN CALCIUM 40 MG/1
40 TABLET, FILM COATED ORAL NIGHTLY
Qty: 30 TABLET | Refills: 3 | Status: SHIPPED | OUTPATIENT
Start: 2023-01-11

## 2023-01-11 RX ADMIN — CETIRIZINE HYDROCHLORIDE 10 MG: 10 TABLET, FILM COATED ORAL at 09:13

## 2023-01-11 RX ADMIN — BUDESONIDE 500 MCG: 0.5 SUSPENSION RESPIRATORY (INHALATION) at 09:35

## 2023-01-11 RX ADMIN — PANTOPRAZOLE SODIUM 40 MG: 40 TABLET, DELAYED RELEASE ORAL at 05:46

## 2023-01-11 RX ADMIN — ESCITALOPRAM OXALATE 10 MG: 10 TABLET ORAL at 09:00

## 2023-01-11 RX ADMIN — INSULIN LISPRO 8 UNITS: 100 INJECTION, SOLUTION INTRAVENOUS; SUBCUTANEOUS at 12:00

## 2023-01-11 RX ADMIN — LORAZEPAM 0.5 MG: 2 INJECTION INTRAMUSCULAR; INTRAVENOUS at 17:08

## 2023-01-11 RX ADMIN — ENOXAPARIN SODIUM 30 MG: 100 INJECTION SUBCUTANEOUS at 09:13

## 2023-01-11 RX ADMIN — CARVEDILOL 6.25 MG: 6.25 TABLET, FILM COATED ORAL at 09:14

## 2023-01-11 RX ADMIN — FUROSEMIDE 20 MG: 20 TABLET ORAL at 09:14

## 2023-01-11 RX ADMIN — GADOTERIDOL 10 ML: 279.3 INJECTION, SOLUTION INTRAVENOUS at 07:11

## 2023-01-11 RX ADMIN — Medication 10 ML: at 23:23

## 2023-01-11 RX ADMIN — ASPIRIN 81 MG CHEWABLE TABLET 81 MG: 81 TABLET CHEWABLE at 09:13

## 2023-01-11 RX ADMIN — LORAZEPAM 0.5 MG: 2 INJECTION INTRAMUSCULAR; INTRAVENOUS at 23:38

## 2023-01-11 RX ADMIN — CLOPIDOGREL BISULFATE 75 MG: 75 TABLET ORAL at 12:20

## 2023-01-11 RX ADMIN — ARFORMOTEROL TARTRATE 15 MCG: 15 SOLUTION RESPIRATORY (INHALATION) at 09:34

## 2023-01-11 RX ADMIN — PRAMIPEXOLE DIHYDROCHLORIDE 0.75 MG: 0.25 TABLET ORAL at 09:13

## 2023-01-11 ASSESSMENT — PAIN DESCRIPTION - DESCRIPTORS: DESCRIPTORS: ACHING

## 2023-01-11 ASSESSMENT — PAIN SCALES - GENERAL
PAINLEVEL_OUTOF10: 5
PAINLEVEL_OUTOF10: 0

## 2023-01-11 ASSESSMENT — PAIN - FUNCTIONAL ASSESSMENT: PAIN_FUNCTIONAL_ASSESSMENT: ACTIVITIES ARE NOT PREVENTED

## 2023-01-11 ASSESSMENT — PAIN DESCRIPTION - ORIENTATION: ORIENTATION: POSTERIOR

## 2023-01-11 ASSESSMENT — PAIN DESCRIPTION - LOCATION: LOCATION: HEAD

## 2023-01-11 NOTE — PROGRESS NOTES
Reason for consult: uncontrolled type 2 DM     A1C: 11%     [] Not available                     Patient states the following concerns/barriers to diabetes self-management:     [] None       [] Medication cost   [] Food cost/availability        [] Reading  [] Hearing   [] Vision                [] Work    [] Transportation  [] No insurance  [] Physical limitations    [x] Other: cognition         Diabetes survival packet provided to:   [x] Patient     [] Other:    Information reviewed:   Definition of diabetes   Target glucose ranges/A1C   Self-monitoring of blood glucose   Prevention/symptoms/treatment of hypo-/hyperglycemia   Medication adherence   The plate method/meal planning guidelines   The benefits of exercise and recommendations   Reducing the risk of chronic complications      Diabetes medications reviewed (use, purpose, action): Education provided regarding current basal/bolus regimen of Lantus and Humalog including differences in types of insulin, timing with meals, onset, duration of effect and peak of insulin dose. Hypoglycemia signs, symptoms and treatments reviewed and literature provided. Patient instructed on the preparation and injection of insulin dose via pen method. Patient states she has used an insulin pen in the past.  I offered to let her practice assembling my demo pen, but she said \"she already knows how to do it. \"                Post-education Assessment  []  Attentive to teaching  [x]  Answered most questions appropriately when asked   []  Seems able to apply concepts to daily lifestyle  []  Seems motivated to do well  [x]  Verbalized an understanding of the plate method for portion control   [x]  Verbalized an understanding of prescribed antidiabetes medications   []  Verbalized an understanding of target glucose ranges/A1C level  []  Expresses an intent to comply with treatment plan   []  Showed very little interest in complying with treatment plan   []  Seems to have trouble applying concepts to daily lifestyle       COMMENTS:  Patient still seems somewhat confused at times, but alert and oriented X 4 upon my questioning. She answers most questions appropriately, but with long pauses. She states diabetes is not new for her and that she used to monitor her glucose levels and take insulin via pens in the past.  She is able to tell me examples of carbohydrate foods and states she tries to eat healthy at home. She lives with her . I am unsure if she would be able to take care of her diabetes on her own at this current point in time. Per  note, patient may go to rehab or have [place] [id number]. Recommendations:   [x] Carbohydrate-controlled diet    [x] Script for glucometer and supplies (per preference of patient's insurance)  [x] Script for insulin pens and pen needles (if insulin is ordered at discharge for home use)   [] Consult to social work: patient has no insurance or has financial hardship  [] Inpatient consult to endocrinologist   [] Follow up with endocrinologist as an outpatient   [x] Home healthcare nursing to increase compliance to treatment plan   [] Script for outpatient diabetes education classes (from doctor)        Thank you for this consult.     Rl Frias, RN, BSN, Kathya Ferrell

## 2023-01-11 NOTE — PROGRESS NOTES
OCCUPATIONAL THERAPY TREATMENT NOTE     [facility]  OT BEDSIDE TREATMENT NOTE      Date:2023  Patient Name: Francisco Carrillo  MRN: [id number]  : 1955  Room: [address] [place] [address]-A     Per OT Eval:    Evaluating OT: Cara Velázquez OTR/L; [id number]        Referring Provider: Asha White MD    Specific Provider Orders/Date: OT Eval and Treat 23        Diagnosis: TIA (transient ischemic attack); Generalized weakness. Pt confused & combative PTA. Surgery: None this admission     Pertinent Medical History:  has a past medical history of Acid reflux disease, Acute on chronic respiratory failure with hypoxia and hypercapnia ([address] Apraxia, Arthritis, Ataxia, CAD (coronary artery disease), Choledocholithiasis, Chronic systolic congestive heart failure (H[facility]), CKD (chronic kidney disease) stage 3, GFR 30-59 ml/min ([facility]), COPD (chronic obstructive pulmonary disease) ([address] Diabetes mellitus ([address] Hyperlipidemia, Hypoxia, Neck pain, Normal pressure hydrocephalus ([facility]), Oxygen dependent, Pleural effusion, Pulmonary hypertension ([address] Restless legs, S/P CABG x 2, and Severe mitral regurgitation.       Recommended Adaptive Equipment: TBD      Precautions:  Fall Risk, spinal neutrality for comfort, O2, seizure precautions, +alarms, cognition      Assessment of current deficits    [x] Functional mobility            [x]ADLs           [x] Strength                  [x]Cognition    [x] Functional transfers          [x] IADLs         [x] Safety Awareness   [x]Endurance    [] Fine Coordination                         [x] Balance      [] Vision/perception   []Sensation      []Gross Motor Coordination             [] ROM           [] Delirium                   [] Motor Control      OT PLAN OF CARE   OT POC based on physician orders, patient diagnosis and results of clinical assessment     Frequency/Duration 1-3 days/wk for 2 weeks PRN   Specific OT Treatment Interventions to include:   * Instruction/training on adapted ADL techniques and AE recommendations to increase functional independence within precautions       * Training on energy conservation strategies, correct breathing pattern and techniques to improve independence/tolerance for self-care routine  * Functional transfer/mobility training/DME recommendations for increased independence, safety, and fall prevention  * Patient/Family education to increase follow through with safety techniques and functional independence  * Recommendation of environmental modifications for increased safety with functional transfers/mobility and ADLs  * Cognitive retraining/development of therapeutic activities to improve problem solving, judgement, memory, and attention for increased safety/participation in ADL/IADL tasks  * Therapeutic exercise to improve motor endurance, ROM, and functional strength for ADLs/functional transfers  * Therapeutic activities to facilitate/challenge dynamic balance, stand tolerance for increased safety and independence with ADLs  * Positioning to improve skin integrity, interaction with environment and functional independence     Modified Sonam Scale   Score     Description  0             No symptoms  1             No significant disability despite symptoms  2             Slight disability; able to look after own affairs  3             Moderate disability; able to ambulate without assist/ requires assist with ADLs  4             Moderate/Severe disability;requires assist to ambulate/assist with ADLs  5             Severe disability;bedridden/incontinent   6               Score:   4     Pt questionable historian. Home Living: Pt lives with  in 2 story home with 1 step & 0 handrails to enter.    Bathroom setup: Tub/shower with shower chair   Equipment owned: abram CRANE  Prior Level of Function: IND with ADLs , IND with IADLs; engaged in functional mobility with use of  ww  Driving: Yes  Occupation: None reported     Pain Level: Pt did not complain of pain this session  Cognition: A&O: 3/4; Follows 1 step directions. Pt impulsive & easily distracted. Memory:  Fair              Sequencing:  Fair-              Problem solving:  Fair-              Judgement/safety:  Fair-                Functional Assessment:  AM-PAC Daily Activity Raw Score: 18/24    Initial Eval Status  Date: 1/7/23 Treatment Status  Date: 1/11/23 STGs = LTGs  Time frame: 10-14 days   Feeding Setup   Setup   Independent    Grooming Stand by Assist  SBA  Pt required light assist to brush dentures. Modified [place]    UB Dressing Stand by Assist  SBA  To tie gown supine in bed Modified [place]    LB Dressing Minimal Assist  SBA  Humphrey/doff socks long-sitting in bed. Modified [place]    Bathing Minimal Assist Per previous session SBA Modified [place]    Toileting Minimal Assist  SBA  Pt able to complete pericare seated on commode. Modified [place]    Bed Mobility  Log Roll: Supervision  Supine to sit: Supervision   Sit to supine: Supervision  Supine to sit: Supervision   Sit to supine: Supervision Supine to sit: Independent   Sit to supine: Independent    Functional Transfers Sit to stand:SBA   Stand to sit:SBA  Stand pivot: SBA  Commode: SBA Sit<>stand: CGA  Commode: CGA Sit to stand:Modified [place]   Stand to sit:Modified [place]  Stand pivot: Modified [place]  Commode: Modified [place]    Functional Mobility SBA  Use of ww to<>from bathroom. CGA  No use of AD to<>from bathroom.   Modified [place] with use of Appropriate AD PRN   Balance Sitting:     Static - Supervision     Dynamic - SBA  Standing: SBA  Sitting EOB:  Supervision     Standing:  CGA w/o AD Sitting:     Static: Independent     Dynamic: Independent  Standing: Modified [place]   Activity Tolerance Fair Fair Good   Visual/  Perceptual Glasses: Yes - readers  Appears WFL          Safety Fair-                    Fair- Good  during ADL completion      Hand Dominance Right    AROM (PROM) Strength Additional Info:    RUE  WFL 4-/5 grossly tested good  and wfl FMC/dexterity noted during ADL tasks      LUE WFL 4-/5 grossly tested Good  and wfl FMC/dexterity noted during ADL tasks         Fine Motor Coordination:  Pt noted with mild difficulty completing finger-to-nose assessment with 100% completion. Hearing: WFL  Sensation:  No c/o numbness or tingling BUE  Tone: WFL BUE  Edema: Unremarkable    Comments: RN approved session. Upon arrival pt seated EOB & agreeable to OT session. Pt easily distracted & impulsive throughout session requiring mod verbal cues & education to promote safety awareness throughout ADLs. Pt able to don/doff socks long-sitting in bed. Pt required light assist/cues to implement fall prevention strategies when completing functional mobility to<>from bathroom with no use of AD. Pt able to void self seated on commode & complete pericare with verbal cues to maintain safe commode transfer with use of grab bars. At end of session pt supine in bed with all lines and tubes intact, call light within reach. Pt has made fair progress towards set goals.    Continue with current plan of care      Treatment Time In:1:21p            Treatment Time Out: 1:46p                Treatment Charges: Mins Units   Ther Ex  27092     Manual Therapy 27625     Thera Activities 04514 10 1   ADL/Home Mgt [id number] 15 1   Neuro Re-ed [id number]     Group Therapy      Orthotic manage/training  07064     Non-Billable Time     Total Timed Treatment 25 2         Micca Mirabella OTR/L; [id number]

## 2023-01-11 NOTE — CARE COORDINATION
1/11, SW met withpatient who was thinking she will need to go to rehab. Discussed where patient has been which is  Home	[place]. Patient agreeable to going to [facility]. Discussed patient could be too good with scores. If patient is unable to go to rehab patient would return home with  at [facility] along with daughter and grandchildren. Franci is following patient for [facility] and would need REJI orders for nursing and PT. Patient says she is not a new diabetic and has been one in the past.  Discussed patient getting re-educated with diabetic education. SW to follow for further discharge planning needs.       Carrie Garay, hospitals  [facility] Case Management  [phone]

## 2023-01-11 NOTE — PROGRESS NOTES
Physical Therapy Treatment     Name: Baljinder Wasserman  : 1955  MRN: [id number]      Date of Service: 2023    Evaluating PT:  Lacie Lopez, PT, DPT [id number]    Room #:  1205/9050-I  Diagnosis:  Hyperglycemia [R73.9]  Stroke-like symptoms [R29.90]  Altered mental status, unspecified altered mental status type [R41.82]  TIA (transient ischemic attack) [G45.9]  PMHx/PSHx:    Past Medical History:   Diagnosis Date    Acid reflux disease     Acute on chronic respiratory failure with hypoxia and hypercapnia ([address] 2018    Apraxia 2018    Arthritis     Ataxia 2018    CAD (coronary artery disease) 10/4/2012    Choledocholithiasis     recurrent    Chronic systolic congestive heart failure ([address] 2018    Ejection fraction 23% plus/minus 5%    CKD (chronic kidney disease) stage 3, GFR 30-59 ml/min ([facility]) 2018    COPD (chronic obstructive pulmonary disease) ([address]     alpha one M1S 183mg/dl    Diabetes mellitus ([address]     Hyperlipidemia     Hypoxia 2018    Neck pain     Normal pressure hydrocephalus ([address] 2018    Oxygen dependent     3l/min/nc    Pleural effusion years ago    requiring right thoracentesis    Pulmonary hypertension ([address] 2018    Restless legs     S/P CABG x 2 10/4/2012    Severe mitral regurgitation 2018     Procedure/Surgery:  none noted this admission   Reason for admission: TIA (transient ischemic attack); Generalized weakness  Precautions:  fall risk, O2 (4L baseline), spinal neutrality for comfort, seizure precautions, cognition, bed alarm   Equipment Needs:  FWW (owns)    SUBJECTIVE:  Pt lives in two story home with  and children with 1 CINDI home without HR. Pt is questionable historian noting confusion since admission. OBJECTIVE:   Initial Evaluation  Date: 23 Treatment  23 Short Term/ Long Term   Goals   AM-PAC 6 Clicks 37/98 88/93    Was pt agreeable to Eval/treatment? Yes yes    Does pt have pain?  Yes, neck pain  Neck pain     Bed Mobility  Rolling: SBA  Supine to sit: SBA  Sit to supine: SBA  Scooting: SBA Rolling:NT  Supine to sit: CGA  Sit to supine: CGA  Scooting: SBA Rolling: ind  Supine to sit: ind  Sit to supine: ind  Scooting: ind   Transfers Sit to stand: SBA  Stand to sit: SBA  Stand pivot: SBA Sit to stand: CGA  Stand to sit: CGA  Stand pivot: CGA Sit to stand: ind  Stand to sit: ind  Stand pivot: ind   Ambulation    50 feet with FWW SBA 45 ft no AD CGA<>min A 100 feet with AAD ind/mod I    Stair negotiation: ascended and descended NT NT 4 steps with one rail mod I    ROM BUE:  Refer to OT eval   BLE:  WNL     Strength BUE:  Refer to OT eval   BLE:  4/5 globally  4+/5   Balance Sitting EOB:  SBA  Dynamic Standing:  SBA FWW  Sitting EOB:  ind  Dynamic Standing:  ind/mod I fww     Pt is A & O x 3, not oriented to date   Sensation:  Pt denies numbness and tingling to extremities  Edema:  none noted     Vitals:  Blood Pressure at rest NT Blood Pressure post session NT   Heart Rate at rest 88 BPM Heart Rate post session 92 BPM   SPO2 at rest 95% SPO2 post session 94%     Therapeutic Exercises:  none performed this visit    Patient education  Pt educated on safety with OOB activity, FWW safety     Patient response to education:   Pt verbalized understanding Pt demonstrated skill Pt requires further education in this area   x x x     ASSESSMENT:    Conditions Requiring Skilled Therapeutic Intervention:    [x]Decreased strength     []Decreased ROM  [x]Decreased functional mobility  []Decreased balance   [x]Decreased endurance   []Decreased posture  []Decreased sensation  []Decreased coordination   []Decreased vision  [x]Decreased safety awareness   [x]Increased pain       Comments:  Pt in bed on arrival, O2 NC out of nose again, pt assisted with donning correctly. Pt requesting change of sheets, assisted to bedside chair to assist with changing.  Pt with erratic conversation/throughout process this date and required redirection to task throughout session. Pt also agitated throughout session, calmed as session progressed. Pt ambulated short distance in room with poor safety awareness and impulsiveness noted. Pt assisted back to bed, seizure  guard pads returned to bed. Pt left with call light in reach and all needs met. Pt noting desire for rehab after DC in order to improve strength prior to return home, SW/RN notified. Treatment:  Patient practiced and was instructed in the following treatment:    Bed mobility: performed with cues for safety awareness and proper hand placement to promote improved functional independence. Transfer Training: STS, SPT transfer performed at EOB with education for safety   Gait training: short distance ambulation performed in room with assist for [employer] and O2 management     Pt's/ family goals   1. Return home safely. Prognosis is good for reaching above PT goals. Patient and or family understand(s) diagnosis, prognosis, and plan of care.   yes    PHYSICAL THERAPY PLAN OF CARE:    PT POC is established based on physician order and patient diagnosis     Referring provider/PT Order:    01/07/23 0415  PT eval and treat  Start:  01/07/23 0415,   End:  01/07/23 0415,   ONE TIME,   Standing Count:  1 Occurrences,   Toi Elizabeth MD Acknowledge New     Diagnosis:  Hyperglycemia [R73.9]  Stroke-like symptoms [R29.90]  Altered mental status, unspecified altered mental status type [R41.82]  TIA (transient ischemic attack) [G45.9]  Specific instructions for next treatment:  increase ambulation distance     Current Treatment Recommendations:   [x] Strengthening to improve independence with functional mobility   [] ROM to improve independence with functional mobility   [] Balance Training to improve static/dynamic balance and to reduce fall risk  [x] Endurance Training to improve activity tolerance during functional mobility   [x] Transfer Training to improve safety and independence with all functional transfers   [x] Gait Training to improve gait mechanics, endurance and assess need for appropriate assistive device  [x] Stair Training in preparation for safe discharge home and/or into the community   [] Positioning to prevent skin breakdown and contractures  [] Safety and Education Training   [] Patient/Caregiver Education   [] HEP  [] Other     PT long term treatment goals are located in above grid    Frequency of treatments: 2-5x/week x 1-2 weeks. Time in  1245  Time out  1310    Total Treatment Time  25 minutes     Evaluation Time includes thorough review of current medical information, gathering information on past medical history/social history and prior level of function, completion of standardized testing/informal observation of tasks, assessment of data and education on plan of care and goals.     CPT codes:  [] Low Complexity PT evaluation 35883  [] Moderate Complexity PT evaluation 42572  [] High Complexity PT evaluation 93262  [] PT Re-evaluation 55490  [] Gait training 18873 -- minutes  [] Manual therapy 96198 -- minutes  [x] Therapeutic activities 84716 25 minutes  [] Therapeutic exercises 49431 -- minutes  [] Neuromuscular reeducation 44061 -- minutes     Broderick South, PT, DPT  [id number]

## 2023-01-11 NOTE — PROGRESS NOTES
Spoke to [place] who is Derotha  son called and reported to us that Olga Louis is now  as of 5 hours ago.

## 2023-01-11 NOTE — PROGRESS NOTES
Physician Progress Note      Alejandro Grijalva  CSN #:                  [id number]  :                       1955  ADMIT DATE:       2023 5:07 PM  [address] [place] DATE:  Cindy Ami  PROVIDER #:        Severo Sar DO          QUERY TEXT:    Dear Provider,    Pt presented with acute ischemic stroke. Noted  dietary consult   documentation of severe malnutrition. If possible, please document in   progress notes and discharge summary:    The medical record reflects the following:  Risk Factors: Acute ischemic stroke, NPH, COPD, DM, CKD, CHF, CAD w/CABG  Clinical Indicators: BMI 19.49.  dietary consult documentation of severe   malnutrition based on chronic  illness related to catabolic illness. 75% or less estimated energy   requirements for 1 month or longer, Severe  body fat loss orbital, triceps and buccal region; Severe muscle loss(   pectoralis & deltoids), calf (  gastrocnemius)  Treatment: Dietary consult with recommendations, Continued inpatient   monitoring, Low calorie/high protein  ONS in addition to ordered diet, I & O, daily weight    Thank you,  Romeo Manzanares RN  Clinical Documentation Improvement  [phone]  Options provided:  -- Severe malnutrition confirmed present on admission  -- Severe malnutrition ruled out  -- Other - I will add my own diagnosis  -- Disagree - Not applicable / Not valid  -- Disagree - Clinically unable to determine / Unknown  -- Refer to Clinical Documentation Reviewer    PROVIDER RESPONSE TEXT:    The diagnosis of severe malnutrition was confirmed as present on admission.     Query created by: Kaden Finney on 2023 1:40 PM      Electronically signed by:  Severo Sar DO 2023 1:44 PM

## 2023-01-11 NOTE — PROGRESS NOTES
Pt tolerating current diet without noted or reported difficulty.    Further speech therapy is not recommended at this time

## 2023-01-11 NOTE — CARE COORDINATION
1/11, SW spoke with Orlin finney from [facility] and [facility]. Patient is out of network with both facilities. Call placed to  and vm left for him to return call to this worker. SW to follow.       Dianne Evans, [facility]  PHYSICIANS [facility] Case Management  [phone]

## 2023-01-11 NOTE — PLAN OF CARE
Applied Zio XT monitor for 14 days. Serial number [id number]. Instructed patient to avoid showering in the first 24 hours. Press the button on the monitor when you feel a symptom and write it in your diary. Do not submerge in water. No lotion or powder near the patch. Please return the monitor in the box provided.  Patient somewhat verbalized understanding

## 2023-01-11 NOTE — PROGRESS NOTES
Lio Reyna is a 75 y.o. right handed female     Neurology following for TIA    PMH of DM, CAD status post CABG x2, HLD, pulmonary hypertension, NPH, COPD, CKD, CHF, and restless leg syndrome    Hospital course   Admitted to [facility] on 1/6/2023 with presentation of confusion and intermittent combative behavior. He initially presented to [facility] with symptoms that included garbled speech along with confusion and combative behavior. According to her  who was present at the bedside, patient has had recent neck surgery and trouble last year where cervical fusion was performed. She then went to [facility] for rehab and subsequently went home. She did end up with recent UTIs x 2 that led to brief hospitalization and report of altered mental status associated. She was fluctuating with respect to her mood and behavior as noted by her . Even patient admits that she has been a lot more combative and aggressive than she normally is and she is much more rasmussen than normal with frequent mood swings. This too is different from her norm. There is no report of any precipitating trauma infection, or fall. Interval history   EEG with a mild to moderate encephalopathy. No seizures. MRI keya with small infarct L parietal lobe. Prior echo in 10/22 with EF of 30-35%. Vessel imaging with no significant stenosis. LDL 48. A1C 11.0. No complaints today, wants to go home.      No family at bedside     No chest pain or palpitations  No coughing or wheezing    No vertigo, lightheadedness or loss of consciousness  No falls, tripping or stumbling  No incontinence of bowels or bladder  No itching or bruising appreciated  No numbness, tingling or focal arm/leg weakness    ROS otherwise negative      Objective:     BP (!) 138/94   Pulse 87   Temp 97.7 °F (36.5 °C) (Oral)   Resp 12   Ht 5' 3\" (1.6 m)   Wt 110 lb (49.9 kg)   LMP  (LMP Unknown)   SpO2 94%   BMI 19.49 kg/m²     General appearance: alert, appears stated age, cooperative and no distress  Head: normocephalic, without obvious abnormality, atraumatic  Eyes: conjunctivae/corneas clear  Neck: supple, symmetrical, trachea midline   Lungs: respirations non labored   Extremities:  normal, atraumatic, no cyanosis or edema  Skin: color, texture, turgor normal---no rashes or lesions      Mental Status: Alert, oriented, thought content appropriate.     Appropriate attention/concentration  Intact fundus of knowledge  Repetition intact  Impaired memories    Speech: no dysarthria  Language: no aphasias     Cranial Nerves:  I: smell    II: visual acuity     II: visual fields Full to confrontation   II: pupils CHANI   III,VII: ptosis None   III,IV,VI: extraocular muscles  Full ROM   V: mastication Normal   V: facial light touch sensation  Normal   V,VII: corneal reflex     VII: facial muscle function - upper  Normal   VII: facial muscle function - lower Normal   VIII: hearing Normal   IX: soft palate elevation  Normal   IX,X: gag reflex    XI: trapezius strength  5/5   XI: sternocleidomastoid strength 5/5   XI: neck extension strength  5/5   XII: tongue strength  Normal     Motor:  5/5 throughout  Normal bulk and tone  No drift   No abnormal movements    Sensory:  LT normal    Coordination:   FN, FFM and MARIBELL normal    DTR:     No Babinskis    Laboratory/Radiology:     CBC with Differential:    Lab Results   Component Value Date/Time    WBC 6.4 01/11/2023 08:28 AM    RBC 4.50 01/11/2023 08:28 AM    HGB 12.3 01/11/2023 08:28 AM    HCT 40.6 01/11/2023 08:28 AM     01/11/2023 08:28 AM    MCV 90.2 01/11/2023 08:28 AM    MCH 27.3 01/11/2023 08:28 AM    MCHC 30.3 01/11/2023 08:28 AM    RDW 15.5 01/11/2023 08:28 AM    SEGSPCT 63 01/30/2014 09:26 AM    LYMPHOPCT 29.3 01/11/2023 08:28 AM    MONOPCT 11.0 01/11/2023 08:28 AM    BASOPCT 0.8 01/11/2023 08:28 AM    MONOSABS 0.70 01/11/2023 08:28 AM    LYMPHSABS 1.87 01/11/2023 08:28 AM    EOSABS 0.12 01/11/2023 08:28 AM    BASOSABS 0.05 01/11/2023 08:28 AM     CMP:    Lab Results   Component Value Date/Time     01/11/2023 08:28 AM    K 3.5 01/11/2023 08:28 AM    K 3.6 01/09/2023 09:11 PM    CL 99 01/11/2023 08:28 AM    CO2 27 01/11/2023 08:28 AM    BUN 20 01/11/2023 08:28 AM    CREATININE 0.9 01/11/2023 08:28 AM    GFRAA 60 10/10/2022 11:40 AM    LABGLOM >60 01/11/2023 08:28 AM    GLUCOSE 213 01/11/2023 08:28 AM    GLUCOSE 111 09/30/2011 02:00 PM    PROT 7.2 01/11/2023 08:28 AM    LABALBU 3.6 01/11/2023 08:28 AM    LABALBU 4.4 09/30/2011 02:00 PM    CALCIUM 9.3 01/11/2023 08:28 AM    BILITOT 0.6 01/11/2023 08:28 AM    ALKPHOS 86 01/11/2023 08:28 AM    AST 13 01/11/2023 08:28 AM    ALT 10 01/11/2023 08:28 AM     Hepatic Function Panel:    Lab Results   Component Value Date/Time    ALKPHOS 86 01/11/2023 08:28 AM    ALT 10 01/11/2023 08:28 AM    AST 13 01/11/2023 08:28 AM    PROT 7.2 01/11/2023 08:28 AM    BILITOT 0.6 01/11/2023 08:28 AM    BILIDIR <0.2 07/30/2014 10:35 AM    IBILI 0.1 07/30/2014 10:35 AM    LABALBU 3.6 01/11/2023 08:28 AM    LABALBU 4.4 09/30/2011 02:00 PM     HgBA1c:    Lab Results   Component Value Date/Time    LABA1C 11.0 01/07/2023 06:10 AM     FLP:    Lab Results   Component Value Date/Time    TRIG 96 01/09/2023 06:30 AM    HDL 26 01/09/2023 06:30 AM    LDLCALC 48 01/09/2023 06:30 AM    LABVLDL 19 01/09/2023 06:30 AM     CTA head/neck: 1. No focal hemodynamically significant stenosis, occlusion or aneurysm in the large arteries of the head and neck. 2. No acute intracranial hemorrhage or mass effect. 3. Stable right occipital approach ventriculoperitoneal shunt catheter. The ventricles are similar in size and shape to the prior study. No progressive hydrocephalus. 4. Stable small left-sided subdural low-attenuation fluid collection which may represent a chronic hygroma. MRI keya   Punctate, acute cortical infarct within the left parietal lobe.      Echo 10/22   Left ventricle mildly dilated. Global hypokinesis is noted to be severe. Estimated left ventricular ejection fraction is 30±5%. Does not meet 50% diagnostic criteria for diastolic dysfunction. Normal left ventricular wall thickness. Paradoxical septum c/w right ventricular volume/pressure overload. The LAESV Index is <34ml/m2. Moderately dilated right ventricle. Right ventricle global systolic function is moderate- severely reduced . TAPSE is decreased   Interatrial septum bowed toward the left, consistent with elevated RA   pressure. Increased E point septal separation noted,suggesting decreased LV cardiac   output. Moderate mitral regurgitation is present. Moderate to severe tricuspid regurgitation. RVSP is 61 mmHg. Pulmonary hypertension is moderate . Physiologic and/or trace pulmonic regurgitation present. Technically excellent quality study. No comparison study available. Suggest clinical correlation. Mild diffuse pachymeningeal enhancement. Correlate with concerns for  intracranial hypotension. EEG  This abnormal study showed evidence of:     A mild to moderate nonspecific encephalopathy     No seizures or definite epileptiform discharges were noted during this study. All labs and imaging studies reviewed independently today     Assessment:     Punctate area of acute ischemic stroke in the L parietal lobe   Vessel imaging with no significant stenosis.  Echo in 10/22 showed EF of 30-35%, concern for cardio-embolic source, will obtain extended cardiac monitoring on d/c   Plan:     Zio patch XT on discharge - order placed   DAPT 21 days, then ASA monotherapy   Statin therapy with goal LDL < 70  Discontinue Keppra   Risk factor modification    A1C 11, goal < 7.0   Stroke education   Stroke clinic f/u   Neuro will sign off, okay for d/c from neuro MELISSA Mustafa PA-C  11:02 AM  1/11/2023

## 2023-01-11 NOTE — PROGRESS NOTES
Hospitalist Progress Note      Synopsis: Patient admitted on 1/6/2023  Ms. Surjit Castillo, a 75y.o. year old female  who  has a past medical history of Acid reflux disease, Acute on chronic respiratory failure with hypoxia and hypercapnia ([facility]), Apraxia, Arthritis, Ataxia, CAD (coronary artery disease), Choledocholithiasis, Chronic systolic congestive heart failure ([address] CKD (chronic kidney disease) stage 3, GFR 30-59 ml/min ([facility]), COPD (chronic obstructive pulmonary disease) ([address] Diabetes mellitus ([address] Hyperlipidemia, Hypoxia, Neck pain, Normal pressure hydrocephalus ([facility]), Oxygen dependent, Pleural effusion, Pulmonary hypertension ([address] Restless legs, S/P CABG x 2, and Severe mitral regurgitation. presents with transfer from [facility] with TIA, asa, keppra, need MRI and EEG. Presented there with weakness started October after having cervical surgery by Dr Nae Hardy but got worse today. woke up this morning confused and intermittently combative. Had garbled speech at 4pm then back to baseline. No falls or head trauma. Denies fever chills no chest pain or SOB. Subjective  Seen and examined chart reviewed. Patient alert and oriented X3. Remembers feeling anxious yesterday. Wants to know answers. Exam:  BP (!) 138/94   Pulse 87   Temp 97.7 °F (36.5 °C) (Oral)   Resp 12   Ht 5' 3\" (1.6 m)   Wt 110 lb (49.9 kg)   LMP  (LMP Unknown)   SpO2 94%   BMI 19.49 kg/m²   -General:  Awake, alert, oriented X 2-3. Well developed, well nourished, well groomed. No apparent distress. -HEENT:  Normocephalic, atraumatic. Pupils equal, round, reactive to light.      -Heart:  RRR, no murmurs, gallops, rubs    -Lungs:  CTA bilaterally, bilat symmetrical expansion, no wheeze, rales, or rhonchi    -Abdomen: Bowel sounds present, soft, nontender, no masses, no organomegaly, no peritoneal signs    -Extremities: normal ROM. No Cyanosis clubbing or edema    -Skin: Warm and dry    -Neuro:  AAO x 2-3 . Cranial nerves 2-12 intact, no focal deficits     -Psych : normal .    Medications:  Reviewed    Infusion Medications    dextrose      sodium chloride       Scheduled Medications    clopidogrel  75 mg Oral Daily    carvedilol  6.25 mg Oral BID WC    cetirizine  10 mg Oral Daily    furosemide  20 mg Oral Daily    arformoterol tartrate  15 mcg Nebulization BID    And    budesonide  0.5 mg Nebulization BID    escitalopram  10 mg Oral Daily    [Held by provider] buPROPion  150 mg Oral BID    pantoprazole  40 mg Oral QAM AC    [Held by provider] oxybutynin  10 mg Oral Daily    pramipexole  0.75 mg Oral BID    sodium chloride flush  10 mL IntraVENous 2 times per day    enoxaparin  30 mg SubCUTAneous Daily    aspirin  81 mg Oral Daily    atorvastatin  40 mg Oral Nightly    insulin lispro  0-16 Units SubCUTAneous TID WC    insulin lispro  0-4 Units SubCUTAneous Nightly     PRN Meds: ipratropium-albuterol, glucose, dextrose bolus **OR** dextrose bolus, glucagon (rDNA), dextrose, sodium chloride flush, sodium chloride, ondansetron **OR** ondansetron, magnesium hydroxide, acetaminophen **OR** acetaminophen, [Held by provider] oxyCODONE, albuterol    I/O    Intake/Output Summary (Last 24 hours) at 1/11/2023 1623  Last data filed at 1/11/2023 1208  Gross per 24 hour   Intake 1920 ml   Output --   Net 1920 ml       Labs:   Recent Labs     01/09/23  0630 01/10/23  0605 01/11/23  0828   WBC 8.0 6.4 6.4   HGB 11.3* 11.7 12.3   HCT 36.8 39.0 40.6    218 228       Recent Labs     01/09/23  2111 01/10/23  0605 01/11/23  0828    139 139   K 3.6 3.7 3.5   CL 96* 100 99   CO2 24 27 27   BUN 17 15 20   CREATININE 0.9 0.9 0.9   CALCIUM 8.9 9.1 9.3       Recent Labs     01/09/23  0630 01/10/23  0605 01/11/23  0828   PROT 6.9 7.1 7.2   ALKPHOS 85 90 86   ALT 10 12 10   AST 15 14 13   BILITOT 0.8 0.8 0.6       No results for input(s): INR in the last 72 hours.       No results for input(s): Chirag Rosado in the last 72 hours.    Chronic labs:  Lab Results   Component Value Date    CHOL 93 01/09/2023    TRIG 96 01/09/2023    HDL 26 01/09/2023    LDLCALC 48 01/09/2023    TSH 1.300 01/07/2023    INR 2.8 01/06/2023    LABA1C 11.0 (H) 01/07/2023       Radiology:  Imaging studies reviewed today. ASSESSMENT:/Plan       - AMS  - TIA  - suspecting seizure  - T2DM  - HTN  - CKD  - HLP  - Hypoxia and oxygen dependant   - CAD s/p CABG   -COPD  -Hypoxia    Plan:  - admit to tele monitoring   - seizure precautions  - neuro checks  - pt/ot   - had received asa ands keppra in ER per tele neurology, neurology with EEG reviewed. No seizure activity. Keppra stopped. MRI showed an acute CVA punctate infarct of left parietal lobe. Also possible intracranial hypotension. Shunt was adjusted by neurosurgery  -Spoke with Neurology. Recommending Zio patch, DUAP X 21 days then ASA monotherapy. I reviewed hba1c of 11. Was planning for 5 of Levemir daily for simplicity at discharge. Sporadic need of SSI. Monitor and adjust discharge dose based off continued blood sugar monitoring.   -Diuresis as tolerated, inhalers supplemental oxygen   -Patient on 4L of 02 at baseline  No focal deficits were noted. Mag 1.8 today. Gap resolved. Lactate was negative. Diet: ADULT DIET; Regular; 3 carb choices (45 gm/meal); Low Sodium (2 gm)  ADULT ORAL NUTRITION SUPPLEMENT; Breakfast, Dinner; Low Calorie/High Protein Oral Supplement  Code Status: Full Code      Disposition: Patient stable for discharge medically but still has some cognitive impairment secondary to CVA and  passed away today with no support at home. She is interested in rehab and awaiting pre-cert for placement but may not qualify.  Needs safe discharge plan with support.     +++++++++++++++++++++++++++++++++++++++++++++++++  Sudeep Rootluis miguel DO  1/11/2023  [facility].  +++++++++++++++++++++++++++++++++++++++++++++++++  NOTE: This report was transcribed using voice recognition software. Every effort was made to ensure accuracy; however, inadvertent computerized transcription errors may be present.

## 2023-01-12 LAB
ALBUMIN SERPL-MCNC: 3.4 G/DL (ref 3.5–5.2)
ALP BLD-CCNC: 80 U/L (ref 35–104)
ALT SERPL-CCNC: 10 U/L (ref 0–32)
ANION GAP SERPL CALCULATED.3IONS-SCNC: 14 MMOL/L (ref 7–16)
AST SERPL-CCNC: 14 U/L (ref 0–31)
BASOPHILS ABSOLUTE: 0.06 E9/L (ref 0–0.2)
BASOPHILS RELATIVE PERCENT: 0.9 % (ref 0–2)
BILIRUB SERPL-MCNC: 0.5 MG/DL (ref 0–1.2)
BUN BLDV-MCNC: 15 MG/DL (ref 6–23)
CALCIUM SERPL-MCNC: 9.1 MG/DL (ref 8.6–10.2)
CHLORIDE BLD-SCNC: 99 MMOL/L (ref 98–107)
CO2: 27 MMOL/L (ref 22–29)
CREAT SERPL-MCNC: 0.8 MG/DL (ref 0.5–1)
EOSINOPHILS ABSOLUTE: 0.14 E9/L (ref 0.05–0.5)
EOSINOPHILS RELATIVE PERCENT: 2.1 % (ref 0–6)
GFR SERPL CREATININE-BSD FRML MDRD: >60 ML/MIN/1.73
GLUCOSE BLD-MCNC: 151 MG/DL (ref 74–99)
HCT VFR BLD CALC: 40.6 % (ref 34–48)
HEMOGLOBIN: 12.4 G/DL (ref 11.5–15.5)
IMMATURE GRANULOCYTES #: 0.01 E9/L
IMMATURE GRANULOCYTES %: 0.1 % (ref 0–5)
LYMPHOCYTES ABSOLUTE: 2.56 E9/L (ref 1.5–4)
LYMPHOCYTES RELATIVE PERCENT: 38.4 % (ref 20–42)
MAGNESIUM: 1.7 MG/DL (ref 1.6–2.6)
MCH RBC QN AUTO: 27.6 PG (ref 26–35)
MCHC RBC AUTO-ENTMCNC: 30.5 % (ref 32–34.5)
MCV RBC AUTO: 90.2 FL (ref 80–99.9)
METER GLUCOSE: 179 MG/DL (ref 74–99)
METER GLUCOSE: 283 MG/DL (ref 74–99)
MONOCYTES ABSOLUTE: 0.68 E9/L (ref 0.1–0.95)
MONOCYTES RELATIVE PERCENT: 10.2 % (ref 2–12)
NEUTROPHILS ABSOLUTE: 3.22 E9/L (ref 1.8–7.3)
NEUTROPHILS RELATIVE PERCENT: 48.3 % (ref 43–80)
PDW BLD-RTO: 15.2 FL (ref 11.5–15)
PLATELET # BLD: 231 E9/L (ref 130–450)
PMV BLD AUTO: 11.2 FL (ref 7–12)
POTASSIUM SERPL-SCNC: 3.2 MMOL/L (ref 3.5–5)
RBC # BLD: 4.5 E12/L (ref 3.5–5.5)
SARS-COV-2, NAAT: NOT DETECTED
SODIUM BLD-SCNC: 140 MMOL/L (ref 132–146)
TOTAL PROTEIN: 7.1 G/DL (ref 6.4–8.3)
WBC # BLD: 6.7 E9/L (ref 4.5–11.5)

## 2023-01-12 PROCEDURE — 2580000003 HC RX 258: Performed by: INTERNAL MEDICINE

## 2023-01-12 PROCEDURE — 85025 COMPLETE CBC W/AUTO DIFF WBC: CPT

## 2023-01-12 PROCEDURE — 6360000002 HC RX W HCPCS: Performed by: INTERNAL MEDICINE

## 2023-01-12 PROCEDURE — 87635 SARS-COV-2 COVID-19 AMP PRB: CPT

## 2023-01-12 PROCEDURE — 80053 COMPREHEN METABOLIC PANEL: CPT

## 2023-01-12 PROCEDURE — 82962 GLUCOSE BLOOD TEST: CPT

## 2023-01-12 PROCEDURE — 6370000000 HC RX 637 (ALT 250 FOR IP): Performed by: PHYSICIAN ASSISTANT

## 2023-01-12 PROCEDURE — 6370000000 HC RX 637 (ALT 250 FOR IP): Performed by: FAMILY MEDICINE

## 2023-01-12 PROCEDURE — 93242 EXT ECG>48HR<7D RECORDING: CPT

## 2023-01-12 PROCEDURE — 97535 SELF CARE MNGMENT TRAINING: CPT

## 2023-01-12 PROCEDURE — 2700000000 HC OXYGEN THERAPY PER DAY

## 2023-01-12 PROCEDURE — 97530 THERAPEUTIC ACTIVITIES: CPT

## 2023-01-12 PROCEDURE — 83735 ASSAY OF MAGNESIUM: CPT

## 2023-01-12 PROCEDURE — 6370000000 HC RX 637 (ALT 250 FOR IP): Performed by: PSYCHIATRY & NEUROLOGY

## 2023-01-12 PROCEDURE — 94640 AIRWAY INHALATION TREATMENT: CPT

## 2023-01-12 PROCEDURE — 6370000000 HC RX 637 (ALT 250 FOR IP): Performed by: INTERNAL MEDICINE

## 2023-01-12 PROCEDURE — 36415 COLL VENOUS BLD VENIPUNCTURE: CPT

## 2023-01-12 RX ORDER — LORAZEPAM 2 MG/ML
1 INJECTION INTRAMUSCULAR EVERY 6 HOURS PRN
Status: DISCONTINUED | OUTPATIENT
Start: 2023-01-12 | End: 2023-01-12 | Stop reason: HOSPADM

## 2023-01-12 RX ADMIN — CARVEDILOL 6.25 MG: 6.25 TABLET, FILM COATED ORAL at 07:34

## 2023-01-12 RX ADMIN — CETIRIZINE HYDROCHLORIDE 10 MG: 10 TABLET, FILM COATED ORAL at 07:34

## 2023-01-12 RX ADMIN — BUDESONIDE 500 MCG: 0.5 SUSPENSION RESPIRATORY (INHALATION) at 08:07

## 2023-01-12 RX ADMIN — ASPIRIN 81 MG CHEWABLE TABLET 81 MG: 81 TABLET CHEWABLE at 07:33

## 2023-01-12 RX ADMIN — INSULIN LISPRO 8 UNITS: 100 INJECTION, SOLUTION INTRAVENOUS; SUBCUTANEOUS at 11:45

## 2023-01-12 RX ADMIN — ESCITALOPRAM OXALATE 10 MG: 10 TABLET ORAL at 07:33

## 2023-01-12 RX ADMIN — Medication 10 ML: at 07:35

## 2023-01-12 RX ADMIN — PRAMIPEXOLE DIHYDROCHLORIDE 0.75 MG: 0.25 TABLET ORAL at 09:15

## 2023-01-12 RX ADMIN — LORAZEPAM 1 MG: 2 INJECTION INTRAMUSCULAR; INTRAVENOUS at 12:34

## 2023-01-12 RX ADMIN — ENOXAPARIN SODIUM 30 MG: 100 INJECTION SUBCUTANEOUS at 07:35

## 2023-01-12 RX ADMIN — CLOPIDOGREL BISULFATE 75 MG: 75 TABLET ORAL at 07:34

## 2023-01-12 RX ADMIN — ARFORMOTEROL TARTRATE 15 MCG: 15 SOLUTION RESPIRATORY (INHALATION) at 08:06

## 2023-01-12 RX ADMIN — FUROSEMIDE 20 MG: 20 TABLET ORAL at 07:34

## 2023-01-12 RX ADMIN — ACETAMINOPHEN 650 MG: 325 TABLET ORAL at 07:33

## 2023-01-12 RX ADMIN — PANTOPRAZOLE SODIUM 40 MG: 40 TABLET, DELAYED RELEASE ORAL at 05:46

## 2023-01-12 ASSESSMENT — PAIN SCALES - GENERAL
PAINLEVEL_OUTOF10: 5
PAINLEVEL_OUTOF10: 2

## 2023-01-12 ASSESSMENT — PAIN DESCRIPTION - ORIENTATION: ORIENTATION: POSTERIOR

## 2023-01-12 ASSESSMENT — PAIN DESCRIPTION - DESCRIPTORS: DESCRIPTORS: ACHING;CRUSHING;HEAVINESS

## 2023-01-12 ASSESSMENT — PAIN DESCRIPTION - LOCATION: LOCATION: NECK

## 2023-01-12 NOTE — PROGRESS NOTES
OCCUPATIONAL THERAPY TREATMENT NOTE     [facility]  OT BEDSIDE TREATMENT NOTE      Date:2023  Patient Name: Chanelle Loving  MRN: [id number]  : 1955  Room: [address] [place], [place] [address]     Evaluating OT: Bridgett GUNTER/MEGAN; [id number]        Referring Provider: Hoda Whitehead MD    Specific Provider Orders/Date: OT Eval and Treat 23        Diagnosis: TIA (transient ischemic attack); Generalized weakness. Pt confused & combative PTA. Surgery: None this admission     Pertinent Medical History:  has a past medical history of Acid reflux disease, Acute on chronic respiratory failure with hypoxia and hypercapnia ([facility] Apraxia, Arthritis, Ataxia, CAD (coronary artery disease), Choledocholithiasis, Chronic systolic congestive heart failure ([facility]), CKD (chronic kidney disease) stage 3, GFR 30-59 ml/min ([facility]), COPD (chronic obstructive pulmonary disease) ([facility] Diabetes mellitus ([facility] Hyperlipidemia, Hypoxia, Neck pain, Normal pressure hydrocephalus ([facility]), Oxygen dependent, Pleural effusion, Pulmonary hypertension ([address] Restless legs, S/P CABG x 2, and Severe mitral regurgitation.       Recommended Adaptive Equipment: TBD      Precautions:  Fall Risk, spinal neutrality for comfort, O2, seizure precautions, +alarms, cognition      Assessment of current deficits    [x] Functional mobility            [x]ADLs           [x] Strength                  [x]Cognition    [x] Functional transfers          [x] IADLs         [x] Safety Awareness   [x]Endurance    [] Fine Coordination                         [x] Balance      [] Vision/perception   []Sensation      []Gross Motor Coordination             [] ROM           [] Delirium                   [] Motor Control      OT PLAN OF CARE   OT POC based on physician orders, patient diagnosis and results of clinical assessment     Frequency/Duration 1-3 days/wk for 2 weeks PRN   Specific OT Treatment Interventions to include:   * Instruction/training on adapted ADL techniques and AE recommendations to increase functional independence within precautions       * Training on energy conservation strategies, correct breathing pattern and techniques to improve independence/tolerance for self-care routine  * Functional transfer/mobility training/DME recommendations for increased independence, safety, and fall prevention  * Patient/Family education to increase follow through with safety techniques and functional independence  * Recommendation of environmental modifications for increased safety with functional transfers/mobility and ADLs  * Cognitive retraining/development of therapeutic activities to improve problem solving, judgement, memory, and attention for increased safety/participation in ADL/IADL tasks  * Therapeutic exercise to improve motor endurance, ROM, and functional strength for ADLs/functional transfers  * Therapeutic activities to facilitate/challenge dynamic balance, stand tolerance for increased safety and independence with ADLs  * Positioning to improve skin integrity, interaction with environment and functional independence     Modified Grimes Scale   Score     Description  0             No symptoms  1             No significant disability despite symptoms  2             Slight disability; able to look after own affairs  3             Moderate disability; able to ambulate without assist/ requires assist with ADLs  4             Moderate/Severe disability;requires assist to ambulate/assist with ADLs  5             Severe disability;bedridden/incontinent   6               Score:   4     Per OT Eval:    Pt questionable historian. Home Living: Pt lives with  in 2 story home with 1 step & 0 handrails to enter.    Bathroom setup: Tub/shower with shower chair   Equipment owned: , abram  Prior Level of Function: IND with ADLs , IND with IADLs; engaged in functional mobility with use of  ww  Driving: Yes  Occupation: None reported     Pain Level: Pt did not complain of pain this session  Cognition: Unable to assess this date d/t pt anxiety/emotional over loss of  yesterday. Follows 1 step commands inconsistently. Pt emotional/crying throughout treatment, emotional support provided. Memory:  Fair              Sequencing:  Fair-              Problem solving:  Fair-              Judgement/safety:  Fair-                Functional Assessment:  AM-PAC Daily Activity Raw Score: 18/24    Initial Eval Status  Date: 1/7/23 Treatment Status  Date: 1/12/23 STGs = LTGs  Time frame: 10-14 days   Feeding Setup   Setup   Independent    Grooming Stand by Assist  SBA/S  Seated on BSC pt brushed hair and washed face/hands. Modified [place]    UB Dressing Stand by Assist  Min A  To don/doff hospital gown seated on BSC. Assist required to manage gown around trunk in back. Pt self limiting this date d/t emotional distress. Modified [place]    LB Dressing Minimal Assist  SBA  Pt able to don/doff socks while seated at EOB utilizing leg cross over technique. Modified [place]    Bathing Minimal Assist SBA  Per previous tx  Pt declined this AM. Will continue to assess. Modified [place]    Toileting Minimal Assist  SBA  For balance/safety while completing personal hygiene and clothing management d/t unsteadiness. Min cues for safety required. Modified [place]    Bed Mobility  Log Roll: Supervision  Supine to sit: Supervision   Sit to supine: Supervision  Sit>Supine: Supervision  For safety. Supine to sit: Independent   Sit to supine: Independent    Functional Transfers Sit to stand:SBA   Stand to sit:SBA  Stand pivot: SBA  Commode: SBA Sit<>Stand: SBA  Commode Transfer: CGA/SBA  Min cues for hand placement/safety required. Sit to stand:Modified [place]   Stand to sit:Modified [place]  Stand pivot: Modified [place]  Commode: Modified [place]    Functional Mobility SBA  Use of ww to<>from bathroom.   CGA  Completed short distance from [facility]>Bed with assist for balance/safety d/t unsteadiness without device. Modified [place] with use of Appropriate AD PRN   Balance Sitting:     Static - Supervision     Dynamic - SBA  Standing: SBA Sitting:     Static - Supervision     Dynamic - SBA  Standing: CGA/SBA   Sitting:     Static: Independent     Dynamic: Independent  Standing: Modified [place]   Activity Tolerance Fair Fair-  Limited this date d/t emotional distress. Good   Visual/  Perceptual Glasses: Yes - readers  Appears WFL          Safety Fair-                    Fair- Good  during ADL completion      Comments: Upon arrival pt completing transfer to [facility]. Pt educated on transfer safety, adaptive techniques for ADL tasks, transfer/mobility safety, recommended DME, safety and fall prevention. Pt emotional throughout tx session secondary to finding out her  passed away yesterday, emotional support provided throughout. Increased time required to complete activities d/t anxiety/crying. At end of tx session pt returned back to supine with HOB elevated with all lines and tubes intact and call light within reach. Pt has made fair progress towards set goals.    Continue with current plan of care      Treatment Time In:1007         Treatment Time Out: 0261              Treatment Charges: Mins Units   Ther Ex  74126     Manual Therapy 95[address] [facility]     Thera Activities 26482 8 1   ADL/Home Mgt 85126 16 1   Neuro Re-ed 45201     Group Therapy      Orthotic manage/training  71569     Non-Billable Time     Total Timed Treatment 24 2333 [facility] HERNANDEZ/L [address]53

## 2023-01-12 NOTE — CARE COORDINATION
1/12, Luke Son from [facility] is able to accept patient for rehab. Insurance is waiving precert. COVID test will be needed. Citlalli Arrant to pick patient up today at 4pm to go to [facility]. Luke Son from [facility] and daughter gabe Charge Nurse aware of  time. 7000, ambulance form, face sheet and envelope on soft chart.   SW to follow     Henrry Martinez  PHYSICIANS [facility] Case Management  [phone]

## 2023-01-12 NOTE — PROGRESS NOTES
Occupational [address]  [address] [facility], [address] TREATMENT NOTE      Date:2023  Patient Name: Chanelle Loving  MRN: [id number]  : 1955  Room: [address] [place], [place] [address]     Per OT Eval:      Evaluating OT: Bridgett Duran OTR/L; [id number]        Referring Provider: Hoda Whitehead MD    Specific Provider Orders/Date: OT Eval and Treat 23        Diagnosis: TIA (transient ischemic attack); Generalized weakness. Pt confused & combative PTA. Surgery: None this admission     Pertinent Medical History:  has a past medical history of Acid reflux disease, Acute on chronic respiratory failure with hypoxia and hypercapnia ([address] Apraxia, Arthritis, Ataxia, CAD (coronary artery disease), Choledocholithiasis, Chronic systolic congestive heart failure (HCC), CKD (chronic kidney disease) stage 3, GFR 30-59 ml/min ([facility]), COPD (chronic obstructive pulmonary disease) ([address] Diabetes mellitus ([address] Hyperlipidemia, Hypoxia, Neck pain, Normal pressure hydrocephalus (HCC), Oxygen dependent, Pleural effusion, Pulmonary hypertension ([address] Restless legs, S/P CABG x 2, and Severe mitral regurgitation.       Recommended Adaptive Equipment: TBD      Precautions:  Fall Risk, spinal neutrality for comfort, O2, seizure precautions, +alarms, cognition      Assessment of current deficits    [x] Functional mobility            [x]ADLs           [x] Strength                  [x]Cognition    [x] Functional transfers          [x] IADLs         [x] Safety Awareness   [x]Endurance    [] Fine Coordination                         [x] Balance      [] Vision/perception   []Sensation      []Gross Motor Coordination             [] ROM           [] Delirium                   [] Motor Control      OT PLAN OF CARE   OT POC based on physician orders, patient diagnosis and results of clinical assessment     Frequency/Duration 1-3 days/wk for 2 weeks PRN   Specific OT Treatment Interventions to include:   * Instruction/training on adapted ADL techniques and AE recommendations to increase functional independence within precautions       * Training on energy conservation strategies, correct breathing pattern and techniques to improve independence/tolerance for self-care routine  * Functional transfer/mobility training/DME recommendations for increased independence, safety, and fall prevention  * Patient/Family education to increase follow through with safety techniques and functional independence  * Recommendation of environmental modifications for increased safety with functional transfers/mobility and ADLs  * Cognitive retraining/development of therapeutic activities to improve problem solving, judgement, memory, and attention for increased safety/participation in ADL/IADL tasks  * Therapeutic exercise to improve motor endurance, ROM, and functional strength for ADLs/functional transfers  * Therapeutic activities to facilitate/challenge dynamic balance, stand tolerance for increased safety and independence with ADLs  * Positioning to improve skin integrity, interaction with environment and functional independence     Modified Tiger Scale   Score     Description  0             No symptoms  1             No significant disability despite symptoms  2             Slight disability; able to look after own affairs  3             Moderate disability; able to ambulate without assist/ requires assist with ADLs  4             Moderate/Severe disability;requires assist to ambulate/assist with ADLs  5             Severe disability;bedridden/incontinent   6               Score:   4     Per OT Eval:    Pt questionable historian. Home Living: Pt lives with  in 2 story home with 1 step & 0 handrails to enter.    Bathroom setup: Tub/shower with shower chair   Equipment owned: abram CRANE  Prior Level of Function: IND with ADLs , IND with IADLs; engaged in functional mobility with use of  ww  Driving: Yes  Occupation: None reported     Pain Level: Pt did not complain of pain this session  Cognition: Unable to assess this date d/t pt anxiety/emotional over loss of  yesterday. Follows 1 step commands inconsistently. Pt emotional/crying throughout treatment, emotional support provided. Memory:  Fair              Sequencing:  Fair-              Problem solving:  Poor              Judgement/safety:  Poor                Functional Assessment:  AM-PAC Daily Activity Raw Score: 15/24    Initial Eval Status  Date: 1/7/23 Treatment Status  Date: 1/12/23 STGs = LTGs  Time frame: 10-14 days   Feeding Setup       Independent    Grooming Stand by Assist  Min A Modified [place]    UB Dressing Stand by Assist  Min A  To don/doff hospital gown seated on BSC Modified [place]    LB Dressing Minimal Assist  Mod A  B socks Modified [place]    Bathing Minimal Assist     Modified [place]    Toileting Minimal Assist  Mod A  Including hygiene task  Incontinence noted Modified [place]    Bed Mobility  Log Roll: Supervision  Supine to sit: Supervision   Sit to supine: Supervision  Sit>Supine: Max A  See below Supine to sit: Independent   Sit to supine: Independent    Functional Transfers Sit to stand:SBA   Stand to sit:SBA  Stand pivot: SBA  Commode: SBA Sit<>Stand: Min A  Commode Transfer: Mod A (stand pivot BSC>EOB)   Sit to stand:Modified [place]   Stand to sit:Modified [place]  Stand pivot: Modified [place]  Commode: Modified [place]    Functional Mobility SBA  Use of ww to<>from bathroom. NT Modified [place] with use of Appropriate AD PRN   Balance Sitting:     Static - Supervision     Dynamic - SBA  Standing: SBA Sitting:     Static - Supervision     Dynamic - Min A  Standing:  Mod A     Sitting:     Static: Independent     Dynamic: Independent  Standing: Modified [place]   Activity Tolerance Fair Poor Good   Visual/  Perceptual Glasses: Yes - readers  Appears WFL          Safety Fair-                    Fair- Good  during ADL completion    **Increased assist and cues for all functional tasks as pt medicated prior for agitation and anxiety (per RN). Comments: Upon arrival pt seated on BSC (next to bed)-this therapist answered bed alarm. At end of session pt lying in bed (bed alarm on, seizure pads on B bed rails) all lines and tubes intact, call light within reach. Treatment: Facilitation of unsupported sitting balance (addressing posture, weight shifting, dynamic reaching to prep for ADL's), functional transfers (various surfaces w/ education/cues for safety/hand placement), standing tolerance tasks (addressing posture, balance and activity tolerance impacting ADLs and functional activity). Therapist facilitated self-care retraining: UB/LB self-care tasks (gown, socks), toileting task and seated grooming tasks while educating/cuing pt on modified techniques, posture, safety and energy conservation techniques. Therapist then facilitated bed mobility (sit>supine) w/ education/cues for safety, sequencing and body mechanics. Skilled monitoring of HR, O2 sats and pts response to treatment. Patient demonstrated decreased independence and safety during completion of ADL/functional transfer/mobility tasks. Pt would benefit from continued skilled OT to increase safety and independence with ADL/IADL tasks for functional independence and quality of life. Pt has made limited progress towards set goals.        Treatment Time In:14:38            Treatment Time Out: 14:50             Treatment Charges: Mins Units   Ther Ex  77860     Manual Therapy 11235     Thera Activities 33736     ADL/Home Mgt 21411 12 1   Neuro Re-ed 86892     Group Therapy      Orthotic manage/training  44716     Non-Billable Time     Total Timed Treatment 12 1700 [address], OTR/L #[id number]

## 2023-01-12 NOTE — PROGRESS NOTES
Hospitalist Progress Note      Synopsis: Patient admitted on 1/6/2023  Ms. Concetta Lane, a 75y.o. year old female  who  has a past medical history of Acid reflux disease, Acute on chronic respiratory failure with hypoxia and hypercapnia ([facility]), Apraxia, Arthritis, Ataxia, CAD (coronary artery disease), Choledocholithiasis, Chronic systolic congestive heart failure ([address] CKD (chronic kidney disease) stage 3, GFR 30-59 ml/min ([facility]), COPD (chronic obstructive pulmonary disease) ([address] Diabetes mellitus ([address] Hyperlipidemia, Hypoxia, Neck pain, Normal pressure hydrocephalus ([facility]), Oxygen dependent, Pleural effusion, Pulmonary hypertension ([address] Restless legs, S/P CABG x 2, and Severe mitral regurgitation. presents with transfer from [facility] with TIA, asa, keppra, need MRI and EEG. Presented there with weakness started October after having cervical surgery by Dr Todd Deleon but got worse today. woke up this morning confused and intermittently combative. Found to have acute stroke in the L parietal lobe; neurology evaluated and pt on DAPT for 21 days followed by ASA, Zio patch for discharge. Pt discharge pending placement at this time. Subjective  Seen and examined chart reviewed. Patient alert and oriented X3. Feeling very sad about the loss of her      Exam:  BP (!) 133/94   Pulse 88   Temp 97.8 °F (36.6 °C) (Oral)   Resp 20   Ht 5' 3\" (1.6 m)   Wt 110 lb (49.9 kg)   LMP  (LMP Unknown)   SpO2 99%   BMI 19.49 kg/m²   -General:  Awake, alert, oriented X 2-3. Well developed, well nourished, well groomed. No apparent distress. -HEENT:  Normocephalic, atraumatic. Pupils equal, round, reactive to light.      -Heart:  RRR, no murmurs, gallops, rubs    -Lungs:  CTA bilaterally, bilat symmetrical expansion, no wheeze, rales, or rhonchi    -Abdomen: Bowel sounds present, soft, nontender, no masses, no organomegaly, no peritoneal signs    -Extremities: normal ROM.  No Cyanosis clubbing or edema    -Skin: Warm and dry    -Neuro:  AAO x 2-3 . Cranial nerves 2-12 intact, no focal deficits     -Psych : normal .    Medications:  Reviewed    Infusion Medications    dextrose      sodium chloride       Scheduled Medications    clopidogrel  75 mg Oral Daily    carvedilol  6.25 mg Oral BID WC    cetirizine  10 mg Oral Daily    furosemide  20 mg Oral Daily    arformoterol tartrate  15 mcg Nebulization BID    And    budesonide  0.5 mg Nebulization BID    escitalopram  10 mg Oral Daily    [Held by provider] buPROPion  150 mg Oral BID    pantoprazole  40 mg Oral QAM AC    [Held by provider] oxybutynin  10 mg Oral Daily    pramipexole  0.75 mg Oral BID    sodium chloride flush  10 mL IntraVENous 2 times per day    enoxaparin  30 mg SubCUTAneous Daily    aspirin  81 mg Oral Daily    atorvastatin  40 mg Oral Nightly    insulin lispro  0-16 Units SubCUTAneous TID WC    insulin lispro  0-4 Units SubCUTAneous Nightly     PRN Meds: LORazepam, ipratropium-albuterol, glucose, dextrose bolus **OR** dextrose bolus, glucagon (rDNA), dextrose, sodium chloride flush, sodium chloride, ondansetron **OR** ondansetron, magnesium hydroxide, acetaminophen **OR** acetaminophen, [Held by provider] oxyCODONE, albuterol    I/O    Intake/Output Summary (Last 24 hours) at 1/12/2023 0920  Last data filed at 1/12/2023 0741  Gross per 24 hour   Intake 720 ml   Output --   Net 720 ml         Labs:   Recent Labs     01/10/23  0605 01/11/23  0828 01/12/23  0646   WBC 6.4 6.4 6.7   HGB 11.7 12.3 12.4   HCT 39.0 40.6 40.6    228 231         Recent Labs     01/10/23  0605 01/11/23  0828 01/12/23  0646    139 140   K 3.7 3.5 3.2*    99 99   CO2 27 27 27   BUN 15 20 15   CREATININE 0.9 0.9 0.8   CALCIUM 9.1 9.3 9.1         Recent Labs     01/10/23  0605 01/11/23  0828 01/12/23  0646   PROT 7.1 7.2 7.1   ALKPHOS 90 86 80   ALT 12 10 10   AST 14 13 14   BILITOT 0.8 0.6 0.5         No results for input(s): INR in the last 72 hours. No results for input(s): [place] in the last 72 hours. Chronic labs:  Lab Results   Component Value Date    CHOL 93 01/09/2023    TRIG 96 01/09/2023    HDL 26 01/09/2023    LDLCALC 48 01/09/2023    TSH 1.300 01/07/2023    INR 2.8 01/06/2023    LABA1C 11.0 (H) 01/07/2023       Radiology:  Imaging studies reviewed today. ASSESSMENT:/Plan  Acute CVA  T2DM  HTN  CKD  HLP  Hypoxia and oxygen dependant   CAD s/p CABG  COPD      Plan:  DAPT 21 days followed by aspirin monotherapy  Zio patch at dc  Glycemic control  Continue home oxygen  Discharge pending placement at this point  PT/OT        Diet: ADULT DIET; Regular; 3 carb choices (45 gm/meal); Low Sodium (2 gm)  ADULT ORAL NUTRITION SUPPLEMENT; Breakfast, Dinner; Low Calorie/High Protein Oral Supplement  Code Status: Full Code      Disposition: Patient stable for discharge pending placement vs 24/7 support at home    +++++++++++++++++++++++++++++++++++++++++++++++++  Gisela Harvey MD  1/12/2023  [facility].  +++++++++++++++++++++++++++++++++++++++++++++++++  NOTE: This report was transcribed using voice recognition software. Every effort was made to ensure accuracy; however, inadvertent computerized transcription errors may be present.

## 2023-01-12 NOTE — DISCHARGE INSTR - COC
Continuity of Care Form    Patient Name: Surjit Castillo   :  1955  MRN:  [id number]    Admit date:  2023  Discharge date:  2023    Code Status Order: Full Code   Advance Directives:     Admitting Physician:  Samra Higgins MD  PCP: Linda Perla DO    Discharging Nurse: [facility] Unit/Room#: 1602/9006-C  Discharging Unit Phone Number: [phone]    Emergency Contact:   Extended Emergency Contact Information  Primary Emergency Contact: Maricarmen Haji [address] Phone: [phone]  Relation: Child    Past Surgical History:  Past Surgical History:   Procedure Laterality Date     Tift Prom[place]    C3-C6    CERVICAL FUSION N/A 10/17/2022    POSTERIOR C3-C7 LAMINECTOMY, C3-T1 FUSION, NEEDS O-ARM, EDUARD TABLE, CELL SAVER, PLATLET GEL, POSTERIOR INSTRUMENTATION, GLOBUS performed by Elle Padgett MD at [facility]      COLONOSCOPY N/A 2019    COLONOSCOPY POLYPECTOMY SNARE/COLD BIOPSY performed by Eva Vale MD at [facility]  10/30/2002    EYE SURGERY      cataract with Lens implants    HERNIA REPAIR      HYSTERECTOMY ([facility])  [address] [facility]  2018    resolved    SD CRTJ SHUNT [id number] TERMINUS N/A 10/15/2018    VENTRICULAR PERITONEAL SHUNT  PLACEMENT performed by Zaheer Harris MD at [address] CSF N/A 2018    LUMBAR DRAIN INSERTION performed by Zaheer Harris MD at [facility] N/A 2021    VENTRICULAR PERITONEAL SHUNT REPLACEMENT performed by Zaheer Harris MD at [address]       Immunization History:   Immunization History   Administered Date(s) Administered    COVID-19, J&J, (age 18y+), IM, 0.5 mL 2021    Influenza, High Dose (Fluzone 65 yrs and older) 10/06/2020    Pneumococcal Polysaccharide (Yiorttszt71) 2019    Tdap (Boostrix, Adacel) 12/22/2016       Active Problems:  Patient Active Problem List   Diagnosis Code    CAD (coronary artery disease) I25.10    Pulmonary hypertension ([facility]) I27.20    COPD (chronic obstructive pulmonary disease) ([facility]) J44.9    CKD (chronic kidney disease) stage 3, GFR 30-59 ml/min N18.30    Normal pressure hydrocephalus ([facility]) G91.2    Chronic systolic (congestive) heart failure ([facility]) I50.22    Severe mitral regurgitation I34.0    NPH (normal pressure hydrocephalus) ([facility]) G91.2    COVID-19 U07.1    Syncope and collapse R55    Gastroenteritis K52.9    Acute kidney injury superimposed on CKD ([facility] N17.9, N18.9    Hypotension I95.9    S/P ventriculoperitoneal shunt Z98.2    Cervical stenosis of spinal canal M48.02    Type 2 diabetes mellitus, without long-term current use of insulin ([facility]) E11.9    O2 dependent Z99.81    Severe protein-calorie malnutrition ([facility] E43    TIA (transient ischemic attack) G45.9    Altered mental status R41.82       Isolation/Infection:   Isolation            No Isolation          Patient Infection Status       Infection Onset Added Last Indicated Last Indicated By Review Planned Expiration Resolved Resolved By    None active    Resolved    COVID-19 (Rule Out) 01/06/23 01/06/23 01/06/23 COVID-19 & Influenza Combo (Ordered)   01/06/23 Rule-Out Test Resulted    COVID-19 (Rule Out) 10/21/22 10/21/22 10/21/22 COVID-19 (Ordered)   10/22/22 Rule-Out Test Resulted            Nurse Assessment:  Last Vital Signs: BP (!) 133/94   Pulse 88   Temp 97.8 °F (36.6 °C) (Oral)   Resp 20   Ht 5' 3\" (1.6 m)   Wt 110 lb (49.9 kg)   LMP  (LMP Unknown)   SpO2 99%   BMI 19.49 kg/m²     Last documented pain score (0-10 scale): Pain Level: 2  Last Weight:   Wt Readings from Last 1 Encounters:   01/11/23 110 lb (49.9 kg)     Mental Status:  oriented, alert, and periods confusion    IV Access:  - None    Nursing Mobility/ADLs:  Walking   Assisted  Transfer  Assisted  Bathing  Assisted  Dressing Assisted  Toileting  Independent  Feeding  Independent  Med Admin  Dependent  Med Delivery   whole    Wound Care Documentation and Therapy:  Incision 10/17/22 Neck Posterior (Active)   Number of days: 87        Elimination:  Continence: Bowel: Yes  Bladder: usually continent- occasional episodes incontinence   Urinary Catheter: None   Colostomy/Ileostomy/Ileal Conduit: No       Date of Last BM: 1/11/2023    Intake/Output Summary (Last 24 hours) at 1/12/2023 1532  Last data filed at 1/12/2023 1025  Gross per 24 hour   Intake 360 ml   Output --   Net 360 ml     I/O last 3 completed shifts: In: 18 [P.O.:1560]  Out: -     Safety Concerns: At Risk for Falls, No safety awareness, Agitation     Impairments/Disabilities:      None    Nutrition Therapy:  Current Nutrition Therapy:   - Oral Diet:  General    Routes of Feeding: Oral  Liquids: Thin Liquids  Daily Fluid Restriction: no  Last Modified Barium Swallow with Video (Video Swallowing Test): not done    Treatments at the Time of Hospital Discharge:   Respiratory Treatments: Inhaler   Oxygen Therapy:  is on oxygen at 4 L/min per nasal cannula.   Ventilator:    - No ventilator support    Rehab Therapies: Physical Therapy and Occupational Therapy  Weight Bearing Status/Restrictions: No weight bearing restrictions  Other Medical Equipment (for information only, NOT a DME order):  bedside commode      Patient's personal belongings (please select all that are sent with patient):  Nevaeh Monsalve RN SIGNATURE:  Electronically signed by Charley Galvez RN on 1/12/23 at 3:37 PM EST    CASE MANAGEMENT/SOCIAL WORK SECTION    Inpatient Status Date: ***    Readmission Risk Assessment Score:  Readmission Risk              Risk of Unplanned Readmission:  16           Discharging to Facility/ Agency   Name:   Address:  Phone:  Fax:    Dialysis Facility (if applicable)   Name:  Address:  Dialysis Schedule:  Phone:  Fax:    / signature: {Esignature:[id number]}    PHYSICIAN SECTION    Prognosis: {Prognosis:6305116198}    Condition at Discharge: [address] Patient Condition:826292911}    Rehab Potential (if transferring to Rehab): {Prognosis:6292620066}    Recommended Labs or Other Treatments After Discharge: ***    Physician Certification: I certify the above information and transfer of Simin Hernandez  is necessary for the continuing treatment of the diagnosis listed and that she requires {Admit to Appropriate Level of Care:54429} for {GREATER/LESS:617393385} 30 days.      Update Admission H&P: {CHP DME Changes in VYXIE:749211881}    PHYSICIAN SIGNATURE:  {Esignature:[id number]}

## 2023-01-13 LAB
BLOOD CULTURE, ROUTINE: NORMAL
CULTURE, BLOOD 2: NORMAL

## 2023-01-25 DIAGNOSIS — G45.9 TIA (TRANSIENT ISCHEMIC ATTACK): ICD-10-CM

## 2023-02-20 ENCOUNTER — HOSPITAL ENCOUNTER (INPATIENT)
Age: 68
LOS: 5 days | Discharge: OTHER INSTITUTION | DRG: 689 | End: 2023-02-25
Attending: EMERGENCY MEDICINE | Admitting: INTERNAL MEDICINE
Payer: MEDICARE

## 2023-02-20 ENCOUNTER — APPOINTMENT (OUTPATIENT)
Dept: CT IMAGING | Age: 68
DRG: 689 | End: 2023-02-20
Payer: MEDICARE

## 2023-02-20 ENCOUNTER — APPOINTMENT (OUTPATIENT)
Dept: GENERAL RADIOLOGY | Age: 68
DRG: 689 | End: 2023-02-20
Payer: MEDICARE

## 2023-02-20 DIAGNOSIS — N17.9 AKI (ACUTE KIDNEY INJURY) (HCC): ICD-10-CM

## 2023-02-20 DIAGNOSIS — I63.9 ACUTE ISCHEMIC STROKE (HCC): ICD-10-CM

## 2023-02-20 DIAGNOSIS — R41.82 ALTERED MENTAL STATUS, UNSPECIFIED ALTERED MENTAL STATUS TYPE: Primary | ICD-10-CM

## 2023-02-20 LAB
ALBUMIN SERPL-MCNC: 3.4 G/DL (ref 3.5–5.2)
ALP BLD-CCNC: 162 U/L (ref 35–104)
ALT SERPL-CCNC: 18 U/L (ref 0–32)
ANION GAP SERPL CALCULATED.3IONS-SCNC: 12 MMOL/L (ref 7–16)
AST SERPL-CCNC: 22 U/L (ref 0–31)
BACTERIA: ABNORMAL /HPF
BASOPHILS ABSOLUTE: 0.09 E9/L (ref 0–0.2)
BASOPHILS RELATIVE PERCENT: 1.2 % (ref 0–2)
BILIRUB SERPL-MCNC: 0.7 MG/DL (ref 0–1.2)
BILIRUBIN URINE: ABNORMAL
BLOOD, URINE: NEGATIVE
BUN BLDV-MCNC: 31 MG/DL (ref 6–23)
CALCIUM SERPL-MCNC: 9.1 MG/DL (ref 8.6–10.2)
CHLORIDE BLD-SCNC: 99 MMOL/L (ref 98–107)
CHP ED QC CHECK: YES
CLARITY: ABNORMAL
CO2: 24 MMOL/L (ref 22–29)
COLOR: YELLOW
CREAT SERPL-MCNC: 1.3 MG/DL (ref 0.5–1)
EOSINOPHILS ABSOLUTE: 0.06 E9/L (ref 0.05–0.5)
EOSINOPHILS RELATIVE PERCENT: 0.8 % (ref 0–6)
EPITHELIAL CELLS, UA: ABNORMAL /HPF
GFR SERPL CREATININE-BSD FRML MDRD: 45 ML/MIN/1.73
GLUCOSE BLD-MCNC: 147 MG/DL (ref 74–99)
GLUCOSE BLD-MCNC: 200 MG/DL
GLUCOSE URINE: NEGATIVE MG/DL
HCT VFR BLD CALC: 36.4 % (ref 34–48)
HEMOGLOBIN: 11.2 G/DL (ref 11.5–15.5)
IMMATURE GRANULOCYTES #: 0.03 E9/L
IMMATURE GRANULOCYTES %: 0.4 % (ref 0–5)
INFLUENZA A: NOT DETECTED
INFLUENZA B: NOT DETECTED
KETONES, URINE: ABNORMAL MG/DL
LACTIC ACID, SEPSIS: 2.4 MMOL/L (ref 0.5–1.9)
LACTIC ACID: 1.8 MMOL/L (ref 0.5–2.2)
LEUKOCYTE ESTERASE, URINE: ABNORMAL
LYMPHOCYTES ABSOLUTE: 1.46 E9/L (ref 1.5–4)
LYMPHOCYTES RELATIVE PERCENT: 19.7 % (ref 20–42)
MCH RBC QN AUTO: 27.4 PG (ref 26–35)
MCHC RBC AUTO-ENTMCNC: 30.8 % (ref 32–34.5)
MCV RBC AUTO: 89 FL (ref 80–99.9)
METER GLUCOSE: 200 MG/DL (ref 74–99)
MONOCYTES ABSOLUTE: 0.69 E9/L (ref 0.1–0.95)
MONOCYTES RELATIVE PERCENT: 9.3 % (ref 2–12)
NEUTROPHILS ABSOLUTE: 5.09 E9/L (ref 1.8–7.3)
NEUTROPHILS RELATIVE PERCENT: 68.6 % (ref 43–80)
NITRITE, URINE: NEGATIVE
PDW BLD-RTO: 18.9 FL (ref 11.5–15)
PH UA: 5.5 (ref 5–9)
PLATELET # BLD: 250 E9/L (ref 130–450)
PMV BLD AUTO: 10.7 FL (ref 7–12)
POTASSIUM SERPL-SCNC: 4.9 MMOL/L (ref 3.5–5)
PROTEIN UA: ABNORMAL MG/DL
RBC # BLD: 4.09 E12/L (ref 3.5–5.5)
RBC UA: ABNORMAL /HPF (ref 0–2)
SARS-COV-2 RNA, RT PCR: NOT DETECTED
SODIUM BLD-SCNC: 135 MMOL/L (ref 132–146)
SPECIFIC GRAVITY UA: 1.02 (ref 1–1.03)
TOTAL PROTEIN: 7.2 G/DL (ref 6.4–8.3)
UROBILINOGEN, URINE: 4 E.U./DL
WBC # BLD: 7.4 E9/L (ref 4.5–11.5)
WBC UA: ABNORMAL /HPF (ref 0–5)

## 2023-02-20 PROCEDURE — 36415 COLL VENOUS BLD VENIPUNCTURE: CPT

## 2023-02-20 PROCEDURE — 6360000002 HC RX W HCPCS: Performed by: INTERNAL MEDICINE

## 2023-02-20 PROCEDURE — 71045 X-RAY EXAM CHEST 1 VIEW: CPT

## 2023-02-20 PROCEDURE — 93005 ELECTROCARDIOGRAM TRACING: CPT | Performed by: EMERGENCY MEDICINE

## 2023-02-20 PROCEDURE — 83605 ASSAY OF LACTIC ACID: CPT

## 2023-02-20 PROCEDURE — 70450 CT HEAD/BRAIN W/O DYE: CPT

## 2023-02-20 PROCEDURE — S5553 INSULIN LONG ACTING 5 U: HCPCS | Performed by: INTERNAL MEDICINE

## 2023-02-20 PROCEDURE — 82962 GLUCOSE BLOOD TEST: CPT

## 2023-02-20 PROCEDURE — 87636 SARSCOV2 & INF A&B AMP PRB: CPT

## 2023-02-20 PROCEDURE — 85025 COMPLETE CBC W/AUTO DIFF WBC: CPT

## 2023-02-20 PROCEDURE — 81001 URINALYSIS AUTO W/SCOPE: CPT

## 2023-02-20 PROCEDURE — 6370000000 HC RX 637 (ALT 250 FOR IP): Performed by: INTERNAL MEDICINE

## 2023-02-20 PROCEDURE — 80053 COMPREHEN METABOLIC PANEL: CPT

## 2023-02-20 PROCEDURE — 99285 EMERGENCY DEPT VISIT HI MDM: CPT

## 2023-02-20 PROCEDURE — 2060000000 HC ICU INTERMEDIATE R&B

## 2023-02-20 PROCEDURE — 87040 BLOOD CULTURE FOR BACTERIA: CPT

## 2023-02-20 PROCEDURE — 2580000003 HC RX 258: Performed by: INTERNAL MEDICINE

## 2023-02-20 PROCEDURE — 2580000003 HC RX 258: Performed by: EMERGENCY MEDICINE

## 2023-02-20 RX ORDER — PANTOPRAZOLE SODIUM 40 MG/1
40 TABLET, DELAYED RELEASE ORAL
Status: DISCONTINUED | OUTPATIENT
Start: 2023-02-21 | End: 2023-02-25 | Stop reason: HOSPADM

## 2023-02-20 RX ORDER — INSULIN LISPRO 100 [IU]/ML
0-4 INJECTION, SOLUTION INTRAVENOUS; SUBCUTANEOUS
Status: DISCONTINUED | OUTPATIENT
Start: 2023-02-21 | End: 2023-02-25 | Stop reason: HOSPADM

## 2023-02-20 RX ORDER — ALOGLIPTIN 12.5 MG/1
12.5 TABLET, FILM COATED ORAL DAILY
Status: DISCONTINUED | OUTPATIENT
Start: 2023-02-21 | End: 2023-02-25 | Stop reason: HOSPADM

## 2023-02-20 RX ORDER — ONDANSETRON 2 MG/ML
4 INJECTION INTRAMUSCULAR; INTRAVENOUS EVERY 6 HOURS PRN
Status: DISCONTINUED | OUTPATIENT
Start: 2023-02-20 | End: 2023-02-20

## 2023-02-20 RX ORDER — ALBUTEROL SULFATE 2.5 MG/3ML
2.5 SOLUTION RESPIRATORY (INHALATION) EVERY 4 HOURS PRN
Status: DISCONTINUED | OUTPATIENT
Start: 2023-02-20 | End: 2023-02-25 | Stop reason: HOSPADM

## 2023-02-20 RX ORDER — PRAMIPEXOLE DIHYDROCHLORIDE 0.25 MG/1
0.75 TABLET ORAL 2 TIMES DAILY
Status: DISCONTINUED | OUTPATIENT
Start: 2023-02-20 | End: 2023-02-21

## 2023-02-20 RX ORDER — DEXTROSE MONOHYDRATE 100 MG/ML
INJECTION, SOLUTION INTRAVENOUS CONTINUOUS PRN
Status: DISCONTINUED | OUTPATIENT
Start: 2023-02-20 | End: 2023-02-25 | Stop reason: HOSPADM

## 2023-02-20 RX ORDER — ACETAMINOPHEN 650 MG/1
650 SUPPOSITORY RECTAL EVERY 6 HOURS PRN
Status: DISCONTINUED | OUTPATIENT
Start: 2023-02-20 | End: 2023-02-25 | Stop reason: HOSPADM

## 2023-02-20 RX ORDER — ESCITALOPRAM OXALATE 10 MG/1
10 TABLET ORAL DAILY
Status: DISCONTINUED | OUTPATIENT
Start: 2023-02-21 | End: 2023-02-25 | Stop reason: HOSPADM

## 2023-02-20 RX ORDER — INSULIN GLARGINE-YFGN 100 [IU]/ML
5 INJECTION, SOLUTION SUBCUTANEOUS NIGHTLY
Status: DISCONTINUED | OUTPATIENT
Start: 2023-02-20 | End: 2023-02-25 | Stop reason: HOSPADM

## 2023-02-20 RX ORDER — SODIUM CHLORIDE 0.9 % (FLUSH) 0.9 %
5-40 SYRINGE (ML) INJECTION EVERY 12 HOURS SCHEDULED
Status: DISCONTINUED | OUTPATIENT
Start: 2023-02-20 | End: 2023-02-25 | Stop reason: HOSPADM

## 2023-02-20 RX ORDER — SODIUM CHLORIDE 0.9 % (FLUSH) 0.9 %
5-40 SYRINGE (ML) INJECTION PRN
Status: DISCONTINUED | OUTPATIENT
Start: 2023-02-20 | End: 2023-02-25 | Stop reason: HOSPADM

## 2023-02-20 RX ORDER — SODIUM CHLORIDE 9 MG/ML
INJECTION, SOLUTION INTRAVENOUS PRN
Status: DISCONTINUED | OUTPATIENT
Start: 2023-02-20 | End: 2023-02-25 | Stop reason: HOSPADM

## 2023-02-20 RX ORDER — INSULIN LISPRO 100 [IU]/ML
0-4 INJECTION, SOLUTION INTRAVENOUS; SUBCUTANEOUS NIGHTLY
Status: DISCONTINUED | OUTPATIENT
Start: 2023-02-20 | End: 2023-02-25 | Stop reason: HOSPADM

## 2023-02-20 RX ORDER — ONDANSETRON 4 MG/1
4 TABLET, ORALLY DISINTEGRATING ORAL EVERY 8 HOURS PRN
Status: DISCONTINUED | OUTPATIENT
Start: 2023-02-20 | End: 2023-02-20

## 2023-02-20 RX ORDER — ATORVASTATIN CALCIUM 40 MG/1
40 TABLET, FILM COATED ORAL NIGHTLY
Status: DISCONTINUED | OUTPATIENT
Start: 2023-02-20 | End: 2023-02-25 | Stop reason: HOSPADM

## 2023-02-20 RX ORDER — ASPIRIN 81 MG/1
81 TABLET, CHEWABLE ORAL DAILY
Status: DISCONTINUED | OUTPATIENT
Start: 2023-02-21 | End: 2023-02-25 | Stop reason: HOSPADM

## 2023-02-20 RX ORDER — ACETAMINOPHEN 325 MG/1
650 TABLET ORAL EVERY 6 HOURS PRN
Status: DISCONTINUED | OUTPATIENT
Start: 2023-02-20 | End: 2023-02-25 | Stop reason: HOSPADM

## 2023-02-20 RX ORDER — CARVEDILOL 6.25 MG/1
6.25 TABLET ORAL 2 TIMES DAILY WITH MEALS
Status: DISCONTINUED | OUTPATIENT
Start: 2023-02-21 | End: 2023-02-25 | Stop reason: HOSPADM

## 2023-02-20 RX ORDER — 0.9 % SODIUM CHLORIDE 0.9 %
1000 INTRAVENOUS SOLUTION INTRAVENOUS ONCE
Status: COMPLETED | OUTPATIENT
Start: 2023-02-20 | End: 2023-02-20

## 2023-02-20 RX ORDER — ENOXAPARIN SODIUM 100 MG/ML
30 INJECTION SUBCUTANEOUS DAILY
Status: DISCONTINUED | OUTPATIENT
Start: 2023-02-21 | End: 2023-02-25 | Stop reason: HOSPADM

## 2023-02-20 RX ORDER — POLYETHYLENE GLYCOL 3350 17 G/17G
17 POWDER, FOR SOLUTION ORAL DAILY PRN
Status: DISCONTINUED | OUTPATIENT
Start: 2023-02-20 | End: 2023-02-25 | Stop reason: HOSPADM

## 2023-02-20 RX ADMIN — INSULIN GLARGINE-YFGN 5 UNITS: 100 INJECTION, SOLUTION SUBCUTANEOUS at 22:32

## 2023-02-20 RX ADMIN — ACETAMINOPHEN 650 MG: 325 TABLET ORAL at 22:33

## 2023-02-20 RX ADMIN — WATER 1000 MG: 1 INJECTION INTRAMUSCULAR; INTRAVENOUS; SUBCUTANEOUS at 22:34

## 2023-02-20 RX ADMIN — Medication 10 ML: at 22:36

## 2023-02-20 RX ADMIN — ATORVASTATIN CALCIUM 40 MG: 40 TABLET, FILM COATED ORAL at 22:33

## 2023-02-20 RX ADMIN — SODIUM CHLORIDE 1000 ML: 9 INJECTION, SOLUTION INTRAVENOUS at 16:44

## 2023-02-20 NOTE — ED PROVIDER NOTES
[address]        Pt Name: Alejandra Rivas  MRN: [id number]  [place] 1955  Date of evaluation: 2/20/2023  Provider: Lily Kirk DO  PCP: Rashawn Patel DO  Note Started: 4:11 PM EST 2/20/23    CHIEF COMPLAINT       Chief Complaint   Patient presents with    Altered Mental Status     Patient from home sent for on and off confusion starting today and hypoxia. Per family patient refuses to wear oxygen at times at home. Patient wears 4L NC.        HISTORY OF PRESENT ILLNESS: 1 or more Elements        Limitations to history : Altered Mental Status    Alejandra Rivas is a 75 y.o. female who presents with confusion. Report given by EMS. Patient apparently lives at home, uses oxygen continuously at 2 L. Family noted confusion throughout the day. Patient states that she feels confused. Patient apparently took a nap earlier in the afternoon without her oxygen, when she awoke she appeared more confused for family. EMS was then summoned. They state that patient had no hypoxia upon their arrival, blood sugar was normal.  Patient arrives oriented to person, disoriented to place and time. She denies any headache. Unknown if there has been any recent dysuria, fevers, vomiting or diarrhea. Patient denies any abdominal pain. EMS states that family told them that patient recently had a TIA, had confusion as her symptoms. Nursing Notes were all reviewed and agreed with or any disagreements were addressed in the HPI. REVIEW OF EXTERNAL NOTE :       Review of discharge summary 9/6/7033, complicated medical history including COPD. Arrived at that time confused and found to have acute stroke in left parietal lobe, patient discharged to SNF but has since returned home    REVIEW OF SYSTEMS :      Positives and Pertinent negatives as per HPI.      SURGICAL HISTORY     Past Surgical History:   Procedure Laterality Date CARDIAC SURGERY      CERVICAL FUSION  1999    C3-C6    CERVICAL FUSION N/A 10/17/2022    POSTERIOR C3-C7 LAMINECTOMY, C3-T1 FUSION, NEEDS O-ARM, EDUARD TABLE, CELL SAVER, PLATLET GEL, POSTERIOR INSTRUMENTATION, GLOBUS performed by Hadley Rojas MD at [address]    COLONOSCOPY N/A 1/21/2019    COLONOSCOPY POLYPECTOMY SNARE/COLD BIOPSY performed by Alma Mckeon MD at [facility]  10/30/2002    EYE SURGERY      cataract with Lens implants    HERNIA REPAIR      HYSTERECTOMY (CERVIX STATUS UNKNOWN)  [address]  07/27/2018    resolved    VT CRTJ SHUNT [id number] TERMINUS N/A 10/15/2018    VENTRICULAR PERITONEAL SHUNT  PLACEMENT performed by Velia Duarte MD at [address] N/A 7/27/2018    LUMBAR DRAIN INSERTION performed by Velia Duarte MD at [address] N/A 2/1/2021    VENTRICULAR PERITONEAL SHUNT REPLACEMENT performed by Velia Duarte MD at [facility]       Previous Medications    ALBUTEROL SULFATE  (90 BASE) MCG/ACT INHALER    INHALE 2 PUFFS BY MOUTH EVERY 4 HOURS AS NEEDED    ASPIRIN 81 MG CHEWABLE TABLET    Take 1 tablet by mouth daily    ATORVASTATIN (LIPITOR) 40 MG TABLET    Take 1 tablet by mouth nightly    BLOOD GLUCOSE MONITOR STRIPS    Test 4 times a day & as needed for symptoms of irregular blood glucose. Dispense sufficient amount for indicated testing frequency plus additional to accommodate PRN testing needs.     CARVEDILOL (COREG) 6.25 MG TABLET    Take 1 tablet by mouth 2 times daily (with meals)    CLOPIDOGREL (PLAVIX) 75 MG TABLET    Take 1 tablet by mouth daily for 21 doses    ESCITALOPRAM (LEXAPRO) 10 MG TABLET    Take 1 tablet by mouth daily    INSULIN DETEMIR (LEVEMIR FLEXTOUCH) 100 UNIT/ML INJECTION PEN    Inject 5 Units into the skin nightly    INSULIN PEN NEEDLE (MEIJER PEN NEEDLES) 31G X 6 MM MISC    1 each by Does not apply route daily    LANCETS MISC    1 each by Does not apply route 4 times daily    OMEPRAZOLE (PRILOSEC) 40 MG CAPSULE      Take 40 mg by mouth daily     OXYGEN    Inhale 3 L into the lungs as needed    PRAMIPEXOLE (MIRAPEX) 0.5 MG TABLET    Take 0.75 mg by mouth in the morning and at bedtime    SITAGLIPTIN (JANUVIA) 100 MG TABLET    Take 100 mg by mouth daily    SPIRONOLACTONE (ALDACTONE) 25 MG TABLET    Take 25 mg by mouth daily    VITAMIN D (ERGOCALCIFEROL) 30034 UNITS CAPS CAPSULE    Take 50,000 Units by mouth once a week THUR       ALLERGIES     Sulfa antibiotics and Pentazocine lactate    FAMILYHISTORY       Family History   Problem Relation Age of Onset    High Blood Pressure Mother     Diabetes Mother     Emphysema Father     Heart Attack Sister     Heart Failure Sister         SOCIAL HISTORY       Social History     Tobacco Use    Smoking status: Former     Packs/day: 0.50     Years: 40.00     Pack years: 20.00     Types: Cigarettes     Quit date: 2018     Years since quittin.5    Smokeless tobacco: Never   Vaping Use    Vaping Use: Never used   Substance Use Topics    Alcohol use: No    Drug use: No       SCREENINGS        [place] Coma Scale  Eye Opening: Spontaneous  Best Verbal Response: Confused  Best Motor Response: Obeys commands  [place] Coma Scale Score: 14                CIWA Assessment  BP: (!) 140/78  Heart Rate: 75           PHYSICAL EXAM  1 or more Elements     ED Triage Vitals   BP Temp Temp src Pulse Resp SpO2 Height Weight   -- -- -- -- -- -- -- --           Constitutional/General: Awake and alert, confused  Head: Normocephalic and atraumatic  Eyes: PERRL, EOMI, conjunctiva normal, sclera non icteric  ENT:  Oropharynx clear, handling secretions, no trismus, no asymmetry of the posterior oropharynx or uvular edema  Neck: Supple, full ROM, no stridor, no meningeal signs  Respiratory: Lungs clear to auscultation bilaterally, no wheezes, rales, or rhonchi. Not in respiratory distress  Cardiovascular:  Regular rate. Regular rhythm. No murmurs, no gallops, no rubs. 2+ distal pulses. Equal extremity pulses. Chest: No chest wall tenderness  GI:  Abdomen Soft, Non tender, Non distended. +BS. No rebound, guarding, or rigidity. No pulsatile masses. Musculoskeletal: Moves all extremities x 4. Warm and well perfused, no clubbing, no cyanosis, no edema. Capillary refill <3 seconds  Integument: skin warm and dry. No rashes.    Neurologic: GCS 14, no focal deficits, symmetric strength 5/5 in the upper and lower extremities bilaterally  Psychiatric: Normal Affect            DIAGNOSTIC RESULTS   LABS:    Labs Reviewed   CBC WITH AUTO DIFFERENTIAL - Abnormal; Notable for the following components:       Result Value    Hemoglobin 11.2 (*)     MCHC 30.8 (*)     RDW 18.9 (*)     Lymphocytes % 19.7 (*)     Lymphocytes Absolute 1.46 (*)     All other components within normal limits   COMPREHENSIVE METABOLIC PANEL - Abnormal; Notable for the following components:    Glucose 147 (*)     BUN 31 (*)     Creatinine 1.3 (*)     Albumin 3.4 (*)     Alkaline Phosphatase 162 (*)     All other components within normal limits   LACTATE, SEPSIS - Abnormal; Notable for the following components:    Lactic Acid, Sepsis 2.4 (*)     All other components within normal limits   URINALYSIS - Abnormal; Notable for the following components:    Clarity, UA TURBID (*)     Bilirubin Urine SMALL (*)     Ketones, Urine TRACE (*)     Urobilinogen, Urine 4.0 (*)     Leukocyte Esterase, Urine TRACE (*)     All other components within normal limits   MICROSCOPIC URINALYSIS - Abnormal; Notable for the following components:    Bacteria, UA MODERATE (*)     All other components within normal limits   COVID-19 & INFLUENZA COMBO   CULTURE, BLOOD 1   CULTURE, BLOOD 2   LACTATE, SEPSIS   ARTERIAL BLOOD GAS, RESPIRATORY ONLY       As interpreted by me, the above displayed labs are abnormal. All other labs obtained during this visit were within normal range or not returned as of this dictation. EKG: This EKG is signed and interpreted by me. Rate: 75  Rhythm: Sinus  Interpretation: Normal axis, right bundle branch block, left anterior fascicular block, bifascicular block with T wave inversion in the anterolateral leads appearing similar to prior EKG. No findings suggestive of acute ischemia  Comparison: stable as compared to patient's most recent EKG      RADIOLOGY:   Non-plain film images such as CT, Ultrasound and MRI are read by the radiologist. Plain radiographic images are visualized and preliminarily interpreted by the ED Provider with the findings documented in the ED Course. Interpretation per the Radiologist below, if available at the time of this note:    XR CHEST PORTABLE   Final Result   No airspace consolidation. Cardiomegaly. Mild right pleural effusion. CT Head W/O Contrast    (Results Pending)     No results found. No results found. PROCEDURES   Unless otherwise noted below, none       CRITICAL CARE TIME (.cct)   None    PAST MEDICAL HISTORY/Chronic Conditions Affecting Care      has a past medical history of Acid reflux disease, Acute on chronic respiratory failure with hypoxia and hypercapnia ([address] (7/25/2018), Apraxia (7/23/2018), Arthritis, Ataxia (7/23/2018), CAD (coronary artery disease) (10/4/2012), Choledocholithiasis, Chronic systolic congestive heart failure ([address] (8/1/2018), CKD (chronic kidney disease) stage 3, GFR 30-59 ml/min ([address] (7/23/2018), COPD (chronic obstructive pulmonary disease) ([address] Diabetes mellitus ([address] Hyperlipidemia, Hypoxia (7/23/2018), Neck pain, Normal pressure hydrocephalus ([address] (7/28/2018), Oxygen dependent, Pleural effusion (years ago), Pulmonary hypertension ([address] (7/23/2018), Restless legs, S/P CABG x 2 (10/4/2012), and Severe mitral regurgitation (8/1/2018).      EMERGENCY DEPARTMENT COURSE    Vitals:    Vitals: 02/20/23 1620   BP: (!) 140/78   Pulse: 75   Resp: 22   SpO2: 97%   Weight: 104 lb (47.2 kg)   Height: 5' 3\" (1.6 m)       Patient was given the following medications:  Medications   0.9 % sodium chloride bolus (1,000 mLs IntraVENous New Bag 2/20/23 1644)             Medical Decision Making/Differential Diagnosis:    CC/HPI Summary, Social Determinants of health, Records Reviewed, DDx, testing done/not done, ED Course, Reassessment, disposition considerations/shared decision making with patient, consults, disposition:             Medical Decision Making  76-year-old female presenting with confusion that began earlier this morning. Unclear if patient has had recent medical symptoms. She did have recent admission for TIA where she was found to have a left parietal stroke, did have confusion at that time. Would have concern for recurrence of intracranial abnormality. Would also have concern for metabolic causes as well as infectious causes for her confusion. She does use oxygen and did take a nap without oxygen, she has no increased work of breathing or hypoxia here on low-flow oxygen but would have concern for hypoxemia or hypercapnia. EKG shows no findings suggestive of acute ischemia or injury, unchanged from prior EKG. Chest x-ray shows no acute abnormality. No leukocytosis, lactic acid 2.4. No anemia. Metabolic panel is within acceptable limits, slight increase in BUN and creatinine, now 31 and 1.3 with baseline 15-20 and 0.8-0.9. Patient was given 1 L of normal saline. Urinalysis is equivocal for infection, clean-catch specimen is turbid with leukocyte esterase and moderate bacteria, however there are many epithelial cells, negative nitrite and only 2-5 WBC. COVID and influenza testing are negative. CT head shows no acute abnormality, no significant change in ventricles with  shunt in place. Patient's daughter arrived at bedside, still feels that patient is confused and not acting appropriately. She was able to state that when patient was here in January of this year she had some adjustments to her shunt with improvement in symptoms, follows with neurosurgery here. Patient will be admitted for confusion with unclear etiology, remaining hemodynamically stable and no focal deficits during ED course. Problems Addressed:  Confusion: acute illness or injury    Amount and/or Complexity of Data Reviewed  Independent Historian: EMS  Labs: ordered. Decision-making details documented in ED Course. Radiology: ordered. Decision-making details documented in ED Course. ECG/medicine tests: ordered and independent interpretation performed. Decision-making details documented in ED Course. Risk  Prescription drug management. Decision regarding hospitalization. EKG is ordered to have documentation of patient's current rhythm, and to rule out any obvious acute cardiac illnesses such as ACS. Additionally, QT interval may be of use in decision making regarding any medications administered here in the ED. Patient is placed on cardiac monitor and continuous pulse ox for monitoring. CBC is ordered to evaluate for any signs of infection or inflammation by obtaining a WBC count, or any signs of acute anemia by interpreting hemoglobin. CMP was ordered to evaluate for any electrolyte imbalances, kidney function, or any elevations in anion gap. Urinalysis ordered to evaluate for UTI as the possible cause of symptoms, and may also evaluate hematuria as well. Lactic acid levels were ordered to evaluate for signs of ischemia or decrease perfusion to organ systems. Viral swabs are ordered to evaluate possible viral etiology of symptoms. Blood cultures are ordered to evaluate, rule out and, if present, treat bacteremia with antibiotics narrowed down to the found organism. CT head without contrast was ordered to evaluate for acute or chronic intracranial hemorrhage or masses.  Chest x-ray is ordered to evaluate for any possible signs of pneumonia, pleural effusions, cardiomegaly, pneumothorax, atelectasis, rib or sternal abnormalities including fractures. CONSULTS: (Who and What was discussed)  None        I am the Primary Clinician of Record. FINAL IMPRESSION      1. Confusion          DISPOSITION/PLAN     DISPOSITION        PATIENT REFERRED TO:  No follow-up provider specified.     DISCHARGE MEDICATIONS:  New Prescriptions    No medications on file       DISCONTINUED MEDICATIONS:  Discontinued Medications    No medications on file              (Please note that portions of this note were completed with a voice recognition program.  Efforts were made to edit the dictations but occasionally words are mis-transcribed.)    Edie Gregg DO (electronically signed)            Edie Gregg DO  02/20/23 2017

## 2023-02-20 NOTE — LETTER
[address] Medicaid  CERTIFICATION OF NECESSITY  FOR TRANSPORTATION   BY WHEELCHAIR VAN     Individual Information   1. Name: Keke Isbael 2. [place] Medicaid Billing Number:    3. Address: [place] [address]      Transportation Provider Information   4. Provider Name:        5. [place] Medicaid Provider Number:  National Provider Identifier (NPI):      Certification  7. Criteria:  By signing this document, the practitioner certifies that two statements are true:  A. This individual must be accompanied by a mobility-related assistive device from the point of pick-up to the point of drop-off. B. Transport of this individual by standard passenger vehicle or common carrier is precluded or contraindicated. 8. Period Beginning Date:    5. Length  [x] Not more than 1 day(s)  [] One Year     Additional Information Relevant to Certification   10. Comments or Explanations, If Necessary or Appropriate     4L O2 per NC, UTI, TIA,  Shunt, COPD, Hypertension, Impulsive     Certifying Practitioner Information   11. Name of Practitioner:  Dr Wes Bacon MD   12. [place] Medicaid Provider Number, If Applicable:  [address] Provider Identifier (NPI):      Signature Information   14. Date of Signature:  13. Name of Person Signin. Signature and Professional Designation:        [facility] [id number]  Rev. 2015    [address] [place] [address] Encounter Date/Time: 2023 Cecy Roque Account: [de-identified]    MRN: [id number]    Patient: Keke Isabel    Contact Serial #: [id number]      ENCOUNTER          Patient Class: I Private Enc? No Unit [place] [address]-   Hospital Service: FMP   Encounter DX: Altered mental status [R*   ADM Provider: Ole Farmer MD   Procedure:     ATT Provider: Grecia Denton MD   REF Provider:        Admission DX:  Altered mental status, EUN (acute kidney injury) ([facility] [address] [address] Altered mental status, unspecified altered mental status type and DX codes: R41.82, N17.9, R41.82      PATIENT                 Name: Alpa Thomas : 1955 (67 yrs)   Address: [address] Sex: Female   City: [place] [address]         Marital Status:    Employer: RETIRED         [employer] [employer]   Primary Care Provider: DO Maximino Earl Phone: [phone]   EMERGENCY CONTACT   Contact Name Legal Guardian? Relationship to Patient Home Phone Work Phone   1. Yamila Veras  2. *No Contact Specified*      Child    [phone]                 GUARANTOR            Guarantor: Alpa Thomas     : 1955   Address: [address] Sex: Female     [place] [id number]     Relation to Patient: Self       Home Phone: [phone]   Guarantor ID: [id number]       Work Phone:     Guarantor Employer: RETIRED         Status: RETIRED      COVERAGE        PRIMARY INSURANCE   Payor: Jhon Moura MEDICARE Plan: Jhon Moura MEDICARE ADVANTAGE*   Payor Address: [facility] [id number],  [address]       Group Number: [id number] Insurance Type: INDEMNITY   Subscriber Name: Juan Frost : 1955   Subscriber ID: [id number] Pat. Rel. to Sub: Self   SECONDARY INSURANCE   Payor:   Plan:     Payor Address:  ,           Group Number:   Insurance Type:     Subscriber Name:   Subscriber :     Subscriber ID:   Pat.  Rel. to Sub:        CSN: [id number]

## 2023-02-21 ENCOUNTER — APPOINTMENT (OUTPATIENT)
Dept: ULTRASOUND IMAGING | Age: 68
DRG: 689 | End: 2023-02-21
Payer: MEDICARE

## 2023-02-21 PROBLEM — R74.8 ALKALINE PHOSPHATASE ELEVATION: Status: ACTIVE | Noted: 2023-02-21

## 2023-02-21 PROBLEM — I10 PRIMARY HYPERTENSION: Status: ACTIVE | Noted: 2023-02-21

## 2023-02-21 PROBLEM — N39.0 URINARY TRACT INFECTION: Status: ACTIVE | Noted: 2023-02-21

## 2023-02-21 LAB
ALBUMIN SERPL-MCNC: 3.1 G/DL (ref 3.5–5.2)
ALP BLD-CCNC: 151 U/L (ref 35–104)
ALT SERPL-CCNC: 16 U/L (ref 0–32)
ANION GAP SERPL CALCULATED.3IONS-SCNC: 14 MMOL/L (ref 7–16)
APTT: 28.6 SEC (ref 24.5–35.1)
AST SERPL-CCNC: 21 U/L (ref 0–31)
BASOPHILS ABSOLUTE: 0.08 E9/L (ref 0–0.2)
BASOPHILS RELATIVE PERCENT: 1.1 % (ref 0–2)
BILIRUB SERPL-MCNC: 0.7 MG/DL (ref 0–1.2)
BUN BLDV-MCNC: 32 MG/DL (ref 6–23)
CALCIUM SERPL-MCNC: 9 MG/DL (ref 8.6–10.2)
CHLORIDE BLD-SCNC: 104 MMOL/L (ref 98–107)
CO2: 21 MMOL/L (ref 22–29)
CREAT SERPL-MCNC: 1.3 MG/DL (ref 0.5–1)
EKG ATRIAL RATE: 75 BPM
EKG P AXIS: 83 DEGREES
EKG P-R INTERVAL: 144 MS
EKG Q-T INTERVAL: 464 MS
EKG QRS DURATION: 138 MS
EKG QTC CALCULATION (BAZETT): 518 MS
EKG R AXIS: -57 DEGREES
EKG T AXIS: 138 DEGREES
EKG VENTRICULAR RATE: 75 BPM
EOSINOPHILS ABSOLUTE: 0.07 E9/L (ref 0.05–0.5)
EOSINOPHILS RELATIVE PERCENT: 1 % (ref 0–6)
FOLATE: >20 NG/ML (ref 4.8–24.2)
GAMMA GLUTAMYL TRANSFERASE: 105 U/L (ref 6–42)
GFR SERPL CREATININE-BSD FRML MDRD: 45 ML/MIN/1.73
GLUCOSE BLD-MCNC: 115 MG/DL (ref 74–99)
HBA1C MFR BLD: 9.8 % (ref 4–5.6)
HCT VFR BLD CALC: 36.7 % (ref 34–48)
HEMOGLOBIN: 11.1 G/DL (ref 11.5–15.5)
IMMATURE GRANULOCYTES #: 0.02 E9/L
IMMATURE GRANULOCYTES %: 0.3 % (ref 0–5)
INR BLD: 1.9
IRON SATURATION: 7 % (ref 15–50)
IRON: 26 MCG/DL (ref 37–145)
LACTIC ACID: 2.7 MMOL/L (ref 0.5–2.2)
LYMPHOCYTES ABSOLUTE: 1.49 E9/L (ref 1.5–4)
LYMPHOCYTES RELATIVE PERCENT: 20.4 % (ref 20–42)
MAGNESIUM: 1.6 MG/DL (ref 1.6–2.6)
MCH RBC QN AUTO: 26.9 PG (ref 26–35)
MCHC RBC AUTO-ENTMCNC: 30.2 % (ref 32–34.5)
MCV RBC AUTO: 88.9 FL (ref 80–99.9)
METER GLUCOSE: 107 MG/DL (ref 74–99)
METER GLUCOSE: 161 MG/DL (ref 74–99)
METER GLUCOSE: 228 MG/DL (ref 74–99)
MONOCYTES ABSOLUTE: 0.9 E9/L (ref 0.1–0.95)
MONOCYTES RELATIVE PERCENT: 12.3 % (ref 2–12)
NEUTROPHILS ABSOLUTE: 4.73 E9/L (ref 1.8–7.3)
NEUTROPHILS RELATIVE PERCENT: 64.9 % (ref 43–80)
PDW BLD-RTO: 19.5 FL (ref 11.5–15)
PLATELET # BLD: 260 E9/L (ref 130–450)
PMV BLD AUTO: 10.8 FL (ref 7–12)
POTASSIUM SERPL-SCNC: 4.7 MMOL/L (ref 3.5–5)
PRO-BNP: 4211 PG/ML (ref 0–125)
PROTHROMBIN TIME: 20.8 SEC (ref 9.3–12.4)
RBC # BLD: 4.13 E12/L (ref 3.5–5.5)
SODIUM BLD-SCNC: 139 MMOL/L (ref 132–146)
TOTAL IRON BINDING CAPACITY: 366 MCG/DL (ref 250–450)
TOTAL PROTEIN: 6.9 G/DL (ref 6.4–8.3)
TSH SERPL DL<=0.05 MIU/L-ACNC: 1.2 UIU/ML (ref 0.27–4.2)
VITAMIN B-12: >2000 PG/ML (ref 211–946)
WBC # BLD: 7.3 E9/L (ref 4.5–11.5)

## 2023-02-21 PROCEDURE — 2580000003 HC RX 258: Performed by: INTERNAL MEDICINE

## 2023-02-21 PROCEDURE — 76770 US EXAM ABDO BACK WALL COMP: CPT

## 2023-02-21 PROCEDURE — 97165 OT EVAL LOW COMPLEX 30 MIN: CPT

## 2023-02-21 PROCEDURE — 80053 COMPREHEN METABOLIC PANEL: CPT

## 2023-02-21 PROCEDURE — 84443 ASSAY THYROID STIM HORMONE: CPT

## 2023-02-21 PROCEDURE — S5553 INSULIN LONG ACTING 5 U: HCPCS | Performed by: INTERNAL MEDICINE

## 2023-02-21 PROCEDURE — 83540 ASSAY OF IRON: CPT

## 2023-02-21 PROCEDURE — 2060000000 HC ICU INTERMEDIATE R&B

## 2023-02-21 PROCEDURE — 82607 VITAMIN B-12: CPT

## 2023-02-21 PROCEDURE — 82962 GLUCOSE BLOOD TEST: CPT

## 2023-02-21 PROCEDURE — 83605 ASSAY OF LACTIC ACID: CPT

## 2023-02-21 PROCEDURE — 97535 SELF CARE MNGMENT TRAINING: CPT

## 2023-02-21 PROCEDURE — 36415 COLL VENOUS BLD VENIPUNCTURE: CPT

## 2023-02-21 PROCEDURE — 85025 COMPLETE CBC W/AUTO DIFF WBC: CPT

## 2023-02-21 PROCEDURE — 97530 THERAPEUTIC ACTIVITIES: CPT

## 2023-02-21 PROCEDURE — 83550 IRON BINDING TEST: CPT

## 2023-02-21 PROCEDURE — 6370000000 HC RX 637 (ALT 250 FOR IP): Performed by: PSYCHIATRY & NEUROLOGY

## 2023-02-21 PROCEDURE — 6370000000 HC RX 637 (ALT 250 FOR IP): Performed by: INTERNAL MEDICINE

## 2023-02-21 PROCEDURE — 83880 ASSAY OF NATRIURETIC PEPTIDE: CPT

## 2023-02-21 PROCEDURE — 83735 ASSAY OF MAGNESIUM: CPT

## 2023-02-21 PROCEDURE — 85610 PROTHROMBIN TIME: CPT

## 2023-02-21 PROCEDURE — 6360000002 HC RX W HCPCS: Performed by: INTERNAL MEDICINE

## 2023-02-21 PROCEDURE — 82746 ASSAY OF FOLIC ACID SERUM: CPT

## 2023-02-21 PROCEDURE — 97161 PT EVAL LOW COMPLEX 20 MIN: CPT

## 2023-02-21 PROCEDURE — 85730 THROMBOPLASTIN TIME PARTIAL: CPT

## 2023-02-21 PROCEDURE — 82977 ASSAY OF GGT: CPT

## 2023-02-21 PROCEDURE — 93010 ELECTROCARDIOGRAM REPORT: CPT | Performed by: INTERNAL MEDICINE

## 2023-02-21 PROCEDURE — 99222 1ST HOSP IP/OBS MODERATE 55: CPT | Performed by: PSYCHIATRY & NEUROLOGY

## 2023-02-21 PROCEDURE — 83036 HEMOGLOBIN GLYCOSYLATED A1C: CPT

## 2023-02-21 RX ORDER — PRAMIPEXOLE DIHYDROCHLORIDE 0.25 MG/1
0.75 TABLET ORAL NIGHTLY
Status: DISCONTINUED | OUTPATIENT
Start: 2023-02-21 | End: 2023-02-25 | Stop reason: HOSPADM

## 2023-02-21 RX ADMIN — PRAMIPEXOLE DIHYDROCHLORIDE 0.75 MG: 0.25 TABLET ORAL at 21:53

## 2023-02-21 RX ADMIN — ACETAMINOPHEN 650 MG: 325 TABLET ORAL at 17:17

## 2023-02-21 RX ADMIN — INSULIN GLARGINE-YFGN 5 UNITS: 100 INJECTION, SOLUTION SUBCUTANEOUS at 21:50

## 2023-02-21 RX ADMIN — ALOGLIPTIN 12.5 MG: 12.5 TABLET, FILM COATED ORAL at 09:01

## 2023-02-21 RX ADMIN — Medication 10 ML: at 09:02

## 2023-02-21 RX ADMIN — CARVEDILOL 6.25 MG: 6.25 TABLET, FILM COATED ORAL at 17:20

## 2023-02-21 RX ADMIN — Medication 10 ML: at 21:44

## 2023-02-21 RX ADMIN — WATER 1000 MG: 1 INJECTION INTRAMUSCULAR; INTRAVENOUS; SUBCUTANEOUS at 21:43

## 2023-02-21 RX ADMIN — CARVEDILOL 6.25 MG: 6.25 TABLET, FILM COATED ORAL at 09:01

## 2023-02-21 RX ADMIN — ESCITALOPRAM OXALATE 10 MG: 10 TABLET, FILM COATED ORAL at 09:01

## 2023-02-21 RX ADMIN — ENOXAPARIN SODIUM 30 MG: 100 INJECTION SUBCUTANEOUS at 09:02

## 2023-02-21 RX ADMIN — INSULIN LISPRO 1 UNITS: 100 INJECTION, SOLUTION INTRAVENOUS; SUBCUTANEOUS at 17:20

## 2023-02-21 RX ADMIN — PRAMIPEXOLE DIHYDROCHLORIDE 0.75 MG: 0.25 TABLET ORAL at 09:01

## 2023-02-21 RX ADMIN — ASPIRIN 81 MG 81 MG: 81 TABLET ORAL at 09:01

## 2023-02-21 RX ADMIN — ACETAMINOPHEN 650 MG: 325 TABLET ORAL at 21:54

## 2023-02-21 RX ADMIN — ATORVASTATIN CALCIUM 40 MG: 40 TABLET, FILM COATED ORAL at 21:54

## 2023-02-21 RX ADMIN — PANTOPRAZOLE SODIUM 40 MG: 40 TABLET, DELAYED RELEASE ORAL at 05:59

## 2023-02-21 ASSESSMENT — ENCOUNTER SYMPTOMS
BACK PAIN: 0
VOMITING: 0
NAUSEA: 0
SHORTNESS OF BREATH: 0
ABDOMINAL PAIN: 0
DIARRHEA: 0
EYE PAIN: 0
COUGH: 0
SORE THROAT: 0

## 2023-02-21 ASSESSMENT — PAIN DESCRIPTION - DESCRIPTORS
DESCRIPTORS: ACHING
DESCRIPTORS: ACHING;SHOOTING;SHARP

## 2023-02-21 ASSESSMENT — PAIN DESCRIPTION - LOCATION
LOCATION: NECK
LOCATION: BUTTOCKS

## 2023-02-21 ASSESSMENT — PAIN SCALES - GENERAL
PAINLEVEL_OUTOF10: 8
PAINLEVEL_OUTOF10: 3
PAINLEVEL_OUTOF10: 5

## 2023-02-21 ASSESSMENT — PAIN DESCRIPTION - ORIENTATION: ORIENTATION: MID

## 2023-02-21 NOTE — PLAN OF CARE
Patient's chart updated to reflect:      . - HF care plan, HF education points and HF discharge instructions.  -Orders: 2 gram sodium diet, daily weights, I/O.  -PCP and/or Cardiologist appointment to be scheduled within 7 days of hospital discharge.  -History of HF, not primary admission Dx. Patient admitted for treatment of   FINAL IMPRESSION       1.  Confusion      Shekhar Nails, RN RN, BSN  Heart Failure Navigator

## 2023-02-21 NOTE — CONSULTS
[facility] [address]  Neurology Consult    Date:  2/21/2023  Patient Name:  Clarke Bryan  YOB: 1955  MRN: [id number]     PCP:  Luli De Santiago DO   Referring:  No ref. provider found      Chief Complaint: confusion, trouble walking    History obtained from: patient, daughter, chart    Vilma Mendez is a 75 y.o. female with a history of NPH s/p  shunt who has been having progressive functional decline with memory and gait. Her ventricles do not appear significantly enlarged on CT head this admission, but when seen in January did have a small cortical infarct. Her exam is consistent with executive dysfunction and impaired short term memory as well as retropulsive gait. It is possible she is developing another dementia (Alzheimer vs vascular) superimposed on her NPH. Some weight loss is also reported by family in there interval as well so we will also check for B12 deficiency. Plan  B12, folate, TSH  MRI brain w/o contrast  Consult placed neurosurgery for assistance with shunt interrogation and reprogramming post-MRI. Recommendations appreciated  Oxybutynin held already, will change Mirapex to HS only from BID to minimize sedating meds during daytime        History of Present Illness:  Clarke Bryan is a 75 y.o. right handed female presenting for evaluation of altered mental status. The patient has a history of NPH s/p shunt in 2018 with revision in 2020. She is admitted at this time for worsening confusion after returning home with family. She had been in a nursing facility  for recovery after a C3-T1 posterior fusion was done on 10/17/22. Once she returned home her family noted she had been getting more confused and was having more difficulty walking. Her daughter states she uses her cane like a \"blind person's walking stick. \" They also report some urinary incontinence as well.              Medical History:   Past Medical History:   Diagnosis Date    Acid reflux disease     Acute on chronic respiratory failure with hypoxia and hypercapnia ([facility] [address] 7/25/2018    Apraxia 7/23/2018    Arthritis     Ataxia 7/23/2018    CAD (coronary artery disease) 10/4/2012    Choledocholithiasis     recurrent    Chronic systolic congestive heart failure ([address] 8/1/2018    Ejection fraction 23% plus/minus 5%    CKD (chronic kidney disease) stage 3, GFR 30-59 ml/min ([facility]) 7/23/2018    COPD (chronic obstructive pulmonary disease) ([facility])     alpha one M1S 183mg/dl    Diabetes mellitus ([facility] [address]     Hyperlipidemia     Hypoxia 7/23/2018    Neck pain     Normal pressure hydrocephalus ([address] 7/28/2018    Oxygen dependent     3l/min/nc    Pleural effusion years ago    requiring right thoracentesis    Primary hypertension 2/21/2023    Pulmonary hypertension ([facility] [address] 7/23/2018    Restless legs     S/P CABG x 2 10/4/2012    Severe mitral regurgitation 8/1/2018        Surgical History:   Past Surgical History:   Procedure Laterality Date    CARDIAC SURGERY      CERVICAL FUSION  1999    C3-C6    CERVICAL FUSION N/A 10/17/2022    POSTERIOR C3-C7 LAMINECTOMY, C3-T1 FUSION, NEEDS O-ARM, EDUARD TABLE, CELL SAVER, PLATLET GEL, POSTERIOR INSTRUMENTATION, GLOBUS performed by Jaspla Arora MD at [facility]  2002    COLONOSCOPY N/A 1/21/2019    COLONOSCOPY POLYPECTOMY SNARE/COLD BIOPSY performed by Melissa Gutiérrez MD at [facility]  10/30/2002    EYE SURGERY      cataract with Lens implants    HERNIA REPAIR      HYSTERECTOMY (CERVIX STATUS UNKNOWN)  [id number] [facility]  07/27/2018    resolved    NE CRTJ SHUNT [id number] TERMINUS N/A 10/15/2018    VENTRICULAR PERITONEAL SHUNT  PLACEMENT performed by Tiana Anderson MD at [address] CSF N/A 7/27/2018    LUMBAR DRAIN INSERTION performed by Tiana Anderson MD at [address] N/A 2/1/2021    VENTRICULAR PERITONEAL SHUNT REPLACEMENT performed by Paul Pires MD at [facility] History:   Family History   Problem Relation Age of Onset    High Blood Pressure Mother     Diabetes Mother     Emphysema Father     Heart Attack Sister     Heart Failure Sister        Social History:  Social History     Tobacco Use    Smoking status: Former     Packs/day: 0.50     Years: 40.00     Pack years: 20.00     Types: Cigarettes     Quit date: 2018     Years since quittin.5    Smokeless tobacco: Never   Vaping Use    Vaping Use: Never used   Substance Use Topics    Alcohol use: No    Drug use: No        Current Medications:      Current Facility-Administered Medications   Medication Dose Route Frequency Provider Last Rate Last Admin    albuterol (PROVENTIL) nebulizer solution 2.5 mg  2.5 mg Nebulization Q4H PRN Jack Fernández MD        aspirin chewable tablet 81 mg  81 mg Oral Daily Jack Fernández MD   81 mg at 23 0901    atorvastatin (LIPITOR) tablet 40 mg  40 mg Oral Nightly Jack Fernández MD   40 mg at 23 2233    carvedilol (COREG) tablet 6.25 mg  6.25 mg Oral BID WC Jack Fernández MD   6.25 mg at 23 1720    escitalopram (LEXAPRO) tablet 10 mg  10 mg Oral Daily Jack Fernández MD   10 mg at 23 0901    pantoprazole (PROTONIX) tablet 40 mg  40 mg Oral QAM AC Jack Fernández MD   40 mg at 23 0559    pramipexole (MIRAPEX) tablet 0.75 mg  0.75 mg Oral BID Jack Fernández MD   0.75 mg at 23 0901    alogliptin (NESINA) tablet 12.5 mg  12.5 mg Oral Daily Jack Fernández MD   12.5 mg at 23 0901    glucose chewable tablet 16 g  4 tablet Oral PRN Jack Fernández MD        dextrose bolus 10% 125 mL  125 mL IntraVENous PRN Jack Fernández MD        Or    dextrose bolus 10% 250 mL  250 mL IntraVENous PRN Jack Fernández MD        glucagon injection 1 mg  1 mg SubCUTAneous PRN Jack Fernández MD        dextrose 10 % infusion   IntraVENous Continuous PRN Jack Fernández MD        sodium chloride flush 0.9 % injection 5-40 mL  5-40 mL IntraVENous 2 times per day Yrn Lane MD   10 mL at 02/21/23 0902    sodium chloride flush 0.9 % injection 5-40 mL  5-40 mL IntraVENous PRN Yrn Lane MD        0.9 % sodium chloride infusion   IntraVENous PRN Yrn Lane MD        enoxaparin Sodium (LOVENOX) injection 30 mg  30 mg SubCUTAneous Daily Yrn Lane MD   30 mg at 02/21/23 0902    polyethylene glycol (GLYCOLAX) packet 17 g  17 g Oral Daily PRN Yrn Lane MD        acetaminophen (TYLENOL) tablet 650 mg  650 mg Oral Q6H PRN Yrn Lane MD   650 mg at 02/21/23 1717    Or    acetaminophen (TYLENOL) suppository 650 mg  650 mg Rectal Q6H PRN Yrn Lane MD        insulin lispro (HUMALOG) injection vial 0-4 Units  0-4 Units SubCUTAneous TID WC Yrn Lane MD   1 Units at 02/21/23 1720    insulin lispro (HUMALOG) injection vial 0-4 Units  0-4 Units SubCUTAneous Nightly Yrn Lane MD        cefTRIAXone (ROCEPHIN) 1,000 mg in sterile water 10 mL IV syringe  1,000 mg IntraVENous Q24H Yrn Lane MD   1,000 mg at 02/20/23 2234    insulin glargine-yfgn [facility]) injection vial 5 Units  5 Units SubCUTAneous Nightly Yrn Lane MD   5 Units at 02/20/23 2232        Allergies: Allergies   Allergen Reactions    Sulfa Antibiotics Anaphylaxis    Pentazocine Lactate Nausea And Vomiting        Physical Examination  Vitals   Vitals:    02/21/23 0246 02/21/23 0312 02/21/23 0900 02/21/23 1530   BP: 103/65 123/73 118/76 121/72   Pulse: 72 70 72 70   Resp:  20 16 17   Temp: 97.3 °F (36.3 °C) 97.8 °F (36.6 °C) 98 °F (36.7 °C) 98.4 °F (36.9 °C)   TempSrc: Oral Temporal Temporal Oral   SpO2: 90% 93% 95% 95%   Weight:       Height:            General: Patient appears in no acute distress. HEENT: Normocephalic, atraumatic  Chest: on O2 via NC  Heart: RRR  Extremities/Peripheral vascular: No edema/swelling noted. No cold limbs noted.     Neurologic Examination    Mental Status  Alert, and oriented to person, place and time. Speech is fluent with intact comprehension. Registration 3/3, recall 1/3. Attention good. +Luria test.    Cranial Nerves  II. Visual fields full to confrontation bilaterally. Fundoscopic exam: Discs not well visualized. III, IV, VI: Pupils equally round and reactive to light, 3 to 2 mm bilaterally. EOMs: full, no nystagmus. V. Facial sensation intact to light touch bilaterally  VII: Facial movements symmetric and strong  VIII: Hearing intact to voice  IX,X: Palate elevates symmetrically. No dysarthria  XI: Sternocleidomastoid and trapezius 5/5 bilaterally   XII: Tongue is midline    Motor     Right Left   Right Left   Deltoid 4+ 4+  Hip Flexion 4+ 4+   Biceps 5 5  Knee Extension 5 5   Triceps 5 5  Knee Flexion 5 5   Handgrip 4+ 4+  Ankle Dorsiflexion 5 5       Ankle Plantarflexion 5 5       Pronator drift: absent bilaterally    Sensation  Light Touch: Intact distally in all four limbs  Vibration: Intact distally in all four limbs    Reflexes     Right Left   Biceps 2 2   Brachioradialis 2 2   Patellar 2 2   Achilles 2 2   ankle clonus none none   Babinski absent absent     Coordination  Rapid alternating movements normal in bilateral upper extremities  Finger to nose testing normal bilaterally    Gait  Patient able to stand with assistance. Gait unsteady with tendency towards retropulsion. Gait not clearly magnetic, but stride shortened and decreased step height noted. +Pull test.      Labs  Recent Labs     02/21/23  0624      K 4.7      CO2 21*   BUN 32*   CREATININE 1.3*   GLUCOSE 115*   CALCIUM 9.0   PROT 6.9   LABALBU 3.1*   BILITOT 0.7   ALKPHOS 151*   AST 21   ALT 16   WBC 7.3   RBC 4.13   HGB 11.1*   HCT 36.7   MCV 88.9   MCH 26.9   MCHC 30.2*   RDW 19.5*      MPV 10.8   LACTA 2.7*   LABA1C 9.8*         Imaging  US RETROPERITONEAL COMPLETE   Final Result   Mild bilateral hydronephrosis. No renal calculi. Right upper and left lower quadrant and pelvic ascites. RECOMMENDATIONS:   Unavailable         CT Head W/O Contrast   Final Result   1. Stable position of right ventricular shunt catheter. 2. Stable size of the ventricles. No hydrocephalus. 3. No acute intracranial hemorrhage or edema. XR CHEST PORTABLE   Final Result   No airspace consolidation. Cardiomegaly. Mild right pleural effusion.                    Electronically signed by Yen Alicea DO on 2/21/2023 at 5:26 PM

## 2023-02-21 NOTE — ED NOTES
Report called, room and rn ready, no questions, vitals obtained, nad, transport notified.       Ramy Baer, RN  02/21/23 399 ELÍAS Ferreira RN  02/21/23 3187

## 2023-02-21 NOTE — H&P
[facility] HISTORY AND PHYSICAL      Patient Name: Maikol Bull  Unit/Bed: [facility]/D  YOB: 1955  Medical Record Number: [id number]  Current Hospital Day: Hospital Day: 2  Admit Date: 2/20/2023  Primary Care Provider: Keisha Agosto DO      Chief Complaint: Altered mental status    History of Present Illness: Patient seen and examined at bedside. Maikol Bull is a 75 y.o. female who presented to the emergency department on February 20 complaining of confusion. The patient is self aware of this and also family has noted this. Patient is alone on interview with no family present. She was a limited historian due to her confusion. She noted compliance with her home oxygen though takes it off at times when she is sleeping and unaware. She denied any chest pain or shortness of breath in the emergency department. She denied any abdominal pain or headache. She otherwise was unable to provide meaningful history. In the emergency department laboratory studies were notable for creatinine of 1.3, BUN of 31, alkaline phosphatase of 162. Initial lactic acid was 2.4 but repeat was 1.8. CBC showed a normal white blood cell count but mild anemia at 11.2. COVID and influenza testing was negative. Urinalysis was borderline for infection. CT of the brain without contrast showed stable position of a right ventricular shunt catheter. Ventricular size was stable with no hydrocephalus. No acute process was seen. Chest x-ray showed no acute process. Therapeutic intervention the emergency department included 1 L of 0.9% normal saline. She was admitted for further evaluation and treatment.     Patient Active Problem List   Diagnosis    CAD (coronary artery disease)    Pulmonary hypertension (HCC)    COPD (chronic obstructive pulmonary disease) ([facility])    CKD (chronic kidney disease) stage 3, GFR 30-59 ml/min    Normal pressure hydrocephalus (HCC)    Chronic systolic (congestive) heart failure ([facility])    Severe mitral regurgitation    NPH (normal pressure hydrocephalus) ([facility])    COVID-19    Syncope and collapse    Gastroenteritis    Acute kidney injury superimposed on CKD ([facility])    Hypotension    S/P ventriculoperitoneal shunt    Cervical stenosis of spinal canal    Type 2 diabetes mellitus, without long-term current use of insulin ([facility])    O2 dependent    Severe protein-calorie malnutrition ([facility])    TIA (transient ischemic attack)    Altered mental status    Alkaline phosphatase elevation    Primary hypertension    Urinary tract infection       Past Medical History:      Diagnosis Date    Acid reflux disease     Acute on chronic respiratory failure with hypoxia and hypercapnia ([facility]) 7/25/2018    Apraxia 7/23/2018    Arthritis     Ataxia 7/23/2018    CAD (coronary artery disease) 10/4/2012    Choledocholithiasis     recurrent    Chronic systolic congestive heart failure ([facility]) 8/1/2018    Ejection fraction 23% plus/minus 5%    CKD (chronic kidney disease) stage 3, GFR 30-59 ml/min ([facility]) 7/23/2018    COPD (chronic obstructive pulmonary disease) ([facility])     alpha one M1S 183mg/dl    Diabetes mellitus ([facility])     Hyperlipidemia     Hypoxia 7/23/2018    Neck pain     Normal pressure hydrocephalus ([facility]) 7/28/2018    Oxygen dependent     3l/min/nc    Pleural effusion years ago    requiring right thoracentesis    Primary hypertension 2/21/2023    Pulmonary hypertension ([facility]) 7/23/2018    Restless legs     S/P CABG x 2 10/4/2012    Severe mitral regurgitation 8/1/2018       Past Surgical History:      Procedure Laterality Date    CARDIAC SURGERY      CERVICAL FUSION  1999    C3-C6    CERVICAL FUSION N/A 10/17/2022    POSTERIOR C3-C7 LAMINECTOMY, C3-T1 FUSION, NEEDS O-ARM, EDUARD TABLE, CELL SAVER, PLATLET GEL, POSTERIOR INSTRUMENTATION, GLOBUS performed by Molly Cervantes MD at [facility] OR    CHOLECYSTECTOMY  2002    COLONOSCOPY N/A 1/21/2019    COLONOSCOPY POLYPECTOMY SNARE/COLD BIOPSY  performed by Rick Olea MD at [address] GRAFT  10/30/2002    EYE SURGERY      cataract with Lens implants    HERNIA REPAIR      HYSTERECTOMY (CERVIX STATUS UNKNOWN)  [address]  2018    resolved    NH CRTJ SHUNT [id number] TERMINUS N/A 10/15/2018    VENTRICULAR PERITONEAL SHUNT  PLACEMENT performed by Soha Bates MD at [address] [facility] N/A 2018    LUMBAR DRAIN INSERTION performed by Soha Bates MD at [address] N/A 2021    VENTRICULAR PERITONEAL SHUNT REPLACEMENT performed by Soha Bates MD at [address]       Family History:      Problem Relation Age of Onset    High Blood Pressure Mother     Diabetes Mother     Emphysema Father     Heart Attack Sister     Heart Failure Sister        Social History     Socioeconomic History    Marital status:      Spouse name: Dante Diallo    Number of children: 2    Years of education: 16   Occupational History    Occupation: retired   Tobacco Use    Smoking status: Former     Packs/day: 0.50     Years: 40.00     Pack years: 20.00     Types: Cigarettes     Quit date: 2018     Years since quittin.5    Smokeless tobacco: Never   Vaping Use    Vaping Use: Never used   Substance and Sexual Activity    Alcohol use: No    Drug use: No    Sexual activity: Not Currently     Partners: Male         Home Medications:    Prior to Admission medications    Medication Sig Start Date End Date Taking?  Authorizing Provider   aspirin 81 MG chewable tablet Take 1 tablet by mouth daily 23   Jeyson Noriega DO   escitalopram (LEXAPRO) 10 MG tablet Take 1 tablet by mouth daily 23   Jeyson Noriega DO   atorvastatin (LIPITOR) 40 MG tablet Take 1 tablet by mouth nightly 23   Jeyson Noriega DO   clopidogrel (PLAVIX) 75 MG tablet Take 1 tablet by mouth daily for 21 doses 23 Burgess Mcginnis, DO   Lancets MISC 1 each by Does not apply route 4 times daily 1/11/23   Burgess Mcginnis, DO   blood glucose monitor strips Test 4 times a day & as needed for symptoms of irregular blood glucose. Dispense sufficient amount for indicated testing frequency plus additional to accommodate PRN testing needs.  1/11/23   Burgess Mcginnis, DO   insulin detemir (LEVEMIR FLEXTOUCH) 100 UNIT/ML injection pen Inject 5 Units into the skin nightly 1/11/23   Burgess Mcginnis DO   Insulin Pen Needle (MEIJER PEN NEEDLES) 31G X 6 MM MISC 1 each by Does not apply route daily 1/11/23   Burgess Mcginnis, DO   carvedilol (COREG) 6.25 MG tablet Take 1 tablet by mouth 2 times daily (with meals) 1/11/23   Burgess Mcginnis DO   spironolactone (ALDACTONE) 25 MG tablet Take 25 mg by mouth daily    Historical Provider, MD   pramipexole (MIRAPEX) 0.5 MG tablet Take 0.75 mg by mouth in the morning and at bedtime    Historical Provider, MD   SITagliptin (JANUVIA) 100 MG tablet Take 100 mg by mouth daily    Historical Provider, MD   albuterol sulfate  (90 Base) MCG/ACT inhaler INHALE 2 PUFFS BY MOUTH EVERY 4 HOURS AS NEEDED 9/16/20   Historical Provider, MD   OXYGEN Inhale 3 L into the lungs as needed    Historical Provider, MD   omeprazole (PRILOSEC) 40 MG capsule   Take 40 mg by mouth daily  5/16/15   Historical Provider, MD   vitamin D (ERGOCALCIFEROL) 13954 UNITS CAPS capsule Take 50,000 Units by mouth once a week THUR 4/30/15   Historical Provider, MD       [facility] Medications:    Scheduled Meds:   aspirin  81 mg Oral Daily    atorvastatin  40 mg Oral Nightly    carvedilol  6.25 mg Oral BID WC    escitalopram  10 mg Oral Daily    pantoprazole  40 mg Oral QAM AC    pramipexole  0.75 mg Oral BID    alogliptin  12.5 mg Oral Daily    sodium chloride flush  5-40 mL IntraVENous 2 times per day    enoxaparin  30 mg SubCUTAneous Daily    insulin lispro  0-4 Units SubCUTAneous TID WC    insulin lispro  0-4 Units SubCUTAneous Nightly    cefTRIAXone (ROCEPHIN) IV  1,000 mg IntraVENous Q24H    insulin glargine  5 Units SubCUTAneous Nightly      Continuous Infusions:   dextrose      sodium chloride       PRN Meds:albuterol, glucose, dextrose bolus **OR** dextrose bolus, glucagon (rDNA), dextrose, sodium chloride flush, sodium chloride, polyethylene glycol, acetaminophen **OR** acetaminophen    Allergies: Allergies   Allergen Reactions    Sulfa Antibiotics Anaphylaxis    Pentazocine Lactate Nausea And Vomiting       Review of Systems:  Review of Systems   Constitutional:  Negative for fatigue and fever. HENT:  Negative for congestion and sore throat. Eyes:  Negative for pain and visual disturbance. Respiratory:  Negative for cough and shortness of breath. Cardiovascular:  Negative for chest pain, palpitations and leg swelling. Gastrointestinal:  Negative for abdominal pain, diarrhea, nausea and vomiting. Genitourinary:  Negative for dysuria and hematuria. Musculoskeletal:  Negative for arthralgias and back pain. Skin:  Negative for rash and wound. Neurological:  Negative for dizziness and headaches. Hematological:  Negative for adenopathy. Does not bruise/bleed easily. Psychiatric/Behavioral:  Positive for confusion. Negative for dysphoric mood. The patient is not nervous/anxious. Objective:   Vitals: /73   Pulse 83   Temp 98.7 °F (37.1 °C) (Oral)   Resp 20   Ht 5' 3\" (1.6 m)   Wt 104 lb (47.2 kg)   LMP  (LMP Unknown)   SpO2 93%   BMI 18.42 kg/m²     CURRENT TEMPERATURE:  Temp: 98.7 °F (37.1 °C)  MAXIMUM TEMPERATURE OVER 24HRS:  Temp (24hrs), Av.7 °F (37.1 °C), Min:98.7 °F (37.1 °C), Max:98.7 °F (37.1 °C)      Physical Exam  Vitals and nursing note reviewed. Constitutional:       General: She is not in acute distress. Appearance: She is cachectic. She is ill-appearing. She is not toxic-appearing. HENT:      Head: Normocephalic and atraumatic.       Right Ear: External ear normal.      Left Ear: External ear normal.      Nose: Nose normal. No rhinorrhea. Mouth/Throat:      Mouth: Mucous membranes are moist.      Pharynx: Oropharynx is clear. No oropharyngeal exudate. Eyes:      General: No scleral icterus. Right eye: No discharge. Left eye: No discharge. Conjunctiva/sclera: Conjunctivae normal.   Cardiovascular:      Rate and Rhythm: Normal rate and regular rhythm. Heart sounds: Normal heart sounds. No murmur heard. No friction rub. No gallop. Pulmonary:      Effort: Pulmonary effort is normal. No respiratory distress. Breath sounds: Decreased breath sounds (Global, mild) present. No wheezing, rhonchi or rales. Abdominal:      General: Abdomen is flat. There is no distension. Palpations: Abdomen is soft. There is no mass. Tenderness: There is no abdominal tenderness. There is no guarding. Musculoskeletal:         General: No deformity. Cervical back: Normal range of motion and neck supple. No rigidity. Right lower leg: No edema. Left lower leg: No edema. Lymphadenopathy:      Cervical: No cervical adenopathy. Skin:     General: Skin is warm and dry. Capillary Refill: Capillary refill takes less than 2 seconds. Coloration: Skin is not jaundiced. Findings: No bruising, erythema or rash. Neurological:      General: No focal deficit present. Mental Status: She is alert. She is disoriented and confused. Cranial Nerves: No cranial nerve deficit. Motor: No weakness. Psychiatric:         Attention and Perception: Attention normal.         Mood and Affect: Mood normal.         Behavior: Behavior is cooperative. Thought Content: Thought content normal.         Cognition and Memory: She exhibits impaired recent memory. Judgment: Judgment is impulsive. Diet: ADULT DIET;  Regular; 4 carb choices (60 gm/meal)  ADULT ORAL NUTRITION SUPPLEMENT; Breakfast, Lunch, Dinner; Diabetic Oral Supplement    Data:     Scheduled Meds: Reviewed  Continuous Infusions:   dextrose      sodium chloride         Labs  CBC:   Recent Labs     02/20/23  1702   WBC 7.4   HGB 11.2*   HCT 36.4          BMP:   Recent Labs     02/20/23  1702 02/20/23  2224     --    K 4.9  --    CL 99  --    CO2 24  --    BUN 31*  --    CREATININE 1.3*  --    GLUCOSE 147* 200     LFT's:   Recent Labs     02/20/23  1702   AST 22   ALT 18   BILITOT 0.7   ALKPHOS 162*     Troponin: No results for input(s): TROPONINI in the last 72 hours. BNP: No results for input(s): BNP in the last 72 hours. INR: No results for input(s): INR in the last 72 hours. Lipids: No results for input(s): CHOL, HDL in the last 72 hours. Invalid input(s): LDLCALCU  Urinalysis:   Lab Results   Component Value Date/Time    NITRU Negative 02/20/2023 05:02 PM    WBCUA 2-5 02/20/2023 05:02 PM    BACTERIA MODERATE 02/20/2023 05:02 PM    RBCUA 0-1 02/20/2023 05:02 PM    BLOODU Negative 02/20/2023 05:02 PM    SPECGRAV 1.025 02/20/2023 05:02 PM    GLUCOSEU Negative 02/20/2023 05:02 PM         I/O:  No intake/output data recorded. Radiology:  CT Head W/O Contrast   Final Result   1. Stable position of right ventricular shunt catheter. 2. Stable size of the ventricles. No hydrocephalus. 3. No acute intracranial hemorrhage or edema. XR CHEST PORTABLE   Final Result   No airspace consolidation. Cardiomegaly. Mild right pleural effusion. US RETROPERITONEAL LIMITED    (Results Pending)       Most Recent EKG: Tracing from ED reviewed and personally interpreted. This showed normal sinus rhythm with right bundle branch block and left anterior fascicular block. This appeared unchanged from prior EKGs. Assessment/Plan    Edyth Body is a 75 y.o. female, who was admitted with Altered mental status.     Active Hospital Problems    Diagnosis Date Noted    Alkaline phosphatase elevation [R74.8] 02/21/2023 Priority: Medium    Primary hypertension [I10] 02/21/2023     Priority: Medium    Urinary tract infection [N39.0] 02/21/2023     Priority: Medium    Altered mental status [R41.82] 01/09/2023     Priority: Medium    Type 2 diabetes mellitus, without long-term current use of insulin ([facility] [E11.9] 10/17/2022     Priority: Medium    Acute kidney injury superimposed on CKD ([facility] [N17.9, N18.9] 06/30/2021       Plan: Altered mental status  -Etiology unclear. Patient is borderline for urinary tract infection and will treat as noted below. -CT of brain unrevealing. However, given patient's complex neurologic history in the setting of altered mentation, consult neurology for their opinion on etiology  -She does have an increased oxygen requirement compared to baseline but does not appear in any decompensated respiratory distress. Unknown if levels are monitored at home. Acute Kidney Injury on Chronic Kidney Disease  -received 1 L 0.9% NS in ED  -given current oxygen requirement, hold off on additional fluids for now. Add BNP onto labs. -creatinine 1.3 on presentation with baseline 0.8  -recheck renal function in a.m. tomorrow and consider nephrology consult if worsening.   -check urine protein/creatinine, urine electrolytes, urine eosinophils    Alkaline Phosphatemia  -check GGT to determine if intrahepatic. Further eval based on results. Diabetes Mellitus  -adjust dose of januvia given renal insufficiency  -start SSI Humalog low dose  -continue home lantus 5 units nightly    Hypertension  -continue carvedilol with hold parameters  -hold spironolactone given EUN    Urinary Tract Infection  -borderline based on labs  -empirically treat with ceftriaxone 1 gram IV daily and await culture in setting of altered mentation      Prophylaxis:  1) Stress Ulcer Protocol Active:   2) DVT Protocol Active:    Code Status:Full Code  FEN: ADULT DIET;  Regular; 4 carb choices (60 gm/meal)  ADULT ORAL NUTRITION SUPPLEMENT; Breakfast, Lunch, Dinner; Diabetic Oral Supplement  PT/OT Eval Status: consulted    General Attestation  Evaluation of the patient today involved thorough review of all progress notes, laboratory tests, consults, radiology, and other diagnostic studies as appropriate.    -----------------------------  Glenn Jackson MD  Internal Medicine Hospitalist

## 2023-02-21 NOTE — CARE COORDINATION
Met with the patient, her daughter Deja Ruth and her grandson Edward Palacios at the bedside to discuss transition of care plans. Patient lives with her daughter Deja Ruth and grandchildren in a two story home with a one step entrance. Patient has a FWW and a Cane and has 4L O2 per NC through [employer] [place]. Patient is currently active with [facility] and has used [facility] in the past as well. Patient has a history of rehab at [facility] and [address] [place] [address] [facility]. Discussed transition of care plan with the patient's daughter Deja Ruth and explained that the patient would benefit from rehab prior to returning home as she is unsteady walking with therapy, running into walls while walking with a FWW with assistance. Patient's daughter stated that she has been doing this at home as well. Family is agreeable to rehab prior to returning home. After reviewing [place] list, they would like, 1.) [address]  [facility], 2.) [address] [facility], 3.) [facility] [employer]. Call placed to Katheryne Canavan, liaison with Bakari Choi and referral made for transition of care planning via detailed VM. Await return call for acceptance. PASAR completed. Envelope and transfer paperwork completed. Will continue to follow. Parris Felix RN.  P:  [phone]      The Plan for Transition of Care is related to the following treatment goals: improve functional mobility to return home with family. The Patient and/or patient representative and daughter Deja Ruth, was provided with a choice of provider and agrees with the discharge plan. [x] Yes [] No    Freedom of choice list was provided with basic dialogue that supports the patient's individualized plan of care/goals, treatment preferences and shares the quality data associated with the providers.  [x] Yes [] No        Case Management Assessment  Initial Evaluation    Date/Time of Evaluation: 2/21/2023 1:23 PM  Assessment Completed by: Parris Felix RN    If patient is discharged prior to next notation, then this note serves as note for discharge by case management. Patient Name: Concetta Lane                   YOB: 1955  Diagnosis: Altered mental status [R41.82]  EUN (acute kidney injury) ([facility] [N17.9]  Altered mental status, unspecified altered mental status type [R41.82]                   Date / Time: 2/20/2023  4:10 PM    Patient Admission Status: Inpatient   Readmission Risk (Low < 19, Mod (19-27), High > 27): Readmission Risk Score: 20    Current PCP: Christofer Contreras, DO  PCP verified by CM? Yes    Chart Reviewed: Yes      History Provided by: Patient, Child/Family (Daughter Lasha Brooks)  Patient Orientation: Alert and Oriented, Person, Place, Situation    Patient Cognition: Short Term Memory Deficit    Hospitalization in the last 30 days (Readmission):  No    If yes, Readmission Assessment in CM Navigator will be completed. Advance Directives:      Code Status: Full Code   Patient's Primary Decision Maker is: Legal Next of Kin    Primary Decision MakerMercedezconnor Starkey  Bessie - [phone]    Discharge Planning:    Patient lives with: Family Members, Children Type of Home: House  Primary Care Giver: Family  Patient Support Systems include: Children, Family Members   Current Financial resources:    Current community resources:    Current services prior to admission: None            Current DME:              Type of Home Care services:  None    ADLS  Prior functional level: Assistance with the following:, Cooking, Housework, Shopping  Current functional level: Assistance with the following:, Bathing, Dressing, Toileting, Cooking, Housework, Mobility, Shopping    PT AM-PAC:   /24  OT AM-PAC: 15 /24    Family can provide assistance at DC: No (pt needs rehab)  Would you like Case Management to discuss the discharge plan with any other family members/significant others, and if so, who?  Yes (Daughter Lasha Brooks)  Plans to Return to Present Housing: No (Needs rehab)  Other Identified Issues/Barriers to RETURNING to current housing: pt needs rehab  Potential Assistance needed at discharge: N/A            Potential DME:    Patient expects to discharge to: [facility] for transportation at discharge:      Financial    Payor: Tyler Adair / Plan: [address] [facility] HMO / Product Type: Medicare /     Does insurance require precert for SNF: Yes    Potential assistance Purchasing Medications:    Meds-to-Beds request: Yes      [facility] [address], [address] [phone] - F [phone]  [address] [facility] [address]  Phone: [phone] Fax: [phone]    [facility] [place] - [address] [address]  [address]  [address] [facility], [facility] [id number]  Phone: [phone] Fax: [phone]      Notes:    Factors facilitating achievement of predicted outcomes: Family support, Cooperative, and Pleasant    Barriers to discharge: Cognitive deficit and Impulsivity    Additional Case Management Notes: See Above    The Plan for Transition of Care is related to the following treatment goals of Altered mental status [R41.82]  EUN (acute kidney injury) ([facility] [N17.9]  Altered mental status, unspecified altered mental status type [G01.87]    IF APPLICABLE: The Patient and/or patient representative Mirna Ayers and her family were provided with a choice of provider and agrees with the discharge plan. Freedom of choice list with basic dialogue that supports the patient's individualized plan of care/goals and shares the quality data associated with the providers was provided to:     Patient Representative Name:       The Patient and/or Patient Representative Agree with the Discharge Plan?       Jonelle Weir RN  Case Management Department  Ph: [phone] Fax: [phone]

## 2023-02-21 NOTE — PROGRESS NOTES
Hospitalist Progress Note      PCP: Elizabeth Ramirez DO    Date of Admission: 2/20/2023    Hospital Course: This is a 54-year-old female with past medical history of NPH status post  shunt implantation, TIA, diabetes mellitus, chronic systolic heart failure, stage III CKD, CAD COPD with chronic respiratory failure on home oxygen presented with altered mental status. CT brain was negative for acute intracranial abnormality; chest x-ray showed no acute process. Noted to have abnormal urinalysis and patient admitted to dysuria; started on IV antibiotics. Subjective: Pt was seen and examined at bedside. No acute event overnight; denies any CP, SOB or palpitation. Medications:  Reviewed    Infusion Medications    dextrose      sodium chloride       Scheduled Medications    aspirin  81 mg Oral Daily    atorvastatin  40 mg Oral Nightly    carvedilol  6.25 mg Oral BID WC    escitalopram  10 mg Oral Daily    pantoprazole  40 mg Oral QAM AC    pramipexole  0.75 mg Oral BID    alogliptin  12.5 mg Oral Daily    sodium chloride flush  5-40 mL IntraVENous 2 times per day    enoxaparin  30 mg SubCUTAneous Daily    insulin lispro  0-4 Units SubCUTAneous TID WC    insulin lispro  0-4 Units SubCUTAneous Nightly    cefTRIAXone (ROCEPHIN) IV  1,000 mg IntraVENous Q24H    insulin glargine  5 Units SubCUTAneous Nightly     PRN Meds: albuterol, glucose, dextrose bolus **OR** dextrose bolus, glucagon (rDNA), dextrose, sodium chloride flush, sodium chloride, polyethylene glycol, acetaminophen **OR** acetaminophen    No intake or output data in the 24 hours ending 02/21/23 1339    Exam:    /76   Pulse 72   Temp 98 °F (36.7 °C) (Temporal)   Resp 16   Ht 5' 3\" (1.6 m)   Wt 104 lb (47.2 kg)   LMP  (LMP Unknown)   SpO2 95%   BMI 18.42 kg/m²     General appearance: Sitting comfortably in bedside chair. No apparent distress. Respiratory: Clear to auscultation bilaterally. Cardiovascular: Normal S1/S2. Regular rhythm and rate. Abdomen: Soft, non-tender, non-distended with normal bowel sounds. Musculoskeletal: No clubbing, cyanosis or edema bilaterally. Skin: Skin color, texture, turgor normal.  No rashes or lesions. Neurologic:  No focal deficit. Psychiatric: Alert and oriented x 3. Pleasantly confused, not agitated  Peripheral Pulses: +2 palpable, equal bilaterally       Labs:   Recent Labs     02/20/23  1702 02/21/23  0624   WBC 7.4 7.3   HGB 11.2* 11.1*   HCT 36.4 36.7    260     Recent Labs     02/20/23  1702 02/21/23  0624    139   K 4.9 4.7   CL 99 104   CO2 24 21*   BUN 31* 32*   CREATININE 1.3* 1.3*   CALCIUM 9.1 9.0     Recent Labs     02/20/23  1702 02/21/23  0624   AST 22 21   ALT 18 16   BILITOT 0.7 0.7   ALKPHOS 162* 151*     Recent Labs     02/21/23  0624   INR 1.9     No results for input(s): Yadira Hernandez in the last 72 hours. Radiology:  US RETROPERITONEAL COMPLETE   Final Result   Mild bilateral hydronephrosis. No renal calculi. Right upper and left lower quadrant and pelvic ascites. RECOMMENDATIONS:   Unavailable         CT Head W/O Contrast   Final Result   1. Stable position of right ventricular shunt catheter. 2. Stable size of the ventricles. No hydrocephalus. 3. No acute intracranial hemorrhage or edema. XR CHEST PORTABLE   Final Result   No airspace consolidation. Cardiomegaly. Mild right pleural effusion.                    Active Hospital Problems    Diagnosis Date Noted    Alkaline phosphatase elevation [R74.8] 02/21/2023     Priority: Medium    Primary hypertension [I10] 02/21/2023     Priority: Medium    Urinary tract infection [N39.0] 02/21/2023     Priority: Medium    Altered mental status [R41.82] 01/09/2023     Priority: Medium    Type 2 diabetes mellitus, without long-term current use of insulin ([facility] [E11.9] 10/17/2022     Priority: Medium    Acute kidney injury superimposed on CKD ([facility] [facility] [N17.9, N18.9] 06/30/2021 Assessment  Acute metabolic encephalopathy - likely related to acute infection, improving  UTI  Acute on chronic renal failure - baseline stage III CKD  Chronic respiratory failure with hypoxia - secondary to below, on 3 L/min supplemental oxygen chronically  COPD - stable, no evidence of acute exacerbation  Type 2 diabetes mellitus - suboptimal glycemic control  CAD - stable, asymptomatic  Essential hypertension - well-controlled  History of NPH - status post  shunt implantation  Anemia of chronic disease - stable H&H  Protein calorie malnutrition  Physical deconditioning  Underweight  -  Body mass index is 18.42 kg/m². Plan:  Continue with IV Rocephin  Follow cultures, NGTD  Gentle IV hydration  Monitor renal function, replace electrolytes as needed  On ASA, statin  Continue with Coreg  Basal and corrective bolus insulin regimen; continue with alogliptin  PT/OT as tolerated  Fall precautions    DVT Prophylaxis: Lovenox  Diet: ADULT ORAL NUTRITION SUPPLEMENT; Breakfast, Lunch, Dinner; Diabetic Oral Supplement  ADULT DIET; Regular; 4 carb choices (60 gm/meal);  Low Sodium (2 gm)  Code Status: Full Code    PT/OT Eval Status: Ongoing    Dispo - TBD    Royanne Kussmaul, MD 2/21/2023 1:39 PM
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
## 2023-02-21 NOTE — PROGRESS NOTES
Physical Therapy  Physical Therapy Initial Assessment     Name: Colten Conway  : 1955  MRN: [id number]      Date of Service: 2023    Evaluating PT:  Jacinto Meng PT, DPT    Room #:  6557/4601-D  Diagnosis:  Altered mental status [R41.82]  EUN (acute kidney injury) ([facility] [address] [N17.9]  Altered mental status, unspecified altered mental status type [R41.82]  PMHx/PSHx:  COPD,  shunt placement, TIA, CHF, HTN, DM  Procedure/Surgery:  N/A  Precautions:  fall risk, cognition, bed/chair alarm, O2  Equipment Needs:  TBD    SUBJECTIVE:    Pt lives with dtr in a 2 story home with 1 steps to enter and no handrail. Bed/bath is on 1st floor. Pt ambulated with ww PTA. OBJECTIVE:   Initial Evaluation  Date: 23 Treatment Short Term/ Long Term   Goals   AM-PAC 6 Clicks 77/83     Was pt agreeable to Eval/treatment? yes     Does pt have pain? 7/10 back     Bed Mobility  Rolling: SBA  Supine to sit: SBA  Sit to supine: NT  Scooting: SBA  Rolling: IND  Supine to sit: IND  Sit to supine: IND  Scooting: IND   Transfers Sit to stand: CGA  Stand to sit: CGA  Stand pivot: min A with ww  Sit to stand: mod I  Stand to sit: mod I  Stand pivot: mod I with AAD   Ambulation    50'x4 with ww min A  300'+ with AAD mod I   Stair negotiation: ascended and descended  NT  1 steps with AAD and no handrail mod I     Strength/ROM:   BLE grossly 4/5  BLE AROM WFL    Balance:   Static Sitting: SBA  Dynamic Sitting: CGA  Static Standing: CGA with ww  Dynamic Standing: min A with ww    Pt is A & O x 2-3  Sensation:  Pt denies numbness and tingling to extremities  Edema:  unremarkable    Vitals:  SpO2 dropped to 78% while on RA, pt had removed O2 prior to entering, recovered to >90% after ~2'.    With 4L of O2, after ambulation, pt's SpO2 was 91%    Therapeutic Exercises:    Bed mobility: supine<>sit, cued for EOB positioning  Transfers: STSx2, cued for hand placement and postural correction  Ambulation: 50'x4 with ww  BLE AROM    Patient education  Pt educated on role of PT, importance of functional mobility during hospital stay, safety with functional mobility    Patient response to education:   Pt verbalized understanding Pt demonstrated skill Pt requires further education in this area   yes partial yes     ASSESSMENT:    Conditions Requiring Skilled Therapeutic Intervention:    [x]Decreased strength     []Decreased ROM  [x]Decreased functional mobility  [x]Decreased balance   [x]Decreased endurance   [x]Decreased posture  []Decreased sensation  []Decreased coordination   []Decreased vision  [x]Decreased safety awareness   [x]Increased pain       Comments:    Pt supine in bed upon entering, agreeable to participate. Pt instructed to transfer to EOB, completing transfer with no physical assistance. Pt sitting upright with good static sitting balance. Pt cued for hand placement and instructed to stand from EOB. Pt standing with no AD initially, later requiring ww 2/2 unsteady gait. Pt ambulated fair distance with ww, demonstrating slow alyssa and short step length. Pt had tendency to veer toward her L, bumping wheel of walker into room furniture and walls. Pt was assisted to bedside chair, and remained there at the end of session. Pt's needs were met, chair alarm was on and call bell was in reach prior to exiting.      Treatment:  Patient practiced and was instructed in the following treatment:    Bed mobility training - pt given verbal and tactile cues to facilitate proper sequencing and safety during rolling and supine>sit as well as provided with physical assistance to complete task   STS and pivot transfer training - pt educated on proper hand and foot placement, safety and sequencing, and use of verbal and tactile cues to safely complete sit<>stand and pivot transfers with hands on assistance to complete task safely   Gait training- pt was given verbal and tactile cues to facilitate pt safety with ww use during ambulation as well as provided with physical assistance. Pt's/ family goals   1. Return home    Prognosis is good for reaching above PT goals. Patient and or family understand(s) diagnosis, prognosis, and plan of care. yes    PHYSICAL THERAPY PLAN OF CARE:    PT POC is established based on physician order and patient diagnosis     Referring provider/PT Order:  Zena Cam MD  Diagnosis:  Altered mental status [R41.82]  EUN (acute kidney injury) ([facility] [address] [N17.9]  Altered mental status, unspecified altered mental status type [R41.82]  Specific instructions for next treatment:  Progress as tolerated    Current Treatment Recommendations:     [x] Strengthening to improve independence with functional mobility   [] ROM to improve independence with functional mobility   [x] Balance Training to improve static/dynamic balance and to reduce fall risk  [x] Endurance Training to improve activity tolerance during functional mobility   [x] Transfer Training to improve safety and independence with all functional transfers   [x] Gait Training to improve gait mechanics, endurance and assess need for appropriate assistive device  [x] Stair Training in preparation for safe discharge home and/or into the community   [] Positioning to prevent skin breakdown and contractures  [x] Safety and Education Training   [x] Patient/Caregiver Education   [] HEP  [] Other     PT long term treatment goals are located in above grid    Frequency of treatments: 2-5x/week x 1-2 weeks. Time in  1016  Time out  1106    Total Treatment Time  30 minutes     Evaluation Time includes thorough review of current medical information, gathering information on past medical history/social history and prior level of function, completion of standardized testing/informal observation of tasks, assessment of data and education on plan of care and goals.     CPT codes:  [x] Low Complexity PT evaluation 64241  [] Moderate Complexity PT evaluation 70655  [] High Complexity PT evaluation [phone]  [] PT Re-evaluation 55799  [] Gait training [phone] -- minutes  [] Manual therapy [phone] -- minutes  [x] Therapeutic activities 91995 30 minutes  [] Therapeutic exercises 24685 -- minutes  [] Neuromuscular reeducation 44927 -- minutes     Francesca Nicole PT, DPT  [id number] No

## 2023-02-21 NOTE — PLAN OF CARE
Problem: Chronic Conditions and Co-morbidities  Goal: Patient's chronic conditions and co-morbidity symptoms are monitored and maintained or improved  Outcome: Progressing     Problem: Discharge Planning  Goal: Discharge to home or other facility with appropriate resources  Outcome: Progressing     Problem: Pain  Goal: Verbalizes/displays adequate comfort level or baseline comfort level  Outcome: Progressing     Problem: Safety - Adult  Goal: Free from fall injury  Outcome: Progressing

## 2023-02-21 NOTE — ED NOTES
Dr Sandra Deng does not need ABG on pt.  Cancelling order     [facility], RN  02/20/23 1117

## 2023-02-22 ENCOUNTER — APPOINTMENT (OUTPATIENT)
Dept: MRI IMAGING | Age: 68
DRG: 689 | End: 2023-02-22
Payer: MEDICARE

## 2023-02-22 LAB
ALBUMIN SERPL-MCNC: 3.1 G/DL (ref 3.5–5.2)
ALP BLD-CCNC: 162 U/L (ref 35–104)
ALT SERPL-CCNC: 21 U/L (ref 0–32)
ANION GAP SERPL CALCULATED.3IONS-SCNC: 14 MMOL/L (ref 7–16)
AST SERPL-CCNC: 24 U/L (ref 0–31)
BASOPHILS ABSOLUTE: 0.07 E9/L (ref 0–0.2)
BASOPHILS RELATIVE PERCENT: 1 % (ref 0–2)
BILIRUB SERPL-MCNC: 0.5 MG/DL (ref 0–1.2)
BUN BLDV-MCNC: 26 MG/DL (ref 6–23)
CALCIUM SERPL-MCNC: 9.3 MG/DL (ref 8.6–10.2)
CHLORIDE BLD-SCNC: 100 MMOL/L (ref 98–107)
CO2: 23 MMOL/L (ref 22–29)
CREAT SERPL-MCNC: 1 MG/DL (ref 0.5–1)
EOSINOPHILS ABSOLUTE: 0.11 E9/L (ref 0.05–0.5)
EOSINOPHILS RELATIVE PERCENT: 1.6 % (ref 0–6)
GFR SERPL CREATININE-BSD FRML MDRD: >60 ML/MIN/1.73
GLUCOSE BLD-MCNC: 154 MG/DL (ref 74–99)
HCT VFR BLD CALC: 35.7 % (ref 34–48)
HEMOGLOBIN: 11.2 G/DL (ref 11.5–15.5)
IMMATURE GRANULOCYTES #: 0.03 E9/L
IMMATURE GRANULOCYTES %: 0.4 % (ref 0–5)
LYMPHOCYTES ABSOLUTE: 1.23 E9/L (ref 1.5–4)
LYMPHOCYTES RELATIVE PERCENT: 17.6 % (ref 20–42)
MAGNESIUM: 1.6 MG/DL (ref 1.6–2.6)
MCH RBC QN AUTO: 27.5 PG (ref 26–35)
MCHC RBC AUTO-ENTMCNC: 31.4 % (ref 32–34.5)
MCV RBC AUTO: 87.5 FL (ref 80–99.9)
METER GLUCOSE: 137 MG/DL (ref 74–99)
METER GLUCOSE: 169 MG/DL (ref 74–99)
METER GLUCOSE: 236 MG/DL (ref 74–99)
METER GLUCOSE: 255 MG/DL (ref 74–99)
MONOCYTES ABSOLUTE: 0.87 E9/L (ref 0.1–0.95)
MONOCYTES RELATIVE PERCENT: 12.5 % (ref 2–12)
NEUTROPHILS ABSOLUTE: 4.66 E9/L (ref 1.8–7.3)
NEUTROPHILS RELATIVE PERCENT: 66.9 % (ref 43–80)
PDW BLD-RTO: 19.4 FL (ref 11.5–15)
PLATELET # BLD: 280 E9/L (ref 130–450)
PMV BLD AUTO: 11.1 FL (ref 7–12)
POTASSIUM SERPL-SCNC: 4.5 MMOL/L (ref 3.5–5)
RBC # BLD: 4.08 E12/L (ref 3.5–5.5)
SODIUM BLD-SCNC: 137 MMOL/L (ref 132–146)
TOTAL PROTEIN: 6.9 G/DL (ref 6.4–8.3)
WBC # BLD: 7 E9/L (ref 4.5–11.5)

## 2023-02-22 PROCEDURE — 82962 GLUCOSE BLOOD TEST: CPT

## 2023-02-22 PROCEDURE — 99232 SBSQ HOSP IP/OBS MODERATE 35: CPT | Performed by: NURSE PRACTITIONER

## 2023-02-22 PROCEDURE — 6360000002 HC RX W HCPCS: Performed by: INTERNAL MEDICINE

## 2023-02-22 PROCEDURE — 6370000000 HC RX 637 (ALT 250 FOR IP): Performed by: PSYCHIATRY & NEUROLOGY

## 2023-02-22 PROCEDURE — 85025 COMPLETE CBC W/AUTO DIFF WBC: CPT

## 2023-02-22 PROCEDURE — S5553 INSULIN LONG ACTING 5 U: HCPCS | Performed by: INTERNAL MEDICINE

## 2023-02-22 PROCEDURE — 83735 ASSAY OF MAGNESIUM: CPT

## 2023-02-22 PROCEDURE — 80053 COMPREHEN METABOLIC PANEL: CPT

## 2023-02-22 PROCEDURE — 2580000003 HC RX 258: Performed by: INTERNAL MEDICINE

## 2023-02-22 PROCEDURE — 70551 MRI BRAIN STEM W/O DYE: CPT

## 2023-02-22 PROCEDURE — 2060000000 HC ICU INTERMEDIATE R&B

## 2023-02-22 PROCEDURE — 6370000000 HC RX 637 (ALT 250 FOR IP): Performed by: INTERNAL MEDICINE

## 2023-02-22 PROCEDURE — 6370000000 HC RX 637 (ALT 250 FOR IP): Performed by: HOSPITALIST

## 2023-02-22 PROCEDURE — 36415 COLL VENOUS BLD VENIPUNCTURE: CPT

## 2023-02-22 RX ORDER — OXYMETAZOLINE HYDROCHLORIDE 0.05 G/100ML
2 SPRAY NASAL 2 TIMES DAILY
Status: DISPENSED | OUTPATIENT
Start: 2023-02-22 | End: 2023-02-24

## 2023-02-22 RX ORDER — HALOPERIDOL 5 MG/ML
2 INJECTION INTRAMUSCULAR EVERY 6 HOURS PRN
Status: DISCONTINUED | OUTPATIENT
Start: 2023-02-22 | End: 2023-02-22

## 2023-02-22 RX ORDER — HYDROXYZINE PAMOATE 25 MG/1
25 CAPSULE ORAL 3 TIMES DAILY PRN
Status: DISCONTINUED | OUTPATIENT
Start: 2023-02-22 | End: 2023-02-25 | Stop reason: HOSPADM

## 2023-02-22 RX ADMIN — ASPIRIN 81 MG 81 MG: 81 TABLET ORAL at 08:59

## 2023-02-22 RX ADMIN — Medication 2 SPRAY: at 22:55

## 2023-02-22 RX ADMIN — Medication 5 ML: at 23:08

## 2023-02-22 RX ADMIN — ESCITALOPRAM OXALATE 10 MG: 10 TABLET, FILM COATED ORAL at 08:59

## 2023-02-22 RX ADMIN — ATORVASTATIN CALCIUM 40 MG: 40 TABLET, FILM COATED ORAL at 22:48

## 2023-02-22 RX ADMIN — WATER 1000 MG: 1 INJECTION INTRAMUSCULAR; INTRAVENOUS; SUBCUTANEOUS at 22:48

## 2023-02-22 RX ADMIN — ALOGLIPTIN 12.5 MG: 12.5 TABLET, FILM COATED ORAL at 08:59

## 2023-02-22 RX ADMIN — INSULIN GLARGINE-YFGN 5 UNITS: 100 INJECTION, SOLUTION SUBCUTANEOUS at 22:49

## 2023-02-22 RX ADMIN — CARVEDILOL 6.25 MG: 6.25 TABLET, FILM COATED ORAL at 08:59

## 2023-02-22 RX ADMIN — ENOXAPARIN SODIUM 30 MG: 100 INJECTION SUBCUTANEOUS at 08:59

## 2023-02-22 RX ADMIN — INSULIN LISPRO 2 UNITS: 100 INJECTION, SOLUTION INTRAVENOUS; SUBCUTANEOUS at 17:26

## 2023-02-22 RX ADMIN — CARVEDILOL 6.25 MG: 6.25 TABLET, FILM COATED ORAL at 17:26

## 2023-02-22 RX ADMIN — INSULIN LISPRO 1 UNITS: 100 INJECTION, SOLUTION INTRAVENOUS; SUBCUTANEOUS at 11:43

## 2023-02-22 RX ADMIN — Medication 10 ML: at 08:59

## 2023-02-22 RX ADMIN — PANTOPRAZOLE SODIUM 40 MG: 40 TABLET, DELAYED RELEASE ORAL at 06:38

## 2023-02-22 RX ADMIN — PRAMIPEXOLE DIHYDROCHLORIDE 0.75 MG: 0.25 TABLET ORAL at 22:48

## 2023-02-22 RX ADMIN — HYDROXYZINE PAMOATE 25 MG: 25 CAPSULE ORAL at 22:48

## 2023-02-22 ASSESSMENT — ENCOUNTER SYMPTOMS
ALLERGIC/IMMUNOLOGIC NEGATIVE: 1
RESPIRATORY NEGATIVE: 1
GASTROINTESTINAL NEGATIVE: 1
EYES NEGATIVE: 1

## 2023-02-22 ASSESSMENT — PAIN SCALES - GENERAL: PAINLEVEL_OUTOF10: 0

## 2023-02-22 NOTE — PROGRESS NOTES
[address]   Chief Complaint   Patient presents with    Altered Mental Status     Patient from home sent for on and off confusion starting today and hypoxia. Per family patient refuses to wear oxygen at times at home. Patient wears 4L NC. Marguerite Bradford Chief Complaint: confusion    HPI:   Concetta Lane is a 75 y.o.  female who has had increased confusion and gait dysfunction over the past week. Her history is significant for a cervical laminectomy for myelopathy last year and a  shunt placed years ago for NPH. She denies any headache or discomfort. She does have some urinary incontinence.     Past Medical History:   Diagnosis Date    Acid reflux disease     Acute on chronic respiratory failure with hypoxia and hypercapnia ([facility]) 7/25/2018    Apraxia 7/23/2018    Arthritis     Ataxia 7/23/2018    CAD (coronary artery disease) 10/4/2012    Choledocholithiasis     recurrent    Chronic systolic congestive heart failure ([address] 8/1/2018    Ejection fraction 23% plus/minus 5%    CKD (chronic kidney disease) stage 3, GFR 30-59 ml/min ([facility]) 7/23/2018    COPD (chronic obstructive pulmonary disease) ([facility])     alpha one M1S 183mg/dl    Diabetes mellitus ([address]     Hyperlipidemia     Hypoxia 7/23/2018    Neck pain     Normal pressure hydrocephalus ([address] 7/28/2018    Oxygen dependent     3l/min/nc    Pleural effusion years ago    requiring right thoracentesis    Primary hypertension 2/21/2023    Pulmonary hypertension ([address] 7/23/2018    Restless legs     S/P CABG x 2 10/4/2012    Severe mitral regurgitation 8/1/2018     Past Surgical History:   Procedure Laterality Date    CARDIAC SURGERY      CERVICAL FUSION  1999    C3-C6    CERVICAL FUSION N/A 10/17/2022    POSTERIOR C3-C7 LAMINECTOMY, C3-T1 FUSION, NEEDS O-ARM, EDUARD TABLE, CELL SAVER, PLATLET GEL, POSTERIOR INSTRUMENTATION, GLOBUS performed by Matthieu Brambila MD at [address]  2002    COLONOSCOPY N/A 1/21/2019    COLONOSCOPY POLYPECTOMY SNARE/COLD BIOPSY performed by Seth Ferrera MD at [address] GRAFT  10/30/2002    EYE SURGERY      cataract with Lens implants    HERNIA REPAIR      HYSTERECTOMY (CERVIX STATUS UNKNOWN)  [id number]  2018    resolved    IL CRTJ SHUNT [id number] TERMINUS N/A 10/15/2018    VENTRICULAR PERITONEAL SHUNT  PLACEMENT performed by Breezy Cruz MD at [facility] CSF N/A 2018    LUMBAR DRAIN INSERTION performed by Breezy Cruz MD at [address] N/A 2021    VENTRICULAR PERITONEAL SHUNT REPLACEMENT performed by Breezy Cruz MD at [facility] History   Problem Relation Age of Onset    High Blood Pressure Mother     Diabetes Mother     Emphysema Father     Heart Attack Sister     Heart Failure Sister       Social History     Socioeconomic History    Marital status:      Spouse name: Fahad [place]    Number of children: 2    Years of education: 16    Highest education level: Not on file   Occupational History    Occupation: retired   Tobacco Use    Smoking status: Former     Packs/day: 0.50     Years: 40.00     Pack years: 20.00     Types: Cigarettes     Quit date: 2018     Years since quittin.5    Smokeless tobacco: Never   Vaping Use    Vaping Use: Never used   Substance and Sexual Activity    Alcohol use: No    Drug use: No    Sexual activity: Not Currently     Partners: Male   Other Topics Concern    Not on file   Social History Narrative    Not on file     Social Determinants of Health     Financial Resource Strain: Not on file   Food Insecurity: Not on file   Transportation Needs: Not on file   Physical Activity: Not on file   Stress: Not on file   Social Connections: Not on file   Intimate Partner Violence: Not on file   Housing Stability: Not on file       Medications:   Current Facility-Administered Medications Medication Dose Route Frequency Provider Last Rate Last Admin    pramipexole (MIRAPEX) tablet 0.75 mg  0.75 mg Oral Nightly Elfrieda Pecatonica, DO   0.75 mg at 02/21/23 2153    albuterol (PROVENTIL) nebulizer solution 2.5 mg  2.5 mg Nebulization Q4H PRN Yrn Lane MD        aspirin chewable tablet 81 mg  81 mg Oral Daily Yrn Lane MD   81 mg at 02/22/23 0859    atorvastatin (LIPITOR) tablet 40 mg  40 mg Oral Nightly Yrn Lane MD   40 mg at 02/21/23 2154    carvedilol (COREG) tablet 6.25 mg  6.25 mg Oral BID WC Yrn Lane MD   6.25 mg at 02/22/23 0859    escitalopram (LEXAPRO) tablet 10 mg  10 mg Oral Daily Yrn Lane MD   10 mg at 02/22/23 0859    pantoprazole (PROTONIX) tablet 40 mg  40 mg Oral QAM AC Yrn Lane MD   40 mg at 02/22/23 7816    alogliptin (NESINA) tablet 12.5 mg  12.5 mg Oral Daily Yrn Lane MD   12.5 mg at 02/22/23 0859    glucose chewable tablet 16 g  4 tablet Oral PRN Yrn Lane MD        dextrose bolus 10% 125 mL  125 mL IntraVENous PRN Yrn Lane MD        Or    dextrose bolus 10% 250 mL  250 mL IntraVENous PRN Yrn Lane MD        glucagon injection 1 mg  1 mg SubCUTAneous PRN Yrn Lane MD        dextrose 10 % infusion   IntraVENous Continuous PRN Yrn Lane MD        sodium chloride flush 0.9 % injection 5-40 mL  5-40 mL IntraVENous 2 times per day Yrn Lane MD   10 mL at 02/22/23 0859    sodium chloride flush 0.9 % injection 5-40 mL  5-40 mL IntraVENous PRN Yrn Lane MD        0.9 % sodium chloride infusion   IntraVENous PRN Yrn Lane MD        enoxaparin Sodium (LOVENOX) injection 30 mg  30 mg SubCUTAneous Daily Yrn Lane MD   30 mg at 02/22/23 0859    polyethylene glycol (GLYCOLAX) packet 17 g  17 g Oral Daily PRN Yrn Lane MD        acetaminophen (TYLENOL) tablet 650 mg  650 mg Oral Q6H PRN Yrn Lane MD   650 mg at 02/21/23 2153    Or    acetaminophen (TYLENOL) suppository 650 mg  650 mg Rectal Q6H PRN Letty Rick Julieta Faustin MD        insulin lispro (HUMALOG) injection vial 0-4 Units  0-4 Units SubCUTAneous TID WC Rob Schilder, MD   1 Units at 02/21/23 1720    insulin lispro (HUMALOG) injection vial 0-4 Units  0-4 Units SubCUTAneous Nightly Rob Schilder, MD        cefTRIAXone (ROCEPHIN) 1,000 mg in sterile water 10 mL IV syringe  1,000 mg IntraVENous Q24H Rob Schilder, MD   1,000 mg at 02/21/23 2143    insulin glargine-yfgn [facility]) injection vial 5 Units  5 Units SubCUTAneous Nightly Rob Schilder, MD   5 Units at 02/21/23 2150        Allergies:    Sulfa antibiotics and Pentazocine lactate       Review of Systems   Constitutional: Negative. HENT: Negative. Eyes: Negative. Respiratory: Negative. Cardiovascular: Negative. Gastrointestinal: Negative. Endocrine: Negative. Genitourinary:  Positive for frequency. Musculoskeletal:  Positive for gait problem. Skin: Negative. Allergic/Immunologic: Negative. Hematological: Negative. Psychiatric/Behavioral:  Positive for confusion. Physical Exam     BP (!) 140/90   Pulse 82   Temp 96.9 °F (36.1 °C) (Temporal)   Resp 18   Ht 5' 3\" (1.6 m)   Wt 104 lb (47.2 kg)   LMP  (LMP Unknown)   SpO2 95%   BMI 18.42 kg/m²    Physical Exam   Constitutional: Appears well-nourished. Head: Normocephalic and atraumatic. Eyes: EOM are normal. Pupils are equal, round, and reactive to light. Neck: Normal range of motion. No tracheal deviation present. Cardiovascular: Normal rate. Pulmonary/Chest: No stridor. Abdominal: No distension. Neurological:   Oriented to person, place, and time. CN 3-12 intact  Motor strength full  Sensation intact to light touch   Skin: Skin is warm and dry. Psychiatric: Thought content normal.      Assessment:   75year old female with increased confusion and gait dysfunction. Her history is significant for cervical laminectomy/myelopathy last year and a  shunt placed years ago for NPH.     Plan:  Brain MRI is ordered.   We will reprogram the Strata valve to 1.0 once MRI complete      Electronically signed by LORI mAos on 2/22/2023 at 11:43 AM

## 2023-02-22 NOTE — DISCHARGE INSTR - COC
Continuity of Care Form    Patient Name: Lio Reyna   :  1955  MRN:  [id number]    Admit date:  2023  Discharge date:  23    Code Status Order: Full Code   Advance Directives:     Admitting Physician:  Long Erwin MD  PCP: John Espinal DO    Discharging Nurse: [facility] Unit/Room#: 9204/5783-M  Discharging Unit Phone Number: [phone]    Emergency Contact:   Extended Emergency Contact Information  Primary Emergency Contact: Leim Apgar [address] Phone: [phone]  Relation: Child    Past Surgical History:  Past Surgical History:   Procedure Laterality Date     Augusta Muldrow    C3-C6    CERVICAL FUSION N/A 10/17/2022    POSTERIOR C3-C7 LAMINECTOMY, C3-T1 FUSION, NEEDS O-ARM, EDUARD TABLE, CELL SAVER, PLATLET GEL, POSTERIOR INSTRUMENTATION, GLOBUS performed by Almas Domingo MD at [address] [place] [address]    COLONOSCOPY N/A 2019    COLONOSCOPY POLYPECTOMY SNARE/COLD BIOPSY performed by Lanna Dandy, MD at [facility]  10/30/2002    EYE SURGERY      cataract with Lens implants    HERNIA REPAIR      HYSTERECTOMY ([facility])  [address] [facility]  2018    resolved    LA CRTJ SHUNT [id number] TERMINUS N/A 10/15/2018    VENTRICULAR PERITONEAL SHUNT  PLACEMENT performed by Katerina Whitmore MD at [facility] CSF N/A 2018    LUMBAR DRAIN INSERTION performed by Katerina Whitmore MD at [facility] N/A 2021    VENTRICULAR PERITONEAL SHUNT REPLACEMENT performed by Katerina Whitmore MD at [address] [place]       Immunization History:   Immunization History   Administered Date(s) Administered    COVID-19, J&J, (age 18y+), IM, 0.5 mL 2021    Influenza, High Dose (Fluzone 65 yrs and older) 10/06/2020    Pneumococcal Polysaccharide ([id number]) 2019    Tdap (Boostrix, Adacel) 12/22/2016       Active Problems:  Patient Active Problem List   Diagnosis Code    CAD (coronary artery disease) I25.10    Pulmonary hypertension ([facility] I27.20    COPD (chronic obstructive pulmonary disease) ([facility]) J44.9    CKD (chronic kidney disease) stage 3, GFR 30-59 ml/min N18.30    Normal pressure hydrocephalus ([facility]) G91.2    Chronic systolic (congestive) heart failure ([facility]) I50.22    Severe mitral regurgitation I34.0    NPH (normal pressure hydrocephalus) ([facility]) G91.2    COVID-19 U07.1    Syncope and collapse R55    Gastroenteritis K52.9    Acute kidney injury superimposed on CKD ([facility] N17.9, N18.9    Hypotension I95.9    S/P ventriculoperitoneal shunt Z98.2    Cervical stenosis of spinal canal M48.02    Type 2 diabetes mellitus, without long-term current use of insulin ([facility]) E11.9    O2 dependent Z99.81    Severe protein-calorie malnutrition ([facility] E43    TIA (transient ischemic attack) G45.9    Altered mental status R41.82    Alkaline phosphatase elevation R74.8    Primary hypertension I10    Urinary tract infection N39.0       Isolation/Infection:   Isolation            No Isolation          Patient Infection Status       Infection Onset Added Last Indicated Last Indicated By Review Planned Expiration Resolved Resolved By    None active    Resolved    COVID-19 (Rule Out) 02/20/23 02/20/23 02/20/23 COVID-19 & Influenza Combo (Ordered)   02/20/23 Rule-Out Test Resulted    COVID-19 (Rule Out) 01/06/23 01/06/23 01/06/23 COVID-19 & Influenza Combo (Ordered)   01/06/23 Rule-Out Test Resulted    COVID-19 (Rule Out) 10/21/22 10/21/22 10/21/22 COVID-19 (Ordered)   10/22/22 Rule-Out Test Resulted            Nurse Assessment:  Last Vital Signs: BP (!) 140/90   Pulse 82   Temp 96.9 °F (36.1 °C) (Temporal)   Resp 18   Ht 5' 3\" (1.6 m)   Wt 104 lb (47.2 kg)   LMP  (LMP Unknown)   SpO2 95%   BMI 18.42 kg/m²     Last documented pain score (0-10 scale): Pain Level: 0  Last Weight:   Wt Readings from Last 1 Encounters:   02/20/23 104 lb (47.2 kg)     Mental Status:  oriented, alert, coherent, and disoriented to situation    IV Access:  - None    Nursing Mobility/ADLs:  Walking   Independent  Transfer  Independent  Bathing  Independent  Dressing  Independent  Toileting  Independent  Feeding  Independent  Med [facility] Delivery   whole    Wound Care Documentation and Therapy:  Incision 10/17/22 Neck Posterior (Active)   Number of days: 128        Elimination:  Continence: Bowel: Yes  Bladder: Yes  Urinary Catheter: None   Colostomy/Ileostomy/Ileal Conduit: No       Date of Last BM: 2/25/23  No intake or output data in the 24 hours ending 02/22/23 1046  No intake/output data recorded. Safety Concerns:     Sundowners Sundrome and At Risk for Falls    Impairments/Disabilities:      None    Nutrition Therapy:  Current Nutrition Therapy:   - Oral Diet:  Carb Control 4 carbs/meal (1800kcals/day)    Routes of Feeding: Oral  Liquids: No Restrictions  Daily Fluid Restriction: no  Last Modified Barium Swallow with Video (Video Swallowing Test): not done    Treatments at the Time of Hospital Discharge:   Respiratory Treatments: See med re  Oxygen Therapy:  is on oxygen at 4 L/min per nasal cannula.   Ventilator:    - No ventilator support    Rehab Therapies: Physical Therapy and Occupational Therapy  Weight Bearing Status/Restrictions: No weight bearing restrictions  Other Medical Equipment (for information only, NOT a DME order):  walker and hospital bed  Other Treatments: N/A    Patient's personal belongings (please select all that are sent with patient):  Dentures upper, Jewelry rings , bracelet, cell phone with     RN SIGNATURE:  Electronically signed by Sharan Hooks RN on 2/25/23 at 8:32 AM EST    CASE MANAGEMENT/SOCIAL WORK SECTION    Inpatient Status Date: ***    Readmission Risk Assessment Score:  Readmission Risk              Risk of Unplanned Readmission:  20           Discharging to Facility/ Agency   Name:   [facility]  Address:  [address]  Phone:   [phone] unit [address] wing ext [phone]  Fax:    [phone]    Dialysis Facility (if applicable)   Name:  Address:  Dialysis Schedule:  Phone:  Fax:    / signature: Electronically signed by DEENA Calderon on 2/25/2023 at 7:39 AM      PHYSICIAN SECTION    Prognosis: Good    Condition at Discharge: Stable    Rehab Potential (if transferring to Rehab): Good    Recommended Labs or Other Treatments After Discharge:     Physician Certification: I certify the above information and transfer of Pati Wilks  is necessary for the continuing treatment of the diagnosis listed and that she requires [place] for less than 30 days.      Update Admission H&P: No change in H&P    PHYSICIAN SIGNATURE:  Electronically signed by Darrell Alvarez MD on 2/25/23 at 8:19 AM EST

## 2023-02-22 NOTE — PROGRESS NOTES
Hospitalist Progress Note      PCP: Linda Perla DO    Date of Admission: 2/20/2023    Hospital Course: This is a 79-year-old female with past medical history of NPH status post  shunt implantation, TIA, diabetes mellitus, chronic systolic heart failure, stage III CKD, CAD COPD with chronic respiratory failure on home oxygen presented with altered mental status. CT brain was negative for acute intracranial abnormality; chest x-ray showed no acute process. Noted to have abnormal urinalysis and patient admitted to dysuria; started on IV antibiotics. Subjective: Pt was seen and examined at bedside. No acute event overnight; denies any CP, SOB or palpitation.       Medications:  Reviewed    Infusion Medications    dextrose      sodium chloride       Scheduled Medications    oxymetazoline  2 spray Each Nostril BID    pramipexole  0.75 mg Oral Nightly    aspirin  81 mg Oral Daily    atorvastatin  40 mg Oral Nightly    carvedilol  6.25 mg Oral BID WC    escitalopram  10 mg Oral Daily    pantoprazole  40 mg Oral QAM AC    alogliptin  12.5 mg Oral Daily    sodium chloride flush  5-40 mL IntraVENous 2 times per day    enoxaparin  30 mg SubCUTAneous Daily    insulin lispro  0-4 Units SubCUTAneous TID WC    insulin lispro  0-4 Units SubCUTAneous Nightly    cefTRIAXone (ROCEPHIN) IV  1,000 mg IntraVENous Q24H    insulin glargine  5 Units SubCUTAneous Nightly     PRN Meds: albuterol, glucose, dextrose bolus **OR** dextrose bolus, glucagon (rDNA), dextrose, sodium chloride flush, sodium chloride, polyethylene glycol, acetaminophen **OR** acetaminophen      Intake/Output Summary (Last 24 hours) at 2/22/2023 1644  Last data filed at 2/22/2023 1544  Gross per 24 hour   Intake 600 ml   Output 2 ml   Net 598 ml       Exam:    BP (!) 140/90   Pulse 82   Temp 96.9 °F (36.1 °C) (Temporal)   Resp 18   Ht 5' 3\" (1.6 m)   Wt 104 lb (47.2 kg)   LMP  (LMP Unknown)   SpO2 95%   BMI 18.42 kg/m²     General appearance: Lying comfortably in bed. No apparent distress. Respiratory: Clear to auscultation bilaterally. Cardiovascular: Normal S1/S2. Regular rhythm and rate. Abdomen: Soft, non-tender, non-distended with normal bowel sounds. Musculoskeletal: No clubbing, cyanosis or edema bilaterally. Skin: Skin color, texture, turgor normal.  No rashes or lesions. Neurologic:  No focal deficit. Psychiatric: Alert and oriented x 3. Pleasantly confused, not agitated  Peripheral Pulses: +2 palpable, equal bilaterally       Labs:   Recent Labs     02/20/23  1702 02/21/23  0624 02/22/23  0700   WBC 7.4 7.3 7.0   HGB 11.2* 11.1* 11.2*   HCT 36.4 36.7 35.7    260 280       Recent Labs     02/20/23  1702 02/21/23  0624 02/22/23  0700    139 137   K 4.9 4.7 4.5   CL 99 104 100   CO2 24 21* 23   BUN 31* 32* 26*   CREATININE 1.3* 1.3* 1.0   CALCIUM 9.1 9.0 9.3       Recent Labs     02/20/23  1702 02/21/23  0624 02/22/23  0700   AST 22 21 24   ALT 18 16 21   BILITOT 0.7 0.7 0.5   ALKPHOS 162* 151* 162*       Recent Labs     02/21/23  0624   INR 1.9       No results for input(s): Xiang Carmen in the last 72 hours. Radiology:  US RETROPERITONEAL COMPLETE   Final Result   Mild bilateral hydronephrosis. No renal calculi. Right upper and left lower quadrant and pelvic ascites. RECOMMENDATIONS:   Unavailable         CT Head W/O Contrast   Final Result   1. Stable position of right ventricular shunt catheter. 2. Stable size of the ventricles. No hydrocephalus. 3. No acute intracranial hemorrhage or edema. XR CHEST PORTABLE   Final Result   No airspace consolidation. Cardiomegaly. Mild right pleural effusion.          MRI BRAIN WO CONTRAST    (Results Pending)             Active Hospital Problems    Diagnosis Date Noted    Alkaline phosphatase elevation [R74.8] 02/21/2023     Priority: Medium    Primary hypertension [I10] 02/21/2023     Priority: Medium    Urinary tract infection [N39.0] 02/21/2023     Priority: Medium    Altered mental status [R41.82] 01/09/2023     Priority: Medium    Type 2 diabetes mellitus, without long-term current use of insulin ([facility] [E11.9] 10/17/2022     Priority: Medium    Acute kidney injury superimposed on CKD ([facility] [N17.9, N18.9] 06/30/2021       Assessment  Acute metabolic encephalopathy - likely related to acute infection, improving  UTI  Acute on chronic renal failure - baseline stage III CKD, resolved with IV hydration  Chronic respiratory failure with hypoxia - secondary to below, on 3 L/min supplemental oxygen chronically  COPD - stable, no evidence of acute exacerbation  Type 2 diabetes mellitus - suboptimal glycemic control  CAD - stable, asymptomatic  Essential hypertension - well-controlled  History of NPH - status post  shunt implantation  Anemia of chronic disease - stable H&H  Protein calorie malnutrition  Physical deconditioning  Underweight  -  Body mass index is 18.42 kg/m². Plan:  Continue with IV Rocephin  Follow cultures, NGTD  Gentle IV hydration  Monitor renal function, replace electrolytes as needed  Awaiting MRI brain to rule out  shunt abnormality as etiology of AMS  On ASA, statin  Continue with Coreg  Basal and corrective bolus insulin regimen; continue with alogliptin  PT/OT as tolerated  Fall precautions      DVT Prophylaxis: Lovenox  Diet: ADULT ORAL NUTRITION SUPPLEMENT; Breakfast, Lunch, Dinner; Diabetic Oral Supplement  ADULT DIET; Regular; 4 carb choices (60 gm/meal);  Low Sodium (2 gm)  Code Status: Full Code    PT/OT Eval Status: Ongoing    Dispo - TBD    Dorota Nick MD 2/22/2023 4:44 PM

## 2023-02-22 NOTE — DISCHARGE INSTRUCTIONS
Urinary Tract Infection (UTI) in Women: Care Instructions  Overview     A urinary tract infection, or UTI, is a general term for an infection anywhere between the kidneys and the urethra (where urine comes out). Most UTIs are bladder infections. They often cause pain or burning when you urinate. UTIs are caused by bacteria and can be cured with antibiotics. Be sure to complete your treatment so that the infection does not get worse. Follow-up care is a key part of your treatment and safety. Be sure to make and go to all appointments, and call your doctor if you are having problems. It's also a good idea to know your test results and keep a list of the medicines you take. How can you care for yourself at home? Take your antibiotics as directed. Do not stop taking them just because you feel better. You need to take the full course of antibiotics. Drink extra water and other fluids for the next day or two. This will help make the urine less concentrated and help wash out the bacteria that are causing the infection. (If you have kidney, heart, or liver disease and have to limit fluids, talk with your doctor before you increase the amount of fluids you drink.)  Avoid drinks that are carbonated or have caffeine. They can irritate the bladder. Urinate often. Try to empty your bladder each time. To relieve pain, take a hot bath or lay a heating pad set on low over your lower belly or genital area. Never go to sleep with a heating pad in place. To prevent UTIs  Drink plenty of water each day. This helps you urinate often, which clears bacteria from your system. (If you have kidney, heart, or liver disease and have to limit fluids, talk with your doctor before you increase the amount of fluids you drink.)  Urinate when you need to. If you are sexually active, urinate right after you have sex. Change sanitary pads often.   Avoid douches, bubble baths, feminine hygiene sprays, and other feminine hygiene products that have deodorants. After going to the bathroom, wipe from front to back. When should you call for help? Call your doctor now or seek immediate medical care if:    Symptoms such as fever, chills, nausea, or vomiting get worse or appear for the first time. You have new pain in your back just below your rib cage. This is called flank pain. There is new blood or pus in your urine. You have any problems with your antibiotic medicine. Watch closely for changes in your health, and be sure to contact your doctor if:    You are not getting better after taking an antibiotic for 2 days. Your symptoms go away but then come back. Where can you learn more? Go to http://www.woods.com/ and enter K848 to learn more about \"Urinary Tract Infection (UTI) in Women: Care Instructions. \"  Current as of: June 16, 2022               Content Version: 13.5  © 2006-2022 [employer]. Care instructions adapted under license by [facility] ([facility]). If you have questions about a medical condition or this instruction, always ask your healthcare professional. [place] [address] any warranty or liability for your use of this information.     ZIO XT patch applied 2/25 for heart monitoring remove 3/11 place in provided box and place in the mail

## 2023-02-22 NOTE — CARE COORDINATION
Chart reviewed and case reviewed in IDR. Neurology saw the patient yesterday, MRI of the brain ordered, as well as Neurosurgery to see and evaluate the  shunt for interrogation and possible adjustments to be made. IV Rocephin continues for UTI. Spoke with Jessica Francois, liaison with the [address] [place] [address]. Patient's insurance is out of Network with their facility and has no out of Network benefits. Call placed to Nicole Jordan, liaison with [facility] and referral made for transition of care. She will review and initiate authorization with the patient's insurance if accepted. PASAR updated with new facility in the system. Envelope and transfer paperwork in the soft chart. JOY completed. Transfer paperwork will need to be signed and dated at discharge. Will continue to follow.      Mela Schneider RN.  [facility]  [phone]

## 2023-02-22 NOTE — PROGRESS NOTES
Spoke with Dylan Norton in MRI to see if she had time for testing, she stated she does not at this time. Will relay information to patient as she requested.

## 2023-02-22 NOTE — PROGRESS NOTES
Neuro Inpatient Follow Up         Aspen Rae is a 75 y.o. right handed female     Neuro is following for confusion and trouble walking    Significant PMH: Apraxia, ataxia, CHF, COPD, CKD, CABG x2, HLD, NPH s/p shunt, oxygen dependence, RLS, cervical fusion, former smoking    HPI:  The patient presented on 2/21 with an altered mental status. She has a history of NPH s/p  shunt in 2018 with revision in 2020. She just had a cervical posterior fusion in October 2022 and had been in a nursing home for recovery. When she came home her family noted increased confusion and more difficulty walking as well as urinary incontinence. She displayed executive dysfunction and impaired short-term memory and retropulsive gait on exam.  Family also reported 10 -15 pound weight loss over the past few months. CT of her head showed stable ventricle size compared to prior. She was found to have a UTI and acute on chronic renal failure. Mirapex was changed and oxybutynin was held. She was just been seen by our inpatient service in January after presenting with confusion and intermittent combative behavior and was found to have a punctate left parietal stroke. Subjective:  She still feels confused and states that when she ambulates to the bathroom with staff she feels like she is going to fall backwards. Is currently sitting up in bed eating lunch. She is being treated for her UTI. Neurosurgery is following to adjust  shunt for MRI once it is done.   There is no family present    No chest pain or palpitations  No SOB  No vertigo, lightheadedness or loss of consciousness  No falls, tripping or stumbling  No incontinence of bowels or bladder  No itching or bruising appreciated  No numbness, tingling or focal arm/leg weakness  No speech or swallowing troubles    ROS otherwise negative     Current Facility-Administered Medications   Medication Dose Route Frequency Provider Last Rate Last Admin    oxymetazoline (AFRIN) 0.05 % nasal spray 2 spray  2 spray Each Nostril BID Lavinia Chicas MD        pramipexole (MIRAPEX) tablet 0.75 mg  0.75 mg Oral Nightly Chris Goon, DO   0.75 mg at 02/21/23 2153    albuterol (PROVENTIL) nebulizer solution 2.5 mg  2.5 mg Nebulization Q4H PRN Nan Ng MD        aspirin chewable tablet 81 mg  81 mg Oral Daily Nan Ng MD   81 mg at 02/22/23 0859    atorvastatin (LIPITOR) tablet 40 mg  40 mg Oral Nightly Nan Ng MD   40 mg at 02/21/23 2154    carvedilol (COREG) tablet 6.25 mg  6.25 mg Oral BID WC Nan Ng MD   6.25 mg at 02/22/23 0859    escitalopram (LEXAPRO) tablet 10 mg  10 mg Oral Daily Nan Ng MD   10 mg at 02/22/23 0859    pantoprazole (PROTONIX) tablet 40 mg  40 mg Oral QAM AC Nan Ng MD   40 mg at 02/22/23 4963    alogliptin (NESINA) tablet 12.5 mg  12.5 mg Oral Daily Nan Ng MD   12.5 mg at 02/22/23 0859    glucose chewable tablet 16 g  4 tablet Oral PRN Nan Ng MD        dextrose bolus 10% 125 mL  125 mL IntraVENous PRN Nan Ng MD        Or    dextrose bolus 10% 250 mL  250 mL IntraVENous PRN Nan Ng MD        glucagon injection 1 mg  1 mg SubCUTAneous PRN Nan Ng MD        dextrose 10 % infusion   IntraVENous Continuous PRN Nan Ng MD        sodium chloride flush 0.9 % injection 5-40 mL  5-40 mL IntraVENous 2 times per day Nan Ng MD   10 mL at 02/22/23 0859    sodium chloride flush 0.9 % injection 5-40 mL  5-40 mL IntraVENous PRN Nan Ng MD        0.9 % sodium chloride infusion   IntraVENous PRN Nan Ng MD        enoxaparin Sodium (LOVENOX) injection 30 mg  30 mg SubCUTAneous Daily Nan Ng MD   30 mg at 02/22/23 0859    polyethylene glycol (GLYCOLAX) packet 17 g  17 g Oral Daily PRN Nan Ng MD        acetaminophen (TYLENOL) tablet 650 mg  650 mg Oral Q6H PRN Nan Ng MD   650 mg at 02/21/23 5843    Or    acetaminophen (TYLENOL) suppository 650 mg  650 mg Rectal Q6H PRN Suma Underwood MD        insulin lispro (HUMALOG) injection vial 0-4 Units  0-4 Units SubCUTAneous TID WC Suma Underwood MD   1 Units at 02/22/23 1143    insulin lispro (HUMALOG) injection vial 0-4 Units  0-4 Units SubCUTAneous Nightly Suma Underwood MD        cefTRIAXone (ROCEPHIN) 1,000 mg in sterile water 10 mL IV syringe  1,000 mg IntraVENous Q24H Suma Underwood MD   1,000 mg at 02/21/23 2143    insulin glargine-yfgn (SEMGLEE-YFGN) injection vial 5 Units  5 Units SubCUTAneous Nightly Suma Underwood MD   5 Units at 02/21/23 2150      Objective:     BP (!) 140/90   Pulse 82   Temp 96.9 °F (36.1 °C) (Temporal)   Resp 18   Ht 5' 3\" (1.6 m)   Wt 104 lb (47.2 kg)   LMP  (LMP Unknown)   SpO2 95%   BMI 18.42 kg/m²     General appearance: alert, appears stated age, cooperative and no distress sitting up in bed eating lunch  Head: normocephalic, without obvious abnormality, atraumatic  Eyes: conjunctivae/corneas clear.  .  Neck: full ROM  Lungs: nonlabored on NC O2  Heart: regular rate and rhythm  Extremities: normal, atraumatic, no cyanosis or edema  Pulses: 2+ and symmetric  Skin: color, texture, turgor normal---no rashes or lesions      Mental Status: alert, oriented to person, place, year, month    Appropriate attention/concentration  Intact fundus of knowledge  Evidence of mild cog impairment--difficulty with three step commands    Speech: no dysarthria  Language: no aphasias    Cranial Nerves:  I: smell NA   II: visual acuity  NA   II: visual fields Full to confrontation   II: pupils CHANI   III,VII: ptosis None   III,IV,VI: extraocular muscles  Full ROM   V: mastication Normal   V: facial light touch sensation  Normal   V,VII: corneal reflex     VII: facial muscle function - upper  Normal   VII: facial muscle function - lower Normal   VIII: hearing Normal   IX: soft palate elevation  Normal   IX,X: gag reflex    XI: trapezius strength  5/5   XI: sternocleidomastoid strength 5/5   XI: neck extension strength  5/5   XII: tongue strength  Normal     Motor:  5/5 throughout  Decreased bulk and normal tone  No drift or abnormal movements    Sensory:  LT normal in all limbs    Coordination:   FN, FFM normal b/l    Gait:  Not ambulated as she is in the middle of lunch    Laboratory/Radiology:     CBC with Differential:    Lab Results   Component Value Date/Time    WBC 7.0 02/22/2023 07:00 AM    RBC 4.08 02/22/2023 07:00 AM    HGB 11.2 02/22/2023 07:00 AM    HCT 35.7 02/22/2023 07:00 AM     02/22/2023 07:00 AM    MCV 87.5 02/22/2023 07:00 AM    MCH 27.5 02/22/2023 07:00 AM    MCHC 31.4 02/22/2023 07:00 AM    RDW 19.4 02/22/2023 07:00 AM    SEGSPCT 63 01/30/2014 09:26 AM    LYMPHOPCT 17.6 02/22/2023 07:00 AM    MONOPCT 12.5 02/22/2023 07:00 AM    BASOPCT 1.0 02/22/2023 07:00 AM    MONOSABS 0.87 02/22/2023 07:00 AM    LYMPHSABS 1.23 02/22/2023 07:00 AM    EOSABS 0.11 02/22/2023 07:00 AM    BASOSABS 0.07 02/22/2023 07:00 AM     CMP:    Lab Results   Component Value Date/Time     02/22/2023 07:00 AM    K 4.5 02/22/2023 07:00 AM    K 3.6 01/09/2023 09:11 PM     02/22/2023 07:00 AM    CO2 23 02/22/2023 07:00 AM    BUN 26 02/22/2023 07:00 AM    CREATININE 1.0 02/22/2023 07:00 AM    GFRAA 60 10/10/2022 11:40 AM    LABGLOM >60 02/22/2023 07:00 AM    GLUCOSE 154 02/22/2023 07:00 AM    GLUCOSE 111 09/30/2011 02:00 PM    PROT 6.9 02/22/2023 07:00 AM    LABALBU 3.1 02/22/2023 07:00 AM    LABALBU 4.4 09/30/2011 02:00 PM    CALCIUM 9.3 02/22/2023 07:00 AM    BILITOT 0.5 02/22/2023 07:00 AM    ALKPHOS 162 02/22/2023 07:00 AM    AST 24 02/22/2023 07:00 AM    ALT 21 02/22/2023 07:00 AM     TSH:    Lab Results   Component Value Date/Time    TSH 1.200 02/21/2023 05:47 PM     Lab Results   Component Value Date    DMICZVEM70 >2000 (H) 02/21/2023     FOLATE:    Lab Results   Component Value Date/Time    FOLATE >20.0 02/21/2023 05:47 PM      Latest Reference Range & Units 2/20/23 17:02   Color, UA Straw/Yellow  Yellow Clarity, UA Clear  TURBID ! Glucose, UA Negative mg/dL Negative   Bilirubin, Urine Negative  SMALL ! Ketones, Urine Negative mg/dL TRACE ! Specific Gravity, UA 1.005 - 1.030  1.025   Blood, Urine Negative  Negative   pH, UA 5.0 - 9.0  5.5   Protein, UA Negative mg/dL TRACE   Urobilinogen, Urine <2.0 E.U./dL 4.0 ! Nitrite, Urine Negative  Negative   Leukocyte Esterase, Urine Negative  TRACE !   WBC, UA 0 - 5 /HPF 2-5   RBC, UA 0 - 2 /HPF 0-1   Epithelial Cells, UA /HPF MANY   Bacteria, UA None Seen /HPF MODERATE !   !: Data is abnormal    CTH: Stable position of right ventricular shunt catheter. 2. Stable size of the ventricles. No hydrocephalus. 3. No acute intracranial hemorrhage or edema. MRI brain WO pending    All pertinent labs and images personally reviewed today    Assessment:     Altered mental status and gait instability: In a patient with history of NPH s/p  shunt with revision in 2020. Altered mental status may be due to an acute metabolic encephalopathy from recurrent UTI, but MRI of the brain is pending to evaluate for acute abnormalities. No evidence of vitamin deficiency. Ventricles appear to be stable in size on CT.     Plan:     -Await MRI brain  -Neurosurgery following for  shunt programming    We will follow    LIZA Durbin - CNP,   1:06 PM  2/22/2023

## 2023-02-23 PROBLEM — I63.9 ACUTE ISCHEMIC STROKE (HCC): Status: ACTIVE | Noted: 2023-02-23

## 2023-02-23 LAB
ALBUMIN SERPL-MCNC: 3.4 G/DL (ref 3.5–5.2)
ALP BLD-CCNC: 153 U/L (ref 35–104)
ALT SERPL-CCNC: 22 U/L (ref 0–32)
ANION GAP SERPL CALCULATED.3IONS-SCNC: 11 MMOL/L (ref 7–16)
AST SERPL-CCNC: 26 U/L (ref 0–31)
BASOPHILS ABSOLUTE: 0.06 E9/L (ref 0–0.2)
BASOPHILS RELATIVE PERCENT: 0.8 % (ref 0–2)
BILIRUB SERPL-MCNC: 0.5 MG/DL (ref 0–1.2)
BUN BLDV-MCNC: 19 MG/DL (ref 6–23)
CALCIUM SERPL-MCNC: 9.3 MG/DL (ref 8.6–10.2)
CHLORIDE BLD-SCNC: 99 MMOL/L (ref 98–107)
CO2: 25 MMOL/L (ref 22–29)
CREAT SERPL-MCNC: 0.9 MG/DL (ref 0.5–1)
EOSINOPHILS ABSOLUTE: 0.14 E9/L (ref 0.05–0.5)
EOSINOPHILS RELATIVE PERCENT: 1.8 % (ref 0–6)
GFR SERPL CREATININE-BSD FRML MDRD: >60 ML/MIN/1.73
GLUCOSE BLD-MCNC: 132 MG/DL (ref 74–99)
HCT VFR BLD CALC: 34.8 % (ref 34–48)
HEMOGLOBIN: 10.8 G/DL (ref 11.5–15.5)
IMMATURE GRANULOCYTES #: 0.02 E9/L
IMMATURE GRANULOCYTES %: 0.3 % (ref 0–5)
LV EF: 33 %
LVEF MODALITY: NORMAL
LYMPHOCYTES ABSOLUTE: 2.01 E9/L (ref 1.5–4)
LYMPHOCYTES RELATIVE PERCENT: 26 % (ref 20–42)
MAGNESIUM: 1.4 MG/DL (ref 1.6–2.6)
MCH RBC QN AUTO: 27.6 PG (ref 26–35)
MCHC RBC AUTO-ENTMCNC: 31 % (ref 32–34.5)
MCV RBC AUTO: 88.8 FL (ref 80–99.9)
METER GLUCOSE: 127 MG/DL (ref 74–99)
METER GLUCOSE: 159 MG/DL (ref 74–99)
METER GLUCOSE: 204 MG/DL (ref 74–99)
METER GLUCOSE: 209 MG/DL (ref 74–99)
MONOCYTES ABSOLUTE: 1.08 E9/L (ref 0.1–0.95)
MONOCYTES RELATIVE PERCENT: 14 % (ref 2–12)
NEUTROPHILS ABSOLUTE: 4.41 E9/L (ref 1.8–7.3)
NEUTROPHILS RELATIVE PERCENT: 57.1 % (ref 43–80)
PDW BLD-RTO: 18.9 FL (ref 11.5–15)
PLATELET # BLD: 229 E9/L (ref 130–450)
PMV BLD AUTO: 10.9 FL (ref 7–12)
POTASSIUM SERPL-SCNC: 4.6 MMOL/L (ref 3.5–5)
RBC # BLD: 3.92 E12/L (ref 3.5–5.5)
SODIUM BLD-SCNC: 135 MMOL/L (ref 132–146)
TOTAL PROTEIN: 6.8 G/DL (ref 6.4–8.3)
WBC # BLD: 7.7 E9/L (ref 4.5–11.5)

## 2023-02-23 PROCEDURE — 2060000000 HC ICU INTERMEDIATE R&B

## 2023-02-23 PROCEDURE — 6370000000 HC RX 637 (ALT 250 FOR IP): Performed by: INTERNAL MEDICINE

## 2023-02-23 PROCEDURE — 97530 THERAPEUTIC ACTIVITIES: CPT

## 2023-02-23 PROCEDURE — 6360000002 HC RX W HCPCS: Performed by: INTERNAL MEDICINE

## 2023-02-23 PROCEDURE — 2580000003 HC RX 258: Performed by: INTERNAL MEDICINE

## 2023-02-23 PROCEDURE — 36415 COLL VENOUS BLD VENIPUNCTURE: CPT

## 2023-02-23 PROCEDURE — 83735 ASSAY OF MAGNESIUM: CPT

## 2023-02-23 PROCEDURE — 85025 COMPLETE CBC W/AUTO DIFF WBC: CPT

## 2023-02-23 PROCEDURE — 6360000002 HC RX W HCPCS: Performed by: HOSPITALIST

## 2023-02-23 PROCEDURE — 99232 SBSQ HOSP IP/OBS MODERATE 35: CPT | Performed by: NURSE PRACTITIONER

## 2023-02-23 PROCEDURE — 82962 GLUCOSE BLOOD TEST: CPT

## 2023-02-23 PROCEDURE — 97535 SELF CARE MNGMENT TRAINING: CPT

## 2023-02-23 PROCEDURE — 80053 COMPREHEN METABOLIC PANEL: CPT

## 2023-02-23 PROCEDURE — 93308 TTE F-UP OR LMTD: CPT

## 2023-02-23 PROCEDURE — 6370000000 HC RX 637 (ALT 250 FOR IP): Performed by: PSYCHIATRY & NEUROLOGY

## 2023-02-23 PROCEDURE — S5553 INSULIN LONG ACTING 5 U: HCPCS | Performed by: INTERNAL MEDICINE

## 2023-02-23 PROCEDURE — 6370000000 HC RX 637 (ALT 250 FOR IP): Performed by: NURSE PRACTITIONER

## 2023-02-23 RX ORDER — MAGNESIUM SULFATE IN WATER 40 MG/ML
2000 INJECTION, SOLUTION INTRAVENOUS PRN
Status: DISCONTINUED | OUTPATIENT
Start: 2023-02-23 | End: 2023-02-25 | Stop reason: HOSPADM

## 2023-02-23 RX ORDER — CLOPIDOGREL BISULFATE 75 MG/1
75 TABLET ORAL DAILY
Status: DISCONTINUED | OUTPATIENT
Start: 2023-02-23 | End: 2023-02-25 | Stop reason: HOSPADM

## 2023-02-23 RX ORDER — POTASSIUM CHLORIDE 7.45 MG/ML
10 INJECTION INTRAVENOUS PRN
Status: DISCONTINUED | OUTPATIENT
Start: 2023-02-23 | End: 2023-02-25 | Stop reason: HOSPADM

## 2023-02-23 RX ORDER — POTASSIUM CHLORIDE 20 MEQ/1
40 TABLET, EXTENDED RELEASE ORAL PRN
Status: DISCONTINUED | OUTPATIENT
Start: 2023-02-23 | End: 2023-02-25 | Stop reason: HOSPADM

## 2023-02-23 RX ADMIN — ATORVASTATIN CALCIUM 40 MG: 40 TABLET, FILM COATED ORAL at 19:43

## 2023-02-23 RX ADMIN — CLOPIDOGREL BISULFATE 75 MG: 75 TABLET ORAL at 16:53

## 2023-02-23 RX ADMIN — Medication 5 ML: at 22:00

## 2023-02-23 RX ADMIN — MAGNESIUM SULFATE HEPTAHYDRATE 2000 MG: 40 INJECTION, SOLUTION INTRAVENOUS at 09:52

## 2023-02-23 RX ADMIN — ASPIRIN 81 MG 81 MG: 81 TABLET ORAL at 09:50

## 2023-02-23 RX ADMIN — CARVEDILOL 6.25 MG: 6.25 TABLET, FILM COATED ORAL at 16:53

## 2023-02-23 RX ADMIN — Medication 2 SPRAY: at 19:42

## 2023-02-23 RX ADMIN — ESCITALOPRAM OXALATE 10 MG: 10 TABLET, FILM COATED ORAL at 09:50

## 2023-02-23 RX ADMIN — PANTOPRAZOLE SODIUM 40 MG: 40 TABLET, DELAYED RELEASE ORAL at 06:47

## 2023-02-23 RX ADMIN — HYDROXYZINE PAMOATE 25 MG: 25 CAPSULE ORAL at 19:42

## 2023-02-23 RX ADMIN — ENOXAPARIN SODIUM 30 MG: 100 INJECTION SUBCUTANEOUS at 09:50

## 2023-02-23 RX ADMIN — ALOGLIPTIN 12.5 MG: 12.5 TABLET, FILM COATED ORAL at 09:50

## 2023-02-23 RX ADMIN — CARVEDILOL 6.25 MG: 6.25 TABLET, FILM COATED ORAL at 09:50

## 2023-02-23 RX ADMIN — PRAMIPEXOLE DIHYDROCHLORIDE 0.75 MG: 0.25 TABLET ORAL at 19:42

## 2023-02-23 RX ADMIN — WATER 1000 MG: 1 INJECTION INTRAMUSCULAR; INTRAVENOUS; SUBCUTANEOUS at 20:23

## 2023-02-23 RX ADMIN — INSULIN GLARGINE-YFGN 5 UNITS: 100 INJECTION, SOLUTION SUBCUTANEOUS at 19:44

## 2023-02-23 RX ADMIN — INSULIN LISPRO 1 UNITS: 100 INJECTION, SOLUTION INTRAVENOUS; SUBCUTANEOUS at 12:08

## 2023-02-23 ASSESSMENT — PAIN SCALES - GENERAL: PAINLEVEL_OUTOF10: 0

## 2023-02-23 NOTE — PLAN OF CARE
Problem: Chronic Conditions and Co-morbidities  Goal: Patient's chronic conditions and co-morbidity symptoms are monitored and maintained or improved  2/23/2023 0429 by Francisco J Balloon, RN  Outcome: Progressing  2/23/2023 0427 by Francisco J Balloon, RN  Outcome: Progressing     Problem: Discharge Planning  Goal: Discharge to home or other facility with appropriate resources  2/23/2023 0429 by Francisco J Balloon, RN  Outcome: Progressing  2/23/2023 0427 by Francisco J Balloon, RN  Outcome: Progressing     Problem: Pain  Goal: Verbalizes/displays adequate comfort level or baseline comfort level  2/23/2023 0429 by Francisco J Balloon, RN  Outcome: Progressing  2/23/2023 0427 by Francisco J Balloon, RN  Outcome: Progressing     Problem: Safety - Adult  Goal: Free from fall injury  2/23/2023 0429 by Francisco J Balloon, RN  Outcome: Progressing  2/23/2023 0427 by Francisco J Balloon, RN  Outcome: Progressing     Problem: Cardiovascular - Adult  Goal: Maintains optimal cardiac output and hemodynamic stability  2/23/2023 0429 by Francisco J Balloon, RN  Outcome: Progressing  2/23/2023 0427 by Francisco J Balloon, RN  Outcome: Progressing     Problem: Metabolic/Fluid and Electrolytes - Adult  Goal: Hemodynamic stability and optimal renal function maintained  2/23/2023 0429 by Francisco J Balloon, RN  Outcome: Progressing  2/23/2023 0427 by Francisco J Balloon, RN  Outcome: Progressing

## 2023-02-23 NOTE — CONSULTS
Neurosurgery Attending  Valve set to 1.0. No evidence of shunt malfunction.   No intervention planned    Nagi Bunn MD

## 2023-02-23 NOTE — CARE COORDINATION
Chart reviewed and case reviewed in IDR and with Dr Quincy Garcia. Downgrade orders received for Med/Surg and discharge orders also received. Patient remains on her 4L O2 per NC that she wears at home. Per Neurosurgery: Valve set to 1.0, No evidence of shunt malfunction, No intervention planned. Call placed to Maciej Montanez, liaison with the [facility] to check on the authorization. Per Maciej Montanez, authorization not yet received, however she will continue to check and notify this CM when it is received. PASAR completed. Envelope and transfer paperwork in the soft chart. Will need to be signed and dated on the day of discharge. Will continue to follow.      Aubree Gleason RN.  [facility]  [phone]

## 2023-02-23 NOTE — PLAN OF CARE
Problem: Chronic Conditions and Co-morbidities  Goal: Patient's chronic conditions and co-morbidity symptoms are monitored and maintained or improved  Outcome: Progressing     Problem: Discharge Planning  Goal: Discharge to home or other facility with appropriate resources  Outcome: Progressing     Problem: Pain  Goal: Verbalizes/displays adequate comfort level or baseline comfort level  Outcome: Progressing     Problem: Safety - Adult  Goal: Free from fall injury  Outcome: Progressing     Problem: Cardiovascular - Adult  Goal: Maintains optimal cardiac output and hemodynamic stability  Outcome: Progressing     Problem: Metabolic/Fluid and Electrolytes - Adult  Goal: Hemodynamic stability and optimal renal function maintained  Outcome: Progressing

## 2023-02-23 NOTE — PLAN OF CARE
Problem: Chronic Conditions and Co-morbidities  Goal: Patient's chronic conditions and co-morbidity symptoms are monitored and maintained or improved  2/23/2023 1309 by Joy Jean Baptiste RN  Outcome: Progressing  2/23/2023 0429 by Lesa Pacheco RN  Outcome: Progressing  2/23/2023 0427 by Lesa Pacheco RN  Outcome: Progressing     Problem: Discharge Planning  Goal: Discharge to home or other facility with appropriate resources  2/23/2023 1309 by Joy Jean Baptiste RN  Outcome: Progressing  2/23/2023 0429 by Lesa Pacheco RN  Outcome: Progressing  2/23/2023 0427 by Lesa Pacheco RN  Outcome: Progressing     Problem: Pain  Goal: Verbalizes/displays adequate comfort level or baseline comfort level  2/23/2023 1309 by Joy Jean Baptiste RN  Outcome: Progressing  2/23/2023 0429 by Lesa Pacheco RN  Outcome: Progressing  2/23/2023 0427 by Lesa Pacheco RN  Outcome: Progressing     Problem: Safety - Adult  Goal: Free from fall injury  2/23/2023 1309 by Joy Jean Baptiste RN  Outcome: Progressing  2/23/2023 0429 by Lesa Pacheco RN  Outcome: Progressing  2/23/2023 0427 by Lesa Pacheco RN  Outcome: Progressing     Problem: Cardiovascular - Adult  Goal: Maintains optimal cardiac output and hemodynamic stability  2/23/2023 1309 by Joy Jean Baptiste RN  Outcome: Progressing  2/23/2023 0429 by Lesa Pacheco RN  Outcome: Progressing  2/23/2023 0427 by Lesa Pacheco RN  Outcome: Progressing     Problem: Metabolic/Fluid and Electrolytes - Adult  Goal: Hemodynamic stability and optimal renal function maintained  2/23/2023 1309 by Joy Jean Baptiste RN  Outcome: Progressing  2/23/2023 0429 by Lesa Pacheco RN  Outcome: Progressing  2/23/2023 0427 by Lesa Pacheco RN  Outcome: Progressing
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
## 2023-02-23 NOTE — PROGRESS NOTES
Perfect serve message sent to Dr. Froylan Holt regarding patient's magnesium level 1.4 this morning.      Alexandra Coon RN, BSN

## 2023-02-23 NOTE — PROGRESS NOTES
Hospitalist Progress Note      PCP: Elizabeth Benjamin DO    Date of Admission: 2/20/2023    Hospital Course: This is a 76-year-old female with past medical history of NPH status post  shunt implantation, TIA, diabetes mellitus, chronic systolic heart failure, stage III CKD, CAD COPD with chronic respiratory failure on home oxygen presented with altered mental status. CT brain was negative for acute intracranial abnormality; chest x-ray showed no acute process. Noted to have abnormal urinalysis and patient admitted to dysuria; started on IV antibiotics. Patient was evaluated by neurologist, recommended MRI brain revealed suggestive of acute/subacute stroke involving the left white matter and left frontal white matter; stable position of the right-sided ventricular shunt catheter with stable size of ventricular, no hydrocephalus. She was restarted on Plavix in addition to ASA, high intensity statin for secondary stroke prevention. Subjective: Pt was seen and examined at bedside. No acute event overnight; denies any CP, SOB or palpitation.       Medications:  Reviewed    Infusion Medications    dextrose      sodium chloride       Scheduled Medications    clopidogrel  75 mg Oral Daily    oxymetazoline  2 spray Each Nostril BID    pramipexole  0.75 mg Oral Nightly    aspirin  81 mg Oral Daily    atorvastatin  40 mg Oral Nightly    carvedilol  6.25 mg Oral BID WC    escitalopram  10 mg Oral Daily    pantoprazole  40 mg Oral QAM AC    alogliptin  12.5 mg Oral Daily    sodium chloride flush  5-40 mL IntraVENous 2 times per day    enoxaparin  30 mg SubCUTAneous Daily    insulin lispro  0-4 Units SubCUTAneous TID WC    insulin lispro  0-4 Units SubCUTAneous Nightly    cefTRIAXone (ROCEPHIN) IV  1,000 mg IntraVENous Q24H    insulin glargine  5 Units SubCUTAneous Nightly     PRN Meds: sodium phosphate IVPB **OR** sodium phosphate IVPB **OR** sodium phosphate IVPB, magnesium sulfate, potassium chloride **OR** potassium alternative oral replacement **OR** potassium chloride, hydrOXYzine pamoate, albuterol, glucose, dextrose bolus **OR** dextrose bolus, glucagon (rDNA), dextrose, sodium chloride flush, sodium chloride, polyethylene glycol, acetaminophen **OR** acetaminophen      Intake/Output Summary (Last 24 hours) at 2/23/2023 1239  Last data filed at 2/22/2023 2308  Gross per 24 hour   Intake 125 ml   Output 2 ml   Net 123 ml         Exam:    BP (!) 142/80   Pulse 82   Temp 97.7 °F (36.5 °C) (Temporal)   Resp 16   Ht 5' 3\" (1.6 m)   Wt 103 lb 11.2 oz (47 kg)   LMP  (LMP Unknown)   SpO2 92%   BMI 18.37 kg/m²     General appearance: Sitting comfortably in bed. No apparent distress. Respiratory: Clear to auscultation bilaterally. Cardiovascular: Normal S1/S2. Regular rhythm and rate. Abdomen: Soft, non-tender, non-distended with normal bowel sounds. Musculoskeletal: No clubbing, cyanosis or edema bilaterally. Skin: Skin color, texture, turgor normal.  No rashes or lesions. Neurologic:  No focal deficit. Psychiatric: Alert and oriented x 3. Pleasantly confused, not agitated  Peripheral Pulses: +2 palpable, equal bilaterally       Labs:   Recent Labs     02/21/23  0624 02/22/23  0700 02/23/23  0445   WBC 7.3 7.0 7.7   HGB 11.1* 11.2* 10.8*   HCT 36.7 35.7 34.8    280 229       Recent Labs     02/21/23  0624 02/22/23  0700 02/23/23  0445    137 135   K 4.7 4.5 4.6    100 99   CO2 21* 23 25   BUN 32* 26* 19   CREATININE 1.3* 1.0 0.9   CALCIUM 9.0 9.3 9.3       Recent Labs     02/21/23  0624 02/22/23  0700 02/23/23  0445   AST 21 24 26   ALT 16 21 22   BILITOT 0.7 0.5 0.5   ALKPHOS 151* 162* 153*       Recent Labs     02/21/23  0624   INR 1.9       No results for input(s): Scott Palacios in the last 72 hours. Radiology:  MRI BRAIN WO CONTRAST   Final Result   1. Limited examination due to motion as well as artifact overlying right   temporal and parietal lobes.    2. Two small foci of increased signal on diffusion weighted imaging located   in left parietal white matter and left frontal white matter suggesting acute   to subacute punctate areas of stroke. 3. Stable position of right-sided ventricular shunt catheter. 4. Ventricles are stable in size. No hydrocephalus. 5. Redemonstration of enlarged CSF spaces related to generalized parenchymal   volume loss. 6. No acute intracranial hemorrhage. 7. Redemonstration of multiple foci of abnormal signal in white matter of   both hemispheres suggesting chronic microvascular ischemia. US RETROPERITONEAL COMPLETE   Final Result   Mild bilateral hydronephrosis. No renal calculi. Right upper and left lower quadrant and pelvic ascites. RECOMMENDATIONS:   Unavailable         CT Head W/O Contrast   Final Result   1. Stable position of right ventricular shunt catheter. 2. Stable size of the ventricles. No hydrocephalus. 3. No acute intracranial hemorrhage or edema. XR CHEST PORTABLE   Final Result   No airspace consolidation. Cardiomegaly. Mild right pleural effusion.          VL Carotid Bilateral    (Results Pending)             Active Hospital Problems    Diagnosis Date Noted    Acute ischemic stroke ([facility] [address] [I63.9] 02/23/2023     Priority: Medium    Alkaline phosphatase elevation [R74.8] 02/21/2023     Priority: Medium    Primary hypertension [I10] 02/21/2023     Priority: Medium    Urinary tract infection [N39.0] 02/21/2023     Priority: Medium    Altered mental status [R41.82] 01/09/2023     Priority: Medium    Type 2 diabetes mellitus, without long-term current use of insulin ([facility] [address] [E11.9] 10/17/2022     Priority: Medium    Acute kidney injury superimposed on CKD ([facility] [address] [N17.9, N18.9] 06/30/2021       Assessment  Acute metabolic encephalopathy - likely related to below, acute infection; improving  Acute CVA - as above  UTI  Acute on chronic renal failure - baseline stage III CKD, resolved with IV hydration  Chronic respiratory failure with hypoxia - secondary to below, on 3 L/min supplemental oxygen chronically  COPD - stable, no evidence of acute exacerbation  Type 2 diabetes mellitus - suboptimal glycemic control  CAD - stable, asymptomatic  Essential hypertension - well-controlled  History of NPH - status post  shunt implantation  Anemia of chronic disease - stable H&H  Hypomagnesemia  Protein calorie malnutrition  Physical deconditioning  Underweight  -  Body mass index is 18.37 kg/m². Plan:  Continue with ASA, Plavix, and statin  Echo with bubble studies, Carotid Dopplers by neurologist  Magali stinson on discharge  On IV Rocephin, will stop after third dose today  Follow cultures, NGTD  Gentle IV hydration  Monitor renal function, replace electrolytes as needed  Continue with Coreg  Basal and corrective bolus insulin regimen; continue with alogliptin  PT/OT as tolerated  Fall precautions      DVT Prophylaxis: Lovenox  Diet: ADULT ORAL NUTRITION SUPPLEMENT; Breakfast, Lunch, Dinner; Diabetic Oral Supplement  ADULT DIET; Regular; 4 carb choices (60 gm/meal);  Low Sodium (2 gm)  Code Status: Full Code    PT/OT Eval Status: Ongoing    Dispo - discharge plan was discussed with case management; awaiting pre-CERT to return to [address], [place] 2/23/2023 12:39 PM

## 2023-02-23 NOTE — ACP (ADVANCE CARE PLANNING)
Advance Care Planning   Healthcare Decision Maker:    Primary Decision Maker: Talon La - [phone]    Click here to complete Healthcare Decision Makers including selection of the Healthcare Decision Maker Relationship (ie \"Primary\").

## 2023-02-23 NOTE — PROGRESS NOTES
Neuro Inpatient Follow Up         Medina Sun is a 75 y.o. right handed female     Neuro is following for confusion and trouble walking    Significant PMH: Apraxia, ataxia, CHF, COPD, CKD, CABG x2, HLD, NPH s/p shunt, oxygen dependence, RLS, cervical fusion, former smoking    HPI:  The patient presented on 2/21 with an altered mental status. She has a history of NPH s/p  shunt in 2018 with revision in 2020. She just had a cervical posterior fusion in October 2022 and had been in a nursing home for recovery. When she came home her family noted increased confusion and more difficulty walking as well as urinary incontinence. She displayed executive dysfunction and impaired short-term memory and retropulsive gait on exam.  Family also reported 10 -15 pound weight loss over the past few months. CT of her head showed stable ventricle size compared to prior. She was found to have a UTI and acute on chronic renal failure. Mirapex was changed and oxybutynin was held. She was just been seen by our inpatient service in January after presenting with confusion and intermittent combative behavior and was found to have a punctate left parietal stroke. Subjective:  MRI showed new punctate areas of stroke in her left hemisphere. She still feels confused and is not back to her baseline. No hallucinations. A1c was 9.8 and her daughter is at the bedside and states the patient is resistant to any dietary modifications for her diabetes. Patient denies any weakness or language difficulties. She feels somewhat steadier when she is walking.     No chest pain or palpitations  No SOB  No vertigo, lightheadedness or loss of consciousness  No falls, tripping or stumbling  No incontinence of bowels or bladder  No itching or bruising appreciated  No numbness, tingling or focal arm/leg weakness  No speech or swallowing troubles    ROS otherwise negative     Current Facility-Administered Medications   Medication Dose Route Frequency Provider Last Rate Last Admin    sodium phosphate 10 mmol in sodium chloride 0.9 % 250 mL IVPB  10 mmol IntraVENous PRN Lavinia JUVENTINO Chicas MD        Or    sodium phosphate 15 mmol in sodium chloride 0.9 % 250 mL IVPB  15 mmol IntraVENous PRN Lavinia JUVENTINO Chicas MD        Or    sodium phosphate 20 mmol in sodium chloride 0.9 % 500 mL IVPB  20 mmol IntraVENous PRN Lavinia JUVENTINO Chicas MD        magnesium sulfate 2000 mg in 50 mL IVPB premix  2,000 mg IntraVENous PRN Lavinia Dorie Mcarthur MD 25 mL/hr at 02/23/23 0952 2,000 mg at 02/23/23 7594    potassium chloride (KLOR-CON M) extended release tablet 40 mEq  40 mEq Oral PRN Lavinia JUVENTINO Chicas MD        Or    potassium bicarb-citric acid (EFFER-K) effervescent tablet 40 mEq  40 mEq Oral PRN Lavinia JUVENTINO Chicas MD        Or    potassium chloride 10 mEq/100 mL IVPB (Peripheral Line)  10 mEq IntraVENous PRN Lavinia Chicas MD        oxymetazoline (AFRIN) 0.05 % nasal spray 2 spray  2 spray Each Nostril BID Lavinia Chicas MD   2 spray at 02/22/23 2255    hydrOXYzine pamoate (VISTARIL) capsule 25 mg  25 mg Oral TID PRN Italian , DO   25 mg at 02/22/23 2248    pramipexole (MIRAPEX) tablet 0.75 mg  0.75 mg Oral Nightly [place] Hillary, DO   0.75 mg at 02/22/23 2248    albuterol (PROVENTIL) nebulizer solution 2.5 mg  2.5 mg Nebulization Q4H PRN Suma Underwood MD        aspirin chewable tablet 81 mg  81 mg Oral Daily Suma Underwood MD   81 mg at 02/23/23 0950    atorvastatin (LIPITOR) tablet 40 mg  40 mg Oral Nightly Suma Underwood MD   40 mg at 02/22/23 2248    carvedilol (COREG) tablet 6.25 mg  6.25 mg Oral BID WC Suma Underwood MD   6.25 mg at 02/23/23 0950    escitalopram (LEXAPRO) tablet 10 mg  10 mg Oral Daily Suma Underwood MD   10 mg at 02/23/23 0950    pantoprazole (PROTONIX) tablet 40 mg  40 mg Oral [facility] Suma Underwood MD   40 mg at 02/23/23 7951    alogliptin (NESINA) tablet 12.5 mg  12.5 mg Oral Daily Suma Underwood MD   12.5 mg at 02/23/23 0950    glucose chewable tablet 16 g  4 tablet Oral PRN Skyla Kendall MD        dextrose bolus 10% 125 mL  125 mL IntraVENous PRN Skyla Kendall MD        Or    dextrose bolus 10% 250 mL  250 mL IntraVENous PRN Skyla Kendall MD        glucagon injection 1 mg  1 mg SubCUTAneous PRN Skyla Kendall MD        dextrose 10 % infusion   IntraVENous Continuous PRN Skyla Kendall MD        sodium chloride flush 0.9 % injection 5-40 mL  5-40 mL IntraVENous 2 times per day Skyla Kendall MD   5 mL at 02/22/23 2308    sodium chloride flush 0.9 % injection 5-40 mL  5-40 mL IntraVENous PRN Skyla Kendall MD        0.9 % sodium chloride infusion   IntraVENous PRN Skyla Kendall MD        enoxaparin Sodium (LOVENOX) injection 30 mg  30 mg SubCUTAneous Daily Skyla Kendall MD   30 mg at 02/23/23 0950    polyethylene glycol (GLYCOLAX) packet 17 g  17 g Oral Daily PRN Skyla Kendall MD        acetaminophen (TYLENOL) tablet 650 mg  650 mg Oral Q6H PRN Skyla Kendall MD   650 mg at 02/21/23 2154    Or    acetaminophen (TYLENOL) suppository 650 mg  650 mg Rectal Q6H PRN Skyla Kendall MD        insulin lispro (HUMALOG) injection vial 0-4 Units  0-4 Units SubCUTAneous TID WC Skyla Kendall MD   2 Units at 02/22/23 1726    insulin lispro (HUMALOG) injection vial 0-4 Units  0-4 Units SubCUTAneous Nightly Skyla Kendall MD        cefTRIAXone (ROCEPHIN) 1,000 mg in sterile water 10 mL IV syringe  1,000 mg IntraVENous Q24H Skyla Kendall MD   1,000 mg at 02/22/23 2248    insulin glargine-yfgn (SEMGLEE-YFGN) injection vial 5 Units  5 Units SubCUTAneous Nightly Skyla Kendall MD   5 Units at 02/22/23 2249      Objective:     BP (!) 142/80   Pulse 82   Temp 97.7 °F (36.5 °C) (Temporal)   Resp 16   Ht 5' 3\" (1.6 m)   Wt 103 lb 11.2 oz (47 kg)   LMP  (LMP Unknown)   SpO2 92%   BMI 18.37 kg/m²     General appearance: alert, appears stated age, cooperative and no distress sitting up in bed   Head: normocephalic, without obvious abnormality, atraumatic  Eyes: conjunctivae/corneas clear. .  Neck: full ROM  Lungs: nonlabored on NC O2  Heart: regular rate and rhythm  Extremities: normal, atraumatic, no cyanosis or edema  Pulses: 2+ and symmetric  Skin: color, texture, turgor normal---no rashes or lesions      Mental Status: alert, oriented to person, place, year, month    Appropriate attention/concentration  Intact fundus of knowledge  Evidence of mild cog impairment--difficulty with three step commands    Speech: no dysarthria  Language: no aphasias    Cranial Nerves:  I: smell NA   II: visual acuity  NA   II: visual fields Full to confrontation   II: pupils CHANI   III,VII: ptosis None   III,IV,VI: extraocular muscles  Full ROM   V: mastication Normal   V: facial light touch sensation  Normal   V,VII: corneal reflex     VII: facial muscle function - upper  Normal   VII: facial muscle function - lower Normal   VIII: hearing Normal   IX: soft palate elevation  Normal   IX,X: gag reflex    XI: trapezius strength  5/5   XI: sternocleidomastoid strength 5/5   XI: neck extension strength  5/5   XII: tongue strength  Normal     Motor:  5/5 throughout  Decreased bulk and normal tone  No drift or abnormal movements    Sensory:  LT normal in all limbs    Coordination:   FN, FFM normal b/l    Gait:  Slow and cautious but no retropulsion or gait freezing.   Requires assist of 1    Laboratory/Radiology:     CBC with Differential:    Lab Results   Component Value Date/Time    WBC 7.7 02/23/2023 04:45 AM    RBC 3.92 02/23/2023 04:45 AM    HGB 10.8 02/23/2023 04:45 AM    HCT 34.8 02/23/2023 04:45 AM     02/23/2023 04:45 AM    MCV 88.8 02/23/2023 04:45 AM    MCH 27.6 02/23/2023 04:45 AM    MCHC 31.0 02/23/2023 04:45 AM    RDW 18.9 02/23/2023 04:45 AM    SEGSPCT 63 01/30/2014 09:26 AM    LYMPHOPCT 26.0 02/23/2023 04:45 AM    MONOPCT 14.0 02/23/2023 04:45 AM    BASOPCT 0.8 02/23/2023 04:45 AM    MONOSABS 1.08 02/23/2023 04:45 AM    LYMPHSABS 2.01 02/23/2023 04:45 AM    EOSABS 0.14 02/23/2023 04:45 AM    BASOSABS 0.06 02/23/2023 04:45 AM     CMP:    Lab Results   Component Value Date/Time     02/23/2023 04:45 AM    K 4.6 02/23/2023 04:45 AM    K 3.6 01/09/2023 09:11 PM    CL 99 02/23/2023 04:45 AM    CO2 25 02/23/2023 04:45 AM    BUN 19 02/23/2023 04:45 AM    CREATININE 0.9 02/23/2023 04:45 AM    GFRAA 60 10/10/2022 11:40 AM    LABGLOM >60 02/23/2023 04:45 AM    GLUCOSE 132 02/23/2023 04:45 AM    GLUCOSE 111 09/30/2011 02:00 PM    PROT 6.8 02/23/2023 04:45 AM    LABALBU 3.4 02/23/2023 04:45 AM    LABALBU 4.4 09/30/2011 02:00 PM    CALCIUM 9.3 02/23/2023 04:45 AM    BILITOT 0.5 02/23/2023 04:45 AM    ALKPHOS 153 02/23/2023 04:45 AM    AST 26 02/23/2023 04:45 AM    ALT 22 02/23/2023 04:45 AM     TSH:    Lab Results   Component Value Date/Time    TSH 1.200 02/21/2023 05:47 PM     Lab Results   Component Value Date    RSZNLYMS86 >2000 (H) 02/21/2023     FOLATE:    Lab Results   Component Value Date/Time    FOLATE >20.0 02/21/2023 05:47 PM     Hemoglobin A1C   Date Value Ref Range Status   02/21/2023 9.8 (H) 4.0 - 5.6 % Final      Latest Reference Range & Units 1/9/23 06:30   CHOLESTEROL, TOTAL, 454242 0 - 199 mg/dL 93   HDL Cholesterol >40 mg/dL 26   LDL Calculated 0 - 99 mg/dL 48   Triglycerides 0 - 149 mg/dL 96   VLDL Cholesterol Calculated mg/dL 19     CTH: Stable position of right ventricular shunt catheter. 2. Stable size of the ventricles. No hydrocephalus. 3. No acute intracranial hemorrhage or edema. CTAs head and neck JAN 2023: No focal hemodynamically significant stenosis, occlusion or aneurysm in the large arteries of the head and neck. 2. No acute intracranial hemorrhage or mass effect. 3. Stable right occipital approach ventriculoperitoneal shunt catheter. The ventricles are similar in size and shape to the prior study. No progressive hydrocephalus.  4. Stable small left-sided subdural low-attenuation fluid collection which may represent a chronic hygroma. MRI brain WO: Limited examination due to motion as well as artifact overlying right temporal and parietal lobes. 2. Two small foci of increased signal on diffusion weighted imaging located in left parietal white matter and left frontal white matter suggesting acute to subacute punctate areas of stroke. 3. Stable position of right-sided ventricular shunt catheter. 4. Ventricles are stable in size. No hydrocephalus. 5. Redemonstration of enlarged CSF spaces related to generalized parenchymal volume loss. 6. No acute intracranial hemorrhage. 7. Redemonstration of multiple foci of abnormal signal in white matter of both hemispheres suggesting chronic microvascular ischemia. All pertinent labs and images personally reviewed today    Assessment:     Altered mental status and gait instability: Secondary to acute strokes atop UTI. Small acute strokes left parietal and frontal regions: In the setting of recent left parietal ischemic stroke. Strokes are suspicious for embolic disease-vessel imaging last month showed no hemodynamically significant stenosis  but will recheck carotid US---she requires updated cardiac work-up. History of NPH s/p  shunt: With revision in 2020.   Ventricle size is stable    Plan:     -Limited echo w bubble--if neg, will need Zio patch  -US carotids  -Add Plavix back to ASA for 21 days then reduce to Plavix monotherapy  -Continue high intensity statin  -Consult diabetes ed for A1C 9.8  -PT/OT/ST  -Mod vasc risk factors: goal BP <140/90, LDL <70, A1C <7.0  -Discussed with her daughter    Anticipate stroke clinic follow up  We will follow    LIZA Ojeda - CNP  11:21 AM  2/23/2023

## 2023-02-24 ENCOUNTER — APPOINTMENT (OUTPATIENT)
Dept: ULTRASOUND IMAGING | Age: 68
DRG: 689 | End: 2023-02-24
Payer: MEDICARE

## 2023-02-24 LAB
METER GLUCOSE: 205 MG/DL (ref 74–99)
METER GLUCOSE: 207 MG/DL (ref 74–99)
METER GLUCOSE: 221 MG/DL (ref 74–99)
METER GLUCOSE: 262 MG/DL (ref 74–99)

## 2023-02-24 PROCEDURE — 6370000000 HC RX 637 (ALT 250 FOR IP): Performed by: INTERNAL MEDICINE

## 2023-02-24 PROCEDURE — 97530 THERAPEUTIC ACTIVITIES: CPT

## 2023-02-24 PROCEDURE — 2060000000 HC ICU INTERMEDIATE R&B

## 2023-02-24 PROCEDURE — 93970 EXTREMITY STUDY: CPT

## 2023-02-24 PROCEDURE — 93880 EXTRACRANIAL BILAT STUDY: CPT | Performed by: RADIOLOGY

## 2023-02-24 PROCEDURE — 99233 SBSQ HOSP IP/OBS HIGH 50: CPT | Performed by: NURSE PRACTITIONER

## 2023-02-24 PROCEDURE — 82962 GLUCOSE BLOOD TEST: CPT

## 2023-02-24 PROCEDURE — 93971 EXTREMITY STUDY: CPT | Performed by: RADIOLOGY

## 2023-02-24 PROCEDURE — 6370000000 HC RX 637 (ALT 250 FOR IP): Performed by: NURSE PRACTITIONER

## 2023-02-24 PROCEDURE — S5553 INSULIN LONG ACTING 5 U: HCPCS | Performed by: INTERNAL MEDICINE

## 2023-02-24 PROCEDURE — 2580000003 HC RX 258: Performed by: INTERNAL MEDICINE

## 2023-02-24 PROCEDURE — 93880 EXTRACRANIAL BILAT STUDY: CPT

## 2023-02-24 PROCEDURE — 6370000000 HC RX 637 (ALT 250 FOR IP): Performed by: PSYCHIATRY & NEUROLOGY

## 2023-02-24 PROCEDURE — 97535 SELF CARE MNGMENT TRAINING: CPT

## 2023-02-24 PROCEDURE — 6360000002 HC RX W HCPCS: Performed by: INTERNAL MEDICINE

## 2023-02-24 RX ADMIN — ENOXAPARIN SODIUM 30 MG: 100 INJECTION SUBCUTANEOUS at 08:43

## 2023-02-24 RX ADMIN — ESCITALOPRAM OXALATE 10 MG: 10 TABLET, FILM COATED ORAL at 08:38

## 2023-02-24 RX ADMIN — INSULIN LISPRO 1 UNITS: 100 INJECTION, SOLUTION INTRAVENOUS; SUBCUTANEOUS at 08:43

## 2023-02-24 RX ADMIN — Medication 10 ML: at 22:35

## 2023-02-24 RX ADMIN — INSULIN GLARGINE-YFGN 5 UNITS: 100 INJECTION, SOLUTION SUBCUTANEOUS at 22:32

## 2023-02-24 RX ADMIN — HYDROXYZINE PAMOATE 25 MG: 25 CAPSULE ORAL at 22:32

## 2023-02-24 RX ADMIN — CARVEDILOL 6.25 MG: 6.25 TABLET, FILM COATED ORAL at 17:50

## 2023-02-24 RX ADMIN — INSULIN LISPRO 1 UNITS: 100 INJECTION, SOLUTION INTRAVENOUS; SUBCUTANEOUS at 17:50

## 2023-02-24 RX ADMIN — Medication 10 ML: at 08:38

## 2023-02-24 RX ADMIN — ATORVASTATIN CALCIUM 40 MG: 40 TABLET, FILM COATED ORAL at 22:32

## 2023-02-24 RX ADMIN — ALOGLIPTIN 12.5 MG: 12.5 TABLET, FILM COATED ORAL at 08:38

## 2023-02-24 RX ADMIN — ASPIRIN 81 MG 81 MG: 81 TABLET ORAL at 08:38

## 2023-02-24 RX ADMIN — PANTOPRAZOLE SODIUM 40 MG: 40 TABLET, DELAYED RELEASE ORAL at 07:01

## 2023-02-24 RX ADMIN — PRAMIPEXOLE DIHYDROCHLORIDE 0.75 MG: 0.25 TABLET ORAL at 22:44

## 2023-02-24 RX ADMIN — POLYETHYLENE GLYCOL 3350 17 G: 17 POWDER, FOR SOLUTION ORAL at 22:44

## 2023-02-24 RX ADMIN — INSULIN LISPRO 1 UNITS: 100 INJECTION, SOLUTION INTRAVENOUS; SUBCUTANEOUS at 12:55

## 2023-02-24 RX ADMIN — CARVEDILOL 6.25 MG: 6.25 TABLET, FILM COATED ORAL at 08:38

## 2023-02-24 RX ADMIN — CLOPIDOGREL BISULFATE 75 MG: 75 TABLET ORAL at 08:38

## 2023-02-24 RX ADMIN — WATER 1000 MG: 1 INJECTION INTRAMUSCULAR; INTRAVENOUS; SUBCUTANEOUS at 22:32
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
## 2023-02-24 NOTE — PROGRESS NOTES
2/24/23  Diabetes education attempted to visit patient today,and yesterday, patient sleeping. No family present. Not appropriate for education at this time.

## 2023-02-24 NOTE — CARE COORDINATION
Message received from Silver, liaison with [facility]. Intent to deny received from Hyacinth Foster. Pending # [id number]. They are requesting a Peer to Peer to be completed by 4:30 pm today at [phone]. Perfect Serve message sent to Dr Alan Zapata with the above information, await response re: if peer to peer will be completed. An appeal could also be completed at [phone]- or faxed to [phone]. Perfect Serve read. Will continue to follow. Munir Lawrence RN.  [place]:  [phone]    Return call received from Dr Alan Zapata and Peer to Peer overturned the patient's denial and patient authorized to transition [facility] for rehab. Per Silver, patient can come this evening and they can pick the patient up at 4:30 this evening. Bedside nurse Mariana Fernandez notified and he stated that Dr Alan Zapata stated the patient cannot leave until the US of the BLE is completed and this will not be done this evening as they have a lot of orders to get to and they leave at 4:30 today. Spoke with Silver, liaison with [facility], Authorization is only good through Sunday morning. Will need to have ultrasound of the BLE completed tomorrow morning or the patient will loose her authorization. SW covering Saturday notified. PASAR completed. Envelope and transfer paperwork completed ans in the soft chart. JOY completed. Will continue to follow.      Munir Lawrence RN.  [facility]  [phone]

## 2023-02-24 NOTE — PROGRESS NOTES
Neuro Inpatient Follow Up         Chana Calixto is a 75 y.o. right handed female     Neuro is following for confusion and trouble walking    Significant PMH: Apraxia, ataxia, CHF, COPD, CKD, CABG x2, HLD, NPH s/p shunt, oxygen dependence, RLS, cervical fusion, former smoking    HPI:  The patient presented on 2/21 with an altered mental status. She was just been seen by our inpatient service in January after presenting with confusion and intermittent combative behavior and was found to have a punctate left parietal stroke. She has a history of NPH s/p  shunt in 2018 with revision in 2020. She just had a cervical posterior fusion in October 2022 and had been in a nursing home for recovery. When she came home her family noted increased confusion and more difficulty walking as well as urinary incontinence. She displayed executive dysfunction and impaired short-term memory and retropulsive gait on exam.  Family also reported 10 -15 pound weight loss over the past few months. CT of her head showed stable ventricle size compared to prior. She was found to have a UTI and acute on chronic renal failure. MRI of the brain showed new punctate areas of stroke in her left parietal and frontal regions. Subjective:  Limited echo was positive for PFO. She did not go down for ultrasound of the carotids yesterday because she states she was in the middle of dinner. She feels better today, less confused and feels that she is walking better. There is no family at the bedside but her daughter was updated by neurology yesterday. Diabetes Ed was consulted for her A1c of 9.8    Of note she had only 4 days of Zio patch monitoring after her January admission which showed no A-fib.     No chest pain or palpitations  No SOB  No vertigo, lightheadedness or loss of consciousness  No falls, tripping or stumbling  No incontinence of bowels or bladder  No itching or bruising appreciated  No numbness, tingling or focal arm/leg weakness  No speech or swallowing troubles    ROS otherwise negative     Current Facility-Administered Medications   Medication Dose Route Frequency Provider Last Rate Last Admin    sodium phosphate 10 mmol in sodium chloride 0.9 % 250 mL IVPB  10 mmol IntraVENous PRN Lavinia JUVENTINO Chicas MD        Or    sodium phosphate 15 mmol in sodium chloride 0.9 % 250 mL IVPB  15 mmol IntraVENous PRN Lavinia JUVENTINO Chicas MD        Or    sodium phosphate 20 mmol in sodium chloride 0.9 % 500 mL IVPB  20 mmol IntraVENous PRN Lavinia JUVENTINO Chicas MD        magnesium sulfate 2000 mg in 50 mL IVPB premix  2,000 mg IntraVENous PRN Lavinia Nataliia Oneal MD   Stopped at 02/23/23 1256    potassium chloride (KLOR-CON M) extended release tablet 40 mEq  40 mEq Oral PRN Lavinia JUVENTINO Chicas MD        Or    potassium bicarb-citric acid (EFFER-K) effervescent tablet 40 mEq  40 mEq Oral PRN Lavinia JUVENTINO Chicas MD        Or    potassium chloride 10 mEq/100 mL IVPB (Peripheral Line)  10 mEq IntraVENous PRN Lavinia Chicas MD        clopidogrel (PLAVIX) tablet 75 mg  75 mg Oral Daily Jose Armandoie File, APRN - CNP   75 mg at 02/24/23 9957    hydrOXYzine pamoate (VISTARIL) capsule 25 mg  25 mg Oral TID PRN Gerardo Llanos, DO   25 mg at 02/23/23 1942    pramipexole (MIRAPEX) tablet 0.75 mg  0.75 mg Oral Nightly Scott Moore, DO   0.75 mg at 02/23/23 1942    albuterol (PROVENTIL) nebulizer solution 2.5 mg  2.5 mg Nebulization Q4H PRN Gage Arboleda MD        aspirin chewable tablet 81 mg  81 mg Oral Daily Gage Arboleda MD   81 mg at 02/24/23 1611    atorvastatin (LIPITOR) tablet 40 mg  40 mg Oral Nightly Gage Arboleda MD   40 mg at 02/23/23 1943    carvedilol (COREG) tablet 6.25 mg  6.25 mg Oral BID WC Gage Arboleda MD   6.25 mg at 02/24/23 0838    escitalopram (LEXAPRO) tablet 10 mg  10 mg Oral Daily Gage Arboleda MD   10 mg at 02/24/23 0838    pantoprazole (PROTONIX) tablet 40 mg  40 mg Oral [facility] AC Gage Arboleda MD   40 mg at 02/24/23 5426 alogliptin (NESINA) tablet 12.5 mg  12.5 mg Oral Daily Radha Blount MD   12.5 mg at 02/24/23 4686    glucose chewable tablet 16 g  4 tablet Oral PRN Radha Blount MD        dextrose bolus 10% 125 mL  125 mL IntraVENous PRN Radha Blount MD        Or    dextrose bolus 10% 250 mL  250 mL IntraVENous PRN Radha Blount MD        glucagon injection 1 mg  1 mg SubCUTAneous PRN Radha Blount MD        dextrose 10 % infusion   IntraVENous Continuous PRN Radha Blount MD        sodium chloride flush 0.9 % injection 5-40 mL  5-40 mL IntraVENous 2 times per day Radha Blount MD   10 mL at 02/24/23 1691    sodium chloride flush 0.9 % injection 5-40 mL  5-40 mL IntraVENous PRN Radha Blount MD        0.9 % sodium chloride infusion   IntraVENous PRN Radha Blount MD        enoxaparin Sodium (LOVENOX) injection 30 mg  30 mg SubCUTAneous Daily Radha Blount MD   30 mg at 02/24/23 0843    polyethylene glycol (GLYCOLAX) packet 17 g  17 g Oral Daily PRN Radha Blount MD        acetaminophen (TYLENOL) tablet 650 mg  650 mg Oral Q6H PRN Radha Blount MD   650 mg at 02/21/23 2154    Or    acetaminophen (TYLENOL) suppository 650 mg  650 mg Rectal Q6H PRN Radha Blount MD        insulin lispro (HUMALOG) injection vial 0-4 Units  0-4 Units SubCUTAneous TID WC Radha Blount MD   1 Units at 02/24/23 0843    insulin lispro (HUMALOG) injection vial 0-4 Units  0-4 Units SubCUTAneous Nightly Radha Blount MD        cefTRIAXone (ROCEPHIN) 1,000 mg in sterile water 10 mL IV syringe  1,000 mg IntraVENous Q24H Radha Blount MD   1,000 mg at 02/23/23 2023    insulin glargine-yfgn (SEMGLEE-YFGN) injection vial 5 Units  5 Units SubCUTAneous Nightly Radha Blount MD   5 Units at 02/23/23 1944      Objective:     BP (!) 149/90   Pulse 97   Temp 97.4 °F (36.3 °C) (Temporal)   Resp 18   Ht 5' 3\" (1.6 m)   Wt 103 lb 11.2 oz (47 kg)   LMP  (LMP Unknown)   SpO2 91%   BMI 18.37 kg/m²     General appearance: alert, appears stated age, cooperative and no distress sitting up in in the chair  Head: normocephalic, without obvious abnormality, atraumatic  Eyes: conjunctivae/corneas clear.  .  Neck: full ROM  Lungs: nonlabored on NC O2  Heart: regular rate and rhythm  Extremities: normal, atraumatic, no cyanosis or edema  Pulses: 2+ and symmetric  Skin: color, texture, turgor normal---no rashes or lesions      Mental Status: alert, oriented x4    Appropriate attention/concentration  Intact fundus of knowledge  Evidence of mild cog impairment--difficulty with three step commands    Speech: no dysarthria  Language: no aphasias    Cranial Nerves:  I: smell NA   II: visual acuity  NA   II: visual fields Full to confrontation   II: pupils CHANI   III,VII: ptosis None   III,IV,VI: extraocular muscles  Full ROM   V: mastication Normal   V: facial light touch sensation  Normal   V,VII: corneal reflex     VII: facial muscle function - upper  Normal   VII: facial muscle function - lower Normal   VIII: hearing Normal   IX: soft palate elevation  Normal   IX,X: gag reflex    XI: trapezius strength  5/5   XI: sternocleidomastoid strength 5/5   XI: neck extension strength  5/5   XII: tongue strength  Normal     Motor:  5/5 throughout  Decreased bulk and normal tone  No drift or abnormal movements    Sensory:  LT normal in all limbs    Coordination:   FN, FFM normal b/l    Laboratory/Radiology:     CBC with Differential:    Lab Results   Component Value Date/Time    WBC 7.7 02/23/2023 04:45 AM    RBC 3.92 02/23/2023 04:45 AM    HGB 10.8 02/23/2023 04:45 AM    HCT 34.8 02/23/2023 04:45 AM     02/23/2023 04:45 AM    MCV 88.8 02/23/2023 04:45 AM    MCH 27.6 02/23/2023 04:45 AM    MCHC 31.0 02/23/2023 04:45 AM    RDW 18.9 02/23/2023 04:45 AM    SEGSPCT 63 01/30/2014 09:26 AM    LYMPHOPCT 26.0 02/23/2023 04:45 AM    MONOPCT 14.0 02/23/2023 04:45 AM    BASOPCT 0.8 02/23/2023 04:45 AM    MONOSABS 1.08 02/23/2023 04:45 AM    LYMPHSABS 2.01 02/23/2023 04:45 AM    EOSABS 0.14 02/23/2023 04:45 AM    BASOSABS 0.06 02/23/2023 04:45 AM     CMP:    Lab Results   Component Value Date/Time     02/23/2023 04:45 AM    K 4.6 02/23/2023 04:45 AM    K 3.6 01/09/2023 09:11 PM    CL 99 02/23/2023 04:45 AM    CO2 25 02/23/2023 04:45 AM    BUN 19 02/23/2023 04:45 AM    CREATININE 0.9 02/23/2023 04:45 AM    GFRAA 60 10/10/2022 11:40 AM    LABGLOM >60 02/23/2023 04:45 AM    GLUCOSE 132 02/23/2023 04:45 AM    GLUCOSE 111 09/30/2011 02:00 PM    PROT 6.8 02/23/2023 04:45 AM    LABALBU 3.4 02/23/2023 04:45 AM    LABALBU 4.4 09/30/2011 02:00 PM    CALCIUM 9.3 02/23/2023 04:45 AM    BILITOT 0.5 02/23/2023 04:45 AM    ALKPHOS 153 02/23/2023 04:45 AM    AST 26 02/23/2023 04:45 AM    ALT 22 02/23/2023 04:45 AM     TSH:    Lab Results   Component Value Date/Time    TSH 1.200 02/21/2023 05:47 PM     Lab Results   Component Value Date    ZLNDGNIK21 >2000 (H) 02/21/2023     FOLATE:    Lab Results   Component Value Date/Time    FOLATE >20.0 02/21/2023 05:47 PM     Hemoglobin A1C   Date Value Ref Range Status   02/21/2023 9.8 (H) 4.0 - 5.6 % Final      Latest Reference Range & Units 1/9/23 06:30   CHOLESTEROL, TOTAL, 301438 0 - 199 mg/dL 93   HDL Cholesterol >40 mg/dL 26   LDL Calculated 0 - 99 mg/dL 48   Triglycerides 0 - 149 mg/dL 96   VLDL Cholesterol Calculated mg/dL 19     CTH: Stable position of right ventricular shunt catheter. 2. Stable size of the ventricles. No hydrocephalus. 3. No acute intracranial hemorrhage or edema. CTAs head and neck JAN 2023: No focal hemodynamically significant stenosis, occlusion or aneurysm in the large arteries of the head and neck. 2. No acute intracranial hemorrhage or mass effect. 3. Stable right occipital approach ventriculoperitoneal shunt catheter. The ventricles are similar in size and shape to the prior study. No progressive hydrocephalus. 4. Stable small left-sided subdural low-attenuation fluid collection which may represent a chronic hygroma.      MRI brain WO: Limited examination due to motion as well as artifact overlying right temporal and parietal lobes. 2. Two small foci of increased signal on diffusion weighted imaging located in left parietal white matter and left frontal white matter suggesting acute to subacute punctate areas of stroke. 3. Stable position of right-sided ventricular shunt catheter. 4. Ventricles are stable in size. No hydrocephalus. 5. Redemonstration of enlarged CSF spaces related to generalized parenchymal volume loss. 6. No acute intracranial hemorrhage. 7. Redemonstration of multiple foci of abnormal signal in white matter of both hemispheres suggesting chronic microvascular ischemia. Limited echo w bubble: Mildly dilated left ventricle. Abnormal (paradoxical) motion consistent with conduction abnormality. Moderate - severe reduced left ventricular systolic dysfunction. The left atrium is borderline dilated. Interatrial septum appears intact. Severely dilated right ventricle. Right ventricular septum flattened in systole and diastole consistent with   RV pressure and volume overload. Right ventricle global systolic function is reduced . Moderate centrally directed mitral regurgitation. The tricuspid valve appears structurally normal with poor cooaptation. Moderate-severe tricuspid regurgitation. Pulmonary hypertension is moderate . Right to left shunt identified via saline contrast administration   consistent with patent foramen ovale. Zio patch 4-day monitoring January 2023: No A-fib    US carotids pending    All pertinent labs and images personally reviewed today    Assessment:     Punctate acute ischemic strokes left parietal and frontal regions: In the setting of recent left parietal ischemic stroke.   Limited echo did show PFO, but her ROPE score is 2 and there is lower suspicion that her strokes were from this abnormality, but she should get evaluated by interventional cardiology as an outpatient. She only had 4 days of Zio patch monitoring after her last admission with no A-fib, and needs longer evaluation. CTAs in January were unrevealing,, but with her unilateral strokes updated ultrasound of the carotids is pending to evaluate for stenosis. Altered mental status and gait instability: Secondary to acute strokes atop UTI. History of NPH s/p  shunt: With revision in 2020.   Ventricle size is stable    Plan:     -Await US carotids--if this cannot be done today, can defer to outpatient  -Place 14-day Zio XT patch at discharge (ordered)  -If 14 days of Zio patch monitoring shows no A-fib, recommend Linq insertion  -Will also need evaluation of PFO by interventional cardiology as OP  -Add Plavix + ASA for 21 days then reduce to Plavix monotherapy (was already on ASA)  -Continue high intensity statin  -Mod vasc risk factors: goal BP <140/90, LDL <70, A1C <7.0    Neuro will follow-up ultrasound of the carotids and sign off if negative  Follow-up in stroke clinic    LIZA Carrero - CNP  9:00 AM  2/24/2023

## 2023-02-24 NOTE — PROGRESS NOTES
2/24/23  Diabetes education attempted to visit patient today,and yesterday, patient sleeping. Not appropriate for education at this time.

## 2023-02-24 NOTE — PLAN OF CARE
Problem: Chronic Conditions and Co-morbidities  Goal: Patient's chronic conditions and co-morbidity symptoms are monitored and maintained or improved  Outcome: Progressing  Flowsheets (Taken 2/23/2023 1930)  Care Plan - Patient's Chronic Conditions and Co-Morbidity Symptoms are Monitored and Maintained or Improved:   Monitor and assess patient's chronic conditions and comorbid symptoms for stability, deterioration, or improvement   Collaborate with multidisciplinary team to address chronic and comorbid conditions and prevent exacerbation or deterioration     Problem: Discharge Planning  Goal: Discharge to home or other facility with appropriate resources  Outcome: Progressing  Flowsheets (Taken 2/23/2023 1930)  Discharge to home or other facility with appropriate resources:   Identify barriers to discharge with patient and caregiver   Arrange for needed discharge resources and transportation as appropriate     Problem: Pain  Goal: Verbalizes/displays adequate comfort level or baseline comfort level  Outcome: Progressing     Problem: Safety - Adult  Goal: Free from fall injury  Outcome: Progressing     Problem: Cardiovascular - Adult  Goal: Maintains optimal cardiac output and hemodynamic stability  Outcome: Progressing     Problem: Metabolic/Fluid and Electrolytes - Adult  Goal: Hemodynamic stability and optimal renal function maintained  Outcome: Progressing  Flowsheets (Taken 2/23/2023 1930)  Hemodynamic stability and optimal renal function maintained: Monitor labs and assess for signs and symptoms of volume excess or deficit

## 2023-02-24 NOTE — PROGRESS NOTES
Hospitalist Progress Note      PCP: Idania Smith DO    Date of Admission: 2/20/2023    Hospital Course: This is a 58-year-old female with past medical history of NPH status post  shunt implantation, TIA, diabetes mellitus, chronic systolic heart failure, stage III CKD, CAD COPD with chronic respiratory failure on home oxygen presented with altered mental status. CT brain was negative for acute intracranial abnormality; chest x-ray showed no acute process. Noted to have abnormal urinalysis and patient admitted to dysuria; started on IV antibiotics. Patient was evaluated by neurologist, recommended MRI brain revealed suggestive of acute/subacute stroke involving the left white matter and left frontal white matter; stable position of the right-sided ventricular shunt catheter with stable size of ventricular, no hydrocephalus. She was restarted on Plavix in addition to ASA, high intensity statin for secondary stroke prevention. Subjective: Pt was seen and examined at bedside. No acute event overnight; denies any CP, SOB or palpitation.       Medications:  Reviewed    Infusion Medications    dextrose      sodium chloride       Scheduled Medications    clopidogrel  75 mg Oral Daily    pramipexole  0.75 mg Oral Nightly    aspirin  81 mg Oral Daily    atorvastatin  40 mg Oral Nightly    carvedilol  6.25 mg Oral BID WC    escitalopram  10 mg Oral Daily    pantoprazole  40 mg Oral QAM AC    alogliptin  12.5 mg Oral Daily    sodium chloride flush  5-40 mL IntraVENous 2 times per day    enoxaparin  30 mg SubCUTAneous Daily    insulin lispro  0-4 Units SubCUTAneous TID WC    insulin lispro  0-4 Units SubCUTAneous Nightly    cefTRIAXone (ROCEPHIN) IV  1,000 mg IntraVENous Q24H    insulin glargine  5 Units SubCUTAneous Nightly     PRN Meds: sodium phosphate IVPB **OR** sodium phosphate IVPB **OR** sodium phosphate IVPB, magnesium sulfate, potassium chloride **OR** potassium alternative oral replacement **OR** potassium chloride, hydrOXYzine pamoate, albuterol, glucose, dextrose bolus **OR** dextrose bolus, glucagon (rDNA), dextrose, sodium chloride flush, sodium chloride, polyethylene glycol, acetaminophen **OR** acetaminophen      Intake/Output Summary (Last 24 hours) at 2/24/2023 0945  Last data filed at 2/23/2023 2200  Gross per 24 hour   Intake 5 ml   Output --   Net 5 ml         Exam:    BP (!) 149/90   Pulse 97   Temp 97.4 °F (36.3 °C) (Temporal)   Resp 18   Ht 5' 3\" (1.6 m)   Wt 103 lb 11.2 oz (47 kg)   LMP  (LMP Unknown)   SpO2 91%   BMI 18.37 kg/m²     General appearance: Sitting comfortably in bed. No apparent distress. Respiratory: Clear to auscultation bilaterally. Cardiovascular: Normal S1/S2. Regular rhythm and rate. Abdomen: Soft, non-tender, non-distended with normal bowel sounds. Musculoskeletal: No clubbing, cyanosis or edema bilaterally. Skin: Skin color, texture, turgor normal.  No rashes or lesions. Neurologic:  No focal deficit. Psychiatric: Alert and oriented x 3. Pleasantly confused, not agitated  Peripheral Pulses: +2 palpable, equal bilaterally       Labs:   Recent Labs     02/22/23  0700 02/23/23  0445   WBC 7.0 7.7   HGB 11.2* 10.8*   HCT 35.7 34.8    229       Recent Labs     02/22/23  0700 02/23/23  0445    135   K 4.5 4.6    99   CO2 23 25   BUN 26* 19   CREATININE 1.0 0.9   CALCIUM 9.3 9.3       Recent Labs     02/22/23  0700 02/23/23  0445   AST 24 26   ALT 21 22   BILITOT 0.5 0.5   ALKPHOS 162* 153*       No results for input(s): INR in the last 72 hours. No results for input(s): Kalli Oms in the last 72 hours. Radiology:  MRI BRAIN WO CONTRAST   Final Result   1. Limited examination due to motion as well as artifact overlying right   temporal and parietal lobes.    2. Two small foci of increased signal on diffusion weighted imaging located   in left parietal white matter and left frontal white matter suggesting acute   to subacute punctate areas of stroke. 3. Stable position of right-sided ventricular shunt catheter. 4. Ventricles are stable in size. No hydrocephalus. 5. Redemonstration of enlarged CSF spaces related to generalized parenchymal   volume loss. 6. No acute intracranial hemorrhage. 7. Redemonstration of multiple foci of abnormal signal in white matter of   both hemispheres suggesting chronic microvascular ischemia. US RETROPERITONEAL COMPLETE   Final Result   Mild bilateral hydronephrosis. No renal calculi. Right upper and left lower quadrant and pelvic ascites. RECOMMENDATIONS:   Unavailable         CT Head W/O Contrast   Final Result   1. Stable position of right ventricular shunt catheter. 2. Stable size of the ventricles. No hydrocephalus. 3. No acute intracranial hemorrhage or edema. XR CHEST PORTABLE   Final Result   No airspace consolidation. Cardiomegaly. Mild right pleural effusion. VL Carotid Bilateral    (Results Pending)             Active Hospital Problems    Diagnosis Date Noted    Acute ischemic stroke ([facility] [address] [I63.9] 02/23/2023     Priority: Medium    Alkaline phosphatase elevation [R74.8] 02/21/2023     Priority: Medium    Primary hypertension [I10] 02/21/2023     Priority: Medium    Urinary tract infection [N39.0] 02/21/2023     Priority: Medium    Altered mental status [R41.82] 01/09/2023     Priority: Medium    Type 2 diabetes mellitus, without long-term current use of insulin ([address] [E11.9] 10/17/2022     Priority: Medium    Acute kidney injury superimposed on CKD ([facility] [address] [N17.9, N18.9] 06/30/2021       Assessment  Acute metabolic encephalopathy - likely related to below, acute infection; improving  Acute CVA - MRI brain revealed suggestive of acute/subacute stroke involving the left white matter and left frontal white matter; stable position of the right-sided ventricular shunt catheter with stable size of ventricular, no hydrocephalus.   Echocardiogram showed moderate to severe reduced LV systolic function with ejection fraction approximated at 30 to 35%; globally reduced RV systolic function. Right to left shunt failure agitated saline consistent with patent foramina ovale. PFO - finding on stroke work-up with echo  Valvular heart disease - moderate MR, moderate to severe TR  Moderate pulmonary hypertension  UTI  Acute on chronic renal failure - baseline stage III CKD, resolved with IV hydration  Chronic respiratory failure with hypoxia - secondary to below, on 3 L/min supplemental oxygen chronically  COPD - stable, no evidence of acute exacerbation  Type 2 diabetes mellitus - suboptimal glycemic control  CAD - stable, asymptomatic  Essential hypertension - well-controlled  History of NPH - status post  shunt implantation  Anemia of chronic disease - stable H&H  Hypomagnesemia  Protein calorie malnutrition  Physical deconditioning  Underweight  -  Body mass index is 18.37 kg/m². Plan:  Continue with ASA, Plavix, and statin  Awaiting carotid Dopplers   Discussed PFO finding with Dr. Luli Stern, recommended obtaining bilateral lower extremity venous ultrasound  Zio patch on discharge  Continue with Coreg  Basal and corrective bolus insulin regimen; continue with alogliptin  PT/OT as tolerated  Fall precautions      DVT Prophylaxis: Lovenox  Diet: ADULT ORAL NUTRITION SUPPLEMENT; Breakfast, Lunch, Dinner; Diabetic Oral Supplement  ADULT DIET; Regular; 4 carb choices (60 gm/meal);  Low Sodium (2 gm)  Code Status: Full Code    PT/OT Eval Status: Ongoing    Dispo - discharge plan was discussed with case management; pre-CERT initially denied but was able to obtain after completing peer to peer with insurance physician    Austin Cook MD 2/24/2023 9:45 AM

## 2023-02-24 NOTE — PROGRESS NOTES
Physical Therapy  Treatment Note    Name: Luis Bunn  : 1955  MRN: [id number]      Date of Service: 2023    Evaluating PT:  Casa Ovalles, PT, DPT    Room #:  7642/6936-L  Diagnosis:  Altered mental status [R41.82]  EUN (acute kidney injury) ([facility] [address] [N17.9]  Altered mental status, unspecified altered mental status type [R41.82]  PMHx/PSHx:  COPD,  shunt placement, TIA, CHF, HTN, DM  Procedure/Surgery:  N/A  Precautions:  fall risk, cognition, bed/chair alarm, 3. 5 L O2, Impulsive  Equipment Needs:  TBD    SUBJECTIVE:    Pt lives with dtr in a 2 story home with 1 steps to enter and no handrail. Bed/bath is on 1st floor. Pt ambulated with ww PTA. OBJECTIVE:   Initial Evaluation  Date: 23 Treatment Date: 23 Short Term/ Long Term   Goals   AM-PAC 6 Clicks  50/05    Was pt agreeable to Eval/treatment? yes yes    Does pt have pain?  /10 back Moderate back pain    Bed Mobility  Rolling: SBA  Supine to sit: SBA  Sit to supine: NT  Scooting: SBA Rolling: SBA  Supine to sit: SBA  Sit to supine: NT  Scooting: SBA Rolling: IND  Supine to sit: IND  Sit to supine: IND  Scooting: IND   Transfers Sit to stand: CGA  Stand to sit: CGA  Stand pivot: min A with ww Sit to stand: Rachel  Stand to sit: Rachel  Stand pivot: [employer] Sit to stand: mod I  Stand to sit: mod I  Stand pivot: mod I with AAD   Ambulation    50'x4 with ww min A 75' x 2 Rachel [employer] 300'+ with AAD mod I   Stair negotiation: ascended and descended  NT NT 1 steps with AAD and no handrail mod I     Strength/ROM:   BLE grossly 4/5  BLE AROM WFL    Balance:   Static Sitting: SBA  Dynamic Sitting: SBA  Static Standing: Rachel with ww  Dynamic Standing: Rachel with ww    Pt is A & O x 2-3  Sensation:  Pt denies numbness and tingling to extremities  Edema:  unremarkable    Vitals:    HR 80 spo2 95% 3.5 L   >92% on 4 L with activity HR 90    Therapeutic Exercises:    Functional mobility    Patient education  Pt educated on role of PT, importance of functional mobility during hospital stay, safety with functional mobility    Patient response to education:   Pt verbalized understanding Pt demonstrated skill Pt requires further education in this area   yes partial yes     ASSESSMENT:    Conditions Requiring Skilled Therapeutic Intervention:    [x]Decreased strength     []Decreased ROM  [x]Decreased functional mobility  [x]Decreased balance   [x]Decreased endurance   [x]Decreased posture  []Decreased sensation  []Decreased coordination   []Decreased vision  [x]Decreased safety awareness   [x]Increased pain       Comments:    Pt agreeable to PT. Pt performing bed mobility without assist. Pt performing transfers and ambulation with intermittent assistance due to safety, impulsivity, strength, balance, and endurance deficits. Pt gait pattern is mildly unsteady with cues for pacing. Cues for [employer] to negotiate obstacles. Left in care of OT on toilet to void. Patient would benefit from continued skilled PT to maximize functional mobility independence. Treatment:  Patient practiced and was instructed in the following treatment:    Functional transfers-Verbal cues for proper positioning and sequencing to perform transfers safely with maximum independence. Gait training-Verbal cues for proper positioning and sequencing using assistive device to maximize functional mobility independence. Pt's/ family goals   1. Return home    Prognosis is good for reaching above PT goals. Patient and or family understand(s) diagnosis, prognosis, and plan of care.   yes    PHYSICAL THERAPY PLAN OF CARE:    PT POC is established based on physician order and patient diagnosis     Referring provider/PT Order:  Almas Gong MD  Diagnosis:  Altered mental status [R41.82]  EUN (acute kidney injury) ([facility] [address] [N17.9]  Altered mental status, unspecified altered mental status type [R41.82]  Specific instructions for next treatment:  Progress as tolerated    Current Treatment Recommendations: [x] Strengthening to improve independence with functional mobility   [x] ROM to improve independence with functional mobility   [x] Balance Training to improve static/dynamic balance and to reduce fall risk  [x] Endurance Training to improve activity tolerance during functional mobility   [x] Transfer Training to improve safety and independence with all functional transfers   [x] Gait Training to improve gait mechanics, endurance and assess need for appropriate assistive device  [x] Stair Training in preparation for safe discharge home and/or into the community   [] Positioning to prevent skin breakdown and contractures  [x] Safety and Education Training   [x] Patient/Caregiver Education   [] HEP  [] Other     PT long term treatment goals are located in above grid    Frequency of treatments: 2-5x/week x 1-2 weeks. Time in  0850  Time out  0907    Total Treatment Time  17 minutes     Evaluation Time includes thorough review of current medical information, gathering information on past medical history/social history and prior level of function, completion of standardized testing/informal observation of tasks, assessment of data and education on plan of care and goals.     CPT codes:  [] Low Complexity PT evaluation 60678  [] Moderate Complexity PT evaluation 89226  [] High Complexity PT evaluation 42438  [] PT Re-evaluation 61869  [] Gait training 57310 -- minutes  [] Manual therapy 44516 -- minutes  [x] Therapeutic activities 78084 17 minutes  [] Therapeutic exercises 88696 -- minutes  [] Neuromuscular reeducation 49408 -- minutes     Kamila Becerra, PT, DPT  [id number]

## 2023-02-25 VITALS
BODY MASS INDEX: 18.66 KG/M2 | OXYGEN SATURATION: 91 % | WEIGHT: 105.3 LBS | SYSTOLIC BLOOD PRESSURE: 134 MMHG | DIASTOLIC BLOOD PRESSURE: 95 MMHG | TEMPERATURE: 96.6 F | RESPIRATION RATE: 18 BRPM | HEIGHT: 63 IN | HEART RATE: 91 BPM

## 2023-02-25 LAB
BLOOD CULTURE, ROUTINE: NORMAL
CULTURE, BLOOD 2: NORMAL
METER GLUCOSE: 182 MG/DL (ref 74–99)
SARS-COV-2, NAAT: NOT DETECTED

## 2023-02-25 PROCEDURE — 6370000000 HC RX 637 (ALT 250 FOR IP): Performed by: NURSE PRACTITIONER

## 2023-02-25 PROCEDURE — 87635 SARS-COV-2 COVID-19 AMP PRB: CPT

## 2023-02-25 PROCEDURE — 6360000002 HC RX W HCPCS: Performed by: INTERNAL MEDICINE

## 2023-02-25 PROCEDURE — 6370000000 HC RX 637 (ALT 250 FOR IP): Performed by: INTERNAL MEDICINE

## 2023-02-25 PROCEDURE — 82962 GLUCOSE BLOOD TEST: CPT

## 2023-02-25 PROCEDURE — 93242 EXT ECG>48HR<7D RECORDING: CPT

## 2023-02-25 RX ADMIN — ENOXAPARIN SODIUM 30 MG: 100 INJECTION SUBCUTANEOUS at 08:15

## 2023-02-25 RX ADMIN — ASPIRIN 81 MG 81 MG: 81 TABLET ORAL at 08:15

## 2023-02-25 RX ADMIN — ESCITALOPRAM OXALATE 10 MG: 10 TABLET, FILM COATED ORAL at 08:15

## 2023-02-25 RX ADMIN — ALOGLIPTIN 12.5 MG: 12.5 TABLET, FILM COATED ORAL at 08:15

## 2023-02-25 RX ADMIN — PANTOPRAZOLE SODIUM 40 MG: 40 TABLET, DELAYED RELEASE ORAL at 06:32

## 2023-02-25 RX ADMIN — CLOPIDOGREL BISULFATE 75 MG: 75 TABLET ORAL at 08:15

## 2023-02-25 RX ADMIN — CARVEDILOL 6.25 MG: 6.25 TABLET, FILM COATED ORAL at 08:15

## 2023-02-25 NOTE — CARE COORDINATION
SOCIAL WORK/CASEMANAGEMENT TRANSITION OF CARE Shi Waite Dalia, [address]):  discharge to [facility] today. Snf;loc. [facility] has been called for a 10:30 a.m. , waiting for return call. Called nsg station and they are to run a rapid covid. Spoke with pt and she is in agreement. [facility] main will pay for the ambulette since pt has no payor and cannot afford to pay for it. DEENA May  2/25/2023  [facility]  at 11 a.m. rn notified.  DEENA May  2/25/2023

## 2023-02-25 NOTE — PROGRESS NOTES
Attempted to call Nurse to Nurse to [facility] at [phone]. Recording stated no one available to take call, call back later.

## 2023-02-25 NOTE — PROGRESS NOTES
Attempted to call daughter Roxy Blancas to notify 21 [phone] discharge to [facility] house, no answer and voicemail not set up at [phone]

## 2023-02-25 NOTE — PROGRESS NOTES
Attempted to call Nurse to Nurse to [facility] at [phone]. Recording stated no one available to take call, call back later.

## 2023-02-25 NOTE — DISCHARGE SUMMARY
Hospital Medicine Discharge Summary    Patient ID: Onetha Dino      Patient's PCP: Annabella Flores DO    Admit Date: 2/20/2023     Discharge Date:   ***    Admitting Physician: Skyla Kendall MD     Discharge Physician: Alirio Barnes MD     Discharge Diagnoses: Active Hospital Problems    Diagnosis Date Noted    Acute ischemic stroke ([facility] [address] [I63.9] 02/23/2023     Priority: Medium    Alkaline phosphatase elevation [R74.8] 02/21/2023     Priority: Medium    Primary hypertension [I10] 02/21/2023     Priority: Medium    Urinary tract infection [N39.0] 02/21/2023     Priority: Medium    Altered mental status [R41.82] 01/09/2023     Priority: Medium    Type 2 diabetes mellitus, without long-term current use of insulin ([facility] [address] [E11.9] 10/17/2022     Priority: Medium    Acute kidney injury superimposed on CKD ([facility] [address] [N17.9, N18.9] 06/30/2021       The patient was seen and examined on day of discharge and this discharge summary is in conjunction with any daily progress note from day of discharge. Hospital Course: ***          Exam:     BP (!) 134/95   Pulse 91   Temp (!) 96.6 °F (35.9 °C) (Temporal)   Resp 18   Ht 5' 3\" (1.6 m)   Wt 105 lb 4.8 oz (47.8 kg)   LMP  (LMP Unknown)   SpO2 91%   BMI 18.65 kg/m²     General appearance: Lying/sitting comfortably in bed. No apparent distress  Respiratory: Good air entry bilaterally. Clear to auscultation  Cardiovascular: Normal S1/S2. Regular rhythm and rate. Abdomen: Soft, non-tender, non-distended with normal bowel sounds. Extremities: No edema bilaterally. No cyanosis or clubbing   Skin: Skin color, texture, turgor normal.  No rashes or lesions. Neurologic:  No focal deficit. Psychiatric: Alert and oriented x3.   Mood and affect are appropriate       Consults:     IP CONSULT TO HOSPITALIST  IP CONSULT TO NEUROLOGY  IP CONSULT TO NEUROSURGERY  IP CONSULT TO DIABETES EDUCATOR    Significant Diagnostic Studies:   ***    Disposition:  *** Condition at Discharge: [address] Patient Condition:[id number]}    Discharge Instructions/Follow-up:  ***    Code Status:  Full Code ***    Activity: activity as tolerated    Diet: {diet:42996}    Labs: For convenience and continuity at follow-up the following most recent labs are provided:      CBC:    Lab Results   Component Value Date/Time    WBC 7.7 02/23/2023 04:45 AM    HGB 10.8 02/23/2023 04:45 AM    HCT 34.8 02/23/2023 04:45 AM     02/23/2023 04:45 AM       Renal:    Lab Results   Component Value Date/Time     02/23/2023 04:45 AM    K 4.6 02/23/2023 04:45 AM    K 3.6 01/09/2023 09:11 PM    CL 99 02/23/2023 04:45 AM    CO2 25 02/23/2023 04:45 AM    BUN 19 02/23/2023 04:45 AM    CREATININE 0.9 02/23/2023 04:45 AM    CALCIUM 9.3 02/23/2023 04:45 AM    PHOS 4.3 02/02/2021 05:03 AM       Discharge Medications:     Current Discharge Medication List        CONTINUE these medications which have NOT CHANGED    Details   aspirin 81 MG chewable tablet Take 1 tablet by mouth daily  Qty: 30 tablet, Refills: 3      escitalopram (LEXAPRO) 10 MG tablet Take 1 tablet by mouth daily  Qty: 30 tablet, Refills: 3      atorvastatin (LIPITOR) 40 MG tablet Take 1 tablet by mouth nightly  Qty: 30 tablet, Refills: 3      clopidogrel (PLAVIX) 75 MG tablet Take 1 tablet by mouth daily for 21 doses  Qty: 21 tablet, Refills: 0      Lancets MISC 1 each by Does not apply route 4 times daily  Qty: 600 each, Refills: 1      blood glucose monitor strips Test 4 times a day & as needed for symptoms of irregular blood glucose. Dispense sufficient amount for indicated testing frequency plus additional to accommodate PRN testing needs. Qty: 90 strip, Refills: 3    Comments: Brand per patient preference. May round up to next available package size.       insulin detemir (LEVEMIR FLEXTOUCH) 100 UNIT/ML injection pen Inject 5 Units into the skin nightly  Qty: 5 Adjustable Dose Pre-filled Pen Syringe, Refills: 3      Insulin Pen Needle (MEIJER PEN NEEDLES) 31G X 6 MM MISC 1 each by Does not apply route daily  Qty: 100 each, Refills: 3      carvedilol (COREG) 6.25 MG tablet Take 1 tablet by mouth 2 times daily (with meals)  Qty: 60 tablet, Refills: 3      spironolactone (ALDACTONE) 25 MG tablet Take 25 mg by mouth daily      pramipexole (MIRAPEX) 0.5 MG tablet Take 0.75 mg by mouth in the morning and at bedtime      SITagliptin (JANUVIA) 100 MG tablet Take 100 mg by mouth daily      albuterol sulfate  (90 Base) MCG/ACT inhaler INHALE 2 PUFFS BY MOUTH EVERY 4 HOURS AS NEEDED      OXYGEN Inhale 3 L into the lungs as needed      omeprazole (PRILOSEC) 40 MG capsule   Take 40 mg by mouth daily   Refills: 5      vitamin D (ERGOCALCIFEROL) 57745 UNITS CAPS capsule Take 50,000 Units by mouth once a week THUR  Refills: 5             Time Spent on discharge is more than {Time; 15 min - 8 hours:54334} in the examination, evaluation, counseling and review of medications and discharge plan. Signed:    Breezy Boothe MD   2/25/2023      Thank you Samina Abel DO for the opportunity to be involved in this patient's care. If you have any questions or concerns please feel free to contact me at [phone].

## 2023-02-25 NOTE — PLAN OF CARE
US carotids with no sig stenosis and US BLE showed no DVT    Neuro signing off--rest per last note  Stroke clinic follow up    Ruben January, APRN - CNP  7:50 AM

## 2023-02-25 NOTE — PROGRESS NOTES
Applied Zio monitor for 14 days. VR#[id number]. More Pedersen, RN instructed patient to avoid showering in the first 24 hours. Press the button on the monitor when you feel a symptom and write it in your diary. Do not submerge in water. No lotion or power near the patch. Please return the monitor in the box provided. Patient verbalized understanding. This RN notified to register the device.

## 2023-02-25 NOTE — PROGRESS NOTES
Attempted to call Nurse to Nurse to [facility] at [phone]. Recording stated no one available to take call, call back later.

## 2023-02-25 NOTE — PROGRESS NOTES
ZIO Patch - Cardiac Event Monitor Instructions      You are being prescribed a Zio by [employer] heart monitor to wear over the next 14 days or as specified by your provider. Your Zio came with a prepaid [employer]PS postage to return the monitor at the end of the wear. Make sure to keep the box and/or prepaid envelope that came with your Zio monitor and mail the device via USPS promptly at the end of wear.    FOR ASSISTANCE WITH YOU Zio, PLEASE CALL [employer] [facility] 24/7 at [phone]    Patient Instructions:  - Avoid showering for the first 24 hours  - For Zio AT (MCT) Monitor - Keep the black gateway nearby   - Press the button on the center of your Zio when you feel symptoms   - Log the symptom in your log book OR download the free [employer] alireza to log your symptoms on your phone  - Wear the Zio for 14 days or as specified you your provider  - Removal instructions are included with your monitor    Troubleshooting: PLEASE CALL [employer] [facility] 24/7 at [phone]  - Skin reaction  - Device removed accidentally   - Flashing orange light       Insurance Information:  Your insurance company may send an Explanation of Benefits (EOB). An EOB from your insurance carrier is NOT a bill.     You will be responsible for usual out-of-pocket cost associated with deductibles and co-insurances determined by your instance provider. If there is a balance due, you will receive a statement from [employer] monitor a cash pay option is available and financial assistance programs are available for those who qualify.     For insurance coverage, financial assistance, or out-of-pocket estimates, call o at [phone]    Prompt Return of the Zio Monitor:  The device and kit components must be returned immediately upon completion of wear using the pre-paid . Delays in return may result in delayed final test results.    FOR ASSISTANCE WITH YOUR Zio, PLEASE CALL [employer] 24/7 at [phone]

## 2023-02-26 NOTE — PROGRESS NOTES
Physician Progress Note      May Monday  CSN #:                  [id number]  :                       1955  ADMIT DATE:       2023 4:10 PM  DISCH DATE:        2023 8:19 AM  RESPONDING  PROVIDER #:        Breezy Boothe MD          QUERY TEXT:    Patient admitted with Acute CVA and altered mental status. Noted documentation   of Protein calorie malnutrition. In order to support the diagnosis of protein   calorie malnutrition, please include additional clinical indicators in your   documentation. Or please document if the diagnosis of protein calorie   malnutrition has been ruled out after further study. The medical record reflects the following:  Risk Factors: dementia; Underweight; ; CHF; Chronic respiratory failure; COPD;  Clinical Indicators:  Per H&P: appears cachectic.  23: Internal Medicine progress note: Protein calorie malnutrition. Underweight--body mass index is 18.42kg/m2  BMI 18.37  Treatment: Oral nutritional Supplement TID; Daily weights; I&O    Thank You,  Jazz SANTOSN, R.N.  Clinical Documentation Improvement  [phone]  Options provided:  -- Protein calorie malnutrition present as evidenced by, Please document   evidence.   -- protein calorie malnutrition was ruled out  -- Other - I will add my own diagnosis  -- Disagree - Not applicable / Not valid  -- Disagree - Clinically unable to determine / Unknown  -- Refer to Clinical Documentation Reviewer    PROVIDER RESPONSE TEXT:    protein calorie malnutrition is present as evidenced by Cachexia, unerweight    Query created by: Carola Mckay on 2023 4:15 PM      Electronically signed by:  Breezy Boothe MD 2023 5:34 AM

## 2023-02-27 ENCOUNTER — OUTSIDE SERVICES (OUTPATIENT)
Dept: FAMILY MEDICINE CLINIC | Age: 68
End: 2023-02-27

## 2023-02-27 DIAGNOSIS — Z98.2 S/P VENTRICULOPERITONEAL SHUNT: ICD-10-CM

## 2023-02-27 DIAGNOSIS — E11.21 TYPE 2 DIABETES MELLITUS WITH NEPHROPATHY (HCC): ICD-10-CM

## 2023-02-27 DIAGNOSIS — I34.0 SEVERE MITRAL REGURGITATION: ICD-10-CM

## 2023-02-27 DIAGNOSIS — G91.2 NORMAL PRESSURE HYDROCEPHALUS (HCC): ICD-10-CM

## 2023-02-27 DIAGNOSIS — G45.9 TIA (TRANSIENT ISCHEMIC ATTACK): ICD-10-CM

## 2023-02-27 DIAGNOSIS — I50.22 CHRONIC SYSTOLIC (CONGESTIVE) HEART FAILURE (HCC): ICD-10-CM

## 2023-02-27 DIAGNOSIS — I27.20 PULMONARY HYPERTENSION (HCC): Primary | ICD-10-CM

## 2023-02-27 DIAGNOSIS — I25.10 CORONARY ARTERY DISEASE INVOLVING NATIVE CORONARY ARTERY OF NATIVE HEART WITHOUT ANGINA PECTORIS: ICD-10-CM

## 2023-02-27 DIAGNOSIS — N18.32 STAGE 3B CHRONIC KIDNEY DISEASE (HCC): ICD-10-CM

## 2023-02-27 PROBLEM — N18.9 ACUTE KIDNEY INJURY SUPERIMPOSED ON CKD (HCC): Status: RESOLVED | Noted: 2021-06-30 | Resolved: 2023-02-27

## 2023-02-27 PROBLEM — E43 SEVERE PROTEIN-CALORIE MALNUTRITION (HCC): Chronic | Status: RESOLVED | Noted: 2023-01-09 | Resolved: 2023-02-27

## 2023-02-27 PROBLEM — N17.9 ACUTE KIDNEY INJURY SUPERIMPOSED ON CKD (HCC): Status: RESOLVED | Noted: 2021-06-30 | Resolved: 2023-02-27

## 2023-02-27 PROBLEM — I63.9 ACUTE ISCHEMIC STROKE (HCC): Status: RESOLVED | Noted: 2023-02-23 | Resolved: 2023-02-27

## 2023-02-27 LAB
ANION GAP SERPL CALCULATED.3IONS-SCNC: 13 MMOL/L (ref 7–16)
BASOPHILS ABSOLUTE: 0.09 E9/L (ref 0–0.2)
BASOPHILS RELATIVE PERCENT: 1 % (ref 0–2)
BUN BLDV-MCNC: 21 MG/DL (ref 6–23)
CALCIUM SERPL-MCNC: 9 MG/DL (ref 8.6–10.2)
CHLORIDE BLD-SCNC: 100 MMOL/L (ref 98–107)
CO2: 26 MMOL/L (ref 22–29)
CREAT SERPL-MCNC: 0.8 MG/DL (ref 0.5–1)
EOSINOPHILS ABSOLUTE: 0.28 E9/L (ref 0.05–0.5)
EOSINOPHILS RELATIVE PERCENT: 3.1 % (ref 0–6)
GFR SERPL CREATININE-BSD FRML MDRD: >60 ML/MIN/1.73
GLUCOSE BLD-MCNC: 212 MG/DL (ref 74–99)
HBA1C MFR BLD: 10.1 % (ref 4–5.6)
HCT VFR BLD CALC: 37.6 % (ref 34–48)
HEMOGLOBIN: 11.7 G/DL (ref 11.5–15.5)
IMMATURE GRANULOCYTES #: 0.03 E9/L
IMMATURE GRANULOCYTES %: 0.3 % (ref 0–5)
LYMPHOCYTES ABSOLUTE: 2.2 E9/L (ref 1.5–4)
LYMPHOCYTES RELATIVE PERCENT: 24.7 % (ref 20–42)
MCH RBC QN AUTO: 26.7 PG (ref 26–35)
MCHC RBC AUTO-ENTMCNC: 31.1 % (ref 32–34.5)
MCV RBC AUTO: 85.8 FL (ref 80–99.9)
MONOCYTES ABSOLUTE: 0.94 E9/L (ref 0.1–0.95)
MONOCYTES RELATIVE PERCENT: 10.6 % (ref 2–12)
NEUTROPHILS ABSOLUTE: 5.36 E9/L (ref 1.8–7.3)
NEUTROPHILS RELATIVE PERCENT: 60.3 % (ref 43–80)
PDW BLD-RTO: 19.8 FL (ref 11.5–15)
PLATELET # BLD: 251 E9/L (ref 130–450)
PMV BLD AUTO: 11 FL (ref 7–12)
POTASSIUM SERPL-SCNC: 4 MMOL/L (ref 3.5–5)
RBC # BLD: 4.38 E12/L (ref 3.5–5.5)
SODIUM BLD-SCNC: 139 MMOL/L (ref 132–146)
WBC # BLD: 8.9 E9/L (ref 4.5–11.5)

## 2023-02-27 ASSESSMENT — ENCOUNTER SYMPTOMS
DIARRHEA: 0
WHEEZING: 0
BACK PAIN: 1
APNEA: 0
ABDOMINAL DISTENTION: 0
EYE PAIN: 0
ALLERGIC/IMMUNOLOGIC NEGATIVE: 1
NAUSEA: 0
PHOTOPHOBIA: 0
SHORTNESS OF BREATH: 0
ABDOMINAL PAIN: 0
RESPIRATORY NEGATIVE: 1
RECTAL PAIN: 0
STRIDOR: 0
CONSTIPATION: 0
FACIAL SWELLING: 0
ANAL BLEEDING: 0
SINUS PAIN: 0
EYE DISCHARGE: 0
GASTROINTESTINAL NEGATIVE: 1
EYE REDNESS: 0
TROUBLE SWALLOWING: 0
COUGH: 0
EYE ITCHING: 0
VOMITING: 0
RHINORRHEA: 0
SORE THROAT: 0
BLOOD IN STOOL: 0
CHOKING: 0
SINUS PRESSURE: 0
VOICE CHANGE: 0
CHEST TIGHTNESS: 0

## 2023-02-27 NOTE — PROGRESS NOTES
SUBJECTIVE  Pat Veras is a 67 y.o. female.  Seen: 2/26/2023   HPI/Chief C/O:  Post hospital with Normo pressure hydrocephalous and a TIA   Allergies   Allergen Reactions    Sulfa Antibiotics Anaphylaxis    Pentazocine Lactate Nausea And Vomiting   This patient is here to establish care as a new patient in our facility   We will review all past medical history and go over past and current medications   We will review all medical records that are available to us  We will reconcile our care planning and treatment goals to past and present for this patient             ROS:  Review of Systems   Constitutional:  Positive for fatigue. Negative for activity change, appetite change, chills, diaphoresis, fever and unexpected weight change.   HENT: Negative.  Negative for congestion, dental problem, drooling, ear discharge, ear pain, facial swelling, hearing loss, mouth sores, nosebleeds, postnasal drip, rhinorrhea, sinus pressure, sinus pain, sneezing, sore throat, tinnitus, trouble swallowing and voice change.    Eyes:  Negative for photophobia, pain, discharge, redness, itching and visual disturbance.   Respiratory: Negative.  Negative for apnea, cough, choking, chest tightness, shortness of breath, wheezing and stridor.    Cardiovascular: Negative.  Negative for chest pain, palpitations and leg swelling.   Gastrointestinal: Negative.  Negative for abdominal distention, abdominal pain, anal bleeding, blood in stool, constipation, diarrhea, nausea, rectal pain and vomiting.   Endocrine: Negative.  Negative for cold intolerance, heat intolerance, polydipsia, polyphagia and polyuria.   Genitourinary: Negative.  Negative for decreased urine volume, difficulty urinating, dysuria, enuresis, flank pain, frequency, genital sores, hematuria and urgency.   Musculoskeletal:  Positive for arthralgias, back pain, gait problem, myalgias and neck pain. Negative for joint swelling and neck stiffness.   Allergic/Immunologic: Negative.   Negative for environmental allergies, food allergies and immunocompromised state. Neurological:  Positive for weakness. Negative for dizziness, tremors, seizures, syncope, facial asymmetry, speech difficulty, light-headedness, numbness and headaches. Hematological: Negative. Negative for adenopathy. Does not bruise/bleed easily. Psychiatric/Behavioral: Negative. Negative for agitation, behavioral problems, confusion, decreased concentration, dysphoric mood, hallucinations, self-injury, sleep disturbance and suicidal ideas. The patient is not nervous/anxious and is not hyperactive.        Past Medical/Surgical Hx;  Reviewed with patient      Diagnosis Date    Acid reflux disease     Acute ischemic stroke ([address] 2/23/2023    Acute on chronic respiratory failure with hypoxia and hypercapnia ([facility]) 7/25/2018    Apraxia 7/23/2018    Arthritis     Ataxia 7/23/2018    CAD (coronary artery disease) 10/4/2012    Choledocholithiasis     recurrent    Chronic systolic congestive heart failure ([address] 8/1/2018    Ejection fraction 23% plus/minus 5%    CKD (chronic kidney disease) stage 3, GFR 30-59 ml/min ([facility]) 7/23/2018    COPD (chronic obstructive pulmonary disease) ([facility])     alpha one M1S 183mg/dl    Diabetes mellitus ([address]     Hyperlipidemia     Hypoxia 7/23/2018    Neck pain     Normal pressure hydrocephalus ([address] 7/28/2018    Oxygen dependent     3l/min/nc    Pleural effusion years ago    requiring right thoracentesis    Primary hypertension 2/21/2023    Pulmonary hypertension ([address] 7/23/2018    Restless legs     S/P CABG x 2 10/4/2012    Severe mitral regurgitation 8/1/2018     Past Surgical History:   Procedure Laterality Date    CARDIAC SURGERY      CERVICAL FUSION  1999    C3-C6    CERVICAL FUSION N/A 10/17/2022    POSTERIOR C3-C7 LAMINECTOMY, C3-T1 FUSION, NEEDS O-ARM, EDUARD TABLE, CELL SAVER, PLATLET GEL, POSTERIOR INSTRUMENTATION, GLOBUS performed by Rico Baumgarten, MD at [facility]  2002 COLONOSCOPY N/A 2019    COLONOSCOPY POLYPECTOMY SNARE/COLD BIOPSY performed by Louann Ferreira MD at [facility]  10/30/2002    EYE SURGERY      cataract with Lens implants    HERNIA REPAIR      HYSTERECTOMY (CERVIX STATUS UNKNOWN)  [id number] [facility]  2018    resolved    OH CRTJ SHUNT [id number] TERMINUS N/A 10/15/2018    VENTRICULAR PERITONEAL SHUNT  PLACEMENT performed by Rupert Sorto MD at [address] [facility] N/A 2018    LUMBAR DRAIN INSERTION performed by Rpuert Sorto MD at [address] N/A 2021    VENTRICULAR PERITONEAL SHUNT REPLACEMENT performed by Rupert Sorto MD at [address]       Past Family Hx:  Reviewed with patient      Problem Relation Age of Onset    High Blood Pressure Mother     Diabetes Mother     Emphysema Father     Heart Attack Sister     Heart Failure Sister        Social Hx:  Reviewed with patient  Social History     Tobacco Use    Smoking status: Former     Packs/day: 0.50     Years: 40.00     Pack years: 20.00     Types: Cigarettes     Quit date: 2018     Years since quittin.6    Smokeless tobacco: Never   Substance Use Topics    Alcohol use: No       OBJECTIVE  LMP  (LMP Unknown)     Problem List:  Janeth Orozco does not have any pertinent problems on file. PHYS EX:  Physical Exam  Vitals and nursing note reviewed. Constitutional:       General: She is not in acute distress. Appearance: Normal appearance. She is well-developed. She is not ill-appearing, toxic-appearing or diaphoretic. HENT:      Head: Normocephalic and atraumatic. Right Ear: External ear normal.      Left Ear: External ear normal.      Nose: Nose normal. No congestion or rhinorrhea. Mouth/Throat:      Mouth: Mucous membranes are moist.      Pharynx: No oropharyngeal exudate or posterior oropharyngeal erythema.    Eyes:      General: No scleral icterus. Right eye: No discharge. Left eye: No discharge. Extraocular Movements: Extraocular movements intact. Conjunctiva/sclera: Conjunctivae normal.      Pupils: Pupils are equal, round, and reactive to light. Neck:      Thyroid: No thyromegaly. Vascular: No carotid bruit or JVD. Trachea: No tracheal deviation. Cardiovascular:      Rate and Rhythm: Normal rate and regular rhythm. Pulses: Normal pulses. Heart sounds: Normal heart sounds. No murmur heard. No friction rub. No gallop. Pulmonary:      Effort: Pulmonary effort is normal. No respiratory distress. Breath sounds: Normal breath sounds. No stridor. No wheezing, rhonchi or rales. Chest:      Chest wall: No tenderness. Abdominal:      General: Bowel sounds are normal. There is no distension. Palpations: Abdomen is soft. There is no mass. Tenderness: There is no abdominal tenderness. There is no right CVA tenderness, left CVA tenderness, guarding or rebound. Hernia: No hernia is present. Musculoskeletal:         General: Tenderness (severe neck pain) present. No swelling, deformity or signs of injury. Cervical back: Neck supple. Spasms, tenderness and bony tenderness present. No swelling, edema, deformity, erythema, signs of trauma, lacerations, rigidity, torticollis or crepitus. Pain with movement present. No muscular tenderness. Decreased range of motion. Back:       Right lower leg: No edema. Left lower leg: No edema. Lymphadenopathy:      Cervical: No cervical adenopathy. Neurological:      General: No focal deficit present. Mental Status: She is alert and oriented to person, place, and time. Cranial Nerves: No cranial nerve deficit. Sensory: Sensory deficit present. Motor: Weakness present. No abnormal muscle tone.       Coordination: Coordination abnormal.      Gait: Gait abnormal.      Deep Tendon Reflexes: Reflexes abnormal. ASSESSMENT/PLAN  Diagnoses and all orders for this visit:    Pulmonary hypertension ([facility]  --stable on current care planning  -- continue treatment as we are meeting goals   --PLAN--aerosol accuneb 1.25 plus chest percussion--Rx      Stage 3b chronic kidney disease ([facility]    ---VASCULAR PANEL  A) ASA, PLAVIX, aggrenox  B) coumadin, pletal, tzd, STATIN  C) ace, ALDACTONE, folic, ccb  D) cannikinumab, fish oils     ---CARDIAC---ASA, ace, BETA, STATIN, ALDACTONE, ( ccb )     A) ace or arb  B) LIPITOR 40  or crestor ( 20 to 40 )  C) glp-1 or sglt 2       Chronic systolic (congestive) heart failure (HCC)  --patient is instructed on low to moderate sodium ( 2 to 2.5 grams ), daily    Also to increase potassium in the diet to about 3.5 grams daily    Literature is provided     --patient is stable on good afterload reduction    Patient is doing well on Aldactone  for a diuretic   --this patient has good systolic and diastolic support        Severe mitral regurgitation  --stable on current care planning  -- continue treatment as we are meeting goals       Type 2 diabetes mellitus with nephropathy ([facility]  --stable on current care planning  -- continue treatment as we are meeting goals       TIA (transient ischemic attack)  --stable on current care planning  -- continue treatment as we are meeting goals       Coronary artery disease involving native coronary artery of native heart without angina pectoris  --stable on current care planning  -- continue treatment as we are meeting goals       Normal pressure hydrocephalus ([facility]  --stable on current care planning  -- continue treatment as we are meeting goals       S/P ventriculoperitoneal shunt  --stable on current care planning  -- continue treatment as we are meeting goals           Outpatient Encounter Medications as of 2/27/2023   Medication Sig Dispense Refill    aspirin 81 MG chewable tablet Take 1 tablet by mouth daily 30 tablet 3    escitalopram (LEXAPRO) 10 MG tablet Take 1 tablet by mouth daily 30 tablet 3    atorvastatin (LIPITOR) 40 MG tablet Take 1 tablet by mouth nightly 30 tablet 3    clopidogrel (PLAVIX) 75 MG tablet Take 1 tablet by mouth daily for 21 doses 21 tablet 0    Lancets MISC 1 each by Does not apply route 4 times daily 600 each 1    blood glucose monitor strips Test 4 times a day & as needed for symptoms of irregular blood glucose. Dispense sufficient amount for indicated testing frequency plus additional to accommodate PRN testing needs. 90 strip 3    insulin detemir (LEVEMIR FLEXTOUCH) 100 UNIT/ML injection pen Inject 5 Units into the skin nightly 5 Adjustable Dose Pre-filled Pen Syringe 3    Insulin Pen Needle (MEIJER PEN NEEDLES) 31G X 6 MM MISC 1 each by Does not apply route daily 100 each 3    carvedilol (COREG) 6.25 MG tablet Take 1 tablet by mouth 2 times daily (with meals) 60 tablet 3    spironolactone (ALDACTONE) 25 MG tablet Take 25 mg by mouth daily      pramipexole (MIRAPEX) 0.5 MG tablet Take 0.75 mg by mouth in the morning and at bedtime      SITagliptin (JANUVIA) 100 MG tablet Take 100 mg by mouth daily      albuterol sulfate  (90 Base) MCG/ACT inhaler INHALE 2 PUFFS BY MOUTH EVERY 4 HOURS AS NEEDED      OXYGEN Inhale 3 L into the lungs as needed      omeprazole (PRILOSEC) 40 MG capsule   Take 40 mg by mouth daily   5    vitamin D (ERGOCALCIFEROL) 70202 UNITS CAPS capsule Take 50,000 Units by mouth once a week THUR  5     No facility-administered encounter medications on file as of 2/27/2023. No follow-ups on file.         Reviewed recent labs related to Pat's current problems      Discussed importance of regular Health Maintenance follow up  Health Maintenance   Topic    Diabetic foot exam     Depression Screen     Diabetic Alb to Cr ratio (uACR) test     Diabetic retinal exam     Hepatitis C screen     Breast cancer screen     Shingles vaccine (1 of 2)    Low dose CT lung screening     DEXA (modify frequency per [facility] score)     COVID-19 Vaccine (2 - Booster for [employer] series)    Jiglu Visit (AWV)     Flu vaccine (1)    A1C test (Diabetic or Prediabetic)     Lipids     GFR test (Diabetes, CKD 3-4, OR last GFR 15-59)     DTaP/Tdap/Td vaccine (2 - Td or Tdap)    Colorectal Cancer Screen     Pneumococcal 65+ years Vaccine     Hepatitis A vaccine     Hib vaccine     Meningococcal (ACWY) vaccine

## 2023-03-20 ENCOUNTER — OUTSIDE SERVICES (OUTPATIENT)
Dept: FAMILY MEDICINE CLINIC | Age: 68
End: 2023-03-20

## 2023-03-20 DIAGNOSIS — I50.22 CHRONIC SYSTOLIC (CONGESTIVE) HEART FAILURE (HCC): ICD-10-CM

## 2023-03-20 DIAGNOSIS — G91.2 NORMAL PRESSURE HYDROCEPHALUS (HCC): ICD-10-CM

## 2023-03-20 DIAGNOSIS — E11.21 TYPE 2 DIABETES MELLITUS WITH NEPHROPATHY (HCC): ICD-10-CM

## 2023-03-20 DIAGNOSIS — J43.9 PULMONARY EMPHYSEMA, UNSPECIFIED EMPHYSEMA TYPE (HCC): ICD-10-CM

## 2023-03-20 DIAGNOSIS — N18.32 STAGE 3B CHRONIC KIDNEY DISEASE (HCC): ICD-10-CM

## 2023-03-20 DIAGNOSIS — I27.20 PULMONARY HYPERTENSION (HCC): Primary | ICD-10-CM

## 2023-03-20 ASSESSMENT — ENCOUNTER SYMPTOMS
GASTROINTESTINAL NEGATIVE: 1
COUGH: 0
EYE PAIN: 0
BACK PAIN: 1
SINUS PAIN: 0
EYE REDNESS: 0
WHEEZING: 0
ABDOMINAL DISTENTION: 0
VOMITING: 0
NAUSEA: 0
ALLERGIC/IMMUNOLOGIC NEGATIVE: 1
EYE DISCHARGE: 0
VOICE CHANGE: 0
SORE THROAT: 0
CHOKING: 0
APNEA: 0
RESPIRATORY NEGATIVE: 1
FACIAL SWELLING: 0
DIARRHEA: 0
ABDOMINAL PAIN: 0
EYE ITCHING: 0
BLOOD IN STOOL: 0
RHINORRHEA: 0
ANAL BLEEDING: 0
TROUBLE SWALLOWING: 0
CHEST TIGHTNESS: 0
SINUS PRESSURE: 0
CONSTIPATION: 0
RECTAL PAIN: 0
PHOTOPHOBIA: 0
SHORTNESS OF BREATH: 0
STRIDOR: 0

## 2023-03-23 PROBLEM — N39.0 URINARY TRACT INFECTION: Status: RESOLVED | Noted: 2023-02-21 | Resolved: 2023-03-23

## 2023-03-24 LAB
ANION GAP SERPL CALCULATED.3IONS-SCNC: 15 MMOL/L (ref 7–16)
BACTERIA URNS QL MICRO: ABNORMAL /HPF
BILIRUB UR QL STRIP: NEGATIVE
BUN SERPL-MCNC: 19 MG/DL (ref 6–23)
CALCIUM SERPL-MCNC: 9.4 MG/DL (ref 8.6–10.2)
CHLORIDE SERPL-SCNC: 99 MMOL/L (ref 98–107)
CLARITY UR: CLEAR
CO2 SERPL-SCNC: 22 MMOL/L (ref 22–29)
COLOR UR: YELLOW
CREAT SERPL-MCNC: 0.9 MG/DL (ref 0.5–1)
EPI CELLS #/AREA URNS HPF: ABNORMAL /HPF
ERYTHROCYTE [DISTWIDTH] IN BLOOD BY AUTOMATED COUNT: 19 FL (ref 11.5–15)
GLUCOSE SERPL-MCNC: 378 MG/DL (ref 74–99)
GLUCOSE UR STRIP-MCNC: 250 MG/DL
HBA1C MFR BLD: 11.3 % (ref 4–5.6)
HCT VFR BLD AUTO: 38 % (ref 34–48)
HGB BLD-MCNC: 11.2 G/DL (ref 11.5–15.5)
HGB UR QL STRIP: NEGATIVE
KETONES UR STRIP-MCNC: NEGATIVE MG/DL
LEUKOCYTE ESTERASE UR QL STRIP: NEGATIVE
MCH RBC QN AUTO: 26.2 PG (ref 26–35)
MCHC RBC AUTO-ENTMCNC: 29.5 % (ref 32–34.5)
MCV RBC AUTO: 89 FL (ref 80–99.9)
NITRITE UR QL STRIP: NEGATIVE
PH UR STRIP: 6 [PH] (ref 5–9)
PLATELET # BLD AUTO: 192 E9/L (ref 130–450)
PMV BLD AUTO: 12.3 FL (ref 7–12)
POTASSIUM SERPL-SCNC: 4 MMOL/L (ref 3.5–5)
PROT UR STRIP-MCNC: ABNORMAL MG/DL
RBC # BLD AUTO: 4.27 E12/L (ref 3.5–5.5)
RBC #/AREA URNS HPF: ABNORMAL /HPF (ref 0–2)
SODIUM SERPL-SCNC: 136 MMOL/L (ref 132–146)
SP GR UR STRIP: 1.02 (ref 1–1.03)
UROBILINOGEN UR STRIP-ACNC: 2 E.U./DL
WBC # BLD: 6.8 E9/L (ref 4.5–11.5)
WBC #/AREA URNS HPF: ABNORMAL /HPF (ref 0–5)

## 2023-03-28 ENCOUNTER — APPOINTMENT (OUTPATIENT)
Dept: CT IMAGING | Age: 68
End: 2023-03-28
Payer: MEDICARE

## 2023-03-28 ENCOUNTER — APPOINTMENT (OUTPATIENT)
Dept: GENERAL RADIOLOGY | Age: 68
End: 2023-03-28
Payer: MEDICARE

## 2023-03-28 ENCOUNTER — HOSPITAL ENCOUNTER (OUTPATIENT)
Age: 68
Setting detail: OBSERVATION
Discharge: HOME | End: 2023-04-03
Attending: EMERGENCY MEDICINE | Admitting: INTERNAL MEDICINE
Payer: MEDICARE

## 2023-03-28 DIAGNOSIS — R41.82 ALTERED MENTAL STATUS, UNSPECIFIED ALTERED MENTAL STATUS TYPE: Primary | ICD-10-CM

## 2023-03-28 DIAGNOSIS — R46.89 AGGRESSIVE BEHAVIOR: ICD-10-CM

## 2023-03-28 LAB
ALBUMIN SERPL-MCNC: 3.4 G/DL (ref 3.5–5.2)
ALP SERPL-CCNC: 120 U/L (ref 35–104)
ALT SERPL-CCNC: 18 U/L (ref 0–32)
AMMONIA PLAS-SCNC: 11 UMOL/L (ref 11–51)
AMPHET UR QL SCN: NOT DETECTED
ANION GAP SERPL CALCULATED.3IONS-SCNC: 9 MMOL/L (ref 7–16)
APAP SERPL-MCNC: <5 MCG/ML (ref 10–30)
AST SERPL-CCNC: 19 U/L (ref 0–31)
BARBITURATES UR QL SCN: NOT DETECTED
BASOPHILS # BLD: 0.04 E9/L (ref 0–0.2)
BASOPHILS NFR BLD: 0.7 % (ref 0–2)
BENZODIAZ UR QL SCN: NOT DETECTED
BILIRUB SERPL-MCNC: 1 MG/DL (ref 0–1.2)
BILIRUB UR QL STRIP: NEGATIVE
BUN SERPL-MCNC: 17 MG/DL (ref 6–23)
CALCIUM SERPL-MCNC: 9 MG/DL (ref 8.6–10.2)
CANNABINOIDS UR QL SCN: NOT DETECTED
CHLORIDE SERPL-SCNC: 103 MMOL/L (ref 98–107)
CHP ED QC CHECK: NORMAL
CLARITY UR: CLEAR
CO2 SERPL-SCNC: 29 MMOL/L (ref 22–29)
COCAINE UR QL SCN: NOT DETECTED
COLOR UR: YELLOW
CREAT SERPL-MCNC: 0.8 MG/DL (ref 0.5–1)
DRUG SCREEN COMMENT UR-IMP: NORMAL
EKG ATRIAL RATE: 82 BPM
EKG P AXIS: 86 DEGREES
EKG P-R INTERVAL: 152 MS
EKG Q-T INTERVAL: 468 MS
EKG QRS DURATION: 148 MS
EKG QTC CALCULATION (BAZETT): 546 MS
EKG R AXIS: -55 DEGREES
EKG T AXIS: 61 DEGREES
EKG VENTRICULAR RATE: 82 BPM
EOSINOPHIL # BLD: 0.04 E9/L (ref 0.05–0.5)
EOSINOPHIL NFR BLD: 0.7 % (ref 0–6)
ERYTHROCYTE [DISTWIDTH] IN BLOOD BY AUTOMATED COUNT: 19.4 FL (ref 11.5–15)
ETHANOLAMINE SERPL-MCNC: <10 MG/DL (ref 0–0.08)
FENTANYL SCREEN, URINE: NOT DETECTED
GLUCOSE BLD-MCNC: 92 MG/DL
GLUCOSE SERPL-MCNC: 198 MG/DL (ref 74–99)
GLUCOSE UR STRIP-MCNC: 500 MG/DL
HCT VFR BLD AUTO: 43.5 % (ref 34–48)
HGB BLD-MCNC: 12.7 G/DL (ref 11.5–15.5)
HGB UR QL STRIP: NEGATIVE
KETONES UR STRIP-MCNC: NEGATIVE MG/DL
LACTATE BLDV-SCNC: 1 MMOL/L (ref 0.5–1.9)
LEUKOCYTE ESTERASE UR QL STRIP: NEGATIVE
LYMPHOCYTES # BLD: 1.12 E9/L (ref 1.5–4)
LYMPHOCYTES NFR BLD: 20.1 % (ref 20–42)
MAGNESIUM SERPL-MCNC: 1.9 MG/DL (ref 1.6–2.6)
MCH RBC QN AUTO: 26 PG (ref 26–35)
MCHC RBC AUTO-ENTMCNC: 29.2 % (ref 32–34.5)
MCV RBC AUTO: 89.1 FL (ref 80–99.9)
METER GLUCOSE: 92 MG/DL (ref 74–99)
METHADONE UR QL SCN: NOT DETECTED
MONOCYTES # BLD: 0.43 E9/L (ref 0.1–0.95)
MONOCYTES NFR BLD: 7.7 % (ref 2–12)
NEUTROPHILS # BLD: 3.93 E9/L (ref 1.8–7.3)
NEUTS SEG NFR BLD: 70.4 % (ref 43–80)
NITRITE UR QL STRIP: NEGATIVE
OPIATES UR QL SCN: NOT DETECTED
OXYCODONE URINE: NOT DETECTED
PCP UR QL SCN: NOT DETECTED
PH UR STRIP: 7 [PH] (ref 5–9)
PLATELET # BLD AUTO: 161 E9/L (ref 130–450)
PMV BLD AUTO: 11.4 FL (ref 7–12)
POTASSIUM SERPL-SCNC: 3.4 MMOL/L (ref 3.5–5)
PROT SERPL-MCNC: 7.2 G/DL (ref 6.4–8.3)
PROT UR STRIP-MCNC: NEGATIVE MG/DL
RBC # BLD AUTO: 4.88 E12/L (ref 3.5–5.5)
SALICYLATES SERPL-MCNC: <0.3 MG/DL (ref 0–30)
SODIUM SERPL-SCNC: 141 MMOL/L (ref 132–146)
SP GR UR STRIP: 1.01 (ref 1–1.03)
TRICYCLIC ANTIDEPRESSANTS SCREEN SERUM: NEGATIVE NG/ML
TROPONIN, HIGH SENSITIVITY: 14 NG/L (ref 0–9)
TROPONIN, HIGH SENSITIVITY: 17 NG/L (ref 0–9)
UROBILINOGEN UR STRIP-ACNC: 2 E.U./DL
WBC # BLD: 5.6 E9/L (ref 4.5–11.5)

## 2023-03-28 PROCEDURE — G0378 HOSPITAL OBSERVATION PER HR: HCPCS

## 2023-03-28 PROCEDURE — 96372 THER/PROPH/DIAG INJ SC/IM: CPT

## 2023-03-28 PROCEDURE — 84484 ASSAY OF TROPONIN QUANT: CPT

## 2023-03-28 PROCEDURE — 80053 COMPREHEN METABOLIC PANEL: CPT

## 2023-03-28 PROCEDURE — 6360000002 HC RX W HCPCS

## 2023-03-28 PROCEDURE — 83735 ASSAY OF MAGNESIUM: CPT

## 2023-03-28 PROCEDURE — 82962 GLUCOSE BLOOD TEST: CPT

## 2023-03-28 PROCEDURE — 80179 DRUG ASSAY SALICYLATE: CPT

## 2023-03-28 PROCEDURE — 70450 CT HEAD/BRAIN W/O DYE: CPT

## 2023-03-28 PROCEDURE — 85025 COMPLETE CBC W/AUTO DIFF WBC: CPT

## 2023-03-28 PROCEDURE — 6370000000 HC RX 637 (ALT 250 FOR IP): Performed by: EMERGENCY MEDICINE

## 2023-03-28 PROCEDURE — 81003 URINALYSIS AUTO W/O SCOPE: CPT

## 2023-03-28 PROCEDURE — 80143 DRUG ASSAY ACETAMINOPHEN: CPT

## 2023-03-28 PROCEDURE — 80307 DRUG TEST PRSMV CHEM ANLYZR: CPT

## 2023-03-28 PROCEDURE — 93005 ELECTROCARDIOGRAM TRACING: CPT | Performed by: EMERGENCY MEDICINE

## 2023-03-28 PROCEDURE — 82077 ASSAY SPEC XCP UR&BREATH IA: CPT

## 2023-03-28 PROCEDURE — 83605 ASSAY OF LACTIC ACID: CPT

## 2023-03-28 PROCEDURE — 71045 X-RAY EXAM CHEST 1 VIEW: CPT

## 2023-03-28 PROCEDURE — 99285 EMERGENCY DEPT VISIT HI MDM: CPT

## 2023-03-28 PROCEDURE — 93010 ELECTROCARDIOGRAM REPORT: CPT | Performed by: INTERNAL MEDICINE

## 2023-03-28 PROCEDURE — 82140 ASSAY OF AMMONIA: CPT

## 2023-03-28 RX ORDER — LORAZEPAM 2 MG/ML
INJECTION INTRAMUSCULAR
Status: COMPLETED
Start: 2023-03-28 | End: 2023-03-28

## 2023-03-28 RX ORDER — POTASSIUM CHLORIDE 20 MEQ/1
40 TABLET, EXTENDED RELEASE ORAL ONCE
Status: COMPLETED | OUTPATIENT
Start: 2023-03-28 | End: 2023-03-28

## 2023-03-28 RX ORDER — LORAZEPAM 2 MG/ML
2 INJECTION INTRAMUSCULAR ONCE
Status: COMPLETED | OUTPATIENT
Start: 2023-03-28 | End: 2023-03-28

## 2023-03-28 RX ADMIN — POTASSIUM CHLORIDE 40 MEQ: 1500 TABLET, EXTENDED RELEASE ORAL at 13:36

## 2023-03-28 RX ADMIN — LORAZEPAM 2 MG: 2 INJECTION INTRAMUSCULAR at 19:04

## 2023-03-28 RX ADMIN — LORAZEPAM 2 MG: 2 INJECTION INTRAMUSCULAR; INTRAVENOUS at 19:04

## 2023-03-28 ASSESSMENT — PAIN - FUNCTIONAL ASSESSMENT: PAIN_FUNCTIONAL_ASSESSMENT: NONE - DENIES PAIN

## 2023-03-28 ASSESSMENT — LIFESTYLE VARIABLES
HOW OFTEN DO YOU HAVE A DRINK CONTAINING ALCOHOL: NEVER
HOW MANY STANDARD DRINKS CONTAINING ALCOHOL DO YOU HAVE ON A TYPICAL DAY: PATIENT DOES NOT DRINK

## 2023-03-28 NOTE — ED NOTES
Patient aggressive, shouting at staff, non cooperative.  Physician at [address]ola RN  03/28/23 1297

## 2023-03-28 NOTE — ED PROVIDER NOTES
rate. Regular rhythm. No murmurs, no gallops, no rubs. 2+ distal pulses. Equal extremity pulses. Chest: No chest wall tenderness  GI:  Abdomen Soft, Non tender, Non distended. No rebound, guarding, or rigidity. No pulsatile masses. Musculoskeletal: Moves all extremities x 4. Warm and well perfused, no clubbing, no cyanosis, no edema. Capillary refill <3 seconds  Integument: skin warm and dry. No rashes.    Neurologic: GCS 15, no focal deficits, symmetric strength 5/5 in the upper and lower extremities bilaterally  Psychiatric: Normal Affect            DIAGNOSTIC RESULTS   LABS: Interpreted by Caterina Dotson MD    Labs Reviewed   CBC WITH AUTO DIFFERENTIAL - Abnormal; Notable for the following components:       Result Value    MCHC 29.2 (*)     RDW 19.4 (*)     Lymphocytes Absolute 1.12 (*)     Eosinophils Absolute 0.04 (*)     All other components within normal limits   COMPREHENSIVE METABOLIC PANEL W/ REFLEX TO MG FOR LOW K - Abnormal; Notable for the following components:    Potassium reflex Magnesium 3.4 (*)     Glucose 198 (*)     Albumin 3.4 (*)     Alkaline Phosphatase 120 (*)     All other components within normal limits   TROPONIN - Abnormal; Notable for the following components:    Troponin, High Sensitivity 14 (*)     All other components within normal limits   URINALYSIS - Abnormal; Notable for the following components:    Glucose, Ur 500 (*)     Urobilinogen, Urine 2.0 (*)     All other components within normal limits   SERUM DRUG SCREEN - Abnormal; Notable for the following components:    Acetaminophen Level <5.0 (*)     All other components within normal limits   POCT GLUCOSE - Normal   CULTURE, URINE   AMMONIA   LACTATE, SEPSIS   URINE DRUG SCREEN   MAGNESIUM   TROPONIN    Narrative:     High Sensitivity Troponin T   POCT GLUCOSE       As interpreted by me, the above displayed labs are abnormal. All other labs obtained during this visit were within normal range or not returned as of this

## 2023-03-28 NOTE — ED NOTES
Behavioral Health Crisis Assessment      Chief Complaint: Sent from [address] [facility] for AMS and combative behaviors against staff patient denies HI/SI    Mental Status Exam: Pt is alert and oriented to person, disoriented to place and time. answers were nonsensical, cooperative, denies suicidal and homicidal ideations, denies auditory and visual hallucinations, insight and judgment is poor. Legal Status:  [x] Voluntary:  [] Involuntary, Issued by:     Gender:  [] Male [x] Female [] Transgender  [] Other    Sexual Orientation:  [x] Heterosexual [] Homosexual [] Bisexual [] Other    Brief Clinical Summary: Pt is a 75year old female presenting to the ED with altered mental status and combative behavior towards staff at [facility]. Pt was unable to answer most questions appropriately. Pt denies suicidal and homicidal ideations. Tearful when she talked about her  passing away in January while she was in the hospital. Pt states she had financial issues and lost her house. Pt states she has been in 2 nursing homes since January due to her strokes and inability to care for herself. Pt has increased confusion and hard time focusing on questions asked. Pt was fixated on her dog that  a few months ago and got a new puppy after that. Pt kept circling back to this thought. Pt denies any auditory or visual hallucinations. Pt does not meet inpatient criteria for psych admission     Collateral Information: daughter Sarah Cohen- [phone], states this is not her moms [facility] sent pt in to be evaluated because she was being combative with them this morning. Sarah Cohen was there visiting when staff called EMT to bring pt to hospital. Sarah Cohen states her mom has Hydrocephalus and has a pump in her skull. Sarah Cohen states sometimes when her mom has too much fluid on the brain or not enough, it can alter her behavior. Pt was not keeping her O2 mask on at [facility].      Sarah Cohen states they are not holding pt's bed at [facility] and she has

## 2023-03-28 NOTE — ED NOTES
Patient with episode of anxiety and agitation, patient not redirecting to staff, patient grabbed EKG machine and held scanner up at staff, patient swinging EKG cords. Multiple attempts to redirect patient unsuccessful. Patient hyper verbal with staff.       Omar Saba RN  03/28/23 [id number]

## 2023-03-29 LAB
ANION GAP SERPL CALCULATED.3IONS-SCNC: 9 MMOL/L (ref 7–16)
BUN SERPL-MCNC: 21 MG/DL (ref 6–23)
CALCIUM SERPL-MCNC: 8.9 MG/DL (ref 8.6–10.2)
CHLORIDE SERPL-SCNC: 101 MMOL/L (ref 98–107)
CO2 SERPL-SCNC: 24 MMOL/L (ref 22–29)
CREAT SERPL-MCNC: 1.1 MG/DL (ref 0.5–1)
GLUCOSE SERPL-MCNC: 231 MG/DL (ref 74–99)
METER GLUCOSE: 128 MG/DL (ref 74–99)
METER GLUCOSE: 199 MG/DL (ref 74–99)
METER GLUCOSE: 245 MG/DL (ref 74–99)
POTASSIUM SERPL-SCNC: 4.7 MMOL/L (ref 3.5–5)
SODIUM SERPL-SCNC: 134 MMOL/L (ref 132–146)

## 2023-03-29 PROCEDURE — G0378 HOSPITAL OBSERVATION PER HR: HCPCS

## 2023-03-29 PROCEDURE — 80048 BASIC METABOLIC PNL TOTAL CA: CPT

## 2023-03-29 PROCEDURE — 2580000003 HC RX 258

## 2023-03-29 PROCEDURE — 6370000000 HC RX 637 (ALT 250 FOR IP)

## 2023-03-29 PROCEDURE — 82962 GLUCOSE BLOOD TEST: CPT

## 2023-03-29 PROCEDURE — S5553 INSULIN LONG ACTING 5 U: HCPCS

## 2023-03-29 RX ORDER — PRAMIPEXOLE DIHYDROCHLORIDE 0.25 MG/1
0.75 TABLET ORAL 2 TIMES DAILY
Status: DISCONTINUED | OUTPATIENT
Start: 2023-03-29 | End: 2023-04-03 | Stop reason: HOSPADM

## 2023-03-29 RX ORDER — PANTOPRAZOLE SODIUM 40 MG/1
40 TABLET, DELAYED RELEASE ORAL
Status: DISCONTINUED | OUTPATIENT
Start: 2023-03-29 | End: 2023-04-03 | Stop reason: HOSPADM

## 2023-03-29 RX ORDER — INSULIN GLARGINE-YFGN 100 [IU]/ML
5 INJECTION, SOLUTION SUBCUTANEOUS NIGHTLY
Status: DISCONTINUED | OUTPATIENT
Start: 2023-03-29 | End: 2023-03-30

## 2023-03-29 RX ORDER — ACETAMINOPHEN 650 MG/1
650 SUPPOSITORY RECTAL EVERY 6 HOURS PRN
Status: DISCONTINUED | OUTPATIENT
Start: 2023-03-29 | End: 2023-04-03 | Stop reason: HOSPADM

## 2023-03-29 RX ORDER — SODIUM CHLORIDE 0.9 % (FLUSH) 0.9 %
5-40 SYRINGE (ML) INJECTION EVERY 12 HOURS SCHEDULED
Status: DISCONTINUED | OUTPATIENT
Start: 2023-03-29 | End: 2023-04-03 | Stop reason: HOSPADM

## 2023-03-29 RX ORDER — CEPHALEXIN 500 MG/1
500 CAPSULE ORAL 2 TIMES DAILY
Status: ON HOLD | COMMUNITY
Start: 2023-03-24 | End: 2023-04-03

## 2023-03-29 RX ORDER — INSULIN DETEMIR 100 [IU]/ML
30 INJECTION, SOLUTION SUBCUTANEOUS NIGHTLY
Status: ON HOLD | COMMUNITY
End: 2023-04-03 | Stop reason: HOSPADM

## 2023-03-29 RX ORDER — ASPIRIN 81 MG/1
81 TABLET, CHEWABLE ORAL DAILY
Status: DISCONTINUED | OUTPATIENT
Start: 2023-03-29 | End: 2023-04-03 | Stop reason: HOSPADM

## 2023-03-29 RX ORDER — ALOGLIPTIN 12.5 MG/1
12.5 TABLET, FILM COATED ORAL DAILY
Status: DISCONTINUED | OUTPATIENT
Start: 2023-03-29 | End: 2023-04-03 | Stop reason: HOSPADM

## 2023-03-29 RX ORDER — ACETAMINOPHEN 325 MG/1
650 TABLET ORAL EVERY 6 HOURS PRN
Status: DISCONTINUED | OUTPATIENT
Start: 2023-03-29 | End: 2023-04-03 | Stop reason: HOSPADM

## 2023-03-29 RX ORDER — SODIUM CHLORIDE 9 MG/ML
INJECTION, SOLUTION INTRAVENOUS PRN
Status: DISCONTINUED | OUTPATIENT
Start: 2023-03-29 | End: 2023-04-03 | Stop reason: HOSPADM

## 2023-03-29 RX ORDER — OMEPRAZOLE 40 MG/1
40 CAPSULE, DELAYED RELEASE ORAL DAILY
Status: ON HOLD | COMMUNITY
End: 2023-04-03 | Stop reason: HOSPADM

## 2023-03-29 RX ORDER — ESCITALOPRAM OXALATE 10 MG/1
10 TABLET ORAL DAILY
Status: DISCONTINUED | OUTPATIENT
Start: 2023-03-29 | End: 2023-04-03 | Stop reason: HOSPADM

## 2023-03-29 RX ORDER — SODIUM CHLORIDE 0.9 % (FLUSH) 0.9 %
10 SYRINGE (ML) INJECTION PRN
Status: DISCONTINUED | OUTPATIENT
Start: 2023-03-29 | End: 2023-04-03 | Stop reason: HOSPADM

## 2023-03-29 RX ORDER — ERGOCALCIFEROL 1.25 MG/1
50000 CAPSULE ORAL WEEKLY
Status: DISCONTINUED | OUTPATIENT
Start: 2023-03-30 | End: 2023-04-03 | Stop reason: HOSPADM

## 2023-03-29 RX ORDER — ALBUTEROL SULFATE 2.5 MG/3ML
2.5 SOLUTION RESPIRATORY (INHALATION) EVERY 4 HOURS PRN
Status: DISCONTINUED | OUTPATIENT
Start: 2023-03-29 | End: 2023-04-03 | Stop reason: HOSPADM

## 2023-03-29 RX ORDER — DEXTROSE MONOHYDRATE 100 MG/ML
INJECTION, SOLUTION INTRAVENOUS CONTINUOUS PRN
Status: DISCONTINUED | OUTPATIENT
Start: 2023-03-29 | End: 2023-04-03 | Stop reason: HOSPADM

## 2023-03-29 RX ORDER — ENOXAPARIN SODIUM 100 MG/ML
40 INJECTION SUBCUTANEOUS DAILY
Status: DISCONTINUED | OUTPATIENT
Start: 2023-03-29 | End: 2023-04-03 | Stop reason: HOSPADM

## 2023-03-29 RX ORDER — ACETAMINOPHEN 325 MG/1
650 TABLET ORAL EVERY 6 HOURS PRN
COMMUNITY

## 2023-03-29 RX ORDER — BISACODYL 10 MG
10 SUPPOSITORY, RECTAL RECTAL DAILY PRN
Status: ON HOLD | COMMUNITY
End: 2023-04-03 | Stop reason: HOSPADM

## 2023-03-29 RX ORDER — SODIUM PHOSPHATE, DIBASIC AND SODIUM PHOSPHATE, MONOBASIC 7; 19 G/133ML; G/133ML
1 ENEMA RECTAL DAILY PRN
Status: ON HOLD | COMMUNITY
End: 2023-04-03 | Stop reason: HOSPADM

## 2023-03-29 RX ORDER — CARVEDILOL 6.25 MG/1
6.25 TABLET ORAL 2 TIMES DAILY WITH MEALS
Status: DISCONTINUED | OUTPATIENT
Start: 2023-03-29 | End: 2023-04-03 | Stop reason: HOSPADM

## 2023-03-29 RX ORDER — ATORVASTATIN CALCIUM 40 MG/1
40 TABLET, FILM COATED ORAL NIGHTLY
Status: DISCONTINUED | OUTPATIENT
Start: 2023-03-29 | End: 2023-04-03 | Stop reason: HOSPADM

## 2023-03-29 RX ORDER — ESCITALOPRAM OXALATE 20 MG/1
20 TABLET ORAL DAILY
Status: ON HOLD | COMMUNITY
End: 2023-04-03 | Stop reason: SDUPTHER

## 2023-03-29 RX ORDER — SPIRONOLACTONE 25 MG/1
25 TABLET ORAL DAILY
Status: DISCONTINUED | OUTPATIENT
Start: 2023-03-29 | End: 2023-04-03 | Stop reason: HOSPADM

## 2023-03-29 RX ORDER — POLYETHYLENE GLYCOL 3350 17 G/17G
17 POWDER, FOR SOLUTION ORAL DAILY PRN
Status: DISCONTINUED | OUTPATIENT
Start: 2023-03-29 | End: 2023-04-03 | Stop reason: HOSPADM

## 2023-03-29 RX ORDER — INSULIN LISPRO 100 [IU]/ML
0-4 INJECTION, SOLUTION INTRAVENOUS; SUBCUTANEOUS
Status: DISCONTINUED | OUTPATIENT
Start: 2023-03-29 | End: 2023-04-03 | Stop reason: HOSPADM

## 2023-03-29 RX ORDER — INSULIN LISPRO 100 [IU]/ML
0-4 INJECTION, SOLUTION INTRAVENOUS; SUBCUTANEOUS NIGHTLY
Status: DISCONTINUED | OUTPATIENT
Start: 2023-03-29 | End: 2023-04-03 | Stop reason: HOSPADM

## 2023-03-29 RX ADMIN — INSULIN GLARGINE-YFGN 5 UNITS: 100 INJECTION, SOLUTION SUBCUTANEOUS at 21:37

## 2023-03-29 RX ADMIN — ATORVASTATIN CALCIUM 40 MG: 40 TABLET, FILM COATED ORAL at 21:30

## 2023-03-29 RX ADMIN — SODIUM CHLORIDE, PRESERVATIVE FREE 10 ML: 5 INJECTION INTRAVENOUS at 21:30

## 2023-03-29 RX ADMIN — PRAMIPEXOLE DIHYDROCHLORIDE 0.75 MG: 0.25 TABLET ORAL at 22:42

## 2023-03-29 NOTE — CARE COORDINATION
03/29/23 Update CM Note: spoke with Angus Skinner at [facility]. Patient is not a bed hold but can return at discharge. She is requesting a PT. OT eval for skill but if not able she will still take patient back pending bed availability. Will follow.  Electronically signed by Nikko Jefferson RN CM on 3/29/2023 at 9:57 AM

## 2023-03-29 NOTE — CARE COORDINATION
03/29/23 Case Management Note: Contact has been made with Melinda Ortiz at [facility]. She will check on patients ability to return to the facility when medically stable. Will call CM with determination of return.  Electronically signed by Teddy Lima RN CM on 3/29/2023 at 7:59 AM

## 2023-03-29 NOTE — H&P
daily   Yes Historical Provider, MD   aspirin 81 MG chewable tablet Take 1 tablet by mouth daily 1/12/23   Severo Sar, DO   atorvastatin (LIPITOR) 40 MG tablet Take 1 tablet by mouth nightly 1/11/23   Severo Sar, DO   carvedilol (COREG) 6.25 MG tablet Take 1 tablet by mouth 2 times daily (with meals) 1/11/23   Severo Sar, DO   spironolactone (ALDACTONE) 25 MG tablet Take 25 mg by mouth daily    Historical Provider, MD   pramipexole (MIRAPEX) 0.75 MG tablet Take 0.75 mg by mouth in the morning and at bedtime    Historical Provider, MD   SITagliptin (JANUVIA) 100 MG tablet Take 100 mg by mouth daily    Historical Provider, MD   albuterol sulfate  (90 Base) MCG/ACT inhaler Inhale 2 puffs into the lungs every 4 hours as needed 9/16/20   Historical Provider, MD   OXYGEN Inhale 3 L into the lungs as needed    Historical Provider, MD   omeprazole (PRILOSEC) 40 MG capsule   Take 40 mg by mouth daily  5/16/15   Historical Provider, MD   vitamin D (ERGOCALCIFEROL) 15570 UNITS CAPS capsule Take 50,000 Units by mouth once a week Given Thursday 4/30/15   Historical Provider, MD       Allergies:  Sulfa antibiotics and Pentazocine lactate    Social History:      The patient currently lives  IN A FACILITY    TOBACCO:   reports that she quit smoking about 4 years ago. Her smoking use included cigarettes. She has a 20.00 pack-year smoking history. She has never used smokeless tobacco.  ETOH:   reports no history of alcohol use. Family History:       Reviewed in detail and negative for DM, CAD, Cancer, CVA. Positive as follows:        Problem Relation Age of Onset    High Blood Pressure Mother     Diabetes Mother     Emphysema Father     Heart Attack Sister     Heart Failure Sister        REVIEW OF SYSTEMS:   Pertinent positives as noted in the HPI. All other systems reviewed and negative.     PHYSICAL EXAM:    /81   Pulse 84   Temp 97.9 °F (36.6 °C)   Resp 16   Wt 135 lb (61.2 kg)   LMP

## 2023-03-29 NOTE — ED NOTES
Medication list reviewed and updated with list provided by facility. Med rec completed.      Rodrigo Daugherty RN  03/29/23 1971

## 2023-03-29 NOTE — ED NOTES
Communicated shift report to Adryan, [employer], RN  03/29/23 [id number]

## 2023-03-30 PROBLEM — E43 SEVERE PROTEIN-CALORIE MALNUTRITION (HCC): Chronic | Status: ACTIVE | Noted: 2023-03-30

## 2023-03-30 PROBLEM — Z98.2 S/P VP SHUNT: Status: ACTIVE | Noted: 2023-03-30

## 2023-03-30 LAB
ANION GAP SERPL CALCULATED.3IONS-SCNC: 13 MMOL/L (ref 7–16)
BUN SERPL-MCNC: 25 MG/DL (ref 6–23)
CALCIUM SERPL-MCNC: 8.7 MG/DL (ref 8.6–10.2)
CHLORIDE SERPL-SCNC: 101 MMOL/L (ref 98–107)
CO2 SERPL-SCNC: 24 MMOL/L (ref 22–29)
CREAT SERPL-MCNC: 1.1 MG/DL (ref 0.5–1)
GLUCOSE SERPL-MCNC: 226 MG/DL (ref 74–99)
METER GLUCOSE: 175 MG/DL (ref 74–99)
METER GLUCOSE: 227 MG/DL (ref 74–99)
METER GLUCOSE: 258 MG/DL (ref 74–99)
METER GLUCOSE: 276 MG/DL (ref 74–99)
POTASSIUM SERPL-SCNC: 4.3 MMOL/L (ref 3.5–5)
SODIUM SERPL-SCNC: 138 MMOL/L (ref 132–146)

## 2023-03-30 PROCEDURE — G0378 HOSPITAL OBSERVATION PER HR: HCPCS

## 2023-03-30 PROCEDURE — 80048 BASIC METABOLIC PNL TOTAL CA: CPT

## 2023-03-30 PROCEDURE — 97530 THERAPEUTIC ACTIVITIES: CPT

## 2023-03-30 PROCEDURE — S5553 INSULIN LONG ACTING 5 U: HCPCS | Performed by: INTERNAL MEDICINE

## 2023-03-30 PROCEDURE — 2580000003 HC RX 258

## 2023-03-30 PROCEDURE — 97535 SELF CARE MNGMENT TRAINING: CPT

## 2023-03-30 PROCEDURE — 6360000002 HC RX W HCPCS

## 2023-03-30 PROCEDURE — 97161 PT EVAL LOW COMPLEX 20 MIN: CPT

## 2023-03-30 PROCEDURE — 6370000000 HC RX 637 (ALT 250 FOR IP)

## 2023-03-30 PROCEDURE — 6370000000 HC RX 637 (ALT 250 FOR IP): Performed by: INTERNAL MEDICINE

## 2023-03-30 PROCEDURE — 99222 1ST HOSP IP/OBS MODERATE 55: CPT | Performed by: NEUROLOGICAL SURGERY

## 2023-03-30 PROCEDURE — 96372 THER/PROPH/DIAG INJ SC/IM: CPT

## 2023-03-30 PROCEDURE — 36415 COLL VENOUS BLD VENIPUNCTURE: CPT

## 2023-03-30 PROCEDURE — 82962 GLUCOSE BLOOD TEST: CPT

## 2023-03-30 PROCEDURE — 97165 OT EVAL LOW COMPLEX 30 MIN: CPT

## 2023-03-30 RX ORDER — INSULIN GLARGINE-YFGN 100 [IU]/ML
12 INJECTION, SOLUTION SUBCUTANEOUS 2 TIMES DAILY
Status: DISCONTINUED | OUTPATIENT
Start: 2023-03-30 | End: 2023-04-03 | Stop reason: HOSPADM

## 2023-03-30 RX ADMIN — SPIRONOLACTONE 25 MG: 25 TABLET ORAL at 07:47

## 2023-03-30 RX ADMIN — ATORVASTATIN CALCIUM 40 MG: 40 TABLET, FILM COATED ORAL at 21:04

## 2023-03-30 RX ADMIN — PANTOPRAZOLE SODIUM 40 MG: 40 TABLET, DELAYED RELEASE ORAL at 06:16

## 2023-03-30 RX ADMIN — ESCITALOPRAM OXALATE 10 MG: 10 TABLET ORAL at 07:47

## 2023-03-30 RX ADMIN — ENOXAPARIN SODIUM 40 MG: 100 INJECTION SUBCUTANEOUS at 07:48

## 2023-03-30 RX ADMIN — SODIUM CHLORIDE, PRESERVATIVE FREE 10 ML: 5 INJECTION INTRAVENOUS at 21:06

## 2023-03-30 RX ADMIN — PRAMIPEXOLE DIHYDROCHLORIDE 0.75 MG: 0.25 TABLET ORAL at 07:47

## 2023-03-30 RX ADMIN — ACETAMINOPHEN 650 MG: 325 TABLET ORAL at 01:03

## 2023-03-30 RX ADMIN — CARVEDILOL 6.25 MG: 6.25 TABLET, FILM COATED ORAL at 18:22

## 2023-03-30 RX ADMIN — ERGOCALCIFEROL 50000 UNITS: 1.25 CAPSULE ORAL at 07:46

## 2023-03-30 RX ADMIN — SODIUM CHLORIDE, PRESERVATIVE FREE 10 ML: 5 INJECTION INTRAVENOUS at 07:48

## 2023-03-30 RX ADMIN — ACETAMINOPHEN 650 MG: 325 TABLET ORAL at 21:05

## 2023-03-30 RX ADMIN — CARVEDILOL 6.25 MG: 6.25 TABLET, FILM COATED ORAL at 07:47

## 2023-03-30 RX ADMIN — INSULIN LISPRO 2 UNITS: 100 INJECTION, SOLUTION INTRAVENOUS; SUBCUTANEOUS at 07:49

## 2023-03-30 RX ADMIN — INSULIN LISPRO 1 UNITS: 100 INJECTION, SOLUTION INTRAVENOUS; SUBCUTANEOUS at 18:23

## 2023-03-30 RX ADMIN — PRAMIPEXOLE DIHYDROCHLORIDE 0.75 MG: 0.25 TABLET ORAL at 22:20

## 2023-03-30 RX ADMIN — ALOGLIPTIN 12.5 MG: 12.5 TABLET, FILM COATED ORAL at 07:46

## 2023-03-30 RX ADMIN — ASPIRIN 81 MG 81 MG: 81 TABLET ORAL at 07:47

## 2023-03-30 RX ADMIN — INSULIN GLARGINE-YFGN 12 UNITS: 100 INJECTION, SOLUTION SUBCUTANEOUS at 21:07

## 2023-03-30 ASSESSMENT — PAIN DESCRIPTION - LOCATION
LOCATION: NECK
LOCATION: BACK

## 2023-03-30 ASSESSMENT — PAIN SCALES - GENERAL
PAINLEVEL_OUTOF10: 8
PAINLEVEL_OUTOF10: 8

## 2023-03-30 NOTE — CARE COORDINATION
Here as observation from SNF ([facility]) for AMS for aggressive behavior. Call placed to [address] [facility] @ [facility] to check bed status await return call . Currently with sitter in room. Needs to be sitter free to go to a facility. Psych consult Neurology consult. Pt/ot. Call placed daughter Janelle Kamara- she does not want her to return there she [facility] or continuing helathcare @ [facility]. Her mother has surgery oct 17 with Dr Anuel Aguayo-, a posterior C3-T1 fusion, has had rehab @ [place]. she also has ms and has shunt or hydrocephalus. Vm to Shannon Jaime await call back. Explained to daughter that her mother may have to go back to [facility] Electronically signed by Chester Thibodeaux RN on 3/30/2023 at 10:52 AM    Referral to [facility]. Await determination. Electronically signed by Chester Thibodeaux RN on 3/30/2023 at 11:03 AM    Message from Shannon Jaime @ [facility]-unable to accept. Call placed to [facility] left vm. Electronically signed by Chester Thibodeaux RN on 3/30/2023 at 12:40 PM    Call placed to [address] [facility] @ [facility]- she is not bedhold  but can return and would need precert. Referral given to [facility] await determination. Electronically signed by Chester Thibodeaux RN on 3/30/2023 at 1:32 PM    Message form Maryjo @[facility] - only have private LTC beds available. Electronically signed by Chester Thibodeaux RN on 3/31/2023 at 6:48 AM

## 2023-03-30 NOTE — ACP (ADVANCE CARE PLANNING)
Advance Care Planning   Healthcare Decision Maker:    Primary Decision Maker: Codey Betancourt - Child - [phone]    Click here to complete Healthcare Decision Makers including selection of the Healthcare Decision Maker Relationship (ie \"Primary\").

## 2023-03-31 PROBLEM — F09 COGNITIVE DISORDER: Status: ACTIVE | Noted: 2023-03-31

## 2023-03-31 PROBLEM — F03.918 DEMENTIA WITH BEHAVIORAL DISTURBANCE (HCC): Status: ACTIVE | Noted: 2023-03-31

## 2023-03-31 LAB
ANION GAP SERPL CALCULATED.3IONS-SCNC: 8 MMOL/L (ref 7–16)
BUN SERPL-MCNC: 23 MG/DL (ref 6–23)
CALCIUM SERPL-MCNC: 8.6 MG/DL (ref 8.6–10.2)
CHLORIDE SERPL-SCNC: 104 MMOL/L (ref 98–107)
CO2 SERPL-SCNC: 27 MMOL/L (ref 22–29)
CREAT SERPL-MCNC: 1.2 MG/DL (ref 0.5–1)
GLUCOSE SERPL-MCNC: 242 MG/DL (ref 74–99)
METER GLUCOSE: 181 MG/DL (ref 74–99)
METER GLUCOSE: 200 MG/DL (ref 74–99)
METER GLUCOSE: 230 MG/DL (ref 74–99)
METER GLUCOSE: 243 MG/DL (ref 74–99)
POTASSIUM SERPL-SCNC: 4.6 MMOL/L (ref 3.5–5)
SODIUM SERPL-SCNC: 139 MMOL/L (ref 132–146)

## 2023-03-31 PROCEDURE — 6370000000 HC RX 637 (ALT 250 FOR IP)

## 2023-03-31 PROCEDURE — 6370000000 HC RX 637 (ALT 250 FOR IP): Performed by: NURSE PRACTITIONER

## 2023-03-31 PROCEDURE — 82962 GLUCOSE BLOOD TEST: CPT

## 2023-03-31 PROCEDURE — S5553 INSULIN LONG ACTING 5 U: HCPCS | Performed by: INTERNAL MEDICINE

## 2023-03-31 PROCEDURE — G0378 HOSPITAL OBSERVATION PER HR: HCPCS

## 2023-03-31 PROCEDURE — 96372 THER/PROPH/DIAG INJ SC/IM: CPT

## 2023-03-31 PROCEDURE — 2580000003 HC RX 258

## 2023-03-31 PROCEDURE — 2700000000 HC OXYGEN THERAPY PER DAY

## 2023-03-31 PROCEDURE — 36415 COLL VENOUS BLD VENIPUNCTURE: CPT

## 2023-03-31 PROCEDURE — 6360000002 HC RX W HCPCS

## 2023-03-31 PROCEDURE — 6370000000 HC RX 637 (ALT 250 FOR IP): Performed by: INTERNAL MEDICINE

## 2023-03-31 PROCEDURE — 80048 BASIC METABOLIC PNL TOTAL CA: CPT

## 2023-03-31 RX ORDER — LANOLIN ALCOHOL/MO/W.PET/CERES
3 CREAM (GRAM) TOPICAL DAILY
Status: DISCONTINUED | OUTPATIENT
Start: 2023-03-31 | End: 2023-04-03 | Stop reason: HOSPADM

## 2023-03-31 RX ORDER — DIVALPROEX SODIUM 125 MG/1
125 CAPSULE, COATED PELLETS ORAL EVERY 8 HOURS SCHEDULED
Status: DISCONTINUED | OUTPATIENT
Start: 2023-03-31 | End: 2023-04-03 | Stop reason: HOSPADM

## 2023-03-31 RX ADMIN — PRAMIPEXOLE DIHYDROCHLORIDE 0.75 MG: 0.25 TABLET ORAL at 09:26

## 2023-03-31 RX ADMIN — DIVALPROEX SODIUM 125 MG: 125 CAPSULE, COATED PELLETS ORAL at 21:37

## 2023-03-31 RX ADMIN — PRAMIPEXOLE DIHYDROCHLORIDE 0.75 MG: 0.25 TABLET ORAL at 21:37

## 2023-03-31 RX ADMIN — SODIUM CHLORIDE, PRESERVATIVE FREE 10 ML: 5 INJECTION INTRAVENOUS at 09:26

## 2023-03-31 RX ADMIN — CARVEDILOL 6.25 MG: 6.25 TABLET, FILM COATED ORAL at 17:28

## 2023-03-31 RX ADMIN — PANTOPRAZOLE SODIUM 40 MG: 40 TABLET, DELAYED RELEASE ORAL at 05:33

## 2023-03-31 RX ADMIN — DIVALPROEX SODIUM 125 MG: 125 CAPSULE, COATED PELLETS ORAL at 17:28

## 2023-03-31 RX ADMIN — ALOGLIPTIN 12.5 MG: 12.5 TABLET, FILM COATED ORAL at 09:25

## 2023-03-31 RX ADMIN — ATORVASTATIN CALCIUM 40 MG: 40 TABLET, FILM COATED ORAL at 21:37

## 2023-03-31 RX ADMIN — CARVEDILOL 6.25 MG: 6.25 TABLET, FILM COATED ORAL at 09:26

## 2023-03-31 RX ADMIN — ENOXAPARIN SODIUM 40 MG: 100 INJECTION SUBCUTANEOUS at 09:26

## 2023-03-31 RX ADMIN — ESCITALOPRAM OXALATE 10 MG: 10 TABLET ORAL at 09:25

## 2023-03-31 RX ADMIN — MELATONIN 3 MG ORAL TABLET 3 MG: 3 TABLET ORAL at 17:28

## 2023-03-31 RX ADMIN — SPIRONOLACTONE 25 MG: 25 TABLET ORAL at 09:26

## 2023-03-31 RX ADMIN — ASPIRIN 81 MG 81 MG: 81 TABLET ORAL at 09:25

## 2023-03-31 RX ADMIN — SODIUM CHLORIDE, PRESERVATIVE FREE 10 ML: 5 INJECTION INTRAVENOUS at 21:38

## 2023-03-31 RX ADMIN — INSULIN LISPRO 1 UNITS: 100 INJECTION, SOLUTION INTRAVENOUS; SUBCUTANEOUS at 09:27

## 2023-03-31 RX ADMIN — INSULIN LISPRO 1 UNITS: 100 INJECTION, SOLUTION INTRAVENOUS; SUBCUTANEOUS at 17:28

## 2023-03-31 RX ADMIN — INSULIN GLARGINE-YFGN 12 UNITS: 100 INJECTION, SOLUTION SUBCUTANEOUS at 21:38

## 2023-03-31 RX ADMIN — INSULIN GLARGINE-YFGN 12 UNITS: 100 INJECTION, SOLUTION SUBCUTANEOUS at 09:27

## 2023-03-31 NOTE — CARE COORDINATION
03/31/23 Update CM Note: [employer] sent to psyche to see patient now that neurosurgery has signed off case. . Electronically signed by Omayra Becker RN CM on 3/31/2023 at 12:26 PM

## 2023-03-31 NOTE — CARE COORDINATION
03/31/23 Update CM Note: Liaison. Dayana Steele from [employer] notified to begin precert for patient to return to facility. She remains observation. PT/OT are completed and documented. Attempts made to reach Erica Hathawaymasood, but voicemail box is not set up. Unable to leave a message.  Electronically signed by Alysha Vargas RN on 3/31/2023 at 12:03 PM

## 2023-03-31 NOTE — DISCHARGE INSTR - COC
[facility]   Address:  Phone:  Fax:    Dialysis Facility (if applicable)   Name:  Address:  Dialysis Schedule:  Phone:  Fax:    / signature: {Esignature:[id number]}    PHYSICIAN SECTION    Prognosis: {Prognosis:1897012549}    Condition at Discharge: [address] Patient Condition:279660415}    Rehab Potential (if transferring to Rehab): {Prognosis:1472640943}    Recommended Labs or Other Treatments After Discharge: ***    Physician Certification: I certify the above information and transfer of Mariel Monreal  is necessary for the continuing treatment of the diagnosis listed and that she requires {Admit to Appropriate Level of Care:85722} for {GREATER/LESS:010841817} 30 days.      Update Admission H&P: {CHP DME Changes in CZYMQ:617037989}    PHYSICIAN SIGNATURE:  Electronically signed by Greg Scherer DO on 4/3/23 at 10:03 AM EDT

## 2023-04-01 LAB
ANION GAP SERPL CALCULATED.3IONS-SCNC: 7 MMOL/L (ref 7–16)
BUN SERPL-MCNC: 20 MG/DL (ref 6–23)
CALCIUM SERPL-MCNC: 8.6 MG/DL (ref 8.6–10.2)
CHLORIDE SERPL-SCNC: 104 MMOL/L (ref 98–107)
CO2 SERPL-SCNC: 27 MMOL/L (ref 22–29)
CREAT SERPL-MCNC: 0.9 MG/DL (ref 0.5–1)
GLUCOSE SERPL-MCNC: 190 MG/DL (ref 74–99)
METER GLUCOSE: 128 MG/DL (ref 74–99)
METER GLUCOSE: 162 MG/DL (ref 74–99)
METER GLUCOSE: 209 MG/DL (ref 74–99)
METER GLUCOSE: 227 MG/DL (ref 74–99)
POTASSIUM SERPL-SCNC: 4.6 MMOL/L (ref 3.5–5)
SODIUM SERPL-SCNC: 138 MMOL/L (ref 132–146)

## 2023-04-01 PROCEDURE — 6360000002 HC RX W HCPCS

## 2023-04-01 PROCEDURE — 6370000000 HC RX 637 (ALT 250 FOR IP): Performed by: NURSE PRACTITIONER

## 2023-04-01 PROCEDURE — 80048 BASIC METABOLIC PNL TOTAL CA: CPT

## 2023-04-01 PROCEDURE — S5553 INSULIN LONG ACTING 5 U: HCPCS | Performed by: INTERNAL MEDICINE

## 2023-04-01 PROCEDURE — 6370000000 HC RX 637 (ALT 250 FOR IP): Performed by: INTERNAL MEDICINE

## 2023-04-01 PROCEDURE — 82962 GLUCOSE BLOOD TEST: CPT

## 2023-04-01 PROCEDURE — G0378 HOSPITAL OBSERVATION PER HR: HCPCS

## 2023-04-01 PROCEDURE — 2700000000 HC OXYGEN THERAPY PER DAY

## 2023-04-01 PROCEDURE — 96372 THER/PROPH/DIAG INJ SC/IM: CPT

## 2023-04-01 PROCEDURE — 2580000003 HC RX 258

## 2023-04-01 PROCEDURE — 6370000000 HC RX 637 (ALT 250 FOR IP)

## 2023-04-01 PROCEDURE — 36415 COLL VENOUS BLD VENIPUNCTURE: CPT

## 2023-04-01 RX ADMIN — ATORVASTATIN CALCIUM 40 MG: 40 TABLET, FILM COATED ORAL at 19:10

## 2023-04-01 RX ADMIN — INSULIN GLARGINE-YFGN 12 UNITS: 100 INJECTION, SOLUTION SUBCUTANEOUS at 20:44

## 2023-04-01 RX ADMIN — CARVEDILOL 6.25 MG: 6.25 TABLET, FILM COATED ORAL at 08:13

## 2023-04-01 RX ADMIN — INSULIN GLARGINE-YFGN 12 UNITS: 100 INJECTION, SOLUTION SUBCUTANEOUS at 08:14

## 2023-04-01 RX ADMIN — DIVALPROEX SODIUM 125 MG: 125 CAPSULE, COATED PELLETS ORAL at 20:43

## 2023-04-01 RX ADMIN — DIVALPROEX SODIUM 125 MG: 125 CAPSULE, COATED PELLETS ORAL at 13:39

## 2023-04-01 RX ADMIN — PRAMIPEXOLE DIHYDROCHLORIDE 0.75 MG: 0.25 TABLET ORAL at 19:11

## 2023-04-01 RX ADMIN — SODIUM CHLORIDE, PRESERVATIVE FREE 10 ML: 5 INJECTION INTRAVENOUS at 19:11

## 2023-04-01 RX ADMIN — INSULIN LISPRO 2 UNITS: 100 INJECTION, SOLUTION INTRAVENOUS; SUBCUTANEOUS at 12:43

## 2023-04-01 RX ADMIN — ALOGLIPTIN 12.5 MG: 12.5 TABLET, FILM COATED ORAL at 08:12

## 2023-04-01 RX ADMIN — ENOXAPARIN SODIUM 40 MG: 100 INJECTION SUBCUTANEOUS at 08:13

## 2023-04-01 RX ADMIN — ACETAMINOPHEN 650 MG: 325 TABLET ORAL at 19:11

## 2023-04-01 RX ADMIN — PRAMIPEXOLE DIHYDROCHLORIDE 0.75 MG: 0.25 TABLET ORAL at 08:12

## 2023-04-01 RX ADMIN — MELATONIN 3 MG ORAL TABLET 3 MG: 3 TABLET ORAL at 19:11

## 2023-04-01 RX ADMIN — SODIUM CHLORIDE, PRESERVATIVE FREE 10 ML: 5 INJECTION INTRAVENOUS at 08:13

## 2023-04-01 RX ADMIN — SPIRONOLACTONE 25 MG: 25 TABLET ORAL at 08:14

## 2023-04-01 RX ADMIN — ASPIRIN 81 MG 81 MG: 81 TABLET ORAL at 08:13

## 2023-04-01 RX ADMIN — PANTOPRAZOLE SODIUM 40 MG: 40 TABLET, DELAYED RELEASE ORAL at 06:16

## 2023-04-01 RX ADMIN — CARVEDILOL 6.25 MG: 6.25 TABLET, FILM COATED ORAL at 19:11

## 2023-04-01 RX ADMIN — DIVALPROEX SODIUM 125 MG: 125 CAPSULE, COATED PELLETS ORAL at 06:16

## 2023-04-01 RX ADMIN — ESCITALOPRAM OXALATE 10 MG: 10 TABLET ORAL at 08:14

## 2023-04-01 ASSESSMENT — PAIN SCALES - GENERAL: PAINLEVEL_OUTOF10: 2

## 2023-04-02 LAB
ANION GAP SERPL CALCULATED.3IONS-SCNC: 9 MMOL/L (ref 7–16)
BUN SERPL-MCNC: 21 MG/DL (ref 6–23)
CALCIUM SERPL-MCNC: 8.9 MG/DL (ref 8.6–10.2)
CHLORIDE SERPL-SCNC: 101 MMOL/L (ref 98–107)
CO2 SERPL-SCNC: 27 MMOL/L (ref 22–29)
CREAT SERPL-MCNC: 0.9 MG/DL (ref 0.5–1)
GLUCOSE SERPL-MCNC: 188 MG/DL (ref 74–99)
METER GLUCOSE: 149 MG/DL (ref 74–99)
METER GLUCOSE: 174 MG/DL (ref 74–99)
METER GLUCOSE: 205 MG/DL (ref 74–99)
METER GLUCOSE: 210 MG/DL (ref 74–99)
POTASSIUM SERPL-SCNC: 4.2 MMOL/L (ref 3.5–5)
SODIUM SERPL-SCNC: 137 MMOL/L (ref 132–146)

## 2023-04-02 PROCEDURE — 82962 GLUCOSE BLOOD TEST: CPT

## 2023-04-02 PROCEDURE — 6370000000 HC RX 637 (ALT 250 FOR IP): Performed by: INTERNAL MEDICINE

## 2023-04-02 PROCEDURE — 6360000002 HC RX W HCPCS

## 2023-04-02 PROCEDURE — 36415 COLL VENOUS BLD VENIPUNCTURE: CPT

## 2023-04-02 PROCEDURE — 96372 THER/PROPH/DIAG INJ SC/IM: CPT

## 2023-04-02 PROCEDURE — 6370000000 HC RX 637 (ALT 250 FOR IP)

## 2023-04-02 PROCEDURE — 6370000000 HC RX 637 (ALT 250 FOR IP): Performed by: NURSE PRACTITIONER

## 2023-04-02 PROCEDURE — S5553 INSULIN LONG ACTING 5 U: HCPCS | Performed by: INTERNAL MEDICINE

## 2023-04-02 PROCEDURE — G0378 HOSPITAL OBSERVATION PER HR: HCPCS

## 2023-04-02 PROCEDURE — 80048 BASIC METABOLIC PNL TOTAL CA: CPT

## 2023-04-02 PROCEDURE — 2580000003 HC RX 258

## 2023-04-02 PROCEDURE — 2700000000 HC OXYGEN THERAPY PER DAY

## 2023-04-02 RX ADMIN — ASPIRIN 81 MG 81 MG: 81 TABLET ORAL at 08:43

## 2023-04-02 RX ADMIN — PANTOPRAZOLE SODIUM 40 MG: 40 TABLET, DELAYED RELEASE ORAL at 05:45

## 2023-04-02 RX ADMIN — INSULIN LISPRO 2 UNITS: 100 INJECTION, SOLUTION INTRAVENOUS; SUBCUTANEOUS at 08:43

## 2023-04-02 RX ADMIN — SODIUM CHLORIDE, PRESERVATIVE FREE 10 ML: 5 INJECTION INTRAVENOUS at 08:43

## 2023-04-02 RX ADMIN — CARVEDILOL 6.25 MG: 6.25 TABLET, FILM COATED ORAL at 08:42

## 2023-04-02 RX ADMIN — INSULIN LISPRO 1 UNITS: 100 INJECTION, SOLUTION INTRAVENOUS; SUBCUTANEOUS at 18:20

## 2023-04-02 RX ADMIN — ALOGLIPTIN 12.5 MG: 12.5 TABLET, FILM COATED ORAL at 08:42

## 2023-04-02 RX ADMIN — INSULIN GLARGINE-YFGN 12 UNITS: 100 INJECTION, SOLUTION SUBCUTANEOUS at 21:10

## 2023-04-02 RX ADMIN — PRAMIPEXOLE DIHYDROCHLORIDE 0.75 MG: 0.25 TABLET ORAL at 08:42

## 2023-04-02 RX ADMIN — ATORVASTATIN CALCIUM 40 MG: 40 TABLET, FILM COATED ORAL at 21:02

## 2023-04-02 RX ADMIN — CARVEDILOL 6.25 MG: 6.25 TABLET, FILM COATED ORAL at 18:21

## 2023-04-02 RX ADMIN — DIVALPROEX SODIUM 125 MG: 125 CAPSULE, COATED PELLETS ORAL at 13:30

## 2023-04-02 RX ADMIN — MELATONIN 3 MG ORAL TABLET 3 MG: 3 TABLET ORAL at 18:21

## 2023-04-02 RX ADMIN — PRAMIPEXOLE DIHYDROCHLORIDE 0.75 MG: 0.25 TABLET ORAL at 21:02

## 2023-04-02 RX ADMIN — DIVALPROEX SODIUM 125 MG: 125 CAPSULE, COATED PELLETS ORAL at 21:02

## 2023-04-02 RX ADMIN — INSULIN GLARGINE-YFGN 12 UNITS: 100 INJECTION, SOLUTION SUBCUTANEOUS at 08:43

## 2023-04-02 RX ADMIN — ESCITALOPRAM OXALATE 10 MG: 10 TABLET ORAL at 08:42

## 2023-04-02 RX ADMIN — SPIRONOLACTONE 25 MG: 25 TABLET ORAL at 08:42

## 2023-04-02 RX ADMIN — ENOXAPARIN SODIUM 40 MG: 100 INJECTION SUBCUTANEOUS at 08:42

## 2023-04-02 RX ADMIN — DIVALPROEX SODIUM 125 MG: 125 CAPSULE, COATED PELLETS ORAL at 05:45

## 2023-04-02 ASSESSMENT — PAIN SCALES - GENERAL: PAINLEVEL_OUTOF10: 0

## 2023-04-03 VITALS
SYSTOLIC BLOOD PRESSURE: 107 MMHG | HEART RATE: 86 BPM | TEMPERATURE: 98 F | BODY MASS INDEX: 20.46 KG/M2 | RESPIRATION RATE: 18 BRPM | DIASTOLIC BLOOD PRESSURE: 71 MMHG | HEIGHT: 63 IN | OXYGEN SATURATION: 91 % | WEIGHT: 115.5 LBS

## 2023-04-03 LAB
ANION GAP SERPL CALCULATED.3IONS-SCNC: 9 MMOL/L (ref 7–16)
BUN SERPL-MCNC: 26 MG/DL (ref 6–23)
CALCIUM SERPL-MCNC: 9.1 MG/DL (ref 8.6–10.2)
CHLORIDE SERPL-SCNC: 102 MMOL/L (ref 98–107)
CO2 SERPL-SCNC: 27 MMOL/L (ref 22–29)
CREAT SERPL-MCNC: 0.9 MG/DL (ref 0.5–1)
GLUCOSE SERPL-MCNC: 236 MG/DL (ref 74–99)
METER GLUCOSE: 210 MG/DL (ref 74–99)
METER GLUCOSE: 241 MG/DL (ref 74–99)
METER GLUCOSE: 322 MG/DL (ref 74–99)
POTASSIUM SERPL-SCNC: 4.4 MMOL/L (ref 3.5–5)
SODIUM SERPL-SCNC: 138 MMOL/L (ref 132–146)

## 2023-04-03 PROCEDURE — 36415 COLL VENOUS BLD VENIPUNCTURE: CPT

## 2023-04-03 PROCEDURE — 6370000000 HC RX 637 (ALT 250 FOR IP): Performed by: NURSE PRACTITIONER

## 2023-04-03 PROCEDURE — G0378 HOSPITAL OBSERVATION PER HR: HCPCS

## 2023-04-03 PROCEDURE — 82962 GLUCOSE BLOOD TEST: CPT

## 2023-04-03 PROCEDURE — 6370000000 HC RX 637 (ALT 250 FOR IP)

## 2023-04-03 PROCEDURE — 6370000000 HC RX 637 (ALT 250 FOR IP): Performed by: INTERNAL MEDICINE

## 2023-04-03 PROCEDURE — 97530 THERAPEUTIC ACTIVITIES: CPT

## 2023-04-03 PROCEDURE — 80048 BASIC METABOLIC PNL TOTAL CA: CPT

## 2023-04-03 PROCEDURE — S5553 INSULIN LONG ACTING 5 U: HCPCS | Performed by: INTERNAL MEDICINE

## 2023-04-03 PROCEDURE — 97535 SELF CARE MNGMENT TRAINING: CPT

## 2023-04-03 PROCEDURE — 96372 THER/PROPH/DIAG INJ SC/IM: CPT

## 2023-04-03 PROCEDURE — 6360000002 HC RX W HCPCS

## 2023-04-03 PROCEDURE — 2700000000 HC OXYGEN THERAPY PER DAY

## 2023-04-03 RX ORDER — SPIRONOLACTONE 25 MG/1
25 TABLET ORAL DAILY
Qty: 30 TABLET | Refills: 0 | Status: SHIPPED | OUTPATIENT
Start: 2023-04-03

## 2023-04-03 RX ORDER — ALBUTEROL SULFATE 90 UG/1
2 AEROSOL, METERED RESPIRATORY (INHALATION) EVERY 4 HOURS PRN
Qty: 18 G | Refills: 0 | Status: SHIPPED | OUTPATIENT
Start: 2023-04-03

## 2023-04-03 RX ORDER — ATORVASTATIN CALCIUM 40 MG/1
40 TABLET, FILM COATED ORAL NIGHTLY
Qty: 30 TABLET | Refills: 0 | Status: SHIPPED | OUTPATIENT
Start: 2023-04-03

## 2023-04-03 RX ORDER — CARVEDILOL 6.25 MG/1
6.25 TABLET ORAL 2 TIMES DAILY WITH MEALS
Qty: 60 TABLET | Refills: 0 | Status: SHIPPED | OUTPATIENT
Start: 2023-04-03

## 2023-04-03 RX ORDER — CEPHALEXIN 500 MG/1
500 CAPSULE ORAL 2 TIMES DAILY
Qty: 14 CAPSULE | Refills: 0 | Status: SHIPPED | OUTPATIENT
Start: 2023-04-03 | End: 2023-04-10

## 2023-04-03 RX ORDER — DIVALPROEX SODIUM 125 MG/1
125 CAPSULE, COATED PELLETS ORAL EVERY 8 HOURS SCHEDULED
Qty: 60 CAPSULE | Refills: 3 | DISCHARGE
Start: 2023-04-03 | End: 2023-04-03 | Stop reason: SDUPTHER

## 2023-04-03 RX ORDER — PRAMIPEXOLE DIHYDROCHLORIDE 0.75 MG/1
0.75 TABLET ORAL 2 TIMES DAILY
Qty: 90 TABLET | Refills: 0 | Status: SHIPPED | OUTPATIENT
Start: 2023-04-03

## 2023-04-03 RX ORDER — LANOLIN ALCOHOL/MO/W.PET/CERES
3 CREAM (GRAM) TOPICAL DAILY
Refills: 3 | DISCHARGE
Start: 2023-04-03 | End: 2023-04-03 | Stop reason: SDUPTHER

## 2023-04-03 RX ORDER — ASPIRIN 81 MG/1
81 TABLET, CHEWABLE ORAL DAILY
Qty: 30 TABLET | Refills: 0 | Status: SHIPPED | OUTPATIENT
Start: 2023-04-03

## 2023-04-03 RX ORDER — DIVALPROEX SODIUM 125 MG/1
125 CAPSULE, COATED PELLETS ORAL EVERY 8 HOURS SCHEDULED
Qty: 60 CAPSULE | Refills: 3 | Status: SHIPPED | OUTPATIENT
Start: 2023-04-03

## 2023-04-03 RX ORDER — CEPHALEXIN 500 MG/1
500 CAPSULE ORAL 2 TIMES DAILY
Qty: 14 CAPSULE | Refills: 0 | DISCHARGE
Start: 2023-04-03 | End: 2023-04-03 | Stop reason: SDUPTHER

## 2023-04-03 RX ORDER — LANOLIN ALCOHOL/MO/W.PET/CERES
3 CREAM (GRAM) TOPICAL DAILY
Qty: 30 TABLET | Refills: 3 | Status: SHIPPED | OUTPATIENT
Start: 2023-04-03

## 2023-04-03 RX ORDER — OMEPRAZOLE 40 MG/1
40 CAPSULE, DELAYED RELEASE ORAL DAILY
Qty: 30 CAPSULE | Refills: 0 | Status: SHIPPED | OUTPATIENT
Start: 2023-04-03

## 2023-04-03 RX ORDER — ESCITALOPRAM OXALATE 20 MG/1
20 TABLET ORAL DAILY
Qty: 30 TABLET | Refills: 0 | Status: SHIPPED | OUTPATIENT
Start: 2023-04-03

## 2023-04-03 RX ORDER — ERGOCALCIFEROL 1.25 MG/1
50000 CAPSULE ORAL WEEKLY
Qty: 5 CAPSULE | Refills: 0 | Status: SHIPPED | OUTPATIENT
Start: 2023-04-03

## 2023-04-03 RX ADMIN — INSULIN GLARGINE-YFGN 12 UNITS: 100 INJECTION, SOLUTION SUBCUTANEOUS at 08:17

## 2023-04-03 RX ADMIN — ALOGLIPTIN 12.5 MG: 12.5 TABLET, FILM COATED ORAL at 08:18

## 2023-04-03 RX ADMIN — ESCITALOPRAM OXALATE 10 MG: 10 TABLET ORAL at 08:17

## 2023-04-03 RX ADMIN — INSULIN LISPRO 1 UNITS: 100 INJECTION, SOLUTION INTRAVENOUS; SUBCUTANEOUS at 08:17

## 2023-04-03 RX ADMIN — SPIRONOLACTONE 25 MG: 25 TABLET ORAL at 08:17

## 2023-04-03 RX ADMIN — ASPIRIN 81 MG 81 MG: 81 TABLET ORAL at 08:18

## 2023-04-03 RX ADMIN — INSULIN LISPRO 3 UNITS: 100 INJECTION, SOLUTION INTRAVENOUS; SUBCUTANEOUS at 12:12

## 2023-04-03 RX ADMIN — CARVEDILOL 6.25 MG: 6.25 TABLET, FILM COATED ORAL at 08:17

## 2023-04-03 RX ADMIN — PRAMIPEXOLE DIHYDROCHLORIDE 0.75 MG: 0.25 TABLET ORAL at 08:18

## 2023-04-03 RX ADMIN — ENOXAPARIN SODIUM 40 MG: 100 INJECTION SUBCUTANEOUS at 08:18

## 2023-04-03 RX ADMIN — PANTOPRAZOLE SODIUM 40 MG: 40 TABLET, DELAYED RELEASE ORAL at 05:40

## 2023-04-03 RX ADMIN — DIVALPROEX SODIUM 125 MG: 125 CAPSULE, COATED PELLETS ORAL at 05:39

## 2023-04-03 RX ADMIN — DIVALPROEX SODIUM 125 MG: 125 CAPSULE, COATED PELLETS ORAL at 14:46

## 2023-04-03 NOTE — CARE COORDINATION
04/03/23 Update CM Note: Therapy did update and patient with [facility] of 18/24. She ambulated 100 plus feet with CGA and walker. She will require assistance with ADLs. She will be discharging home with her daughter, Elena Garcia. Spoke with Elena Garcia who [facility] [address]. She will pick the patient up later this evening after picking up her grand children. Elena Garcia was instructed to get the patients portable oxygen tank and bring it with her. Per Elena Garcia there are no other needs she states.  Electronically signed by Nikko Jefferson RN CM on 4/3/2023 at 2:50 PM

## 2023-04-03 NOTE — CARE COORDINATION
04/03/23 Update CM Note: Patient continues to await precert from insurance to discharge to [facility]. Checking with insurance again today. If denied she will need to discharge home pending electric is on at [facility] house. Family has been living in a hotel currently. Will follow for completion of precert.  Electronically signed by Yumiko Pelaez RN CM on 4/3/2023 at 10:39 AM

## 2023-04-03 NOTE — PLAN OF CARE
Patient's chart updated to reflect:      . - HF care plan, HF education points and HF discharge instructions.  -Orders: 2 gram sodium diet, daily weights, I/O.  -PCP and/or Cardiologist appointment to be scheduled within 7 days of hospital discharge.  -History of HF, not primary admission Dx.   Patient admitted for treatment of ASSESSMENT:          Active Hospital Problems     Diagnosis Date Noted    Aggressive behavior [R46.89] 03/28/2023   II DM   HLD   DEPRESSION  HTN   H/O NPH  Vin Jaeger RN RN, BSN  Heart Failure Navigator
Problem: Safety - Adult  Goal: Free from fall injury  3/30/2023 0005 by Ivelisse Harrington RN  Outcome: Progressing  3/29/2023 2323 by Wendi Pemberton, RN  Outcome: Progressing     Problem: Self Harm/Suicidality  Goal: Will have no self-injury during hospital stay  Description: INTERVENTIONS:  1. Ensure constant observer at bedside with Q15M safety checks  2. Maintain a safe environment  3. Secure patient belongings  4. Ensure family/visitors adhere to safety recommendations  5. Ensure safety tray has been added to patient's diet order  6.   Every shift and PRN: Re-assess suicidal risk via Frequent Screener    3/30/2023 0005 by Ivelisse Harrington RN  Outcome: Progressing  3/29/2023 2323 by Wendi Pemberton, RN  Outcome: Progressing
Problem: Safety - Adult  Goal: Free from fall injury  4/1/2023 0905 by Sammye Kanner, RN  Outcome: Progressing  4/1/2023 0049 by Mini Mathias RN  Outcome: Progressing     Problem: Self Harm/Suicidality  Goal: Will have no self-injury during hospital stay  Description: INTERVENTIONS:  1. Ensure constant observer at bedside with Q15M safety checks  2. Maintain a safe environment  3. Secure patient belongings  4. Ensure family/visitors adhere to safety recommendations  5. Ensure safety tray has been added to patient's diet order  6. Every shift and PRN: Re-assess suicidal risk via Frequent Screener    4/1/2023 0905 by Sammye Kanner, RN  Outcome: Progressing  4/1/2023 0049 by Mini Mathias RN  Outcome: Progressing     Problem: Chronic Conditions and Co-morbidities  Goal: Patient's chronic conditions and co-morbidity symptoms are monitored and maintained or improved  4/1/2023 0905 by Sammye Kanner, RN  Outcome: Progressing  4/1/2023 0049 by Mini Mathias RN  Outcome: Progressing     Problem: Skin/Tissue Integrity  Goal: Absence of new skin breakdown  Description: 1. Monitor for areas of redness and/or skin breakdown  2. Assess vascular access sites hourly  3. Every 4-6 hours minimum:  Change oxygen saturation probe site  4. Every 4-6 hours:  If on nasal continuous positive airway pressure, respiratory therapy assess nares and determine need for appliance change or resting period.   4/1/2023 0905 by Sammye Kanner, RN  Outcome: Progressing  4/1/2023 0049 by Mini Mathias RN  Outcome: Progressing     Problem: Nutrition Deficit:  Goal: Optimize nutritional status  4/1/2023 0905 by Sammye Kanner, RN  Outcome: Progressing  4/1/2023 0049 by Mini Mathias RN  Outcome: Progressing     Problem: Cardiovascular - Adult  Goal: Maintains optimal cardiac output and hemodynamic stability  4/1/2023 0905 by Sammye Kanner, RN  Outcome: Progressing  4/1/2023 0049 by Mini Mathias RN  Outcome: Progressing     Problem:
Problem: Safety - Adult  Goal: Free from fall injury  Outcome: Completed     Problem: Self Harm/Suicidality  Goal: Will have no self-injury during hospital stay  Description: INTERVENTIONS:  1. Ensure constant observer at bedside with Q15M safety checks  2. Maintain a safe environment  3. Secure patient belongings  4. Ensure family/visitors adhere to safety recommendations  5. Ensure safety tray has been added to patient's diet order  6. Every shift and PRN: Re-assess suicidal risk via Frequent Screener    Outcome: Completed     Problem: Chronic Conditions and Co-morbidities  Goal: Patient's chronic conditions and co-morbidity symptoms are monitored and maintained or improved  Outcome: Completed     Problem: Skin/Tissue Integrity  Goal: Absence of new skin breakdown  Description: 1. Monitor for areas of redness and/or skin breakdown  2. Assess vascular access sites hourly  3. Every 4-6 hours minimum:  Change oxygen saturation probe site  4. Every 4-6 hours:  If on nasal continuous positive airway pressure, respiratory therapy assess nares and determine need for appliance change or resting period.   Outcome: Completed     Problem: Nutrition Deficit:  Goal: Optimize nutritional status  Outcome: Completed     Problem: Cardiovascular - Adult  Goal: Maintains optimal cardiac output and hemodynamic stability  Outcome: Completed     Problem: Metabolic/Fluid and Electrolytes - Adult  Goal: Hemodynamic stability and optimal renal function maintained  Outcome: Completed     Problem: Pain  Goal: Verbalizes/displays adequate comfort level or baseline comfort level  Outcome: Completed
Problem: Safety - Adult  Goal: Free from fall injury  Outcome: Progressing
Problem: Safety - Adult  Goal: Free from fall injury  Outcome: Progressing     Problem: Chronic Conditions and Co-morbidities  Goal: Patient's chronic conditions and co-morbidity symptoms are monitored and maintained or improved  Outcome: Progressing     Problem: Skin/Tissue Integrity  Goal: Absence of new skin breakdown  Description: 1. Monitor for areas of redness and/or skin breakdown  2. Assess vascular access sites hourly  3. Every 4-6 hours minimum:  Change oxygen saturation probe site  4. Every 4-6 hours:  If on nasal continuous positive airway pressure, respiratory therapy assess nares and determine need for appliance change or resting period.   Outcome: Progressing
Problem: Safety - Adult  Goal: Free from fall injury  Outcome: Progressing     Problem: Self Harm/Suicidality  Goal: Will have no self-injury during hospital stay  Description: INTERVENTIONS:  1. Ensure constant observer at bedside with Q15M safety checks  2. Maintain a safe environment  3. Secure patient belongings  4. Ensure family/visitors adhere to safety recommendations  5. Ensure safety tray has been added to patient's diet order  6. Every shift and PRN: Re-assess suicidal risk via Frequent Screener    Outcome: Progressing     Problem: Chronic Conditions and Co-morbidities  Goal: Patient's chronic conditions and co-morbidity symptoms are monitored and maintained or improved  Outcome: Progressing     Problem: Skin/Tissue Integrity  Goal: Absence of new skin breakdown  Description: 1. Monitor for areas of redness and/or skin breakdown  2. Assess vascular access sites hourly  3. Every 4-6 hours minimum:  Change oxygen saturation probe site  4. Every 4-6 hours:  If on nasal continuous positive airway pressure, respiratory therapy assess nares and determine need for appliance change or resting period.   Outcome: Progressing     Problem: Nutrition Deficit:  Goal: Optimize nutritional status  Outcome: Progressing     Problem: Cardiovascular - Adult  Goal: Maintains optimal cardiac output and hemodynamic stability  Outcome: Progressing     Problem: Metabolic/Fluid and Electrolytes - Adult  Goal: Hemodynamic stability and optimal renal function maintained  Outcome: Progressing
Metabolic/Fluid and Electrolytes - Adult  Goal: Hemodynamic stability and optimal renal function maintained  4/1/2023 2253 by Dereck Simmons RN  Outcome: Progressing  4/1/2023 0905 by Mary Ann Espinoza RN  Outcome: Progressing
Metabolic/Fluid and Electrolytes - Adult  Goal: Hemodynamic stability and optimal renal function maintained  4/2/2023 0802 by Gardenia Wilson RN  Outcome: Progressing  4/1/2023 2253 by Ky Thomason RN  Outcome: Progressing     Problem: Pain  Goal: Verbalizes/displays adequate comfort level or baseline comfort level  Outcome: Progressing

## 2023-04-03 NOTE — CARE COORDINATION
04/03/23 Update CM Note: Therapy called to see patient for an update today for possible discharge to home.  Electronically signed by Heladio Monroe RN CM on 4/3/2023 at 1:49 PM

## 2023-04-03 NOTE — CONSULTS
Psychiatry aware of patient and chart has been reviewed. We will await neurosurgery evaluation and assess the patient after their evaluation.
This patient was seen and evaluated by psychiatry on 3/31/2023 please see the chart note written by Thuy Bolton file time of 3/31/2023 at 4:27 PM psychiatry has signed off
her shunt is failed especially given the fact that her  ventricular size is unchanged. From a Neurosurgical standpoint, no  intervention is required.         Geo Dunbar MD    D: 03/30/2023 19:49:00       T: 03/30/2023 19:51:21     [email or web address]  Job#: [id number]     Doc#: [id number]    CC:
intracranial abnormality. Specifically, there is no intracranial hemorrhage. 2. Atrophy and periventricular leukomalacia,     XR CHEST PORTABLE    Result Date: 3/28/2023  EXAMINATION: ONE XRAY VIEW OF THE CHEST 3/28/2023 8:28 am COMPARISON: 20 February 2023 HISTORY: ORDERING SYSTEM PROVIDED HISTORY: altered mental status TECHNOLOGIST PROVIDED HISTORY: Reason for exam:->altered mental status What reading provider will be dictating this exam?->CRC FINDINGS: Right-sided central ventriculostomy catheter descends the chest as before. Enlarged main pulmonary artery suggests pulmonary arterial hypertension. Stable postoperative changes. Right-sided pleural thickening as before. No new abnormal findings. Suspected pulmonary arterial hypertension. Suspected pulmonary arterial hypertension. No new abnormal findings. EKG: TRACING REVIEWED     RECOMMENDATIONS  The patient's diagnosis, treatment plan, medication management were formulated after patient was seen directly by the attending physician and myself and all relevant documentation was reviewed. The patient is not suicidal, homicidal psychotic or manic she does not meet criteria for inpatient psychiatric hospitalization. Melatonin 3 mg daily at 1800 to reset sleep wake cycle  Depakote 125 mg TID to reduce confusion and stabilize mood           Thanks for the consult.      Psychiatry signs off     Electronically signed by LIZA Hill CNP on 3/30/2023 at 2:29 PM

## 2023-04-04 NOTE — PROGRESS NOTES
CLINICAL PHARMACY NOTE: MEDS TO BEDS    Total # of Prescriptions Filled:  12   The following medications were delivered to the patient:  Vitamin d 1.25mg  Januvia 100mg  Omeprazole 40mg  Escitalopram 20mg  Atorvastatin 40mg  Spironolactone 25mg  Pramipexole 0.75mg  Jardiance 10mg  Carvedilol 6.25mg  Aspirin 81mg  Alubuterol inh  Cephalexin 500mg    Additional Documentation:  Delivered to nurse's station
Comprehensive Nutrition Assessment    Type and Reason for Visit:  Initial, Consult, Positive Nutrition Screen (gopal score; auto assess)    Nutrition Recommendations/Plan:   Continue current diet. Recommend and start Glucerna TID to optimize intake in the setting of severe malnutrition. Will continue to monitor. Malnutrition Assessment:  Malnutrition Status:  Severe malnutrition (03/30/23 1257)    Context:  Chronic Illness     Findings of the 6 clinical characteristics of malnutrition:  Energy Intake:  75% or less estimated energy requirements for 1 month or longer  Weight Loss:  Unable to assess (KATINA 2/2 Wt hx difficult to assess d/t hx of CKD/CHF and possible fluid shifts + varied wt hx)     Body Fat Loss:  Severe body fat loss Orbital, Triceps, Buccal region   Muscle Mass Loss:  Severe muscle mass loss Temples (temporalis), Clavicles (pectoralis & deltoids), Calf (gastrocnemius), Hand (interosseous)  Fluid Accumulation:  No significant fluid accumulation     Strength:  Not Performed    Nutrition Assessment:    Pt. admitted from SNF for AMS for aggressive behavior. Hx of stroke, CKD, CHF,CAD,COPD,DM,NPH,stroke, malnutrition. Pt. does meet criteria for severe malnutrition. PO intake is stable-pt. consuming % of meals. Will start ONS and monitor. Nutrition Related Findings:    AMS, +I/O, elevated BUN/Cr, hyperglycemia, no edema, +BS, severe wasting Wound Type: None       Current Nutrition Intake & Therapies:    Average Meal Intake: %  Average Supplements Intake: None Ordered  ADULT DIET; Regular; 3 carb choices (45 gm/meal)    Anthropometric Measures:  Height: 5' 3\" (160 cm)  Ideal Body Weight (IBW): 115 lbs (52 kg)    Admission Body Weight: 135 lb (61.2 kg) (3/29 BS)  Current Body Weight: 135 lb (61.2 kg) (3/29 BS), 117.4 % IBW.  Weight Source: Bed Scale  Current BMI (kg/m2): 23.9  Usual Body Weight:  (Wt hx difficult to assess d/t hx of CKD/CHF and possible fluid shifts + varied wt hx- wt
Dr. Elpidio Hammond notified of neurology consult. Pt added to census.
Dr. Remington Olivares notified of neurosurgery consult. Pt added to census.
Hospitalist Progress Note      PCP: Elizabeth Benjamin DO    Date of Admission: 3/28/2023        Hospital Course:  75 y.o. female presented with [address] . SHE IS FROM A FACILITY AND HAS EXHIBPTED  AGRESSIVE BEHAVIOR. HAS A KNOWN HISTORY ON NPH WITH  SHUNT, FAMILY WORRIED AOBUT INFECTION , REQUESTING NEUROSURGERY CONSULT,  CT NEG!!  NO WHITE COUNT, NO FEVER         Subjective: \ WANTS  TO EAT           Medications:  Reviewed    Infusion Medications    dextrose      sodium chloride       Scheduled Medications    insulin glargine-yfgn  12 Units SubCUTAneous BID    aspirin  81 mg Oral Daily    atorvastatin  40 mg Oral Nightly    carvedilol  6.25 mg Oral BID WC    escitalopram  10 mg Oral Daily    pantoprazole  40 mg Oral QAM AC    pramipexole  0.75 mg Oral BID    alogliptin  12.5 mg Oral Daily    vitamin D  50,000 Units Oral Weekly    spironolactone  25 mg Oral Daily    sodium chloride flush  5-40 mL IntraVENous 2 times per day    enoxaparin  40 mg SubCUTAneous Daily    insulin lispro  0-4 Units SubCUTAneous TID WC    insulin lispro  0-4 Units SubCUTAneous Nightly     PRN Meds: albuterol, glucose, dextrose bolus **OR** dextrose bolus, glucagon (rDNA), dextrose, sodium chloride flush, sodium chloride, polyethylene glycol, acetaminophen **OR** acetaminophen, trimethobenzamide      Intake/Output Summary (Last 24 hours) at 3/30/2023 1640  Last data filed at 3/30/2023 1518  Gross per 24 hour   Intake 1020 ml   Output --   Net 1020 ml       Exam:    BP (!) 143/106   Pulse 91   Temp 98.7 °F (37.1 °C) (Temporal)   Resp 20   Ht 5' 3\" (1.6 m)   Wt 135 lb (61.2 kg)   LMP  (LMP Unknown)   SpO2 100%   BMI 23.91 kg/m²       General appearance:  No apparent distress, appears stated age. HEENT:  Normal cephalic,   Neck: Supple, with full range of motion. No jugular venous distention. Trachea midline. Respiratory:  Normal respiratory effort.  Clear to auscultation,   Cardiovascular:  RRR  Abdomen:
Hospitalist Progress Note      PCP: Rashawn Patel DO    Date of Admission: 3/28/2023        Hospital Course:   75 y.o. female presented with [address] . SHE IS FROM A FACILITY AND HAS EXHIBPTED  AGRESSIVE BEHAVIOR. HAS A KNOWN HISTORY ON NPH WITH  SHUNT, FAMILY WORRIED AOBUT INFECTION , REQUESTING NEUROSURGERY CONSULT,  CT NEG!!  NO WHITE COUNT, NO FEVER, NEUROSURGERY AGREES, NO FURTHER WORKUP,   AWAITING PSYCH EVAL           Subjective: PLEASANTLY CONFUSED, DAUGHTER PRESENT           Medications:  Reviewed    Infusion Medications    dextrose      sodium chloride       Scheduled Medications    divalproex  125 mg Oral 3 times per day    melatonin  3 mg Oral Daily    insulin glargine-yfgn  12 Units SubCUTAneous BID    aspirin  81 mg Oral Daily    atorvastatin  40 mg Oral Nightly    carvedilol  6.25 mg Oral BID WC    escitalopram  10 mg Oral Daily    pantoprazole  40 mg Oral QAM AC    pramipexole  0.75 mg Oral BID    alogliptin  12.5 mg Oral Daily    vitamin D  50,000 Units Oral Weekly    spironolactone  25 mg Oral Daily    sodium chloride flush  5-40 mL IntraVENous 2 times per day    enoxaparin  40 mg SubCUTAneous Daily    insulin lispro  0-4 Units SubCUTAneous TID WC    insulin lispro  0-4 Units SubCUTAneous Nightly     PRN Meds: albuterol, glucose, dextrose bolus **OR** dextrose bolus, glucagon (rDNA), dextrose, sodium chloride flush, sodium chloride, polyethylene glycol, acetaminophen **OR** acetaminophen, trimethobenzamide      Intake/Output Summary (Last 24 hours) at 3/31/2023 2108  Last data filed at 3/31/2023 1951  Gross per 24 hour   Intake 600 ml   Output --   Net 600 ml       Exam:    /65   Pulse 90   Temp 97.2 °F (36.2 °C) (Temporal)   Resp 16   Ht 5' 3\" (1.6 m)   Wt 135 lb (61.2 kg)   LMP  (LMP Unknown)   SpO2 90%   BMI 23.91 kg/m²         General appearance:  No apparent distress,   HEENT:  Normal cephalic,   Neck: Supple, with full range of motion.  No jugular
Hospitalist Progress Note      PCP: Ronnie Mcdonald DO    Date of Admission: 3/28/2023        Hospital Course:  75 y.o. female presented with [address] . SHE IS FROM A FACILITY AND HAS EXHIBPTED  AGRESSIVE BEHAVIOR. HAS A KNOWN HISTORY ON NPH WITH  SHUNT, FAMILY WORRIED AOBUT INFECTION , REQUESTING NEUROSURGERY CONSULT,  CT NEG!!  NO WHITE COUNT, NO FEVER, NEUROSURGERY AGREES, NO FURTHER WORKUP,    TO DC HOME           Subjective:  WANTS TO DC HOME           Medications:  Reviewed    Infusion Medications    dextrose      sodium chloride       Scheduled Medications    divalproex  125 mg Oral 3 times per day    melatonin  3 mg Oral Daily    insulin glargine-yfgn  12 Units SubCUTAneous BID    aspirin  81 mg Oral Daily    atorvastatin  40 mg Oral Nightly    carvedilol  6.25 mg Oral BID WC    escitalopram  10 mg Oral Daily    pantoprazole  40 mg Oral QAM AC    pramipexole  0.75 mg Oral BID    alogliptin  12.5 mg Oral Daily    vitamin D  50,000 Units Oral Weekly    spironolactone  25 mg Oral Daily    sodium chloride flush  5-40 mL IntraVENous 2 times per day    enoxaparin  40 mg SubCUTAneous Daily    insulin lispro  0-4 Units SubCUTAneous TID WC    insulin lispro  0-4 Units SubCUTAneous Nightly     PRN Meds: albuterol, glucose, dextrose bolus **OR** dextrose bolus, glucagon (rDNA), dextrose, sodium chloride flush, sodium chloride, polyethylene glycol, acetaminophen **OR** acetaminophen, trimethobenzamide      Intake/Output Summary (Last 24 hours) at 4/3/2023 1601  Last data filed at 4/3/2023 1018  Gross per 24 hour   Intake 360 ml   Output --   Net 360 ml       Exam:    /71   Pulse 86   Temp 98 °F (36.7 °C) (Temporal)   Resp 18   Ht 5' 3\" (1.6 m)   Wt 115 lb 8 oz (52.4 kg)   LMP  (LMP Unknown)   SpO2 91%   BMI 20.46 kg/m²     General appearance:  No apparent distress,   HEENT:  Normal cephalic,   Neck: Supple, with full range of motion. . Trachea midline.   Respiratory:  Normal
Hospitalist Progress Note      PCP: Yanet Lerner DO    Date of Admission: 3/28/2023        Hospital Course:   75 y.o. female presented with [address] . SHE IS FROM A FACILITY AND HAS EXHIBPTED  AGRESSIVE BEHAVIOR. HAS A KNOWN HISTORY ON NPH WITH  SHUNT, FAMILY WORRIED AOBUT INFECTION , REQUESTING NEUROSURGERY CONSULT,  CT NEG!!  NO WHITE COUNT, NO FEVER, NEUROSURGERY AGREES, NO FURTHER WORKUP,   AWAITING PSYCH EVAL           Subjective:     Patient is alert and oriented.   Teary eyed on exam as she is upset that she lost her  in January  No acute events overnight      Medications:  Reviewed    Infusion Medications    dextrose      sodium chloride       Scheduled Medications    divalproex  125 mg Oral 3 times per day    melatonin  3 mg Oral Daily    insulin glargine-yfgn  12 Units SubCUTAneous BID    aspirin  81 mg Oral Daily    atorvastatin  40 mg Oral Nightly    carvedilol  6.25 mg Oral BID WC    escitalopram  10 mg Oral Daily    pantoprazole  40 mg Oral QAM AC    pramipexole  0.75 mg Oral BID    alogliptin  12.5 mg Oral Daily    vitamin D  50,000 Units Oral Weekly    spironolactone  25 mg Oral Daily    sodium chloride flush  5-40 mL IntraVENous 2 times per day    enoxaparin  40 mg SubCUTAneous Daily    insulin lispro  0-4 Units SubCUTAneous TID WC    insulin lispro  0-4 Units SubCUTAneous Nightly     PRN Meds: albuterol, glucose, dextrose bolus **OR** dextrose bolus, glucagon (rDNA), dextrose, sodium chloride flush, sodium chloride, polyethylene glycol, acetaminophen **OR** acetaminophen, trimethobenzamide      Intake/Output Summary (Last 24 hours) at 4/1/2023 1457  Last data filed at 4/1/2023 1248  Gross per 24 hour   Intake 720 ml   Output --   Net 720 ml       Exam:    BP (!) 140/68   Pulse 86   Temp 97.6 °F (36.4 °C) (Temporal)   Resp 17   Ht 5' 3\" (1.6 m)   Wt 135 lb (61.2 kg)   LMP  (LMP Unknown)   SpO2 96%   BMI 23.91 kg/m²         General appearance:  No
Left message for Dr. Norton regarding family requesting consult for neurosurgery d/t  shunt possibly not working correctly.   
Psych consult called
Spoke with nurse at [facility] regarding patient oxygen, states patient chronically wears 3L O2.
House  Labs and vital signs stable    0/2/1368  Await precert for [address]  Patient is on baseline 4 liters O2  Vital signs stable  Hopeful for discharge to [facility] in the next 24 hours  Psych has evaluated the patient- will not qualify for inpatient psych  No intervention planned by neurosurgery in regards to shunt-NPH  Resting comfortably in no acute distress-on the toilet on my evaluation    Mary Ann Schofield MD
minutes  [x] Therapeutic activities 44627 15 minutes  [] Therapeutic exercises 53342 -- minutes  [] Neuromuscular reeducation 14083 -- minutes    Nathan Worley PT, DPT  [id number]
WNL ;    WNL FMC/dexterity noted during ADL tasks         Sensation:  Denies numbness or tingling Srini UEs   Tone: WFL Srini UEs   Edema: None Noted Srini UEs         Comments/Treatment/Education: Upon arrival pt supine in bed, agreeable to therapy. Pt compelted of bed mobility, functional mobility, transfers and ADL tasks this session. Pt was educated on role of OT, goals to be reached, safety with bed mobility, safety and hand placement with transfers, safety/balance and walker managements with functional mobility, and compensatory strategies to assist with ADL tasks. Pt requiring cueing for follow through to complete all tasks, with cueing for safety. At end of session, pt seated upright in bed, all lines and tubes intact, call light within reach. Pt has made fair progress towards set goals.    Continue with current plan of care focusing on increasing of independency with transfers and ADL tasks      Treatment Time In: 1:10pm           Treatment Time Out: 1:40pm                Treatment Charges: Mins Units   Ther Ex  79777     Manual Therapy 74945     Thera Activities 47998 15 1   ADL/Home Mgt 80651 15 1   Neuro Re-ed 69273     Group Therapy      Orthotic manage/training  00079     Non-Billable Time     Total Timed Treatment 30 2        Verónica Meade HERNANDEZ/L 92147
from continued skilled OT to increase safety and independence with completion of ADL/IADL tasks for functional independence and quality of life. Treatment: OT treatment provided this date includes:   Instruction/training on safety and adapted techniques for completion of ADLs, use of DME/AD/Adaptive equip:    Instruction/training on safe functional mobility/transfer techniques, use of DME/AD:       Instruction/training on energy conservation techs (EC)/Work Simplification/Pursed-Lip Breathing (PLB) for completion of ADLs:     Neuromuscular Reeducation to facilitate balance/righting reactions for increased function with ADLs:    Skilled positioning/alignment for Pain Mgmt, to maximize Pt's safety and ability to safely and INDly interact w/ his/her environment, maximize respiratory status  Activity tolerance - Sitting/Standing to improve endurance w/ functional ax   Cognitive retraining -  Oriented pt to current Date, Place and Situation; Cues for safety/safety awareness, sequencing, problem solving    Skilled monitoring of pt's response to tx ax      Pt/family ed re: benefits of participate in post-acute therapy program;     Consulted RN,      Made all appropriate Environmental Modifications to facilitate pt's level of IND and safety. Recommendations for Continued Participation in OT services during Hospitalization and at D/C - SNF    Pt and/or Family verbalized/demonstrated a Fair understanding of education provided. Will Review PRN. Rehab Potential: Good(-) for established goals     Patient / Family Goal: Not stated at this time      Patient and/or family were instructed on functional diagnosis, prognosis/goals and OT plan of care. Demonstrated Fair understanding.      Eval Complexity: Low    Time In: 1444  Time Out: 1508  Total Treatment Time: 9 minutes    Min Units   OT Eval Low 97165  X  1   OT Eval Medium 16692      OT Eval High 41566      OT Re-Eval [id number]       Therapeutic Ex
observation of tasks, assessment of data and education on plan of care and goals.     CPT codes:  [x] Low Complexity PT evaluation 86356  [] Moderate Complexity PT evaluation 11338  [] High Complexity PT evaluation 13300  [] PT Re-evaluation 91122  [] Gait training 51931 0 minutes  [] Manual therapy 77030 0 minutes  [x] Therapeutic activities 26491 8 minutes  [] Therapeutic exercises 78321 0 minutes  [] Neuromuscular reeducation 74898 0 minutes     Regina Wang PT, DPT  [id number]

## 2023-04-06 NOTE — DISCHARGE SUMMARY
Yes Иван Norton, DO   albuterol sulfate HFA (PROVENTIL;VENTOLIN;PROAIR) 108 (90 Base) MCG/ACT inhaler Inhale 2 puffs into the lungs every 4 hours as needed for Wheezing or Shortness of Breath 4/3/23  Yes Иван Norton DO   aspirin 81 MG chewable tablet Take 1 tablet by mouth daily 4/3/23  Yes Иван Norton DO   atorvastatin (LIPITOR) 40 MG tablet Take 1 tablet by mouth nightly 4/3/23  Yes Susette Bosworth, DO   carvedilol (COREG) 6.25 MG tablet Take 1 tablet by mouth 2 times daily (with meals) 4/3/23  Yes Иван Norton DO   empagliflozin (JARDIANCE) 25 MG tablet Take 1 tablet by mouth daily 4/3/23  Yes Susette Bosworth, DO   escitalopram (LEXAPRO) 20 MG tablet Take 1 tablet by mouth daily 4/3/23  Yes Susette Bosworth, DO   omeprazole (PRILOSEC) 40 MG delayed release capsule Take 1 capsule by mouth daily 4/3/23  Yes Susette Bosworth, DO   pramipexole (MIRAPEX) 0.75 MG tablet Take 1 tablet by mouth in the morning and at bedtime 4/3/23  Yes Иван Norton DO   SITagliptin (JANUVIA) 100 MG tablet Take 1 tablet by mouth daily 4/3/23  Yes Susette Bosworth, DO   spironolactone (ALDACTONE) 25 MG tablet Take 1 tablet by mouth daily 4/3/23  Yes Susette Bosworth, DO   vitamin D (ERGOCALCIFEROL) 1.25 MG (39706 UT) CAPS capsule Take 1 capsule by mouth once a week Given Thursday 4/3/23  Yes Susette Bosworth, DO   melatonin 3 MG TABS tablet Take 1 tablet by mouth daily 4/3/23  Yes Susette Bosworth, DO   divalproex (DEPAKOTE SPRINKLE) 125 MG DR capsule Take 1 capsule by mouth every 8 hours 4/3/23  Yes Susette Bosworth, DO   acetaminophen (TYLENOL) 325 MG tablet Take 650 mg by mouth every 6 hours as needed for Fever or Pain   Yes Historical Provider, MD   OXYGEN Inhale 3 L into the lungs as needed    Historical Provider, MD       Consults:   IP CONSULT TO SOCIAL WORK  IP CONSULT TO HOSPITALIST  IP CONSULT TO INTERNAL MEDICINE  IP CONSULT TO SOCIAL WORK  IP CONSULT TO PSYCHIATRY  IP

## (undated) DEVICE — SYRINGE,EAR/ULCER, 3 OZ, STERILE: Brand: MEDLINE

## (undated) DEVICE — SURGICAL PROCEDURE PACK BASIC

## (undated) DEVICE — WARMER SCP LAP

## (undated) DEVICE — GLOVE SURG SZ 65 THK91MIL LTX FREE SYN POLYISOPRENE

## (undated) DEVICE — SET EXTN L14IN 1ML IV L BOR NO FLTR NO STPCOCK

## (undated) DEVICE — TUBING, SUCTION, 3/16" X 12', STRAIGHT: Brand: MEDLINE

## (undated) DEVICE — DECANTER BAG 9": Brand: MEDLINE INDUSTRIES, INC.

## (undated) DEVICE — VALVE 42836 STRATA II BURR HOLE: Brand: STRATA®

## (undated) DEVICE — SNARE ENDOSCP M L240CM LOOP W27MM SHTH DIA2.4MM OVL FLX

## (undated) DEVICE — WAX SURG 2.5GM HEMSTAT BNE BEESWAX PARAFFIN ISO PALMITATE

## (undated) DEVICE — CORD,CAUTERY,BIPOLAR,STERILE: Brand: MEDLINE

## (undated) DEVICE — GLOVE ORANGE PI 8   MSG9080

## (undated) DEVICE — ADHESIVE SKIN CLOSURE TOP 36 CC HI VISC DERMBND MINI

## (undated) DEVICE — TOWEL,OR,DSP,ST,BLUE,STD,6/PK,12PK/CS: Brand: MEDLINE

## (undated) DEVICE — 3M™ STERI-DRAPE™ INCISE DRAPE 1050 (60CM X 45CM): Brand: STERI-DRAPE™

## (undated) DEVICE — INTENDED FOR TISSUE SEPARATION, AND OTHER PROCEDURES THAT REQUIRE A SHARP SURGICAL BLADE TO PUNCTURE OR CUT.: Brand: BARD-PARKER ® STAINLESS STEEL BLADES

## (undated) DEVICE — SKIN AFFIX SURG ADHESIVE 72/CS 0.55ML: Brand: MEDLINE

## (undated) DEVICE — NEEDLE HYPO 25GA L0.625IN BLU POLYPR HUB S STL REG BVL STR

## (undated) DEVICE — SYRINGE MED 10ML LUERLOCK TIP W/O SFTY DISP

## (undated) DEVICE — SYRINGE 20ML LL S/C 50

## (undated) DEVICE — TUBING SUCT 12FR MAL ALUM SHFT FN CAP VENT UNIV CONN W/ OBT

## (undated) DEVICE — THE MILL DISPOSABLE - FINE

## (undated) DEVICE — SOLUTION IV IRRIG WATER 1000ML POUR BRL 2F7114

## (undated) DEVICE — APPLICATOR PREP 26ML 0.7% IOD POVACRYLEX 74% ISO ALC ST

## (undated) DEVICE — GAUZE,SPONGE,4"X4",16PLY,XRAY,STRL,LF: Brand: MEDLINE

## (undated) DEVICE — 3M(TM) MEDIPORE(TM) +PAD SOFT CLOTH ADHESIVE WOUND DRESSING 3569: Brand: 3M™ MEDIPORE™

## (undated) DEVICE — BLADE ES L6IN ELASTOMERIC COAT EXT DURABLE BEND UPTO 90DEG

## (undated) DEVICE — JACKSON TABLE POSITIONER KIT: Brand: MEDLINE INDUSTRIES, INC.

## (undated) DEVICE — PATIENT RETURN ELECTRODE, SINGLE-USE, CONTACT QUALITY MONITORING, ADULT, WITH 9FT CORD, FOR PATIENTS WEIGING OVER 33LBS. (15KG): Brand: MEGADYNE

## (undated) DEVICE — FORCEPS BX L240CM JAW DIA2.4MM ORNG L CAP W/ NDL DISP RAD

## (undated) DEVICE — GARMENT,MEDLINE,DVT,INT,CALF,MED, GEN2: Brand: MEDLINE

## (undated) DEVICE — 1000 S-DRAPE TOWEL DRAPE 10/BX: Brand: STERI-DRAPE™

## (undated) DEVICE — DRESSING TRNSPAR W8XL12IN FLM SURESITE 123

## (undated) DEVICE — Device

## (undated) DEVICE — STANDARD HYPODERMIC NEEDLE,POLYPROPYLENE HUB: Brand: MONOJECT

## (undated) DEVICE — GRADUATE

## (undated) DEVICE — 3.0MM PRECISION NEURO (MATCH HEAD)

## (undated) DEVICE — CLOTH SURG PREP PREOPERATIVE CHLORHEXIDINE GLUC 2% READYPREP

## (undated) DEVICE — CAMERA STRYKER 1488 HD GEN

## (undated) DEVICE — TOTAL TRAY, 16FR 10ML SIL FOLEY, URN: Brand: MEDLINE

## (undated) DEVICE — KIT,ANTI FOG,W/SPONGE & FLUID,SOFT PACK: Brand: MEDLINE

## (undated) DEVICE — SNARE ENDOSCP L240CM LOOP W13MM SHTH DIA2.4MM SM OVL FLX

## (undated) DEVICE — PRECISION ACORN

## (undated) DEVICE — BLADE CLP TAPR HD WET DRY CAPABILITY GTT IN CHARGING USE

## (undated) DEVICE — GAUZE,SPONGE,AVANT,4"X4",4PLY,STRL,10/TR: Brand: MEDLINE

## (undated) DEVICE — 1.5L THIN WALL CAN: Brand: CRD

## (undated) DEVICE — PACK,UNIV, II AURORA: Brand: MEDLINE

## (undated) DEVICE — KIT EVAC 400CC DIA1/8IN H PAT 12.5IN 3 SPR RND SHP PVC DRN

## (undated) DEVICE — SET INST MINOR PROCEDURE

## (undated) DEVICE — DRAPE C ARM UNIV W41XL74IN CLR PLAS XR VELC CLSR POLY STRP

## (undated) DEVICE — GOWN,SIRUS,FABRNF,L,20/CS: Brand: MEDLINE

## (undated) DEVICE — VENTRICULAR DRAINAGE SYSTEM

## (undated) DEVICE — SWABSTICK MEDICATED L4IN BENZ TINC SKIN PREP APLICARE

## (undated) DEVICE — COUNTER NDL 30 COUNT DBL MAG

## (undated) DEVICE — STRIP,CLOSURE,WOUND,MEDI-STRIP,1/4X3: Brand: MEDLINE

## (undated) DEVICE — 3M™ STERI-DRAPE™ INCISE DRAPE 1040 (35CM X 35CM): Brand: STERI-DRAPE™

## (undated) DEVICE — LABEL MED 4 IN SURG PANEL W/ PEN STRL

## (undated) DEVICE — CODMAN® BARIUM VENTRICULAR CATHETER 15CM: Brand: CODMAN®

## (undated) DEVICE — DRILL BIT FOR 3.5MM SCREW

## (undated) DEVICE — THE LUMBAR CATHETER ACCESSORY KIT (LCAK) IS DESIGNED FOR CEREBROSPINAL FLUID DRAINAGE, AND CONSISTS OF: A 80 CM RADIOPAQUE LUMBAR CATHETER (F5) WITH GRAPHITE BASED LENGTH MARKS AT 2 CM INTERVALS (FIRST MARK AT 10 CM FROM THE CLOSED TIP); A GUIDEWIRE IN DISPENSER; A LUER-LOCK CONNECTOR; A BUTTERFLY SUTURE CLAMP; A 14-GA, THIN-WALL TUOHY NEEDLE WITH OBTURATOR.: Brand: LUMBAR CATHETER ACCESSORY KIT

## (undated) DEVICE — DOUBLE BASIN SET: Brand: MEDLINE INDUSTRIES, INC.

## (undated) DEVICE — DRESSING ADH N ADH 8X35 IN 6X175 IN SFT CLTH MEDIPORE +

## (undated) DEVICE — GOWN,SIRUS,FABRNF,XL,20/CS: Brand: MEDLINE

## (undated) DEVICE — GLOVE SURG 8.5 PF POLYMER WHT STRL SIGN LTX ESSENTIAL LTX

## (undated) DEVICE — READY WET SKIN SCRUB TRAY-LF: Brand: MEDLINE INDUSTRIES, INC.

## (undated) DEVICE — Z DUP USE 2257490 ADHESIVE SKIN CLSRE 036ML TPCL 2CTL CNCRLTE HIGH VSCSTY DRMB

## (undated) DEVICE — COLLAR CERV REG AD 2 PC ATLS

## (undated) DEVICE — TUNNELER SURG ROD SYNCHROMED IMP INFUS PMP DISP

## (undated) DEVICE — MARKER,SKIN,WI/RULER AND LABELS: Brand: MEDLINE

## (undated) DEVICE — PACK,LAPAROTOMY,NO GOWNS: Brand: MEDLINE

## (undated) DEVICE — HYPODERMIC SAFETY NEEDLE: Brand: MAGELLAN

## (undated) DEVICE — KIT INTRO L14CM DIA12FR GWIRE L50CM DIA0.038IN NDL 18GA

## (undated) DEVICE — CATH 43522 C/P CATH OPEN END W/SLITS STD

## (undated) DEVICE — TOWEL,OR,DSP,ST,BLUE,DLX,10/PK,8PK/CS: Brand: MEDLINE

## (undated) DEVICE — LUMBAR LAMINECTOMY: Brand: MEDLINE INDUSTRIES, INC.

## (undated) DEVICE — SWABSTICK SURG PREP BENZOIN TINCTURE SINGLE ST

## (undated) DEVICE — TROCAR ENDOSCP SHFT L100MM DIA5MM DIL TIP ENDOPATH XCEL

## (undated) DEVICE — APPLICATOR PREP 6ML 0.7% IOD POVACRYLEX 74% ISO ALC

## (undated) DEVICE — KIT PLT RATIO DISPNS KT 2IN CANN TIP SPRY TIP DISP MAGELLAN

## (undated) DEVICE — TRAP POLYP ETRAP

## (undated) DEVICE — ELECTRODE PT RET AD L9FT HI MOIST COND ADH HYDRGEL CORDED

## (undated) DEVICE — 3M™ IOBAN™ 2 ANTIMICROBIAL INCISE DRAPE 6640EZ: Brand: IOBAN™ 2

## (undated) DEVICE — GRADUATE TRIANG MEASURE 1000ML BLK PRNT